# Patient Record
Sex: FEMALE | Race: WHITE | Employment: OTHER | URBAN - METROPOLITAN AREA
[De-identification: names, ages, dates, MRNs, and addresses within clinical notes are randomized per-mention and may not be internally consistent; named-entity substitution may affect disease eponyms.]

---

## 2018-01-05 ENCOUNTER — TRANSFERRED RECORDS (OUTPATIENT)
Dept: HEALTH INFORMATION MANAGEMENT | Facility: CLINIC | Age: 68
End: 2018-01-05

## 2018-01-10 ENCOUNTER — TRANSFERRED RECORDS (OUTPATIENT)
Dept: HEALTH INFORMATION MANAGEMENT | Facility: CLINIC | Age: 68
End: 2018-01-10

## 2018-01-16 ENCOUNTER — TRANSFERRED RECORDS (OUTPATIENT)
Dept: HEALTH INFORMATION MANAGEMENT | Facility: CLINIC | Age: 68
End: 2018-01-16

## 2018-01-16 PROCEDURE — 00000346 ZZHCL STATISTIC REVIEW OUTSIDE SLIDES TC 88321: Performed by: OBSTETRICS & GYNECOLOGY

## 2018-01-17 ENCOUNTER — TRANSFERRED RECORDS (OUTPATIENT)
Dept: HEALTH INFORMATION MANAGEMENT | Facility: CLINIC | Age: 68
End: 2018-01-17

## 2018-01-19 ENCOUNTER — CARE COORDINATION (OUTPATIENT)
Dept: ONCOLOGY | Facility: CLINIC | Age: 68
End: 2018-01-19

## 2018-01-19 NOTE — PROGRESS NOTES
New Patient Pre-Visit Phone Call:    1) Verify time, date & provider:    2) What to bring:  - Medication list and/or bring medication bottles  - List or notebook - write down all questions to address at visit    3) What to expect:   - Briefly walk Pt through their day  - Duration (2-4 hrs) Bring reading material. Family members welcome.  - Possible labs, EKG, CXR    4) Records/information  - PCP:   - Referring MD information:    5) Road construction  6) Use Applico Parking    Maria Victoria Turner RN

## 2018-01-22 ENCOUNTER — ONCOLOGY VISIT (OUTPATIENT)
Dept: ONCOLOGY | Facility: CLINIC | Age: 68
End: 2018-01-22
Attending: OBSTETRICS & GYNECOLOGY
Payer: COMMERCIAL

## 2018-01-22 VITALS
HEART RATE: 82 BPM | TEMPERATURE: 98 F | OXYGEN SATURATION: 96 % | WEIGHT: 284.3 LBS | HEIGHT: 65 IN | RESPIRATION RATE: 16 BRPM | BODY MASS INDEX: 47.37 KG/M2 | SYSTOLIC BLOOD PRESSURE: 145 MMHG | DIASTOLIC BLOOD PRESSURE: 76 MMHG

## 2018-01-22 DIAGNOSIS — C54.1 ENDOMETRIAL CARCINOMA (H): Primary | ICD-10-CM

## 2018-01-22 PROCEDURE — G0463 HOSPITAL OUTPT CLINIC VISIT: HCPCS | Mod: ZF

## 2018-01-22 PROCEDURE — 99205 OFFICE O/P NEW HI 60 MIN: CPT | Mod: ZP | Performed by: OBSTETRICS & GYNECOLOGY

## 2018-01-22 RX ORDER — TRIAMTERENE AND HYDROCHLOROTHIAZIDE 37.5; 25 MG/1; MG/1
1 CAPSULE ORAL EVERY MORNING
Status: ON HOLD | COMMUNITY
End: 2020-01-01

## 2018-01-22 RX ORDER — TRIAMTERENE/HYDROCHLOROTHIAZID 37.5-25 MG
1 TABLET ORAL
COMMUNITY
Start: 2011-11-14 | End: 2018-01-22

## 2018-01-22 RX ORDER — ASPIRIN 81 MG/1
81 TABLET ORAL
COMMUNITY
End: 2018-11-21

## 2018-01-22 RX ORDER — LOSARTAN POTASSIUM 25 MG/1
25 TABLET ORAL
COMMUNITY
Start: 2011-11-14 | End: 2018-02-09

## 2018-01-22 RX ORDER — SIMVASTATIN 20 MG
20 TABLET ORAL
COMMUNITY
Start: 2011-04-21 | End: 2018-02-09

## 2018-01-22 ASSESSMENT — PAIN SCALES - GENERAL: PAINLEVEL: NO PAIN (0)

## 2018-01-22 NOTE — NURSING NOTE
"Oncology Rooming Note    January 22, 2018 2:23 PM   Lu Smith is a 67 year old female who presents for:    Chief Complaint   Patient presents with     Oncology Clinic Visit     new patient visit for consultation related to endometrial adenocarcinoma      Initial Vitals: /76  Pulse 82  Temp 98  F (36.7  C) (Oral)  Resp 16  Ht 1.638 m (5' 4.5\")  Wt 129 kg (284 lb 4.8 oz)  SpO2 96%  Breastfeeding? No  BMI 48.05 kg/m2 Estimated body mass index is 48.05 kg/(m^2) as calculated from the following:    Height as of this encounter: 1.638 m (5' 4.5\").    Weight as of this encounter: 129 kg (284 lb 4.8 oz). Body surface area is 2.42 meters squared.  No Pain (0) Comment: abdomen cramping     No LMP recorded. Patient is postmenopausal.  Allergies reviewed: Yes  Medications reviewed: Yes    Medications: Medication refills not needed today.  Pharmacy [facility] entered into [facility]: [facility] [address] [place] [address] [address] AT [facility] [address]    Clinical concerns: no concerns dr. brothers was notified.    7 minutes for nursing intake (face to face time)     Renea Gonzales CMA              "

## 2018-01-22 NOTE — LETTER
2018       RE: Lu Smith  [address]  [facility] [facility] [address]     Dear Colleague,    Thank you for referring your patient, Lu Smith, to the [facility]. Please see a copy of my visit note below.                            Consult Notes on Referred Patient    Date: 2018       Dr. Jeanne Blancas MD  [facility]  [address]  [place] [address]       RE: Lu Smith  : 1950  HEIDY: 2018    Dear Dr. Jeanne Blancas:    I had the pleasure of seeing your patient Lu Smith here at the Gynecologic Cancer Clinic at the [facility] on 2018.  As you know she is a very pleasant 67 year old woman with a recent diagnosis of high grade serous endometrial cancer.  Given these findings she was subsequently sent to the Gynecologic Cancer Clinic for new patient consultation.   The patient is a , 3 prior vaginal deliveries, went through menopause at age 56.  She started having some spotting and discharge in December.  Pap smear showed adenocarcinoma.  Pap smear as well as endometrial biopsy showed high-grade serous carcinoma of the uterus.  CT was done of the abdomen revealed possible carcinomatosis as well as several enlarged lymph nodes which have been biopsied.  That biopsy is pending.  Otherwise, patient noticed some pelvic cramping and some diarrhea.  Otherwise, no nausea, vomiting, fever or chills.  No change in urinary habits.  No B symptoms.             Past Medical History:  1.  Hypertension.    2.  Diabetes.     3.  Obesity.           Past Surgical History:  1.  Laparoscopy cholecystectomy.     2.  Back surgery.           Health Maintenance:  Health Maintenance Due   Topic Date Due     TETANUS IMMUNIZATION (SYSTEM ASSIGNED)  1970     HEPATITIS C SCREENING  1970     LIPID SCREEN Q5 YR FEMALE (SYSTEM ASSIGNED)  1997     MAMMO SCREEN Q2 YR (SYSTEM ASSIGNED)  2002     COLON CANCER SCREEN (SYSTEM ASSIGNED)   "2002     ADVANCE DIRECTIVE PLANNING Q5 YRS  2007     FALL RISK ASSESSMENT  2017     DEXA SCAN SCREENING (SYSTEM ASSIGNED)  2017     PNEUMOCOCCAL (1 of 2 - PCV13) 2017     INFLUENZA VACCINE (SYSTEM ASSIGNED)  2017       Had an abnormal Pap smear about 20 years ago.  Since then, no abnormal Pap smear until recent one.       Patient just had a colonoscopy last year.             Current Medications:     has a current medication list which includes the following prescription(s): simvastatin, losartan, triamterene-hydrochlorothiazide, aspirin ec, and metformin hcl.       Allergies:     [unfilled]        Social History:     Social History   Substance Use Topics     Smoking status: Former Smoker     Smokeless tobacco: Never Used     Alcohol use Yes       History   Drug Use No           Family History:   1.  The patient's sister has had a lower grade lymphoma since 25 years.     2.  Patient has had breast cancer at age 60.   3.  Mother had colon cancer at age 70.   4.  Father had renal cell carcinoma,  at age 64.   5.  Brother has melanoma at age 61.     6.  Daughter  of leukemia at age 25.     There is further carcinoma of the breast as well as colon cancer in first degree cousins.  No genetic testing done of the family.           Physical Exam:     /76  Pulse 82  Temp 98  F (36.7  C) (Oral)  Resp 16  Ht 1.638 m (5' 4.5\")  Wt 129 kg (284 lb 4.8 oz)  SpO2 96%  Breastfeeding? No  BMI 48.05 kg/m2  Body mass index is 48.05 kg/(m^2).    General Appearance: healthy and alert, no distress    AXILLARY:  No axillary, supra or infraclavicular, axillary or inguinal lymphadenopathy or cervical lymphadenopathy.   ABDOMEN:  Soft, nontender, no adnexal mass or tenderness, obese.   PELVIC:  Normal external genitalia.  The patient has a skin tag on the right thigh that she has had for a long time and is unchanged.  There is normal vaginal mucosa, normal-appearing cervix, no adnexal " masses or tenderness.  Rectovaginal confirms.             Assessment:    Lu Smith is a 67 year old woman with a new diagnosis of high grade serous endometrial cancer.     A total of 60 minutes was spent with the patient, 40 minutes of which were spent in counseling the patient and/or treatment planning.    1.  High-grade serous endometrial carcinoma.   2.  Possible carcinomatosis.   3.  Significant family history for cancers.   4.  Class 3 obesity.   5.  Hypertension.   6.  Diabetes.        Discussed with the patient we will proceed with a PET scan, as well as have her see a genetic counselor.  After we have that PET scan, we will see her back for therapy planning.  Hopefully we will have the biopsy results of the retroperitoneal lymph nodes biopsied.  Depending on the extent of disease, we will proceed with surgical cytoreductive chemotherapy versus chemotherapy followed by surgical cytoreduction, unless she has additional secondary or primary.  The patient agrees with the plan, was very appreciative of her care.  All questions were answered.           Thank you for allowing us to participate in the care of your patient.         Sincerely,    Kevin Henderson MD, MS    Department of Obstetrics and Gynecology   Division of Gynecologic Oncology   [facility]  Phone: [phone]      CC  Patient Care Team:  Levar Scott as PCP - General (Internal Medicine)  Jeanne Blancas MD as Referring Physician (OB/Gyn)

## 2018-01-22 NOTE — MR AVS SNAPSHOT
After Visit Summary   1/22/2018    Lu Smith    MRN: [id number]           Patient Information     Date Of Birth          1950        Visit Information        Provider Department      1/22/2018 2:40 PM Kevin Henderson MD [facility]        Today's Diagnoses     Endometrial carcinoma (H)    -  1       Follow-ups after your visit        Additional Services     CANCER RISK MGMT/CANCER GENETIC COUNSELING REFERRAL       Your provider has referred you to the Cancer Risk Management Program - Cancer Genetic Counseling.    Reason for Referral: Patient has huge family history of cancer and now was recently diagnosed herself     We have a sent a notice to a staff member of the Cancer Risk Management Program to give you a call to assist with scheduling your appointment.  You may also call  [phone] ([phone]) to initiate scheduling.    Please be aware that coverage of these services is subject to the terms and limitations of your health insurance plan.  Call member services at your health plan with any benefit or coverage questions.      Please bring the completed family history sheet to your appointment in addition to any available outside medical records documenting your cancer diagnosis.                  Future tests that were ordered for you today     Open Future Orders        Priority Expected Expires Ordered    PET Oncology Whole Body Routine  1/22/2019 1/22/2018            Who to contact     If you have questions or need follow up information about today's clinic visit or your schedule please contact [facility] directly at [phone].  Normal or non-critical lab and imaging results will be communicated to you by MyChart, letter or phone within 4 business days after the clinic has received the results. If you do not hear from us within 7 days, please contact the clinic through MyChart or phone. If you have a critical or abnormal lab  "result, we will notify you by phone as soon as possible.  Submit refill requests through [employer] or call your pharmacy and they will forward the refill request to us. Please allow 3 business days for your refill to be completed.          Additional Information About Your Visit        [employer] Information     [employer] gives you secure access to your electronic health record. If you see a primary care provider, you can also send messages to your care team and make appointments. If you have questions, please call your primary care clinic.  If you do not have a primary care provider, please call [phone] and they will assist you.        Care EveryWhere ID     This is your Care EveryWhere ID. This could be used by other organizations to access your [place] medical records  [id number]        Your Vitals Were     Pulse Temperature Respirations Height Pulse Oximetry Breastfeeding?    82 98  F (36.7  C) (Oral) 16 1.638 m (5' 4.5\") 96% No    BMI (Body Mass Index)                   48.05 kg/m2            Blood Pressure from Last 3 Encounters:   01/22/18 145/76    Weight from Last 3 Encounters:   01/22/18 129 kg (284 lb 4.8 oz)              We Performed the Following     CANCER RISK MGMT/CANCER GENETIC COUNSELING REFERRAL        Primary Care Provider Office Phone # Fax #    Levar Scott [phone]       [address] [place] [address] [place] [address]        Equal Access to Services     CED COLLINS : Hadii bailee ku hadasho Soomaali, waaxda luqadaha, qaybta kaalmada adeegyada, angela enriquez. So [facility] [phone].    ATENCIÓN: Si habla español, tiene a cortes disposición servicios gratuitos de asistencia lingüística. Arnold al [phone].    We comply with applicable federal civil rights laws and [place] laws. We do not discriminate on the basis of race, color, national origin, age, disability, sex, sexual orientation, or gender identity.            Thank you!     Thank " you for choosing [facility]  for your care. Our goal is always to provide you with excellent care. Hearing back from our patients is one way we can continue to improve our services. Please take a few minutes to complete the written survey that you may receive in the mail after your visit with us. Thank you!             Your Updated Medication List - Protect others around you: Learn how to safely use, store and throw away your medicines at www.disposemymeds.org.          This list is accurate as of: 1/22/18  3:53 PM.  Always use your most recent med list.                   Brand Name Dispense Instructions for use Diagnosis    aspirin EC 81 MG EC tablet      Take 81 mg by mouth        losartan 25 MG tablet    COZAAR     Take 25 mg by mouth        METFORMIN HCL PO      Take by mouth daily        simvastatin 20 MG tablet    ZOCOR     Take 20 mg by mouth        triamterene-hydrochlorothiazide 37.5-25 MG per capsule    DYAZIDE     Take 1 capsule by mouth

## 2018-01-22 NOTE — PROGRESS NOTES
Consult Notes on Referred Patient    Date: 2018       Dr. Jeanne Blancas MD  [facility]  [address]  [place] [address]       RE: Lu Smith  : 1950  HEIDY: 2018    Dear Dr. Jeanne Blancas:    I had the pleasure of seeing your patient Lu Smith here at the Gynecologic Cancer Clinic at the [facility] on 2018.  As you know she is a very pleasant 67 year old woman with a recent diagnosis of high grade serous endometrial cancer.  Given these findings she was subsequently sent to the Gynecologic Cancer Clinic for new patient consultation.   The patient is a , 3 prior vaginal deliveries, went through menopause at age 56.  She started having some spotting and discharge in December.  Pap smear showed adenocarcinoma.  Pap smear as well as endometrial biopsy showed high-grade serous carcinoma of the uterus.  CT was done of the abdomen revealed possible carcinomatosis as well as several enlarged lymph nodes which have been biopsied.  That biopsy is pending.  Otherwise, patient noticed some pelvic cramping and some diarrhea.  Otherwise, no nausea, vomiting, fever or chills.  No change in urinary habits.  No B symptoms.             Past Medical History:  1.  Hypertension.    2.  Diabetes.     3.  Obesity.           Past Surgical History:  1.  Laparoscopy cholecystectomy.     2.  Back surgery.           Health Maintenance:  Health Maintenance Due   Topic Date Due     TETANUS IMMUNIZATION (SYSTEM ASSIGNED)  1970     HEPATITIS C SCREENING  1970     LIPID SCREEN Q5 YR FEMALE (SYSTEM ASSIGNED)  1997     MAMMO SCREEN Q2 YR (SYSTEM ASSIGNED)  2002     COLON CANCER SCREEN (SYSTEM ASSIGNED)  2002     ADVANCE DIRECTIVE PLANNING Q5 YRS  2007     FALL RISK ASSESSMENT  2017     DEXA SCAN SCREENING (SYSTEM ASSIGNED)  2017     PNEUMOCOCCAL (1 of 2 - PCV13) 2017     INFLUENZA VACCINE (SYSTEM  "ASSIGNED)  2017       Had an abnormal Pap smear about 20 years ago.  Since then, no abnormal Pap smear until recent one.       Patient just had a colonoscopy last year.             Current Medications:     has a current medication list which includes the following prescription(s): simvastatin, losartan, triamterene-hydrochlorothiazide, aspirin ec, and metformin hcl.       Allergies:     [unfilled]        Social History:     Social History   Substance Use Topics     Smoking status: Former Smoker     Smokeless tobacco: Never Used     Alcohol use Yes       History   Drug Use No           Family History:   1.  The patient's sister has had a lower grade lymphoma since 25 years.     2.  Patient has had breast cancer at age 60.   3.  Mother had colon cancer at age 70.   4.  Father had renal cell carcinoma,  at age 64.   5.  Brother has melanoma at age 61.     6.  Daughter  of leukemia at age 25.     There is further carcinoma of the breast as well as colon cancer in first degree cousins.  No genetic testing done of the family.           Physical Exam:     /76  Pulse 82  Temp 98  F (36.7  C) (Oral)  Resp 16  Ht 1.638 m (5' 4.5\")  Wt 129 kg (284 lb 4.8 oz)  SpO2 96%  Breastfeeding? No  BMI 48.05 kg/m2  Body mass index is 48.05 kg/(m^2).    General Appearance: healthy and alert, no distress    AXILLARY:  No axillary, supra or infraclavicular, axillary or inguinal lymphadenopathy or cervical lymphadenopathy.   ABDOMEN:  Soft, nontender, no adnexal mass or tenderness, obese.   PELVIC:  Normal external genitalia.  The patient has a skin tag on the right thigh that she has had for a long time and is unchanged.  There is normal vaginal mucosa, normal-appearing cervix, no adnexal masses or tenderness.  Rectovaginal confirms.             Assessment:    Lu Smith is a 67 year old woman with a new diagnosis of high grade serous endometrial cancer.     A total of 60 minutes was spent with the patient, " 40 minutes of which were spent in counseling the patient and/or treatment planning.    1.  High-grade serous endometrial carcinoma.   2.  Possible carcinomatosis.   3.  Significant family history for cancers.   4.  Class 3 obesity.   5.  Hypertension.   6.  Diabetes.        Discussed with the patient we will proceed with a PET scan, as well as have her see a genetic counselor.  After we have that PET scan, we will see her back for therapy planning.  Hopefully we will have the biopsy results of the retroperitoneal lymph nodes biopsied.  Depending on the extent of disease, we will proceed with surgical cytoreductive chemotherapy versus chemotherapy followed by surgical cytoreduction, unless she has additional secondary or primary.  The patient agrees with the plan, was very appreciative of her care.  All questions were answered.           Thank you for allowing us to participate in the care of your patient.         Sincerely,    Kevin Henderson MD, MS    Department of Obstetrics and Gynecology   Division of Gynecologic Oncology   [facility]  Phone: [phone]      CC  Patient Care Team:  Levar Scott as PCP - General (Internal Medicine)  Jeanne Ramirez MD as Referring Physician (OB/Gyn)  JEANNE RAMIREZ

## 2018-01-30 ENCOUNTER — TELEPHONE (OUTPATIENT)
Dept: ONCOLOGY | Facility: CLINIC | Age: 68
End: 2018-01-30

## 2018-01-30 NOTE — TELEPHONE ENCOUNTER
"Patient called triage to discuss her displeasure on how long it is taking to get a diagnosis and plan.     Patient was upset that she is having a PET scan completed on Feb 6th and will not be seeing Dr. Henderson until Feb 19th. Patient was wanting to see a different MD sooner. Patient has a sister that had lymphoma that spread \"wildly\" because doctors did not move fast enough. RN listened to all her concerns.     RN stated that patient could see if there were sooner openings for the PET scan and once we have those results we will know more.     RN discussed plan in great detail.     Patient was very grateful for RN time and agrees to the plan.     Maria Victoria Turner RN    "

## 2018-02-01 PROCEDURE — 00000346 ZZHCL STATISTIC REVIEW OUTSIDE SLIDES TC 88321: Performed by: OBSTETRICS & GYNECOLOGY

## 2018-02-06 ENCOUNTER — RADIANT APPOINTMENT (OUTPATIENT)
Dept: PET IMAGING | Facility: CLINIC | Age: 68
End: 2018-02-06
Attending: OBSTETRICS & GYNECOLOGY
Payer: MEDICARE

## 2018-02-06 DIAGNOSIS — C54.1 ENDOMETRIAL CARCINOMA (H): ICD-10-CM

## 2018-02-06 LAB
CREAT BLD-MCNC: 0.7 MG/DL (ref 0.5–1.2)
GFR SERPL CREATININE-BSD FRML MDRD: >90 ML/MIN/{1.73_M2}
GFRB: NORMAL
GLUCOSE SERPL-MCNC: 130 MG/DL (ref 70–99)

## 2018-02-06 RX ORDER — FUROSEMIDE 10 MG/ML
40 INJECTION INTRAMUSCULAR; INTRAVENOUS ONCE
Status: COMPLETED | OUTPATIENT
Start: 2018-02-06 | End: 2018-02-06

## 2018-02-06 RX ADMIN — FUROSEMIDE 40 MG: 10 INJECTION INTRAMUSCULAR; INTRAVENOUS at 09:53

## 2018-02-06 NOTE — DISCHARGE INSTRUCTIONS

## 2018-02-08 DIAGNOSIS — C54.1 ENDOMETRIAL CANCER (H): Primary | ICD-10-CM

## 2018-02-08 RX ORDER — CLINDAMYCIN PHOSPHATE 900 MG/50ML
900 INJECTION, SOLUTION INTRAVENOUS SEE ADMIN INSTRUCTIONS
Status: CANCELLED | OUTPATIENT
Start: 2018-02-08

## 2018-02-08 RX ORDER — CLINDAMYCIN PHOSPHATE 900 MG/50ML
900 INJECTION, SOLUTION INTRAVENOUS
Status: CANCELLED | OUTPATIENT
Start: 2018-02-08

## 2018-02-08 NOTE — PROGRESS NOTES
Result reviewed. Patient will have surgery Feb. 23 and will need to see PAC ASAP for clearance. Patient aware of and agrees with the plan. Order placed.

## 2018-02-09 ENCOUNTER — APPOINTMENT (OUTPATIENT)
Dept: SURGERY | Facility: CLINIC | Age: 68
End: 2018-02-09
Payer: MEDICARE

## 2018-02-09 ENCOUNTER — ALLIED HEALTH/NURSE VISIT (OUTPATIENT)
Dept: SURGERY | Facility: CLINIC | Age: 68
End: 2018-02-09
Payer: MEDICARE

## 2018-02-09 ENCOUNTER — OFFICE VISIT (OUTPATIENT)
Dept: SURGERY | Facility: CLINIC | Age: 68
End: 2018-02-09
Payer: MEDICARE

## 2018-02-09 ENCOUNTER — ANESTHESIA EVENT (OUTPATIENT)
Dept: SURGERY | Facility: CLINIC | Age: 68
End: 2018-02-09

## 2018-02-09 VITALS
WEIGHT: 288.5 LBS | DIASTOLIC BLOOD PRESSURE: 87 MMHG | OXYGEN SATURATION: 97 % | TEMPERATURE: 98 F | BODY MASS INDEX: 48.07 KG/M2 | RESPIRATION RATE: 16 BRPM | HEIGHT: 65 IN | SYSTOLIC BLOOD PRESSURE: 147 MMHG | HEART RATE: 74 BPM

## 2018-02-09 DIAGNOSIS — C54.1 ENDOMETRIAL CANCER (H): ICD-10-CM

## 2018-02-09 DIAGNOSIS — Z01.818 PREOP EXAMINATION: Primary | ICD-10-CM

## 2018-02-09 LAB
ANION GAP SERPL CALCULATED.3IONS-SCNC: 6 MMOL/L (ref 3–14)
BUN SERPL-MCNC: 11 MG/DL (ref 7–30)
CALCIUM SERPL-MCNC: 9.1 MG/DL (ref 8.5–10.1)
CHLORIDE SERPL-SCNC: 101 MMOL/L (ref 94–109)
CO2 SERPL-SCNC: 28 MMOL/L (ref 20–32)
CREAT SERPL-MCNC: 0.62 MG/DL (ref 0.52–1.04)
ERYTHROCYTE [DISTWIDTH] IN BLOOD BY AUTOMATED COUNT: 14.1 % (ref 10–15)
GFR SERPL CREATININE-BSD FRML MDRD: >90 ML/MIN/1.7M2
GLUCOSE SERPL-MCNC: 106 MG/DL (ref 70–99)
HBA1C MFR BLD: 6.7 % (ref 4.3–6)
HCT VFR BLD AUTO: 40.7 % (ref 35–47)
HGB BLD-MCNC: 13 G/DL (ref 11.7–15.7)
INR PPP: 0.99 (ref 0.86–1.14)
MCH RBC QN AUTO: 28 PG (ref 26.5–33)
MCHC RBC AUTO-ENTMCNC: 31.9 G/DL (ref 31.5–36.5)
MCV RBC AUTO: 88 FL (ref 78–100)
PLATELET # BLD AUTO: 336 10E9/L (ref 150–450)
POTASSIUM SERPL-SCNC: 4.2 MMOL/L (ref 3.4–5.3)
RBC # BLD AUTO: 4.64 10E12/L (ref 3.8–5.2)
SODIUM SERPL-SCNC: 134 MMOL/L (ref 133–144)
WBC # BLD AUTO: 9 10E9/L (ref 4–11)

## 2018-02-09 PROCEDURE — 86901 BLOOD TYPING SEROLOGIC RH(D): CPT | Performed by: NURSE PRACTITIONER

## 2018-02-09 PROCEDURE — 85610 PROTHROMBIN TIME: CPT | Performed by: NURSE PRACTITIONER

## 2018-02-09 PROCEDURE — 83036 HEMOGLOBIN GLYCOSYLATED A1C: CPT | Performed by: NURSE PRACTITIONER

## 2018-02-09 PROCEDURE — 86850 RBC ANTIBODY SCREEN: CPT | Performed by: NURSE PRACTITIONER

## 2018-02-09 PROCEDURE — 85027 COMPLETE CBC AUTOMATED: CPT | Performed by: NURSE PRACTITIONER

## 2018-02-09 PROCEDURE — 86900 BLOOD TYPING SEROLOGIC ABO: CPT | Performed by: NURSE PRACTITIONER

## 2018-02-09 PROCEDURE — 80048 BASIC METABOLIC PNL TOTAL CA: CPT | Performed by: NURSE PRACTITIONER

## 2018-02-09 RX ORDER — IBUPROFEN 200 MG
200 TABLET ORAL EVERY 4 HOURS PRN
COMMUNITY
End: 2018-03-19

## 2018-02-09 ASSESSMENT — LIFESTYLE VARIABLES: TOBACCO_USE: 1
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
## 2018-02-09 NOTE — PATIENT INSTRUCTIONS
Preparing for Your Surgery      Name:  Lu Smith   MRN:  [id number]   :  1950   Today's Date:  2018     Arriving for surgery:  Surgery date:  18  Arrival time:  0530 AM Friday  Please come to:       [facility] [address]  [address] [place]  [address]    -   parking is available in front of the hospital from 5:15 am to 8:00 pm    -  Stop at the Information Desk in the lobby    -   Inform the information person that you are here for surgery. An escort to 3c will be provided. If you would not like an escort, please proceed to 3C on the 3rd floor. [phone]     What can I eat or drink?  -  You may have solid food or milk products until 8 hours prior to your surgery.11 PM Thursday Jacqui.  -  You may have water, apple juice or 7up/Sprite until 2 hours prior to your surgery. 0530 AM Friday AM    Which medicines can I take?  -  Do NOT take these medications in the morning, the day of surgery:  HOLD METFORMIN, AM OF SURGERY. CONTINUE TO HOLD ASPIRIN.   HOLD IBUPROFEN 24 HOURS BEFORE SURGERY.    -  Please take these medications the day of surgery:  SCHEDULED MEDS. TYLENOL FOR DISCOMFORT TAKE DYAZIDE AM OF SURGERY.    How do I prepare myself?  -  Take two showers: one the night before surgery; and one the morning of surgery.         Use Scrubcare or Hibiclens to wash from neck down.  You may use your own shampoo and conditioner. No other hair products.   -  Do NOT use lotion, powder, deodorant, or antiperspirant the day of your surgery.  -  Do NOT wear any makeup, fingernail polish or jewelry.  -  Begin using Incentive Spirometer 1 week prior to surgery.  Use 4 times per day, up to 5 breaths each time.  Bring Incentive Spirometer to hospital.  -Do not bring your own medications to the hospital, except for inhalers and eye drops.  -  Bring your ID and insurance card.    Questions or Concerns:  If you have questions or concerns, please call  the  Preoperative Assessment Center, Monday-Friday 7AM-7PM:  [phone] YOUR SURGERY  Breathing exercises   Breathing exercises help you recover faster. Take deep breaths and let the air out slowly. This will:     Help you wake up after surgery.    Help prevent complications like pneumonia.  Preventing complications will help you go home sooner.   We may give you a breathing device (incentive spirometer) to encourage you to breathe deeply.   Nausea and vomiting   You may feel sick to your stomach after surgery; if so, let your nurse know.    Pain control:  After surgery, you may have pain. Our goal is to help you manage your pain. Pain medicine will help you feel comfortable enough to do activities that will help you heal.  These activities may include breathing exercises, walking and physical therapy.   To help your health care team treat your pain we will ask: 1) If you have pain  2) where it is located 3) describe your pain in your words  Methods of pain control include medications given by mouth, vein or by nerve block for some surgeries.  We may give you a pain control pump that will:  1) Deliver the medicine through a tube placed in your vein  2) Control the amount of medicine you receive  3) Allow you to push a button to deliver a dose of pain medicine  Sequential Compression Device (SCD) or Pneumo Boots:  You may need to wear SCD S on your legs or feet. These are wraps connected to a machine that pumps in air and releases it. The repeated pumping helps prevent blood clots from forming.     Using an Incentive Spirometer    An incentive spirometer is a device that helps you do deep breathing exercises. These exercises expand your lungs, aid in circulation, and help prevent pneumonia. Deep breathing exercises also help you breathe better and improve the function of your lungs by:    Keeping your lungs clear    Strengthening your breathing muscles    Helping prevent respiratory complications or  problems  The incentive spirometer gives you a way to take an active part in your care. A nurse or therapist will teach you breathing exercises. To do these exercises, you will breathe in through your mouth and not your nose. The incentive spirometer only works correctly if you breathe in through your mouth.    Steps to clear lungs  Step 1. Exhale normally. Then, inhale normally.    Relax and breathe out.  Step 2. Place your lips tightly around the mouthpiece.    Make sure the device is upright and not tilted.  Step 3. Inhale as much air as you can through the mouthpiece (don't breath through your nose).    Inhale slowly and deeply.    Hold your breath long enough to keep the balls or disk raised for at least 3 to 5 seconds, or as instructed by your healthcare provider.  Step 4. Repeat the exercise regularly.    Begin using the Incentive Spirometer one week prior to your surgery, 4 times per day-5 times each.

## 2018-02-09 NOTE — NURSING NOTE
Chief Complaint   Patient presents with     Lab Only     labs drawn with vpt by rn.     Labs drawn with vpt by rn.  Pt tolerated well.    Madeleine Rhodes RN

## 2018-02-09 NOTE — MR AVS SNAPSHOT
After Visit Summary   2018    Lu Smith    MRN: [id number]           Patient Information     Date Of Birth          1950        Visit Information        Provider Department      2018 8:30 AM Rn, [facility]        Care Instructions    Preparing for Your Surgery      Name:  Lu Smith   MRN:  [id number]   :  1950   Today's Date:  2018     Arriving for surgery:  Surgery date:  18  Arrival time:  05 AM Friday  Please come to:       [facility] [address]  [address] [place]  [address]    -   parking is available in front of the hospital from 5:15 am to 8:00 pm    -  Stop at the Information Desk in the lobby    -   Inform the information person that you are here for surgery. An escort to 3c will be provided. If you would not like an escort, please proceed to 3C on the 3rd floor. [phone]     What can I eat or drink?  -  You may have solid food or milk products until 8 hours prior to your surgery.11 PM Thursday Jacqui.  -  You may have water, apple juice or 7up/Sprite until 2 hours prior to your surgery. 0530 AM Friday AM    Which medicines can I take?  -  Do NOT take these medications in the morning, the day of surgery:  HOLD METFORMIN, AM OF SURGERY. CONTINUE TO HOLD ASPIRIN.   HOLD IBUPROFEN 24 HOURS BEFORE SURGERY.    -  Please take these medications the day of surgery:  SCHEDULED MEDS. TYLENOL FOR DISCOMFORT TAKE DYAZIDE AM OF SURGERY.    How do I prepare myself?  -  Take two showers: one the night before surgery; and one the morning of surgery.         Use Scrubcare or Hibiclens to wash from neck down.  You may use your own shampoo and conditioner. No other hair products.   -  Do NOT use lotion, powder, deodorant, or antiperspirant the day of your surgery.  -  Do NOT wear any makeup, fingernail polish or jewelry.  -  Begin using Incentive Spirometer 1 week prior to  surgery.  Use 4 times per day, up to 5 breaths each time.  Bring Incentive Spirometer to hospital.  -Do not bring your own medications to the hospital, except for inhalers and eye drops.  -  Bring your ID and insurance card.    Questions or Concerns:  If you have questions or concerns, please call the  Preoperative Assessment Center, Monday-Friday 7AM-7PM:  [phone] YOUR SURGERY  Breathing exercises   Breathing exercises help you recover faster. Take deep breaths and let the air out slowly. This will:     Help you wake up after surgery.    Help prevent complications like pneumonia.  Preventing complications will help you go home sooner.   We may give you a breathing device (incentive spirometer) to encourage you to breathe deeply.   Nausea and vomiting   You may feel sick to your stomach after surgery; if so, let your nurse know.    Pain control:  After surgery, you may have pain. Our goal is to help you manage your pain. Pain medicine will help you feel comfortable enough to do activities that will help you heal.  These activities may include breathing exercises, walking and physical therapy.   To help your health care team treat your pain we will ask: 1) If you have pain  2) where it is located 3) describe your pain in your words  Methods of pain control include medications given by mouth, vein or by nerve block for some surgeries.  We may give you a pain control pump that will:  1) Deliver the medicine through a tube placed in your vein  2) Control the amount of medicine you receive  3) Allow you to push a button to deliver a dose of pain medicine  Sequential Compression Device (SCD) or Pneumo Boots:  You may need to wear SCD S on your legs or feet. These are wraps connected to a machine that pumps in air and releases it. The repeated pumping helps prevent blood clots from forming.     Using an Incentive Spirometer    An incentive spirometer is a device that helps you do deep breathing exercises. These  exercises expand your lungs, aid in circulation, and help prevent pneumonia. Deep breathing exercises also help you breathe better and improve the function of your lungs by:    Keeping your lungs clear    Strengthening your breathing muscles    Helping prevent respiratory complications or problems  The incentive spirometer gives you a way to take an active part in your care. A nurse or therapist will teach you breathing exercises. To do these exercises, you will breathe in through your mouth and not your nose. The incentive spirometer only works correctly if you breathe in through your mouth.    Steps to clear lungs  Step 1. Exhale normally. Then, inhale normally.    Relax and breathe out.  Step 2. Place your lips tightly around the mouthpiece.    Make sure the device is upright and not tilted.  Step 3. Inhale as much air as you can through the mouthpiece (don't breath through your nose).    Inhale slowly and deeply.    Hold your breath long enough to keep the balls or disk raised for at least 3 to 5 seconds, or as instructed by your healthcare provider.  Step 4. Repeat the exercise regularly.    Begin using the Incentive Spirometer one week prior to your surgery, 4 times per day-5 times each.                    Follow-ups after your visit        Your next 10 appointments already scheduled     Feb 09, 2018  9:15 AM CST   [facility] Draw with  [facility]   [facility] Draw ([facility])    [address]  [address]  [facility] [address]   [phone]            Feb 19, 2018  4:00 PM CST   Ech Complete with UCECHCR2   [facility] Echo ([facility])    [address]  3rd Floor  [facility] [address]   [phone]           1. Please bring or wear a comfortable two-piece outfit. 2. You may eat, drink and take your normal medicines. 3. For any questions that cannot be answered, please contact the ordering physician            Feb 19,  2018  5:20 PM CST   (Arrive by 5:05 PM)   Return Visit with Kevin Henderson MD   [facility]    [address]  [address]  [facility] [address] [phone]            Feb 23, 2018   Procedure with Kevin Henderson MD   [facility] [place], Same Day Surgery (--)    [address]  [facility] [id number] [phone]              Future tests that were ordered for you today     Open Future Orders        Priority Expected Expires Ordered    ABO/Rh type and screen Routine 2/9/2018 3/11/2018 2/9/2018    Basic metabolic panel Routine 2/9/2018 3/11/2018 2/9/2018    CBC with platelets Routine 2/9/2018 3/11/2018 2/9/2018    Echocardiogram Routine 2/9/2018 3/11/2018 2/9/2018    Hemoglobin A1c Routine 2/9/2018 3/11/2018 2/9/2018    INR Routine 2/9/2018 3/11/2018 2/9/2018            Who to contact     Please call your clinic at [phone] to:    Ask questions about your health    Make or cancel appointments    Discuss your medicines    Learn about your test results    Speak to your doctor            Additional Information About Your Visit        [employer] Information     [employer] gives you secure access to your electronic health record. If you see a primary care provider, you can also send messages to your care team and make appointments. If you have questions, please call your primary care clinic.  If you do not have a primary care provider, please call [phone] and they will assist you.      [employer] is an electronic gateway that provides easy, online access to your medical records. With [employer], you can request a clinic appointment, read your test results, renew a prescription or communicate with your care team.     To access your existing account, please contact your [facility] or call [phone] for assistance.        Care EveryWhere ID     This is your Care EveryWhere ID. This could be used by other organizations to  "access your [place] medical records  [id number]         Blood Pressure from Last 3 Encounters:   02/09/18 147/87   01/22/18 145/76    Weight from Last 3 Encounters:   02/09/18 130.9 kg (288 lb 8 oz)   01/22/18 129 kg (284 lb 4.8 oz)              Today, you had the following     No orders found for display       Primary Care Provider Office Phone # Fax #    Levar Scott [phone]       [address] [place] [address] [place] [address]        Equal Access to Services     [facility]: Hadii bailee lafleur Soeliezer, waaxda luqadaha, qaybta kaalmada wing, angela greenfield . So [facility] [phone].    ATENCIÓN: Si habla español, tiene a cortes disposición servicios gratuitos de asistencia lingüística. [facility] [phone].    We comply with applicable federal civil rights laws and [place] laws. We do not discriminate on the basis of race, color, national origin, age, disability, sex, sexual orientation, or gender identity.            Thank you!     Thank you for choosing [facility]  for your care. Our goal is always to provide you with excellent care. Hearing back from our patients is one way we can continue to improve our services. Please take a few minutes to complete the written survey that you may receive in the mail after your visit with us. Thank you!             Your Updated Medication List - Protect others around you: Learn how to safely use, store and throw away your medicines at www.disposemymeds.org.          This list is accurate as of 2/9/18  9:14 AM.  Always use your most recent med list.                   Brand Name Dispense Instructions for use Diagnosis    aspirin EC 81 MG EC tablet      Take 81 mg by mouth Pt states, \"I don't take it regularly. I take it when I remember to\".        ibuprofen 200 MG tablet    ADVIL/MOTRIN     Take 200 mg by mouth every 4 hours as needed for mild pain        METFORMIN HCL PO      Take " 500 mg by mouth daily (with breakfast)        triamterene-hydrochlorothiazide 37.5-25 MG per capsule    DYAZIDE     Take 1 capsule by mouth every morning

## 2018-02-12 ENCOUNTER — DOCUMENTATION ONLY (OUTPATIENT)
Dept: ONCOLOGY | Facility: CLINIC | Age: 68
End: 2018-02-12

## 2018-02-12 ASSESSMENT — ENCOUNTER SYMPTOMS
ORTHOPNEA: 0
NERVOUS/ANXIOUS: 1
CHILLS: 0
SLEEP DISTURBANCES DUE TO BREATHING: 0
WEIGHT LOSS: 0
POLYDIPSIA: 0
POLYPHAGIA: 0
DECREASED CONCENTRATION: 1
PALPITATIONS: 0
FATIGUE: 1
INCREASED ENERGY: 1
PANIC: 0
HOT FLASHES: 0
EXERCISE INTOLERANCE: 0
INSOMNIA: 0
WEIGHT GAIN: 1
FEVER: 0
HYPOTENSION: 0
HALLUCINATIONS: 0
DECREASED LIBIDO: 0
HYPERTENSION: 1
ALTERED TEMPERATURE REGULATION: 0
NIGHT SWEATS: 0
SYNCOPE: 0
DEPRESSION: 0
LEG PAIN: 0
LIGHT-HEADEDNESS: 0
DECREASED APPETITE: 0

## 2018-02-15 LAB — COPATH REPORT: NORMAL

## 2018-02-19 ENCOUNTER — RADIANT APPOINTMENT (OUTPATIENT)
Dept: CARDIOLOGY | Facility: CLINIC | Age: 68
End: 2018-02-19
Payer: MEDICARE

## 2018-02-19 ENCOUNTER — ONCOLOGY VISIT (OUTPATIENT)
Dept: ONCOLOGY | Facility: CLINIC | Age: 68
End: 2018-02-19
Attending: OBSTETRICS & GYNECOLOGY
Payer: COMMERCIAL

## 2018-02-19 VITALS
HEART RATE: 75 BPM | WEIGHT: 286 LBS | OXYGEN SATURATION: 97 % | BODY MASS INDEX: 48.33 KG/M2 | TEMPERATURE: 95.5 F | DIASTOLIC BLOOD PRESSURE: 93 MMHG | RESPIRATION RATE: 18 BRPM | SYSTOLIC BLOOD PRESSURE: 160 MMHG

## 2018-02-19 DIAGNOSIS — C54.1 ENDOMETRIAL CANCER (H): ICD-10-CM

## 2018-02-19 DIAGNOSIS — C54.1 ENDOMETRIAL CANCER (H): Primary | ICD-10-CM

## 2018-02-19 PROCEDURE — 40000114 ZZH STATISTIC NO CHARGE CLINIC VISIT

## 2018-02-19 PROCEDURE — 99214 OFFICE O/P EST MOD 30 MIN: CPT | Mod: 57 | Performed by: OBSTETRICS & GYNECOLOGY

## 2018-02-19 ASSESSMENT — PAIN SCALES - GENERAL: PAINLEVEL: MODERATE PAIN (5)

## 2018-02-19 NOTE — LETTER
2018       RE: Lu Smith  [address] [address]     Dear Colleague,    Thank you for referring your patient, Lu Smith, to the [facility]. Please see a copy of my visit note below.                Follow Up Notes on Referred Patient    Date: 2018       Dr. Salvador Eugene MD  No address on file       RE: Lu Smith  : 1950  HEIDY: 2018    Dear Dr. Salvador Eugene:    Lu Smith is a 67 year old woman with a diagnosis of high grade serous endometrial cancer.     Patient presents today for followup and to discuss her results.  No new symptoms since the last time I have seen her.         Past Medical History:    Past Medical History:   Diagnosis Date     Diabetes (H)     Type II     Endometrial cancer determined by uterine biopsy (H) 2018     HTN (hypertension)      Obesity      Osteoarthritis          Past Surgical History:    Past Surgical History:   Procedure Laterality Date     CHOLECYSTECTOMY       Partial lumbar discectomy           Health Maintenance Due   Topic Date Due     TETANUS IMMUNIZATION (SYSTEM ASSIGNED)  1970     HEPATITIS C SCREENING  1970     LIPID SCREEN Q5 YR FEMALE (SYSTEM ASSIGNED)  1997     MAMMO SCREEN Q2 YR (SYSTEM ASSIGNED)  2002     COLON CANCER SCREEN (SYSTEM ASSIGNED)  2002     ADVANCE DIRECTIVE PLANNING Q5 YRS  2007     FALL RISK ASSESSMENT  2017     DEXA SCAN SCREENING (SYSTEM ASSIGNED)  2017     PNEUMOCOCCAL (1 of 2 - PCV13) 2017     INFLUENZA VACCINE (SYSTEM ASSIGNED)  2017       Current Medications:     Current Outpatient Prescriptions   Medication Sig Dispense Refill     triamterene-hydrochlorothiazide (DYAZIDE) 37.5-25 MG per capsule Take 1 capsule by mouth every morning        METFORMIN HCL PO Take 500 mg by mouth daily (with breakfast)        ibuprofen (ADVIL/MOTRIN) 200 MG tablet Take 200 mg by mouth every 4 hours as needed for mild pain    "    aspirin EC 81 MG EC tablet Take 81 mg by mouth Pt states, \"I don't take it regularly. I take it when I remember to\".           Allergies:        Allergies   Allergen Reactions     Ace Inhibitors Cough     Amoxicillin Hives        Social History:     Social History   Substance Use Topics     Smoking status: Former Smoker     Packs/day: 0.25     Years: 20.00     Smokeless tobacco: Never Used      Comment: Quite smoking 1992     Alcohol use Yes      Comment: 4-7/week if out 1-2x's/week       History   Drug Use No         Family History:       Family History   Problem Relation Age of Onset     Colon Cancer Mother      Breast Cancer Sister      Lymphoma Sister          Physical Exam:     BP (!) 160/93  Pulse 75  Temp 95.5  F (35.3  C) (Oral)  Resp 18  Wt 129.7 kg (286 lb)  SpO2 97%  BMI 48.33 kg/m2  Body mass index is 48.33 kg/(m^2).    General Appearance: healthy and alert, no distress      Assessment:    uL Smith is a 67 year old woman with a diagnosis of high grade serous endometrial cancer.     A total of 30 minutes was spent with the patient, 25 minutes of which were spent in counseling the patient and/or treatment planning.    1.  High-grade serous endometrial carcinoma.   2.  Intraabdominal nodule with atypia.   3.  Dilated pulmonary artery.        The patient has seen my colleagues in Anesthesia.  We will wait for the echo from today.  If that shows no sign of pulmonary hypertension, we will proceed with robotic hysterectomy, bilateral salpingo-oophorectomy, pelvic and periaortic lymphadenectomy, omentectomy and possible debulking on Friday.  Discussed with the patient it is possible we will find intraperitoneal carcinomatosis but will not know until that point.  The patient as well as her sister agrees with the plan.  She is very appreciative of her care.  All questions were answered.       Risks, benefits and alternatives to proceed discussed in detail with the patient. Risks include but are not " limited to bleeding, infection, possible injury to surrounding organs including bowel, bladder, ureter, need for second procedure/surgery related to complications from first procedure, postoperative medical complications such as cardiopulmonary events, lymphedema, lymphocyst, thromboembolic events. Consent for surgery, blood transfusion signed.  Will arrange appropriate preoperative blood work, CXR, EKG. Patient also advised on need for postoperative surveillance and/or adjuvant therapy. Questions answered.    Kevin Henderson MD, MS    Department of Obstetrics and Gynecology   Division of Gynecologic Oncology   [facility]  Phone: [phone]      CC  Patient Care Team:  Levar Scott as PCP - General (Internal Medicine)  Jeanne Blancas MD as Referring Physician (OB/Gyn)

## 2018-02-19 NOTE — PROGRESS NOTES
"            Follow Up Notes on Referred Patient    Date: 2018       Dr. Salvador Eugene MD  No address on file       RE: Lu Smith  : 1950  HEIDY: 2018    Dear Dr. Salvador Eugene:    Lu Smith is a 67 year old woman with a diagnosis of high grade serous endometrial cancer.     Patient presents today for followup and to discuss her results.  No new symptoms since the last time I have seen her.         Past Medical History:    Past Medical History:   Diagnosis Date     Diabetes (H)     Type II     Endometrial cancer determined by uterine biopsy (H) 2018     HTN (hypertension)      Obesity      Osteoarthritis          Past Surgical History:    Past Surgical History:   Procedure Laterality Date     CHOLECYSTECTOMY       Partial lumbar discectomy           Health Maintenance Due   Topic Date Due     TETANUS IMMUNIZATION (SYSTEM ASSIGNED)  1970     HEPATITIS C SCREENING  1970     LIPID SCREEN Q5 YR FEMALE (SYSTEM ASSIGNED)  1997     MAMMO SCREEN Q2 YR (SYSTEM ASSIGNED)  2002     COLON CANCER SCREEN (SYSTEM ASSIGNED)  2002     ADVANCE DIRECTIVE PLANNING Q5 YRS  2007     FALL RISK ASSESSMENT  2017     DEXA SCAN SCREENING (SYSTEM ASSIGNED)  2017     PNEUMOCOCCAL (1 of 2 - PCV13) 2017     INFLUENZA VACCINE (SYSTEM ASSIGNED)  2017       Current Medications:     Current Outpatient Prescriptions   Medication Sig Dispense Refill     triamterene-hydrochlorothiazide (DYAZIDE) 37.5-25 MG per capsule Take 1 capsule by mouth every morning        METFORMIN HCL PO Take 500 mg by mouth daily (with breakfast)        ibuprofen (ADVIL/MOTRIN) 200 MG tablet Take 200 mg by mouth every 4 hours as needed for mild pain       aspirin EC 81 MG EC tablet Take 81 mg by mouth Pt states, \"I don't take it regularly. I take it when I remember to\".           Allergies:        Allergies   Allergen Reactions     Ace Inhibitors Cough     Amoxicillin Hives    "     Social History:     Social History   Substance Use Topics     Smoking status: Former Smoker     Packs/day: 0.25     Years: 20.00     Smokeless tobacco: Never Used      Comment: Quite smoking 1992     Alcohol use Yes      Comment: 4-7/week if out 1-2x's/week       History   Drug Use No         Family History:       Family History   Problem Relation Age of Onset     Colon Cancer Mother      Breast Cancer Sister      Lymphoma Sister          Physical Exam:     BP (!) 160/93  Pulse 75  Temp 95.5  F (35.3  C) (Oral)  Resp 18  Wt 129.7 kg (286 lb)  SpO2 97%  BMI 48.33 kg/m2  Body mass index is 48.33 kg/(m^2).    General Appearance: healthy and alert, no distress      Assessment:    Lu Smith is a 67 year old woman with a diagnosis of high grade serous endometrial cancer.     A total of 30 minutes was spent with the patient, 25 minutes of which were spent in counseling the patient and/or treatment planning.    1.  High-grade serous endometrial carcinoma.   2.  Intraabdominal nodule with atypia.   3.  Dilated pulmonary artery.        The patient has seen my colleagues in Anesthesia.  We will wait for the echo from today.  If that shows no sign of pulmonary hypertension, we will proceed with robotic hysterectomy, bilateral salpingo-oophorectomy, pelvic and periaortic lymphadenectomy, omentectomy and possible debulking on Friday.  Discussed with the patient it is possible we will find intraperitoneal carcinomatosis but will not know until that point.  The patient as well as her sister agrees with the plan.  She is very appreciative of her care.  All questions were answered.       Risks, benefits and alternatives to proceed discussed in detail with the patient. Risks include but are not limited to bleeding, infection, possible injury to surrounding organs including bowel, bladder, ureter, need for second procedure/surgery related to complications from first procedure, postoperative medical complications such as  cardiopulmonary events, lymphedema, lymphocyst, thromboembolic events. Consent for surgery, blood transfusion signed.  Will arrange appropriate preoperative blood work, CXR, EKG. Patient also advised on need for postoperative surveillance and/or adjuvant therapy. Questions answered.    Kevin Henderson MD, MS    Department of Obstetrics and Gynecology   Division of Gynecologic Oncology   [facility]  Phone: [phone]      CC  Patient Care Team:  Levar Scott as PCP - General (Internal Medicine)  Jeanne Blancas MD as Referring Physician (OB/Gyn)  SELF, REFERRED

## 2018-02-20 NOTE — NURSING NOTE
Pre Op Nurse Teaching Template    Relevant Diagnosis: Serous endometrial cancer    Teaching Topic: Robotic assisted hysterectomy, BSO, lymph node dissection, omentectomy, possible open, cystoscopy    Person(s) involved in teaching :  Patient  & sister  Motivation Level:  Asks Questions:    Yes      Eager to Learn:     Yes     Cooperative:          Yes    Receptive (willing. Able to accept information):    Yes      Patient and those who are listed above demonstrates understanding of the following:   Reason for the appointment, diagnosis and treatment plan:   Yes   Knowledge of proper use of medications and conditions for which they are ordered (with special attention to potential side effects or drug interactions): Yes   Which situations necessitate calling provider and whom to contact: Yes         Nutritional needs and diet plan:  Yes      Pain management techniques:     Yes, Pain Scale   Diet:   Yes, [facility] Diet Instructions    Teaching Concerns addressed: Yes    Infection Prevention:  Patient and those who are listed above demonstrate understanding of the following:  Pre-Op CHG Bathing Instructions: Yes  Surgical procedure site care taught:   Yes   Signs and symptoms of infection taught: Yes       Instructional Materials Used/Given:  The [place] Before You Surgery Booklet  Showering or Bathing before Surgery Instructions   Hysterectomy Guidelines  Pain Assessment Tool   Home Care after Major Abdominal or Vaginal Surgery  Map  Accommodations Brochure  Phone numbers for [facility]  Copy of Surgical Consent    Comments:  LULU Turner RN

## 2018-02-22 LAB — COPATH REPORT: NORMAL

## 2018-02-23 ENCOUNTER — HOSPITAL ENCOUNTER (OUTPATIENT)
Facility: CLINIC | Age: 68
Discharge: HOME | End: 2018-02-24
Attending: OBSTETRICS & GYNECOLOGY | Admitting: OBSTETRICS & GYNECOLOGY
Payer: COMMERCIAL

## 2018-02-23 ENCOUNTER — SURGERY (OUTPATIENT)
Age: 68
End: 2018-02-23
Payer: COMMERCIAL

## 2018-02-23 ENCOUNTER — ANESTHESIA (OUTPATIENT)
Dept: SURGERY | Facility: CLINIC | Age: 68
End: 2018-02-23
Payer: COMMERCIAL

## 2018-02-23 ENCOUNTER — ANESTHESIA EVENT (OUTPATIENT)
Dept: SURGERY | Facility: CLINIC | Age: 68
End: 2018-02-23
Payer: COMMERCIAL

## 2018-02-23 DIAGNOSIS — C54.1 ENDOMETRIAL CARCINOMA (H): ICD-10-CM

## 2018-02-23 LAB
ABO + RH BLD: NORMAL
ABO + RH BLD: NORMAL
B-HCG SERPL-ACNC: <1 IU/L (ref 0–5)
BLD GP AB SCN SERPL QL: NORMAL
BLOOD BANK CMNT PATIENT-IMP: NORMAL
BLOOD BANK CMNT PATIENT-IMP: NORMAL
CREAT SERPL-MCNC: 0.72 MG/DL (ref 0.52–1.04)
GFR SERPL CREATININE-BSD FRML MDRD: 82 ML/MIN/1.7M2
GLUCOSE BLDC GLUCOMTR-MCNC: 123 MG/DL (ref 70–99)
GLUCOSE BLDC GLUCOMTR-MCNC: 138 MG/DL (ref 70–99)
GLUCOSE BLDC GLUCOMTR-MCNC: 157 MG/DL (ref 70–99)
GLUCOSE BLDC GLUCOMTR-MCNC: 168 MG/DL (ref 70–99)
HGB BLD-MCNC: 12 G/DL (ref 11.7–15.7)
POTASSIUM SERPL-SCNC: 3.7 MMOL/L (ref 3.4–5.3)
SPECIMEN EXP DATE BLD: NORMAL

## 2018-02-23 PROCEDURE — 37000008 ZZH ANESTHESIA TECHNICAL FEE, 1ST 30 MIN: Performed by: OBSTETRICS & GYNECOLOGY

## 2018-02-23 PROCEDURE — 58571 TLH W/T/O 250 G OR LESS: CPT | Mod: GC | Performed by: OBSTETRICS & GYNECOLOGY

## 2018-02-23 PROCEDURE — 25000128 H RX IP 250 OP 636: Performed by: OBSTETRICS & GYNECOLOGY

## 2018-02-23 PROCEDURE — 25000128 H RX IP 250 OP 636: Performed by: STUDENT IN AN ORGANIZED HEALTH CARE EDUCATION/TRAINING PROGRAM

## 2018-02-23 PROCEDURE — 71000014 ZZH RECOVERY PHASE 1 LEVEL 2 FIRST HR: Performed by: OBSTETRICS & GYNECOLOGY

## 2018-02-23 PROCEDURE — 84132 ASSAY OF SERUM POTASSIUM: CPT | Performed by: OBSTETRICS & GYNECOLOGY

## 2018-02-23 PROCEDURE — 88341 IMHCHEM/IMCYTCHM EA ADD ANTB: CPT | Performed by: OBSTETRICS & GYNECOLOGY

## 2018-02-23 PROCEDURE — 88309 TISSUE EXAM BY PATHOLOGIST: CPT | Performed by: OBSTETRICS & GYNECOLOGY

## 2018-02-23 PROCEDURE — 88307 TISSUE EXAM BY PATHOLOGIST: CPT | Performed by: OBSTETRICS & GYNECOLOGY

## 2018-02-23 PROCEDURE — 85018 HEMOGLOBIN: CPT | Performed by: OBSTETRICS & GYNECOLOGY

## 2018-02-23 PROCEDURE — 40000014 ZZH STATISTIC ARTERIAL MONITORING DAILY

## 2018-02-23 PROCEDURE — 88342 IMHCHEM/IMCYTCHM 1ST ANTB: CPT | Performed by: OBSTETRICS & GYNECOLOGY

## 2018-02-23 PROCEDURE — 84702 CHORIONIC GONADOTROPIN TEST: CPT | Performed by: OBSTETRICS & GYNECOLOGY

## 2018-02-23 PROCEDURE — 40000275 ZZH STATISTIC RCP TIME EA 10 MIN

## 2018-02-23 PROCEDURE — 82565 ASSAY OF CREATININE: CPT | Performed by: OBSTETRICS & GYNECOLOGY

## 2018-02-23 PROCEDURE — 71000015 ZZH RECOVERY PHASE 1 LEVEL 2 EA ADDTL HR: Performed by: OBSTETRICS & GYNECOLOGY

## 2018-02-23 PROCEDURE — 37000009 ZZH ANESTHESIA TECHNICAL FEE, EACH ADDTL 15 MIN: Performed by: OBSTETRICS & GYNECOLOGY

## 2018-02-23 PROCEDURE — 25000128 H RX IP 250 OP 636: Performed by: ANESTHESIOLOGY

## 2018-02-23 PROCEDURE — 88331 PATH CONSLTJ SURG 1 BLK 1SPC: CPT | Performed by: OBSTETRICS & GYNECOLOGY

## 2018-02-23 PROCEDURE — 82962 GLUCOSE BLOOD TEST: CPT

## 2018-02-23 PROCEDURE — 88305 TISSUE EXAM BY PATHOLOGIST: CPT | Performed by: OBSTETRICS & GYNECOLOGY

## 2018-02-23 PROCEDURE — 25000125 ZZHC RX 250: Performed by: OBSTETRICS & GYNECOLOGY

## 2018-02-23 PROCEDURE — 25000132 ZZH RX MED GY IP 250 OP 250 PS 637: Performed by: OBSTETRICS & GYNECOLOGY

## 2018-02-23 PROCEDURE — 27210794 ZZH OR GENERAL SUPPLY STERILE: Performed by: OBSTETRICS & GYNECOLOGY

## 2018-02-23 PROCEDURE — 25000128 H RX IP 250 OP 636: Performed by: NURSE ANESTHETIST, CERTIFIED REGISTERED

## 2018-02-23 PROCEDURE — 25000125 ZZHC RX 250: Performed by: NURSE ANESTHETIST, CERTIFIED REGISTERED

## 2018-02-23 PROCEDURE — 25000132 ZZH RX MED GY IP 250 OP 250 PS 637: Performed by: STUDENT IN AN ORGANIZED HEALTH CARE EDUCATION/TRAINING PROGRAM

## 2018-02-23 PROCEDURE — 82365 CALCULUS SPECTROSCOPY: CPT | Performed by: OBSTETRICS & GYNECOLOGY

## 2018-02-23 PROCEDURE — 38572 LAPAROSCOPY LYMPHADENECTOMY: CPT | Mod: GC | Performed by: OBSTETRICS & GYNECOLOGY

## 2018-02-23 PROCEDURE — 00000155 ZZHCL STATISTIC H-CELL BLOCK W/STAIN: Performed by: OBSTETRICS & GYNECOLOGY

## 2018-02-23 PROCEDURE — 40000170 ZZH STATISTIC PRE-PROCEDURE ASSESSMENT II: Performed by: OBSTETRICS & GYNECOLOGY

## 2018-02-23 PROCEDURE — 36000088 ZZH SURGERY LEVEL 8 EA 15 ADDTL MIN - UMMC: Performed by: OBSTETRICS & GYNECOLOGY

## 2018-02-23 PROCEDURE — 25000566 ZZH SEVOFLURANE, EA 15 MIN: Performed by: OBSTETRICS & GYNECOLOGY

## 2018-02-23 PROCEDURE — 25000131 ZZH RX MED GY IP 250 OP 636 PS 637: Performed by: STUDENT IN AN ORGANIZED HEALTH CARE EDUCATION/TRAINING PROGRAM

## 2018-02-23 PROCEDURE — 36000086 ZZH SURGERY LEVEL 8 1ST 30 MIN UMMC: Performed by: OBSTETRICS & GYNECOLOGY

## 2018-02-23 PROCEDURE — 88112 CYTOPATH CELL ENHANCE TECH: CPT | Performed by: OBSTETRICS & GYNECOLOGY

## 2018-02-23 PROCEDURE — C9399 UNCLASSIFIED DRUGS OR BIOLOG: HCPCS | Performed by: NURSE ANESTHETIST, CERTIFIED REGISTERED

## 2018-02-23 RX ORDER — AMOXICILLIN 250 MG
1-2 CAPSULE ORAL 2 TIMES DAILY
Qty: 30 TABLET | Refills: 0 | Status: SHIPPED | OUTPATIENT
Start: 2018-02-23 | End: 2018-03-19

## 2018-02-23 RX ORDER — NALOXONE HYDROCHLORIDE 0.4 MG/ML
.1-.4 INJECTION, SOLUTION INTRAMUSCULAR; INTRAVENOUS; SUBCUTANEOUS
Status: DISCONTINUED | OUTPATIENT
Start: 2018-02-23 | End: 2018-02-24 | Stop reason: HOSPADM

## 2018-02-23 RX ORDER — GLYCOPYRROLATE 0.2 MG/ML
INJECTION, SOLUTION INTRAMUSCULAR; INTRAVENOUS PRN
Status: DISCONTINUED | OUTPATIENT
Start: 2018-02-23 | End: 2018-02-23

## 2018-02-23 RX ORDER — ONDANSETRON 4 MG/1
4 TABLET, ORALLY DISINTEGRATING ORAL EVERY 30 MIN PRN
Status: DISCONTINUED | OUTPATIENT
Start: 2018-02-23 | End: 2018-02-23 | Stop reason: HOSPADM

## 2018-02-23 RX ORDER — IBUPROFEN 600 MG/1
600 TABLET, FILM COATED ORAL EVERY 6 HOURS PRN
Qty: 30 TABLET | Refills: 0 | Status: SHIPPED | OUTPATIENT
Start: 2018-02-23 | End: 2018-09-28

## 2018-02-23 RX ORDER — LIDOCAINE 40 MG/G
CREAM TOPICAL
Status: DISCONTINUED | OUTPATIENT
Start: 2018-02-23 | End: 2018-02-23 | Stop reason: HOSPADM

## 2018-02-23 RX ORDER — LABETALOL HYDROCHLORIDE 5 MG/ML
10 INJECTION, SOLUTION INTRAVENOUS
Status: COMPLETED | OUTPATIENT
Start: 2018-02-23 | End: 2018-02-23

## 2018-02-23 RX ORDER — DEXTROSE MONOHYDRATE 25 G/50ML
25-50 INJECTION, SOLUTION INTRAVENOUS
Status: DISCONTINUED | OUTPATIENT
Start: 2018-02-23 | End: 2018-02-24 | Stop reason: HOSPADM

## 2018-02-23 RX ORDER — SODIUM CHLORIDE 9 MG/ML
INJECTION, SOLUTION INTRAVENOUS CONTINUOUS
Status: DISCONTINUED | OUTPATIENT
Start: 2018-02-23 | End: 2018-02-24 | Stop reason: HOSPADM

## 2018-02-23 RX ORDER — FENTANYL CITRATE 50 UG/ML
25-50 INJECTION, SOLUTION INTRAMUSCULAR; INTRAVENOUS
Status: DISCONTINUED | OUTPATIENT
Start: 2018-02-23 | End: 2018-02-23 | Stop reason: HOSPADM

## 2018-02-23 RX ORDER — SODIUM CHLORIDE, SODIUM LACTATE, POTASSIUM CHLORIDE, CALCIUM CHLORIDE 600; 310; 30; 20 MG/100ML; MG/100ML; MG/100ML; MG/100ML
INJECTION, SOLUTION INTRAVENOUS CONTINUOUS
Status: DISCONTINUED | OUTPATIENT
Start: 2018-02-23 | End: 2018-02-23 | Stop reason: HOSPADM

## 2018-02-23 RX ORDER — ONDANSETRON 2 MG/ML
4 INJECTION INTRAMUSCULAR; INTRAVENOUS EVERY 30 MIN PRN
Status: DISCONTINUED | OUTPATIENT
Start: 2018-02-23 | End: 2018-02-23 | Stop reason: HOSPADM

## 2018-02-23 RX ORDER — LIDOCAINE 40 MG/G
CREAM TOPICAL
Status: DISCONTINUED | OUTPATIENT
Start: 2018-02-23 | End: 2018-02-24 | Stop reason: HOSPADM

## 2018-02-23 RX ORDER — CLINDAMYCIN PHOSPHATE 900 MG/50ML
900 INJECTION, SOLUTION INTRAVENOUS SEE ADMIN INSTRUCTIONS
Status: DISCONTINUED | OUTPATIENT
Start: 2018-02-23 | End: 2018-02-23 | Stop reason: HOSPADM

## 2018-02-23 RX ORDER — ONDANSETRON 2 MG/ML
INJECTION INTRAMUSCULAR; INTRAVENOUS PRN
Status: DISCONTINUED | OUTPATIENT
Start: 2018-02-23 | End: 2018-02-23

## 2018-02-23 RX ORDER — IBUPROFEN 600 MG/1
600 TABLET, FILM COATED ORAL EVERY 6 HOURS PRN
Status: DISCONTINUED | OUTPATIENT
Start: 2018-02-23 | End: 2018-02-24 | Stop reason: HOSPADM

## 2018-02-23 RX ORDER — OXYCODONE AND ACETAMINOPHEN 5; 325 MG/1; MG/1
1 TABLET ORAL
Status: DISCONTINUED | OUTPATIENT
Start: 2018-02-23 | End: 2018-02-23

## 2018-02-23 RX ORDER — HEPARIN SODIUM 5000 [USP'U]/.5ML
INJECTION, SOLUTION INTRAVENOUS; SUBCUTANEOUS PRN
Status: DISCONTINUED | OUTPATIENT
Start: 2018-02-23 | End: 2018-02-23

## 2018-02-23 RX ORDER — OXYCODONE AND ACETAMINOPHEN 5; 325 MG/1; MG/1
1-2 TABLET ORAL EVERY 4 HOURS PRN
Qty: 20 TABLET | Refills: 0 | Status: SHIPPED | OUTPATIENT
Start: 2018-02-23 | End: 2018-03-19

## 2018-02-23 RX ORDER — CLINDAMYCIN PHOSPHATE 900 MG/50ML
900 INJECTION, SOLUTION INTRAVENOUS
Status: COMPLETED | OUTPATIENT
Start: 2018-02-23 | End: 2018-02-23

## 2018-02-23 RX ORDER — PROPOFOL 10 MG/ML
INJECTION, EMULSION INTRAVENOUS PRN
Status: DISCONTINUED | OUTPATIENT
Start: 2018-02-23 | End: 2018-02-23

## 2018-02-23 RX ORDER — ONDANSETRON 4 MG/1
4 TABLET, FILM COATED ORAL EVERY 6 HOURS PRN
Status: DISCONTINUED | OUTPATIENT
Start: 2018-02-23 | End: 2018-02-24 | Stop reason: HOSPADM

## 2018-02-23 RX ORDER — ACETAMINOPHEN 325 MG/1
650 TABLET ORAL EVERY 4 HOURS PRN
Status: DISCONTINUED | OUTPATIENT
Start: 2018-02-26 | End: 2018-02-23

## 2018-02-23 RX ORDER — ACETAMINOPHEN 325 MG/1
650 TABLET ORAL EVERY 6 HOURS
Status: DISCONTINUED | OUTPATIENT
Start: 2018-02-23 | End: 2018-02-24 | Stop reason: HOSPADM

## 2018-02-23 RX ORDER — FENTANYL CITRATE 50 UG/ML
INJECTION, SOLUTION INTRAMUSCULAR; INTRAVENOUS PRN
Status: DISCONTINUED | OUTPATIENT
Start: 2018-02-23 | End: 2018-02-23

## 2018-02-23 RX ORDER — NALOXONE HYDROCHLORIDE 0.4 MG/ML
.1-.4 INJECTION, SOLUTION INTRAMUSCULAR; INTRAVENOUS; SUBCUTANEOUS
Status: ACTIVE | OUTPATIENT
Start: 2018-02-23 | End: 2018-02-24

## 2018-02-23 RX ORDER — NICOTINE POLACRILEX 4 MG
15-30 LOZENGE BUCCAL
Status: DISCONTINUED | OUTPATIENT
Start: 2018-02-23 | End: 2018-02-24 | Stop reason: HOSPADM

## 2018-02-23 RX ORDER — IBUPROFEN 200 MG
600 TABLET ORAL
Status: COMPLETED | OUTPATIENT
Start: 2018-02-23 | End: 2018-02-23

## 2018-02-23 RX ORDER — DEXAMETHASONE SODIUM PHOSPHATE 4 MG/ML
INJECTION, SOLUTION INTRA-ARTICULAR; INTRALESIONAL; INTRAMUSCULAR; INTRAVENOUS; SOFT TISSUE PRN
Status: DISCONTINUED | OUTPATIENT
Start: 2018-02-23 | End: 2018-02-23

## 2018-02-23 RX ORDER — OXYCODONE HYDROCHLORIDE 5 MG/1
5-10 TABLET ORAL EVERY 4 HOURS PRN
Status: DISCONTINUED | OUTPATIENT
Start: 2018-02-23 | End: 2018-02-24 | Stop reason: HOSPADM

## 2018-02-23 RX ADMIN — ACETAMINOPHEN 650 MG: 325 TABLET, FILM COATED ORAL at 23:38

## 2018-02-23 RX ADMIN — HEPARIN SODIUM 5000 UNITS: 10000 INJECTION, SOLUTION INTRAVENOUS; SUBCUTANEOUS at 08:00

## 2018-02-23 RX ADMIN — SODIUM CHLORIDE, POTASSIUM CHLORIDE, SODIUM LACTATE AND CALCIUM CHLORIDE: 600; 310; 30; 20 INJECTION, SOLUTION INTRAVENOUS at 07:26

## 2018-02-23 RX ADMIN — MIDAZOLAM 1 MG: 1 INJECTION INTRAMUSCULAR; INTRAVENOUS at 07:26

## 2018-02-23 RX ADMIN — FENTANYL CITRATE 25 MCG: 50 INJECTION, SOLUTION INTRAMUSCULAR; INTRAVENOUS at 13:04

## 2018-02-23 RX ADMIN — GENTAMICIN SULFATE 170 MG: 40 INJECTION, SOLUTION INTRAMUSCULAR; INTRAVENOUS at 07:50

## 2018-02-23 RX ADMIN — ACETAMINOPHEN 650 MG: 325 TABLET, FILM COATED ORAL at 18:27

## 2018-02-23 RX ADMIN — FENTANYL CITRATE 50 MCG: 50 INJECTION, SOLUTION INTRAMUSCULAR; INTRAVENOUS at 07:42

## 2018-02-23 RX ADMIN — DEXAMETHASONE SODIUM PHOSPHATE 6 MG: 4 INJECTION, SOLUTION INTRA-ARTICULAR; INTRALESIONAL; INTRAMUSCULAR; INTRAVENOUS; SOFT TISSUE at 07:54

## 2018-02-23 RX ADMIN — SUGAMMADEX 250 MG: 100 INJECTION, SOLUTION INTRAVENOUS at 12:01

## 2018-02-23 RX ADMIN — ROCURONIUM BROMIDE 30 MG: 10 INJECTION INTRAVENOUS at 08:41

## 2018-02-23 RX ADMIN — IBUPROFEN 600 MG: 200 TABLET, FILM COATED ORAL at 14:06

## 2018-02-23 RX ADMIN — FENTANYL CITRATE 100 MCG: 50 INJECTION, SOLUTION INTRAMUSCULAR; INTRAVENOUS at 08:10

## 2018-02-23 RX ADMIN — SODIUM CHLORIDE, POTASSIUM CHLORIDE, SODIUM LACTATE AND CALCIUM CHLORIDE: 600; 310; 30; 20 INJECTION, SOLUTION INTRAVENOUS at 07:50

## 2018-02-23 RX ADMIN — INSULIN ASPART 1 UNITS: 100 INJECTION, SOLUTION INTRAVENOUS; SUBCUTANEOUS at 18:27

## 2018-02-23 RX ADMIN — FENTANYL CITRATE 50 MCG: 50 INJECTION, SOLUTION INTRAMUSCULAR; INTRAVENOUS at 10:26

## 2018-02-23 RX ADMIN — FENTANYL CITRATE 100 MCG: 50 INJECTION, SOLUTION INTRAMUSCULAR; INTRAVENOUS at 08:26

## 2018-02-23 RX ADMIN — FENTANYL CITRATE 25 MCG: 50 INJECTION, SOLUTION INTRAMUSCULAR; INTRAVENOUS at 12:06

## 2018-02-23 RX ADMIN — HYDROMORPHONE HYDROCHLORIDE 0.3 MG: 1 INJECTION, SOLUTION INTRAMUSCULAR; INTRAVENOUS; SUBCUTANEOUS at 13:41

## 2018-02-23 RX ADMIN — SODIUM CHLORIDE: 9 INJECTION, SOLUTION INTRAVENOUS at 15:25

## 2018-02-23 RX ADMIN — ONDANSETRON 4 MG: 2 INJECTION INTRAMUSCULAR; INTRAVENOUS at 11:32

## 2018-02-23 RX ADMIN — ROCURONIUM BROMIDE 30 MG: 10 INJECTION INTRAVENOUS at 10:26

## 2018-02-23 RX ADMIN — MIDAZOLAM 1 MG: 1 INJECTION INTRAMUSCULAR; INTRAVENOUS at 07:47

## 2018-02-23 RX ADMIN — ROCURONIUM BROMIDE 10 MG: 10 INJECTION INTRAVENOUS at 09:38

## 2018-02-23 RX ADMIN — PROPOFOL 140 MG: 10 INJECTION, EMULSION INTRAVENOUS at 07:42

## 2018-02-23 RX ADMIN — CLINDAMYCIN PHOSPHATE 900 MG: 18 INJECTION, SOLUTION INTRAVENOUS at 07:50

## 2018-02-23 RX ADMIN — Medication 10 MG: at 13:39

## 2018-02-23 RX ADMIN — ROCURONIUM BROMIDE 10 MG: 10 INJECTION INTRAVENOUS at 11:15

## 2018-02-23 RX ADMIN — GLYCOPYRROLATE 0.2 MG: 0.2 INJECTION, SOLUTION INTRAMUSCULAR; INTRAVENOUS at 09:13

## 2018-02-23 RX ADMIN — PHENYLEPHRINE HYDROCHLORIDE 100 MCG: 10 INJECTION, SOLUTION INTRAMUSCULAR; INTRAVENOUS; SUBCUTANEOUS at 08:13

## 2018-02-23 RX ADMIN — ROCURONIUM BROMIDE 70 MG: 10 INJECTION INTRAVENOUS at 07:42

## 2018-02-23 ASSESSMENT — LIFESTYLE VARIABLES: TOBACCO_USE: 1

## 2018-02-23 ASSESSMENT — PAIN DESCRIPTION - DESCRIPTORS: DESCRIPTORS: ACHING

## 2018-02-23 NOTE — DISCHARGE SUMMARY
"Gynecologic Oncology Discharge Summary    Lu Smith  [id number]    Admit Date: 2/23/2018  Discharge Date: 2/24/2018  Admitting Provider: Dr. Henderson  Discharge Provider: Dr. Hernandez    Admission Dx:   -Serous Endometrial Cancer    Discharge Dx:  -High Grade Serous Adenocarcinoma    There is no problem list on file for this patient.      Procedures: Da Delfino Assisted total hysterectomy, bilateral salpingo-oopherectomy, Omentectomy, Bilateral Pelvic and Para-Aortic Lymph Node Dissection, Pelvic Washings    Prior to Admission Medications:  Prescriptions Prior to Admission   Medication Sig Dispense Refill Last Dose     ibuprofen (ADVIL/MOTRIN) 200 MG tablet Take 200 mg by mouth every 4 hours as needed for mild pain   Past Month at Unknown time     triamterene-hydrochlorothiazide (DYAZIDE) 37.5-25 MG per capsule Take 1 capsule by mouth every morning    2/23/2018 at 0430     aspirin EC 81 MG EC tablet Take 81 mg by mouth Pt states, \"I don't take it regularly. I take it when I remember to\".   Past Month at Unknown time     METFORMIN HCL PO Take 500 mg by mouth daily (with breakfast)    2/22/2018 at 0800       Discharge Medications:     Review of your medicines      START taking       Dose / Directions    oxyCODONE-acetaminophen 5-325 MG per tablet   Commonly known as:  PERCOCET   Used for:  Endometrial carcinoma (H)        Dose:  1-2 tablet   Take 1-2 tablets by mouth every 4 hours as needed for pain (moderate to severe)   Quantity:  20 tablet   Refills:  0       senna-docusate 8.6-50 MG per tablet   Commonly known as:  SENOKOT-S;PERICOLACE   Used for:  Endometrial carcinoma (H)        Dose:  1-2 tablet   Take 1-2 tablets by mouth 2 times daily Take while on oral narcotics to prevent or treat constipation.   Quantity:  30 tablet   Refills:  0         CONTINUE these medicines which may have CHANGED, or have new prescriptions. If we are uncertain of the size of tablets/capsules you have at home, strength may be " "listed as something that might have changed.       Dose / Directions    * ibuprofen 200 MG tablet   Commonly known as:  ADVIL/MOTRIN   This may have changed:  Another medication with the same name was added. Make sure you understand how and when to take each.        Dose:  200 mg   Take 200 mg by mouth every 4 hours as needed for mild pain   Refills:  0       * ibuprofen 600 MG tablet   Commonly known as:  ADVIL/MOTRIN   This may have changed:  You were already taking a medication with the same name, and this prescription was added. Make sure you understand how and when to take each.   Used for:  Endometrial carcinoma (H)        Dose:  600 mg   Take 1 tablet (600 mg) by mouth every 6 hours as needed for pain (mild)   Quantity:  30 tablet   Refills:  0       * Notice:  This list has 2 medication(s) that are the same as other medications prescribed for you. Read the directions carefully, and ask your doctor or other care provider to review them with you.      CONTINUE these medicines which have NOT CHANGED       Dose / Directions    aspirin EC 81 MG EC tablet        Dose:  81 mg   Take 81 mg by mouth Pt states, \"I don't take it regularly. I take it when I remember to\".   Refills:  0       METFORMIN HCL PO        Dose:  500 mg   Take 500 mg by mouth daily (with breakfast)   Refills:  0       triamterene-hydrochlorothiazide 37.5-25 MG per capsule   Commonly known as:  DYAZIDE        Dose:  1 capsule   Take 1 capsule by mouth every morning   Refills:  0            Where to get your medicines      These medications were sent to [facility] [place], [place] - [address]  [address] [address]     Phone:  [phone]      ibuprofen 600 MG tablet     senna-docusate 8.6-50 MG per tablet         Some of these will need a paper prescription and others can be bought over the counter. Ask your nurse if you have questions.     Bring a paper prescription for each of these medications "      oxyCODONE-acetaminophen 5-325 MG per tablet           Consultations:  None    Brief History of Illness:  The patient is a , 3 prior vaginal deliveries, went through menopause at age 56.  She started having some spotting and discharge in December.  Pap smear showed adenocarcinoma.  Pap smear as well as endometrial biopsy showed high-grade serous carcinoma of the uterus.  CT was done of the abdomen revealed possible carcinomatosis as well as several enlarged lymph nodes which have been biopsied. Due to diagnosis surgery was recommended   Risks, benefits and alternatives to proceed discussed in detail with the patient.    Hospital Course:  Dz:   - Preoperative diagnosis was high grade serous endometrial carcincoma.  Frozen section at the time of the surgery showed high grade serous adenocarcinoma.  Final pathology is pending at the time of discharge.  She will follow-up postoperatively for a care plan.  FEN:   - She was maintained on IVF overnight and by POD#1 she was tolerating regular diet and IVF discontinued. At time of discharge, she was tolerating a regular diet without nausea and vomiting and able to maintain her hydration without IVF supplementation.  Pain:   - Her pain was controlled with oral medications, and she discharged home with these medications.  CV:   - She has a history of hypertension and her home Dyazide was held while inpatient.  Her vital signs were stable while in house and she had no acute CV issues.  PULM:   - She has no history of pulmonary issues.  She was initially given O2 supplementation in to maintain her O2 sats in the immediate postop period and had difficulty weaning off oxygen and was admitted for observation.  By discharge, her O2 sats were greater than 94% on RA.  She was encouraged to use her bedside IS while in house.  HEME:   - Her preoperative Hgb was 12 with minimal surgical blood loss of 75. She was hemodynamically stable postoperatively\ without concern for  anemia.  GI:   - She was made NPO prior to the procedure.  On POD#0, her diet was advanced to clear liquids and then advanced slowly as tolerated.  At the time of discharge, she was tolerating a regular diet without nausea and vomiting.  She will be discharged with a bowel regimen to prevent constipation in the postoperative period.  She had no acute GI issues while in house.  :    - A sosa catheter was placed at the time of the surgery and removed after the procedure.   Prior to discharge, the patient was voiding spontaneously without difficulty.  She had no acute  issues while in house.  ID:   - The patient was afebrile during her hospitalization.  She received standard preoperative antibiotics without incident.    ENDO:   - Patient has a history of Type II Diabetes Mellitus. Her hold metformin was held and she was placed on a sliding scale insulin while inpatient.  PSYCH/NEURO:   - No issues  PPX:    - She was given SCDs and IS during her hospital course.  She tolerated these prophylactic interventions without incident.  They were discontinued at the time of her discharge.      Discharge Instructions and Follow up:  Ms. Lu Smith was discharged from the hospital with follow up with Dr. Henderson on 3/19 for postoperative visit.    Discharge Diet: Regular  Discharge Activity: Lifting restricted to 15 pounds for 4 weeks    Discharge Disposition:  Discharged to home    Discharge Staff: Dr. David Lan MD  OB/GYN Resident, PGY-2  2/24/2018

## 2018-02-23 NOTE — IP AVS SNAPSHOT
[facility] [address] Observation [address] [address]    Phone:  [phone]    Fax:  [phone]                                       After Visit Summary   2/23/2018    Lu Smith    MRN: [id number]           After Visit Summary Signature Page     I have received my discharge instructions, and my questions have been answered. I have discussed any challenges I see with this plan with the nurse or doctor.    ..........................................................................................................................................  Patient/Patient Representative Signature      ..........................................................................................................................................  Patient Representative Print Name and Relationship to Patient    ..................................................               ................................................  Date                                            Time    ..........................................................................................................................................  Reviewed by Signature/Title    ...................................................              ..............................................  Date                                                            Time
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
## 2018-02-23 NOTE — PROGRESS NOTES
02/23/18 0600   Values Beliefs and Spiritual Care   C: Community: In support of your spiritual health, is there someone we may contact for you? (identify all that apply) (3C/2-23)   Visit Information   Visit Made By Staff    Type of Visit Initial;Staff consultation/triage;Surgical   Visited Patient;Family   Interventions   Plan of Care Review   With patient/family/proxy   Basic Spiritual Interventions    introduction/orientation to Spiritual Health Services;Assessment of spiritual needs/resources;Prayer   Advanced Assessments/Interventions   Presenting Concerns/Issues Spiritual/Buddhist/emotional support   Presurgical  Visit - 3C   Data: Pt visited following request for  support. Met with Lu and her two sisters, one from [facility], one from [place], [place]. Lu said that they were raised [employer], but now she attends a [employer] that she likes a lot. We shared a prayer for wisdom for the doctors and a healthy recovery for Lu.    Rev. Burak Delcid, Staff   [address] [address]  Phone: [phone]  Pager: [phone]    * [facility] remains available 24/7 for emergent requests/referrals, either by having the switchboard page the on-call  or by entering an ASAP/STAT consult in Epic (this will also page the on-call ).*

## 2018-02-23 NOTE — BRIEF OP NOTE
[facility], [place]    Brief Operative Note    Pre-operative diagnosis: Serous Endometrial Carcinoma  Post-operative diagnosis High grade serous adenocarcinoma     Procedure: Procedure(s):  DaVinci Assisted Total Laparoscopic Hysterectomy, Bilateral Salpingo-Oophorectomy, Cystoscopy,  Omental biopsy, omentectomy,bilateral  Pelvic And Para Aortic Lymph Node Dissection - Wound Class: II-Clean Contaminated   - Wound Class: II-Clean Contaminated    Surgeon: Surgeon(s) and Role:     * Kevin Henderson MD - Primary     * Nika Cope MD - Resident - Assisting     * Rogelio Hernandez MD - Resident - Assisting    Anesthesia: General   Estimated blood loss: 75mL  IVF: 1400 mL  UOP: 450 mL    Drains: None    Specimens:   ID Type Source Tests Collected by Time Destination   1 : pelvic washings Washings Pelvis CYTOLOGY NON GYN Kevin Henderson MD 2/23/2018 11:24 AM    2 : gallstone Calculus/Stone Other STONE ANALYSIS Kevin Henderson MD 2/23/2018  9:59 AM    A : omental biopsy Tissue Omentum SURGICAL PATHOLOGY EXAM Kevin Henderson MD 2/23/2018  8:49 AM    B : uterus, cervix, bilateral fallopian tubes and ovaries Organ Uterus with Bilateral Ovaries and Fallopian Tubes SURGICAL PATHOLOGY EXAM Kevin Henderson MD 2/23/2018  9:54 AM    C : right pelvic lymph node Tissue Lymph Node, Right Pelvic SURGICAL PATHOLOGY EXAM Kevin Henderson MD 2/23/2018  9:58 AM    D : left pelvic lymph nodes Tissue Lymph Node, Left Pelvic SURGICAL PATHOLOGY EXAM Kevin Henderson MD 2/23/2018 10:01 AM    E : right para aortic lymph nodes Tissue Lymph Node, Right Para Aortic SURGICAL PATHOLOGY EXAM Kevin Henderson MD 2/23/2018 10:02 AM    F : left para aortic lymph nodes Tissue Lymph Node, Left Para Aortic SURGICAL PATHOLOGY EXAM Kevin Henderson MD 2/23/2018 10:02 AM    G : omentum Tissue Omentum SURGICAL PATHOLOGY EXAM Kevin Henderson MD 2/23/2018 11:17 AM       Findings:  EUA limited by body habitus. Normal appearing external genitalia. Cervix smooth and retroverted. On laparoscopy, adhesions visible from the anterior abdominal wall to the omentum near the umbilicus. The omentum appeared to have a discrete area of calcifications within it but biopsy sent to pathology and returned benign. Also within the omentum there was what appeared to be a calcified gall stone within it that was removed and sent to pathology. Otherwise, normal appearing upper abdominal survey. Uterus, bilateral fallopian tubes, and ovaries appeared normal. Frozen pathology showed high grade serous adenocarcinoma in deep myometrium, with intravascular invasion.  There were enlarged lymph nodes seen near the iliac chain that were removed and sent to pathology. Procedure technically difficult given patient's habitus with ventilation and steep [place]; however, no apparent complications and hemostatic at end of case. On cystoscopy, brisk bilateral ureteral efflux seen.   Complications: None.  Implants: None    Nika Cope MD   OBGYN, PGY-3  2/23/2018 12:03 PM

## 2018-02-23 NOTE — PROGRESS NOTES
Gynecologic Oncology Postoperative Check Note  2/23/2018    S: Doing well. Pain well controlled, tolerating clears, ambulating without dizziness. No SOB. Voiding on her own.    O:  Vitals:    02/23/18 1600 02/23/18 1630 02/23/18 1712 02/23/18 1715   BP: 135/72 113/82 128/69 128/69   BP Location:   Right arm    Resp: 16 16 16    Temp:   97.8  F (36.6  C)    TempSrc:   Oral    SpO2: 94% 96% 97%    Weight:       Height:           Gen: Alert, oriented  Cardio: RRR  Resp: On room air  Abdomen: Appropriately tender, non-distended  Incisions: Dressings on, c/d/i  Extremities: Trace edema    A: 67 year old POD#0 s/p DA-TLH, BSO, cysto, PPALND, omentecomy     Dz: High grade serous adenocarcinoma  FEN: Regular Diet. IVF 100ml LR  Pain: oxycodone PRN, scheduled tylenol  Heme: Hgb 12> EBL 75   CV: Hx of HTN- hold home dyazide  Pulm: O2 as needed. On room air currently.  GI: No acute issues. Bowel regimen, anti-emetics PRN  : s/p sosa  ID: afebrile. Pre-op antibiotics  Endo: DMT2- hold home metformin, MSSI  Psych/Neuro/MSK: NI  PPX: SCDs, IS,  Dispo: likely 2/24  Drains/Lines: PIV    Apolinar Negron MD, PGY1  2/23/2018 5:53 PM

## 2018-02-23 NOTE — ANESTHESIA PROCEDURE NOTES
Arterial Line Procedure Note  Staff:     Anesthesiologist:  OSMAN SORIANO  Location: In OR After Induction  Procedure Start/Stop Times:     patient identified, IV checked, risks and benefits discussed, informed consent, monitors and equipment checked, pre-op evaluation and at physician/surgeon's request      Correct Patient: Yes      Correct Position: Yes      Correct Site: Yes      Correct Procedure: Yes      Correct Laterality:  Yes    Site Marked:  N/A  Line Placement:     Procedure:  Arterial Line    Insertion Site:  Radial    Insertion laterality:  Left    Skin Prep: Chloraprep      Patient Prep: patient draped, mask, sterile gloves, hat and hand hygiene      Local skin infiltration:  None    Ultrasound Guided?: No      Catheter size:  20 gauge, Quick cath    Cath secured with: other (comment)      Dressing:  Tegaderm    Complications:  None obvious    Arterial waveform: Yes      IBP within 10% of NIBP: Yes

## 2018-02-23 NOTE — OR NURSING
GYN/ONC resident Leon Negron MD informed patient unable to maintain oxygen saturation >90% on room air.  Patient requiring 2L NC.  OB/GYN will enter orders for observation status.     Handoff report given to Triny Piedra RN.

## 2018-02-23 NOTE — OR NURSING
Dr. Morales arrived to bedside in PACU to assess patient's left hand numbness.  Per Dr. Morales, numbness d/t positioning during surgery.  Patient encouraged to continue mobility with hand.    Patient intermittently desaturating <90 % on RA.  Plan:  Place patient in upright position in chair to see if improved oxygen saturation prior to transport to phase II.  Patient may need observation overnight for oxygen needs.  Patient does not use Cpap machine at home.  Continued use of incentive spirometer in PACU.

## 2018-02-23 NOTE — IP AVS SNAPSHOT
MRN:[id number]                      After Visit Summary   2/23/2018    Lu Smith    MRN: [id number]           Thank you!     Thank you for choosing [facility] for your care. Our goal is always to provide you with excellent care. Hearing back from our patients is one way we can continue to improve our services. Please take a few minutes to complete the written survey that you may receive in the mail after you visit with us. Thank you!        Patient Information     Date Of Birth          1950        Designated Caregiver       Most Recent Value    Caregiver    Will someone help with your care after discharge? no      About your hospital stay     You were admitted on:  February 23, 2018 You last received care in the:  Unit 6D Observation [facility]    You were discharged on:  February 24, 2018       Who to Call     For medical emergencies, please call 911.  For non-urgent questions about your medical care, please call your primary care provider or clinic, [phone]  For questions related to your surgery, please call your surgery clinic        Attending Provider     Provider Specialty    Kevin Henderson MD Obstetrics & Gynecology, Maternal & Fetal Medicine    Kirstin Hernandez MD Gyn-Onc       Primary Care Provider Office Phone # Fax #    Levar Scott [phone]      After Care Instructions     Discharge Instructions       Pelvic Rest. No tampons, douching or intercourse for  6-8  weeks.            Discharge Instructions       Resume pre procedure diet            Discharge Instructions       Please keep your follow up visit with Dr. Henderson on 3/19/18            Discharge Instructions       PATIENT INSTRUCTIONS     FOLLOW-UP:     Call Gyn Onc team if you have any of the following:    Temperature greater than 100.4  Persistent nausea and vomiting  Severe uncontrolled pain  Redness, tenderness, or signs of infection (pain, swelling, redness, odor or green/yellow  discharge around the site)  Difficulty breathing, headache or visual disturbances  Hives  Persistent dizziness or light-headedness  Extreme fatigue      Contact Information:  Gynecology Cancer Clinic  Cancer Clinic ([facility])   Clinics and Surgery Center   Floor [address]  [address] [place] [address]   Appointments: [phone]   Information: [phone]   If you have any other questions please call [phone] or [phone] on nights or weekends.    In an emergency, call [phone] or go to an Emergency Department at a nearby hospital    WOUND CARE INSTRUCTIONS: Keep a dry clean dressing on the wound if there is drainage. The initial bandage may be removed after 24 hours. Once the wound has quit draining you may leave it open to air. If clothing rubs against the wound or causes irritation and the wound is not draining you may cover it with a dry dressing during the daytime. Try to keep the wound dry and avoid ointments on the wound unless directed to do so. If the wound becomes bright red and painful or starts to drain infected material that is not clear, please contact your physician immediately.     1. You may shower 48 hrs after surgery    2. No soaking in the tub     DIET: There are no dietary restrictions. You may eat any foods that you can tolerate. It is a good idea to eat a high fiber diet and take in plenty of fluids to prevent constipation. If you do become constipated you may want to take a mild laxative or take ducolax tablets on a daily basis until your bowel habits are regular. Constipation can be very uncomfortable, along with straining, after recent surgery.      ACTIVITY: You are encouraged to cough and deep breath or use your incentive spirometer if you were given one, every 15-30 minutes when awake. This will help prevent respiratory complications and low grade fevers post-operatively if you had a general anesthetic. You may want to hug a pillow when coughing and sneezing to add  additional support to the surgical area, if you had abdominal or chest surgery, which will decrease pain during these times.       1. No heavy lifting >10lbs or strenuous exercise for six-eight weeks. No exercise in which you are using core muscles (yoga, pilates, swimming, weight lifting)    2. You may walk as much as you wish. You are encouraged to increase your activity each day after surgery. Stairs are okay.     3. Nothing per vagina for 6-8 weeks. No tampons, no intercourse, no douching. You can expect some light vaginal spotting and discharge for up to six weeks. If bleeding becomes heavy, please contact the office.       MEDICATIONS: Try to take narcotic medications and anti-inflammatory medications, such as tylenol, ibuprofen, naprosyn, etc., with food. This will minimize stomach upset from the medication. Should you develop nausea and vomiting from the pain medication, or develop a rash, please discontinue the medication and contact your physician. You should not drive, make important decisions, or operate machinery when taking narcotic pain medication.      OTHER: Patients are often constipated after general anesthesia and surgery. The patient should continue to take stool softeners (for example, Senokot-S) for the next six weeks and consume adequate amounts of water. If the patient remains constipated or unable to pass stool, please try one or all of the following measures:    1. Milk of Magnesia 30cc twice a day as needed by mouth    2. Metamucil 2 tablespoons in 12 ounces of fluid    3. Dulcolax oral or suppositories    4. Prunes or prune juice    5. Miralax daily      QUESTIONS: Please feel free to call your physician or the hospital  if you have any questions, and they will be glad to assist you.            No alcohol       NO ALCOHOL for 24 hours post procedure            No driving or operating machinery       No driving or operating machinery until day after procedure            No lifting  "      No lifting over 10 pounds and no strenuous physical activity.  For 6 weeks            Shower        Shower on Post-op day #1.   DO NOT take a bath                  Follow-up Appointments     Follow Up and recommended labs and tests       Follow-up with Dr. Henderson 3/19.                  Your next 10 appointments already scheduled     Mar 19, 2018  1:00 PM CDT   (Arrive by 12:45 PM)   Return Visit with Kevin Henderson MD   [facility] ([facility])    [address] [place] [address]  [address] [place] [id number] [phone]              Additional Information     If you use hormonal birth control (such as the pill, patch, ring or implants): You'll need a second form of birth control for 7 days (condoms, a diaphragm or contraceptive foam). While in the hospital, you received a medicine called Bridion. Your normal birth control will not work as well for a week after taking this medicine.          Pending Results     Date and Time Order Name Status Description    2/23/2018 1143 Cytology non gyn In process     2/23/2018 1000 Stone analysis In process     2/23/2018 0850 Surgical pathology exam Preliminary             Statement of Approval     Ordered          02/24/18 1216  I have reviewed and agree with all the recommendations and orders detailed in this document.  EFFECTIVE NOW     Approved and electronically signed by:  Daryl Lan MD             Admission Information     Date & Time Provider Department Dept. Phone    2/23/2018 Kirstin Hernandez MD Unit 6D Observation [facility] [phone]      Your Vitals Were     Blood Pressure Temperature Respirations Height Weight Pulse Oximetry    123/65 (BP Location: Right arm) 98.1  F (36.7  C) (Oral) 16 1.626 m (5' 4\") 130.8 kg (288 lb 5.8 oz) 95%    BMI (Body Mass Index)                   49.5 kg/m2           MyChart Information     [employer] gives you secure access to your electronic health record. If you see a " primary care provider, you can also send messages to your care team and make appointments. If you have questions, please call your primary care clinic.  If you do not have a primary care provider, please call [phone] and they will assist you.        Care EveryWhere ID     This is your Care EveryWhere ID. This could be used by other organizations to access your [facility] medical records  [id number]        Equal Access to Services     CED COLLINS : Hadii aad ku hadasho Soomaali, waaxda luqadaha, qaybta kaalmada adeegyada, waxay tadin scarlettn congjeanette freitas laDenniscraig enriquez. So [facility] [phone].    ATENCIÓN: Si mars mccauley, tiene a cortes disposición servicios gratuitos de asistencia lingüística. Llame al [phone].    We comply with applicable federal civil rights laws and [place] laws. We do not discriminate on the basis of race, color, national origin, age, disability, sex, sexual orientation, or gender identity.               Review of your medicines      START taking        Dose / Directions    oxyCODONE-acetaminophen 5-325 MG per tablet   Commonly known as:  PERCOCET   Used for:  Endometrial carcinoma (H)        Dose:  1-2 tablet   Take 1-2 tablets by mouth every 4 hours as needed for pain (moderate to severe)   Quantity:  20 tablet   Refills:  0       senna-docusate 8.6-50 MG per tablet   Commonly known as:  SENOKOT-S;PERICOLACE   Used for:  Endometrial carcinoma (H)        Dose:  1-2 tablet   Take 1-2 tablets by mouth 2 times daily Take while on oral narcotics to prevent or treat constipation.   Quantity:  30 tablet   Refills:  0         CONTINUE these medicines which may have CHANGED, or have new prescriptions. If we are uncertain of the size of tablets/capsules you have at home, strength may be listed as something that might have changed.        Dose / Directions    * ibuprofen 200 MG tablet   Commonly known as:  ADVIL/MOTRIN   This may have changed:  Another medication with the same name was added. Make  "sure you understand how and when to take each.        Dose:  200 mg   Take 200 mg by mouth every 4 hours as needed for mild pain   Refills:  0       * ibuprofen 600 MG tablet   Commonly known as:  ADVIL/MOTRIN   This may have changed:  You were already taking a medication with the same name, and this prescription was added. Make sure you understand how and when to take each.   Used for:  Endometrial carcinoma (H)        Dose:  600 mg   Take 1 tablet (600 mg) by mouth every 6 hours as needed for pain (mild)   Quantity:  30 tablet   Refills:  0       * Notice:  This list has 2 medication(s) that are the same as other medications prescribed for you. Read the directions carefully, and ask your doctor or other care provider to review them with you.      CONTINUE these medicines which have NOT CHANGED        Dose / Directions    aspirin EC 81 MG EC tablet        Dose:  81 mg   Take 81 mg by mouth Pt states, \"I don't take it regularly. I take it when I remember to\".   Refills:  0       METFORMIN HCL PO        Dose:  500 mg   Take 500 mg by mouth daily (with breakfast)   Refills:  0       triamterene-hydrochlorothiazide 37.5-25 MG per capsule   Commonly known as:  DYAZIDE        Dose:  1 capsule   Take 1 capsule by mouth every morning   Refills:  0            Where to get your medicines      These medications were sent to [facility] [address] [address]     Phone:  [phone]     ibuprofen 600 MG tablet    senna-docusate 8.6-50 MG per tablet         Some of these will need a paper prescription and others can be bought over the counter. Ask your nurse if you have questions.     Bring a paper prescription for each of these medications     oxyCODONE-acetaminophen 5-325 MG per tablet                Protect others around you: Learn how to safely use, store and throw away your medicines at www.disposemymeds.org.        Information about OPIOIDS     " PRESCRIPTION OPIOIDS: WHAT YOU NEED TO KNOW    Prescription opioids can be used to help relieve moderate to severe pain and are often prescribed following a surgery or injury, or for certain health conditions. These medications can be an important part of treatment but also come with serious risks. It is important to work with your health care provider to make sure you are getting the safest, most effective care.    WHAT ARE THE RISKS AND SIDE EFFECTS OF OPIOID USE?  Prescription opioids carry serious risks of addiction and overdose, especially with prolonged use. An opioid overdose, often marked by slowed breathing can cause sudden death. The use of prescription opioids can have a number of side effects as well, even when taken as directed:      Tolerance - meaning you might need to take more of a medication for the same pain relief    Physical dependence - meaning you have symptoms of withdrawal when a medication is stopped    Increased sensitivity to pain    Constipation    Nausea, vomiting, and dry mouth    Sleepiness and dizziness    Confusion    Depression    Low levels of testosterone that can result in lower sex drive, energy, and strength    Itching and sweating    RISKS ARE GREATER WITH:    History of drug misuse, substance use disorder, or overdose    Mental health conditions (such as depression or anxiety)    Sleep apnea    Older age (65 years or older)    Pregnancy    Avoid alcohol while taking prescription opioids.   Also, unless specifically advised by your health care provider, medications to avoid include:    Benzodiazepines (such as Xanax or Valium)    Muscle relaxants (such as Soma or Flexeril)    Hypnotics (such as Ambien or Lunesta)    Other prescription opioids    KNOW YOUR OPTIONS:  Talk to your health care provider about ways to manage your pain that do not involve prescription opioids. Some of these options may actually work better and have fewer risks and side effects:    Pain relievers  "such as acetaminophen, ibuprofen, and naproxen    Some medications that are also used for depression or seizures    Physical therapy and exercise    Cognitive behavioral therapy, a psychological, goal-directed approach, in which patients learn how to modify physical, behavioral, and emotional triggers of pain and stress    IF YOU ARE PRESCRIBED OPIOIDS FOR PAIN:    Never take opioids in greater amounts or more often than prescribed    Follow up with your primary health care provider and work together to create a plan on how to manage your pain.    Talk about ways to help manage your pain that do not involve prescription opioids    Talk about all concerns and side effects    Help prevent misuse and abuse    Never sell or share prescription opioids    Never use another person's prescription opioids    Store prescription opioids in a secure place and out of reach of others (this may include visitors, children, friends, and family)    Visit www.cdc.gov/drugoverdose to learn about risks of opioid abuse and overdose    If you believe you may be struggling with addiction, tell your health care provider and ask for guidance or call [facility]'s National Helpline at 2-101-777-HELP    LEARN MORE / www.cdc.gov/drugoverdose/prescribing/guideline.html    Safely dispose of unused prescription opioids: Find your local drug take-back programs and more information about the importance of safe disposal at www.doseofreality.[place].gov             Medication List: This is a list of all your medications and when to take them. Check marks below indicate your daily home schedule. Keep this list as a reference.      Medications           Morning Afternoon Evening Bedtime As Needed    aspirin EC 81 MG EC tablet   Take 81 mg by mouth Pt states, \"I don't take it regularly. I take it when I remember to\".                                * ibuprofen 200 MG tablet   Commonly known as:  ADVIL/MOTRIN   Take 200 mg by mouth every 4 hours as needed for mild " pain   Last time this was given:  600 mg on 2/23/2018  2:06 PM                                * ibuprofen 600 MG tablet   Commonly known as:  ADVIL/MOTRIN   Take 1 tablet (600 mg) by mouth every 6 hours as needed for pain (mild)   Last time this was given:  600 mg on 2/23/2018  2:06 PM                                METFORMIN HCL PO   Take 500 mg by mouth daily (with breakfast)                                oxyCODONE-acetaminophen 5-325 MG per tablet   Commonly known as:  PERCOCET   Take 1-2 tablets by mouth every 4 hours as needed for pain (moderate to severe)                                senna-docusate 8.6-50 MG per tablet   Commonly known as:  SENOKOT-S;PERICOLACE   Take 1-2 tablets by mouth 2 times daily Take while on oral narcotics to prevent or treat constipation.                                triamterene-hydrochlorothiazide 37.5-25 MG per capsule   Commonly known as:  DYAZIDE   Take 1 capsule by mouth every morning                                * Notice:  This list has 2 medication(s) that are the same as other medications prescribed for you. Read the directions carefully, and ask your doctor or other care provider to review them with you.

## 2018-02-23 NOTE — ANESTHESIA CARE TRANSFER NOTE
Patient: Lu Smith    Procedure(s):  DaVinci Assisted Total Laparoscopic Hysterectomy, Bilateral Salpingo-Oophorectomy, Cystoscopy,  Omental biopsy, omentectomy,bilateral  Pelvic And Para Aortic Lymph Node Dissection - Wound Class: II-Clean Contaminated   - Wound Class: II-Clean Contaminated    Diagnosis: Endometrial Carcinoma  Diagnosis Additional Information: No value filed.    Anesthesia Type:   General, ETT     Note:  Airway :Face Mask  Patient transferred to:PACU  Comments: Pt. Pink and breathing spontaneously.  Pt alert and oriented.  Vitals stable.  Report given to oncoming nurse.  Transfer of care occurred.Handoff Report: Identifed the Patient, Identified the Reponsible Provider, Reviewed the pertinent medical history, Discussed the surgical course, Reviewed Intra-OP anesthesia mangement and issues during anesthesia, Set expectations for post-procedure period and Allowed opportunity for questions and acknowledgement of understanding      Vitals: (Last set prior to Anesthesia Care Transfer)    CRNA VITALS  2/23/2018 1154 - 2/23/2018 1236      2/23/2018             Pulse: 85    SpO2: 99 %    Resp Rate (set): 10                Electronically Signed By: ANU Still CRNA  February 23, 2018  12:36 PM

## 2018-02-23 NOTE — OR NURSING
Patient educated on use and importance of incentive spirometer.  Patient able to demonstrate proper use of incentive spirometer.    Patient c/o numbness left hand.  Dr. Morales notified and will assess in PACU.

## 2018-02-23 NOTE — ANESTHESIA POSTPROCEDURE EVALUATION
Patient: Lu Smith    Procedure(s):  DaVinci Assisted Total Laparoscopic Hysterectomy, Bilateral Salpingo-Oophorectomy, Cystoscopy,  Omental biopsy, omentectomy,bilateral  Pelvic And Para Aortic Lymph Node Dissection - Wound Class: II-Clean Contaminated   - Wound Class: II-Clean Contaminated    Diagnosis:Endometrial Carcinoma  Diagnosis Additional Information: No value filed.    Anesthesia Type:  General, ETT    Note:  Anesthesia Post Evaluation    Patient location during evaluation: PACU  Patient participation: Able to fully participate in evaluation  Level of consciousness: awake  Pain management: adequate  Airway patency: patent  Cardiovascular status: acceptable  Respiratory status: acceptable  Hydration status: acceptable  PONV: none     Anesthetic complications: None    Comments: Patient complaining of some numbness in her left hand (all finger tips), she has normal  strength in her hand and normal sensation.  She was told that this will likely improve over time and to follow up if symptoms worsen.          Last vitals:  Vitals:    02/23/18 1415 02/23/18 1430 02/23/18 1445   BP: 135/71 123/71 142/75   Resp: 18 18 18   Temp: 36.4  C (97.5  F)     SpO2: 97% 93% 93%         Electronically Signed By: Belen Morales MD  February 23, 2018  3:00 PM

## 2018-02-24 VITALS
DIASTOLIC BLOOD PRESSURE: 65 MMHG | HEIGHT: 64 IN | RESPIRATION RATE: 16 BRPM | SYSTOLIC BLOOD PRESSURE: 123 MMHG | BODY MASS INDEX: 49.23 KG/M2 | TEMPERATURE: 98.1 F | WEIGHT: 288.36 LBS | OXYGEN SATURATION: 95 %

## 2018-02-24 LAB
GLUCOSE BLDC GLUCOMTR-MCNC: 125 MG/DL (ref 70–99)
GLUCOSE BLDC GLUCOMTR-MCNC: 128 MG/DL (ref 70–99)

## 2018-02-24 PROCEDURE — 25000132 ZZH RX MED GY IP 250 OP 250 PS 637: Performed by: STUDENT IN AN ORGANIZED HEALTH CARE EDUCATION/TRAINING PROGRAM

## 2018-02-24 PROCEDURE — 25000128 H RX IP 250 OP 636: Performed by: STUDENT IN AN ORGANIZED HEALTH CARE EDUCATION/TRAINING PROGRAM

## 2018-02-24 PROCEDURE — 82962 GLUCOSE BLOOD TEST: CPT

## 2018-02-24 RX ADMIN — SODIUM CHLORIDE: 9 INJECTION, SOLUTION INTRAVENOUS at 08:00

## 2018-02-24 RX ADMIN — ACETAMINOPHEN 650 MG: 325 TABLET, FILM COATED ORAL at 06:16

## 2018-02-24 RX ADMIN — OXYCODONE HYDROCHLORIDE 5 MG: 5 TABLET ORAL at 06:16

## 2018-02-24 ASSESSMENT — PAIN DESCRIPTION - DESCRIPTORS: DESCRIPTORS: ACHING

## 2018-02-24 NOTE — PROGRESS NOTES
Patient alert, oriented, claimed having pain at her operative site/abdomen aggravated by movement like getting up going to the bathroom, scheduled Acetaminophen and Oxycodone given. Pt ambulated to the bathroom with SBA, voiding adequately and without difficulty. On 2 li of Oxygen overnight, was wean off this morning. Tolerating clear liquids, denies nausea. Operative site unable to visualize, abdominal binder in place, pt wants it not released. VSS. Tolerating clear liquids.

## 2018-02-24 NOTE — PROGRESS NOTES
Gynecologic Oncology Postoperative Check Note  2/24/2018    S: Lu states that she is having pain when she moves as she did not want to take her narcotic pain medication. She denies any nausea, vomiting, chest pain, shortness of breath, dizziness. She is ambulating and voiding spontaneously. She ate a chicken quesadilla last night. She denies any flatus.    O:  Vitals:    02/23/18 1712 02/23/18 1715 02/23/18 2249 02/23/18 2346   BP: 128/69 128/69 135/66    BP Location: Right arm      Resp: 16  16    Temp: 97.8  F (36.6  C)  98.2  F (36.8  C)    TempSrc: Oral  Oral    SpO2: 97%  94% 92%   Weight:       Height:         Gen: Alert, oriented- on oxygen  Cardio: RRR  Resp: On O2 NC with lungs CTAB bilateral from anterior lung fields  Abdomen: Appropriately tender, non-distended  Incisions: Dressings on, c/d/ix5 port sites  Extremities: Trace edema    UOP:  250 mL O/N    Blood glucose:  138 and 125    A: 67 year old POD#1 s/p DA-TLH, BSO, cysto, PPALND, omentecomy     Dz: High grade serous adenocarcinoma  FEN: Regular Diet. IVF 100ml LR. Will d/c today once tolerating more fluids  Pain: oxycodone and ibuprofen PRN, scheduled tylenol  Heme: Hgb 12> EBL 75 . Asymptomatic  CV: Hx of HTN- hold home dyazide  Pulm: O2 as needed. On room air currently.  GI: No acute issues. Bowel regimen, anti-emetics PRN  : s/p sosa  ID: afebrile. Pre-op antibiotics  Endo: DMT2- hold home metformin, MSSI  Psych/Neuro/MSK: NI  PPX: SCDs, IS,  Dispo: likely today when meeting post-operative goals. Will wean O2 and encouraged further ambulation  Drains/Lines: PIV    Nika Cope MD   OBGYN, PGY-3  2/24/2018 4:35 AM

## 2018-02-24 NOTE — PLAN OF CARE
Problem: Patient Care Overview  Goal: Plan of Care/Patient Progress Review  Outcome: Improving  Pt is A&Ox4, VSS, and tolerating a regular diet. Pt is c/o incisional abdominal pain that is managed with scheduled Tylenol and PRN oxycodone. Pt had IV fluids running at 125 mL/hr into a patent IV, and they were discontinued by the provider at 1130. Pt did not receive novalog this AM due to a BG of 128. Pt ambulates independently, and voids spontaneously and without difficulty. Pt discharging soon.

## 2018-02-24 NOTE — PROGRESS NOTES
Reviewed and discussed d/c instructions with patient and family.  PIV was removed and all questions were answered.  Instructions for opioids were also discussed.

## 2018-02-24 NOTE — PROVIDER NOTIFICATION
"Team paged RE: \"Pt is curious about when she is discharging today, and would like an updated timeline for that. Thanks!\"  "

## 2018-02-24 NOTE — OP NOTE
GYNECOLOGIC ONCOLOGY OPERATION NOTE    PATIENT: Lu Smith  MRN: [id number]  DATE OF SURGERY: 02/23/2018    PREOPERATIVE DIAGNOSES:    1. High-grade serous carcinoma of the uterus    POSTOPERATIVE DIAGNOSES:    1. High-grade serous carcinoma of the uterus    PROCEDURES:    1. Robotic-assisted total laparoscopic hysterectomy  2. Bilateral salpingo-oopherectomy  3. Bilateral pelvic lymphadenectomy  4. Bilateral para-aortic lymphadenectomy  5. Omental biopsy  6. Cystoscopy    SURGEON: Dr. Beatriz MD     ASSISTANTS:    1. Rogelio Hernandez MD, 2nd year fellow  2. Nika Cope MD, 4th year resident    ANESTHESIA: General endotracheal.     ESTIMATED BLOOD LOSS: 75 ml      IV FLUIDS: 1400 ml    TOTAL URINE OUTPUT: 450 ml    DRAINS: None      FINDINGS: Exam under anesthesia revealed normal appearing external genitalia, vagina and cervix. Bimanual exam with normal size uterus and no appreciable adnexal enlargement. Laparoscopy revealed atraumatic entry to the abdomen with normal appearing liver, stomach, diaphragm, small and large bowel. The omentum appeared to have a multiple discrete calcifications, which one was removed and sent for frozen section: benign calcified gall stone. Normal appearing uterus, cervix, fallopian tubes and ovaries. Frozen pathology with high grade serous adenocarcinoma of the endometrium with deep invasion. Enlarged lymph nodes on bilateral external iliacs. Procedure was complicated by patient's body habitus and unable to tolerate steep Trendelenburg with poor ventilation. Cystoscopy revealed bilateral UO jet streams without evidence of bladder injury. Hemostatic surgical site at the end of the case.      SPECIMENS:    1. Pelvic washings  2. Uterus, cervix, bilateral tubes and ovaries  3. Bilateral pelvic lymph nodes  4. Omental biopsies  5. Bilateral para-aortic lymph nodes    COMPLICATIONS: None    CONDITION: Stable to PACU.     INDICATIONS: High-grade serous adenocarcinoma of the  endometrium    OPERATIVE PROCEDURE IN DETAIL: Consent was reviewed with the patient in the preoperative setting and confirmed. She received prophylactic antibiotics. In addition, she received heparin for venous thrombosis prevention. The patient was transferred to the operating room and placed in dorsal supine position. General anesthetic was obtained in the usual manner without noted difficulties. The patient was then positioned onto Hernandez stirrups and an exam under anesthesia was performed with findings as described above. Sanchez catheter was then placed under sterile techniques and the patient was prepped and draped for the above-mentioned procedure.     Timeout was called at which point the patient's name, procedure and operative site was confirmed by the operative team. Initially, the umbilicus was then elevated and the Veress needle introduced through this stab incision. The abdomen was insufflated without difficulty. The Veress needle was removed. Sites for the da Delfino laparoscopic ports were demarcated, total of 5, initiating' midline approximately 4 cm above the umbilicus and positioned in a semicircular fashion around the upper abdomen. The initial midline 8 mm port was inserted without difficulties, the left 2 lateral 8 mm da Delfino ports were inserted and on the right a 12 mm and an 8 mm all under direct visualization without any vascular injury noted. At this point, the patient was placed into steep Trendelenburg. The pelvis was inspected as well as the upper abdomen as noted above. Bowels were attempted to be packed up into the upper abdomen with gentle traction but due to the patient's body habitus, the patient did not tolerate steep Trendelenburg. The abdominal pressure was reduced to 12 mmHg. The patient had a large amount of small and large bowel that would not remain in the upper abdomen. We decided to proceed with the surgery. At this point, da Delfino was docked onto the ports, all appropriate  instruments were inserted.     We started by resecting one of the calcified nodules in the omentum and sent it for frozen section, which returned benign calcified gall stone. Since this was negative, we decided to proceed with the procedure.    The hysterectomy procedure was initiated by identifying and opening the paravesicle spaces in both the right and left retroperitoneum. On the right, the round ligament was tented away and transected laterally. We opened the retroperitoneal space and identified the right superior vesicle artery. The paravesicle space was opened and the obturator nerve was identified. The same procedure was carried out on the left side.  Attention was then turned to the hysterectomy.    First on the right, the peritoneal dissection was carried out parallel to the infundibulopelvic ligament. A window was created in the medial leaf of the peritoneum isolating the ovarian vasculature with the ureter in view. The vessels were cauterized with the vessel sealer and transected. The posterior and anterior broad ligaments were then taken down to the level of the uterine vessels on the right. A bladder flap was then initiated at the lower uterine segment in the vesicouterine peritoneum. Our attention was then turned to the left side. In a similar manner, the peritoneal dissection was carried out parallel to the infundibulopelvic ligament on the left. A window was created in the medial leaf of the peritoneum isolating the ovarian vasculature with the ureter in view. The vessels were cauterized with the vessel sealer and transected. The posterior and anterior broad ligaments were then taken down to the level of the uterine vessels on the left. We connected the bladder flap. Minimal bleeding was encountered during the process. The vaginal cuff had been well-developed and clearly the bladder was out of the way.     At this point, the bilateral uterine vessels was taken with the vessel sealer. First on the  right, the uterine artery and vein were coagulated and cut, allowing the pedicle and ureter to fall laterally. The bladder flap was further created and pushed below the level of the cervix. Then on the left, the uterine artery and vein were coagulated and cut, allowing the pedicle and ureter to fall laterally. Once the uterine vessels were completely transected and the bladder flap well developed, we began colpotomy on the anterior side. This was continued around circumferentially, amputating the cervix, uterus, bilateral tubes and ovaries. The specimen was delivered through the vagina without difficulty. Specimen was sent for frozen, which returned as above.    We then began staging. We performed the left pelvic lymph node dissection. At this point, peritoneum overlying the left external iliac artery was incised and space opened up.The borders of the pelvic lymph node dissection included cephalad bifurcation of the common iliac artery, caudad to the circumflex iliac vein, medially the ureter and lateral to genitofemoral nerve. Lymph nodes overlying the iliac vessels towards the external iliac artery and vein, were elevated and resected and additional lymph nodes within the obturator space were removed above the level of the nerve without injury to the nerve. There were several enlarged left pelvic lymph nodes. Hemostasis was obtained. Attention was turned towards the right. In a similar manner, the right pelvic lymph node dissection was performed with the borders extending from the common iliac artery to the deep circumflex iliac vein, medially the ureter and laterally genitofemoral nerve.  We were able to retract the ureter again medially out of harm's way. Lymph nodes overlying the iliac vessels were elevated and there was significant adenopathy noted. In addition, we removed the lymph nodes within the obturator space above the level of the nerve without injury to the nerve. Both the right and left pelvic lymph  nodes were removed in the Endobag, delivered through the vagina, and sent to pathology for permanent sections.    We then performed a limited para-aortic lymph node dissection due to the difficulty retracting the bowel out of the way and obtaining visualization to perform the procedure safely. On the right, we were able to start at the bifurcation of the iliac vessels and dissect directly off the common right iliac artery up to the level of the CHRISTINE. We removed these nodes and sent them for permanent pathology. The left side was a little more easy, as the bowel retracted into the upper abdomen more easily. Again, we dissected along the left common iliac as high as above the level of the CHRISTINE. We removed these nodes and sent them for permanent pathology.    At this time, we had removed all [employer]s and lymph nodes through the vagina. We then decided to close the vaginal cuff. This was performed using interrupted 0-vicryl suture, starting at the right apex and moving towards the left apex. Excellent re-approximation was achieved and hemostasis was obtained.    Next, we performed cystoscopy using 30-degree angled scope. We noted normal bladder mucosa and efflux from bilateral ureteral orifice. At this point, the bladder was drained and sosa catheter remained out. Everything was removed from the vagina. The robot was undocked and patient taken out of steep Trendelenburg. We closed the fascia on the right upper 12 mm incision using the Franklin-Vega device. All laparoscopic port sites were now removed and CO2 allowed to escape from the abdomen. Skin was reapproximated with 4-0 Vicryl sutures and appropriate dressing applied.     Sponge, lap and needle counts were reported as correct x2 and instrument count was correct x1.     Kevin Henderson MD, MS    Department of Obstetrics and Gynecology   Division of Gynecologic Oncology   [facility]  Phone: [phone]

## 2018-02-24 NOTE — PROGRESS NOTES
"S/p total hysterectomy. Pt ate, voided well. Requires SBA to bathroom. 5 laps site CDI, with abdominal binder in place. Schedule tylenol given with relief. AVSS ./66  Temp 98.2  F (36.8  C) (Oral)  Resp 16  Ht 1.626 m (5' 4\")  Wt 130.8 kg (288 lb 5.8 oz)  SpO2 94%  BMI 49.5 kg/m2    "

## 2018-02-26 LAB
APPEARANCE STONE: NORMAL
COMPN STONE: NORMAL
COPATH REPORT: NORMAL
NUMBER STONE: 1
SIZE STONE: > 9 MM
WT STONE: NORMAL MG

## 2018-02-27 ENCOUNTER — CARE COORDINATION (OUTPATIENT)
Dept: ONCOLOGY | Facility: CLINIC | Age: 68
End: 2018-02-27

## 2018-02-27 NOTE — PROGRESS NOTES
RN attempted to reach patient for post operative call. Patient unavailable. Voicemail and call back number left.     Maria Victoria Turner RN

## 2018-03-01 LAB — COPATH REPORT: NORMAL

## 2018-03-12 ASSESSMENT — ENCOUNTER SYMPTOMS
POOR WOUND HEALING: 0
SKIN CHANGES: 0
NAIL CHANGES: 1

## 2018-03-19 ENCOUNTER — CARE COORDINATION (OUTPATIENT)
Dept: ONCOLOGY | Facility: CLINIC | Age: 68
End: 2018-03-19

## 2018-03-19 ENCOUNTER — ONCOLOGY VISIT (OUTPATIENT)
Dept: ONCOLOGY | Facility: CLINIC | Age: 68
End: 2018-03-19
Attending: OBSTETRICS & GYNECOLOGY
Payer: COMMERCIAL

## 2018-03-19 VITALS
RESPIRATION RATE: 16 BRPM | DIASTOLIC BLOOD PRESSURE: 72 MMHG | SYSTOLIC BLOOD PRESSURE: 149 MMHG | HEIGHT: 64 IN | BODY MASS INDEX: 49.66 KG/M2 | TEMPERATURE: 98.2 F | OXYGEN SATURATION: 94 % | HEART RATE: 73 BPM | WEIGHT: 290.9 LBS

## 2018-03-19 DIAGNOSIS — C54.1 ENDOMETRIAL CANCER (H): Primary | ICD-10-CM

## 2018-03-19 DIAGNOSIS — Z79.899 ENCOUNTER FOR LONG-TERM (CURRENT) USE OF MEDICATIONS: ICD-10-CM

## 2018-03-19 PROCEDURE — 99024 POSTOP FOLLOW-UP VISIT: CPT | Mod: ZP | Performed by: OBSTETRICS & GYNECOLOGY

## 2018-03-19 PROCEDURE — G0463 HOSPITAL OUTPT CLINIC VISIT: HCPCS | Mod: ZF

## 2018-03-19 RX ORDER — EPINEPHRINE 0.3 MG/.3ML
0.3 INJECTION SUBCUTANEOUS EVERY 5 MIN PRN
Status: CANCELLED | OUTPATIENT
Start: 2018-03-30

## 2018-03-19 RX ORDER — METHYLPREDNISOLONE SODIUM SUCCINATE 125 MG/2ML
125 INJECTION, POWDER, LYOPHILIZED, FOR SOLUTION INTRAMUSCULAR; INTRAVENOUS
Status: CANCELLED
Start: 2018-03-30

## 2018-03-19 RX ORDER — DIPHENHYDRAMINE HCL 25 MG
50 CAPSULE ORAL ONCE
Status: CANCELLED
Start: 2018-03-30

## 2018-03-19 RX ORDER — SODIUM CHLORIDE 9 MG/ML
1000 INJECTION, SOLUTION INTRAVENOUS CONTINUOUS PRN
Status: CANCELLED
Start: 2018-03-30

## 2018-03-19 RX ORDER — EPINEPHRINE 1 MG/ML
0.3 INJECTION, SOLUTION, CONCENTRATE INTRAVENOUS EVERY 5 MIN PRN
Status: CANCELLED | OUTPATIENT
Start: 2018-03-30

## 2018-03-19 RX ORDER — MEPERIDINE HYDROCHLORIDE 25 MG/ML
25 INJECTION INTRAMUSCULAR; INTRAVENOUS; SUBCUTANEOUS EVERY 30 MIN PRN
Status: CANCELLED | OUTPATIENT
Start: 2018-03-30

## 2018-03-19 RX ORDER — ALBUTEROL SULFATE 90 UG/1
1-2 AEROSOL, METERED RESPIRATORY (INHALATION)
Status: CANCELLED
Start: 2018-03-30

## 2018-03-19 RX ORDER — ALBUTEROL SULFATE 0.83 MG/ML
2.5 SOLUTION RESPIRATORY (INHALATION)
Status: CANCELLED | OUTPATIENT
Start: 2018-03-30

## 2018-03-19 RX ORDER — DIPHENHYDRAMINE HYDROCHLORIDE 50 MG/ML
50 INJECTION INTRAMUSCULAR; INTRAVENOUS
Status: CANCELLED
Start: 2018-03-30

## 2018-03-19 RX ORDER — PALONOSETRON 0.05 MG/ML
0.25 INJECTION, SOLUTION INTRAVENOUS ONCE
Status: CANCELLED
Start: 2018-03-30

## 2018-03-19 RX ORDER — LORAZEPAM 2 MG/ML
1 INJECTION INTRAMUSCULAR EVERY 6 HOURS PRN
Status: CANCELLED
Start: 2018-03-30

## 2018-03-19 ASSESSMENT — PAIN SCALES - GENERAL: PAINLEVEL: NO PAIN (0)

## 2018-03-19 NOTE — PROGRESS NOTES
Follow Up Notes on Referred Patient    Date: 3/19/2018       Dr. Levar Scott  [address] [place] [address]       RE: Lu Smith  : 1950  HEIDY: 3/19/2018    Dear Dr. Levar Scott:    Lu Smith is a 67 year old woman with a diagnosis of stage IIIC2 serous endometrial cancer.     Patient presents today for followup.  She has been doing well since surgery.  No nausea, vomiting, fever or chills.  Normal urinary and bowel function.  No vaginal bleeding.  No B symptoms.           Past Medical History:    Past Medical History:   Diagnosis Date     Diabetes (H)     Type II     Endometrial cancer determined by uterine biopsy (H) 2018     HTN (hypertension)      Obesity      Osteoarthritis          Past Surgical History:    Past Surgical History:   Procedure Laterality Date     CHOLECYSTECTOMY       CYSTOSCOPY N/A 2018    Procedure: CYSTOSCOPY;;  Surgeon: Kevin Henderson MD;  Location: [facility] OR     DAVINCI HYSTERECTOMY TOTAL, BILATERAL SALPINGO-OOPHORECTOMY, COMBINED N/A 2018    Procedure: COMBINED DAVINCI HYSTERECTOMY TOTAL, SALPINGO-OOPHORECTOMY;  DaVinci Assisted Total Laparoscopic Hysterectomy, Bilateral Salpingo-Oophorectomy, Cystoscopy,  Omental biopsy, omentectomy,bilateral  Pelvic And Para Aortic Lymph Node Dissection;  Surgeon: Kevin Henderson MD;  Location: [facility] OR     Partial lumbar discectomy           Health Maintenance Due   Topic Date Due     TETANUS IMMUNIZATION (SYSTEM ASSIGNED)  1970     HEPATITIS C SCREENING  1970     LIPID SCREEN Q5 YR FEMALE (SYSTEM ASSIGNED)  1997     MAMMO SCREEN Q2 YR (SYSTEM ASSIGNED)  2002     COLON CANCER SCREEN (SYSTEM ASSIGNED)  2002     ADVANCE DIRECTIVE PLANNING Q5 YRS  2007     FALL RISK ASSESSMENT  2017     DEXA SCAN SCREENING (SYSTEM ASSIGNED)  2017     PNEUMOCOCCAL (1 of 2 - PCV13) 2017       Current Medications:     Current  "Outpatient Prescriptions   Medication Sig Dispense Refill     ibuprofen (ADVIL/MOTRIN) 600 MG tablet Take 1 tablet (600 mg) by mouth every 6 hours as needed for pain (mild) 30 tablet 0     triamterene-hydrochlorothiazide (DYAZIDE) 37.5-25 MG per capsule Take 1 capsule by mouth every morning        aspirin EC 81 MG EC tablet Take 81 mg by mouth Pt states, \"I don't take it regularly. I take it when I remember to\".       METFORMIN HCL PO Take 500 mg by mouth daily (with breakfast)            Allergies:        Allergies   Allergen Reactions     Ace Inhibitors Cough     Amoxicillin Hives        Social History:     Social History   Substance Use Topics     Smoking status: Former Smoker     Packs/day: 0.25     Years: 20.00     Smokeless tobacco: Never Used      Comment: Quite smoking 1992     Alcohol use Yes      Comment: 4-7/week if out 1-2x's/week       History   Drug Use No         Family History:       Family History   Problem Relation Age of Onset     Colon Cancer Mother      Breast Cancer Sister      Lymphoma Sister          Physical Exam:     /72  Pulse 73  Temp 98.2  F (36.8  C) (Oral)  Resp 16  Ht 1.626 m (5' 4\")  Wt 132 kg (290 lb 14.4 oz)  SpO2 94%  Breastfeeding? No  BMI 49.93 kg/m2  Body mass index is 49.93 kg/(m^2).    General Appearance: healthy and alert, no distress     ABDOMEN:  Soft, nontender, nondistended.  No organomegaly.  Well-healed port site incisions.     PELVIC:  Normal external genitalia, normal vaginal mucosa, well-healing vaginal cuff, intact, confirmed on bimanual exam.             Assessment:    Lu Smith is a 67 year old woman with a diagnosis of stage IIIC2 serous endometrial cancer.     A total of 30 minutes was spent with the patient, 25 minutes of which were spent in counseling the patient and/or treatment planning.      1.  Stage IIIC2 serous endometrial carcinoma.   2.  Class 3 obesity.      Discussed with the patient that given she had had 6 positive paraaortic " lymph nodes involved by cancer out of over 40 lymph nodes total, that I would recommend to proceed with adjuvant treatment with a total of 6 cycles of carboplatin and paclitaxel every 3-week chemotherapy with dose of AUC of 6 for the carboplatin as well as paclitaxel 175 mg/m2.  In addition I would recommend adjuvant external beam radiation therapy. We will plan to do that sandwiched between cycle 3 and 4 of chemotherapy. She will see my colleagues in Radiation Oncology for consultation.  The patient agrees with the plan.  She is very appreciative of her care.  All questions were answered.       Kevin Henderson MD, MS    Department of Obstetrics and Gynecology   Division of Gynecologic Oncology   [facility]  Phone: [phone]        CC  Patient Care Team:  Levar Scott as PCP - General (Internal Medicine)  Jeanne Blancas MD as Referring Physician (OB/Gyn)  LEVAR SCOTT

## 2018-03-19 NOTE — PROGRESS NOTES
[facility] GYN-Oncology Teaching Note    Relevant Diagnosis:  Endometrial Cancer    Teaching Topic:  Chemotherapy:  Taxol/Carbo   Implanted Port    Person(s) involved in teaching:  Patient and sister    Motivation Level:   Asks Questions:   Yes  Eager to Learn:  Yes  Cooperative:  Yes  Receptive (willing/able to accept information):  Yes      Patient demonstrates understanding of the following:  Reason for the appointment, diagnosis and treatment plan:  Yes  Knowledge of proper use of medications and conditions for which they are ordered (with special attention to potential side effects or drug interactions):  Yes  Which situations necessitate calling provider and whom to contact:  Yes    Teaching Concerns:  Plan Taxol 175 mg/m2 and Carbo AUC 6 every 21 days x3/Radiation Therapy followed by 3 more cycles Taxol/Carbo.  Patient would like a port.  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  She is also looking at potential for skilled nursing.      Instructional Materials Used/Given:  Implanted Port Booklet  Chemotherapy Packet and Cards  Disease Packet   Cancer Handbook   Care Coordinator Cards and after hours contact information    Time spent teaching with patient:  60 minutes    Lalitha MERCER, RN  Care Coordinator  Gynecologic Cancer   Office:  [phone]  Pager: [phone] #[phone]

## 2018-03-19 NOTE — NURSING NOTE
"Oncology Rooming Note    March 19, 2018 1:23 PM   Lu Smith is a 67 year old female who presents for:    Chief Complaint   Patient presents with     Oncology Clinic Visit     return patient visit for post op follow up related to endometrial cancer     Initial Vitals: /72  Pulse 73  Temp 98.2  F (36.8  C) (Oral)  Resp 16  Ht 1.626 m (5' 4\")  Wt 132 kg (290 lb 14.4 oz)  SpO2 94%  Breastfeeding? No  BMI 49.93 kg/m2 Estimated body mass index is 49.93 kg/(m^2) as calculated from the following:    Height as of this encounter: 1.626 m (5' 4\").    Weight as of this encounter: 132 kg (290 lb 14.4 oz). Body surface area is 2.44 meters squared.  No Pain (0) Comment: lower abdomen pressure    No LMP recorded. Patient is postmenopausal.  Allergies reviewed: Yes  Medications reviewed: Yes    Medications: Medication refills not needed today.  Pharmacy name entered into [facility] [place] [address] AT [facility] [place] [address]    Clinical concerns: abdomen pressure and left hip rash dr. brothers was notified.    6 minutes for nursing intake (face to face time)     Renea Gonzales CMA              "

## 2018-03-19 NOTE — LETTER
3/19/2018       RE: Lu Smith  [address]  [place] [address]     Dear Colleague,    Thank you for referring your patient, Lu Smith, to the [facility]. Please see a copy of my visit note below.                Follow Up Notes on Referred Patient    Date: 3/19/2018       Dr. Levar Scott  [address] [place] [address]       RE: Lu Smith  : 1950  HEIDY: 3/19/2018    Dear Dr. Levar Scott:    uL Smith is a 67 year old woman with a diagnosis of stage IIIC2 serous endometrial cancer.     Patient presents today for followup.  She has been doing well since surgery.  No nausea, vomiting, fever or chills.  Normal urinary and bowel function.  No vaginal bleeding.  No B symptoms.           Past Medical History:    Past Medical History:   Diagnosis Date     Diabetes (H)     Type II     Endometrial cancer determined by uterine biopsy (H) 2018     HTN (hypertension)      Obesity      Osteoarthritis          Past Surgical History:    Past Surgical History:   Procedure Laterality Date     CHOLECYSTECTOMY       CYSTOSCOPY N/A 2018    Procedure: CYSTOSCOPY;;  Surgeon: Kevin Henderson MD;  Location: [facility] OR     DAVINCI HYSTERECTOMY TOTAL, BILATERAL SALPINGO-OOPHORECTOMY, COMBINED N/A 2018    Procedure: COMBINED DAVINCI HYSTERECTOMY TOTAL, SALPINGO-OOPHORECTOMY;  DaVinci Assisted Total Laparoscopic Hysterectomy, Bilateral Salpingo-Oophorectomy, Cystoscopy,  Omental biopsy, omentectomy,bilateral  Pelvic And Para Aortic Lymph Node Dissection;  Surgeon: Kevin Henderson MD;  Location: [facility] OR     Partial lumbar discectomy           Health Maintenance Due   Topic Date Due     TETANUS IMMUNIZATION (SYSTEM ASSIGNED)  1970     HEPATITIS C SCREENING  1970     LIPID SCREEN Q5 YR FEMALE (SYSTEM ASSIGNED)  1997     MAMMO SCREEN Q2 YR (SYSTEM ASSIGNED)  2002     COLON CANCER SCREEN (SYSTEM ASSIGNED)  2002  "    ADVANCE DIRECTIVE PLANNING Q5 YRS  04/07/2007     FALL RISK ASSESSMENT  04/07/2017     DEXA SCAN SCREENING (SYSTEM ASSIGNED)  04/07/2017     PNEUMOCOCCAL (1 of 2 - PCV13) 04/07/2017       Current Medications:     Current Outpatient Prescriptions   Medication Sig Dispense Refill     ibuprofen (ADVIL/MOTRIN) 600 MG tablet Take 1 tablet (600 mg) by mouth every 6 hours as needed for pain (mild) 30 tablet 0     triamterene-hydrochlorothiazide (DYAZIDE) 37.5-25 MG per capsule Take 1 capsule by mouth every morning        aspirin EC 81 MG EC tablet Take 81 mg by mouth Pt states, \"I don't take it regularly. I take it when I remember to\".       METFORMIN HCL PO Take 500 mg by mouth daily (with breakfast)            Allergies:        Allergies   Allergen Reactions     Ace Inhibitors Cough     Amoxicillin Hives        Social History:     Social History   Substance Use Topics     Smoking status: Former Smoker     Packs/day: 0.25     Years: 20.00     Smokeless tobacco: Never Used      Comment: Quite smoking 1992     Alcohol use Yes      Comment: 4-7/week if out 1-2x's/week       History   Drug Use No         Family History:       Family History   Problem Relation Age of Onset     Colon Cancer Mother      Breast Cancer Sister      Lymphoma Sister          Physical Exam:     /72  Pulse 73  Temp 98.2  F (36.8  C) (Oral)  Resp 16  Ht 1.626 m (5' 4\")  Wt 132 kg (290 lb 14.4 oz)  SpO2 94%  Breastfeeding? No  BMI 49.93 kg/m2  Body mass index is 49.93 kg/(m^2).    General Appearance: healthy and alert, no distress     ABDOMEN:  Soft, nontender, nondistended.  No organomegaly.  Well-healed port site incisions.     PELVIC:  Normal external genitalia, normal vaginal mucosa, well-healing vaginal cuff, intact, confirmed on bimanual exam.             Assessment:    uL Smith is a 67 year old woman with a diagnosis of stage IIIC2 serous endometrial cancer.     A total of 30 minutes was spent with the patient, 25 minutes " of which were spent in counseling the patient and/or treatment planning.      1.  Stage IIIC2 serous endometrial carcinoma.   2.  Class 3 obesity.      Discussed with the patient that given she had had 6 positive paraaortic lymph nodes involved by cancer out of over 40 lymph nodes total, that I would recommend to proceed with adjuvant treatment with a total of 6 cycles of carboplatin and paclitaxel every 3-week chemotherapy with dose of AUC of 6 for the carboplatin as well as paclitaxel 175 mg/m2.  In addition I would recommend adjuvant external beam radiation therapy. We will plan to do that sandwiched between cycle 3 and 4 of chemotherapy. She will see my colleagues in Radiation Oncology for consultation.  The patient agrees with the plan.  She is very appreciative of her care.  All questions were answered.       Kevin Henderson MD, MS    Department of Obstetrics and Gynecology   Division of Gynecologic Oncology   [facility]  Phone: [phone]        CC  Patient Care Team:  Levar Scott as PCP - General (Internal Medicine)  Jeanne Blancas MD as Referring Physician (OB/Gyn)

## 2018-03-19 NOTE — MR AVS SNAPSHOT
After Visit Summary   3/19/2018    Lu Smith    MRN: [id number]           Patient Information     Date Of Birth          1950        Visit Information        Provider Department      3/19/2018 1:00 PM Kevin Henderson MD [facility]        Today's Diagnoses     Endometrial cancer (H)    -  1       Follow-ups after your visit        Your next 10 appointments already scheduled     Mar 29, 2018  8:45 AM CDT   (Arrive by 7:15 AM)   IR CHEST PORT PLACEMENT > 5 YRS OF AGE with [id number]   [facility] ([facility])    [address]  5th Floor  [facility] [address]              1. Your doctor will need to do a history and physical within 7 days before this procedure. 2. Your doctor will which medications should not be taken the morning of the exam. 3. Laboratory tests are to be obtained by your doctor prior to the exam (Basic Metabolic Panel, CBCP, PTT and INR) (No labs needed if you are having a tunneled catheter exchange or removal) 4. If you have allergies to x-ray contrast or iodine, contact your doctor or a Radiology nurse prior to the exam day for instructions. 5. Someone will need to drive you to and from the hospital. 6. If you are or may be pregnant, contact your doctor or a Radiology nurse prior to the day of the exam. 7. If you have diabetes, check with your doctor or a Radiology nurse to see if your insulin needs to be adjusted for the exam. 8. If you are taking a medication called Glucophage or Glucovance; these medications need to be held the day of the exam and for approximately 48 hours following. A blood sample must be drawn so your creatinine level can be checked before resuming this medication. 9. If you are taking Coumadin (to thin you blood) please contact your doctor or a Radiology nurse at least 3 days before the exam for special instructions. 10. You should not have received contrast within 48 hours of this  exam. 11. The day before your exam you may eat your regular diet and are encouraged to drink at least 2 quarts of clear liquids. Drink no alcoholic beverages for 24 hours prior to the exam. 12. If you have a colostomy you will need to irrigate it with tap water at 8PM the evening before and again at 6AM the morning of the exam. 13. Do not smoke for 24 hours prior to the procedure. 14. Birth to 4 years: - Breast feeding must be stopped 4 hours prior to exam - Solid food or formula must be stopped 6 hours prior to exam - Tube feedings must be stopped 6 hours prior to exam 15. 4-10 years old: - Nothing to eat or drink 6 hours prior to exam 16. 10+ years old: - Nothing to eat or drink 8 hours prior to exam 17. The morning of the exam you may brush your teeth and take medications as directed with a sip of water. 18. When discharged, you cannot drive until morning, and an adult must be with you until then. You should stay in the [facility] overnight. 19. Bring a list of all drugs you are taking; include supplements and over-the-counter medications. Wear comfortable clothes and leave your valuables at home.            Mar 29, 2018   Procedure with Cesar Gifford PA-C   [facility] ([facility])    [address] [place] [address]  [address]th [place] [id number] [phone]           Located in the Clinics and Surgery Center at [address] [place] [address].   parking is very convenient and highly recommended.  is a $6 flat rate fee.  Both  and self parkers should enter the main arrival plaza from [facility]; parking attendants will direct you based on your parking preference.              Future tests that were ordered for you today     Open Future Orders        Priority Expected Expires Ordered    IR Chest Port Placement > 5 Yrs of Age Routine 3/29/2018 3/20/2019 3/19/2018            Who to contact     If you have questions or need  "follow up information about today's clinic visit or your schedule please contact [facility] directly at [phone].  Normal or non-critical lab and imaging results will be communicated to you by Gizmozhart, letter or phone within 4 business days after the clinic has received the results. If you do not hear from us within 7 days, please contact the clinic through [employer] Catert or phone. If you have a critical or abnormal lab result, we will notify you by phone as soon as possible.  Submit refill requests through [employer]Panda or call your pharmacy and they will forward the refill request to us. Please allow 3 business days for your refill to be completed.          Additional Information About Your Visit        GizmozharPymetrics Information     [employer] gives you secure access to your electronic health record. If you see a primary care provider, you can also send messages to your care team and make appointments. If you have questions, please call your primary care clinic.  If you do not have a primary care provider, please call [phone] and they will assist you.        Care EveryWhere ID     This is your Care EveryWhere ID. This could be used by other organizations to access your [place] medical records  [id number]        Your Vitals Were     Pulse Temperature Respirations Height Pulse Oximetry Breastfeeding?    73 98.2  F (36.8  C) (Oral) 16 1.626 m (5' 4\") 94% No    BMI (Body Mass Index)                   49.93 kg/m2            Blood Pressure from Last 3 Encounters:   03/19/18 149/72   02/24/18 123/65   02/19/18 (!) 160/93    Weight from Last 3 Encounters:   03/19/18 132 kg (290 lb 14.4 oz)   02/23/18 130.8 kg (288 lb 5.8 oz)   02/19/18 129.7 kg (286 lb)              Today, you had the following     No orders found for display         Today's Medication Changes          These changes are accurate as of 3/19/18 11:59 PM.  If you have any questions, ask your nurse or doctor.               These medicines have " changed or have updated prescriptions.        Dose/Directions    ibuprofen 600 MG tablet   Commonly known as:  ADVIL/MOTRIN   This may have changed:  Another medication with the same name was removed. Continue taking this medication, and follow the directions you see here.   Used for:  Endometrial carcinoma (H)   Changed by:  Kevin Henderson MD        Dose:  600 mg   Take 1 tablet (600 mg) by mouth every 6 hours as needed for pain (mild)   Quantity:  30 tablet   Refills:  0         Stop taking these medicines if you haven't already. Please contact your care team if you have questions.     oxyCODONE-acetaminophen 5-325 MG per tablet   Commonly known as:  PERCOCET   Stopped by:  Kevin Henderson MD           senna-docusate 8.6-50 MG per tablet   Commonly known as:  SENOKOT-S;PERICOLACE   Stopped by:  Kevin Henderson MD                    Primary Care Provider Office Phone # Fax #    Levar Scott [phone]       [address] [place]  [place] [id number]        Equal Access to Services     [facility]: Hadii bailee ku hadasho Soomaali, waaxda luqadaha, qaybta kaalmada adeegyada, waxay idiin haycraig greenfield . So [facility] [phone].    ATENCIÓN: Si habla español, tiene a cortes disposición servicios gratuitos de asistencia lingüística. Llame al [phone].    We comply with applicable federal civil rights laws and [place] laws. We do not discriminate on the basis of race, color, national origin, age, disability, sex, sexual orientation, or gender identity.            Thank you!     Thank you for choosing [facility]  for your care. Our goal is always to provide you with excellent care. Hearing back from our patients is one way we can continue to improve our services. Please take a few minutes to complete the written survey that you may receive in the mail after your visit with us. Thank you!             Your Updated Medication List - Protect  "others around you: Learn how to safely use, store and throw away your medicines at www.disposemymeds.org.          This list is accurate as of 3/19/18 11:59 PM.  Always use your most recent med list.                   Brand Name Dispense Instructions for use Diagnosis    aspirin EC 81 MG EC tablet      Take 81 mg by mouth Pt states, \"I don't take it regularly. I take it when I remember to\".        ibuprofen 600 MG tablet    ADVIL/MOTRIN    30 tablet    Take 1 tablet (600 mg) by mouth every 6 hours as needed for pain (mild)    Endometrial carcinoma (H)       METFORMIN HCL PO      Take 500 mg by mouth daily (with breakfast)        triamterene-hydrochlorothiazide 37.5-25 MG per capsule    DYAZIDE     Take 1 capsule by mouth every morning          "

## 2018-03-19 NOTE — PATIENT INSTRUCTIONS
Call your Care Coordinator if you have chills and/or temperature greater then or equal to 100.4, uncontrolled nausea and vomiting, diarrhea, constipation, dizziness, light headedness, shortness of breath, chest pain, unexplained bruising, bleeding that does not stop with 10 minutes of pressure or any new symptoms, questions/concerns  Monday- Friday.    If after hours, weekends or holidays call the [facility] hospital at [phone] and ask for the GYN ONCOLOGY resident on call.    Your  Care Coordinator is    Lalitha MERCER, RN  Gynecologic Cancer   Office:  [phone]  Pager: [phone] #[phone]

## 2018-03-20 ENCOUNTER — CARE COORDINATION (OUTPATIENT)
Dept: ONCOLOGY | Facility: CLINIC | Age: 68
End: 2018-03-20

## 2018-03-20 ENCOUNTER — HOSPITAL ENCOUNTER (OUTPATIENT)
Facility: AMBULATORY SURGERY CENTER | Age: 68
End: 2018-03-20
Attending: PHYSICIAN ASSISTANT
Payer: MEDICARE

## 2018-03-20 NOTE — PROGRESS NOTES
Care Coordinator Note  Message left for patient reviewing current schedule for port on Thursday 3/29/18 and chemo on Friday 3/30/18.      Lalitha Johns MSN, RN  Care Coordinator  Gynecologic Cancer   Office:  [phone]  Pager: [phone] #[phone]

## 2018-03-27 ENCOUNTER — TELEPHONE (OUTPATIENT)
Dept: INTERVENTIONAL RADIOLOGY/VASCULAR | Facility: CLINIC | Age: 68
End: 2018-03-27

## 2018-03-27 ENCOUNTER — CARE COORDINATION (OUTPATIENT)
Dept: ONCOLOGY | Facility: CLINIC | Age: 68
End: 2018-03-27

## 2018-03-27 NOTE — PROGRESS NOTES
Care Coordinator Note  Reviewed that port insertion is now scheduled to be done in main hospital.  Again reviewed date, time and prep information for port and also lab and chemo for following day.      Patient verbalized back understanding of the above information discussed.     Lalitha Johns MSN, RN  Care Coordinator  Gynecologic Cancer   Office:  [phone]  Pager: [phone] #[phone]

## 2018-03-29 ENCOUNTER — APPOINTMENT (OUTPATIENT)
Dept: MEDSURG UNIT | Facility: CLINIC | Age: 68
End: 2018-03-29
Attending: OBSTETRICS & GYNECOLOGY
Payer: COMMERCIAL

## 2018-03-29 ENCOUNTER — APPOINTMENT (OUTPATIENT)
Dept: INTERVENTIONAL RADIOLOGY/VASCULAR | Facility: CLINIC | Age: 68
End: 2018-03-29
Attending: OBSTETRICS & GYNECOLOGY
Payer: COMMERCIAL

## 2018-03-29 ENCOUNTER — HOSPITAL ENCOUNTER (OUTPATIENT)
Facility: CLINIC | Age: 68
Discharge: HOME | End: 2018-03-29
Attending: OBSTETRICS & GYNECOLOGY | Admitting: OBSTETRICS & GYNECOLOGY
Payer: COMMERCIAL

## 2018-03-29 VITALS
RESPIRATION RATE: 16 BRPM | OXYGEN SATURATION: 96 % | HEART RATE: 71 BPM | SYSTOLIC BLOOD PRESSURE: 104 MMHG | DIASTOLIC BLOOD PRESSURE: 52 MMHG | HEIGHT: 65 IN | TEMPERATURE: 99.1 F | BODY MASS INDEX: 47.82 KG/M2 | WEIGHT: 287 LBS

## 2018-03-29 DIAGNOSIS — C54.1 ENDOMETRIAL CANCER (H): ICD-10-CM

## 2018-03-29 DIAGNOSIS — Z79.899 ENCOUNTER FOR LONG-TERM (CURRENT) USE OF MEDICATIONS: ICD-10-CM

## 2018-03-29 LAB
ANION GAP SERPL CALCULATED.3IONS-SCNC: 8 MMOL/L (ref 3–14)
BUN SERPL-MCNC: 15 MG/DL (ref 7–30)
CALCIUM SERPL-MCNC: 8.7 MG/DL (ref 8.5–10.1)
CHLORIDE SERPL-SCNC: 104 MMOL/L (ref 94–109)
CO2 SERPL-SCNC: 27 MMOL/L (ref 20–32)
CREAT SERPL-MCNC: 0.62 MG/DL (ref 0.52–1.04)
ERYTHROCYTE [DISTWIDTH] IN BLOOD BY AUTOMATED COUNT: 14.5 % (ref 10–15)
GFR SERPL CREATININE-BSD FRML MDRD: >90 ML/MIN/1.7M2
GLUCOSE SERPL-MCNC: 123 MG/DL (ref 70–99)
HCT VFR BLD AUTO: 35.9 % (ref 35–47)
HGB BLD-MCNC: 11.3 G/DL (ref 11.7–15.7)
INR PPP: 1.04 (ref 0.86–1.14)
MCH RBC QN AUTO: 27.7 PG (ref 26.5–33)
MCHC RBC AUTO-ENTMCNC: 31.5 G/DL (ref 31.5–36.5)
MCV RBC AUTO: 88 FL (ref 78–100)
PLATELET # BLD AUTO: 302 10E9/L (ref 150–450)
POTASSIUM SERPL-SCNC: 3.6 MMOL/L (ref 3.4–5.3)
RBC # BLD AUTO: 4.08 10E12/L (ref 3.8–5.2)
SODIUM SERPL-SCNC: 139 MMOL/L (ref 133–144)
WBC # BLD AUTO: 9.2 10E9/L (ref 4–11)

## 2018-03-29 PROCEDURE — 25000128 H RX IP 250 OP 636: Performed by: PHYSICIAN ASSISTANT

## 2018-03-29 PROCEDURE — 99153 MOD SED SAME PHYS/QHP EA: CPT

## 2018-03-29 PROCEDURE — 99152 MOD SED SAME PHYS/QHP 5/>YRS: CPT

## 2018-03-29 PROCEDURE — 85027 COMPLETE CBC AUTOMATED: CPT | Performed by: RADIOLOGY

## 2018-03-29 PROCEDURE — 27210904 ZZH KIT CR6

## 2018-03-29 PROCEDURE — 77001 FLUOROGUIDE FOR VEIN DEVICE: CPT

## 2018-03-29 PROCEDURE — 80048 BASIC METABOLIC PNL TOTAL CA: CPT | Performed by: RADIOLOGY

## 2018-03-29 PROCEDURE — 85610 PROTHROMBIN TIME: CPT | Performed by: RADIOLOGY

## 2018-03-29 PROCEDURE — 25000125 ZZHC RX 250: Performed by: PHYSICIAN ASSISTANT

## 2018-03-29 PROCEDURE — C1769 GUIDE WIRE: HCPCS

## 2018-03-29 PROCEDURE — 27210732 ZZH ACCESSORY CR1

## 2018-03-29 PROCEDURE — 27210738 ZZH ACCESSORY CR2

## 2018-03-29 PROCEDURE — C1788 PORT, INDWELLING, IMP: HCPCS

## 2018-03-29 PROCEDURE — 40000166 ZZH STATISTIC PP CARE STAGE 1

## 2018-03-29 PROCEDURE — 27210908 ZZH NEEDLE CR4

## 2018-03-29 PROCEDURE — 76937 US GUIDE VASCULAR ACCESS: CPT

## 2018-03-29 RX ORDER — FLUMAZENIL 0.1 MG/ML
0.2 INJECTION, SOLUTION INTRAVENOUS
Status: DISCONTINUED | OUTPATIENT
Start: 2018-03-29 | End: 2018-03-29 | Stop reason: HOSPADM

## 2018-03-29 RX ORDER — HEPARIN SODIUM,PORCINE 10 UNIT/ML
5 VIAL (ML) INTRAVENOUS EVERY 24 HOURS
Status: DISCONTINUED | OUTPATIENT
Start: 2018-03-29 | End: 2018-03-29 | Stop reason: HOSPADM

## 2018-03-29 RX ORDER — LORAZEPAM 0.5 MG/1
0.5 TABLET ORAL EVERY 6 HOURS PRN
Qty: 30 TABLET | Refills: 1 | Status: SHIPPED | OUTPATIENT
Start: 2018-03-29 | End: 2019-02-20

## 2018-03-29 RX ORDER — HEPARIN SODIUM (PORCINE) LOCK FLUSH IV SOLN 100 UNIT/ML 100 UNIT/ML
5 SOLUTION INTRAVENOUS
Status: DISCONTINUED | OUTPATIENT
Start: 2018-03-29 | End: 2018-03-29 | Stop reason: HOSPADM

## 2018-03-29 RX ORDER — CLINDAMYCIN PHOSPHATE 900 MG/50ML
900 INJECTION, SOLUTION INTRAVENOUS
Status: COMPLETED | OUTPATIENT
Start: 2018-03-29 | End: 2018-03-29

## 2018-03-29 RX ORDER — NALOXONE HYDROCHLORIDE 0.4 MG/ML
.1-.4 INJECTION, SOLUTION INTRAMUSCULAR; INTRAVENOUS; SUBCUTANEOUS
Status: DISCONTINUED | OUTPATIENT
Start: 2018-03-29 | End: 2018-03-29 | Stop reason: HOSPADM

## 2018-03-29 RX ORDER — PROCHLORPERAZINE MALEATE 5 MG
5 TABLET ORAL EVERY 6 HOURS PRN
Qty: 30 TABLET | Refills: 2 | Status: SHIPPED | OUTPATIENT
Start: 2018-03-29 | End: 2019-02-20

## 2018-03-29 RX ORDER — LIDOCAINE 40 MG/G
CREAM TOPICAL
Status: DISCONTINUED | OUTPATIENT
Start: 2018-03-29 | End: 2018-03-29 | Stop reason: HOSPADM

## 2018-03-29 RX ORDER — HEPARIN SODIUM (PORCINE) LOCK FLUSH IV SOLN 100 UNIT/ML 100 UNIT/ML
5 SOLUTION INTRAVENOUS
Status: DISCONTINUED | OUTPATIENT
Start: 2018-03-29 | End: 2018-03-29

## 2018-03-29 RX ORDER — FENTANYL CITRATE 50 UG/ML
25-50 INJECTION, SOLUTION INTRAMUSCULAR; INTRAVENOUS EVERY 5 MIN PRN
Status: DISCONTINUED | OUTPATIENT
Start: 2018-03-29 | End: 2018-03-29 | Stop reason: HOSPADM

## 2018-03-29 RX ORDER — SODIUM CHLORIDE 9 MG/ML
INJECTION, SOLUTION INTRAVENOUS CONTINUOUS
Status: DISCONTINUED | OUTPATIENT
Start: 2018-03-29 | End: 2018-03-29 | Stop reason: HOSPADM

## 2018-03-29 RX ORDER — HEPARIN SODIUM,PORCINE 10 UNIT/ML
5 VIAL (ML) INTRAVENOUS
Status: COMPLETED | OUTPATIENT
Start: 2018-03-29 | End: 2018-03-29

## 2018-03-29 RX ADMIN — CLINDAMYCIN PHOSPHATE 900 MG: 18 INJECTION, SOLUTION INTRAVENOUS at 07:42

## 2018-03-29 RX ADMIN — SODIUM CHLORIDE, PRESERVATIVE FREE 5 ML: 5 INJECTION INTRAVENOUS at 09:30

## 2018-03-29 RX ADMIN — MIDAZOLAM 1 MG: 1 INJECTION INTRAMUSCULAR; INTRAVENOUS at 08:50

## 2018-03-29 RX ADMIN — MIDAZOLAM 1 MG: 1 INJECTION INTRAMUSCULAR; INTRAVENOUS at 09:15

## 2018-03-29 RX ADMIN — FENTANYL CITRATE 50 MCG: 50 INJECTION, SOLUTION INTRAMUSCULAR; INTRAVENOUS at 09:00

## 2018-03-29 RX ADMIN — SODIUM CHLORIDE: 9 INJECTION, SOLUTION INTRAVENOUS at 07:41

## 2018-03-29 RX ADMIN — FENTANYL CITRATE 50 MCG: 50 INJECTION, SOLUTION INTRAMUSCULAR; INTRAVENOUS at 09:15

## 2018-03-29 RX ADMIN — FENTANYL CITRATE 50 MCG: 50 INJECTION, SOLUTION INTRAMUSCULAR; INTRAVENOUS at 08:50

## 2018-03-29 RX ADMIN — LIDOCAINE HYDROCHLORIDE 18 ML: 10 INJECTION, SOLUTION EPIDURAL; INFILTRATION; INTRACAUDAL; PERINEURAL at 09:23

## 2018-03-29 RX ADMIN — HEPARIN SODIUM 5000 UNITS: 1000 INJECTION, SOLUTION INTRAVENOUS; SUBCUTANEOUS at 09:18

## 2018-03-29 RX ADMIN — MIDAZOLAM 1 MG: 1 INJECTION INTRAMUSCULAR; INTRAVENOUS at 09:00

## 2018-03-29 NOTE — DISCHARGE INSTRUCTIONS
[facility]        Interventional Radiology  Discharge Instructions  Following Port Placement    Your Port has been closed with  ? Absorbable suture    If after 10 days there are visible suture they may be trimmed by your primary doctor  ? Derma Calvert (Skin Glue)    Do not apply any ointments over site    Do not cover with any dressing    This thin layer will slough off in 7-10 days    May gently remove Derma Calvert in 10 days if still present    If there is any oozing or bleeding from the site, apply direct pressure for 5-10 minutes with a gauze pad.  If bleeding continues after 10 minutes call your primary doctor.  If bleeding cannot be controlled with direct pressure, call 911.    Call your Doctor if:    Bleeding as above    Swelling in your neck or arm    Sudden onset of Shortness of Breath, Lightheadedness, Palpitations.    Fever greater than 100.5  F    Other signs of infection such as, redness, tenderness, or drainage from the wound    If you were given sedation:    We recommend an adult stay with you for the first 24 hours.    No driving or alcoholic beverages for 24 hours.    ? Discharge Booklet given    ADDITIONAL INSTRUCTIONS: May use ice packs 3-4 times a day for 15 minutes for minor swelling or pain    No heavy lifting greater than 10 lbs. for one week    No tub bath, hot tub, or swimming until Derma Calvert (Skin Glue) is removed    It is OK to shower and get the incision wet but immediately pat it dry    No Emla Cream to Port site for 1 week.      [facility] INTERVENTIONAL RADIOLOGY DEPARTMENT  Procedure Physician:          ROBERT Nayak                   Date of procedure: March 29, 2018  Telephone Numbers: [phone] Monday-Friday 8:00 am to 4:30 pm  [phone] After 4:30 pm Monday-Friday, Weekends & Holidays.   Ask for the Interventional Radiologist on call.  Someone is on call 24 hrs/day  [facility] toll free number: [phone] Monday-Friday 8:00 am to 4:30 pm  [facility] Emergency  Dept: [phone]

## 2018-03-29 NOTE — PROGRESS NOTES
Patient prepped for port placement.  Has lower abd pain which she describes as like mild menstrual cramping.  Pain is eased with heat, so warm blankets across abdomen.   Has also had spotting last week.  Feels she got sick from food intake over weekend & had vomiting then which has resolved.  Sister with her.  Needs consent.  Labs pending.  H & P current.

## 2018-03-29 NOTE — IP AVS SNAPSHOT
"                  MRN:[id number]                      After Visit Summary   3/29/2018    Lu Smith    MRN: [id number]           Visit Information        Department      3/29/2018  6:49 AM [address] [facility] [place]          Review of your medicines      UNREVIEWED medicines. Ask your doctor about these medicines        Dose / Directions    aspirin EC 81 MG EC tablet        Dose:  81 mg   Take 81 mg by mouth Pt states, \"I don't take it regularly. I take it when I remember to\".   Refills:  0       ibuprofen 600 MG tablet   Commonly known as:  ADVIL/MOTRIN   Used for:  Endometrial carcinoma (H)        Dose:  600 mg   Take 1 tablet (600 mg) by mouth every 6 hours as needed for pain (mild)   Quantity:  30 tablet   Refills:  0       METFORMIN HCL PO        Dose:  500 mg   Take 500 mg by mouth daily (with breakfast)   Refills:  0       triamterene-hydrochlorothiazide 37.5-25 MG per capsule   Commonly known as:  DYAZIDE        Dose:  1 capsule   Take 1 capsule by mouth every morning   Refills:  0                Protect others around you: Learn how to safely use, store and throw away your medicines at www.disposemymeds.org.         Follow-ups after your visit        Your next 10 appointments already scheduled     Mar 30, 2018  8:00 AM CDT   Masonic Lab Draw with  MASONIC LAB DRAW   [facility] Masonic Lab Draw ([facility])    [address] [place] [address]  [address] [place] [address]   [phone]            Mar 30, 2018  8:30 AM CDT   Infusion 360 with  ONCOLOGY INFUSION, UC 30 ATC   [facility] ([facility])    [address] [place] [address]  [address]  [facility] [address]   [phone]            Apr 20, 2018  8:30 AM CDT   Masonic Lab Draw with  MASONIC LAB DRAW   [facility] Masonic Lab Draw ([facility])    [address] [place] [address]  [address]  [facility] [address]   [phone]            Apr 20, 2018  9:00 AM CDT   (Arrive by " 8:45 AM)   Return Active Treatment with ANU Selby CNP   [facility] ([facility] [place] [facility])    [address]  [address]  [facility] [address]   [phone]            Apr 20, 2018 10:00 AM CDT   Infusion 360 with UC ONCOLOGY INFUSION, UC 12 ATC   [facility] ([facility] [place])    [address]  [address]  [facility] [address]   [phone]            Apr 27, 2018 10:30 AM CDT   CONSULT with Ana Michelle MD   Radiation Oncology Clinic ([facility])    [facility]  1st Floor  [address] [address]   [phone]            May 11, 2018  8:30 AM CDT   Masonic Lab Draw with  MASONIC LAB DRAW   [facility] Lab Draw ([facility])    [address]  [address]  [facility] [address]   [phone]            May 11, 2018  9:00 AM CDT   (Arrive by 8:45 AM)   Return Active Treatment with ANU Selby CNP   [facility] ([facility])    [address]  [address]  [facility] [address]   [phone]               Care Instructions        Further instructions from your care team       [facility]        Interventional Radiology  Discharge Instructions  Following Port Placement    Your Port has been closed with  ? Absorbable suture    If after 10 days there are visible suture they may be trimmed by your primary doctor  ? Derma Calvert (Skin Glue)    Do not apply any ointments over site    Do not cover with any dressing    This thin layer will slough off in 7-10 days    May gently remove Derma Calvert in 10 days if still present    If there is any oozing or bleeding from the site, apply direct pressure for 5-10 minutes with a gauze pad.  If bleeding continues after 10 minutes call your primary doctor.  If bleeding cannot be controlled with direct  pressure, call [phone].    Call your Doctor if:    Bleeding as above    Swelling in your neck or arm    Sudden onset of Shortness of Breath, Lightheadedness, Palpitations.    Fever greater than 100.5  F    Other signs of infection such as, redness, tenderness, or drainage from the wound    If you were given sedation:    We recommend an adult stay with you for the first 24 hours.    No driving or alcoholic beverages for 24 hours.    ? Discharge Booklet given    ADDITIONAL INSTRUCTIONS: May use ice packs 3-4 times a day for 15 minutes for minor swelling or pain    No heavy lifting greater than 10 lbs. for one week    No tub bath, hot tub, or swimming until Derma Calvert (Skin Glue) is removed    It is OK to shower and get the incision wet but immediately pat it dry    No Emla Cream to Port site for 1 week.      [facility] INTERVENTIONAL RADIOLOGY DEPARTMENT  Procedure Physician:          ROBERT Nayak                   Date of procedure: March 29, 2018  Telephone Numbers: [phone] Monday-Friday 8:00 am to 4:30 pm  [phone] After 4:30 pm Monday-Friday, Weekends & Holidays.   Ask for the Interventional Radiologist on call.  Someone is on call 24 hrs/day  [facility] toll free number: [phone] Monday-Friday 8:00 am to 4:30 pm  [facility] Emergency Dept: [phone]             Additional Information About Your Visit        MyChart Information     [employer] gives you secure access to your electronic health record. If you see a primary care provider, you can also send messages to your care team and make appointments. If you have questions, please call your primary care clinic.  If you do not have a primary care provider, please call [phone] and they will assist you.        Care EveryWhere ID     This is your Care EveryWhere ID. This could be used by other organizations to access your [place] medical records  [id number]        Your Vitals Were     Blood Pressure Pulse Temperature Respirations Height Weight    118/63 (BP  "Location: Left arm) 71 99.1  F (37.3  C) (Oral) 18 1.651 m (5' 5\") 130.2 kg (287 lb)    Pulse Oximetry BMI (Body Mass Index)                96% 47.76 kg/m2           Primary Care Provider Office Phone # Fax #    Levar Scott [phone]      Equal Access to Services     [facility]: Hadii aad ku hadasho Soomaali, waaxda luqadaha, qaybta kaalmada adeegyada, waxay idiin hayaan adeeg zena la'aan ah. So [facility] [phone].    ATENCIÓN: Si habla espderick, tiene a cortes disposición servicios gratuitos de asistencia lingüística. Llame al [phone].    We comply with applicable federal civil rights laws and [place] laws. We do not discriminate on the basis of race, color, national origin, age, disability, sex, sexual orientation, or gender identity.            Thank you!     Thank you for choosing [facility] for your care. Our goal is always to provide you with excellent care. Hearing back from our patients is one way we can continue to improve our services. Please take a few minutes to complete the written survey that you may receive in the mail after you visit with us. Thank you!             Medication List: This is a list of all your medications and when to take them. Check marks below indicate your daily home schedule. Keep this list as a reference.      Medications           Morning Afternoon Evening Bedtime As Needed    aspirin EC 81 MG EC tablet   Take 81 mg by mouth Pt states, \"I don't take it regularly. I take it when I remember to\".                                ibuprofen 600 MG tablet   Commonly known as:  ADVIL/MOTRIN   Take 1 tablet (600 mg) by mouth every 6 hours as needed for pain (mild)                                METFORMIN HCL PO   Take 500 mg by mouth daily (with breakfast)                                triamterene-hydrochlorothiazide 37.5-25 MG per capsule   Commonly known as:  DYAZIDE   Take 1 capsule by mouth every morning                                  "

## 2018-03-29 NOTE — PROGRESS NOTES
Pt back from IR s/p right chest port placement.  VSS.  Pt alert and oriented x4.  Pt deneies any pain.  Rt neck and chest sites F/D/I.  Pt's family at the bedside.

## 2018-03-29 NOTE — PROGRESS NOTES
Interventional Radiology Intra-procedural Nursing Note    Patient Name: Lu Smith  Medical Record Number: [id number]  Today's Date: March 29, 2018    Procedure: chest port placement    Attending MD in room during timeout:  Proceduralist: Patricia Newell PA-C    Procedure Start Time: 0900  Procedure Puncture time:  0900  Procedure End Time: 0935    Sedation start time: 0850  Sedation end time: 0935  Sedation medications given: versed 3 mg, fentanyl 150 mg    Report given to: Kaelyn CANTU 2A  : not needed    D: Patient brought to IR room 2 at 0830. Verified Patient's ID and informed consent using two identifiers. She denied having questions or concerns regarding the procedure.  A: Patient was positioned supine and was monitored per IR protocol. Patient tolerated the procedure without apparent incident. The right internal jugular chest port placement was verified using fluoroscopy and is ready for immediate use. The port was left accessed as requested in the procedure order. The dressing is clean, dry and intact.  P: Patient was returned to 2A for post procedure cares.     Brynn Atkinson RN  [phone]

## 2018-03-29 NOTE — PROGRESS NOTES
Interventional Radiology Pre-Procedure Sedation Assessment   Time of Assessment: 8:12 AM    Expected Level: Moderate Sedation    Indication: Sedation is required for the following type of Procedure: Port Placement    Sedation and procedural consent: Risks, benefits and alternatives were discussed with Patient and Relative sister    PO Intake: Appropriately NPO for procedure    ASA Class: Class 2 - MILD SYSTEMIC DISEASE, NO ACUTE PROBLEMS, NO FUNCTIONAL LIMITATIONS.    Mallampati: Grade 2:  Soft palate, base of uvula, tonsillar pillars, and portion of posterior pharyngeal wall visible    Lungs: Lungs Clear with good breath sounds bilaterally    Heart: Normal heart sounds and rate    History and physical reviewed and no updates needed. I have reviewed the lab findings, diagnostic data, medications, and the plan for sedation. I have determined this patient to be an appropriate candidate for the planned sedation and procedure and have reassessed the patient IMMEDIATELY PRIOR to sedation and procedure.    Patricia Newell PA-C

## 2018-03-29 NOTE — PROCEDURES
Interventional Radiology Brief Post Procedure Note    Procedure: IR CHEST PORT PLACEMENT > 5 YRS OF AGE    Proceduralist: Patricia Newell MS, PAMolinaC    Assistant: None    Time Out: Prior to the start of the procedure and with procedural staff participation, I verbally confirmed the patient s identity using two indicators, relevant allergies, that the procedure was appropriate and matched the consent or emergent situation, and that the correct equipment/implants were available. Immediately prior to starting the procedure I conducted the Time Out with the procedural staff and re-confirmed the patient s name, procedure, and site/side. (The Joint Commission universal protocol was followed.)  Yes    Medications   Medication Event Details Admin User Admin Time   midazolam (VERSED) injection 0.5-1 mg Medication Given Dose: 1 mg; Route: Intravenous Brynn Atkinson RN 3/29/2018  8:50 AM   fentaNYL (PF) (SUBLIMAZE) injection 25-50 mcg Medication Given Dose: 50 mcg; Route: Intravenous Brynn Atkinson RN 3/29/2018  8:50 AM   midazolam (VERSED) injection 0.5-1 mg Medication Given Dose: 1 mg; Route: Intravenous Brynn Atkinson RN 3/29/2018  9:00 AM   fentaNYL (PF) (SUBLIMAZE) injection 25-50 mcg Medication Given Dose: 50 mcg; Route: Intravenous Brynn Atkinson RN 3/29/2018  9:00 AM   midazolam (VERSED) injection 0.5-1 mg Medication Given Dose: 1 mg; Route: Intravenous Brynn Atkinson RN 3/29/2018  9:15 AM   fentaNYL (PF) (SUBLIMAZE) injection 25-50 mcg Medication Given Dose: 50 mcg; Route: Intravenous Brynn Atkinson RN 3/29/2018  9:15 AM   heparin 5,000 units in 0.9% sodium chloride 1000 mL Medication New Bag Dose: 5,000 Units; Route: TABLE SOLN; Comment: placed on sterile field for use during the IR procedure Brynn Atkinson RN 3/29/2018  9:18 AM   lidocaine 1 % 1-30 mL Medication Given by Other Clinician Dose: 18 mL; Route: Intradermal Brynn Atkinson RN 3/29/2018  9:23 AM   heparin lock flush 10 UNIT/ML injection 5 mL  Medication Given by Other Clinician Dose: 5 mL; Route: Intravenous Brynn Atkinson RN 3/29/2018  9:30 AM       Sedation: IR Nurse Monitored Care   Post Procedure Summary:  Prior to the start of the procedure and with procedural staff participation, I verbally confirmed the patient s identity using two indicators, relevant allergies, that the procedure was appropriate and matched the consent or emergent situation, and that the correct equipment/implants were available. Immediately prior to starting the procedure I conducted the Time Out with the procedural staff and re-confirmed the patient s name, procedure, and site/side. (The Joint Commission universal protocol was followed.)  Yes       Sedatives: Fentanyl and Midazolam (Versed)    Vital signs, airway and pulse oximetry were monitored and remained stable throughout the procedure and sedation was maintained until the procedure was complete.  The patient was monitored by staff until sedation discharge criteria were met.    Patient tolerance: Patient tolerated the procedure well with no immediate complications.    Time of sedation in minutes: 45 Minutes minutes from beginning to end of physician one to one monitoring.    Findings: Completed image-guided placement of 8 Slovenian 22 cm single lumen power-injectable central venous chest port via right IJ. Aspirates and flushes freely, heparin locked and is ready for immediate use.    Estimated Blood Loss: Minimal    Fluoroscopy Time: 1 minute(s)    SPECIMENS: None    Complications: 1. None     Condition: Stable    Plan: Port accessed and ready for immediate use. Follow up per primary team.     Comments: See dictated procedure note for full details.    Patricia Newell PA-C

## 2018-03-29 NOTE — IP AVS SNAPSHOT
[address] [address]                                       After Visit Summary   3/29/2018    Lu Smith    MRN: [id number]           After Visit Summary Signature Page     I have received my discharge instructions, and my questions have been answered. I have discussed any challenges I see with this plan with the nurse or doctor.    ..........................................................................................................................................  Patient/Patient Representative Signature      ..........................................................................................................................................  Patient Representative Print Name and Relationship to Patient    ..................................................               ................................................  Date                                            Time    ..........................................................................................................................................  Reviewed by Signature/Title    ...................................................              ..............................................  Date                                                            Time

## 2018-03-30 ENCOUNTER — APPOINTMENT (OUTPATIENT)
Dept: LAB | Facility: CLINIC | Age: 68
End: 2018-03-30
Attending: OBSTETRICS & GYNECOLOGY
Payer: COMMERCIAL

## 2018-03-30 ENCOUNTER — INFUSION THERAPY VISIT (OUTPATIENT)
Dept: ONCOLOGY | Facility: CLINIC | Age: 68
End: 2018-03-30
Attending: OBSTETRICS & GYNECOLOGY
Payer: COMMERCIAL

## 2018-03-30 VITALS
TEMPERATURE: 98 F | OXYGEN SATURATION: 100 % | RESPIRATION RATE: 16 BRPM | DIASTOLIC BLOOD PRESSURE: 68 MMHG | HEART RATE: 76 BPM | SYSTOLIC BLOOD PRESSURE: 132 MMHG

## 2018-03-30 DIAGNOSIS — Z79.899 ENCOUNTER FOR LONG-TERM (CURRENT) USE OF MEDICATIONS: ICD-10-CM

## 2018-03-30 DIAGNOSIS — C54.1 ENDOMETRIAL CANCER (H): Primary | ICD-10-CM

## 2018-03-30 LAB
ALBUMIN SERPL-MCNC: 3.1 G/DL (ref 3.4–5)
ALP SERPL-CCNC: 58 U/L (ref 40–150)
ALT SERPL W P-5'-P-CCNC: 19 U/L (ref 0–50)
ANION GAP SERPL CALCULATED.3IONS-SCNC: 8 MMOL/L (ref 3–14)
AST SERPL W P-5'-P-CCNC: 16 U/L (ref 0–45)
BASOPHILS # BLD AUTO: 0 10E9/L (ref 0–0.2)
BASOPHILS NFR BLD AUTO: 0.5 %
BILIRUB SERPL-MCNC: 0.7 MG/DL (ref 0.2–1.3)
BUN SERPL-MCNC: 16 MG/DL (ref 7–30)
CALCIUM SERPL-MCNC: 8.7 MG/DL (ref 8.5–10.1)
CHLORIDE SERPL-SCNC: 102 MMOL/L (ref 94–109)
CO2 SERPL-SCNC: 27 MMOL/L (ref 20–32)
CREAT SERPL-MCNC: 0.69 MG/DL (ref 0.52–1.04)
DIFFERENTIAL METHOD BLD: ABNORMAL
EOSINOPHIL # BLD AUTO: 0.7 10E9/L (ref 0–0.7)
EOSINOPHIL NFR BLD AUTO: 9.2 %
ERYTHROCYTE [DISTWIDTH] IN BLOOD BY AUTOMATED COUNT: 14.3 % (ref 10–15)
GFR SERPL CREATININE-BSD FRML MDRD: 85 ML/MIN/1.7M2
GLUCOSE SERPL-MCNC: 131 MG/DL (ref 70–99)
HCT VFR BLD AUTO: 35 % (ref 35–47)
HGB BLD-MCNC: 11.3 G/DL (ref 11.7–15.7)
IMM GRANULOCYTES # BLD: 0 10E9/L (ref 0–0.4)
IMM GRANULOCYTES NFR BLD: 0.1 %
LYMPHOCYTES # BLD AUTO: 1.8 10E9/L (ref 0.8–5.3)
LYMPHOCYTES NFR BLD AUTO: 23.8 %
MAGNESIUM SERPL-MCNC: 2.2 MG/DL (ref 1.6–2.3)
MCH RBC QN AUTO: 28.1 PG (ref 26.5–33)
MCHC RBC AUTO-ENTMCNC: 32.3 G/DL (ref 31.5–36.5)
MCV RBC AUTO: 87 FL (ref 78–100)
MONOCYTES # BLD AUTO: 0.8 10E9/L (ref 0–1.3)
MONOCYTES NFR BLD AUTO: 10.8 %
NEUTROPHILS # BLD AUTO: 4.3 10E9/L (ref 1.6–8.3)
NEUTROPHILS NFR BLD AUTO: 55.6 %
NRBC # BLD AUTO: 0 10*3/UL
NRBC BLD AUTO-RTO: 0 /100
PLATELET # BLD AUTO: 301 10E9/L (ref 150–450)
POTASSIUM SERPL-SCNC: 3.3 MMOL/L (ref 3.4–5.3)
PROT SERPL-MCNC: 7.4 G/DL (ref 6.8–8.8)
RBC # BLD AUTO: 4.02 10E12/L (ref 3.8–5.2)
SODIUM SERPL-SCNC: 137 MMOL/L (ref 133–144)
WBC # BLD AUTO: 7.7 10E9/L (ref 4–11)

## 2018-03-30 PROCEDURE — 80053 COMPREHEN METABOLIC PANEL: CPT | Performed by: OBSTETRICS & GYNECOLOGY

## 2018-03-30 PROCEDURE — 96417 CHEMO IV INFUS EACH ADDL SEQ: CPT

## 2018-03-30 PROCEDURE — 83735 ASSAY OF MAGNESIUM: CPT | Performed by: OBSTETRICS & GYNECOLOGY

## 2018-03-30 PROCEDURE — 96375 TX/PRO/DX INJ NEW DRUG ADDON: CPT

## 2018-03-30 PROCEDURE — 96415 CHEMO IV INFUSION ADDL HR: CPT

## 2018-03-30 PROCEDURE — 25000128 H RX IP 250 OP 636: Mod: ZF | Performed by: OBSTETRICS & GYNECOLOGY

## 2018-03-30 PROCEDURE — 25000132 ZZH RX MED GY IP 250 OP 250 PS 637: Mod: ZF | Performed by: OBSTETRICS & GYNECOLOGY

## 2018-03-30 PROCEDURE — 96413 CHEMO IV INFUSION 1 HR: CPT

## 2018-03-30 PROCEDURE — 85025 COMPLETE CBC W/AUTO DIFF WBC: CPT | Performed by: OBSTETRICS & GYNECOLOGY

## 2018-03-30 PROCEDURE — 25000125 ZZHC RX 250: Mod: ZF | Performed by: OBSTETRICS & GYNECOLOGY

## 2018-03-30 RX ORDER — HEPARIN SODIUM (PORCINE) LOCK FLUSH IV SOLN 100 UNIT/ML 100 UNIT/ML
500 SOLUTION INTRAVENOUS ONCE
Status: COMPLETED | OUTPATIENT
Start: 2018-03-30 | End: 2018-03-30

## 2018-03-30 RX ORDER — EPINEPHRINE 0.3 MG/.3ML
INJECTION SUBCUTANEOUS
Status: DISCONTINUED
Start: 2018-03-30 | End: 2018-03-30 | Stop reason: WASHOUT

## 2018-03-30 RX ORDER — HEPARIN SODIUM (PORCINE) LOCK FLUSH IV SOLN 100 UNIT/ML 100 UNIT/ML
5 SOLUTION INTRAVENOUS
Status: COMPLETED | OUTPATIENT
Start: 2018-03-30 | End: 2018-03-30

## 2018-03-30 RX ORDER — DIPHENHYDRAMINE HYDROCHLORIDE 50 MG/ML
50 INJECTION INTRAMUSCULAR; INTRAVENOUS ONCE
Status: COMPLETED | OUTPATIENT
Start: 2018-03-30 | End: 2018-03-30

## 2018-03-30 RX ORDER — PALONOSETRON 0.05 MG/ML
0.25 INJECTION, SOLUTION INTRAVENOUS ONCE
Status: COMPLETED | OUTPATIENT
Start: 2018-03-30 | End: 2018-03-30

## 2018-03-30 RX ORDER — POTASSIUM CHLORIDE 1500 MG/1
40 TABLET, EXTENDED RELEASE ORAL ONCE
Status: COMPLETED | OUTPATIENT
Start: 2018-03-30 | End: 2018-03-30

## 2018-03-30 RX ADMIN — DEXAMETHASONE SODIUM PHOSPHATE 20 MG: 10 INJECTION, SOLUTION INTRAMUSCULAR; INTRAVENOUS at 09:03

## 2018-03-30 RX ADMIN — SODIUM CHLORIDE 250 ML: 9 INJECTION, SOLUTION INTRAVENOUS at 08:56

## 2018-03-30 RX ADMIN — FAMOTIDINE 40 MG: 10 INJECTION INTRAVENOUS at 09:01

## 2018-03-30 RX ADMIN — PALONOSETRON HYDROCHLORIDE 0.25 MG: 0.25 INJECTION INTRAVENOUS at 08:57

## 2018-03-30 RX ADMIN — PACLITAXEL 427 MG: 6 INJECTION, SOLUTION INTRAVENOUS at 09:32

## 2018-03-30 RX ADMIN — CARBOPLATIN 810 MG: 10 INJECTION, SOLUTION INTRAVENOUS at 12:48

## 2018-03-30 RX ADMIN — SODIUM CHLORIDE, PRESERVATIVE FREE 5 ML: 5 INJECTION INTRAVENOUS at 08:08

## 2018-03-30 RX ADMIN — POTASSIUM CHLORIDE 40 MEQ: 20 TABLET, EXTENDED RELEASE ORAL at 10:14

## 2018-03-30 RX ADMIN — DIPHENHYDRAMINE HYDROCHLORIDE 50 MG: 50 INJECTION, SOLUTION INTRAMUSCULAR; INTRAVENOUS at 08:57

## 2018-03-30 RX ADMIN — SODIUM CHLORIDE, PRESERVATIVE FREE 500 UNITS: 5 INJECTION INTRAVENOUS at 13:45

## 2018-03-30 ASSESSMENT — PAIN SCALES - GENERAL: PAINLEVEL: NO PAIN (0)

## 2018-03-30 NOTE — PROGRESS NOTES
Infusion Nursing Note:  Lu Smith presents today for C1D1 Taxl/Carboplatin.    Patient seen by provider today: No   present during visit today: Not Applicable.    Note: Patient feels well. No compalaints made. Agreed to have chemotherapy today.  First time getting chemotherapy today.Chemotherapy teaching, side effects, and schedule reviewed with patient. Pt instructed to call triage (or MD on call if after hours/weekends) with chills/temp >=100.5. Pt stated understanding of plan.  Thermometer provided.       Intravenous Access:  Implanted Port.    Treatment Conditions:  Lab Results   Component Value Date    HGB 11.3 03/30/2018     Lab Results   Component Value Date    WBC 7.7 03/30/2018      Lab Results   Component Value Date    ANEU 4.3 03/30/2018     Lab Results   Component Value Date     03/30/2018      Lab Results   Component Value Date     03/30/2018                   Lab Results   Component Value Date    POTASSIUM 3.3 03/30/2018           Lab Results   Component Value Date    MAG 2.2 03/30/2018            Lab Results   Component Value Date    CR 0.69 03/30/2018                   Lab Results   Component Value Date    MARYLOU 8.7 03/30/2018                Lab Results   Component Value Date    BILITOTAL 0.7 03/30/2018           Lab Results   Component Value Date    ALBUMIN 3.1 03/30/2018                    Lab Results   Component Value Date    ALT 19 03/30/2018           Lab Results   Component Value Date    AST 16 03/30/2018       Results reviewed, labs MET treatment parameters, ok to proceed with treatment.      Post Infusion Assessment:  Patient tolerated infusion without incident.  Blood return noted pre and post infusion.  Site patent and intact, free from redness, edema or discomfort.  No evidence of extravasations.  Access discontinued per protocol.    Discharge Plan:   Prescription refills given for Ativan and Compazine.  Discharge instructions reviewed with: Patient and  Family.  Patient and/or family verbalized understanding of discharge instructions and all questions answered.  Copy of AVS reviewed with patient and/or family.  Patient will return 4/20/18 for next appointment.  Patient discharged in stable condition accompanied by:  and sister.  Departure Mode: Ambulatory.    VICKIE MAR RN

## 2018-03-30 NOTE — MR AVS SNAPSHOT
After Visit Summary   3/30/2018    Lu Smith    MRN: [id number]           Patient Information     Date Of Birth          1950        Visit Information        Provider Department      3/30/2018 8:30 AM UC 30 ATC; UC ONCOLOGY INFUSION [facility]        Today's Diagnoses     Endometrial cancer (H)    -  1    Encounter for long-term (current) use of medications          Care Instructions    Contact Numbers  [facility]: [phone]    After Hours:  [phone]  Triage: [phone]    Please call the [facility] Triage line if you experience a temperature greater than or equal to 100.5, shaking chills, have uncontrolled nausea, vomiting and/or diarrhea, dizziness, shortness of breath, chest pain, bleeding, unexplained bruising, or if you have any other new/concerning symptoms, questions or concerns.     If it is after hours, weekends, or holidays, please call the main hospital  at  [phone] and ask to speak to the Oncology doctor on call.     If you are having any concerning symptoms or wish to speak to a provider before your next infusion visit, please call your care coordinator or triage to notify them so we can adequately serve you.     If you need a refill on a narcotic prescription or other medication, please call triage before your infusion appointment.           March 2018 Sunday Monday Tuesday Wednesday Thursday Friday Saturday                       1     2     3       4     5     6     7     8     9     10       11     12     13     14     15     16     17       18     19     [facility] RETURN   12:45 PM   (20 min.)   Kevin Henderson MD   [facility] 20     21     22     23     24       25     26     27     28     29     Admission    6:49 AM   Kevin Henderson MD   [address] [facility]   (Discharge: 3/29/2018)     PROCEDURE - 3.5 HR    7:00 AM   (210 min.)   U2A ROOM 4   [address] [facility]     IR CHEST PORT PLACEMENT >5  YRS    8:15 AM   (90 min.)   UUIR2   [facility] [place], Interventional Radiology 30     [facility] DRAW    8:00 AM   (15 min.)    [facility] DRAW   [facility] Lab Draw     P ONC INFUSION 360    8:30 AM   (360 min.)   UC ONCOLOGY INFUSION   [facility] 31 April 2018 Sunday Monday Tuesday Wednesday Thursday Friday Saturday   1     2     3     4     5     6     7  Happy Birthday!       8     9     10     11     12     13     14       15     16     17     18     19     20     [facility] DRAW    8:30 AM   (15 min.)    [facility] DRAW   [facility] Lab Draw     P RETURN ACTIVE TREATMENT    8:45 AM   (40 min.)   Yasmine Zazueta APRN CNP   [facility] ONC INFUSION 360   10:00 AM   (360 min.)   UC ONCOLOGY INFUSION   [facility] 21       22     23     24     25     26     27     [facility] CONSULT   10:30 AM   (90 min.)   Ana Michelle MD   Radiation Oncology Clinic 28       29     30                                           Lab Results:  Recent Results (from the past 12 hour(s))   CBC with platelets differential    Collection Time: 03/30/18  8:12 AM   Result Value Ref Range    WBC 7.7 4.0 - 11.0 10e9/L    RBC Count 4.02 3.8 - 5.2 10e12/L    Hemoglobin 11.3 (L) 11.7 - 15.7 g/dL    Hematocrit 35.0 35.0 - 47.0 %    MCV 87 78 - 100 fl    MCH 28.1 26.5 - 33.0 pg    MCHC 32.3 31.5 - 36.5 g/dL    RDW 14.3 10.0 - 15.0 %    Platelet Count 301 150 - 450 10e9/L    Diff Method Automated Method     % Neutrophils 55.6 %    % Lymphocytes 23.8 %    % Monocytes 10.8 %    % Eosinophils 9.2 %    % Basophils 0.5 %    % Immature Granulocytes 0.1 %    Nucleated RBCs 0 0 /100    Absolute Neutrophil 4.3 1.6 - 8.3 10e9/L    Absolute Lymphocytes 1.8 0.8 - 5.3 10e9/L    Absolute Monocytes 0.8 0.0 - 1.3 10e9/L    Absolute Eosinophils 0.7 0.0 - 0.7 10e9/L    Absolute Basophils 0.0 0.0 - 0.2 10e9/L    Abs Immature Granulocytes 0.0 0 - 0.4  10e9/L    Absolute Nucleated RBC 0.0    Comprehensive metabolic panel    Collection Time: 03/30/18  8:12 AM   Result Value Ref Range    Sodium 137 133 - 144 mmol/L    Potassium 3.3 (L) 3.4 - 5.3 mmol/L    Chloride 102 94 - 109 mmol/L    Carbon Dioxide 27 20 - 32 mmol/L    Anion Gap 8 3 - 14 mmol/L    Glucose 131 (H) 70 - 99 mg/dL    Urea Nitrogen 16 7 - 30 mg/dL    Creatinine 0.69 0.52 - 1.04 mg/dL    GFR Estimate 85 >60 mL/min/1.7m2    GFR Estimate If Black >90 >60 mL/min/1.7m2    Calcium 8.7 8.5 - 10.1 mg/dL    Bilirubin Total 0.7 0.2 - 1.3 mg/dL    Albumin 3.1 (L) 3.4 - 5.0 g/dL    Protein Total 7.4 6.8 - 8.8 g/dL    Alkaline Phosphatase 58 40 - 150 U/L    ALT 19 0 - 50 U/L    AST 16 0 - 45 U/L   Magnesium    Collection Time: 03/30/18  8:12 AM   Result Value Ref Range    Magnesium 2.2 1.6 - 2.3 mg/dL               Follow-ups after your visit        Your next 10 appointments already scheduled     Apr 20, 2018  8:30 AM CDT   [facility] Lab Draw with  [facility] DRAW   [facility] Lab Draw ([facility])    [address] [place] [address]  [address] [place] [id number] [phone]            Apr 20, 2018  9:00 AM CDT   (Arrive by 8:45 AM)   Return Active Treatment with ANU Selby CNP   [facility] ([facility])    [address] [place] [address]  [address] [place] [id number] [phone]            Apr 20, 2018 10:00 AM CDT   Infusion 360 with [facility] INFUSION, UC 12 ATC   [facility])    [address] [place] [address]  [address] [place] [id number]   [phone]            Apr 27, 2018 10:30 AM CDT   CONSULT with Ana Michelle MD   Radiation Oncology Clinic ([facility])    [facility]  1st Floor  [address] [facility] [id number] [phone]            May 11, 2018  8:30 AM CDT   [facility] Lab Draw with  [facility] DRAW   M  [facility] Lab Draw ([facility])    [address]  [address]  [facility] [id number] [phone]            May 11, 2018  9:00 AM CDT   (Arrive by 8:45 AM)   Return Active Treatment with ANU Selby CNP   [facility] ([facility])    [address]  [address]  [facility] [id number] [phone]            May 11, 2018 10:00 AM CDT   Infusion 360 with UC ONCOLOGY INFUSION, UC 12 ATC   [facility] ([facility])    [address]  [address]  [facility] [id number] [phone]              Who to contact     If you have questions or need follow up information about today's clinic visit or your schedule please contact [facility]CER [facility] directly at [phone].  Normal or non-critical lab and imaging results will be communicated to you by MyChart, letter or phone within 4 business days after the clinic has received the results. If you do not hear from us within 7 days, please contact the clinic through [employer] or phone. If you have a critical or abnormal lab result, we will notify you by phone as soon as possible.  Submit refill requests through [employer] or call your pharmacy and they will forward the refill request to us. Please allow 3 business days for your refill to be completed.          Additional Information About Your Visit        [email or web address] Information     [employer] gives you secure access to your electronic health record. If you see a primary care provider, you can also send messages to your care team and make appointments. If you have questions, please call your primary care clinic.  If you do not have a primary care provider, please call [phone] and they will assist you.        Care EveryWhere ID     This is your Care EveryWhere ID. This could be used by other organizations to access your [place] medical records  [id number]         Your Vitals Were     Pulse Temperature Respirations Pulse Oximetry          76 98  F (36.7  C) (Oral) 16 100%         Blood Pressure from Last 3 Encounters:   03/30/18 132/68   03/29/18 104/52   03/19/18 149/72    Weight from Last 3 Encounters:   03/29/18 130.2 kg (287 lb)   03/19/18 132 kg (290 lb 14.4 oz)   02/23/18 130.8 kg (288 lb 5.8 oz)              We Performed the Following     CBC with platelets differential     Comprehensive metabolic panel     Magnesium          Today's Medication Changes          These changes are accurate as of 3/30/18  9:15 AM.  If you have any questions, ask your nurse or doctor.               Start taking these medicines.        Dose/Directions    LORazepam 0.5 MG tablet   Commonly known as:  ATIVAN   Used for:  Endometrial cancer (H), Encounter for long-term (current) use of medications        Dose:  0.5 mg   Take 1 tablet (0.5 mg) by mouth every 6 hours as needed (nausea/vomiting, anxiety or sleep)   Quantity:  30 tablet   Refills:  1       prochlorperazine 5 MG tablet   Commonly known as:  COMPAZINE   Used for:  Endometrial cancer (H), Encounter for long-term (current) use of medications        Dose:  5 mg   Take 1 tablet (5 mg) by mouth every 6 hours as needed (nausea/vomiting)   Quantity:  30 tablet   Refills:  2            Where to get your medicines      These medications were sent to [facility] [address] [address]    Hours:  TRANSPLANT PHONE NUMBER [phone] Phone:  [phone]     prochlorperazine 5 MG tablet         Some of these will need a paper prescription and others can be bought over the counter.  Ask your nurse if you have questions.     Bring a paper prescription for each of these medications     LORazepam 0.5 MG tablet                Primary Care Provider Office Phone # Fax #    Levar Tyler [phone]       [address] [place]  [address]  [facility] [address]        Equal Access to Services     CED COLLINS : Hadii bailee forbes miquel Muhammadali, wavondada luqraduha, qaybta thuytinobandar dimas, angela raygozafedericosiddhartha enriquez. So [facility] [phone].    ATENCIÓN: Si habla español, tiene a cortes disposición servicios gratuitos de asistencia lingüística. Llame al [phone].    We comply with applicable federal civil rights laws and [place] laws. We do not discriminate on the basis of race, color, national origin, age, disability, sex, sexual orientation, or gender identity.            Thank you!     Thank you for choosing [facility]  for your care. Our goal is always to provide you with excellent care. Hearing back from our patients is one way we can continue to improve our services. Please take a few minutes to complete the written survey that you may receive in the mail after your visit with us. Thank you!             Your Updated Medication List - Protect others around you: Learn how to safely use, store and throw away your medicines at www.disposemymeds.org.          This list is accurate as of 3/30/18  9:15 AM.  Always use your most recent med list.                   Brand Name Dispense Instructions for use Diagnosis    aspirin EC 81 MG EC tablet      Take 81 mg by mouth Pt states, \"I don't take it regularly. I take it when I remember to\".        ibuprofen 600 MG tablet    ADVIL/MOTRIN    30 tablet    Take 1 tablet (600 mg) by mouth every 6 hours as needed for pain (mild)    Endometrial carcinoma (H)       LORazepam 0.5 MG tablet    ATIVAN    30 tablet    Take 1 tablet (0.5 mg) by mouth every 6 hours as needed (nausea/vomiting, anxiety or sleep)    Endometrial cancer (H), Encounter for long-term (current) use of medications       METFORMIN HCL PO      Take 500 mg by mouth daily (with breakfast)        prochlorperazine 5 MG tablet    COMPAZINE    30 tablet    Take 1 tablet (5 mg) by mouth every 6 hours as needed " (nausea/vomiting)    Endometrial cancer (H), Encounter for long-term (current) use of medications       triamterene-hydrochlorothiazide 37.5-25 MG per capsule    DYAZIDE     Take 1 capsule by mouth every morning

## 2018-03-30 NOTE — PATIENT INSTRUCTIONS
Contact Numbers  [facility] Cancer [facility]: [phone]    After Hours:  [phone]  Triage: [phone]    Please call the [facility] Triage line if you experience a temperature greater than or equal to 100.5, shaking chills, have uncontrolled nausea, vomiting and/or diarrhea, dizziness, shortness of breath, chest pain, bleeding, unexplained bruising, or if you have any other new/concerning symptoms, questions or concerns.     If it is after hours, weekends, or holidays, please call the main hospital  at  [phone] and ask to speak to the Oncology doctor on call.     If you are having any concerning symptoms or wish to speak to a provider before your next infusion visit, please call your care coordinator or triage to notify them so we can adequately serve you.     If you need a refill on a narcotic prescription or other medication, please call triage before your infusion appointment.           March 2018 Sunday Monday Tuesday Wednesday Thursday Friday Saturday                       1     2     3       4     5     6     7     8     9     10       11     12     13     14     15     16     17       18     19     [facility] RETURN   12:45 PM   (20 min.)   Kevin Henderson MD   Formerly [facility] 20     21     22     23     24       25     26     27     28     29     Admission    6:49 AM   Kevin Henderson MD   [address] [facility]   (Discharge: 3/29/2018)     PROCEDURE - 3.5 HR    7:00 AM   (210 min.)   U2A ROOM 4   [address] [facility]     IR CHEST PORT PLACEMENT >5 YRS    8:15 AM   (90 min.)   UUIR2   [facility], [place], Interventional Radiology 30     [facility] DRAW    8:00 AM   (15 min.)    [facility] DRAW   [facility] Lab Draw     [facility] ONC INFUSION 360    8:30 AM   (360 min.)   [facility]Y INFUSION   [facility] 31 April 2018 Sunday Monday Tuesday Wednesday Thursday Friday Saturday   1     2     3     4     5     6     7  Happy Birthday!        8     9     10     11     12     13     14       15     16     17     18     19     20     [facility] LAB DRAW    8:30 AM   (15 min.)   [facility] LAB DRAW   [facility] Lab Draw     [facility] RETURN ACTIVE TREATMENT    8:45 AM   (40 min.)   Yasmine Zazueta APRN CNP   [facility] ONC INFUSION 360   10:00 AM   (360 min.)   [facility] ONCOLOGY INFUSION   [facility] 21       22     23     24     25     26     27     P CONSULT   10:30 AM   (90 min.)   Ana Michelle MD   Radiation Oncology Clinic 28       29     30                                           Lab Results:  Recent Results (from the past 12 hour(s))   CBC with platelets differential    Collection Time: 03/30/18  8:12 AM   Result Value Ref Range    WBC 7.7 4.0 - 11.0 10e9/L    RBC Count 4.02 3.8 - 5.2 10e12/L    Hemoglobin 11.3 (L) 11.7 - 15.7 g/dL    Hematocrit 35.0 35.0 - 47.0 %    MCV 87 78 - 100 fl    MCH 28.1 26.5 - 33.0 pg    MCHC 32.3 31.5 - 36.5 g/dL    RDW 14.3 10.0 - 15.0 %    Platelet Count 301 150 - 450 10e9/L    Diff Method Automated Method     % Neutrophils 55.6 %    % Lymphocytes 23.8 %    % Monocytes 10.8 %    % Eosinophils 9.2 %    % Basophils 0.5 %    % Immature Granulocytes 0.1 %    Nucleated RBCs 0 0 /100    Absolute Neutrophil 4.3 1.6 - 8.3 10e9/L    Absolute Lymphocytes 1.8 0.8 - 5.3 10e9/L    Absolute Monocytes 0.8 0.0 - 1.3 10e9/L    Absolute Eosinophils 0.7 0.0 - 0.7 10e9/L    Absolute Basophils 0.0 0.0 - 0.2 10e9/L    Abs Immature Granulocytes 0.0 0 - 0.4 10e9/L    Absolute Nucleated RBC 0.0    Comprehensive metabolic panel    Collection Time: 03/30/18  8:12 AM   Result Value Ref Range    Sodium 137 133 - 144 mmol/L    Potassium 3.3 (L) 3.4 - 5.3 mmol/L    Chloride 102 94 - 109 mmol/L    Carbon Dioxide 27 20 - 32 mmol/L    Anion Gap 8 3 - 14 mmol/L    Glucose 131 (H) 70 - 99 mg/dL    Urea Nitrogen 16 7 - 30 mg/dL    Creatinine 0.69 0.52 - 1.04 mg/dL    GFR Estimate 85 >60  mL/min/1.7m2    GFR Estimate If Black >90 >60 mL/min/1.7m2    Calcium 8.7 8.5 - 10.1 mg/dL    Bilirubin Total 0.7 0.2 - 1.3 mg/dL    Albumin 3.1 (L) 3.4 - 5.0 g/dL    Protein Total 7.4 6.8 - 8.8 g/dL    Alkaline Phosphatase 58 40 - 150 U/L    ALT 19 0 - 50 U/L    AST 16 0 - 45 U/L   Magnesium    Collection Time: 03/30/18  8:12 AM   Result Value Ref Range    Magnesium 2.2 1.6 - 2.3 mg/dL

## 2018-03-30 NOTE — NURSING NOTE
Chief Complaint   Patient presents with     Port Draw     labs drawn from port (already accessed) by rn.  vs taken.     Labs drawn from port (already accessed) by rn.   port flushed with saline and heparin, vitals checked, pt checked in for next appointment.  Karishma Humphrey RN

## 2018-03-30 NOTE — PROGRESS NOTES
Result reviewed by Infusion RN. Per note: Results reviewed, labs MET treatment parameters, ok to proceed with treatment

## 2018-04-03 ENCOUNTER — CARE COORDINATION (OUTPATIENT)
Dept: ONCOLOGY | Facility: CLINIC | Age: 68
End: 2018-04-03

## 2018-04-03 NOTE — PROGRESS NOTES
Care Coordinator Note  Left message regarding radiation therapy appointment scheduled 4/27/18.    Lalitha Johns MSN, RN  Care Coordinator  Gynecologic Cancer   Office:  [phone]  Pager: [phone] #[phone]

## 2018-04-19 NOTE — PROGRESS NOTES
Gynecologic Oncology Follow-Up Note    RE: Lu Smith  MRN: [id number]  : 1950  Date of Visit: 2018    CC: Lu Smith is a 68 year old year old female with stage IIIC2 serous endometrial cancer who presents today for follow up regarding disease management.    HPI: Lu comes to the clinic accompanied by her sister Carol. She tolerated her first cycle of chemotherapy fairly well. Notes mild headaches the first few days after chemo- no associated vomiting, changes in vision, or gait changes. She is wondering if this is due to elevated blood sugars after receiving IV steroids. Reports feeling fatigued the first few days after treatment and abdominal discomfort/heartburn the first few days as well. Constipation improved with fiber. Eating well, drinking well. No nausea, fevers, bleeding, or numbness/tingling. Feels ready for another cycle of treatment today.      Oncology History:  18: TLH-BSO, omentectomy, bilateral pelvic and para-aortic lymph node dissection, pelvic washings  3/30/18: C1D1 carboplatin/paclitaxel  18: C2D1 carboplatin/paclitaxel    Past Medical History:   Diagnosis Date     Diabetes (H)     Type II     Endometrial cancer determined by uterine biopsy (H) 2018     HTN (hypertension)      Obesity      Osteoarthritis        Past Surgical History:   Procedure Laterality Date     CHOLECYSTECTOMY       CYSTOSCOPY N/A 2018    Procedure: CYSTOSCOPY;;  Surgeon: Kevin Henderson MD;  Location: [facility] OR     DAVINCI HYSTERECTOMY TOTAL, BILATERAL SALPINGO-OOPHORECTOMY, COMBINED N/A 2018    Procedure: COMBINED DAVINCI HYSTERECTOMY TOTAL, SALPINGO-OOPHORECTOMY;  DaVinci Assisted Total Laparoscopic Hysterectomy, Bilateral Salpingo-Oophorectomy, Cystoscopy,  Omental biopsy, omentectomy,bilateral  Pelvic And Para Aortic Lymph Node Dissection;  Surgeon: Kevin Henderson MD;  Location: [facility] OR     Partial lumbar discectomy         Current Outpatient Prescriptions    Medication     aspirin EC 81 MG EC tablet     ibuprofen (ADVIL/MOTRIN) 600 MG tablet     LORazepam (ATIVAN) 0.5 MG tablet     METFORMIN HCL PO     prochlorperazine (COMPAZINE) 5 MG tablet     triamterene-hydrochlorothiazide (DYAZIDE) 37.5-25 MG per capsule     No current facility-administered medications for this visit.        Allergies   Allergen Reactions     Ace Inhibitors Cough     Amoxicillin Hives       Family History   Problem Relation Age of Onset     Colon Cancer Mother      Breast Cancer Sister      Lymphoma Sister        Social History     Social History     Marital status:      Spouse name: N/A     Number of children: 3     Years of education: N/A     Occupational History     conroller      Social History Main Topics     Smoking status: Former Smoker     Packs/day: 0.25     Years: 20.00     Smokeless tobacco: Never Used      Comment: Quite smoking      Alcohol use Yes      Comment: 4-7/week if out 1-2x's/week     Drug use: No     Sexual activity: Not on file     Other Topics Concern     Not on file     Social History Narrative    Daughter  at age 25 from leukemia.  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           ROS  General: + fatigue. Denies changes in weight, weakness, appetite changes, night sweats, hot flashes, fever, chills, or difficulty sleeping  HEENT: + headaches, hair loss. Denies visual difficulty or disturbances, masses, head injury, tinnitus, hearing loss, epistaxis, congestion, problems with teeth or gums, dysphonia, or dysphagia  Pulmonary: Denies cough, sputum, hemoptysis, shortness of breath, dyspnea on exertion, wheezing, or allergies  Cardiovascular: Denies chest pain, fainting, palpitations, murmurs, activity intolerance, swelling in legs, or high blood pressure  Gastrointestinal: + constipation, abdominal pain. Denies nausea, vomiting, diarrhea, bloating, heartburn, melena, hematochezia, or jaundice  Genitourinary: Denies dysuria, urinary urgency or  frequency, hematuria, cloudy or malodorous urine, incontinence, repeat urinary tract infections, flank pain, pelvic pain, vaginal bleeding, vaginal discharge, or vaginal dryness  Sexual Function: Denies pain with intercourse, changes in libido, or changes in orgasm  Integumentary: Denies rashes, sores, changing moles, or scarring  Hematologic: Denies swollen lymph nodes, masses, easy bruising, or easy bleeding  Musculoskeletal: Denies falls, back pain, myalgias, arthralgias, stiffness, muscle weakness, or muscle cramps  Neurologic: Denies numbness or tingling, changes in memory, difficulty with walking, dizziness, seizures, or tremors  Psychiatric: Denies anxiety, depression, mood changes, suicidal thoughts, or difficulty concentrating  Endocrine: Denies polydipsia, polyuria, temperature intolerance, or history of thyroid disease      Physical Exam:    /82 (BP Location: Right arm, Patient Position: Sitting, Cuff Size: Adult Regular)  Pulse 72  Temp 98.5  F (36.9  C) (Oral)  Resp 18  Wt 130.6 kg (288 lb)  SpO2 98%  BMI 47.93 kg/m2    CONSTITUTIONAL: Alert non-toxic appearing female in no acute distress  HEAD: Normocephalic, atraumatic  EYES: PERRLA; no scleral icterus  ENT: Oropharynx pink without lesions  NECK: Neck supple without lymphadenopathy  RESPIRATORY: Lungs clear to auscultation, no increased work of breathing noted  CV: Regular rate and rhythm, S1S2, no clicks, murmurs, rubs, or gallops; bilateral lower extremities without edema, dorsalis pedis pulses 2+ bilaterally  GASTROINTESTINAL: Normoactive bowel sounds x4 quadrants, abdomen soft, non-distended, and non-tender to palpation without masses or organomegaly  GENITOURINARY: Not indicated  LYMPHATIC: Cervical, supraclavicular, and inguinal nodes without lymphadenopathy  MUSCULOSKELETAL: Moves all extremities, no obvious muscle wasting  NEUROLOGIC: No gross deficits, normal gait  SKIN: Appropriate color for race, warm and dry, no rashes or  lesions to unclothed skin  PSYCHIATRIC: Pleasant and interactive, affect bright, makes appropriate eye contact, thought process linear    Labs:      4/20/2018  Day 1   Hemoglobin 11.7 - 15.7 g/dL 11.1 (A)   Hematocrit 35.0 - 47.0 % 34.5 (A)   Platelet Count 150 - 450 10e9/L 195   Absolute Neutrophil 1.6 - 8.3 10e9/L 4.5   Sodium 133 - 144 mmol/L 138   Potassium 3.4 - 5.3 mmol/L 3.6   Chloride 94 - 109 mmol/L 103   Carbon Dioxide 20 - 32 mmol/L 26   Urea Nitrogen 7 - 30 mg/dL 18   Creatinine 0.52 - 1.04 mg/dL 0.64   Calcium 8.5 - 10.1 mg/dL 8.5   Magnesium 1.6 - 2.3 mg/dL 1.8   Bilirubin Total 0.2 - 1.3 mg/dL 0.6   ALT 0 - 50 U/L 27   AST 0 - 45 U/L 19   Alkaline Phosphatase 40 - 150 U/L 58   Albumin 3.4 - 5.0 g/dL 3.2 (A)   Protein Total 6.8 - 8.8 g/dL 7.2   WBC 4.0 - 11.0 10e9/L 7.4       Assessment/Plan:  1) Stage IIIC2 serous endometrial cancer: Tolerating treatment well without dose limiting side effects. Proceed with cycle two of carboplatin/paclitaxel as originally ordered by Dr. Henderson. To receive total of six cycles followed by treatment planning with Dr. Henderson. To see radiation oncology next week for radiation treatment planning (to receive sandwich style chemo/radiation). Continue small frequent meals high in protein, drink 2L non-caffeinated fluids daily. Reviewed signs and symptoms for when she should contact the clinic or seek additional care, including but not limited to fever, chills, inability to keep down food or fluids, nausea and vomiting not controlled with antiemetics, and diarrhea leading to dehydration. Patient to contact the clinic with any questions or concerns in the interim.  2) Chemotherapy/disease side effects:   Abdominal discomfort: Suspect this is due to gastritis/GERD- recommend omeprazole 20mg PO daily which she will  over the counter. Continue to monitor.   Fatigue: Mild, lasting three days. Continue with physical activity and periods of rest.   Headaches: Unclear  etiology- potentially due to hyperglycemia, dehydration, fatigue, deconditioning, tension. No red flags present. To obtain a blood glucose meter and check her BG when she has headaches- if readings are elevated, to see PCP. To drink a large glass of water when she develops headaches and take Tylenol 650mg x1.  3) Genetic counseling: MMR protein expression intact which makes Willsi syndrome unlikely  4) Health maintenance issues discussed include to follow up with PCP for non-gynecologic concerns and co-morbid conditions  5) Patient verbalized understanding of and agreement with plan    A total of 25 minutes of face to face time were spent with the patient with over 50% of that time spent in counseling, coordination of care, education, and symptom management.    ANU Selby, FNP-C  Division of Gynecologic Oncology  [facility]  Pager: [phone]

## 2018-04-20 ENCOUNTER — ONCOLOGY VISIT (OUTPATIENT)
Dept: ONCOLOGY | Facility: CLINIC | Age: 68
End: 2018-04-20
Attending: NURSE PRACTITIONER
Payer: COMMERCIAL

## 2018-04-20 ENCOUNTER — APPOINTMENT (OUTPATIENT)
Dept: LAB | Facility: CLINIC | Age: 68
End: 2018-04-20
Attending: NURSE PRACTITIONER
Payer: COMMERCIAL

## 2018-04-20 VITALS
OXYGEN SATURATION: 98 % | RESPIRATION RATE: 18 BRPM | DIASTOLIC BLOOD PRESSURE: 82 MMHG | WEIGHT: 288 LBS | HEART RATE: 72 BPM | TEMPERATURE: 98.5 F | SYSTOLIC BLOOD PRESSURE: 149 MMHG | BODY MASS INDEX: 47.93 KG/M2

## 2018-04-20 DIAGNOSIS — Z79.899 ENCOUNTER FOR LONG-TERM (CURRENT) USE OF MEDICATIONS: ICD-10-CM

## 2018-04-20 DIAGNOSIS — C54.1 ENDOMETRIAL CANCER (H): Primary | ICD-10-CM

## 2018-04-20 LAB
ALBUMIN SERPL-MCNC: 3.2 G/DL (ref 3.4–5)
ALP SERPL-CCNC: 58 U/L (ref 40–150)
ALT SERPL W P-5'-P-CCNC: 27 U/L (ref 0–50)
ANION GAP SERPL CALCULATED.3IONS-SCNC: 10 MMOL/L (ref 3–14)
AST SERPL W P-5'-P-CCNC: 19 U/L (ref 0–45)
BASOPHILS # BLD AUTO: 0.1 10E9/L (ref 0–0.2)
BASOPHILS NFR BLD AUTO: 1.1 %
BILIRUB SERPL-MCNC: 0.6 MG/DL (ref 0.2–1.3)
BUN SERPL-MCNC: 18 MG/DL (ref 7–30)
CALCIUM SERPL-MCNC: 8.5 MG/DL (ref 8.5–10.1)
CHLORIDE SERPL-SCNC: 103 MMOL/L (ref 94–109)
CO2 SERPL-SCNC: 26 MMOL/L (ref 20–32)
CREAT SERPL-MCNC: 0.64 MG/DL (ref 0.52–1.04)
DIFFERENTIAL METHOD BLD: ABNORMAL
EOSINOPHIL # BLD AUTO: 0.2 10E9/L (ref 0–0.7)
EOSINOPHIL NFR BLD AUTO: 3.2 %
ERYTHROCYTE [DISTWIDTH] IN BLOOD BY AUTOMATED COUNT: 14.5 % (ref 10–15)
GFR SERPL CREATININE-BSD FRML MDRD: >90 ML/MIN/1.7M2
GLUCOSE SERPL-MCNC: 130 MG/DL (ref 70–99)
HCT VFR BLD AUTO: 34.5 % (ref 35–47)
HGB BLD-MCNC: 11.1 G/DL (ref 11.7–15.7)
IMM GRANULOCYTES # BLD: 0.1 10E9/L (ref 0–0.4)
IMM GRANULOCYTES NFR BLD: 0.9 %
LYMPHOCYTES # BLD AUTO: 1.5 10E9/L (ref 0.8–5.3)
LYMPHOCYTES NFR BLD AUTO: 20.8 %
MAGNESIUM SERPL-MCNC: 1.8 MG/DL (ref 1.6–2.3)
MCH RBC QN AUTO: 28 PG (ref 26.5–33)
MCHC RBC AUTO-ENTMCNC: 32.2 G/DL (ref 31.5–36.5)
MCV RBC AUTO: 87 FL (ref 78–100)
MONOCYTES # BLD AUTO: 1 10E9/L (ref 0–1.3)
MONOCYTES NFR BLD AUTO: 13.5 %
NEUTROPHILS # BLD AUTO: 4.5 10E9/L (ref 1.6–8.3)
NEUTROPHILS NFR BLD AUTO: 60.5 %
NRBC # BLD AUTO: 0 10*3/UL
NRBC BLD AUTO-RTO: 0 /100
PLATELET # BLD AUTO: 195 10E9/L (ref 150–450)
POTASSIUM SERPL-SCNC: 3.6 MMOL/L (ref 3.4–5.3)
PROT SERPL-MCNC: 7.2 G/DL (ref 6.8–8.8)
RBC # BLD AUTO: 3.97 10E12/L (ref 3.8–5.2)
SODIUM SERPL-SCNC: 138 MMOL/L (ref 133–144)
WBC # BLD AUTO: 7.4 10E9/L (ref 4–11)

## 2018-04-20 PROCEDURE — 83735 ASSAY OF MAGNESIUM: CPT | Performed by: NURSE PRACTITIONER

## 2018-04-20 PROCEDURE — 80053 COMPREHEN METABOLIC PANEL: CPT | Performed by: NURSE PRACTITIONER

## 2018-04-20 PROCEDURE — 96375 TX/PRO/DX INJ NEW DRUG ADDON: CPT

## 2018-04-20 PROCEDURE — 25000128 H RX IP 250 OP 636: Mod: ZF | Performed by: NURSE PRACTITIONER

## 2018-04-20 PROCEDURE — G0463 HOSPITAL OUTPT CLINIC VISIT: HCPCS | Mod: ZF

## 2018-04-20 PROCEDURE — 85025 COMPLETE CBC W/AUTO DIFF WBC: CPT | Performed by: NURSE PRACTITIONER

## 2018-04-20 PROCEDURE — 96413 CHEMO IV INFUSION 1 HR: CPT

## 2018-04-20 PROCEDURE — 40000257 ZZH STATISTIC CONSULT NO CHARGE VASC ACCESS: Mod: ZF

## 2018-04-20 PROCEDURE — 99214 OFFICE O/P EST MOD 30 MIN: CPT | Mod: ZP | Performed by: NURSE PRACTITIONER

## 2018-04-20 PROCEDURE — 96415 CHEMO IV INFUSION ADDL HR: CPT

## 2018-04-20 PROCEDURE — 96417 CHEMO IV INFUS EACH ADDL SEQ: CPT

## 2018-04-20 PROCEDURE — 25000132 ZZH RX MED GY IP 250 OP 250 PS 637: Mod: ZF | Performed by: NURSE PRACTITIONER

## 2018-04-20 PROCEDURE — 25000125 ZZHC RX 250: Mod: ZF | Performed by: NURSE PRACTITIONER

## 2018-04-20 RX ORDER — DIPHENHYDRAMINE HCL 25 MG
50 CAPSULE ORAL ONCE
Status: CANCELLED
Start: 2018-04-20

## 2018-04-20 RX ORDER — EPINEPHRINE 0.3 MG/.3ML
INJECTION SUBCUTANEOUS
Status: DISCONTINUED
Start: 2018-04-20 | End: 2018-04-20 | Stop reason: WASHOUT

## 2018-04-20 RX ORDER — PALONOSETRON 0.05 MG/ML
0.25 INJECTION, SOLUTION INTRAVENOUS ONCE
Status: COMPLETED | OUTPATIENT
Start: 2018-04-20 | End: 2018-04-20

## 2018-04-20 RX ORDER — EPINEPHRINE 1 MG/ML
0.3 INJECTION, SOLUTION, CONCENTRATE INTRAVENOUS EVERY 5 MIN PRN
Status: CANCELLED | OUTPATIENT
Start: 2018-04-20

## 2018-04-20 RX ORDER — ALBUTEROL SULFATE 0.83 MG/ML
2.5 SOLUTION RESPIRATORY (INHALATION)
Status: CANCELLED | OUTPATIENT
Start: 2018-04-20

## 2018-04-20 RX ORDER — PALONOSETRON 0.05 MG/ML
0.25 INJECTION, SOLUTION INTRAVENOUS ONCE
Status: CANCELLED
Start: 2018-04-20

## 2018-04-20 RX ORDER — DIPHENHYDRAMINE HYDROCHLORIDE 50 MG/ML
50 INJECTION INTRAMUSCULAR; INTRAVENOUS
Status: CANCELLED
Start: 2018-04-20

## 2018-04-20 RX ORDER — SODIUM CHLORIDE 9 MG/ML
1000 INJECTION, SOLUTION INTRAVENOUS CONTINUOUS PRN
Status: CANCELLED
Start: 2018-04-20

## 2018-04-20 RX ORDER — METHYLPREDNISOLONE SODIUM SUCCINATE 125 MG/2ML
125 INJECTION, POWDER, LYOPHILIZED, FOR SOLUTION INTRAMUSCULAR; INTRAVENOUS
Status: CANCELLED
Start: 2018-04-20

## 2018-04-20 RX ORDER — ACETAMINOPHEN 500 MG
500 TABLET ORAL EVERY 6 HOURS PRN
COMMUNITY

## 2018-04-20 RX ORDER — LORAZEPAM 2 MG/ML
1 INJECTION INTRAMUSCULAR EVERY 6 HOURS PRN
Status: CANCELLED
Start: 2018-04-20

## 2018-04-20 RX ORDER — HEPARIN SODIUM (PORCINE) LOCK FLUSH IV SOLN 100 UNIT/ML 100 UNIT/ML
5 SOLUTION INTRAVENOUS EVERY 8 HOURS PRN
Status: DISCONTINUED | OUTPATIENT
Start: 2018-04-20 | End: 2018-04-20 | Stop reason: HOSPADM

## 2018-04-20 RX ORDER — ALBUTEROL SULFATE 90 UG/1
1-2 AEROSOL, METERED RESPIRATORY (INHALATION)
Status: CANCELLED
Start: 2018-04-20

## 2018-04-20 RX ORDER — MEPERIDINE HYDROCHLORIDE 25 MG/ML
25 INJECTION INTRAMUSCULAR; INTRAVENOUS; SUBCUTANEOUS EVERY 30 MIN PRN
Status: CANCELLED | OUTPATIENT
Start: 2018-04-20

## 2018-04-20 RX ORDER — EPINEPHRINE 0.3 MG/.3ML
0.3 INJECTION SUBCUTANEOUS EVERY 5 MIN PRN
Status: CANCELLED | OUTPATIENT
Start: 2018-04-20

## 2018-04-20 RX ORDER — DIPHENHYDRAMINE HCL 25 MG
50 CAPSULE ORAL ONCE
Status: COMPLETED | OUTPATIENT
Start: 2018-04-20 | End: 2018-04-20

## 2018-04-20 RX ADMIN — PACLITAXEL 427 MG: 6 INJECTION, SOLUTION INTRAVENOUS at 11:04

## 2018-04-20 RX ADMIN — SODIUM CHLORIDE, PRESERVATIVE FREE 5 ML: 5 INJECTION INTRAVENOUS at 14:55

## 2018-04-20 RX ADMIN — SODIUM CHLORIDE 250 ML: 9 INJECTION, SOLUTION INTRAVENOUS at 10:29

## 2018-04-20 RX ADMIN — CARBOPLATIN 845 MG: 10 INJECTION, SOLUTION INTRAVENOUS at 14:10

## 2018-04-20 RX ADMIN — PALONOSETRON HYDROCHLORIDE 0.25 MG: 0.25 INJECTION INTRAVENOUS at 10:34

## 2018-04-20 RX ADMIN — FAMOTIDINE 40 MG: 10 INJECTION INTRAVENOUS at 10:36

## 2018-04-20 RX ADMIN — DIPHENHYDRAMINE HYDROCHLORIDE 50 MG: 25 CAPSULE ORAL at 10:28

## 2018-04-20 RX ADMIN — SODIUM CHLORIDE, PRESERVATIVE FREE 5 ML: 5 INJECTION INTRAVENOUS at 08:50

## 2018-04-20 RX ADMIN — DEXAMETHASONE SODIUM PHOSPHATE 20 MG: 10 INJECTION, SOLUTION INTRAMUSCULAR; INTRAVENOUS at 10:29

## 2018-04-20 ASSESSMENT — PAIN SCALES - GENERAL: PAINLEVEL: NO PAIN (0)

## 2018-04-20 NOTE — LETTER
2018       RE: Lu Smith  [address] [address]     Dear Colleague,    Thank you for referring your patient, Lu Smith, to the [facility]. Please see a copy of my visit note below.    Gynecologic Oncology Follow-Up Note    RE: Lu Smith  MRN: [id number]  : 1950  Date of Visit: 2018    CC: Lu Smith is a 68 year old year old female with stage IIIC2 serous endometrial cancer who presents today for follow up regarding disease management.    HPI: Lu comes to the clinic accompanied by her sister Carol. She tolerated her first cycle of chemotherapy fairly well. Notes mild headaches the first few days after chemo- no associated vomiting, changes in vision, or gait changes. She is wondering if this is due to elevated blood sugars after receiving IV steroids. Reports feeling fatigued the first few days after treatment and abdominal discomfort/heartburn the first few days as well. Constipation improved with fiber. Eating well, drinking well. No nausea, fevers, bleeding, or numbness/tingling. Feels ready for another cycle of treatment today.      Oncology History:  18: TLH-BSO, omentectomy, bilateral pelvic and para-aortic lymph node dissection, pelvic washings  3/30/18: C1D1 carboplatin/paclitaxel  18: C2D1 carboplatin/paclitaxel    Past Medical History:   Diagnosis Date     Diabetes (H)     Type II     Endometrial cancer determined by uterine biopsy (H) 2018     HTN (hypertension)      Obesity      Osteoarthritis        Past Surgical History:   Procedure Laterality Date     CHOLECYSTECTOMY       CYSTOSCOPY N/A 2018    Procedure: CYSTOSCOPY;;  Surgeon: Kevin Henderson MD;  Location:  OR     DAVINCI HYSTERECTOMY TOTAL, BILATERAL SALPINGO-OOPHORECTOMY, COMBINED N/A 2018    Procedure: COMBINED DAVINCI HYSTERECTOMY TOTAL, SALPINGO-OOPHORECTOMY;  DaVinci Assisted Total Laparoscopic Hysterectomy, Bilateral  Salpingo-Oophorectomy, Cystoscopy,  Omental biopsy, omentectomy,bilateral  Pelvic And Para Aortic Lymph Node Dissection;  Surgeon: Kevin Henderson MD;  Location: [facility] OR     Partial lumbar discectomy         Current Outpatient Prescriptions   Medication     aspirin EC 81 MG EC tablet     ibuprofen (ADVIL/MOTRIN) 600 MG tablet     LORazepam (ATIVAN) 0.5 MG tablet     METFORMIN HCL PO     prochlorperazine (COMPAZINE) 5 MG tablet     triamterene-hydrochlorothiazide (DYAZIDE) 37.5-25 MG per capsule     No current facility-administered medications for this visit.        Allergies   Allergen Reactions     Ace Inhibitors Cough     Amoxicillin Hives       Family History   Problem Relation Age of Onset     Colon Cancer Mother      Breast Cancer Sister      Lymphoma Sister        Social History     Social History     Marital status:      Spouse name: N/A     Number of children: 3     Years of education: N/A     Occupational History     conroller      Social History Main Topics     Smoking status: Former Smoker     Packs/day: 0.25     Years: 20.00     Smokeless tobacco: Never Used      Comment: Quite smoking      Alcohol use Yes      Comment: 4-7/week if out 1-2x's/week     Drug use: No     Sexual activity: Not on file     Other Topics Concern     Not on file     Social History Narrative    Daughter  at age 25 from leukemia.  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           ROS  General: + fatigue. Denies changes in weight, weakness, appetite changes, night sweats, hot flashes, fever, chills, or difficulty sleeping  HEENT: + headaches, hair loss. Denies visual difficulty or disturbances, masses, head injury, tinnitus, hearing loss, epistaxis, congestion, problems with teeth or gums, dysphonia, or dysphagia  Pulmonary: Denies cough, sputum, hemoptysis, shortness of breath, dyspnea on exertion, wheezing, or allergies  Cardiovascular: Denies chest pain, fainting, palpitations, murmurs, activity  intolerance, swelling in legs, or high blood pressure  Gastrointestinal: + constipation, abdominal pain. Denies nausea, vomiting, diarrhea, bloating, heartburn, melena, hematochezia, or jaundice  Genitourinary: Denies dysuria, urinary urgency or frequency, hematuria, cloudy or malodorous urine, incontinence, repeat urinary tract infections, flank pain, pelvic pain, vaginal bleeding, vaginal discharge, or vaginal dryness  Sexual Function: Denies pain with intercourse, changes in libido, or changes in orgasm  Integumentary: Denies rashes, sores, changing moles, or scarring  Hematologic: Denies swollen lymph nodes, masses, easy bruising, or easy bleeding  Musculoskeletal: Denies falls, back pain, myalgias, arthralgias, stiffness, muscle weakness, or muscle cramps  Neurologic: Denies numbness or tingling, changes in memory, difficulty with walking, dizziness, seizures, or tremors  Psychiatric: Denies anxiety, depression, mood changes, suicidal thoughts, or difficulty concentrating  Endocrine: Denies polydipsia, polyuria, temperature intolerance, or history of thyroid disease      Physical Exam:    /82 (BP Location: Right arm, Patient Position: Sitting, Cuff Size: Adult Regular)  Pulse 72  Temp 98.5  F (36.9  C) (Oral)  Resp 18  Wt 130.6 kg (288 lb)  SpO2 98%  BMI 47.93 kg/m2    CONSTITUTIONAL: Alert non-toxic appearing female in no acute distress  HEAD: Normocephalic, atraumatic  EYES: PERRLA; no scleral icterus  ENT: Oropharynx pink without lesions  NECK: Neck supple without lymphadenopathy  RESPIRATORY: Lungs clear to auscultation, no increased work of breathing noted  CV: Regular rate and rhythm, S1S2, no clicks, murmurs, rubs, or gallops; bilateral lower extremities without edema, dorsalis pedis pulses 2+ bilaterally  GASTROINTESTINAL: Normoactive bowel sounds x4 quadrants, abdomen soft, non-distended, and non-tender to palpation without masses or organomegaly  GENITOURINARY: Not indicated  LYMPHATIC:  Cervical, supraclavicular, and inguinal nodes without lymphadenopathy  MUSCULOSKELETAL: Moves all extremities, no obvious muscle wasting  NEUROLOGIC: No gross deficits, normal gait  SKIN: Appropriate color for race, warm and dry, no rashes or lesions to unclothed skin  PSYCHIATRIC: Pleasant and interactive, affect bright, makes appropriate eye contact, thought process linear    Labs:      4/20/2018  Day 1   Hemoglobin 11.7 - 15.7 g/dL 11.1 (A)   Hematocrit 35.0 - 47.0 % 34.5 (A)   Platelet Count 150 - 450 10e9/L 195   Absolute Neutrophil 1.6 - 8.3 10e9/L 4.5   Sodium 133 - 144 mmol/L 138   Potassium 3.4 - 5.3 mmol/L 3.6   Chloride 94 - 109 mmol/L 103   Carbon Dioxide 20 - 32 mmol/L 26   Urea Nitrogen 7 - 30 mg/dL 18   Creatinine 0.52 - 1.04 mg/dL 0.64   Calcium 8.5 - 10.1 mg/dL 8.5   Magnesium 1.6 - 2.3 mg/dL 1.8   Bilirubin Total 0.2 - 1.3 mg/dL 0.6   ALT 0 - 50 U/L 27   AST 0 - 45 U/L 19   Alkaline Phosphatase 40 - 150 U/L 58   Albumin 3.4 - 5.0 g/dL 3.2 (A)   Protein Total 6.8 - 8.8 g/dL 7.2   WBC 4.0 - 11.0 10e9/L 7.4       Assessment/Plan:  1) Stage IIIC2 serous endometrial cancer: Tolerating treatment well without dose limiting side effects. Proceed with cycle two of carboplatin/paclitaxel as originally ordered by Dr. Henderson. To receive total of six cycles followed by treatment planning with Dr. Henderson. To see radiation oncology next week for radiation treatment planning (to receive sandwich style chemo/radiation). Continue small frequent meals high in protein, drink 2L non-caffeinated fluids daily. Reviewed signs and symptoms for when she should contact the clinic or seek additional care, including but not limited to fever, chills, inability to keep down food or fluids, nausea and vomiting not controlled with antiemetics, and diarrhea leading to dehydration. Patient to contact the clinic with any questions or concerns in the interim.  2) Chemotherapy/disease side effects:   Abdominal discomfort:  Suspect this is due to gastritis/GERD- recommend omeprazole 20mg PO daily which she will  over the counter. Continue to monitor.   Fatigue: Mild, lasting three days. Continue with physical activity and periods of rest.   Headaches: Unclear etiology- potentially due to hyperglycemia, dehydration, fatigue, deconditioning, tension. No red flags present. To obtain a blood glucose meter and check her BG when she has headaches- if readings are elevated, to see PCP. To drink a large glass of water when she develops headaches and take Tylenol 650mg x1.  3) Genetic counseling: MMR protein expression intact which makes Willis syndrome unlikely  4) Health maintenance issues discussed include to follow up with PCP for non-gynecologic concerns and co-morbid conditions  5) Patient verbalized understanding of and agreement with plan    A total of 25 minutes of face to face time were spent with the patient with over 50% of that time spent in counseling, coordination of care, education, and symptom management.    ANU Selby, FNP-C  Division of Gynecologic Oncology  [facility]  Pager: [phone]              Again, thank you for allowing me to participate in the care of your patient.      Sincerely,    ANU Selby CNP

## 2018-04-20 NOTE — MR AVS SNAPSHOT
After Visit Summary   4/20/2018    Lu Smith    MRN: [id number]           Patient Information     Date Of Birth          1950        Visit Information        Provider Department      4/20/2018 10:00 AM  12 ATC;  ONCOLOGY INFUSION [facility]        Today's Diagnoses     Endometrial cancer (H)    -  1    Encounter for long-term (current) use of medications          Care Instructions    Contact Numbers    [facility] Main Line: [phone]  [facility] Triage:  [phone]    Call triage with chills and/or temperature greater than or equal to 100.5, uncontrolled nausea/vomiting, diarrhea, constipation, dizziness, shortness of breath, chest pain, bleeding, unexplained bruising, or any new/concerning symptoms, questions/concerns.     If you are having any concerning symptoms or wish to speak to a provider before your next infusion visit, please call your care coordinator or triage to notify them so we can adequately serve you.       After Hours: [phone]    If after hours, weekends, or holidays, call main hospital  and ask for Oncology doctor on call.         April 2018 Sunday Monday Tuesday Wednesday Thursday Friday Saturday   1     2     3     4     5     6     7  Happy Birthday!       8     9     10     11     12     13     14       15     16     17     18     19     20     [facility] LAB DRAW    8:30 AM   (15 min.)    M[facility]ONIC LAB DRAW   [facility] Lab Draw     [facility] RETURN ACTIVE TREATMENT    8:45 AM   (40 min.)   Yasmine Zazueta APRN CNP   [facility]   10:00 AM   (360 min.)   UC ONCOLOGY INFUSION   [facility] 21       22     23     24     25     26     27     [facility] CONSULT   10:30 AM   (90 min.)   Ana Michelle MD   Radiation Oncology Clinic 28       29     30                                         May 2018   Derrek Monday Tuesday Wednesday Thursday Friday Saturday             1     2      3     4     5       6     7     8     9     10     11     [facility] LAB DRAW    8:30 AM   (15 min.)   [facility] LAB DRAW   [facility] Lab Draw     [facility] RETURN ACTIVE TREATMENT    8:45 AM   (40 min.)   Yasmine Zazueta APRN CNP   [facility] ONC INFUSION 360   10:00 AM   (360 min.)    ONCOLOGY INFUSION   [facility] 12       13     14     15     16     17     18     19       20     21     22     23     24     25     26       27     28     29     30     31                            Lab Results:  Recent Results (from the past 12 hour(s))   CBC with platelets differential    Collection Time: 04/20/18  9:18 AM   Result Value Ref Range    WBC 7.4 4.0 - 11.0 10e9/L    RBC Count 3.97 3.8 - 5.2 10e12/L    Hemoglobin 11.1 (L) 11.7 - 15.7 g/dL    Hematocrit 34.5 (L) 35.0 - 47.0 %    MCV 87 78 - 100 fl    MCH 28.0 26.5 - 33.0 pg    MCHC 32.2 31.5 - 36.5 g/dL    RDW 14.5 10.0 - 15.0 %    Platelet Count 195 150 - 450 10e9/L    Diff Method Automated Method     % Neutrophils 60.5 %    % Lymphocytes 20.8 %    % Monocytes 13.5 %    % Eosinophils 3.2 %    % Basophils 1.1 %    % Immature Granulocytes 0.9 %    Nucleated RBCs 0 0 /100    Absolute Neutrophil 4.5 1.6 - 8.3 10e9/L    Absolute Lymphocytes 1.5 0.8 - 5.3 10e9/L    Absolute Monocytes 1.0 0.0 - 1.3 10e9/L    Absolute Eosinophils 0.2 0.0 - 0.7 10e9/L    Absolute Basophils 0.1 0.0 - 0.2 10e9/L    Abs Immature Granulocytes 0.1 0 - 0.4 10e9/L    Absolute Nucleated RBC 0.0    Comprehensive metabolic panel    Collection Time: 04/20/18  9:18 AM   Result Value Ref Range    Sodium 138 133 - 144 mmol/L    Potassium 3.6 3.4 - 5.3 mmol/L    Chloride 103 94 - 109 mmol/L    Carbon Dioxide 26 20 - 32 mmol/L    Anion Gap 10 3 - 14 mmol/L    Glucose 130 (H) 70 - 99 mg/dL    Urea Nitrogen 18 7 - 30 mg/dL    Creatinine 0.64 0.52 - 1.04 mg/dL    GFR Estimate >90 >60 mL/min/1.7m2    GFR Estimate If Black >90 >60 mL/min/1.7m2    Calcium 8.5  8.5 - 10.1 mg/dL    Bilirubin Total 0.6 0.2 - 1.3 mg/dL    Albumin 3.2 (L) 3.4 - 5.0 g/dL    Protein Total 7.2 6.8 - 8.8 g/dL    Alkaline Phosphatase 58 40 - 150 U/L    ALT 27 0 - 50 U/L    AST 19 0 - 45 U/L   Magnesium    Collection Time: 04/20/18  9:18 AM   Result Value Ref Range    Magnesium 1.8 1.6 - 2.3 mg/dL               Follow-ups after your visit        Your next 10 appointments already scheduled     Apr 27, 2018 10:30 AM CDT   CONSULT with Ana Michelle MD   Radiation Oncology Clinic ([facility])    [facility]  1st Floor  [address] [facility] [address] [phone]            May 11, 2018  8:30 AM CDT   [facility] Lab Draw with UC [facility] LAB DRAW   [facility] Lab Draw ([facility])    [address] [place] [address]  [address]  [facility] [id number] [phone]            May 11, 2018  9:00 AM CDT   (Arrive by 8:45 AM)   Return Active Treatment with ANU Selby CNP   [facility] ([facility])    [address] [place] [address]  [address]  [facility] [id number] [phone]            May 11, 2018 10:00 AM CDT   Infusion 360 with PAOLA ONCOLOGY INFUSION, UC 12 ATC   [facility] ([facility])    [address] [place] [address]  [address]  [facility] [id number] [phone]              Who to contact     If you have questions or need follow up information about today's clinic visit or your schedule please contact [facility] directly at [phone].  Normal or non-critical lab and imaging results will be communicated to you by MyChart, letter or phone within 4 business days after the clinic has received the results. If you do not hear from us within 7 days, please contact the clinic through MyChart or phone. If you have a critical or abnormal lab result, we will notify you by phone as soon as possible.  Submit refill requests  through [employer] or call your pharmacy and they will forward the refill request to us. Please allow 3 business days for your refill to be completed.          Additional Information About Your Visit        [employer] Information     [employer] gives you secure access to your electronic health record. If you see a primary care provider, you can also send messages to your care team and make appointments. If you have questions, please call your primary care clinic.  If you do not have a primary care provider, please call [phone] and they will assist you.        Care EveryWhere ID     This is your Care EveryWhere ID. This could be used by other organizations to access your [place] medical records  [id number]         Blood Pressure from Last 3 Encounters:   04/20/18 149/82   03/30/18 132/68   03/29/18 104/52    Weight from Last 3 Encounters:   04/20/18 130.6 kg (288 lb)   03/29/18 130.2 kg (287 lb)   03/19/18 132 kg (290 lb 14.4 oz)              We Performed the Following     CBC with platelets differential     Comprehensive metabolic panel     Magnesium        Primary Care Provider Office Phone # Fax #    Levar Scott [phone]       [address] [place]  [facility] [address]        Equal Access to Services     CED COLLINS : Hadii bailee forbes hadasho Soomaali, waaxda luqadaha, qaybta kaalmada adeegyada, angela enriquez. So [facility] [phone].    ATENCIÓN: Si habla español, tiene a cortes disposición servicios gratuitos de asistencia lingüística. Llame al [phone].    We comply with applicable federal civil rights laws and [place] laws. We do not discriminate on the basis of race, color, national origin, age, disability, sex, sexual orientation, or gender identity.            Thank you!     Thank you for choosing [facility]  for your care. Our goal is always to provide you with excellent care. Hearing back from our patients is one way we can  "continue to improve our services. Please take a few minutes to complete the written survey that you may receive in the mail after your visit with us. Thank you!             Your Updated Medication List - Protect others around you: Learn how to safely use, store and throw away your medicines at www.disposemymeds.org.          This list is accurate as of 4/20/18  3:20 PM.  Always use your most recent med list.                   Brand Name Dispense Instructions for use Diagnosis    ACETAMINOPHEN PO      Take 325 mg by mouth every 6 hours as needed for pain        aspirin EC 81 MG EC tablet      Take 81 mg by mouth Pt states, \"I don't take it regularly. I take it when I remember to\".        ibuprofen 600 MG tablet    ADVIL/MOTRIN    30 tablet    Take 1 tablet (600 mg) by mouth every 6 hours as needed for pain (mild)    Endometrial carcinoma (H)       LORazepam 0.5 MG tablet    ATIVAN    30 tablet    Take 1 tablet (0.5 mg) by mouth every 6 hours as needed (nausea/vomiting, anxiety or sleep)    Endometrial cancer (H), Encounter for long-term (current) use of medications       METFORMIN HCL PO      Take 500 mg by mouth daily (with breakfast)        prochlorperazine 5 MG tablet    COMPAZINE    30 tablet    Take 1 tablet (5 mg) by mouth every 6 hours as needed (nausea/vomiting)    Endometrial cancer (H), Encounter for long-term (current) use of medications       triamterene-hydrochlorothiazide 37.5-25 MG per capsule    DYAZIDE     Take 1 capsule by mouth every morning          "

## 2018-04-20 NOTE — PATIENT INSTRUCTIONS
Contact Numbers    [facility] Main Line: [phone]  [facility] Triage:  [phone]    Call triage with chills and/or temperature greater than or equal to 100.5, uncontrolled nausea/vomiting, diarrhea, constipation, dizziness, shortness of breath, chest pain, bleeding, unexplained bruising, or any new/concerning symptoms, questions/concerns.     If you are having any concerning symptoms or wish to speak to a provider before your next infusion visit, please call your care coordinator or triage to notify them so we can adequately serve you.       After Hours: [phone]    If after hours, weekends, or holidays, call main hospital  and ask for Oncology doctor on call.         April 2018 Sunday Monday Tuesday Wednesday Thursday Friday Saturday   1     2     3     4     5     6     7  Happy Birthday!       8     9     10     11     12     13     14       15     16     17     18     19     20     [facility] DRAW    8:30 AM   (15 min.)    [facility] LAB DRAW   [facility] Lab Draw     [facility] RETURN ACTIVE TREATMENT    8:45 AM   (40 min.)   Yasmine Zazueta APRN CNP   [facility]   10:00 AM   (360 min.)   [facility] ONCOLOGY INFUSION   [facility] 21       22     23     24     25     26     27     [facility] CONSULT   10:30 AM   (90 min.)   Ana Michelle MD   Radiation Oncology Clinic 28       29     30                                         May 2018   Derrek Monday Tuesday Wednesday Thursday Friday Saturday             1     2     3     4     5       6     7     8     9     10     11     [facility] LAB DRAW    8:30 AM   (15 min.)   [facility] LAB DRAW   [facility] Lab Draw     [facility] RETURN ACTIVE TREATMENT    8:45 AM   (40 min.)   Yasmine Zazueta APRN CNP   [facility]     [facility] ONC INFUSION 360   10:00 AM   (360 min.)   UC ONCOLOGY INFUSION   [facility] 12       13     14     15     16     17     18     19        20     21     22     23     24     25     26       27     28     29     30     31                            Lab Results:  Recent Results (from the past 12 hour(s))   CBC with platelets differential    Collection Time: 04/20/18  9:18 AM   Result Value Ref Range    WBC 7.4 4.0 - 11.0 10e9/L    RBC Count 3.97 3.8 - 5.2 10e12/L    Hemoglobin 11.1 (L) 11.7 - 15.7 g/dL    Hematocrit 34.5 (L) 35.0 - 47.0 %    MCV 87 78 - 100 fl    MCH 28.0 26.5 - 33.0 pg    MCHC 32.2 31.5 - 36.5 g/dL    RDW 14.5 10.0 - 15.0 %    Platelet Count 195 150 - 450 10e9/L    Diff Method Automated Method     % Neutrophils 60.5 %    % Lymphocytes 20.8 %    % Monocytes 13.5 %    % Eosinophils 3.2 %    % Basophils 1.1 %    % Immature Granulocytes 0.9 %    Nucleated RBCs 0 0 /100    Absolute Neutrophil 4.5 1.6 - 8.3 10e9/L    Absolute Lymphocytes 1.5 0.8 - 5.3 10e9/L    Absolute Monocytes 1.0 0.0 - 1.3 10e9/L    Absolute Eosinophils 0.2 0.0 - 0.7 10e9/L    Absolute Basophils 0.1 0.0 - 0.2 10e9/L    Abs Immature Granulocytes 0.1 0 - 0.4 10e9/L    Absolute Nucleated RBC 0.0    Comprehensive metabolic panel    Collection Time: 04/20/18  9:18 AM   Result Value Ref Range    Sodium 138 133 - 144 mmol/L    Potassium 3.6 3.4 - 5.3 mmol/L    Chloride 103 94 - 109 mmol/L    Carbon Dioxide 26 20 - 32 mmol/L    Anion Gap 10 3 - 14 mmol/L    Glucose 130 (H) 70 - 99 mg/dL    Urea Nitrogen 18 7 - 30 mg/dL    Creatinine 0.64 0.52 - 1.04 mg/dL    GFR Estimate >90 >60 mL/min/1.7m2    GFR Estimate If Black >90 >60 mL/min/1.7m2    Calcium 8.5 8.5 - 10.1 mg/dL    Bilirubin Total 0.6 0.2 - 1.3 mg/dL    Albumin 3.2 (L) 3.4 - 5.0 g/dL    Protein Total 7.2 6.8 - 8.8 g/dL    Alkaline Phosphatase 58 40 - 150 U/L    ALT 27 0 - 50 U/L    AST 19 0 - 45 U/L   Magnesium    Collection Time: 04/20/18  9:18 AM   Result Value Ref Range    Magnesium 1.8 1.6 - 2.3 mg/dL

## 2018-04-20 NOTE — PROGRESS NOTES
Infusion Nursing Note:  Lu Smith presents today for Cycle 2, day 1 Taxol and Carboplatin (#2).    Patient seen by provider today: Yes: Yasmine Zazueta NP    Note: Patient presents to clinic today feeling well with no questions.      Intravenous Access:  Implanted Port.    Treatment Conditions:  Lab Results   Component Value Date    HGB 11.1 04/20/2018     Lab Results   Component Value Date    WBC 7.4 04/20/2018      Lab Results   Component Value Date    ANEU 4.5 04/20/2018     Lab Results   Component Value Date     04/20/2018      Lab Results   Component Value Date     04/20/2018                   Lab Results   Component Value Date    POTASSIUM 3.6 04/20/2018           Lab Results   Component Value Date    MAG 1.8 04/20/2018            Lab Results   Component Value Date    CR 0.64 04/20/2018                   Lab Results   Component Value Date    MARYLOU 8.5 04/20/2018                Lab Results   Component Value Date    BILITOTAL 0.6 04/20/2018           Lab Results   Component Value Date    ALBUMIN 3.2 04/20/2018                    Lab Results   Component Value Date    ALT 27 04/20/2018           Lab Results   Component Value Date    AST 19 04/20/2018     Results reviewed, labs MET treatment parameters, ok to proceed with treatment.    Post Infusion Assessment:  Patient tolerated infusion without incident.  Blood return noted pre and post infusion.  Site patent and intact, free from redness, edema or discomfort.  No evidence of extravasations.  Access discontinued per protocol.    Discharge Plan:   Patient declined prescription refills.  Discharge instructions reviewed with: Patient.  Patient and/or family verbalized understanding of discharge instructions and all questions answered.  AVS to patient via [employer].  Patient will return 5/11/2018 for next appointment.   Departure Mode: Ambulatory.      Asia Manzo RN

## 2018-04-20 NOTE — MR AVS SNAPSHOT
After Visit Summary   4/20/2018    Lu Smith    MRN: [id number]           Patient Information     Date Of Birth          1950        Visit Information        Provider Department      4/20/2018 9:00 AM Yasmine Zazueta APRN CNP [facility]        Today's Diagnoses     Endometrial cancer (H)    -  1    Encounter for long-term (current) use of medications           Follow-ups after your visit        Your next 10 appointments already scheduled     Apr 27, 2018 10:30 AM CDT   CONSULT with Ana Michelle MD   Radiation Oncology Clinic ([facility])    [facility]  1st Floor  [address] [facility] [address]   [phone]            May 11, 2018  8:30 AM CDT   [facility] Lab Draw with UC [facility]ONIC LAB DRAW   [facility] Lab Draw ([facility])    [address] [place] [address]  [address]  [facility] [id number] [phone]            May 11, 2018  9:00 AM CDT   (Arrive by 8:45 AM)   Return Active Treatment with ANU Selby CNP   [facility] ([facility])    [address] [place] [address]  [address]  [facility] [id number] [phone]            May 11, 2018 10:00 AM CDT   Infusion 360 with  ONCOLOGY INFUSION, UC 12 ATC   [facility] ([facility])    [address] [place] [address]  [address]  [facility] [id number] [phone]              Who to contact     If you have questions or need follow up information about today's clinic visit or your schedule please contact [facility] directly at [phone].  Normal or non-critical lab and imaging results will be communicated to you by MyChart, letter or phone within 4 business days after the clinic has received the results. If you do not hear from us within 7 days, please contact the clinic through MyChart or phone. If you have a critical or abnormal lab  result, we will notify you by phone as soon as possible.  Submit refill requests through [employer] or call your pharmacy and they will forward the refill request to us. Please allow 3 business days for your refill to be completed.          Additional Information About Your Visit        [employer] Information     [employer] gives you secure access to your electronic health record. If you see a primary care provider, you can also send messages to your care team and make appointments. If you have questions, please call your primary care clinic.  If you do not have a primary care provider, please call [phone] and they will assist you.        Care EveryWhere ID     This is your Care EveryWhere ID. This could be used by other organizations to access your [place] medical records  [id number]        Your Vitals Were     Pulse Temperature Respirations Pulse Oximetry BMI (Body Mass Index)       72 98.5  F (36.9  C) (Oral) 18 98% 47.93 kg/m2        Blood Pressure from Last 3 Encounters:   04/20/18 149/82   03/30/18 132/68   03/29/18 104/52    Weight from Last 3 Encounters:   04/20/18 130.6 kg (288 lb)   03/29/18 130.2 kg (287 lb)   03/19/18 132 kg (290 lb 14.4 oz)              Today, you had the following     No orders found for display       Primary Care Provider Office Phone # Fax #    Levar Scott [phone]       [address] [place]  [place] [address]        Equal Access to Services     CED COLLINS AH: Hadii bailee fraustoo Soeliezer, waaxda luqadaha, qaybta kaalmada adeegyada, angela enriquez. So [facility] [phone].    ATENCIÓN: Si habla español, tiene a cortes disposición servicios gratuitos de asistencia lingüística. Lldianna al [phone].    We comply with applicable federal civil rights laws and [place] laws. We do not discriminate on the basis of race, color, national origin, age, disability, sex, sexual orientation, or gender identity.            Thank you!   "   Thank you for choosing [facility]  for your care. Our goal is always to provide you with excellent care. Hearing back from our patients is one way we can continue to improve our services. Please take a few minutes to complete the written survey that you may receive in the mail after your visit with us. Thank you!             Your Updated Medication List - Protect others around you: Learn how to safely use, store and throw away your medicines at www.disposemymeds.org.          This list is accurate as of 4/20/18  4:34 PM.  Always use your most recent med list.                   Brand Name Dispense Instructions for use Diagnosis    ACETAMINOPHEN PO      Take 325 mg by mouth every 6 hours as needed for pain        aspirin EC 81 MG EC tablet      Take 81 mg by mouth Pt states, \"I don't take it regularly. I take it when I remember to\".        ibuprofen 600 MG tablet    ADVIL/MOTRIN    30 tablet    Take 1 tablet (600 mg) by mouth every 6 hours as needed for pain (mild)    Endometrial carcinoma (H)       LORazepam 0.5 MG tablet    ATIVAN    30 tablet    Take 1 tablet (0.5 mg) by mouth every 6 hours as needed (nausea/vomiting, anxiety or sleep)    Endometrial cancer (H), Encounter for long-term (current) use of medications       METFORMIN HCL PO      Take 500 mg by mouth daily (with breakfast)        prochlorperazine 5 MG tablet    COMPAZINE    30 tablet    Take 1 tablet (5 mg) by mouth every 6 hours as needed (nausea/vomiting)    Endometrial cancer (H), Encounter for long-term (current) use of medications       triamterene-hydrochlorothiazide 37.5-25 MG per capsule    DYAZIDE     Take 1 capsule by mouth every morning          "

## 2018-04-20 NOTE — NURSING NOTE
Chief Complaint   Patient presents with     Port Draw     Labs drawn via port by RN. Line flushed and hep locked. VS taken.     Princess Overton RN

## 2018-04-20 NOTE — NURSING NOTE
"Oncology Rooming Note    April 20, 2018 9:32 AM   Lu Smith is a 68 year old female who presents for:    Chief Complaint   Patient presents with     Port Draw     Labs drawn via port by RN. Line flushed and hep locked. VS taken.     Oncology Clinic Visit     Endometrial CA; Active Tx     Initial Vitals: /82 (BP Location: Right arm, Patient Position: Sitting, Cuff Size: Adult Regular)  Pulse 72  Temp 98.5  F (36.9  C) (Oral)  Resp 18  Wt 130.6 kg (288 lb)  SpO2 98%  BMI 47.93 kg/m2 Estimated body mass index is 47.93 kg/(m^2) as calculated from the following:    Height as of 3/29/18: 1.651 m (5' 5\").    Weight as of this encounter: 130.6 kg (288 lb). Body surface area is 2.45 meters squared.  No Pain (0) Comment: Data Unavailable   No LMP recorded. Patient is postmenopausal.  Allergies reviewed: Yes  Medications reviewed: Yes    Medications: Medication refills not needed today.  Pharm[facility] into [employer] [address] [place] [address] [address] AT [facility] [address] [place] [address]    Clinical concerns: Patient states there are no new concerns to discuss with provider.  Yasmine Zazueta was not notified.       8 minutes for nursing intake (face to face time)     Coby Anne CMA              "

## 2018-04-27 ENCOUNTER — OFFICE VISIT (OUTPATIENT)
Dept: RADIATION ONCOLOGY | Facility: CLINIC | Age: 68
End: 2018-04-27
Attending: RADIOLOGY
Payer: MEDICARE

## 2018-04-27 VITALS — DIASTOLIC BLOOD PRESSURE: 77 MMHG | BODY MASS INDEX: 47.26 KG/M2 | WEIGHT: 284 LBS | SYSTOLIC BLOOD PRESSURE: 160 MMHG

## 2018-04-27 DIAGNOSIS — C54.1 ENDOMETRIAL CANCER (H): Primary | ICD-10-CM

## 2018-04-27 PROCEDURE — G0463 HOSPITAL OUTPT CLINIC VISIT: HCPCS | Performed by: RADIOLOGY

## 2018-04-27 ASSESSMENT — ENCOUNTER SYMPTOMS
ABDOMINAL PAIN: 0
INSOMNIA: 0
EYE DISCHARGE: 0
CONSTIPATION: 0
BACK PAIN: 0
MYALGIAS: 0
BLOOD IN STOOL: 0
SPEECH CHANGE: 0
TINGLING: 0
POLYDIPSIA: 0
SINUS PAIN: 0
DEPRESSION: 0
TREMORS: 0
FLANK PAIN: 0
HEMOPTYSIS: 0
PHOTOPHOBIA: 0
PND: 0
HEMATURIA: 0
LOSS OF CONSCIOUSNESS: 0
BRUISES/BLEEDS EASILY: 0
NERVOUS/ANXIOUS: 0
FALLS: 0
DIZZINESS: 0
FOCAL WEAKNESS: 0
BLURRED VISION: 0
ORTHOPNEA: 0
DOUBLE VISION: 0
FEVER: 0
COUGH: 0
WEIGHT LOSS: 0
WEAKNESS: 0
SENSORY CHANGE: 0
HEADACHES: 0
CLAUDICATION: 0
EYE PAIN: 0
VOMITING: 0
HEARTBURN: 0
NAUSEA: 0
DYSURIA: 0
DIAPHORESIS: 0
EYE REDNESS: 0
MEMORY LOSS: 0
STRIDOR: 0
SPUTUM PRODUCTION: 0
SORE THROAT: 0
SHORTNESS OF BREATH: 0
WHEEZING: 0
DIARRHEA: 0
NECK PAIN: 0
HALLUCINATIONS: 0
PALPITATIONS: 0
CHILLS: 0
FREQUENCY: 0
SEIZURES: 0

## 2018-04-27 ASSESSMENT — LIFESTYLE VARIABLES: SUBSTANCE_ABUSE: 0

## 2018-04-27 NOTE — PROGRESS NOTES
HPI  INITIAL PATIENT ASSESSMENT    Diagnosis: Endometrial Cancer    Prior radiation therapy: None    Prior chemotherapy: Taxol and Carboplatin    Prior hormonal therapy:No    Pain Eval:  Denies    Psychosocial  Living arrangements: Lives with son  Fall Risk: independent   referral needs: Not needed    Advanced Directive: No  Implantable Cardiac Device? No    Onset of menarche: 15  LMP: No LMP recorded. Patient is postmenopausal.  Onset of menopause: 54  Abnormal vaginal bleeding/discharge: No  Nurse face-to-face time: Level 5:  over 15 min face to face time    Review of Systems   Constitutional: Positive for malaise/fatigue. Negative for chills, diaphoresis, fever and weight loss.   HENT: Negative for congestion, ear discharge, ear pain, hearing loss, nosebleeds, sinus pain, sore throat and tinnitus.    Eyes: Negative for blurred vision, double vision, photophobia, pain, discharge and redness.   Respiratory: Negative for cough, hemoptysis, sputum production, shortness of breath, wheezing and stridor.    Cardiovascular: Positive for leg swelling. Negative for chest pain, palpitations, orthopnea, claudication and PND.   Gastrointestinal: Negative for abdominal pain, blood in stool, constipation, diarrhea, heartburn, melena, nausea and vomiting.   Genitourinary: Negative for dysuria, flank pain, frequency, hematuria and urgency.   Musculoskeletal: Positive for joint pain. Negative for back pain, falls, myalgias and neck pain.   Skin: Negative for itching and rash.   Neurological: Negative for dizziness, tingling, tremors, sensory change, speech change, focal weakness, seizures, loss of consciousness, weakness and headaches.   Endo/Heme/Allergies: Positive for environmental allergies. Negative for polydipsia. Does not bruise/bleed easily.   Psychiatric/Behavioral: Negative for depression, hallucinations, memory loss, substance abuse and suicidal ideas. The patient is not nervous/anxious and does not  have insomnia.

## 2018-04-27 NOTE — PROGRESS NOTES
RADIATION ONCOLOGY CONSULTATION  DATE OF ENCOUNTER: April 27, 2018      Lu Smith was seen in consultation at the request of Dr. Kevin Henderson for consideration of adjuvant pelvic radiation for newly diagnosed endometrial carcinoma.      HISTORY OF PRESENT ILLNESS:  Lu Smith is a 67-year-old woman who began experiencing postmenopausal vaginal bleeding in 12/2017.  A Pap smear as well as an endometrial biopsy showed high-grade serous carcinoma.  She saw Dr. Henderson in 01/2018 and subsequently underwent da Delfino-assisted total laparoscopic hysterectomy, bilateral salpingo-oophorectomy, cystoscopy, omentectomy, pelvic and periaortic lymph node dissection on 02/23/2018.  At surgery, the omentum appeared to have multiple discrete calcifications.  One was sent for pathology which demonstrated benign calcified gallstone.  Pathology ([id number]) demonstrated endometrial serous carcinoma invading the outer half of the myometrium.  Cervical stromal invasion of the outer half was also noted.  Both the right and left ovaries were involved with metastatic serous carcinoma into the hilar and cortical vessels.  There were 11 right pelvic lymph nodes and 11 left pelvic lymph nodes negative for metastatic disease (0/22).  There were 4 of 9 right periaortic lymph nodes involved with metastatic serous carcinoma with the largest metastatic focus measuring 8 mm without extranodal extension.  Two of 10 left periaortic lymph nodes were involved with metastatic serous carcinoma with the largest focus measuring 15 mm with extranodal extension.  Additionally, tumor size measured 4.9 cm and the radial margin measured 1 mm at the anterior cervical/lower uterine segment margin.  Lymphovascular space invasion was present.  Pathologic stage is pT3a pN2 (FIGO IIIC2).  Immunohistochemical testing for mismatch repair proteins showed no loss of nuclear expression of MMR proteins.      Postoperatively, Ms. Smith has done well.  She  began systemic chemotherapy on 2018 and received her second cycle of carboplatin and paclitaxel on .  She presents now for consideration of adjuvant pelvic radiation with sandwich chemotherapy.  Overall, she feels relatively well except for fatigue related to chemotherapy.  She denies any fever, chills, nausea, vomiting, headaches, abdominal pain, vaginal bleeding.      PAST MEDICAL HISTORY:   1.  Diabetes mellitus.   2.  Hypertension.   3.  Osteoarthritis.   4.  Endometrial cancer (see History of Present Illness).      PAST SURGICAL HISTORY:   1.  Cholecystectomy .   2.  Partial lumbar diskectomy .   3.  Hysterectomy (see History of Present Illness).      PAST RADIATION HISTORY:  None.      PAST CHEMOTHERAPY HISTORY:  Please see History of Present Illness for current chemotherapy protocol.      IMPLANTABLE CARDIAC DEVICE:  None.      PREGNANCY STATUS:  Post hysterectomy, postmenopausal.      ALLERGIES:  ACE inhibitors cause cough, amoxicillin causes hives.      MEDICATIONS:   1.  Dyazide (triamterene/hydrochlorothiazide) 37.25/25 mg every morning.   2.  Metformin 500 mg every morning.   3.  Aspirin 81 mg daily.   4.  Acetaminophen p.r.n. pain.   5.  Compazine p.r.n. nausea.   6.  Ativan p.r.n. nausea.      SOCIAL HISTORY:  The patient is .  She has 3 adult children.  She presents today with her sister.  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  She has a 20-pack-year history of smoking but quit in  and currently does not use any tobacco products.  She drinks approximately 4 alcoholic drinks per week.  She is independent in her activities of daily living.      FAMILY CANCER HISTORY:  The patient's daughter  of leukemia at age 25.  Patient's sister was diagnosed with low-grade lymphoma at age 25.  A brother diagnosed with melanoma at age 61.  Her father  of renal cell carcinoma at age 64.  The patient's mother has a history of  colon cancer at age 70.  She also notes further breast cancer and colon cancer in first-degree cousins.      REVIEW OF SYSTEMS:  A 12-point review of systems was performed and reviewed in the medical record.  Pertinent positives and negatives are noted in the history of present illness.      ECOG Performance Status: 0    PHYSICAL EXAMINATION:   GENERAL:  Well-developed, well-nourished, in no acute distress.   VITAL SIGNS:  /77, weight 128.82 kg, pain 0/10.   HEENT:  Normocephalic, atraumatic, complete alopecia.  Oral cavity and oropharynx without lesions.   LUNGS:  Breathing comfortably on room air, no wheezing.   CARDIOVASCULAR:  Regular rate and rhythm.  Extremities well perfused.   ABDOMEN:  Soft, nondistended, healing port sites, no masses.   PELVIC: Deferred  EXTREMITIES:  Without cyanosis or edema.   SKIN:  No rashes.   LYMPHATICS:  No palpable cervical, supraclavicular or axillary adenopathy.   MUSCULOSKELETAL:  No atrophy, pain or tenderness to percussion of the spine.   PSYCHIATRIC:  Mood is appropriate.      LABORATORY:    Lab Results   Component Value Date    WBC 7.4 04/20/2018    HGB 11.1 (L) 04/20/2018    HCT 34.5 (L) 04/20/2018    MCV 87 04/20/2018     04/20/2018     Lab Results   Component Value Date     04/20/2018    POTASSIUM 3.6 04/20/2018    CHLORIDE 103 04/20/2018    CO2 26 04/20/2018     (H) 04/20/2018        ASSESSMENT and PLAN:  Lu Smith is a 68-year-old woman with FIGO IIIC2 serous endometrial carcinoma currently receiving sandwich protocol chemotherapy with carboplatin and paclitaxel.  The rationale for recommending adjuvant pelvic and periaortic jamel irradiation was discussed with the patient and her sister.  I reviewed the pathologic findings that are of concern for increased local regional recurrence and the recommendation to proceed with external beam radiation to the pelvis and periaortic nodes followed by vaginal cuff brachytherapy boost.  I also  discussed the anticipated acute side effects, the potential long-term risks and the expected outcome from radiation.  The patient and her sister asked several pertinent questions which were answered such that they were able to verbalize understanding.  Informed consent was obtained.  She is scheduled to receive her third cycle of chemotherapy on 05/11/2018 and we will plan to initiate therapy approximately 3 weeks later pending recovery of any cytopenia from chemotherapy.  Given the close radial margin and adnexal involvement, we will plan for 5,040 cGy in 28 fractions to the pelvis and periaortic region followed by 2 fractions of high-dose rate brachytherapy boost to the vaginal cuff.  Simulation is scheduled for 05/22/2018.      We appreciate the opportunity to participate in Lu Smith's care.  Please do not hesitate to contact me should you have any questions regarding her visit with us today.      Ana Michelle MD  Department of Radiation Oncology    CC  Patient Care Team:  Levar Scott as PCP - General (Internal Medicine)  Jeanne Blancas MD as Referring Physician (OB/Gyn)  Kevin Henderson MD

## 2018-04-27 NOTE — LETTER
4/27/2018       RE: Lu Smith  [address] [place] [place] [address]     Dear Colleague,    Thank you for referring your patient, Lu Smith, to the [facility]. Please see a copy of my visit note below.      HPI  INITIAL PATIENT ASSESSMENT    Diagnosis: Endometrial Cancer    Prior radiation therapy: None    Prior chemotherapy: Taxol and Carboplatin    Prior hormonal therapy:No    Pain Eval:  Denies    Psychosocial  Living arrangements: Lives with son  Fall Risk: independent   referral needs: Not needed    Advanced Directive: No  Implantable Cardiac Device? No    Onset of menarche: 15  LMP: No LMP recorded. Patient is postmenopausal.  Onset of menopause: 54  Abnormal vaginal bleeding/discharge: No  Nurse face-to-face time: Level 5:  over 15 min face to face time    Review of Systems   Constitutional: Positive for malaise/fatigue. Negative for chills, diaphoresis, fever and weight loss.   HENT: Negative for congestion, ear discharge, ear pain, hearing loss, nosebleeds, sinus pain, sore throat and tinnitus.    Eyes: Negative for blurred vision, double vision, photophobia, pain, discharge and redness.   Respiratory: Negative for cough, hemoptysis, sputum production, shortness of breath, wheezing and stridor.    Cardiovascular: Positive for leg swelling. Negative for chest pain, palpitations, orthopnea, claudication and PND.   Gastrointestinal: Negative for abdominal pain, blood in stool, constipation, diarrhea, heartburn, melena, nausea and vomiting.   Genitourinary: Negative for dysuria, flank pain, frequency, hematuria and urgency.   Musculoskeletal: Positive for joint pain. Negative for back pain, falls, myalgias and neck pain.   Skin: Negative for itching and rash.   Neurological: Negative for dizziness, tingling, tremors, sensory change, speech change, focal weakness, seizures, loss of consciousness, weakness and headaches.   Endo/Heme/Allergies: Positive for environmental  allergies. Negative for polydipsia. Does not bruise/bleed easily.   Psychiatric/Behavioral: Negative for depression, hallucinations, memory loss, substance abuse and suicidal ideas. The patient is not nervous/anxious and does not have insomnia.                  RADIATION ONCOLOGY CONSULTATION  DATE OF ENCOUNTER: April 27, 2018      Lu Smith was seen in consultation at the request of Dr. Kevin Henderson for consideration of adjuvant pelvic radiation for newly diagnosed endometrial carcinoma.      HISTORY OF PRESENT ILLNESS:  Lu Smith is a 67-year-old woman who began experiencing postmenopausal vaginal bleeding in 12/2017.  A Pap smear as well as an endometrial biopsy showed high-grade serous carcinoma.  She saw Dr. Henderson in 01/2018 and subsequently underwent da Delfino-assisted total laparoscopic hysterectomy, bilateral salpingo-oophorectomy, cystoscopy, omentectomy, pelvic and periaortic lymph node dissection on 02/23/2018.  At surgery, the omentum appeared to have multiple discrete calcifications.  One was sent for pathology which demonstrated benign calcified gallstone.  Pathology ([id number]) demonstrated endometrial serous carcinoma invading the outer half of the myometrium.  Cervical stromal invasion of the outer half was also noted.  Both the right and left ovaries were involved with metastatic serous carcinoma into the hilar and cortical vessels.  There were 11 right pelvic lymph nodes and 11 left pelvic lymph nodes negative for metastatic disease (0/22).  There were 4 of 9 right periaortic lymph nodes involved with metastatic serous carcinoma with the largest metastatic focus measuring 8 mm without extranodal extension.  Two of 10 left periaortic lymph nodes were involved with metastatic serous carcinoma with the largest focus measuring 15 mm with extranodal extension.  Additionally, tumor size measured 4.9 cm and the radial margin measured 1 mm at the anterior cervical/lower uterine segment  margin.  Lymphovascular space invasion was present.  Pathologic stage is pT3a pN2 (FIGO IIIC2).  Immunohistochemical testing for mismatch repair proteins showed no loss of nuclear expression of MMR proteins.      Postoperatively, Ms. Smith has done well.  She began systemic chemotherapy on 03/30/2018 and received her second cycle of carboplatin and paclitaxel on 04/20.  She presents now for consideration of adjuvant pelvic radiation with sandwich chemotherapy.  Overall, she feels relatively well except for fatigue related to chemotherapy.  She denies any fever, chills, nausea, vomiting, headaches, abdominal pain, vaginal bleeding.      PAST MEDICAL HISTORY:   1.  Diabetes mellitus.   2.  Hypertension.   3.  Osteoarthritis.   4.  Endometrial cancer (see History of Present Illness).      PAST SURGICAL HISTORY:   1.  Cholecystectomy 1993.   2.  Partial lumbar diskectomy 1998.   3.  Hysterectomy (see History of Present Illness).      PAST RADIATION HISTORY:  None.      PAST CHEMOTHERAPY HISTORY:  Please see History of Present Illness for current chemotherapy protocol.      IMPLANTABLE CARDIAC DEVICE:  None.      PREGNANCY STATUS:  Post hysterectomy, postmenopausal.      ALLERGIES:  ACE inhibitors cause cough, amoxicillin causes hives.      MEDICATIONS:   1.  Dyazide (triamterene/hydrochlorothiazide) 37.25/25 mg every morning.   2.  Metformin 500 mg every morning.   3.  Aspirin 81 mg daily.   4.  Acetaminophen p.r.n. pain.   5.  Compazine p.r.n. nausea.   6.  Ativan p.r.n. nausea.      SOCIAL HISTORY:  The patient is .  She has 3 adult children.  She presents today with her sister.  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  She has a 20-pack-year history of smoking but quit in 1992 and currently does not use any tobacco products.  She drinks approximately 4 alcoholic drinks per week.  She is independent in her activities of daily living.      FAMILY CANCER  HISTORY:  The patient's daughter  of leukemia at age 25.  Patient's sister was diagnosed with low-grade lymphoma at age 25.  A brother diagnosed with melanoma at age 61.  Her father  of renal cell carcinoma at age 64.  The patient's mother has a history of colon cancer at age 70.  She also notes further breast cancer and colon cancer in first-degree cousins.      REVIEW OF SYSTEMS:  A 12-point review of systems was performed and reviewed in the medical record.  Pertinent positives and negatives are noted in the history of present illness.      ECOG Performance Status: 0    PHYSICAL EXAMINATION:   GENERAL:  Well-developed, well-nourished, in no acute distress.   VITAL SIGNS:  /77, weight 128.82 kg, pain 0/10.   HEENT:  Normocephalic, atraumatic, complete alopecia.  Oral cavity and oropharynx without lesions.   LUNGS:  Breathing comfortably on room air, no wheezing.   CARDIOVASCULAR:  Regular rate and rhythm.  Extremities well perfused.   ABDOMEN:  Soft, nondistended, healing port sites, no masses.   PELVIC: Deferred  EXTREMITIES:  Without cyanosis or edema.   SKIN:  No rashes.   LYMPHATICS:  No palpable cervical, supraclavicular or axillary adenopathy.   MUSCULOSKELETAL:  No atrophy, pain or tenderness to percussion of the spine.   PSYCHIATRIC:  Mood is appropriate.      LABORATORY:    Lab Results   Component Value Date    WBC 7.4 2018    HGB 11.1 (L) 2018    HCT 34.5 (L) 2018    MCV 87 2018     2018     Lab Results   Component Value Date     2018    POTASSIUM 3.6 2018    CHLORIDE 103 2018    CO2 26 2018     (H) 2018        ASSESSMENT and PLAN:  Lu Smith is a 68-year-old woman with FIGO IIIC2 serous endometrial carcinoma currently receiving sandwich protocol chemotherapy with carboplatin and paclitaxel.  The rationale for recommending adjuvant pelvic and periaortic jamel irradiation was discussed with the patient and  her sister.  I reviewed the pathologic findings that are of concern for increased local regional recurrence and the recommendation to proceed with external beam radiation to the pelvis and periaortic nodes followed by vaginal cuff brachytherapy boost.  I also discussed the anticipated acute side effects, the potential long-term risks and the expected outcome from radiation.  The patient and her sister asked several pertinent questions which were answered such that they were able to verbalize understanding.  Informed consent was obtained.  She is scheduled to receive her third cycle of chemotherapy on 05/11/2018 and we will plan to initiate therapy approximately 3 weeks later pending recovery of any cytopenia from chemotherapy.  Given the close radial margin and adnexal involvement, we will plan for 5,040 cGy in 28 fractions to the pelvis and periaortic region followed by 2 fractions of high-dose rate brachytherapy boost to the vaginal cuff.  Simulation is scheduled for 05/22/2018.      We appreciate the opportunity to participate in Lu Smith's care.  Please do not hesitate to contact me should you have any questions regarding her visit with us today.      Ana Michelle MD  Department of Radiation Oncology    [facility]  Patient Care Team:  Levar Scott as PCP - General (Internal Medicine)  Jeanne Blancas MD as Referring Physician (OB/Gyn)  Kevin Henderson MD

## 2018-04-27 NOTE — MR AVS SNAPSHOT
After Visit Summary   4/27/2018    Lu Smith    MRN: [id number]           Patient Information     Date Of Birth          1950        Visit Information        Provider Department      4/27/2018 10:30 AM Ana Michelle MD [facility]        Today's Diagnoses     Endometrial cancer (H)    -  1       Follow-ups after your visit        Your next 10 appointments already scheduled     May 11, 2018  8:30 AM CDT   [facility] Draw with [facility] LAB DRAW   [facility] Draw ([facility])    [address]  [address]  [place] [address]   [phone]            May 11, 2018  9:00 AM CDT   (Arrive by 8:45 AM)   Return Active Treatment with ANU Selby CNP   [facility] ([facility])    [address]  [address]  [place] [address]   [phone]            May 11, 2018 10:00 AM CDT   Infusion 360 with  ONCOLOGY INFUSION, UC 12 ATC   [facility] ([facility])    [address]  [address]  [place] [address]   [phone]            May 22, 2018  1:00 PM CDT   TCT/SIM Suite Visit with Ana Michelle MD   [facility] ([facility])    [facility]  1st Floor  [address] [address]   [phone]            May 22, 2018  2:00 PM CDT   CT PELVIS W CONTRAST with UUCT1   [facility] [place] ([facility] [place])    [address] [address] [phone]           Please bring any scans or X-rays taken at other hospitals, if similar tests were done. Also bring a list of your medicines, including vitamins, minerals and over-the-counter drugs. It is safest to leave personal items at home.  Be sure to tell your doctor:   If you have any allergies.   If there s any chance you are pregnant.   If  you are breastfeeding.  How to prepare:   Do not eat or drink for 2 hours before your exam. If you need to take medicine, you may take it with small sips of water. (We may ask you to take liquid medicine as well.)   Please wear loose clothing, such as a sweat suit or jogging clothes. Avoid snaps, zippers and other metal. We may ask you to undress and put on a hospital gown.  Please arrive 30 minutes early for your CT. Once in the department you might be asked to drink water 15-20 minutes prior to your exam.  If indicated you may be asked to drink an oral contrast in advance of your CT.  If this is the case, the imaging team will let you know or be in contact with you prior to your appointment  Patients over 70 or patients with diabetes or kidney problems:   If you haven t had a blood test (creatinine test) within the last 30 days, the Cardiologist/Radiologist may require you to get this test prior to your exam.  If you have diabetes:   Continue to take your metformin medication on the day of your exam  If you have any questions, please call the Imaging Department where you will have your exam.              Future tests that were ordered for you today     Open Future Orders        Priority Expected Expires Ordered    CT Pelvis w contrast* Routine 5/22/2018 4/27/2019 4/27/2018            Who to contact     Please call your clinic at [phone] to:    Ask questions about your health    Make or cancel appointments    Discuss your medicines    Learn about your test results    Speak to your doctor            Additional Information About Your Visit        redealize Information     [employer]alize gives you secure access to your electronic health record. If you see a primary care provider, you can also send messages to your care team and make appointments. If you have questions, please call your primary care clinic.  If you do not have a primary care provider, please call [phone] and they will assist you.      [employer]ize is an  electronic gateway that provides easy, online access to your medical records. With [employer], you can request a clinic appointment, read your test results, renew a prescription or communicate with your care team.     To access your existing account, please contact your [facility] or call [phone] for assistance.        Care EveryWhere ID     This is your Care EveryWhere ID. This could be used by other organizations to access your [facility] medical records  [id number]        Your Vitals Were     BMI (Body Mass Index)                   47.26 kg/m2            Blood Pressure from Last 3 Encounters:   04/27/18 160/77   04/20/18 149/82   03/30/18 132/68    Weight from Last 3 Encounters:   04/27/18 128.8 kg (284 lb)   04/20/18 130.6 kg (288 lb)   03/29/18 130.2 kg (287 lb)               Primary Care Provider Office Phone # Fax #    Levar Scott [phone]       [address] [place]  [place] [address]        Equal Access to Services     CED COLLINS : Hadii bailee ku hadasho Soomaali, waaxda luqadaha, qaybta kaalmada adeegyada, angela greenfield . So [facility] [phone].    ATENCIÓN: Si habla español, tiene a cortes disposición servicios gratuitos de asistencia lingüística. Llame al [phone].    We comply with applicable federal civil rights laws and [place] laws. We do not discriminate on the basis of race, color, national origin, age, disability, sex, sexual orientation, or gender identity.            Thank you!     Thank you for choosing [facility]  for your care. Our goal is always to provide you with excellent care. Hearing back from our patients is one way we can continue to improve our services. Please take a few minutes to complete the written survey that you may receive in the mail after your visit with us. Thank you!             Your Updated Medication List - Protect others around you: Learn how to safely use,  "store and throw away your medicines at www.disposemymeds.org.          This list is accurate as of 4/27/18  2:39 PM.  Always use your most recent med list.                   Brand Name Dispense Instructions for use Diagnosis    ACETAMINOPHEN PO      Take 325 mg by mouth every 6 hours as needed for pain        aspirin EC 81 MG EC tablet      Take 81 mg by mouth Pt states, \"I don't take it regularly. I take it when I remember to\".        ibuprofen 600 MG tablet    ADVIL/MOTRIN    30 tablet    Take 1 tablet (600 mg) by mouth every 6 hours as needed for pain (mild)    Endometrial carcinoma (H)       LORazepam 0.5 MG tablet    ATIVAN    30 tablet    Take 1 tablet (0.5 mg) by mouth every 6 hours as needed (nausea/vomiting, anxiety or sleep)    Endometrial cancer (H), Encounter for long-term (current) use of medications       METFORMIN HCL PO      Take 500 mg by mouth daily (with breakfast)        prochlorperazine 5 MG tablet    COMPAZINE    30 tablet    Take 1 tablet (5 mg) by mouth every 6 hours as needed (nausea/vomiting)    Endometrial cancer (H), Encounter for long-term (current) use of medications       triamterene-hydrochlorothiazide 37.5-25 MG per capsule    DYAZIDE     Take 1 capsule by mouth every morning          "

## 2018-05-02 ENCOUNTER — CARE COORDINATION (OUTPATIENT)
Dept: ONCOLOGY | Facility: CLINIC | Age: 68
End: 2018-05-02

## 2018-05-07 ASSESSMENT — ENCOUNTER SYMPTOMS
ALTERED TEMPERATURE REGULATION: 1
SINUS PAIN: 1
SYNCOPE: 0
INCREASED ENERGY: 1
WEIGHT GAIN: 0
JAUNDICE: 0
LOSS OF CONSCIOUSNESS: 0
HYPOTENSION: 0
SMELL DISTURBANCE: 0
NECK MASS: 0
HALLUCINATIONS: 0
LIGHT-HEADEDNESS: 0
ARTHRALGIAS: 1
DIARRHEA: 1
BACK PAIN: 0
HEARTBURN: 1
STIFFNESS: 0
SINUS CONGESTION: 1
JOINT SWELLING: 0
RECTAL PAIN: 1
NECK PAIN: 0
SEIZURES: 0
HEADACHES: 1
BLOOD IN STOOL: 1
LEG PAIN: 0
EXERCISE INTOLERANCE: 0
DECREASED APPETITE: 0
POLYDIPSIA: 0
DISTURBANCES IN COORDINATION: 0
MUSCLE WEAKNESS: 1
NUMBNESS: 0
CHILLS: 0
SKIN CHANGES: 0
NAUSEA: 0
MYALGIAS: 1
NAIL CHANGES: 0
TROUBLE SWALLOWING: 0
POLYPHAGIA: 0
PARALYSIS: 0
ORTHOPNEA: 0
FEVER: 0
NIGHT SWEATS: 0
WEAKNESS: 1
TINGLING: 1
TASTE DISTURBANCE: 0
SLEEP DISTURBANCES DUE TO BREATHING: 0
ABDOMINAL PAIN: 1
CONSTIPATION: 1
DIZZINESS: 0
BLOATING: 0
POOR WOUND HEALING: 0
MUSCLE CRAMPS: 1
MEMORY LOSS: 0
WEIGHT LOSS: 0
HYPERTENSION: 1
PALPITATIONS: 0
FATIGUE: 1
HOARSE VOICE: 0
BOWEL INCONTINENCE: 0
SORE THROAT: 0
SPEECH CHANGE: 0
TREMORS: 0
VOMITING: 1

## 2018-05-11 ENCOUNTER — ONCOLOGY VISIT (OUTPATIENT)
Dept: ONCOLOGY | Facility: CLINIC | Age: 68
End: 2018-05-11
Attending: NURSE PRACTITIONER
Payer: COMMERCIAL

## 2018-05-11 ENCOUNTER — APPOINTMENT (OUTPATIENT)
Dept: LAB | Facility: CLINIC | Age: 68
End: 2018-05-11
Attending: NURSE PRACTITIONER
Payer: COMMERCIAL

## 2018-05-11 VITALS
DIASTOLIC BLOOD PRESSURE: 72 MMHG | BODY MASS INDEX: 47.39 KG/M2 | OXYGEN SATURATION: 95 % | SYSTOLIC BLOOD PRESSURE: 152 MMHG | HEART RATE: 77 BPM | WEIGHT: 284.8 LBS | TEMPERATURE: 97.9 F

## 2018-05-11 DIAGNOSIS — C54.1 ENDOMETRIAL CANCER (H): Primary | ICD-10-CM

## 2018-05-11 DIAGNOSIS — J30.2 CHRONIC SEASONAL ALLERGIC RHINITIS, UNSPECIFIED TRIGGER: ICD-10-CM

## 2018-05-11 DIAGNOSIS — Z79.899 ENCOUNTER FOR LONG-TERM (CURRENT) USE OF MEDICATIONS: ICD-10-CM

## 2018-05-11 DIAGNOSIS — M25.50 PAIN IN JOINT, MULTIPLE SITES: ICD-10-CM

## 2018-05-11 DIAGNOSIS — G62.0 CHEMOTHERAPY-INDUCED PERIPHERAL NEUROPATHY (H): ICD-10-CM

## 2018-05-11 DIAGNOSIS — Z51.11 ENCOUNTER FOR ANTINEOPLASTIC CHEMOTHERAPY: ICD-10-CM

## 2018-05-11 DIAGNOSIS — T45.1X5A CHEMOTHERAPY-INDUCED PERIPHERAL NEUROPATHY (H): ICD-10-CM

## 2018-05-11 LAB
ALBUMIN SERPL-MCNC: 3.1 G/DL (ref 3.4–5)
ALP SERPL-CCNC: 63 U/L (ref 40–150)
ALT SERPL W P-5'-P-CCNC: 30 U/L (ref 0–50)
ANION GAP SERPL CALCULATED.3IONS-SCNC: 9 MMOL/L (ref 3–14)
AST SERPL W P-5'-P-CCNC: 19 U/L (ref 0–45)
BASOPHILS # BLD AUTO: 0.1 10E9/L (ref 0–0.2)
BASOPHILS NFR BLD AUTO: 0.8 %
BILIRUB SERPL-MCNC: 0.5 MG/DL (ref 0.2–1.3)
BUN SERPL-MCNC: 15 MG/DL (ref 7–30)
CALCIUM SERPL-MCNC: 9.3 MG/DL (ref 8.5–10.1)
CHLORIDE SERPL-SCNC: 103 MMOL/L (ref 94–109)
CO2 SERPL-SCNC: 25 MMOL/L (ref 20–32)
CREAT SERPL-MCNC: 0.66 MG/DL (ref 0.52–1.04)
DIFFERENTIAL METHOD BLD: ABNORMAL
EOSINOPHIL # BLD AUTO: 0.1 10E9/L (ref 0–0.7)
EOSINOPHIL NFR BLD AUTO: 1.8 %
ERYTHROCYTE [DISTWIDTH] IN BLOOD BY AUTOMATED COUNT: 15 % (ref 10–15)
GFR SERPL CREATININE-BSD FRML MDRD: >90 ML/MIN/1.7M2
GLUCOSE SERPL-MCNC: 110 MG/DL (ref 70–99)
HCT VFR BLD AUTO: 33.9 % (ref 35–47)
HGB BLD-MCNC: 11 G/DL (ref 11.7–15.7)
IMM GRANULOCYTES # BLD: 0 10E9/L (ref 0–0.4)
IMM GRANULOCYTES NFR BLD: 0.3 %
LYMPHOCYTES # BLD AUTO: 1.9 10E9/L (ref 0.8–5.3)
LYMPHOCYTES NFR BLD AUTO: 26.4 %
MAGNESIUM SERPL-MCNC: 1.7 MG/DL (ref 1.6–2.3)
MCH RBC QN AUTO: 27.8 PG (ref 26.5–33)
MCHC RBC AUTO-ENTMCNC: 32.4 G/DL (ref 31.5–36.5)
MCV RBC AUTO: 86 FL (ref 78–100)
MONOCYTES # BLD AUTO: 0.7 10E9/L (ref 0–1.3)
MONOCYTES NFR BLD AUTO: 9 %
NEUTROPHILS # BLD AUTO: 4.5 10E9/L (ref 1.6–8.3)
NEUTROPHILS NFR BLD AUTO: 61.7 %
NRBC # BLD AUTO: 0 10*3/UL
NRBC BLD AUTO-RTO: 0 /100
PLATELET # BLD AUTO: 246 10E9/L (ref 150–450)
POTASSIUM SERPL-SCNC: 3.6 MMOL/L (ref 3.4–5.3)
PROT SERPL-MCNC: 7.3 G/DL (ref 6.8–8.8)
RBC # BLD AUTO: 3.95 10E12/L (ref 3.8–5.2)
SODIUM SERPL-SCNC: 138 MMOL/L (ref 133–144)
WBC # BLD AUTO: 7.2 10E9/L (ref 4–11)

## 2018-05-11 PROCEDURE — 25000125 ZZHC RX 250: Mod: ZF | Performed by: NURSE PRACTITIONER

## 2018-05-11 PROCEDURE — 96417 CHEMO IV INFUS EACH ADDL SEQ: CPT

## 2018-05-11 PROCEDURE — 99214 OFFICE O/P EST MOD 30 MIN: CPT | Mod: 24 | Performed by: NURSE PRACTITIONER

## 2018-05-11 PROCEDURE — 96375 TX/PRO/DX INJ NEW DRUG ADDON: CPT

## 2018-05-11 PROCEDURE — 25000128 H RX IP 250 OP 636: Mod: ZF | Performed by: NURSE PRACTITIONER

## 2018-05-11 PROCEDURE — 25000132 ZZH RX MED GY IP 250 OP 250 PS 637: Mod: ZF | Performed by: NURSE PRACTITIONER

## 2018-05-11 PROCEDURE — 96413 CHEMO IV INFUSION 1 HR: CPT

## 2018-05-11 PROCEDURE — 96415 CHEMO IV INFUSION ADDL HR: CPT

## 2018-05-11 PROCEDURE — 83735 ASSAY OF MAGNESIUM: CPT | Performed by: NURSE PRACTITIONER

## 2018-05-11 PROCEDURE — 80053 COMPREHEN METABOLIC PANEL: CPT | Performed by: NURSE PRACTITIONER

## 2018-05-11 PROCEDURE — 85025 COMPLETE CBC W/AUTO DIFF WBC: CPT | Performed by: NURSE PRACTITIONER

## 2018-05-11 RX ORDER — ALBUTEROL SULFATE 90 UG/1
1-2 AEROSOL, METERED RESPIRATORY (INHALATION)
Status: CANCELLED
Start: 2018-05-11

## 2018-05-11 RX ORDER — LANOLIN ALCOHOL/MO/W.PET/CERES
50 CREAM (GRAM) TOPICAL 2 TIMES DAILY
Qty: 60 TABLET | Refills: 3 | Status: SHIPPED | OUTPATIENT
Start: 2018-05-11 | End: 2018-09-07

## 2018-05-11 RX ORDER — EPINEPHRINE 0.3 MG/.3ML
0.3 INJECTION SUBCUTANEOUS EVERY 5 MIN PRN
Status: CANCELLED | OUTPATIENT
Start: 2018-05-11

## 2018-05-11 RX ORDER — DIPHENHYDRAMINE HCL 25 MG
50 CAPSULE ORAL ONCE
Status: CANCELLED
Start: 2018-05-11

## 2018-05-11 RX ORDER — HEPARIN SODIUM (PORCINE) LOCK FLUSH IV SOLN 100 UNIT/ML 100 UNIT/ML
5 SOLUTION INTRAVENOUS ONCE
Status: COMPLETED | OUTPATIENT
Start: 2018-05-11 | End: 2018-05-11

## 2018-05-11 RX ORDER — DIPHENHYDRAMINE HCL 25 MG
50 CAPSULE ORAL ONCE
Status: COMPLETED | OUTPATIENT
Start: 2018-05-11 | End: 2018-05-11

## 2018-05-11 RX ORDER — LORATADINE 10 MG/1
10 TABLET ORAL DAILY
Qty: 30 TABLET | Refills: 3 | Status: SHIPPED | OUTPATIENT
Start: 2018-05-11 | End: 2020-01-01

## 2018-05-11 RX ORDER — DIPHENHYDRAMINE HYDROCHLORIDE 50 MG/ML
50 INJECTION INTRAMUSCULAR; INTRAVENOUS
Status: CANCELLED
Start: 2018-05-11

## 2018-05-11 RX ORDER — METHYLPREDNISOLONE SODIUM SUCCINATE 125 MG/2ML
125 INJECTION, POWDER, LYOPHILIZED, FOR SOLUTION INTRAMUSCULAR; INTRAVENOUS
Status: CANCELLED
Start: 2018-05-11

## 2018-05-11 RX ORDER — EPINEPHRINE 1 MG/ML
0.3 INJECTION, SOLUTION, CONCENTRATE INTRAVENOUS EVERY 5 MIN PRN
Status: CANCELLED | OUTPATIENT
Start: 2018-05-11

## 2018-05-11 RX ORDER — LORAZEPAM 2 MG/ML
1 INJECTION INTRAMUSCULAR EVERY 6 HOURS PRN
Status: CANCELLED
Start: 2018-05-11

## 2018-05-11 RX ORDER — FLUTICASONE PROPIONATE 50 MCG
1-2 SPRAY, SUSPENSION (ML) NASAL DAILY
Qty: 1 BOTTLE | Refills: 1 | Status: SHIPPED | OUTPATIENT
Start: 2018-05-11 | End: 2018-09-28

## 2018-05-11 RX ORDER — PALONOSETRON 0.05 MG/ML
0.25 INJECTION, SOLUTION INTRAVENOUS ONCE
Status: CANCELLED
Start: 2018-05-11

## 2018-05-11 RX ORDER — SODIUM CHLORIDE 9 MG/ML
1000 INJECTION, SOLUTION INTRAVENOUS CONTINUOUS PRN
Status: CANCELLED
Start: 2018-05-11

## 2018-05-11 RX ORDER — MEPERIDINE HYDROCHLORIDE 25 MG/ML
25 INJECTION INTRAMUSCULAR; INTRAVENOUS; SUBCUTANEOUS EVERY 30 MIN PRN
Status: CANCELLED | OUTPATIENT
Start: 2018-05-11

## 2018-05-11 RX ORDER — PALONOSETRON 0.05 MG/ML
0.25 INJECTION, SOLUTION INTRAVENOUS ONCE
Status: COMPLETED | OUTPATIENT
Start: 2018-05-11 | End: 2018-05-11

## 2018-05-11 RX ORDER — ALBUTEROL SULFATE 0.83 MG/ML
2.5 SOLUTION RESPIRATORY (INHALATION)
Status: CANCELLED | OUTPATIENT
Start: 2018-05-11

## 2018-05-11 RX ADMIN — CARBOPLATIN 820 MG: 10 INJECTION, SOLUTION INTRAVENOUS at 14:07

## 2018-05-11 RX ADMIN — PALONOSETRON HYDROCHLORIDE 0.25 MG: 0.25 INJECTION INTRAVENOUS at 10:13

## 2018-05-11 RX ADMIN — DEXAMETHASONE SODIUM PHOSPHATE 20 MG: 10 INJECTION, SOLUTION INTRAMUSCULAR; INTRAVENOUS at 10:20

## 2018-05-11 RX ADMIN — DIPHENHYDRAMINE HYDROCHLORIDE 50 MG: 25 CAPSULE ORAL at 10:16

## 2018-05-11 RX ADMIN — PACLITAXEL 427 MG: 6 INJECTION, SOLUTION INTRAVENOUS at 11:04

## 2018-05-11 RX ADMIN — Medication 5 ML: at 14:56

## 2018-05-11 RX ADMIN — SODIUM CHLORIDE 250 ML: 9 INJECTION, SOLUTION INTRAVENOUS at 10:12

## 2018-05-11 RX ADMIN — SODIUM CHLORIDE, PRESERVATIVE FREE 5 ML: 5 INJECTION INTRAVENOUS at 08:35

## 2018-05-11 RX ADMIN — FAMOTIDINE 40 MG: 10 INJECTION INTRAVENOUS at 10:15

## 2018-05-11 ASSESSMENT — PAIN SCALES - GENERAL: PAINLEVEL: NO PAIN (0)

## 2018-05-11 NOTE — PROGRESS NOTES
"Gynecologic Oncology Follow-Up Note    RE: Lu Smith  MRN: [id number]  : 1950  Date of Visit: 2018    CC: Lu Smith is a 68 year old year old female with stage IIIC2 serous endometrial cancer who presents today for follow up regarding disease management.    HPI: Lu comes to the clinic accompanied by her sister Golria. Notes her arthralgias were less severe this past cycle but lasted longer, used hot packs with relief. Had some itching and tingling to the bottoms of her feet for roughly one week after chemotherapy but no rashes or airway compromise. Continues to have restless legs. Constipated, taking Miralax with relief. Roughly 12 days after chemotherapy she had a sudden onset of nausea, vomiting, and diarrhea with cramping that resolved within a day- had vomited so hard at one point she noticed a pink color but denies len bleeding, has not since occurred. No other issues with nausea or vomiting, thinks she may have contracted \"a stomach bug.\" Seasonal allergies and allergic rhinitis are bothersome to her. Eating well, drinking well. Feels ready for another cycle of treatment today.      Oncology History:  18: TLH-BSO, omentectomy, bilateral pelvic and para-aortic lymph node dissection, pelvic washings  3/30/18: C1D1 carboplatin/paclitaxel  18: C2D1 carboplatin/paclitaxel  18: C3D1 carboplatin/paclitaxel    Past Medical History:   Diagnosis Date     Diabetes (H)     Type II     Endometrial cancer determined by uterine biopsy (H) 2018     HTN (hypertension)      Obesity      Osteoarthritis        Past Surgical History:   Procedure Laterality Date     1993     CYSTOSCOPY N/A 2018    Procedure: CYSTOSCOPY;;  Surgeon: Kevin Henderson MD;  Location: [facility] OR     DAVINCI HYSTERECTOMY TOTAL, BILATERAL SALPINGO-OOPHORECTOMY, COMBINED N/A 2018    Procedure: COMBINED DAVINCI HYSTERECTOMY TOTAL, SALPINGO-OOPHORECTOMY;  DaVinci Assisted Total " Laparoscopic Hysterectomy, Bilateral Salpingo-Oophorectomy, Cystoscopy,  Omental biopsy, omentectomy,bilateral  Pelvic And Para Aortic Lymph Node Dissection;  Surgeon: Kevin Henderson MD;  Location: [facility] OR     Partial lumbar discectomy         Current Outpatient Prescriptions   Medication     ACETAMINOPHEN PO     aspirin EC 81 MG EC tablet     ibuprofen (ADVIL/MOTRIN) 600 MG tablet     LORazepam (ATIVAN) 0.5 MG tablet     METFORMIN HCL PO     prochlorperazine (COMPAZINE) 5 MG tablet     triamterene-hydrochlorothiazide (DYAZIDE) 37.5-25 MG per capsule     No current facility-administered medications for this visit.        Allergies   Allergen Reactions     Ace Inhibitors Cough     Amoxicillin Hives       Family History   Problem Relation Age of Onset     Colon Cancer Mother      Breast Cancer Sister      Lymphoma Sister        Social History     Social History     Marital status:      Spouse name: N/A     Number of children: 3     Years of education: N/A     Occupational History     conroller      Social History Main Topics     Smoking status: Former Smoker     Packs/day: 0.25     Years: 20.00     Quit date: 1991     Smokeless tobacco: Never Used      Comment: Quite smoking      Alcohol use Yes      Comment: 4-7/week if out 1-2x's/week     Drug use: No     Sexual activity: Not on file     Other Topics Concern     Not on file     Social History Narrative    Daughter  at age 25 from leukemia.  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           ROS  Answers for HPI/ROS submitted by the patient on 2018   General Symptoms: Yes  Skin Symptoms: Yes  HENT Symptoms: Yes  EYE SYMPTOMS: No  HEART SYMPTOMS: Yes  LUNG SYMPTOMS: No  INTESTINAL SYMPTOMS: Yes  URINARY SYMPTOMS: No  GYNECOLOGIC SYMPTOMS: No  BREAST SYMPTOMS: No  SKELETAL SYMPTOMS: Yes  BLOOD SYMPTOMS: No  NERVOUS SYSTEM SYMPTOMS: Yes  MENTAL HEALTH SYMPTOMS: No  Fever: No  Loss of appetite: No  Weight loss: No  Weight gain:  No  Fatigue: Yes  Night sweats: No  Chills: No  Increased stress: No  Excessive hunger: No  Excessive thirst: No  Feeling hot or cold when others believe the temperature is normal: Yes  Loss of height: No  Post-operative complications: No  Surgical site pain: No  Hallucinations: No  Change in or Loss of Energy: Yes  Hyperactivity: No  Confusion: No  Changes in hair: No  Changes in moles/birth marks: No  Itching: Yes  Rashes: No  Changes in nails: No  Acne: No  Hair in places you don't want it: No  Change in facial hair: No  Warts: No  Non-healing sores: No  Scarring: No  Flaking of skin: No  Color changes of hands/feet in cold : No  Sun sensitivity: Yes  Skin thickening: No  Ear pain: No  Ear discharge: No  Hearing loss: No  Tinnitus: No  Nosebleeds: No  Congestion: Yes  Sinus pain: Yes  Trouble swallowing: No   Voice hoarseness: No  Mouth sores: No  Sore throat: No  Tooth pain: No  Gum tenderness: No  Bleeding gums: No  Change in taste: No  Change in sense of smell: No  Dry mouth: Yes  Hearing aid used: No  Neck lump: No  Chest pain or pressure: No  Fast or irregular heartbeat: No  Pain in legs with walking: No  Trouble breathing while lying down: No  Fingers or toes appear blue: No  High blood pressure: Yes  Low blood pressure: No  Fainting: No  Murmurs: No  Pacemaker: No  Varicose veins: No  Edema or swelling: Yes  Wake up at night with shortness of breath: No  Light-headedness: No  Exercise intolerance: No  Heart burn or indigestion: Yes  Nausea: No  Vomiting: Yes  Abdominal pain: Yes  Bloating: No  Constipation: Yes  Diarrhea: Yes  Blood in stool: Yes  Black stools: No  Rectal or Anal pain: Yes  Fecal incontinence: No  Yellowing of skin or eyes: No  Vomit with blood: Yes  Change in stools: No  Back pain: No  Muscle aches: Yes  Neck pain: No  Swollen joints: No  Joint pain: Yes  Bone pain: No  Muscle cramps: Yes  Muscle weakness: Yes  Joint stiffness: No  Bone fracture: No  Trouble with coordination:  No  Dizziness or trouble with balance: No  Fainting or black-out spells: No  Memory loss: No  Headache: Yes  Seizures: No  Speech problems: No  Tingling: Yes  Tremor: No  Weakness: Yes  Difficulty walking: No  Paralysis: No  Numbness: No  I have reviewed the patient's ROS and discussed all pertinent information as noted in the HPI.      Physical Exam:    /72 (BP Location: Right arm, Patient Position: Chair, Cuff Size: Adult Large)  Pulse 77  Temp 97.9  F (36.6  C) (Oral)  Wt 129.2 kg (284 lb 12.8 oz)  SpO2 95%  BMI 47.39 kg/m2    CONSTITUTIONAL: Alert non-toxic appearing female in no acute distress  HEAD: Normocephalic, atraumatic  EYES: PERRLA; no scleral icterus  ENT: Oropharynx pink without lesions  NECK: Neck supple without lymphadenopathy  RESPIRATORY: Lungs clear to auscultation, no increased work of breathing noted  CV: Regular rate and rhythm, S1S2, no clicks, murmurs, rubs, or gallops; bilateral lower extremities without edema, dorsalis pedis pulses 2+ bilaterally  GASTROINTESTINAL: Normoactive bowel sounds x4 quadrants, abdomen soft, non-distended, and non-tender to palpation without masses or organomegaly  GENITOURINARY: Not indicated  LYMPHATIC: Cervical, supraclavicular, and inguinal nodes without lymphadenopathy  MUSCULOSKELETAL: Moves all extremities, no obvious muscle wasting  NEUROLOGIC: No gross deficits, normal gait  SKIN: Appropriate color for race, warm and dry, no rashes or lesions to unclothed skin  PSYCHIATRIC: Pleasant and interactive, affect bright, makes appropriate eye contact, thought process linear    Labs:      5/11/2018  Day 1   Hemoglobin 11.7 - 15.7 g/dL 11.0 (A)   Hematocrit 35.0 - 47.0 % 33.9 (A)   Platelet Count 150 - 450 10e9/L 246   Absolute Neutrophil 1.6 - 8.3 10e9/L 4.5   Sodium 133 - 144 mmol/L 138   Potassium 3.4 - 5.3 mmol/L 3.6   Chloride 94 - 109 mmol/L 103   Carbon Dioxide 20 - 32 mmol/L 25   Urea Nitrogen 7 - 30 mg/dL 15   Creatinine 0.52 - 1.04 mg/dL 0.66    Calcium 8.5 - 10.1 mg/dL 9.3   Magnesium 1.6 - 2.3 mg/dL 1.7   Bilirubin Total 0.2 - 1.3 mg/dL 0.5   ALT 0 - 50 U/L 30   AST 0 - 45 U/L 19   Alkaline Phosphatase 40 - 150 U/L 63   Albumin 3.4 - 5.0 g/dL 3.1 (A)   Protein Total 6.8 - 8.8 g/dL 7.3   WBC 4.0 - 11.0 10e9/L 7.2       Assessment/Plan:  1) Stage IIIC2 serous endometrial cancer: Tolerating treatment well without dose limiting side effects. Proceed with cycle two of carboplatin/paclitaxel as originally ordered by Dr. Henderson. To receive total of six cycles followed by treatment planning with Dr. Henderson. Radiation per radiation oncology. Continue small frequent meals high in protein, drink 2L non-caffeinated fluids daily. Reviewed signs and symptoms for when she should contact the clinic or seek additional care, including but not limited to fever, chills, inability to keep down food or fluids, nausea and vomiting not controlled with antiemetics, and diarrhea leading to dehydration. Patient to contact the clinic with any questions or concerns in the interim.  2) Chemotherapy/disease side effects:   Pruritis and tingling: Suspect this is related to neuropathy rather than a reaction to chemotherapy. To start vitamin B6 50mg PO BID and L-glutamine 2000mg PO BID. Given instructions on neuropathy care.   Vomiting and diarrhea: Short lived, onset 12 days post chemotherapy- suspect this was an acute viral vs bacterial process. Now afebrile, no leukocytosis. Continue with chemotherapy. To call clinic if this recurs.   Arthralgias: May scheduled Tylenol 1g TID and start daily loratadine 10mg PO, which may also help with her seasonal allergies.  3) Allergic rhinitis: Bothersome. Recommend fluticasone nasal spray and daily loratadine 10mg PO. Follow up with PCP if this worsens or fails to improve.  4) Genetic counseling: MMR protein expression intact, however, strong family history of cancer. Referral for genetic counseling placed.  5) Health maintenance issues  discussed include to follow up with PCP for non-gynecologic concerns and co-morbid conditions  6) Patient verbalized understanding of and agreement with plan    A total of 25 minutes of face to face time were spent with the patient with over 50% of that time spent in counseling, coordination of care, education, and symptom management.    ANU Selby, FNP-C  Division of Gynecologic Oncology  [facility]  Pager: [phone]

## 2018-05-11 NOTE — LETTER
"2018       RE: Lu Smith  [address]  [place] [address]     Dear Colleague,    Thank you for referring your patient, Lu Smith, to the [facility]. Please see a copy of my visit note below.    Gynecologic Oncology Follow-Up Note    RE: Lu Smith  MRN: [id number]  : 1950  Date of Visit: 2018    CC: Lu Smith is a 68 year old year old female with stage IIIC2 serous endometrial cancer who presents today for follow up regarding disease management.    HPI: Lu comes to the clinic accompanied by her sister Gloria. Notes her arthralgias were less severe this past cycle but lasted longer, used hot packs with relief. Had some itching and tingling to the bottoms of her feet for roughly one week after chemotherapy but no rashes or airway compromise. Continues to have restless legs. Constipated, taking Miralax with relief. Roughly 12 days after chemotherapy she had a sudden onset of nausea, vomiting, and diarrhea with cramping that resolved within a day- had vomited so hard at one point she noticed a pink color but denies len bleeding, has not since occurred. No other issues with nausea or vomiting, thinks she may have contracted \"a stomach bug.\" Seasonal allergies and allergic rhinitis are bothersome to her. Eating well, drinking well. Feels ready for another cycle of treatment today.      Oncology History:  18: TLH-BSO, omentectomy, bilateral pelvic and para-aortic lymph node dissection, pelvic washings  3/30/18: C1D1 carboplatin/paclitaxel  18: C2D1 carboplatin/paclitaxel  18: C3D1 carboplatin/paclitaxel    Past Medical History:   Diagnosis Date     Diabetes (H)     Type II     Endometrial cancer determined by uterine biopsy (H) 2018     HTN (hypertension)      Obesity      Osteoarthritis        Past Surgical History:   Procedure Laterality Date     CHOLECYSTECTOMY       CYSTOSCOPY N/A 2018    Procedure: CYSTOSCOPY;;  Surgeon: " Kevin Henderson MD;  Location: [facility] OR     DAVINCI HYSTERECTOMY TOTAL, BILATERAL SALPINGO-OOPHORECTOMY, COMBINED N/A 2018    Procedure: COMBINED DAVINCI HYSTERECTOMY TOTAL, SALPINGO-OOPHORECTOMY;  DaVinci Assisted Total Laparoscopic Hysterectomy, Bilateral Salpingo-Oophorectomy, Cystoscopy,  Omental biopsy, omentectomy,bilateral  Pelvic And Para Aortic Lymph Node Dissection;  Surgeon: Kevin Henderson MD;  Location: [facility] OR     Partial lumbar discectomy         Current Outpatient Prescriptions   Medication     ACETAMINOPHEN PO     aspirin EC 81 MG EC tablet     ibuprofen (ADVIL/MOTRIN) 600 MG tablet     LORazepam (ATIVAN) 0.5 MG tablet     METFORMIN HCL PO     prochlorperazine (COMPAZINE) 5 MG tablet     triamterene-hydrochlorothiazide (DYAZIDE) 37.5-25 MG per capsule     No current facility-administered medications for this visit.        Allergies   Allergen Reactions     Ace Inhibitors Cough     Amoxicillin Hives       Family History   Problem Relation Age of Onset     Colon Cancer Mother      Breast Cancer Sister      Lymphoma Sister        Social History     Social History     Marital status:      Spouse name: N/A     Number of children: 3     Years of education: N/A     Occupational History     conroller      Social History Main Topics     Smoking status: Former Smoker     Packs/day: 0.25     Years: 20.00     Quit date: 1991     Smokeless tobacco: Never Used      Comment: Quite smoking      Alcohol use Yes      Comment: 4-7/week if out 1-2x's/week     Drug use: No     Sexual activity: Not on file     Other Topics Concern     Not on file     Social History Narrative    Daughter  at age 25 from leukemia.  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           ROS  Answers for HPI/ROS submitted by the patient on 2018   General Symptoms: Yes  Skin Symptoms: Yes  HENT Symptoms: Yes  EYE SYMPTOMS: No  HEART SYMPTOMS: Yes  LUNG SYMPTOMS: No  INTESTINAL SYMPTOMS: Yes  URINARY  SYMPTOMS: No  GYNECOLOGIC SYMPTOMS: No  BREAST SYMPTOMS: No  SKELETAL SYMPTOMS: Yes  BLOOD SYMPTOMS: No  NERVOUS SYSTEM SYMPTOMS: Yes  MENTAL HEALTH SYMPTOMS: No  Fever: No  Loss of appetite: No  Weight loss: No  Weight gain: No  Fatigue: Yes  Night sweats: No  Chills: No  Increased stress: No  Excessive hunger: No  Excessive thirst: No  Feeling hot or cold when others believe the temperature is normal: Yes  Loss of height: No  Post-operative complications: No  Surgical site pain: No  Hallucinations: No  Change in or Loss of Energy: Yes  Hyperactivity: No  Confusion: No  Changes in hair: No  Changes in moles/birth marks: No  Itching: Yes  Rashes: No  Changes in nails: No  Acne: No  Hair in places you don't want it: No  Change in facial hair: No  Warts: No  Non-healing sores: No  Scarring: No  Flaking of skin: No  Color changes of hands/feet in cold : No  Sun sensitivity: Yes  Skin thickening: No  Ear pain: No  Ear discharge: No  Hearing loss: No  Tinnitus: No  Nosebleeds: No  Congestion: Yes  Sinus pain: Yes  Trouble swallowing: No   Voice hoarseness: No  Mouth sores: No  Sore throat: No  Tooth pain: No  Gum tenderness: No  Bleeding gums: No  Change in taste: No  Change in sense of smell: No  Dry mouth: Yes  Hearing aid used: No  Neck lump: No  Chest pain or pressure: No  Fast or irregular heartbeat: No  Pain in legs with walking: No  Trouble breathing while lying down: No  Fingers or toes appear blue: No  High blood pressure: Yes  Low blood pressure: No  Fainting: No  Murmurs: No  Pacemaker: No  Varicose veins: No  Edema or swelling: Yes  Wake up at night with shortness of breath: No  Light-headedness: No  Exercise intolerance: No  Heart burn or indigestion: Yes  Nausea: No  Vomiting: Yes  Abdominal pain: Yes  Bloating: No  Constipation: Yes  Diarrhea: Yes  Blood in stool: Yes  Black stools: No  Rectal or Anal pain: Yes  Fecal incontinence: No  Yellowing of skin or eyes: No  Vomit with blood: Yes  Change in stools:  No  Back pain: No  Muscle aches: Yes  Neck pain: No  Swollen joints: No  Joint pain: Yes  Bone pain: No  Muscle cramps: Yes  Muscle weakness: Yes  Joint stiffness: No  Bone fracture: No  Trouble with coordination: No  Dizziness or trouble with balance: No  Fainting or black-out spells: No  Memory loss: No  Headache: Yes  Seizures: No  Speech problems: No  Tingling: Yes  Tremor: No  Weakness: Yes  Difficulty walking: No  Paralysis: No  Numbness: No  I have reviewed the patient's ROS and discussed all pertinent information as noted in the HPI.      Physical Exam:    /72 (BP Location: Right arm, Patient Position: Chair, Cuff Size: Adult Large)  Pulse 77  Temp 97.9  F (36.6  C) (Oral)  Wt 129.2 kg (284 lb 12.8 oz)  SpO2 95%  BMI 47.39 kg/m2    CONSTITUTIONAL: Alert non-toxic appearing female in no acute distress  HEAD: Normocephalic, atraumatic  EYES: PERRLA; no scleral icterus  ENT: Oropharynx pink without lesions  NECK: Neck supple without lymphadenopathy  RESPIRATORY: Lungs clear to auscultation, no increased work of breathing noted  CV: Regular rate and rhythm, S1S2, no clicks, murmurs, rubs, or gallops; bilateral lower extremities without edema, dorsalis pedis pulses 2+ bilaterally  GASTROINTESTINAL: Normoactive bowel sounds x4 quadrants, abdomen soft, non-distended, and non-tender to palpation without masses or organomegaly  GENITOURINARY: Not indicated  LYMPHATIC: Cervical, supraclavicular, and inguinal nodes without lymphadenopathy  MUSCULOSKELETAL: Moves all extremities, no obvious muscle wasting  NEUROLOGIC: No gross deficits, normal gait  SKIN: Appropriate color for race, warm and dry, no rashes or lesions to unclothed skin  PSYCHIATRIC: Pleasant and interactive, affect bright, makes appropriate eye contact, thought process linear    Labs:      5/11/2018  Day 1   Hemoglobin 11.7 - 15.7 g/dL 11.0 (A)   Hematocrit 35.0 - 47.0 % 33.9 (A)   Platelet Count 150 - 450 10e9/L 246   Absolute Neutrophil 1.6  - 8.3 10e9/L 4.5   Sodium 133 - 144 mmol/L 138   Potassium 3.4 - 5.3 mmol/L 3.6   Chloride 94 - 109 mmol/L 103   Carbon Dioxide 20 - 32 mmol/L 25   Urea Nitrogen 7 - 30 mg/dL 15   Creatinine 0.52 - 1.04 mg/dL 0.66   Calcium 8.5 - 10.1 mg/dL 9.3   Magnesium 1.6 - 2.3 mg/dL 1.7   Bilirubin Total 0.2 - 1.3 mg/dL 0.5   ALT 0 - 50 U/L 30   AST 0 - 45 U/L 19   Alkaline Phosphatase 40 - 150 U/L 63   Albumin 3.4 - 5.0 g/dL 3.1 (A)   Protein Total 6.8 - 8.8 g/dL 7.3   WBC 4.0 - 11.0 10e9/L 7.2       Assessment/Plan:  1) Stage IIIC2 serous endometrial cancer: Tolerating treatment well without dose limiting side effects. Proceed with cycle two of carboplatin/paclitaxel as originally ordered by Dr. Henderson. To receive total of six cycles followed by treatment planning with Dr. Henderson. Radiation per radiation oncology. Continue small frequent meals high in protein, drink 2L non-caffeinated fluids daily. Reviewed signs and symptoms for when she should contact the clinic or seek additional care, including but not limited to fever, chills, inability to keep down food or fluids, nausea and vomiting not controlled with antiemetics, and diarrhea leading to dehydration. Patient to contact the clinic with any questions or concerns in the interim.  2) Chemotherapy/disease side effects:   Pruritis and tingling: Suspect this is related to neuropathy rather than a reaction to chemotherapy. To start vitamin B6 50mg PO BID and L-glutamine 2000mg PO BID. Given instructions on neuropathy care.   Vomiting and diarrhea: Short lived, onset 12 days post chemotherapy- suspect this was an acute viral vs bacterial process. Now afebrile, no leukocytosis. Continue with chemotherapy. To call clinic if this recurs.   Arthralgias: May scheduled Tylenol 1g TID and start daily loratadine 10mg PO, which may also help with her seasonal allergies.  3) Allergic rhinitis: Bothersome. Recommend fluticasone nasal spray and daily loratadine 10mg PO. Follow up  with PCP if this worsens or fails to improve.  4) Genetic counseling: MMR protein expression intact, however, strong family history of cancer. Referral for genetic counseling placed.  5) Health maintenance issues discussed include to follow up with PCP for non-gynecologic concerns and co-morbid conditions  6) Patient verbalized understanding of and agreement with plan    A total of 25 minutes of face to face time were spent with the patient with over 50% of that time spent in counseling, coordination of care, education, and symptom management.    ANU Selby, FNP-C  Division of Gynecologic Oncology  [facility]  Pager: [phone]

## 2018-05-11 NOTE — PATIENT INSTRUCTIONS
"Signs and symptoms of neuropathy include numbness, tingling, burning, or heaviness of the extremities, typically in a \"stocking/glove\" distribution of the feet and arms. It is important to take care of extremities by doing such things as wearing gloves or boots in cold weather and protecting skin from extreme temperatures, adjusting hot water heater to 105-120 degrees, checking hands and feet daily for skin breakdown, and wearing hard soled protective shoes. Alcohol may worsen symptoms of neuropathy and should be avoided. Neuropathy puts patients at risks for falls. Floor rugs may cause trips and falls and should be avoided. Night lights may help prevent falls at night. Vitamin B6 50mg twice daily and L-glutamine 2000mg twice daily may help decrease neuropathy symptoms during active chemotherapy treatment. Call clinic if you have worsening numbness, tingling, pain, or functional ability as treatment may need to be adjusted.    L-glutamine can be found at [employer], [employer], co-ops, [employer], and [employer]      "

## 2018-05-11 NOTE — NURSING NOTE
"Oncology Rooming Note    May 11, 2018 8:57 AM   Lu Smith is a 68 year old female who presents for:    Chief Complaint   Patient presents with     Port Draw     labs drawn via port by RN     Oncology Clinic Visit     endometrial ca ; active tx.      Initial Vitals: /72 (BP Location: Right arm, Patient Position: Chair, Cuff Size: Adult Large)  Pulse 77  Temp 97.9  F (36.6  C) (Oral)  Wt 129.2 kg (284 lb 12.8 oz)  SpO2 95%  BMI 47.39 kg/m2 Estimated body mass index is 47.39 kg/(m^2) as calculated from the following:    Height as of 3/29/18: 1.651 m (5' 5\").    Weight as of this encounter: 129.2 kg (284 lb 12.8 oz). Body surface area is 2.43 meters squared.  No Pain (0) Comment: Data Unavailable   No LMP recorded. Patient is postmenopausal.  Allergies reviewed: Yes  Medications reviewed: Yes    Medications: Medication refills not needed today.  Pharmacy name entered into [employer]: [facility] [address] [place] [address] [address] AT [facility] [address]    Clinical concerns: pt here for endometrial cancer ; active tx. Pt has concerns about stool softeners would like to discuss options during the visit. Would like to ask about port flushes and where she is able to have it flushed at.  Yasmine Zazueta was notified.    8 minutes for nursing intake (face to face time)     Kristina Bermudez              "

## 2018-05-11 NOTE — PATIENT INSTRUCTIONS
Contact Numbers    [facility] [place] [phone]  [facility] [place]:  [phone]    Call triage with chills and/or temperature greater than or equal to 100.5, uncontrolled nausea/vomiting, diarrhea, constipation, dizziness, shortness of breath, chest pain, bleeding, unexplained bruising, or any new/concerning symptoms, questions/concerns.     If you are having any concerning symptoms or wish to speak to a provider before your next infusion visit, please call your care coordinator or triage to notify them so we can adequately serve you.       After Hours: [phone]    If after hours, weekends, or holidays, call main hospital  and ask for Oncology doctor on call.         May 2018   Derrek Monday Tuesday Wednesday Thursday Friday Saturday             1     2     3     4     5       6     7     8     9     10     11     [facility] LAB DRAW    8:30 AM   (15 min.)   [facility] LAB DRAW   [facility] Lab Draw     [facility] RETURN ACTIVE TREATMENT    8:45 AM   (40 min.)   Yasmine Zazueta APRN CNP   [facility] ONC INFUSION 360   10:00 AM   (360 min.)    ONCOLOGY INFUSION   [facility] 12       13     14     15     16     17     18     19       20     21     22     [facility] TCT/SIM SUITE    1:00 PM   (60 min.)   Ana Michelle MD   Radiation Oncology Clinic     CT PELVIS W    2:00 PM   (20 min.)   [id number]   [place], [place] 23     24     25     26       27     28     29     30     31 June 2018 Sunday Monday Tuesday Wednesday Thursday Friday Saturday                            1     2       3     4     5     6     7     8     9       10     11     12     13     14     15     16       17     18     19     20     21     22     23       24     25     26     27     28     29     30                  Lab Results:  Recent Results (from the past 12 hour(s))   CBC with platelets differential    Collection Time: 05/11/18  8:44 AM   Result Value Ref  Range    WBC 7.2 4.0 - 11.0 10e9/L    RBC Count 3.95 3.8 - 5.2 10e12/L    Hemoglobin 11.0 (L) 11.7 - 15.7 g/dL    Hematocrit 33.9 (L) 35.0 - 47.0 %    MCV 86 78 - 100 fl    MCH 27.8 26.5 - 33.0 pg    MCHC 32.4 31.5 - 36.5 g/dL    RDW 15.0 10.0 - 15.0 %    Platelet Count 246 150 - 450 10e9/L    Diff Method Automated Method     % Neutrophils 61.7 %    % Lymphocytes 26.4 %    % Monocytes 9.0 %    % Eosinophils 1.8 %    % Basophils 0.8 %    % Immature Granulocytes 0.3 %    Nucleated RBCs 0 0 /100    Absolute Neutrophil 4.5 1.6 - 8.3 10e9/L    Absolute Lymphocytes 1.9 0.8 - 5.3 10e9/L    Absolute Monocytes 0.7 0.0 - 1.3 10e9/L    Absolute Eosinophils 0.1 0.0 - 0.7 10e9/L    Absolute Basophils 0.1 0.0 - 0.2 10e9/L    Abs Immature Granulocytes 0.0 0 - 0.4 10e9/L    Absolute Nucleated RBC 0.0    Comprehensive metabolic panel    Collection Time: 05/11/18  8:44 AM   Result Value Ref Range    Sodium 138 133 - 144 mmol/L    Potassium 3.6 3.4 - 5.3 mmol/L    Chloride 103 94 - 109 mmol/L    Carbon Dioxide 25 20 - 32 mmol/L    Anion Gap 9 3 - 14 mmol/L    Glucose 110 (H) 70 - 99 mg/dL    Urea Nitrogen 15 7 - 30 mg/dL    Creatinine 0.66 0.52 - 1.04 mg/dL    GFR Estimate >90 >60 mL/min/1.7m2    GFR Estimate If Black >90 >60 mL/min/1.7m2    Calcium 9.3 8.5 - 10.1 mg/dL    Bilirubin Total 0.5 0.2 - 1.3 mg/dL    Albumin 3.1 (L) 3.4 - 5.0 g/dL    Protein Total 7.3 6.8 - 8.8 g/dL    Alkaline Phosphatase 63 40 - 150 U/L    ALT 30 0 - 50 U/L    AST 19 0 - 45 U/L   Magnesium    Collection Time: 05/11/18  8:44 AM   Result Value Ref Range    Magnesium 1.7 1.6 - 2.3 mg/dL

## 2018-05-11 NOTE — PROGRESS NOTES
Infusion Nursing Note:  Lu Smith presents today for Cycle 3, day 1 Taxol and Carboplatin.    Patient seen by provider today: Yes: Yasmine Zazueta NP    Note: Patient presents to clinic today feeling well with no questions.      Intravenous Access:  Implanted Port; discussed port will need to be flushed Q4-5 weeks while off of chemo.  Patient will schedule at lab in hospital while completing radiation.    Treatment Conditions:  Lab Results   Component Value Date    HGB 11.0 05/11/2018     Lab Results   Component Value Date    WBC 7.2 05/11/2018      Lab Results   Component Value Date    ANEU 4.5 05/11/2018     Lab Results   Component Value Date     05/11/2018      Lab Results   Component Value Date     05/11/2018                   Lab Results   Component Value Date    POTASSIUM 3.6 05/11/2018           Lab Results   Component Value Date    MAG 1.7 05/11/2018            Lab Results   Component Value Date    CR 0.66 05/11/2018                   Lab Results   Component Value Date    MARYLOU 9.3 05/11/2018                Lab Results   Component Value Date    BILITOTAL 0.5 05/11/2018           Lab Results   Component Value Date    ALBUMIN 3.1 05/11/2018                    Lab Results   Component Value Date    ALT 30 05/11/2018           Lab Results   Component Value Date    AST 19 05/11/2018     Results reviewed, labs MET treatment parameters, ok to proceed with treatment.    Post Infusion Assessment:  Patient tolerated infusion without incident.  Blood return noted pre and post infusion.  Site patent and intact, free from redness, edema or discomfort.  No evidence of extravasations.  Access discontinued per protocol.    Discharge Plan:   Prescription refills given for Flonase, Claritin and Vitamin B6..  Discharge instructions reviewed with: Patient.  Patient and/or family verbalized understanding of discharge instructions and all questions answered.  AVS to patient via [employer].  Patient will return as  determined following radiation for next appointment.   Departure Mode: Ambulatory.    Asia Manzo RN

## 2018-05-11 NOTE — MR AVS SNAPSHOT
"              After Visit Summary   5/11/2018    Lu Smith    MRN: [id number]           Patient Information     Date Of Birth          1950        Visit Information        Provider Department      5/11/2018 9:00 AM Yasmine Zazueta APRN [facility]        Today's Diagnoses     Endometrial cancer (H)    -  1    Encounter for antineoplastic chemotherapy        Chemotherapy-induced peripheral neuropathy (H)        Pain in joint, multiple sites        Chronic seasonal allergic rhinitis, unspecified trigger          Care Instructions    Signs and symptoms of neuropathy include numbness, tingling, burning, or heaviness of the extremities, typically in a \"stocking/glove\" distribution of the feet and arms. It is important to take care of extremities by doing such things as wearing gloves or boots in cold weather and protecting skin from extreme temperatures, adjusting hot water heater to 105-120 degrees, checking hands and feet daily for skin breakdown, and wearing hard soled protective shoes. Alcohol may worsen symptoms of neuropathy and should be avoided. Neuropathy puts patients at risks for falls. Floor rugs may cause trips and falls and should be avoided. Night lights may help prevent falls at night. Vitamin B6 50mg twice daily and L-glutamine 2000mg twice daily may help decrease neuropathy symptoms during active chemotherapy treatment. Call clinic if you have worsening numbness, tingling, pain, or functional ability as treatment may need to be adjusted.    L-glutamine can be found at [employer], [employer], co-ops, [employer], and [employer]              Follow-ups after your visit        Additional Services     CANCER RISK MGMT/CANCER GENETIC COUNSELING REFERRAL       Your provider has referred you to the Cancer Risk Management Program - Cancer Genetic Counseling.    Reason for Referral: strong family history of cancer, endometrial cancer    We have a sent a notice to a staff " member of the Cancer Risk Management Program to give you a call to assist with scheduling your appointment.  You may also call  [phone] ([phone]) to initiate scheduling.    Please be aware that coverage of these services is subject to the terms and limitations of your health insurance plan.  Call member services at your health plan with any benefit or coverage questions.      Please bring the completed family history sheet to your appointment in addition to any available outside medical records documenting your cancer diagnosis.                  Your next 10 appointments already scheduled     May 22, 2018  1:00 PM CDT   TCT/SIM Suite Visit with Ana Michelle MD   Radiation Oncology Clinic ([facility])    [facility]  1st Floor  [address] [address] [phone]            May 22, 2018  2:00 PM CDT   CT PELVIS W CONTRAST with UUCT1   [facility], [place], [place] ([facility], [facility])    [address] [address] [phone]           Please bring any scans or X-rays taken at other hospitals, if similar tests were done. Also bring a list of your medicines, including vitamins, minerals and over-the-counter drugs. It is safest to leave personal items at home.  Be sure to tell your doctor:   If you have any allergies.   If there s any chance you are pregnant.   If you are breastfeeding.  How to prepare:   Do not eat or drink for 2 hours before your exam. If you need to take medicine, you may take it with small sips of water. (We may ask you to take liquid medicine as well.)   Please wear loose clothing, such as a sweat suit or jogging clothes. Avoid snaps, zippers and other metal. We may ask you to undress and put on a hospital gown.  Please arrive 30 minutes early for your CT. Once in the department you might be asked to drink water 15-20 minutes prior to your exam.  If indicated you  may be asked to drink an oral contrast in advance of your CT.  If this is the case, the imaging team will let you know or be in contact with you prior to your appointment  Patients over 70 or patients with diabetes or kidney problems:   If you haven t had a blood test (creatinine test) within the last 30 days, the Cardiologist/Radiologist may require you to get this test prior to your exam.  If you have diabetes:   Continue to take your metformin medication on the day of your exam  If you have any questions, please call the Imaging Department where you will have your exam.              Who to contact     If you have questions or need follow up information about today's clinic visit or your schedule please contact [facility] directly at [phone].  Normal or non-critical lab and imaging results will be communicated to you by [employer], letter or phone within 4 business days after the clinic has received the results. If you do not hear from us within 7 days, please contact the clinic through [employer] or phone. If you have a critical or abnormal lab result, we will notify you by phone as soon as possible.  Submit refill requests through [employer] or call your pharmacy and they will forward the refill request to us. Please allow 3 business days for your refill to be completed.          Additional Information About Your Visit        [employer] Information     [employer] gives you secure access to your electronic health record. If you see a primary care provider, you can also send messages to your care team and make appointments. If you have questions, please call your primary care clinic.  If you do not have a primary care provider, please call [phone] and they will assist you.        Care EveryWhere ID     This is your Care EveryWhere ID. This could be used by other organizations to access your [place] medical records  [id number]        Your Vitals Were     Pulse Temperature Pulse Oximetry BMI (Body  Mass Index)          77 97.9  F (36.6  C) (Oral) 95% 47.39 kg/m2         Blood Pressure from Last 3 Encounters:   05/11/18 152/72   04/27/18 160/77   04/20/18 149/82    Weight from Last 3 Encounters:   05/11/18 129.2 kg (284 lb 12.8 oz)   04/27/18 128.8 kg (284 lb)   04/20/18 130.6 kg (288 lb)              We Performed the Following     CANCER RISK MGMT/CANCER GENETIC COUNSELING REFERRAL          Today's Medication Changes          These changes are accurate as of 5/11/18  2:13 PM.  If you have any questions, ask your nurse or doctor.               Start taking these medicines.        Dose/Directions    fluticasone 50 MCG/ACT spray   Commonly known as:  FLONASE   Used for:  Chronic seasonal allergic rhinitis, unspecified trigger   Started by:  Yasmine Zazueta APRN CNP        Dose:  1-2 spray   Spray 1-2 sprays into both nostrils daily   Quantity:  1 Bottle   Refills:  1       loratadine 10 MG tablet   Commonly known as:  CLARITIN   Used for:  Pain in joint, multiple sites, Chronic seasonal allergic rhinitis, unspecified trigger   Started by:  Yasmine Zazueta APRN CNP        Dose:  10 mg   Take 1 tablet (10 mg) by mouth daily   Quantity:  30 tablet   Refills:  3       pyridoxine 50 MG Tabs   Commonly known as:  VITAMIN B-6   Used for:  Chemotherapy-induced peripheral neuropathy (H)   Started by:  Yasmine Zazueta APRN CNP        Dose:  50 mg   Take 1 tablet (50 mg) by mouth 2 times daily   Quantity:  60 tablet   Refills:  3            Where to get your medicines      These medications were sent to [address] [place] [address] [facility] [place] [address]    Hours:  TRANSPLANT PHONE NUMBER [phone] Phone:  [phone]     fluticasone 50 MCG/ACT spray    loratadine 10 MG tablet    pyridoxine 50 MG Tabs                Primary Care Provider Office Phone # Fax #    Levar Scott [phone]       [place] [id number]  [address]  [facility] [address]        Equal Access to Services     CED COLLINS : Hadii aad ku hadzenyjeniffer Sejaleliezer, wavondada iesharaduha, qaregineta katinobandar dimas, angela raygozafedericosiddhartha enriquez. So [facility] [phone].    ATENCIÓN: Si habla español, tiene a cortes disposición servicios gratuitos de asistencia lingüística. [facility] [phone].    We comply with applicable federal civil rights laws and [place] laws. We do not discriminate on the basis of race, color, national origin, age, disability, sex, sexual orientation, or gender identity.            Thank you!     Thank you for choosing [facility]  for your care. Our goal is always to provide you with excellent care. Hearing back from our patients is one way we can continue to improve our services. Please take a few minutes to complete the written survey that you may receive in the mail after your visit with us. Thank you!             Your Updated Medication List - Protect others around you: Learn how to safely use, store and throw away your medicines at www.disposemymeds.org.          This list is accurate as of 5/11/18  2:13 PM.  Always use your most recent med list.                   Brand Name Dispense Instructions for use Diagnosis    ACETAMINOPHEN PO      Take 325 mg by mouth every 6 hours as needed for pain        aspirin 81 MG EC tablet      Take 81 mg by mouth Pt states, \"I don't take it regularly. I take it when I remember to\".        fluticasone 50 MCG/ACT spray    FLONASE    1 Bottle    Spray 1-2 sprays into both nostrils daily    Chronic seasonal allergic rhinitis, unspecified trigger       ibuprofen 600 MG tablet    ADVIL/MOTRIN    30 tablet    Take 1 tablet (600 mg) by mouth every 6 hours as needed for pain (mild)    Endometrial carcinoma (H)       loratadine 10 MG tablet    CLARITIN    30 tablet    Take 1 tablet (10 mg) by mouth daily    Pain in joint, multiple sites, Chronic seasonal allergic rhinitis, " unspecified trigger       LORazepam 0.5 MG tablet    ATIVAN    30 tablet    Take 1 tablet (0.5 mg) by mouth every 6 hours as needed (nausea/vomiting, anxiety or sleep)    Endometrial cancer (H), Encounter for long-term (current) use of medications       METFORMIN HCL PO      Take 500 mg by mouth daily (with breakfast)        prochlorperazine 5 MG tablet    COMPAZINE    30 tablet    Take 1 tablet (5 mg) by mouth every 6 hours as needed (nausea/vomiting)    Endometrial cancer (H), Encounter for long-term (current) use of medications       pyridoxine 50 MG Tabs    VITAMIN B-6    60 tablet    Take 1 tablet (50 mg) by mouth 2 times daily    Chemotherapy-induced peripheral neuropathy (H)       triamterene-hydrochlorothiazide 37.5-25 MG per capsule    DYAZIDE     Take 1 capsule by mouth every morning

## 2018-05-11 NOTE — NURSING NOTE
Chief Complaint   Patient presents with     Port Draw     labs drawn via port by RN     /72 (BP Location: Right arm, Patient Position: Chair, Cuff Size: Adult Large)  Pulse 77  Temp 97.9  F (36.6  C) (Oral)  Wt 129.2 kg (284 lb 12.8 oz)  SpO2 95%  BMI 47.39 kg/m2    Vitals taken. Port accessed by RN. Labs collected and sent. Line flushed with NS & Heparin. Pt tolerated well. Pt checked in for next appointment.    Verona Lees RN

## 2018-05-16 ENCOUNTER — TELEPHONE (OUTPATIENT)
Dept: ONCOLOGY | Facility: CLINIC | Age: 68
End: 2018-05-16

## 2018-05-16 NOTE — TELEPHONE ENCOUNTER
Social Work Note: Telephone Call  Oncology Clinic    Data/Intervention:  Patient Name:  Lu Smith  /Age:  1950 (68 year old)     Call From:  Social work contacted patient over the phone  Reason for Call:  Social work received referral from APRN indicating patient had concerns about insurance/disability/work/finances    Assessment:  SW contacted patient over the phone.  Patient reported that she is currently on short term disability from her employer and has applied for long term disability benefits as she does not feel she would likely be able to return to work.  Patient has also started receiving social security but indicated she is concerned about managing her household living expenses with social security alone if her long term disability application is not approved.  She is still waiting to hear back from her employer regarding the long term disability at this time.  Patient asked about different resources that may be able to assist if problems arise in the future.  SW discussed available oncology grants and programs including [employer], [employer] and [employer] ([employer] as potential options. Patient indicated understanding and stated she does not need further assistance at this time but would contact SW if needs come up once she hears back from her employer.      Plan:  SW contact information provided to patient. SW is available to assist with any identified needs.     Soo Yeon Han, MSW, LICSW  Pager: [phone]  Phone: [phone]

## 2018-05-22 ENCOUNTER — ALLIED HEALTH/NURSE VISIT (OUTPATIENT)
Dept: RADIATION ONCOLOGY | Facility: CLINIC | Age: 68
End: 2018-05-22
Attending: RADIOLOGY
Payer: COMMERCIAL

## 2018-05-22 ENCOUNTER — HOSPITAL ENCOUNTER (OUTPATIENT)
Dept: CT IMAGING | Facility: CLINIC | Age: 68
Discharge: HOME | End: 2018-05-22
Attending: RADIOLOGY | Admitting: RADIOLOGY
Payer: COMMERCIAL

## 2018-05-22 DIAGNOSIS — C54.1 ENDOMETRIAL CANCER (H): ICD-10-CM

## 2018-05-22 DIAGNOSIS — C54.1 ENDOMETRIAL CANCER (H): Primary | ICD-10-CM

## 2018-05-22 PROCEDURE — 25000128 H RX IP 250 OP 636: Performed by: RADIOLOGY

## 2018-05-22 PROCEDURE — 77334 RADIATION TREATMENT AID(S): CPT | Performed by: RADIOLOGY

## 2018-05-22 PROCEDURE — 72193 CT PELVIS W/DYE: CPT

## 2018-05-22 RX ORDER — IOPAMIDOL 755 MG/ML
135 INJECTION, SOLUTION INTRAVASCULAR ONCE
Status: COMPLETED | OUTPATIENT
Start: 2018-05-22 | End: 2018-05-22

## 2018-05-22 RX ADMIN — IOPAMIDOL 135 ML: 755 INJECTION, SOLUTION INTRAVENOUS at 14:30

## 2018-05-22 NOTE — PROGRESS NOTES
A radiation therapy treatment planning simulation was performed.  Please see the [employer] record for documentation.    Ana Michelle MD  Radiation Oncology

## 2018-05-22 NOTE — MR AVS SNAPSHOT
After Visit Summary   5/22/2018    Lu Smith    MRN: [id number]           Patient Information     Date Of Birth          1950        Visit Information        Provider Department      5/22/2018 1:00 PM Ana Michelle MD Radiation Oncology Clinic        Today's Diagnoses     Endometrial cancer (H)    -  1       Follow-ups after your visit        Who to contact     Please call your clinic at [phone] to:    Ask questions about your health    Make or cancel appointments    Discuss your medicines    Learn about your test results    Speak to your doctor            Additional Information About Your Visit        [employer] Information     "[employer] gives you secure access to your electronic health record. If you see a primary care provider, you can also send messages to your care team and make appointments. If you have questions, please call your primary care clinic.  If you do not have a primary care provider, please call [phone] and they will assist you.      "[employer] is an electronic gateway that provides easy, online access to your medical records. With "[employer] you can request a clinic appointment, read your test results, renew a prescription or communicate with your care team.     To access your existing account, please contact your [facility] or call [phone] for assistance.        Care EveryWhere ID     This is your Care EveryWhere ID. This could be used by other organizations to access your [place] medical records  [id number]         Blood Pressure from Last 3 Encounters:   05/11/18 152/72   04/27/18 160/77   04/20/18 149/82    Weight from Last 3 Encounters:   05/11/18 129.2 kg (284 lb 12.8 oz)   04/27/18 128.8 kg (284 lb)   04/20/18 130.6 kg (288 lb)              Today, you had the following     No orders found for display       Primary Care Provider Office Phone # Fax #    Mokendominic Scott [phone]       [facility] [address]  [facility] [address]        Equal Access to Services     CED COLLINS : Hadii bailee forbes lisbetjeniffer Jbali, wavondada luqraduha, qaregineta katinobandar reedamarabandar, angela raygozafedericosiddhartha enriquez. So [facility] [phone].    ATENCIÓN: Si habla español, tiene a cortes disposición servicios gratuitos de asistencia lingüística. [facility] [phone].    We comply with applicable federal civil rights laws and [place] laws. We do not discriminate on the basis of race, color, national origin, age, disability, sex, sexual orientation, or gender identity.            Thank you!     Thank you for choosing RADIATION ONCOLOGY CLINIC  for your care. Our goal is always to provide you with excellent care. Hearing back from our patients is one way we can continue to improve our services. Please take a few minutes to complete the written survey that you may receive in the mail after your visit with us. Thank you!             Your Updated Medication List - Protect others around you: Learn how to safely use, store and throw away your medicines at www.disposemymeds.org.          This list is accurate as of 5/22/18  2:04 PM.  Always use your most recent med list.                   Brand Name Dispense Instructions for use Diagnosis    ACETAMINOPHEN PO      Take 325 mg by mouth every 6 hours as needed for pain        aspirin 81 MG EC tablet      Take 81 mg by mouth Pt states, \"I don't take it regularly. I take it when I remember to\".        fluticasone 50 MCG/ACT spray    FLONASE    1 Bottle    Spray 1-2 sprays into both nostrils daily    Chronic seasonal allergic rhinitis, unspecified trigger       ibuprofen 600 MG tablet    ADVIL/MOTRIN    30 tablet    Take 1 tablet (600 mg) by mouth every 6 hours as needed for pain (mild)    Endometrial carcinoma (H)       loratadine 10 MG tablet    CLARITIN    30 tablet    Take 1 tablet (10 mg) by mouth daily    Pain in joint, multiple sites, Chronic seasonal allergic rhinitis, unspecified " trigger       LORazepam 0.5 MG tablet    ATIVAN    30 tablet    Take 1 tablet (0.5 mg) by mouth every 6 hours as needed (nausea/vomiting, anxiety or sleep)    Endometrial cancer (H), Encounter for long-term (current) use of medications       METFORMIN HCL PO      Take 500 mg by mouth daily (with breakfast)        prochlorperazine 5 MG tablet    COMPAZINE    30 tablet    Take 1 tablet (5 mg) by mouth every 6 hours as needed (nausea/vomiting)    Endometrial cancer (H), Encounter for long-term (current) use of medications       pyridoxine 50 MG Tabs    VITAMIN B-6    60 tablet    Take 1 tablet (50 mg) by mouth 2 times daily    Chemotherapy-induced peripheral neuropathy (H)       triamterene-hydrochlorothiazide 37.5-25 MG per capsule    DYAZIDE     Take 1 capsule by mouth every morning

## 2018-05-22 NOTE — PROGRESS NOTES
Radiation Therapy Patient Education    Person involved with teaching: Patient and Friend    Patient educational needs for self management of treatment-related side effects assessment completed.  [facility] Patient Ed tab contains Patient Learning Assessment    Education Materials Given  Radiation Therapy for GYN Patients    Educational Topics Discussed  Side effects expected, Pain management, Skin care, Nutrition and weight loss and When to call MD/RN    Response To Teaching  Verbalizes understanding    GYN Only  Vaginal Dilator-given and educated: given    Referrals sent: None    Chemotherapy?  No

## 2018-06-01 DIAGNOSIS — C54.1 MALIGNANT NEOPLASM OF ENDOMETRIUM (H): Primary | ICD-10-CM

## 2018-06-04 ENCOUNTER — OFFICE VISIT (OUTPATIENT)
Dept: RADIATION ONCOLOGY | Facility: CLINIC | Age: 68
End: 2018-06-04
Attending: RADIOLOGY
Payer: MEDICARE

## 2018-06-04 VITALS — BODY MASS INDEX: 47.28 KG/M2 | WEIGHT: 284.1 LBS

## 2018-06-04 DIAGNOSIS — C54.1 MALIGNANT NEOPLASM OF ENDOMETRIUM (H): Primary | ICD-10-CM

## 2018-06-04 PROCEDURE — 77386 ZZH IMRT TREATMENT DELIVERY, COMPLEX: CPT | Performed by: RADIOLOGY

## 2018-06-04 NOTE — PROGRESS NOTES
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 Smith presents to clinic today for her weekly on-treatment visit. She starts radiation today. She has no concerns at this time.    Nursing ROS:   Nutrition Alteration  Diet Type: Patient's Preference  Skin  Skin Reaction: 0 - No changes     ENT and Mouth Exam  Mucositis - Current: 0 - None   Cardiovascular  Respiratory effort: 1 - Normal - without distress  Gastrointestinal  Nausea: 0 - None     Pain Assessment  0-10 Pain Scale: 0    OBJECTIVE:  General: in NAD  Vital signs: Wt 128.9 kg (284 lb 1.6 oz)  BMI 47.28 kg/m2    Lab Results   Component Value Date    WBC 7.2 05/11/2018    HGB 11.0 (L) 05/11/2018    HCT 33.9 (L) 05/11/2018    MCV 86 05/11/2018     05/11/2018     Lab Results   Component Value Date     05/11/2018    POTASSIUM 3.6 05/11/2018    CHLORIDE 103 05/11/2018    CO2 25 05/11/2018     (H) 05/11/2018     Mosaiq chart and setup information reviewed  MVCT images reviewed    Medication Review  Med list reviewed with patient?: Yes    Educational Topic Discussed  Education Instructions: Reviewed    IMPRESSION: Ms. Smith is tolerating treatment well.    PLAN:   1. Continue radiation therapy.  2. Continue Skin cares  3. Vaginal given with instructions    Ana Michelle M.D.  Department of Radiation Oncology

## 2018-06-04 NOTE — LETTER
6/4/2018       RE: Lu Smith  [address]  [place] [address]     Dear Colleague,    Thank you for referring your patient, Lu Smith, to the RADIATION ONCOLOGY CLINIC. Please see a copy of my visit note below.    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 Smith presents to clinic today for her weekly on-treatment visit. She starts radiation today. She has no concerns at this time.    Nursing ROS:   Nutrition Alteration  Diet Type: Patient's Preference  Skin  Skin Reaction: 0 - No changes     ENT and Mouth Exam  Mucositis - Current: 0 - None   Cardiovascular  Respiratory effort: 1 - Normal - without distress  Gastrointestinal  Nausea: 0 - None     Pain Assessment  0-10 Pain Scale: 0    OBJECTIVE:  General: in NAD  Vital signs: Wt 128.9 kg (284 lb 1.6 oz)  BMI 47.28 kg/m2    Lab Results   Component Value Date    WBC 7.2 05/11/2018    HGB 11.0 (L) 05/11/2018    HCT 33.9 (L) 05/11/2018    MCV 86 05/11/2018     05/11/2018     Lab Results   Component Value Date     05/11/2018    POTASSIUM 3.6 05/11/2018    CHLORIDE 103 05/11/2018    CO2 25 05/11/2018     (H) 05/11/2018     Mosaiq chart and setup information reviewed  MVCT images reviewed    Medication Review  Med list reviewed with patient?: Yes    Educational Topic Discussed  Education Instructions: Reviewed    IMPRESSION: Ms. Smith is tolerating treatment well.    PLAN:   1. Continue  radiation therapy.  2. Continue Skin cares  3. Vaginal given with instructions    Ana Michelle M.D.  Department of Radiation Oncology

## 2018-06-04 NOTE — MR AVS SNAPSHOT
After Visit Summary   6/4/2018    Lu Smith    MRN: [id number]           Patient Information     Date Of Birth          1950        Visit Information        Provider Department      6/4/2018 11:45 AM Ana Michelle MD Radiation Oncology Clinic        Today's Diagnoses     Malignant neoplasm of endometrium (H)    -  1       Follow-ups after your visit        Your next 10 appointments already scheduled     Jun 05, 2018 11:15 AM CDT   EXTERNAL RADIATION TREATMENT with [facility] RAD ONC [place]   Radiation Oncology Clinic ([facility])    [facility]  1st Floor  [address] [address]   [phone]            Jun 06, 2018 11:15 AM CDT   EXTERNAL RADIATION TREATMENT with [facility] RAD ONC [place]   Radiation Oncology Clinic ([facility])    [facility]  1st Floor  [address] [address]   [phone]            Jun 07, 2018 11:15 AM CDT   EXTERNAL RADIATION TREATMENT with [facility] RAD ONC [place]   Radiation Oncology Clinic ([facility])    [facility]  1st Floor  [address] [address]   [phone]            Jun 08, 2018 11:15 AM CDT   EXTERNAL RADIATION TREATMENT with [facility] RAD ONC [place]   Radiation Oncology Clinic ([facility])    [facility]  1st Floor  [address] [address]   [phone]            Jun 11, 2018 11:15 AM CDT   EXTERNAL RADIATION TREATMENT with [facility] RAD ONC [place]   Radiation Oncology Clinic ([facility])    [facility]  1st Floor  [address] [address]   [phone]            Jun 11, 2018 11:45 AM CDT   ON TREATMENT VISIT with Ana Michelle MD   Radiation Oncology Clinic ([facility])    [facility]  1st Floor  [address] [address]   [phone]            Jun 12,  2018 11:15 AM CDT   EXTERNAL RADIATION TREATMENT with [facility] RAD ONC [place]   Radiation Oncology Clinic ([facility])    [facility]  1st Floor  [address] [facility] [address]   [phone]            Jun 13, 2018 11:15 AM CDT   EXTERNAL RADIATION TREATMENT with [facility] RAD ONC [place]   Radiation Oncology Clinic ([facility])    [facility]  1st Floor  [address] [facility] [address]   [phone]            Jun 14, 2018 11:15 AM CDT   EXTERNAL RADIATION TREATMENT with [facility] RAD ONC [place]   Radiation Oncology Clinic ([facility])    [facility]  1st Floor  [address] [facility] [address]   [phone]            Flex 15, 2018 11:15 AM CDT   EXTERNAL RADIATION TREATMENT with [facility] RAD ONC [place]   Radiation Oncology Clinic ([facility])    [facility]  1st Floor  [address] [address] [phone]              Who to contact     Please call your clinic at [phone] to:    Ask questions about your health    Make or cancel appointments    Discuss your medicines    Learn about your test results    Speak to your doctor            Additional Information About Your Visit        [employer] Information     [employer] gives you secure access to your electronic health record. If you see a primary care provider, you can also send messages to your care team and make appointments. If you have questions, please call your primary care clinic.  If you do not have a primary care provider, please call [phone] and they will assist you.      [employer] is an electronic gateway that provides easy, online access to your medical records. With [employer], you can request a clinic appointment, read your test results, renew a prescription or communicate with your care team.     To access your existing account, please contact your [facility]  or call [phone] for assistance.        Care EveryWhere ID     This is your Care EveryWhere ID. This could be used by other organizations to access your [employer] medical records  [id number]        Your Vitals Were     BMI (Body Mass Index)                   47.28 kg/m2            Blood Pressure from Last 3 Encounters:   05/11/18 152/72   04/27/18 160/77   04/20/18 149/82    Weight from Last 3 Encounters:   06/04/18 128.9 kg (284 lb 1.6 oz)   05/11/18 129.2 kg (284 lb 12.8 oz)   04/27/18 128.8 kg (284 lb)              Today, you had the following     No orders found for display       Primary Care Provider Office Phone # Fax #    Levar Scott [phone]       [address]  [place] [facility] [address]        Equal Access to Services     [facility]E [facility] : Hadii bailee fraustoo Soeliezer, waaxda luqadaha, qaybta kaalmada briannayabandar, angela greenfield . So [facility] [phone].    ATENCIÓN: Si habla español, tiene a cortes disposición servicios gratuitos de asistencia lingüística. Arnold al [phone].    We comply with applicable federal civil rights laws and [place] laws. We do not discriminate on the basis of race, color, national origin, age, disability, sex, sexual orientation, or gender identity.            Thank you!     Thank you for choosing [facility] ONCOLOGY CLINIC  for your care. Our goal is always to provide you with excellent care. Hearing back from our patients is one way we can continue to improve our services. Please take a few minutes to complete the written survey that you may receive in the mail after your visit with us. Thank you!             Your Updated Medication List - Protect others around you: Learn how to safely use, store and throw away your medicines at www.disposemymeds.org.          This list is accurate as of 6/4/18 12:21 PM.  Always use your most recent med list.                   Brand Name Dispense Instructions for use Diagnosis     "ACETAMINOPHEN PO      Take 325 mg by mouth every 6 hours as needed for pain        aspirin 81 MG EC tablet      Take 81 mg by mouth Pt states, \"I don't take it regularly. I take it when I remember to\".        fluticasone 50 MCG/ACT spray    FLONASE    1 Bottle    Spray 1-2 sprays into both nostrils daily    Chronic seasonal allergic rhinitis, unspecified trigger       ibuprofen 600 MG tablet    ADVIL/MOTRIN    30 tablet    Take 1 tablet (600 mg) by mouth every 6 hours as needed for pain (mild)    Endometrial carcinoma (H)       loratadine 10 MG tablet    CLARITIN    30 tablet    Take 1 tablet (10 mg) by mouth daily    Pain in joint, multiple sites, Chronic seasonal allergic rhinitis, unspecified trigger       LORazepam 0.5 MG tablet    ATIVAN    30 tablet    Take 1 tablet (0.5 mg) by mouth every 6 hours as needed (nausea/vomiting, anxiety or sleep)    Endometrial cancer (H), Encounter for long-term (current) use of medications       METFORMIN HCL PO      Take 500 mg by mouth daily (with breakfast)        prochlorperazine 5 MG tablet    COMPAZINE    30 tablet    Take 1 tablet (5 mg) by mouth every 6 hours as needed (nausea/vomiting)    Endometrial cancer (H), Encounter for long-term (current) use of medications       pyridoxine 50 MG Tabs    VITAMIN B-6    60 tablet    Take 1 tablet (50 mg) by mouth 2 times daily    Chemotherapy-induced peripheral neuropathy (H)       triamterene-hydrochlorothiazide 37.5-25 MG per capsule    DYAZIDE     Take 1 capsule by mouth every morning          "

## 2018-06-05 ENCOUNTER — APPOINTMENT (OUTPATIENT)
Dept: RADIATION ONCOLOGY | Facility: CLINIC | Age: 68
End: 2018-06-05
Attending: RADIOLOGY
Payer: MEDICARE

## 2018-06-05 PROCEDURE — 77386 ZZH IMRT TREATMENT DELIVERY, COMPLEX: CPT | Performed by: RADIOLOGY

## 2018-06-06 ENCOUNTER — APPOINTMENT (OUTPATIENT)
Dept: RADIATION ONCOLOGY | Facility: CLINIC | Age: 68
End: 2018-06-06
Attending: RADIOLOGY
Payer: MEDICARE

## 2018-06-06 PROCEDURE — 77386 ZZH IMRT TREATMENT DELIVERY, COMPLEX: CPT | Performed by: RADIOLOGY

## 2018-06-07 ENCOUNTER — APPOINTMENT (OUTPATIENT)
Dept: RADIATION ONCOLOGY | Facility: CLINIC | Age: 68
End: 2018-06-07
Attending: RADIOLOGY
Payer: MEDICARE

## 2018-06-07 PROCEDURE — 77386 ZZH IMRT TREATMENT DELIVERY, COMPLEX: CPT | Performed by: RADIOLOGY

## 2018-06-08 ENCOUNTER — APPOINTMENT (OUTPATIENT)
Dept: RADIATION ONCOLOGY | Facility: CLINIC | Age: 68
End: 2018-06-08
Attending: RADIOLOGY
Payer: MEDICARE

## 2018-06-08 PROCEDURE — 77336 RADIATION PHYSICS CONSULT: CPT | Performed by: RADIOLOGY

## 2018-06-08 PROCEDURE — 77386 ZZH IMRT TREATMENT DELIVERY, COMPLEX: CPT | Performed by: RADIOLOGY

## 2018-06-11 ENCOUNTER — APPOINTMENT (OUTPATIENT)
Dept: RADIATION ONCOLOGY | Facility: CLINIC | Age: 68
End: 2018-06-11
Attending: RADIOLOGY
Payer: MEDICARE

## 2018-06-11 PROCEDURE — 77386 ZZH IMRT TREATMENT DELIVERY, COMPLEX: CPT | Performed by: RADIOLOGY

## 2018-06-12 ENCOUNTER — OFFICE VISIT (OUTPATIENT)
Dept: RADIATION ONCOLOGY | Facility: CLINIC | Age: 68
End: 2018-06-12
Attending: RADIOLOGY
Payer: MEDICARE

## 2018-06-12 ENCOUNTER — CARE COORDINATION (OUTPATIENT)
Dept: ONCOLOGY | Facility: CLINIC | Age: 68
End: 2018-06-12

## 2018-06-12 VITALS — WEIGHT: 283 LBS | BODY MASS INDEX: 47.09 KG/M2

## 2018-06-12 DIAGNOSIS — C54.1 MALIGNANT NEOPLASM OF ENDOMETRIUM (H): Primary | ICD-10-CM

## 2018-06-12 PROCEDURE — 77386 ZZH IMRT TREATMENT DELIVERY, COMPLEX: CPT | Performed by: RADIOLOGY

## 2018-06-12 NOTE — LETTER
2018       RE: Lu Smith  [address]  [place] [address]     Dear Colleague,    Thank you for referring your patient, Lu Smith, to the RADIATION ONCOLOGY CLINIC. Please see a copy of my visit note below.    [facility]  SPECIALIZING IN BREAKTHROUGHS  Radiation Oncology     On Treatment Visit Note     Lu Smith                                                                                                                                    Date: 2018             MRN: [id number]   : 1950     Reason for Visit:  On Radiation Treatment Visit      Diagnosis: Serous endometrial carcinoma, FIGOIIIC2, with close radial cervical margins     Plan for curative sandwich therapy (3C carbo/taxel->RT->3C carbo/taxel)     Treatment Summary to Date  Site Treated: Pelvis and PA nodes Current Dose:  1260cGy/5040cGy  Fraction:        Subjective:   Today Ms. Smith continues to do well. She is early in the course of radiation therapy. She affirms to mild intermittent nausea and to a softening of her stools, but denies any pelvic pain, vaginal spotting or bleeding, diarrhea, or pain at any other site. She has no questions or concerns.     Objective:   Gen: A/Ox3. NAD     Toxicities: No appreciable treatment related toxicities.     Labs: No interval labs     Assessment:    Tolerating radiation therapy well.  All questions and concerns addressed.     Plan:   1. Continue current therapy.       Hernandez Sidhu MD-PhD PGY-2  Radiation Oncology Resident, [facility]     Mosaiq chart and setup information reviewed  MVCT images reviewed  Ms. Smith was seen and examined by me. Note above by Dr. Sidhu was reviewed and edited by me and reflects our mutual findings and plan of care.    Ana Michelle MD  Department of Radiation Oncology  [facility]

## 2018-06-12 NOTE — MR AVS SNAPSHOT
After Visit Summary   6/12/2018    Lu Smith    MRN: [id number]           Patient Information     Date Of Birth          1950        Visit Information        Provider Department      6/12/2018 11:30 AM Ana Michelle MD Radiation Oncology Clinic        Today's Diagnoses     Malignant neoplasm of endometrium (H)    -  1       Follow-ups after your visit        Your next 10 appointments already scheduled     Jun 13, 2018 11:15 AM CDT   EXTERNAL RADIATION TREATMENT with [facility] RAD ONC [place]   Radiation Oncology Clinic ([facility])    [facility]  1st Floor  [address] [address]   [phone]            Jun 14, 2018 11:15 AM CDT   EXTERNAL RADIATION TREATMENT with [facility] RAD ONC [place]   Radiation Oncology Clinic ([facility])    [facility]  1st Floor  [address] [address]   [phone]            Flex 15, 2018 11:15 AM CDT   EXTERNAL RADIATION TREATMENT with [facility] RAD ONC [place]   Radiation Oncology Clinic ([facility])    [facility]  1st Floor  [address] [address]   [phone]            Jun 18, 2018 11:15 AM CDT   EXTERNAL RADIATION TREATMENT with [facility] RAD ONC [place]   Radiation Oncology Clinic ([facility])    [facility]  1st Floor  [address] [address]   [phone]            Jun 18, 2018 11:45 AM CDT   ON TREATMENT VISIT with Ana Michelle MD   Radiation Oncology Clinic ([facility])    [facility]  1st Floor  [address] [address]   [phone]            Jun 19, 2018 11:15 AM CDT   EXTERNAL RADIATION TREATMENT with [facility] RAD ONC [place]   Radiation Oncology Clinic ([facility])    [facility]  1st Floor  [address] [address]   [phone]            Jun  20, 2018 11:15 AM CDT   EXTERNAL RADIATION TREATMENT with [facility] RAD ONC [place]   Radiation Oncology Clinic ([facility])    [facility]  1st Floor  [address] [address]   [phone]            Jun 21, 2018 11:15 AM CDT   EXTERNAL RADIATION TREATMENT with [facility] RAD ONC [place]   Radiation Oncology Clinic ([facility])    [facility]  1st Floor  [address] [address]   [phone]            Jun 22, 2018 11:15 AM CDT   EXTERNAL RADIATION TREATMENT with [facility] RAD ONC [place]   Radiation Oncology Clinic ([facility])    [facility]  1st Floor  [address] [address]   [phone]            Jun 25, 2018 11:15 AM CDT   EXTERNAL RADIATION TREATMENT with [facility] RAD ONC [place]   Radiation Oncology Clinic ([facility])    [facility]  1st Floor  [address] [address] [phone]              Who to contact     Please call your clinic at [phone] to:    Ask questions about your health    Make or cancel appointments    Discuss your medicines    Learn about your test results    Speak to your doctor            Additional Information About Your Visit        [employer] Information     [employer] gives you secure access to your electronic health record. If you see a primary care provider, you can also send messages to your care team and make appointments. If you have questions, please call your primary care clinic.  If you do not have a primary care provider, please call [phone] and they will assist you.      [employer] is an electronic gateway that provides easy, online access to your medical records. With [employer], you can request a clinic appointment, read your test results, renew a prescription or communicate with your care team.     To access your existing account, please contact your [facility] or call [phone] for assistance.        Care EveryWhere ID     This is your Care EveryWhere ID. This could be used by other organizations to access your [facility] medical records  [id number]        Your Vitals Were     BMI (Body Mass Index)                   47.09 kg/m2            Blood Pressure from Last 3 Encounters:   05/11/18 152/72   04/27/18 160/77   04/20/18 149/82    Weight from Last 3 Encounters:   06/12/18 128.4 kg (283 lb)   06/04/18 128.9 kg (284 lb 1.6 oz)   05/11/18 129.2 kg (284 lb 12.8 oz)              Today, you had the following     No orders found for display       Primary Care Provider Office Phone # Fax #    Levar Scott [phone]       [address]  [place] [facility] [address]        Equal Access to Services     HA[facility]E [facility] : Hadii aad ku hadasho Soeliezer, waaxda luqadaha, qaybta kaalmada adeegyada, angela greenfield . So [facility] [phone].    ATENCIÓN: Si habla español, tiene a cortes disposición servicios gratuitos de asistencia lingüística. Llame al [phone].    We comply with applicable federal civil rights laws and [place] laws. We do not discriminate on the basis of race, color, national origin, age, disability, sex, sexual orientation, or gender identity.            Thank you!     Thank you for choosing [facility]COL[facility]Y CLINIC  for your care. Our goal is always to provide you with excellent care. Hearing back from our patients is one way we can continue to improve our services. Please take a few minutes to complete the written survey that you may receive in the mail after your visit with us. Thank you!             Your Updated Medication List - Protect others around you: Learn how to safely use, store and throw away your medicines at www.disposemymeds.org.          This list is accurate as of 6/12/18 11:59 PM.  Always use your most recent med list.                   Brand Name Dispense Instructions for use  "Diagnosis    ACETAMINOPHEN PO      Take 325 mg by mouth every 6 hours as needed for pain        aspirin 81 MG EC tablet      Take 81 mg by mouth Pt states, \"I don't take it regularly. I take it when I remember to\".        fluticasone 50 MCG/ACT spray    FLONASE    1 Bottle    Spray 1-2 sprays into both nostrils daily    Chronic seasonal allergic rhinitis, unspecified trigger       ibuprofen 600 MG tablet    ADVIL/MOTRIN    30 tablet    Take 1 tablet (600 mg) by mouth every 6 hours as needed for pain (mild)    Endometrial carcinoma (H)       loratadine 10 MG tablet    CLARITIN    30 tablet    Take 1 tablet (10 mg) by mouth daily    Pain in joint, multiple sites, Chronic seasonal allergic rhinitis, unspecified trigger       LORazepam 0.5 MG tablet    ATIVAN    30 tablet    Take 1 tablet (0.5 mg) by mouth every 6 hours as needed (nausea/vomiting, anxiety or sleep)    Endometrial cancer (H), Encounter for long-term (current) use of medications       METFORMIN HCL PO      Take 500 mg by mouth daily (with breakfast)        prochlorperazine 5 MG tablet    COMPAZINE    30 tablet    Take 1 tablet (5 mg) by mouth every 6 hours as needed (nausea/vomiting)    Endometrial cancer (H), Encounter for long-term (current) use of medications       pyridoxine 50 MG Tabs    VITAMIN B-6    60 tablet    Take 1 tablet (50 mg) by mouth 2 times daily    Chemotherapy-induced peripheral neuropathy (H)       triamterene-hydrochlorothiazide 37.5-25 MG per capsule    DYAZIDE     Take 1 capsule by mouth every morning          "

## 2018-06-12 NOTE — PROGRESS NOTES
Care Coordinator Note  Message left for patient that I am trying to reach her to schedule appointment to have port accessed/port flush.  I will try to reach her in morning to arrange appointment for after her radiation therapy appointment.     Lalitha MERCER, RN  Care Coordinator  Gynecologic Cancer   Office:  [phone]  Pager: [phone] #[phone]

## 2018-06-12 NOTE — PROGRESS NOTES
[facility] PHYSICIANS  SPECIALIZING IN BREAKTHROUGHS  Radiation Oncology     On Treatment Visit Note     Lu Smith                                                                                                                                    Date: 2018             MRN: [id number]   : 1950     Reason for Visit:  On Radiation Treatment Visit      Diagnosis: Serous endometrial carcinoma, FIGOIIIC2, with close radial cervical margins     Plan for curative sandwich therapy (3C carbo/taxel->RT->3C carbo/taxel)     Treatment Summary to Date  Site Treated: Pelvis and PA nodes Current Dose: 1260cGy/5040cGy  Fraction:       Subjective:   Today Ms. Smith continues to do well. She is early in the course of radiation therapy. She affirms to mild intermittent nausea and to a softening of her stools, but denies any pelvic pain, vaginal spotting or bleeding, diarrhea, or pain at any other site. She has no questions or concerns.     Objective:   Gen: A/Ox3. NAD     Toxicities: No appreciable treatment related toxicities.     Labs: No interval labs     Assessment:    Tolerating radiation therapy well.  All questions and concerns addressed.     Plan:   1. Continue current therapy.       Hernandez Sidhu MD-PhD PGY-2  Radiation Oncology Resident, [facility]     Mosaiq chart and setup information reviewed  MVCT images reviewed  Ms. Smith was seen and examined by me. Note above by Dr. Sidhu was reviewed and edited by me and reflects our mutual findings and plan of care.    Ana Michelle MD  Department of Radiation Oncology  [facility] [place]

## 2018-06-13 ENCOUNTER — APPOINTMENT (OUTPATIENT)
Dept: RADIATION ONCOLOGY | Facility: CLINIC | Age: 68
End: 2018-06-13
Attending: RADIOLOGY
Payer: MEDICARE

## 2018-06-13 PROCEDURE — 77386 ZZH IMRT TREATMENT DELIVERY, COMPLEX: CPT | Performed by: RADIOLOGY

## 2018-06-14 ENCOUNTER — APPOINTMENT (OUTPATIENT)
Dept: RADIATION ONCOLOGY | Facility: CLINIC | Age: 68
End: 2018-06-14
Attending: RADIOLOGY
Payer: MEDICARE

## 2018-06-14 PROCEDURE — 25000128 H RX IP 250 OP 636: Performed by: OBSTETRICS & GYNECOLOGY

## 2018-06-14 PROCEDURE — 96523 IRRIG DRUG DELIVERY DEVICE: CPT

## 2018-06-14 PROCEDURE — 77386 ZZH IMRT TREATMENT DELIVERY, COMPLEX: CPT | Performed by: RADIOLOGY

## 2018-06-14 RX ORDER — HEPARIN SODIUM (PORCINE) LOCK FLUSH IV SOLN 100 UNIT/ML 100 UNIT/ML
5 SOLUTION INTRAVENOUS ONCE
Status: COMPLETED | OUTPATIENT
Start: 2018-06-14 | End: 2018-06-14

## 2018-06-14 RX ADMIN — SODIUM CHLORIDE, PRESERVATIVE FREE 5 ML: 5 INJECTION INTRAVENOUS at 12:28

## 2018-06-14 NOTE — NURSING NOTE
Chief Complaint   Patient presents with     Port Flush     Port flushed by RN     There were no vitals taken for this visit.    Port accessed by RN. Line flushed with NS & Heparin. Pt tolerated well.     Verona Lees

## 2018-06-15 ENCOUNTER — APPOINTMENT (OUTPATIENT)
Dept: RADIATION ONCOLOGY | Facility: CLINIC | Age: 68
End: 2018-06-15
Attending: RADIOLOGY
Payer: MEDICARE

## 2018-06-15 PROCEDURE — 77386 ZZH IMRT TREATMENT DELIVERY, COMPLEX: CPT | Performed by: RADIOLOGY

## 2018-06-15 PROCEDURE — 77336 RADIATION PHYSICS CONSULT: CPT | Performed by: RADIOLOGY

## 2018-06-18 ENCOUNTER — OFFICE VISIT (OUTPATIENT)
Dept: RADIATION ONCOLOGY | Facility: CLINIC | Age: 68
End: 2018-06-18
Attending: RADIOLOGY
Payer: MEDICARE

## 2018-06-18 VITALS — WEIGHT: 288 LBS | BODY MASS INDEX: 47.93 KG/M2

## 2018-06-18 DIAGNOSIS — C54.1 MALIGNANT NEOPLASM OF ENDOMETRIUM (H): Primary | ICD-10-CM

## 2018-06-18 PROCEDURE — 77386 ZZH IMRT TREATMENT DELIVERY, COMPLEX: CPT | Performed by: RADIOLOGY

## 2018-06-18 NOTE — MR AVS SNAPSHOT
After Visit Summary   6/18/2018    Lu Smith    MRN: [id number]           Patient Information     Date Of Birth          1950        Visit Information        Provider Department      6/18/2018 11:45 AM Ana Michelle MD Radiation Oncology Clinic        Today's Diagnoses     Malignant neoplasm of endometrium (H)    -  1       Follow-ups after your visit        Your next 10 appointments already scheduled     Jun 19, 2018 11:15 AM CDT   EXTERNAL RADIATION TREATMENT with [facility] RAD ONC [place]   Radiation Oncology Clinic ([facility])    [facility]  1st Floor  [address] [address]   [phone]            Jun 20, 2018 11:15 AM CDT   EXTERNAL RADIATION TREATMENT with [facility] RAD ONC [place]   Radiation Oncology Clinic ([facility])    [facility]  1st Floor  [address] [address]   [phone]            Jun 21, 2018 11:15 AM CDT   EXTERNAL RADIATION TREATMENT with [facility] RAD ONC [place]   Radiation Oncology Clinic ([facility])    [facility]  1st Floor  [address] [address]   [phone]            Jun 22, 2018 11:15 AM CDT   EXTERNAL RADIATION TREATMENT with [facility] RAD ONC [place]   Radiation Oncology Clinic ([facility])    [facility]  1st Floor  [address] [address]   [phone]            Jun 25, 2018 11:15 AM CDT   EXTERNAL RADIATION TREATMENT with [facility] RAD ONC [place]   Radiation Oncology Clinic ([facility])    [facility]  1st Floor  [address] [address]   [phone]            Jun 25, 2018 11:45 AM CDT   ON TREATMENT VISIT with Ana Michelle MD   Radiation Oncology Clinic ([facility])    [facility]  1st Floor  [address] [address]   [phone]            Jun  26, 2018 11:15 AM CDT   EXTERNAL RADIATION TREATMENT with P RAD ONC [place]   Radiation Oncology Clinic ([facility])    [facility]  1st Floor  [address] [address]   [phone]            Jun 27, 2018 11:15 AM CDT   EXTERNAL RADIATION TREATMENT with [facility] RAD ONC [place]   Radiation Oncology Clinic ([facility])    [facility]  1st Floor  [address] [address]   [phone]            Jun 28, 2018 11:15 AM CDT   EXTERNAL RADIATION TREATMENT with [facility] RAD ONC [place]   Radiation Oncology Clinic ([facility])    [facility]  1st Floor  [address] [address]   [phone]            Jun 29, 2018 11:15 AM CDT   EXTERNAL RADIATION TREATMENT with [facility] RAD ONC [place]   Radiation Oncology Clinic ([facility])    [facility]  1st Floor  [address] [address] [phone]              Who to contact     Please call your clinic at [phone] to:    Ask questions about your health    Make or cancel appointments    Discuss your medicines    Learn about your test results    Speak to your doctor            Additional Information About Your Visit        Zinio Information     Zinio gives you secure access to your electronic health record. If you see a primary care provider, you can also send messages to your care team and make appointments. If you have questions, please call your primary care clinic.  If you do not have a primary care provider, please call [phone] and they will assist you.      Zinio is an electronic gateway that provides easy, online access to your medical records. With Zinio, you can request a clinic appointment, read your test results, renew a prescription or communicate with your care team.     To access your existing account, please contact your [facility]  Clinic or call [phone] for assistance.        Care EveryWhere ID     This is your Care EveryWhere ID. This could be used by other organizations to access your [facility] medical records  [id number]        Your Vitals Were     BMI (Body Mass Index)                   47.93 kg/m2            Blood Pressure from Last 3 Encounters:   05/11/18 152/72   04/27/18 160/77   04/20/18 149/82    Weight from Last 3 Encounters:   06/18/18 130.6 kg (288 lb)   06/12/18 128.4 kg (283 lb)   06/04/18 128.9 kg (284 lb 1.6 oz)              Today, you had the following     No orders found for display       Primary Care Provider Office Phone # Fax #    Levar Scott [phone]       [address] [place]  [facility] [address]        Equal Access to Services     CED COLLINS : Hadii bailee fraustoo Soeliezer, waaxda luqadaha, qaybta kaalmada adezac, angela greenfield . So [facility] [phone].    ATENCIÓN: Si habla español, tiene a cortes disposición servicios gratuitos de asistencia lingüística. Arnold al [phone].    We comply with applicable federal civil rights laws and [place] laws. We do not discriminate on the basis of race, color, national origin, age, disability, sex, sexual orientation, or gender identity.            Thank you!     Thank you for choosing RADIATION ONCOLOGY CLINIC  for your care. Our goal is always to provide you with excellent care. Hearing back from our patients is one way we can continue to improve our services. Please take a few minutes to complete the written survey that you may receive in the mail after your visit with us. Thank you!             Your Updated Medication List - Protect others around you: Learn how to safely use, store and throw away your medicines at www.disposemymeds.org.          This list is accurate as of 6/18/18 12:53 PM.  Always use your most recent med list.                   Brand Name Dispense Instructions for use Diagnosis     "ACETAMINOPHEN PO      Take 325 mg by mouth every 6 hours as needed for pain        aspirin 81 MG EC tablet      Take 81 mg by mouth Pt states, \"I don't take it regularly. I take it when I remember to\".        fluticasone 50 MCG/ACT spray    FLONASE    1 Bottle    Spray 1-2 sprays into both nostrils daily    Chronic seasonal allergic rhinitis, unspecified trigger       ibuprofen 600 MG tablet    ADVIL/MOTRIN    30 tablet    Take 1 tablet (600 mg) by mouth every 6 hours as needed for pain (mild)    Endometrial carcinoma (H)       loratadine 10 MG tablet    CLARITIN    30 tablet    Take 1 tablet (10 mg) by mouth daily    Pain in joint, multiple sites, Chronic seasonal allergic rhinitis, unspecified trigger       LORazepam 0.5 MG tablet    ATIVAN    30 tablet    Take 1 tablet (0.5 mg) by mouth every 6 hours as needed (nausea/vomiting, anxiety or sleep)    Endometrial cancer (H), Encounter for long-term (current) use of medications       METFORMIN HCL PO      Take 500 mg by mouth daily (with breakfast)        prochlorperazine 5 MG tablet    COMPAZINE    30 tablet    Take 1 tablet (5 mg) by mouth every 6 hours as needed (nausea/vomiting)    Endometrial cancer (H), Encounter for long-term (current) use of medications       pyridoxine 50 MG Tabs    VITAMIN B-6    60 tablet    Take 1 tablet (50 mg) by mouth 2 times daily    Chemotherapy-induced peripheral neuropathy (H)       triamterene-hydrochlorothiazide 37.5-25 MG per capsule    DYAZIDE     Take 1 capsule by mouth every morning          "

## 2018-06-18 NOTE — LETTER
2018       RE: Lu mSith  [address]  [place] [address]     Dear Colleague,    Thank you for referring your patient, Lu Smith, to the RADIATION ONCOLOGY CLINIC. Please see a copy of my visit note below.    [facility]YS[facility]ANS  SPECIALIZING IN BREAKTHROUGHS  Radiation Oncology     On Treatment Visit Note     Lu Smith                                                                                                                                    Date: 2018             MRN: [id number]   : 1950     Reason for Visit:  On Radiation Treatment Visit      Diagnosis: Serous endometrial carcinoma, FIGOIIIC2, with close radial cervical margins     Plan for curative sandwich therapy (3C carbo/taxel->RT->3C carbo/taxel)     Treatment Summary to Date  Site Treated: Pelvis and PA nodes Current Dose: 1 980cGy/5040cGy  Fraction:       Subjective:   Today Ms. Smith continues to do well. She states that she had a very active weekend and continues to affirm to mild fatigue and watery stools. She take 1 imodium this morning with relief of her diarrhea. She denies any pelvic pain, vaginal spotting or bleeding, or pain at any other site. She has no questions or concerns.     Objective:   Gen: A/Ox3. NAD     Toxicities: Grade 1 diarrhea, alleviated with low-doses of Imodium PRN     Labs: No interval labs     Assessment:    Tolerating radiation therapy well.  All questions and concerns addressed.     Plan:   1. Continue current therapy  2. Recommendation for continued use of Imodium PRN for management of mild diarrhea       Hernandez Sidhu MD-PhD PGY-2  Radiation Oncology Resident, [facility]     Mosaiq chart and setup information reviewed  MVCT images reviewed  Ms. Smith was seen and examined by me. Note above by Dr. Sidhu was reviewed and edited by me and reflects our mutual findings and plan of care.    Ana Michelle MD  Department of  Radiation Oncology  [facility]

## 2018-06-18 NOTE — PROGRESS NOTES
[facility] PHYSICIANS  SPECIALIZING IN BREAKTHROUGHS  Radiation Oncology     On Treatment Visit Note     Lu Smith                                                                                                                                    Date: 2018             MRN: [id number]   : 1950     Reason for Visit:  On Radiation Treatment Visit      Diagnosis: Serous endometrial carcinoma, FIGOIIIC2, with close radial cervical margins     Plan for curative sandwich therapy (3C carbo/taxel->RT->3C carbo/taxel)     Treatment Summary to Date  Site Treated: Pelvis and PA nodes Current Dose: 1980cGy/5040cGy  Fraction:       Subjective:   Today Ms. Smith continues to do well. She states that she had a very active weekend and continues to affirm to mild fatigue and watery stools. She take 1 imodium this morning with relief of her diarrhea. She denies any pelvic pain, vaginal spotting or bleeding, or pain at any other site. She has no questions or concerns.     Objective:   Gen: A/Ox3. NAD     Toxicities: Grade 1 diarrhea, alleviated with low-doses of Imodium PRN     Labs: No interval labs     Assessment:    Tolerating radiation therapy well.  All questions and concerns addressed.     Plan:   1. Continue current therapy  2. Recommendation for continued use of Imodium PRN for management of mild diarrhea       Hernandez Sidhu MD-PhD PGY-2  Radiation Oncology Resident, [facility]     Mosaiq chart and setup information reviewed  MVCT images reviewed  Ms. Smith was seen and examined by me. Note above by Dr. Sidhu was reviewed and edited by me and reflects our mutual findings and plan of care.    Ana Michelle MD  Department of Radiation Oncology  [facility]

## 2018-06-19 ENCOUNTER — APPOINTMENT (OUTPATIENT)
Dept: RADIATION ONCOLOGY | Facility: CLINIC | Age: 68
End: 2018-06-19
Attending: RADIOLOGY
Payer: MEDICARE

## 2018-06-19 PROCEDURE — 77386 ZZH IMRT TREATMENT DELIVERY, COMPLEX: CPT | Performed by: RADIOLOGY

## 2018-06-20 ENCOUNTER — APPOINTMENT (OUTPATIENT)
Dept: RADIATION ONCOLOGY | Facility: CLINIC | Age: 68
End: 2018-06-20
Attending: RADIOLOGY
Payer: MEDICARE

## 2018-06-20 PROCEDURE — 77386 ZZH IMRT TREATMENT DELIVERY, COMPLEX: CPT | Performed by: RADIOLOGY

## 2018-06-21 ENCOUNTER — APPOINTMENT (OUTPATIENT)
Dept: RADIATION ONCOLOGY | Facility: CLINIC | Age: 68
End: 2018-06-21
Attending: RADIOLOGY
Payer: MEDICARE

## 2018-06-21 PROCEDURE — 77386 ZZH IMRT TREATMENT DELIVERY, COMPLEX: CPT | Performed by: RADIOLOGY

## 2018-06-22 ENCOUNTER — APPOINTMENT (OUTPATIENT)
Dept: RADIATION ONCOLOGY | Facility: CLINIC | Age: 68
End: 2018-06-22
Attending: RADIOLOGY
Payer: MEDICARE

## 2018-06-22 PROCEDURE — 77336 RADIATION PHYSICS CONSULT: CPT | Performed by: RADIOLOGY

## 2018-06-22 PROCEDURE — 77386 ZZH IMRT TREATMENT DELIVERY, COMPLEX: CPT | Performed by: RADIOLOGY

## 2018-06-25 ENCOUNTER — APPOINTMENT (OUTPATIENT)
Dept: RADIATION ONCOLOGY | Facility: CLINIC | Age: 68
End: 2018-06-25
Attending: RADIOLOGY
Payer: MEDICARE

## 2018-06-25 VITALS — BODY MASS INDEX: 47.93 KG/M2 | WEIGHT: 288 LBS

## 2018-06-25 DIAGNOSIS — C54.1 MALIGNANT NEOPLASM OF ENDOMETRIUM (H): Primary | ICD-10-CM

## 2018-06-25 NOTE — MR AVS SNAPSHOT
After Visit Summary   6/25/2018    Lu Smith    MRN: [id number]           Patient Information     Date Of Birth          1950        Visit Information        Provider Department      6/25/2018 11:45 AM Ana Michelle MD Radiation Oncology Clinic        Today's Diagnoses     Malignant neoplasm of endometrium (H)    -  1       Follow-ups after your visit        Your next 10 appointments already scheduled     Jun 26, 2018 11:15 AM CDT   EXTERNAL RADIATION TREATMENT with [facility] RAD ONC [place]   Radiation Oncology Clinic ([facility])    [facility]  1st Floor  [address] [address]   [phone]            Jun 27, 2018 11:15 AM CDT   EXTERNAL RADIATION TREATMENT with [facility] RAD ONC [place]   Radiation Oncology Clinic ([facility])    [facility]  1st Floor  [address] [address]   [phone]            Jun 28, 2018 11:15 AM CDT   EXTERNAL RADIATION TREATMENT with [facility] RAD ONC [place]   Radiation Oncology Clinic ([facility])    [facility]  1st Floor  [address] [address]   [phone]            Jun 29, 2018 11:15 AM CDT   EXTERNAL RADIATION TREATMENT with [facility] RAD ONC [place]   Radiation Oncology Clinic ([facility])    [facility]  1st Floor  [address] [address]   [phone]            Jul 02, 2018 11:15 AM CDT   EXTERNAL RADIATION TREATMENT with [facility] RAD ONC [place]   Radiation Oncology Clinic ([facility])    [facility]  1st Floor  [address] [address]   [phone]            Jul 02, 2018 11:45 AM CDT   ON TREATMENT VISIT with Ana Michelle MD   Radiation Oncology Clinic ([facility])    [facility]  1st Floor  [address] [address]   [phone]            Jul  03, 2018 11:15 AM CDT   EXTERNAL RADIATION TREATMENT with P RAD ONC [place]   Radiation Oncology Clinic ([facility])    [facility]  1st Floor  [address] [facility] [address]   [phone]            Jul 05, 2018 11:15 AM CDT   EXTERNAL RADIATION TREATMENT with [facility] RAD ONC [place]   Radiation Oncology Clinic ([facility])    [facility]  1st Floor  [address] [address]   [phone]            Jul 06, 2018 11:15 AM CDT   EXTERNAL RADIATION TREATMENT with [facility] RAD ONC [place]   [facility] ([facility])    [facility]  1st Floor  [address] [facility] [address]   [phone]            Jul 09, 2018 11:15 AM CDT   EXTERNAL RADIATION TREATMENT with [facility] RAD ONC [place]   Radiation Oncology Clinic ([facility])    [facility]  1st Floor  [address] [address] [phone]              Who to contact     Please call your clinic at [phone] to:    Ask questions about your health    Make or cancel appointments    Discuss your medicines    Learn about your test results    Speak to your doctor            Additional Information About Your Visit        Veeqo Information     Veeqo gives you secure access to your electronic health record. If you see a primary care provider, you can also send messages to your care team and make appointments. If you have questions, please call your primary care clinic.  If you do not have a primary care provider, please call [phone] and they will assist you.      Veeqo is an electronic gateway that provides easy, online access to your medical records. With Veeqo, you can request a clinic appointment, read your test results, renew a prescription or communicate with your care team.     To access your existing account, please contact your [facility]  Clinic or call [phone] for assistance.        Care EveryWhere ID     This is your Care EveryWhere ID. This could be used by other organizations to access your [facility] medical records  [id number]        Your Vitals Were     BMI (Body Mass Index)                   47.93 kg/m2            Blood Pressure from Last 3 Encounters:   05/11/18 152/72   04/27/18 160/77   04/20/18 149/82    Weight from Last 3 Encounters:   06/25/18 130.6 kg (288 lb)   06/18/18 130.6 kg (288 lb)   06/12/18 128.4 kg (283 lb)              Today, you had the following     No orders found for display       Primary Care Provider Office Phone # Fax #    Levar Scott [phone]       [address]  [place] [facility] [address]        Equal Access to Services     CED COLLINS : Geeta lafleur Soeliezer, waaxda luqadaha, qaybta kaalmabandar dimas, angela greenfield . So [facility] [phone].    ATENCIÓN: Si habla español, tiene a cortes disposición servicios gratuitos de asistencia lingüística. Llame al [phone].    We comply with applicable federal civil rights laws and [place] laws. We do not discriminate on the basis of race, color, national origin, age, disability, sex, sexual orientation, or gender identity.            Thank you!     Thank you for choosing RAD[facility]ATION ONCOLOGY CLINIC  for your care. Our goal is always to provide you with excellent care. Hearing back from our patients is one way we can continue to improve our services. Please take a few minutes to complete the written survey that you may receive in the mail after your visit with us. Thank you!             Your Updated Medication List - Protect others around you: Learn how to safely use, store and throw away your medicines at www.disposemymeds.org.          This list is accurate as of 6/25/18  4:48 PM.  Always use your most recent med list.                   Brand Name Dispense Instructions for use Diagnosis     "ACETAMINOPHEN PO      Take 325 mg by mouth every 6 hours as needed for pain        aspirin 81 MG EC tablet      Take 81 mg by mouth Pt states, \"I don't take it regularly. I take it when I remember to\".        fluticasone 50 MCG/ACT spray    FLONASE    1 Bottle    Spray 1-2 sprays into both nostrils daily    Chronic seasonal allergic rhinitis, unspecified trigger       ibuprofen 600 MG tablet    ADVIL/MOTRIN    30 tablet    Take 1 tablet (600 mg) by mouth every 6 hours as needed for pain (mild)    Endometrial carcinoma (H)       loratadine 10 MG tablet    CLARITIN    30 tablet    Take 1 tablet (10 mg) by mouth daily    Pain in joint, multiple sites, Chronic seasonal allergic rhinitis, unspecified trigger       LORazepam 0.5 MG tablet    ATIVAN    30 tablet    Take 1 tablet (0.5 mg) by mouth every 6 hours as needed (nausea/vomiting, anxiety or sleep)    Endometrial cancer (H), Encounter for long-term (current) use of medications       METFORMIN HCL PO      Take 500 mg by mouth daily (with breakfast)        prochlorperazine 5 MG tablet    COMPAZINE    30 tablet    Take 1 tablet (5 mg) by mouth every 6 hours as needed (nausea/vomiting)    Endometrial cancer (H), Encounter for long-term (current) use of medications       pyridoxine 50 MG Tabs    VITAMIN B-6    60 tablet    Take 1 tablet (50 mg) by mouth 2 times daily    Chemotherapy-induced peripheral neuropathy (H)       triamterene-hydrochlorothiazide 37.5-25 MG per capsule    DYAZIDE     Take 1 capsule by mouth every morning          "

## 2018-06-25 NOTE — PROGRESS NOTES
[facility] PHYSICIANS  SPECIALIZING IN BREAKTHROUGHS  Radiation Oncology     On Treatment Visit Note     Lu Smith                                                                                                                                    Date: 2018             MRN: [id number]   : 1950     Reason for Visit:  On Radiation Treatment Visit      Diagnosis: Serous endometrial carcinoma, FIGOIIIC2, with close radial cervical margins     Plan for curative sandwich therapy (3C carbo/taxel->RT->3C carbo/taxel)     Treatment Summary to Date  Site Treated: Pelvis and PA nodes Current Dose: 2880cGy/5040cGy  Fraction:         Subjective: Today Ms. Smith continues to do well. She admits fatigue and intermittent watery stools, which have slightly worsened since last week. She takes 2-3 imodium this morning with relief. She denies any lightheadedness, pelvic pain, vaginal spotting or bleeding, or pain at any other site. She has no questions or concerns. She requested to review her pre-tx CT scan today.     Objective:   Gen: A/Ox3. NAD     Toxicities: Grade 1 diarrhea, alleviated with low-doses of Imodium PRN     Labs: No interval labs     Assessment:    Tolerating radiation therapy well.  All questions and concerns addressed.     Plan:   1. Continue current therapy  2. Recommendation for continued use of Imodium PRN and judicious intake of fluids for management of mild diarrhea       Hernandez Sidhu MD-PhD PGY-2  Radiation Oncology Resident, [facility]     Mosaiq chart and setup information reviewed  MVCT images reviewed  Ms. Smith was seen and examined by me. Note above by Dr. Sidhu was reviewed and edited by me and reflects our mutual findings and plan of care.    Ana Michelle MD  Department of Radiation Oncology  [facility]

## 2018-06-25 NOTE — LETTER
2018       RE: Lu Smith  [address]  [place] [address]     Dear Colleague,    Thank you for referring your patient, Lu Smith, to the RADIATION ONCOLOGY CLINIC. Please see a copy of my visit note below.    [facility] PHYSIC[facility]ANS  SPECIALIZING IN BREAKTHROUGHS  Radiation Oncology     On Treatment Visit Note     Lu Smith                                                                                                                                    Date: 2018             MRN: [id number]   : 1950     Reason for Visit:  On Radiation Treatment Visit      Diagnosis: Serous endometrial carcinoma, FIGOIIIC2, with close radial cervical margins     Plan for curative sandwich therapy (3C carbo/taxel->RT->3C carbo/taxel)     Treatment Summary to Date  Site Treated: Pelvis and PA nodes Current Dose: 2880cGy/5040cGy  Fraction:         Subjective: Today Ms. Smith continues to do well. She admits fatigue and intermittent watery stools, which have slightly worsened since last week. She takes 2-3 imodium this morning with relief. She denies any  lightheadedness, pelvic pain, vaginal spotting or bleeding, or pain at any other site. She has no questions or concerns. She requested to review her pre-tx CT scan today.     Objective:   Gen: A/Ox3. NAD     Toxicities: Grade 1 diarrhea, alleviated with low-doses of Imodium PRN     Labs: No interval labs     Assessment:    Tolerating radiation therapy well.  All questions and concerns addressed.     Plan:   1. Continue current therapy  2. Recommendation for continued use of Imodium PRN and judicious intake of fluids for management of mild diarrhea       Hernandez Sidhu MD-PhD PGY-2  Radiation Oncology Resident, [facility]     Mosaiq chart and setup information reviewed  MVCT images reviewed  Ms. Smith was seen and examined by me. Note above by Dr. Sidhu was reviewed and edited by me and reflects our  mutual findings and plan of care.    Ana Michelle MD  Department of Radiation Oncology  [facility]

## 2018-06-26 ENCOUNTER — APPOINTMENT (OUTPATIENT)
Dept: RADIATION ONCOLOGY | Facility: CLINIC | Age: 68
End: 2018-06-26
Attending: RADIOLOGY
Payer: MEDICARE

## 2018-06-26 PROCEDURE — 77386 ZZH IMRT TREATMENT DELIVERY, COMPLEX: CPT | Performed by: RADIOLOGY

## 2018-06-27 ENCOUNTER — APPOINTMENT (OUTPATIENT)
Dept: RADIATION ONCOLOGY | Facility: CLINIC | Age: 68
End: 2018-06-27
Attending: RADIOLOGY
Payer: MEDICARE

## 2018-06-27 PROCEDURE — 77386 ZZH IMRT TREATMENT DELIVERY, COMPLEX: CPT | Performed by: RADIOLOGY

## 2018-06-28 ENCOUNTER — APPOINTMENT (OUTPATIENT)
Dept: RADIATION ONCOLOGY | Facility: CLINIC | Age: 68
End: 2018-06-28
Attending: RADIOLOGY
Payer: MEDICARE

## 2018-06-28 PROCEDURE — 77386 ZZH IMRT TREATMENT DELIVERY, COMPLEX: CPT | Performed by: RADIOLOGY

## 2018-06-29 ENCOUNTER — APPOINTMENT (OUTPATIENT)
Dept: RADIATION ONCOLOGY | Facility: CLINIC | Age: 68
End: 2018-06-29
Attending: RADIOLOGY
Payer: MEDICARE

## 2018-06-29 PROCEDURE — 77386 ZZH IMRT TREATMENT DELIVERY, COMPLEX: CPT | Performed by: RADIOLOGY

## 2018-07-02 ENCOUNTER — APPOINTMENT (OUTPATIENT)
Dept: RADIATION ONCOLOGY | Facility: CLINIC | Age: 68
End: 2018-07-02
Attending: RADIOLOGY
Payer: MEDICARE

## 2018-07-02 VITALS — WEIGHT: 286 LBS | BODY MASS INDEX: 47.59 KG/M2

## 2018-07-02 DIAGNOSIS — C54.1 MALIGNANT NEOPLASM OF ENDOMETRIUM (H): Primary | ICD-10-CM

## 2018-07-02 PROCEDURE — 77386 ZZH IMRT TREATMENT DELIVERY, COMPLEX: CPT | Performed by: RADIOLOGY

## 2018-07-02 PROCEDURE — 77336 RADIATION PHYSICS CONSULT: CPT | Performed by: RADIOLOGY

## 2018-07-02 NOTE — LETTER
2018       RE: Lu Smith  [address]  [facility] [address]     Dear Colleague,    Thank you for referring your patient, Lu Smith, to the RADIATION ONCOLOGY CLINIC. Please see a copy of my visit note below.    [facility] PHYSICIANS  SPECIALIZING IN BREAKTHROUGHS  Radiation Oncology    On Treatment Visit Note      Lu Smith      Date: 2018   MRN: [id number]   : 1950  Diagnosis: Endometrial Cancer      Reason for Visit:  On Radiation Treatment Visit     Diagnosis: Serous endometrial carcinoma, FIGOIIIC2, with close radial cervical margins      Plan for curative sandwich therapy (3C carbo/taxol->RT->3C carbo/taxol)    Treatment Summary to Date   Site: Pelvis Current Dose: 3600/5040 cGy Fractions:       Chemotherapy  Chemo concurrent with radx?: Yes  Oncologist: Dr Henderson  Drug Name/Frequency 1: Taxol/Carboplatin    Subjective:     Continues to tolerate treatment well. Primary complaint is fatigue but she is still able to manage all ADLs without too much difficulty. Diarrhea is still present but well controlled on imodium. She uses imodium following loose bowel movements and has been taking 2-3 per day recently. No skin irritation. No additional complaints.    Nursing ROS:   Nutrition Alteration  Diet Type: Patient's Preference  Skin  Skin Reaction: 0 - No changes     ENT and Mouth Exam  Mucositis - Current: 0 - None   Cardiovascular  Respiratory effort: 1 - Normal - without distress  Gastrointestinal  Nausea: 0 - None  Diarrhea: 1 - Abdominal cramping, two or less soft or liquid bowel movements (Controlled with Immodium)    Pain Assessment  0-10 Pain Scale: 0    Objective:   Wt 129.7 kg (286 lb)  BMI 47.59 kg/m2  Gen: Appears well, in no acute distress  Skin: No erythema    Labs:  CBC RESULTS:   Recent Labs   Lab Test  18   0844   WBC  7.2   RBC  3.95   HGB  11.0*   HCT  33.9*   MCV  86   MCH  27.8   MCHC  32.4   RDW  15.0   PLT  246      ELECTROLYTES:  Recent Labs   Lab Test  05/11/18   0844   NA  138   POTASSIUM  3.6   CHLORIDE  103   MARYLOU  9.3   CO2  25   BUN  15   CR  0.66   GLC  110*       Assessment:    Tolerating radiation therapy well.  All questions and concerns addressed.    Toxicities:  Fatigue: Grade 1: Fatigue relieved by rest  Diarrhea: Grade 1: Increase of <4 stools per day over baseline; mild increase in ostomy output compared to baseline    Mosaiq chart and setup information reviewed  MVCT/IGRT images checked    Medication Review  Med list reviewed with patient?: Yes    Educational Topic Discussed  Education Instructions: Reviewed    Plan:   1. Continue current therapy.    2. Continue imodium prn.    The patient was seen and discussed with staff, Dr. Michelle.    Anoop Riddle MD  Resident, PGY-3  Department of Radiation Oncology  [facility]  [phone]    Mosaiq chart and setup information reviewed  MVCT images reviewed  Ms. Smith was seen and examined by me. Note above by Dr. Riddle was reviewed and edited by me and reflects our mutual findings and plan of care.    Ana Michelle MD  Department of Radiation Oncology  [facility]

## 2018-07-02 NOTE — PROGRESS NOTES
[facility] PHYSICIANS  SPECIALIZING IN BREAKTHROUGHS  Radiation Oncology    On Treatment Visit Note      Lu Smith      Date: 2018   MRN: [id number]   : 1950  Diagnosis: Endometrial Cancer      Reason for Visit:  On Radiation Treatment Visit     Diagnosis: Serous endometrial carcinoma, FIGOIIIC2, with close radial cervical margins      Plan for curative sandwich therapy (3C carbo/taxol->RT->3C carbo/taxol)    Treatment Summary to Date   Site: Pelvis Current Dose: 3600/5040 cGy Fractions:       Chemotherapy  Chemo concurrent with radx?: Yes  Oncologist: Dr Henderson  Drug Name/Frequency 1: Taxol/Carboplatin    Subjective:     Continues to tolerate treatment well. Primary complaint is fatigue but she is still able to manage all ADLs without too much difficulty. Diarrhea is still present but well controlled on imodium. She uses imodium following loose bowel movements and has been taking 2-3 per day recently. No skin irritation. No additional complaints.    Nursing ROS:   Nutrition Alteration  Diet Type: Patient's Preference  Skin  Skin Reaction: 0 - No changes     ENT and Mouth Exam  Mucositis - Current: 0 - None   Cardiovascular  Respiratory effort: 1 - Normal - without distress  Gastrointestinal  Nausea: 0 - None  Diarrhea: 1 - Abdominal cramping, two or less soft or liquid bowel movements (Controlled with Immodium)    Pain Assessment  0-10 Pain Scale: 0    Objective:   Wt 129.7 kg (286 lb)  BMI 47.59 kg/m2  Gen: Appears well, in no acute distress  Skin: No erythema    Labs:  CBC RESULTS:   Recent Labs   Lab Test  18   0844   WBC  7.2   RBC  3.95   HGB  11.0*   HCT  33.9*   MCV  86   MCH  27.8   MCHC  32.4   RDW  15.0   PLT  246     ELECTROLYTES:  Recent Labs   Lab Test  18   0844   NA  138   POTASSIUM  3.6   CHLORIDE  103   MARYLOU  9.3   CO2  25   BUN  15   CR  0.66   GLC  110*       Assessment:    Tolerating radiation therapy well.  All questions and concerns  addressed.    Toxicities:  Fatigue: Grade 1: Fatigue relieved by rest  Diarrhea: Grade 1: Increase of <4 stools per day over baseline; mild increase in ostomy output compared to baseline    Mosaiq chart and setup information reviewed  MVCT/IGRT images checked    Medication Review  Med list reviewed with patient?: Yes    Educational Topic Discussed  Education Instructions: Reviewed    Plan:   1. Continue current therapy.    2. Continue imodium prn.    The patient was seen and discussed with staff, Dr. Michelle.    Anoop Riddle MD  Resident, PGY-3  Department of Radiation Oncology  [facility]  [phone]    Mosaiq chart and setup information reviewed  MVCT images reviewed  Ms. Smith was seen and examined by me. Note above by Dr. Riddle was reviewed and edited by me and reflects our mutual findings and plan of care.    Ana Michelle MD  Department of Radiation Oncology  [facility]

## 2018-07-02 NOTE — MR AVS SNAPSHOT
After Visit Summary   7/2/2018    Lu Smith    MRN: [id number]           Patient Information     Date Of Birth          1950        Visit Information        Provider Department      7/2/2018 11:45 AM Ana Michelle MD Radiation Oncology Clinic        Today's Diagnoses     Malignant neoplasm of endometrium (H)    -  1       Follow-ups after your visit        Your next 10 appointments already scheduled     Jul 05, 2018 11:15 AM CDT   EXTERNAL RADIATION TREATMENT with [facility] [place]   Radiation Oncology Clinic ([facility])    [facility]  1st Floor  [address] [address]   [phone]            Jul 06, 2018 11:15 AM CDT   EXTERNAL RADIATION TREATMENT with [facility] [place]   Radiation Oncology Clinic ([facility])    [facility]  1st Floor  [address] [address]   [phone]            Jul 09, 2018 11:15 AM CDT   EXTERNAL RADIATION TREATMENT with [facility] [place]   Radiation Oncology Clinic ([facility])    [facility]  1st Floor  [address] [address]   [phone]            Jul 09, 2018 11:45 AM CDT   ON TREATMENT VISIT with Ana Michelle MD   Radiation Oncology Clinic ([facility])    [facility]  1st Floor  [address] [address]   [phone]            Jul 10, 2018 11:15 AM CDT   EXTERNAL RADIATION TREATMENT with [facility] [place]   Radiation Oncology Clinic ([facility])    [facility]  1st Floor  [address] [address]   [phone]            Jul 11, 2018 11:15 AM CDT   EXTERNAL RADIATION TREATMENT with [facility] [place]   Radiation Oncology Clinic ([facility])    [facility]  1st Floor  [address] [address]   [phone]            Jul 12,  2018 11:15 AM CDT   EXTERNAL RADIATION TREATMENT with [facility] [place]   Radiation Oncology Clinic ([facility])    [facility]  1st Floor  [address] [address]   [phone]            Jul 13, 2018 11:15 AM CDT   EXTERNAL RADIATION TREATMENT with [facility] RAD [facility] [place]   Radiation Oncology Clinic ([facility])    [facility]  1st Floor  [address] [address] [phone]            Jul 16, 2018  8:45 AM CDT   TCT/SIM Suite Visit with Sara Rosado MD   Radiation Oncology Clinic ([facility])    [facility]  1st Floor  [address] [address] [phone]            Jul 20, 2018  9:45 AM CDT   TCT/SIM Suite Visit with Sara Rosado MD   Radiation Oncology Clinic ([facility])    [facility]  1st Floor  [address] [address] [phone]              Who to contact     Please call your clinic at [phone] to:    Ask questions about your health    Make or cancel appointments    Discuss your medicines    Learn about your test results    Speak to your doctor            Additional Information About Your Visit        [employer] Information     [employer] gives you secure access to your electronic health record. If you see a primary care provider, you can also send messages to your care team and make appointments. If you have questions, please call your primary care clinic.  If you do not have a primary care provider, please call [phone] and they will assist you.      [employer] is an electronic gateway that provides easy, online access to your medical records. With [employer], you can request a clinic appointment, read your test results, renew a prescription or communicate with your care team.     To access your existing account, please contact your [facility] or call  [phone] for assistance.        Care EveryWhere ID     This is your Care EveryWhere ID. This could be used by other organizations to access your [facility] medical records  [id number]        Your Vitals Were     BMI (Body Mass Index)                   47.59 kg/m2            Blood Pressure from Last 3 Encounters:   05/11/18 152/72   04/27/18 160/77   04/20/18 149/82    Weight from Last 3 Encounters:   07/02/18 129.7 kg (286 lb)   06/25/18 130.6 kg (288 lb)   06/18/18 130.6 kg (288 lb)              Today, you had the following     No orders found for display       Primary Care Provider Office Phone # Fax #    Levar Scott [phone]       [address] [place]  [facility] [address]        Equal Access to Services     CED COLLINS : Hadii bailee fraustoo Soeliezer, waaxda luqadaha, qaybta kaalmada wing, angela greenfield . So [facility] [phone].    ATENCIÓN: Si habla español, tiene a cortes disposición servicios gratuitos de asistencia lingüística. Arnold al [phone].    We comply with applicable federal civil rights laws and [place] laws. We do not discriminate on the basis of race, color, national origin, age, disability, sex, sexual orientation, or gender identity.            Thank you!     Thank you for choosing RADIATION ONCOLOGY CLINIC  for your care. Our goal is always to provide you with excellent care. Hearing back from our patients is one way we can continue to improve our services. Please take a few minutes to complete the written survey that you may receive in the mail after your visit with us. Thank you!             Your Updated Medication List - Protect others around you: Learn how to safely use, store and throw away your medicines at www.disposemymeds.org.          This list is accurate as of 7/2/18 11:59 PM.  Always use your most recent med list.                   Brand Name Dispense Instructions for use Diagnosis    ACETAMINOPHEN PO       "Take 325 mg by mouth every 6 hours as needed for pain        aspirin 81 MG EC tablet      Take 81 mg by mouth Pt states, \"I don't take it regularly. I take it when I remember to\".        fluticasone 50 MCG/ACT spray    FLONASE    1 Bottle    Spray 1-2 sprays into both nostrils daily    Chronic seasonal allergic rhinitis, unspecified trigger       ibuprofen 600 MG tablet    ADVIL/MOTRIN    30 tablet    Take 1 tablet (600 mg) by mouth every 6 hours as needed for pain (mild)    Endometrial carcinoma (H)       loratadine 10 MG tablet    CLARITIN    30 tablet    Take 1 tablet (10 mg) by mouth daily    Pain in joint, multiple sites, Chronic seasonal allergic rhinitis, unspecified trigger       LORazepam 0.5 MG tablet    ATIVAN    30 tablet    Take 1 tablet (0.5 mg) by mouth every 6 hours as needed (nausea/vomiting, anxiety or sleep)    Endometrial cancer (H), Encounter for long-term (current) use of medications       METFORMIN HCL PO      Take 500 mg by mouth daily (with breakfast)        prochlorperazine 5 MG tablet    COMPAZINE    30 tablet    Take 1 tablet (5 mg) by mouth every 6 hours as needed (nausea/vomiting)    Endometrial cancer (H), Encounter for long-term (current) use of medications       pyridoxine 50 MG Tabs    VITAMIN B-6    60 tablet    Take 1 tablet (50 mg) by mouth 2 times daily    Chemotherapy-induced peripheral neuropathy (H)       triamterene-hydrochlorothiazide 37.5-25 MG per capsule    DYAZIDE     Take 1 capsule by mouth every morning          "

## 2018-07-03 ENCOUNTER — APPOINTMENT (OUTPATIENT)
Dept: RADIATION ONCOLOGY | Facility: CLINIC | Age: 68
End: 2018-07-03
Attending: RADIOLOGY
Payer: MEDICARE

## 2018-07-03 PROCEDURE — 77386 ZZH IMRT TREATMENT DELIVERY, COMPLEX: CPT | Performed by: RADIOLOGY

## 2018-07-05 ENCOUNTER — APPOINTMENT (OUTPATIENT)
Dept: RADIATION ONCOLOGY | Facility: CLINIC | Age: 68
End: 2018-07-05
Attending: RADIOLOGY
Payer: MEDICARE

## 2018-07-05 PROCEDURE — 77386 ZZH IMRT TREATMENT DELIVERY, COMPLEX: CPT | Performed by: RADIOLOGY

## 2018-07-06 ENCOUNTER — APPOINTMENT (OUTPATIENT)
Dept: RADIATION ONCOLOGY | Facility: CLINIC | Age: 68
End: 2018-07-06
Attending: RADIOLOGY
Payer: MEDICARE

## 2018-07-06 PROCEDURE — 77386 ZZH IMRT TREATMENT DELIVERY, COMPLEX: CPT | Performed by: RADIOLOGY

## 2018-07-09 ENCOUNTER — OFFICE VISIT (OUTPATIENT)
Dept: RADIATION ONCOLOGY | Facility: CLINIC | Age: 68
End: 2018-07-09
Attending: RADIOLOGY
Payer: MEDICARE

## 2018-07-09 VITALS — BODY MASS INDEX: 47.26 KG/M2 | WEIGHT: 284 LBS

## 2018-07-09 DIAGNOSIS — C54.1 MALIGNANT NEOPLASM OF ENDOMETRIUM (H): Primary | ICD-10-CM

## 2018-07-09 PROCEDURE — 77386 ZZH IMRT TREATMENT DELIVERY, COMPLEX: CPT | Performed by: RADIOLOGY

## 2018-07-09 NOTE — LETTER
2018       RE: Lu Smith  [address]  [facility] [address]     Dear Colleague,    Thank you for referring your patient, Lu Smith, to the RADIATION ONCOLOGY CLINIC. Please see a copy of my visit note below.    RADIATION ONCOLOGY WEEKLY ON TREATMENT VISIT   Encounter Date: 2018    Patient Name: Lu Smith  MRN: [id number]  : 1950     Disease and Stage: Serous endometrial cancer, FIGO IIIC2  Treatment Site: Pelvis and PA nodes  Current Dose/Planned Total Dose: [4320] / [5040] cGy  Daily Fraction Size: [180] cGy/day, [5] times/week  Concurrent Chemotherapy: No    Treatment Summary:  Ms. Smith is a 68 year old female with FIGO IIIC2 serous endometrial cancer. She initially presented with post-menopausal bleeding and an endometrial biopsy showed high-grade serous carcinoma.  She underwent robotic assisted TLH-BSO, omentectomy, PPALND.  Surgical path was significant for outer one half myometrial invasion, LV SI, cervical stromal invasion, and bilateral ovarian involvement.  0/22 pelvic lymph nodes were involved, but 4/9 right and 2/10 left periaortic lymph nodes were positive; several with ADAL.  Also noted was a close radial margin of 1 mm.  She completed 3 cycles of carbo/taxol and is now undergoing adjuvant RT with the plan for 3 additional cycles of chemotherapy following RT.     ED visits/Hospitalizations:  None    Missed Treatments:  18    Subjective: Ms. Smith presents to clinic today for her weekly on-treatment visit. She states that she continues to tolerate treatment well but her fatigue is getting worse. Still able to complete ADLs without difficulty but resting more. Diarrhea is well controlled with imodium. Notes some cramping pain when she valsalvas. Also notes a little skin irritation under he pannus.    ROS:   Constitutional  Pain (0-10): 0  Fatigue: Grade 1    CNS  Headaches: None    ENT  Mucositis: N/A    Cardiopulmonary  Dyspnea: N/A    GI  Nausea/vomiting:  None    Nutrition  PEG: No  Diet: Full    Integumentary  Dermatitis: None    Objective:   Wt 128.8 kg (284 lb)  BMI 47.26 kg/m2  Current weight: 128.8 kg  Last week's weight: 129.7 kg  Starting weight: 129.1 kg      General: alert, oriented, NAD  HEENT: NCAT, EOMI  Cardiac: WWP  Respiratory: Breathing comfortably on RA  Skin: No erythema    Treatment-related toxicities (CTCAE v5.0):  1. Fatigue: Grade 1: Fatigue relieved by rest  2. Diarrhea: Grade 1: Increase of <4 stools per day over baseline; mild increase in ostomy output compared to baseline    Assessment:    Ms. Smith is a 68 year old female with a FIGO IIIC2 endometrial cancer s/p surgery and 3 cycles adjuvant chemo. She is currently undergoing adjuvant RT as per sandwich protocol.    Plan:   Continue radiotherapy  Continue imodium prn  RTC in 6-8 weeks following brachytherapy    Pain management:    N/A    Fluids/Electrolytes/Nutrition:    Regular diet    Dermatitis:    N/A    Mosaiq chart and setup information reviewed  IGRT images reviewed    Medication Review  Med list reviewed with patient?: Yes    The patient was seen and discussed with staff, Dr. Michelle.    Anoop Riddle MD  Resident, PGY-4  Department of Radiation Oncology  [facility]  [phone]    Ms. Smith was seen and examined by me. Note above by Dr. Riddle was reviewed and edited by me and reflects our mutual findings and plan of care.    Ana Michelle MD  Department of Radiation Oncology  [facility]

## 2018-07-09 NOTE — MR AVS SNAPSHOT
After Visit Summary   7/9/2018    Lu Smith    MRN: [id number]           Patient Information     Date Of Birth          1950        Visit Information        Provider Department      7/9/2018 11:45 AM Ana Michelle MD Radiation Oncology Clinic        Today's Diagnoses     Malignant neoplasm of endometrium (H)    -  1       Follow-ups after your visit        Your next 10 appointments already scheduled     Jul 10, 2018 11:15 AM CDT   EXTERNAL RADIATION TREATMENT with [facility] RAD ONC [place]   Radiation Oncology Clinic ([facility])    [facility]  1st Floor  [address] [address]   [phone]            Jul 11, 2018 11:15 AM CDT   EXTERNAL RADIATION TREATMENT with [facility] RAD ONC [place]   Radiation Oncology Clinic ([facility])    [facility]  1st Floor  [address] [address]   [phone]            Jul 12, 2018 11:15 AM CDT   EXTERNAL RADIATION TREATMENT with [facility] RAD ONC [place]   Radiation Oncology Clinic ([facility])    [facility]  1st Floor  [address] [address]   [phone]            Jul 13, 2018 11:15 AM CDT   EXTERNAL RADIATION TREATMENT with [facility] RAD ONC [place]   Radiation Oncology Clinic ([facility])    [facility]  1st Floor  [address] [address]   [phone]            Jul 16, 2018  8:45 AM CDT   TCT/SIM Suite Visit with Sara Rosado MD   Radiation Oncology Clinic ([facility])    [facility]  1st Floor  [address] [address]   [phone]            Jul 20, 2018  9:45 AM CDT   TCT/SIM Suite Visit with Sara Rosado MD   Radiation Oncology Clinic ([facility])    [facility]  1st Floor  [address] [address]   [phone]            Aug 13, 2018  6:30 AM  CDT   [facility] Lab Draw with  [facility] LAB DRAW   [facility] ([facility])    [address]  [address]  [facility] [id number] [phone]            Aug 13, 2018  7:00 AM CDT   (Arrive by 6:45 AM)   Return Active Treatment with ANU Selby CNP   [facility] ([facility])    [address]  [address]  [facility] [address] [phone]            Aug 13, 2018  8:00 AM CDT   Infusion 360 with UC ONCOLOGY INFUSION, UC 14 ATC   [facility] ([facility])    [address]  [address]  [facility] [address] [phone]              Who to contact     Please call your clinic at [phone] to:    Ask questions about your health    Make or cancel appointments    Discuss your medicines    Learn about your test results    Speak to your doctor            Additional Information About Your Visit        [employer] Information     [employer] gives you secure access to your electronic health record. If you see a primary care provider, you can also send messages to your care team and make appointments. If you have questions, please call your primary care clinic.  If you do not have a primary care provider, please call [phone] and they will assist you.      [employer] is an electronic gateway that provides easy, online access to your medical records. With [employer], you can request a clinic appointment, read your test results, renew a prescription or communicate with your care team.     To access your existing account, please contact your [facility] or call [phone] for assistance.        Care EveryWhere ID     This is your Care EveryWhere ID. This could be used by other organizations to access your [facility] medical records  [id number]        Your Vitals Were     BMI (Body Mass Index)                   47.26 kg/m2            Blood  "Pressure from Last 3 Encounters:   05/11/18 152/72   04/27/18 160/77   04/20/18 149/82    Weight from Last 3 Encounters:   07/09/18 128.8 kg (284 lb)   07/02/18 129.7 kg (286 lb)   06/25/18 130.6 kg (288 lb)              Today, you had the following     No orders found for display       Primary Care Provider Office Phone # Fax #    Levar Scott [phone]       [address] DAR[place]ING [address] [place] [address]        Equal Access to Services     [facility]: Hadii bailee lafleur Soeliezer, waaxda luqadaha, qaybta kaalmada wing, angela greenfield . So [facility] [phone].    ATENCIÓN: Si habla español, tiene a cortes disposición servicios gratuitos de asistencia lingüística. [facility] [phone].    We comply with applicable federal civil rights laws and [place] laws. We do not discriminate on the basis of race, color, national origin, age, disability, sex, sexual orientation, or gender identity.            Thank you!     Thank you for choosing RADIATION ONCOLOGY CLINIC  for your care. Our goal is always to provide you with excellent care. Hearing back from our patients is one way we can continue to improve our services. Please take a few minutes to complete the written survey that you may receive in the mail after your visit with us. Thank you!             Your Updated Medication List - Protect others around you: Learn how to safely use, store and throw away your medicines at www.disposemymeds.org.          This list is accurate as of 7/9/18  8:56 PM.  Always use your most recent med list.                   Brand Name Dispense Instructions for use Diagnosis    ACETAMINOPHEN PO      Take 325 mg by mouth every 6 hours as needed for pain        aspirin 81 MG EC tablet      Take 81 mg by mouth Pt states, \"I don't take it regularly. I take it when I remember to\".        fluticasone 50 MCG/ACT spray    FLONASE    1 Bottle    Spray 1-2 sprays into both nostrils daily    " Chronic seasonal allergic rhinitis, unspecified trigger       ibuprofen 600 MG tablet    ADVIL/MOTRIN    30 tablet    Take 1 tablet (600 mg) by mouth every 6 hours as needed for pain (mild)    Endometrial carcinoma (H)       loratadine 10 MG tablet    CLARITIN    30 tablet    Take 1 tablet (10 mg) by mouth daily    Pain in joint, multiple sites, Chronic seasonal allergic rhinitis, unspecified trigger       LORazepam 0.5 MG tablet    ATIVAN    30 tablet    Take 1 tablet (0.5 mg) by mouth every 6 hours as needed (nausea/vomiting, anxiety or sleep)    Endometrial cancer (H), Encounter for long-term (current) use of medications       METFORMIN HCL PO      Take 500 mg by mouth daily (with breakfast)        prochlorperazine 5 MG tablet    COMPAZINE    30 tablet    Take 1 tablet (5 mg) by mouth every 6 hours as needed (nausea/vomiting)    Endometrial cancer (H), Encounter for long-term (current) use of medications       pyridoxine 50 MG Tabs    VITAMIN B-6    60 tablet    Take 1 tablet (50 mg) by mouth 2 times daily    Chemotherapy-induced peripheral neuropathy (H)       triamterene-hydrochlorothiazide 37.5-25 MG per capsule    DYAZIDE     Take 1 capsule by mouth every morning

## 2018-07-09 NOTE — PROGRESS NOTES
RADIATION ONCOLOGY WEEKLY ON TREATMENT VISIT   Encounter Date: 2018    Patient Name: Lu Smith  MRN: [id number]  : 1950     Disease and Stage: Serous endometrial cancer, FIGO IIIC2  Treatment Site: Pelvis and PA nodes  Current Dose/Planned Total Dose: [4320] / [5040] cGy  Daily Fraction Size: [180] cGy/day, [5] times/week  Concurrent Chemotherapy: No    Treatment Summary:  Ms. Smith is a 68 year old female with FIGO IIIC2 serous endometrial cancer. She initially presented with post-menopausal bleeding and an endometrial biopsy showed high-grade serous carcinoma.  She underwent robotic assisted TLH-BSO, omentectomy, PPALND.  Surgical path was significant for outer one half myometrial invasion, LV SI, cervical stromal invasion, and bilateral ovarian involvement.  0/22 pelvic lymph nodes were involved, but 4/9 right and 2/10 left periaortic lymph nodes were positive; several with ADAL.  Also noted was a close radial margin of 1 mm.  She completed 3 cycles of carbo/taxol and is now undergoing adjuvant RT with the plan for 3 additional cycles of chemotherapy following RT.     ED visits/Hospitalizations:  None    Missed Treatments:  18    Subjective: Ms. Smith presents to clinic today for her weekly on-treatment visit. She states that she continues to tolerate treatment well but her fatigue is getting worse. Still able to complete ADLs without difficulty but resting more. Diarrhea is well controlled with imodium. Notes some cramping pain when she valsalvas. Also notes a little skin irritation under he pannus.    ROS:   Constitutional  Pain (0-10): 0  Fatigue: Grade 1    CNS  Headaches: None    ENT  Mucositis: N/A    Cardiopulmonary  Dyspnea: N/A    GI  Nausea/vomiting: None    Nutrition  PEG: No  Diet: Full    Integumentary  Dermatitis: None    Objective:   Wt 128.8 kg (284 lb)  BMI 47.26 kg/m2  Current weight: 128.8 kg  Last week's weight: 129.7 kg  Starting weight: 129.1 kg      General:  alert, oriented, NAD  HEENT: NCAT, EOMI  Cardiac: WWP  Respiratory: Breathing comfortably on RA  Skin: No erythema    Treatment-related toxicities (CTCAE v5.0):  1. Fatigue: Grade 1: Fatigue relieved by rest  2. Diarrhea: Grade 1: Increase of <4 stools per day over baseline; mild increase in ostomy output compared to baseline    Assessment:    Ms. Smith is a 68 year old female with a FIGO IIIC2 endometrial cancer s/p surgery and 3 cycles adjuvant chemo. She is currently undergoing adjuvant RT as per sandwich protocol.    Plan:   Continue radiotherapy  Continue imodium prn  RTC in 6-8 weeks following brachytherapy    Pain management:    N/A    Fluids/Electrolytes/Nutrition:    Regular diet    Dermatitis:    N/A    Mosaiq chart and setup information reviewed  IGRT images reviewed    Medication Review  Med list reviewed with patient?: Yes    The patient was seen and discussed with staff, Dr. Michelle.    Anoop Riddle MD  Resident, PGY-4  Department of Radiation Oncology  [facility]  [phone]    Ms. Smith was seen and examined by me. Note above by Dr. Riddle was reviewed and edited by me and reflects our mutual findings and plan of care.    Ana Michelle MD  Department of Radiation Oncology  [facility]

## 2018-07-10 ENCOUNTER — APPOINTMENT (OUTPATIENT)
Dept: RADIATION ONCOLOGY | Facility: CLINIC | Age: 68
End: 2018-07-10
Attending: RADIOLOGY
Payer: MEDICARE

## 2018-07-10 PROCEDURE — 77336 RADIATION PHYSICS CONSULT: CPT | Performed by: RADIOLOGY

## 2018-07-10 PROCEDURE — 77386 ZZH IMRT TREATMENT DELIVERY, COMPLEX: CPT | Performed by: RADIOLOGY

## 2018-07-11 ENCOUNTER — APPOINTMENT (OUTPATIENT)
Dept: RADIATION ONCOLOGY | Facility: CLINIC | Age: 68
End: 2018-07-11
Attending: RADIOLOGY
Payer: MEDICARE

## 2018-07-11 PROCEDURE — 77386 ZZH IMRT TREATMENT DELIVERY, COMPLEX: CPT | Performed by: RADIOLOGY

## 2018-07-12 ENCOUNTER — APPOINTMENT (OUTPATIENT)
Dept: RADIATION ONCOLOGY | Facility: CLINIC | Age: 68
End: 2018-07-12
Attending: RADIOLOGY
Payer: MEDICARE

## 2018-07-12 PROCEDURE — 77386 ZZH IMRT TREATMENT DELIVERY, COMPLEX: CPT | Performed by: RADIOLOGY

## 2018-07-16 ENCOUNTER — ALLIED HEALTH/NURSE VISIT (OUTPATIENT)
Dept: RADIATION ONCOLOGY | Facility: CLINIC | Age: 68
End: 2018-07-16
Attending: RADIOLOGY
Payer: MEDICARE

## 2018-07-16 VITALS — DIASTOLIC BLOOD PRESSURE: 74 MMHG | SYSTOLIC BLOOD PRESSURE: 132 MMHG | BODY MASS INDEX: 47.59 KG/M2 | WEIGHT: 286 LBS

## 2018-07-16 DIAGNOSIS — C54.1 ENDOMETRIAL CANCER (H): Primary | ICD-10-CM

## 2018-07-16 PROCEDURE — 57156 INS VAG BRACHYTX DEVICE: CPT | Performed by: RADIOLOGY

## 2018-07-16 PROCEDURE — 77295 3-D RADIOTHERAPY PLAN: CPT | Performed by: RADIOLOGY

## 2018-07-16 PROCEDURE — 77332 RADIATION TREATMENT AID(S): CPT | Performed by: RADIOLOGY

## 2018-07-16 PROCEDURE — C1717 BRACHYTX, NON-STR,HDR IR-192: HCPCS | Performed by: RADIOLOGY

## 2018-07-16 PROCEDURE — 27110014 ZZH IMPLANT RADIATION BRIEF: Performed by: RADIOLOGY

## 2018-07-16 PROCEDURE — 77470 SPECIAL RADIATION TREATMENT: CPT | Performed by: RADIOLOGY

## 2018-07-16 PROCEDURE — 77770 HDR RDNCL NTRSTL/ICAV BRCHTX: CPT | Performed by: RADIOLOGY

## 2018-07-16 NOTE — MR AVS SNAPSHOT
After Visit Summary   7/16/2018    Lu Smith    MRN: [id number]           Patient Information     Date Of Birth          1950        Visit Information        Provider Department      7/16/2018 8:45 AM Sara Rosado MD Radiation Oncology Clinic        Today's Diagnoses     Endometrial cancer (H)    -  1       Follow-ups after your visit        Your next 10 appointments already scheduled     Jul 20, 2018  9:45 AM CDT   TCT/SIM Suite Visit with Sara Rosado MD   Radiation Oncology Clinic ([facility])    [facility]  1st Floor  [address] [address]   [phone]            Aug 13, 2018  6:30 AM CDT   [facility] Lab Draw with  [facility]IC LAB DRAW   [facility] ([facility])    [address] [place] [address]  [address] [place] [address] [phone]            Aug 13, 2018  7:00 AM CDT   (Arrive by 6:45 AM)   Return Active Treatment with ANU Selby CNP   [facility] ([facility])    [address] [place] [address]  [address] [place] [address] [phone]            Aug 13, 2018  8:00 AM CDT   Infusion 360 with UC ONCOLOGY INFUSION, UC 14 ATC   [facility] ([facility])    [address] [place] [address]  [address] [place] [address] [phone]              Who to contact     Please call your clinic at [phone] to:    Ask questions about your health    Make or cancel appointments    Discuss your medicines    Learn about your test results    Speak to your doctor            Additional Information About Your Visit        MyChart Information     [employer] gives you secure access to your electronic health record. If you see a primary care provider, you can also send messages to your care team and make appointments. If you have questions, please call your primary care clinic.  If you do not have a primary care  provider, please call [phone] and they will assist you.      [employer] is an electronic gateway that provides easy, online access to your medical records. With [employer], you can request a clinic appointment, read your test results, renew a prescription or communicate with your care team.     To access your existing account, please contact your [facility] or call [phone] for assistance.        Care EveryWhere ID     This is your Care EveryWhere ID. This could be used by other organizations to access your [place] medical records  [id number]        Your Vitals Were     BMI (Body Mass Index)                   47.59 kg/m2            Blood Pressure from Last 3 Encounters:   07/16/18 132/74   05/11/18 152/72   04/27/18 160/77    Weight from Last 3 Encounters:   07/16/18 129.7 kg (286 lb)   07/09/18 128.8 kg (284 lb)   07/02/18 129.7 kg (286 lb)              Today, you had the following     No orders found for display       Primary Care Provider Office Phone # Fax #    Levra Scott [phone]       [address] [place]  [place] [id number]        Equal Access to Services     [facility] : Hadii bailee Pride, waaxda luqadaha, qaybta kaalmada adejeanetteyada, angela enriquez. So [facility] [phone].    ATENCIÓN: Si habla español, tiene a cortes disposición servicios gratuitos de asistencia lingüística. [facility] [phone].    We comply with applicable federal civil rights laws and [place] laws. We do not discriminate on the basis of race, color, national origin, age, disability, sex, sexual orientation, or gender identity.            Thank you!     Thank you for choosing RAD[facility]ATION ONCOLOGY CLINIC  for your care. Our goal is always to provide you with excellent care. Hearing back from our patients is one way we can continue to improve our services. Please take a few minutes to complete the written survey that you may  "receive in the mail after your visit with us. Thank you!             Your Updated Medication List - Protect others around you: Learn how to safely use, store and throw away your medicines at www.disposemymeds.org.          This list is accurate as of 7/16/18  2:19 PM.  Always use your most recent med list.                   Brand Name Dispense Instructions for use Diagnosis    ACETAMINOPHEN PO      Take 325 mg by mouth every 6 hours as needed for pain        aspirin 81 MG EC tablet      Take 81 mg by mouth Pt states, \"I don't take it regularly. I take it when I remember to\".        fluticasone 50 MCG/ACT spray    FLONASE    1 Bottle    Spray 1-2 sprays into both nostrils daily    Chronic seasonal allergic rhinitis, unspecified trigger       ibuprofen 600 MG tablet    ADVIL/MOTRIN    30 tablet    Take 1 tablet (600 mg) by mouth every 6 hours as needed for pain (mild)    Endometrial carcinoma (H)       loratadine 10 MG tablet    CLARITIN    30 tablet    Take 1 tablet (10 mg) by mouth daily    Pain in joint, multiple sites, Chronic seasonal allergic rhinitis, unspecified trigger       LORazepam 0.5 MG tablet    ATIVAN    30 tablet    Take 1 tablet (0.5 mg) by mouth every 6 hours as needed (nausea/vomiting, anxiety or sleep)    Endometrial cancer (H), Encounter for long-term (current) use of medications       METFORMIN HCL PO      Take 500 mg by mouth daily (with breakfast)        prochlorperazine 5 MG tablet    COMPAZINE    30 tablet    Take 1 tablet (5 mg) by mouth every 6 hours as needed (nausea/vomiting)    Endometrial cancer (H), Encounter for long-term (current) use of medications       pyridoxine 50 MG Tabs    VITAMIN B-6    60 tablet    Take 1 tablet (50 mg) by mouth 2 times daily    Chemotherapy-induced peripheral neuropathy (H)       triamterene-hydrochlorothiazide 37.5-25 MG per capsule    DYAZIDE     Take 1 capsule by mouth every morning          "

## 2018-07-16 NOTE — PROGRESS NOTES
Lu Smith is here for scheduled HDR brachytherapy    HDR Single Channel Cylinder  1    Pre-procedure-  Time out done by Jana Newton RN and Dr Rosado.   Patient identified, arm band applied, signed consent available   Medications taken by patient prior to procedure: none  Pain assessment: 0/10  /74  Wt 129.7 kg (286 lb)  BMI 47.59 kg/m2    Post-procedure-  Pain assessment: 0/10   Device removed without difficulty, Education for possible side effects and management, Discharge teaching reviewed, questions answered, Vaginal dilator use reviewed and Discharged to home

## 2018-07-20 ENCOUNTER — ALLIED HEALTH/NURSE VISIT (OUTPATIENT)
Dept: RADIATION ONCOLOGY | Facility: CLINIC | Age: 68
End: 2018-07-20
Attending: RADIOLOGY
Payer: MEDICARE

## 2018-07-20 ENCOUNTER — ONCOLOGY VISIT (OUTPATIENT)
Dept: RADIATION ONCOLOGY | Facility: CLINIC | Age: 68
End: 2018-07-20

## 2018-07-20 VITALS — OXYGEN SATURATION: 96 % | DIASTOLIC BLOOD PRESSURE: 82 MMHG | HEART RATE: 92 BPM | SYSTOLIC BLOOD PRESSURE: 157 MMHG

## 2018-07-20 DIAGNOSIS — C54.1 ENDOMETRIAL CANCER (H): Primary | ICD-10-CM

## 2018-07-20 PROCEDURE — 77280 THER RAD SIMULAJ FIELD SMPL: CPT | Performed by: RADIOLOGY

## 2018-07-20 PROCEDURE — 27110014 ZZH IMPLANT RADIATION BRIEF: Performed by: RADIOLOGY

## 2018-07-20 PROCEDURE — C1717 BRACHYTX, NON-STR,HDR IR-192: HCPCS | Performed by: RADIOLOGY

## 2018-07-20 PROCEDURE — 77336 RADIATION PHYSICS CONSULT: CPT | Performed by: RADIOLOGY

## 2018-07-20 PROCEDURE — 57156 INS VAG BRACHYTX DEVICE: CPT | Performed by: RADIOLOGY

## 2018-07-20 PROCEDURE — 25000125 ZZHC RX 250: Mod: ZF | Performed by: RADIOLOGY

## 2018-07-20 PROCEDURE — 77332 RADIATION TREATMENT AID(S): CPT | Performed by: RADIOLOGY

## 2018-07-20 PROCEDURE — 77770 HDR RDNCL NTRSTL/ICAV BRCHTX: CPT | Performed by: RADIOLOGY

## 2018-07-20 RX ORDER — LOPERAMIDE HCL 2 MG
2 CAPSULE ORAL 4 TIMES DAILY PRN
COMMUNITY

## 2018-07-20 RX ADMIN — LIDOCAINE HYDROCHLORIDE 1 TUBE: 20 JELLY TOPICAL at 12:29

## 2018-07-20 ASSESSMENT — PAIN SCALES - GENERAL: PAINLEVEL: NO PAIN (0)

## 2018-07-20 NOTE — MR AVS SNAPSHOT
After Visit Summary   7/20/2018    Lu Smith    MRN: [id number]           Patient Information     Date Of Birth          1950        Visit Information        Provider Department      7/20/2018 9:45 AM Sara Rosado MD Radiation Oncology Clinic        Today's Diagnoses     Endometrial cancer (H)    -  1       Follow-ups after your visit        Your next 10 appointments already scheduled     Aug 13, 2018  6:30 AM CDT   [facility] Lab Draw with UC [facility]ONIC LAB DRAW   [facility] ([facility])    [address] [place] [address]  [address]  [facility] [address] [phone]            Aug 13, 2018  7:00 AM CDT   (Arrive by 6:45 AM)   Return Active Treatment with ANU Selby CNP   [facility] ([facility])    [address] [place] [address]  [address]  [facility] [address] [phone]            Aug 13, 2018  8:00 AM CDT   Infusion 360 with  ONCOLOGY INFUSION, UC 14 ATC   [facility] ([facility])    [address] [place] [address]  [address] [place] [address] [phone]              Who to contact     Please call your clinic at [phone] to:    Ask questions about your health    Make or cancel appointments    Discuss your medicines    Learn about your test results    Speak to your doctor            Additional Information About Your Visit        MyChart Information     [employer] gives you secure access to your electronic health record. If you see a primary care provider, you can also send messages to your care team and make appointments. If you have questions, please call your primary care clinic.  If you do not have a primary care provider, please call [phone] and they will assist you.      [employer] is an electronic gateway that provides easy, online access to your medical records. With [employer], you can request a clinic appointment, read your test results, renew a  prescription or communicate with your care team.     To access your existing account, please contact your [facility] or call [phone] for assistance.        Care EveryWhere ID     This is your Care EveryWhere ID. This could be used by other organizations to access your [facility] medical records  [id number]        Your Vitals Were     Pulse Pulse Oximetry                92 96%           Blood Pressure from Last 3 Encounters:   07/20/18 157/82   07/16/18 132/74   05/11/18 152/72    Weight from Last 3 Encounters:   07/16/18 129.7 kg (286 lb)   07/09/18 128.8 kg (284 lb)   07/02/18 129.7 kg (286 lb)              Today, you had the following     No orders found for display       Primary Care Provider Office Phone # Fax #    Levar Scott [phone]       [address] [place]  [place] [address]        Equal Access to Services     [facility] : Hadii aad ku hadasho Soeliezer, waaxda luqadaha, qaybta kaalmada adeegyada, angela greenfield . So [facility] [phone].    ATENCIÓN: Si habla español, tiene a cortes disposición servicios gratuitos de asistencia lingüística. Llame al [phone].    We comply with applicable federal civil rights laws and [place] laws. We do not discriminate on the basis of race, color, national origin, age, disability, sex, sexual orientation, or gender identity.            Thank you!     Thank you for choosing RAD[facility] ONCOLOGY CLINIC  for your care. Our goal is always to provide you with excellent care. Hearing back from our patients is one way we can continue to improve our services. Please take a few minutes to complete the written survey that you may receive in the mail after your visit with us. Thank you!             Your Updated Medication List - Protect others around you: Learn how to safely use, store and throw away your medicines at www.disposemymeds.org.          This list is accurate as of  "7/20/18 12:30 PM.  Always use your most recent med list.                   Brand Name Dispense Instructions for use Diagnosis    ACETAMINOPHEN PO      Take 325 mg by mouth every 6 hours as needed for pain        aspirin 81 MG EC tablet      Take 81 mg by mouth Pt states, \"I don't take it regularly. I take it when I remember to\".        fluticasone 50 MCG/ACT spray    FLONASE    1 Bottle    Spray 1-2 sprays into both nostrils daily    Chronic seasonal allergic rhinitis, unspecified trigger       ibuprofen 600 MG tablet    ADVIL/MOTRIN    30 tablet    Take 1 tablet (600 mg) by mouth every 6 hours as needed for pain (mild)    Endometrial carcinoma (H)       loperamide 2 MG capsule    IMODIUM     Take 2 mg by mouth 4 times daily as needed for diarrhea        loratadine 10 MG tablet    CLARITIN    30 tablet    Take 1 tablet (10 mg) by mouth daily    Pain in joint, multiple sites, Chronic seasonal allergic rhinitis, unspecified trigger       LORazepam 0.5 MG tablet    ATIVAN    30 tablet    Take 1 tablet (0.5 mg) by mouth every 6 hours as needed (nausea/vomiting, anxiety or sleep)    Endometrial cancer (H), Encounter for long-term (current) use of medications       METFORMIN HCL PO      Take 500 mg by mouth daily (with breakfast)        prochlorperazine 5 MG tablet    COMPAZINE    30 tablet    Take 1 tablet (5 mg) by mouth every 6 hours as needed (nausea/vomiting)    Endometrial cancer (H), Encounter for long-term (current) use of medications       pyridoxine 50 MG Tabs    VITAMIN B-6    60 tablet    Take 1 tablet (50 mg) by mouth 2 times daily    Chemotherapy-induced peripheral neuropathy (H)       triamterene-hydrochlorothiazide 37.5-25 MG per capsule    DYAZIDE     Take 1 capsule by mouth every morning          "

## 2018-07-20 NOTE — MR AVS SNAPSHOT
After Visit Summary   7/20/2018    Lu Smith    MRN: [id number]           Patient Information     Date Of Birth          1950        Visit Information        Provider Department      7/20/2018 10:00 PM Ana Michelle MD Rad[facility]         Follow-ups after your visit        Your next 10 appointments already scheduled     Aug 13, 2018  6:30 AM CDT   [facility] Draw with [facility] DRAW   [facility] ([facility])    [address] [place] [address]  [address]  [facility] [id number] [phone]            Aug 13, 2018  7:00 AM CDT   (Arrive by 6:45 AM)   Return Active Treatment with ANU Selby CNP   [facility] ([facility])    [address] [place] [address]  [address]  [facility] [id number] [phone]            Aug 13, 2018  8:00 AM CDT   Infusion 360 with  ONCOLOGY INFUSION, UC 14 ATC   [facility] ([facility])    [address] [place] [address]  [address]  [facility] [id number] [phone]              Who to contact     Please call your clinic at [phone] to:    Ask questions about your health    Make or cancel appointments    Discuss your medicines    Learn about your test results    Speak to your doctor            Additional Information About Your Visit        Globitelhart Information     B[employer] gives you secure access to your electronic health record. If you see a primary care provider, you can also send messages to your care team and make appointments. If you have questions, please call your primary care clinic.  If you do not have a primary care provider, please call [phone] and they will assist you.      Brainjuicer is an electronic gateway that provides easy, online access to your medical records. With [employer], you can request a clinic appointment, read your test results, renew a prescription or communicate with your care team.      To access your existing account, please contact your [facility] or call [phone] for assistance.        Care EveryWhere ID     This is your Care EveryWhere ID. This could be used by other organizations to access your [place] medical records  [id number]         Blood Pressure from Last 3 Encounters:   No data found for BP    Weight from Last 3 Encounters:   No data found for Wt              Today, you had the following     No orders found for display       Primary Care Provider Office Phone # Fax #    Levar Scott [phone]       [address] [place]  [facility] [address]        Equal Access to Services     [facility]: Hadii aad ku hadasho Soomaali, waaxda luqadaha, qaybta kaalmada adeegyada, angela pryor haymaryn adejeanette greenfield . So [facility] [phone].    ATENCIÓN: Si habla español, tiene a corets disposición servicios gratuitos de asistencia lingüística. Llame al [phone].    We comply with applicable federal civil rights laws and [place] laws. We do not discriminate on the basis of race, color, national origin, age, disability, sex, sexual orientation, or gender identity.            Thank you!     Thank you for choosing [facility]  for your care. Our goal is always to provide you with excellent care. Hearing back from our patients is one way we can continue to improve our services. Please take a few minutes to complete the written survey that you may receive in the mail after your visit with us. Thank you!             Your Updated Medication List - Protect others around you: Learn how to safely use, store and throw away your medicines at www.disposemymeds.org.          This list is accurate as of 7/20/18 11:59 PM.  Always use your most recent med list.                   Brand Name Dispense Instructions for use Diagnosis    ACETAMINOPHEN PO      Take 325 mg by mouth every 6 hours as needed for pain        aspirin 81 MG  "EC tablet      Take 81 mg by mouth Pt states, \"I don't take it regularly. I take it when I remember to\".        fluticasone 50 MCG/ACT spray    FLONASE    1 Bottle    Spray 1-2 sprays into both nostrils daily    Chronic seasonal allergic rhinitis, unspecified trigger       ibuprofen 600 MG tablet    ADVIL/MOTRIN    30 tablet    Take 1 tablet (600 mg) by mouth every 6 hours as needed for pain (mild)    Endometrial carcinoma (H)       loperamide 2 MG capsule    IMODIUM     Take 2 mg by mouth 4 times daily as needed for diarrhea        loratadine 10 MG tablet    CLARITIN    30 tablet    Take 1 tablet (10 mg) by mouth daily    Pain in joint, multiple sites, Chronic seasonal allergic rhinitis, unspecified trigger       LORazepam 0.5 MG tablet    ATIVAN    30 tablet    Take 1 tablet (0.5 mg) by mouth every 6 hours as needed (nausea/vomiting, anxiety or sleep)    Endometrial cancer (H), Encounter for long-term (current) use of medications       METFORMIN HCL PO      Take 500 mg by mouth daily (with breakfast)        prochlorperazine 5 MG tablet    COMPAZINE    30 tablet    Take 1 tablet (5 mg) by mouth every 6 hours as needed (nausea/vomiting)    Endometrial cancer (H), Encounter for long-term (current) use of medications       pyridoxine 50 MG Tabs    VITAMIN B-6    60 tablet    Take 1 tablet (50 mg) by mouth 2 times daily    Chemotherapy-induced peripheral neuropathy (H)       triamterene-hydrochlorothiazide 37.5-25 MG per capsule    DYAZIDE     Take 1 capsule by mouth every morning          "

## 2018-07-25 NOTE — PROCEDURES
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    Radiation Oncology - Course: 1  Treatment Site Current Dose Modality From To Elapsed Days Fx.  Pelvis and PANs 5,040 cGy 06 X 6/04/2018 2018 38 28  Vaginal Cuff 1,200 cGy Implant 2018  4  2    Dose per Fraction: 180 cGy IMRT/ 600 cGy brachytherapy      Total Dose: 6,240 cGy         COMMENTS: Ms. Smith is a 68 year old female with FIGO IIIC2 serous endometrial cancer. She initially presented with post-menopausal bleeding and an endometrial biopsy showed high-grade serous carcinoma.  She underwent robotic assisted total laparoscopic hysterectomy, bilateral salpingooophorectomy, omentectomy, and node dissection. Pathology was significant for outer one half myometrial invasion, lymphovascular space invasion, cervical stromal invasion, and bilateral ovarian involvement.  0/22 pelvic lymph nodes were involved, but 4/9 right and 2/10 left periaortic lymph nodes were positive; several with extracapsular extension.  Also noted was a close radial margin of 1 mm.  She completed 3 cycles of carbo/taxol and then underwent adjuvant radiation as described above with the plan for 3 additional cycles of chemotherapy following radiation. She tolerated the above treatment well with relatively mild acute toxicity and no unplanned treatment breaks. She did not have any hospitalizations or ED visits during treatment. She experienced grade 1 fatigue and grade 1 diarrhea which was managed with Imodium. She did not have any  periprocedural complications during the brachytherapy portion of her treatment. Acute Toxicity Profile by CTC v5.0: Grade 1 fatigue and Grade 1 diarrhea.    PAIN MANAGEMENT: Ms. Smith did not experience any pain during her course of treatment.    FOLLOW UP PLAN: Return to clinic in 6-8 weeks                        Resident Physician:  Anoop Riddle M.D.   Staff Physician: Ana Michelle M.D.  Physicist: Wan Amor, PhD    CC:  Kevin Henderson MD

## 2018-08-13 ENCOUNTER — INFUSION THERAPY VISIT (OUTPATIENT)
Dept: ONCOLOGY | Facility: CLINIC | Age: 68
End: 2018-08-13
Attending: OBSTETRICS & GYNECOLOGY
Payer: MEDICARE

## 2018-08-13 ENCOUNTER — APPOINTMENT (OUTPATIENT)
Dept: LAB | Facility: CLINIC | Age: 68
End: 2018-08-13
Attending: OBSTETRICS & GYNECOLOGY
Payer: MEDICARE

## 2018-08-13 VITALS
OXYGEN SATURATION: 96 % | BODY MASS INDEX: 46.54 KG/M2 | WEIGHT: 279.7 LBS | DIASTOLIC BLOOD PRESSURE: 76 MMHG | SYSTOLIC BLOOD PRESSURE: 151 MMHG | TEMPERATURE: 98.5 F | RESPIRATION RATE: 22 BRPM | HEART RATE: 71 BPM

## 2018-08-13 DIAGNOSIS — Z79.899 ENCOUNTER FOR LONG-TERM (CURRENT) USE OF MEDICATIONS: ICD-10-CM

## 2018-08-13 DIAGNOSIS — Z51.11 ENCOUNTER FOR ANTINEOPLASTIC CHEMOTHERAPY: ICD-10-CM

## 2018-08-13 DIAGNOSIS — C54.1 ENDOMETRIAL CANCER (H): Primary | ICD-10-CM

## 2018-08-13 LAB
ALBUMIN SERPL-MCNC: 3.1 G/DL (ref 3.4–5)
ALP SERPL-CCNC: 58 U/L (ref 40–150)
ALT SERPL W P-5'-P-CCNC: 29 U/L (ref 0–50)
ANION GAP SERPL CALCULATED.3IONS-SCNC: 7 MMOL/L (ref 3–14)
AST SERPL W P-5'-P-CCNC: 20 U/L (ref 0–45)
BASOPHILS # BLD AUTO: 0 10E9/L (ref 0–0.2)
BASOPHILS NFR BLD AUTO: 0.6 %
BILIRUB SERPL-MCNC: 0.6 MG/DL (ref 0.2–1.3)
BUN SERPL-MCNC: 21 MG/DL (ref 7–30)
CALCIUM SERPL-MCNC: 9.4 MG/DL (ref 8.5–10.1)
CHLORIDE SERPL-SCNC: 102 MMOL/L (ref 94–109)
CO2 SERPL-SCNC: 29 MMOL/L (ref 20–32)
CREAT SERPL-MCNC: 0.66 MG/DL (ref 0.52–1.04)
DIFFERENTIAL METHOD BLD: ABNORMAL
EOSINOPHIL # BLD AUTO: 0.3 10E9/L (ref 0–0.7)
EOSINOPHIL NFR BLD AUTO: 4.7 %
ERYTHROCYTE [DISTWIDTH] IN BLOOD BY AUTOMATED COUNT: 17.3 % (ref 10–15)
GFR SERPL CREATININE-BSD FRML MDRD: 90 ML/MIN/1.7M2
GLUCOSE SERPL-MCNC: 115 MG/DL (ref 70–99)
HCT VFR BLD AUTO: 29.2 % (ref 35–47)
HGB BLD-MCNC: 9.6 G/DL (ref 11.7–15.7)
IMM GRANULOCYTES # BLD: 0 10E9/L (ref 0–0.4)
IMM GRANULOCYTES NFR BLD: 0.8 %
LYMPHOCYTES # BLD AUTO: 0.8 10E9/L (ref 0.8–5.3)
LYMPHOCYTES NFR BLD AUTO: 15 %
MAGNESIUM SERPL-MCNC: 1.7 MG/DL (ref 1.6–2.3)
MCH RBC QN AUTO: 30.2 PG (ref 26.5–33)
MCHC RBC AUTO-ENTMCNC: 32.9 G/DL (ref 31.5–36.5)
MCV RBC AUTO: 92 FL (ref 78–100)
MONOCYTES # BLD AUTO: 0.6 10E9/L (ref 0–1.3)
MONOCYTES NFR BLD AUTO: 12.1 %
NEUTROPHILS # BLD AUTO: 3.5 10E9/L (ref 1.6–8.3)
NEUTROPHILS NFR BLD AUTO: 66.8 %
NRBC # BLD AUTO: 0 10*3/UL
NRBC BLD AUTO-RTO: 0 /100
PLATELET # BLD AUTO: 212 10E9/L (ref 150–450)
POTASSIUM SERPL-SCNC: 3.8 MMOL/L (ref 3.4–5.3)
PROT SERPL-MCNC: 7.1 G/DL (ref 6.8–8.8)
RBC # BLD AUTO: 3.18 10E12/L (ref 3.8–5.2)
SODIUM SERPL-SCNC: 138 MMOL/L (ref 133–144)
WBC # BLD AUTO: 5.3 10E9/L (ref 4–11)

## 2018-08-13 PROCEDURE — 25000128 H RX IP 250 OP 636: Mod: ZF | Performed by: NURSE PRACTITIONER

## 2018-08-13 PROCEDURE — 25000125 ZZHC RX 250: Mod: ZF | Performed by: NURSE PRACTITIONER

## 2018-08-13 PROCEDURE — 96375 TX/PRO/DX INJ NEW DRUG ADDON: CPT

## 2018-08-13 PROCEDURE — 80053 COMPREHEN METABOLIC PANEL: CPT | Performed by: NURSE PRACTITIONER

## 2018-08-13 PROCEDURE — 85025 COMPLETE CBC W/AUTO DIFF WBC: CPT | Performed by: NURSE PRACTITIONER

## 2018-08-13 PROCEDURE — 99214 OFFICE O/P EST MOD 30 MIN: CPT | Mod: ZP | Performed by: NURSE PRACTITIONER

## 2018-08-13 PROCEDURE — 96415 CHEMO IV INFUSION ADDL HR: CPT

## 2018-08-13 PROCEDURE — 96413 CHEMO IV INFUSION 1 HR: CPT

## 2018-08-13 PROCEDURE — 83735 ASSAY OF MAGNESIUM: CPT | Performed by: NURSE PRACTITIONER

## 2018-08-13 PROCEDURE — 25000132 ZZH RX MED GY IP 250 OP 250 PS 637: Mod: ZF | Performed by: NURSE PRACTITIONER

## 2018-08-13 PROCEDURE — G0463 HOSPITAL OUTPT CLINIC VISIT: HCPCS | Mod: ZF

## 2018-08-13 PROCEDURE — 96417 CHEMO IV INFUS EACH ADDL SEQ: CPT

## 2018-08-13 RX ORDER — LORAZEPAM 2 MG/ML
1 INJECTION INTRAMUSCULAR EVERY 6 HOURS PRN
Status: CANCELLED
Start: 2018-08-13

## 2018-08-13 RX ORDER — EPINEPHRINE 0.3 MG/.3ML
0.3 INJECTION SUBCUTANEOUS EVERY 5 MIN PRN
Status: CANCELLED | OUTPATIENT
Start: 2018-08-13

## 2018-08-13 RX ORDER — PALONOSETRON 0.05 MG/ML
0.25 INJECTION, SOLUTION INTRAVENOUS ONCE
Status: COMPLETED | OUTPATIENT
Start: 2018-08-13 | End: 2018-08-13

## 2018-08-13 RX ORDER — SODIUM CHLORIDE 9 MG/ML
1000 INJECTION, SOLUTION INTRAVENOUS CONTINUOUS PRN
Status: CANCELLED
Start: 2018-08-13

## 2018-08-13 RX ORDER — HEPARIN SODIUM (PORCINE) LOCK FLUSH IV SOLN 100 UNIT/ML 100 UNIT/ML
5 SOLUTION INTRAVENOUS ONCE
Status: COMPLETED | OUTPATIENT
Start: 2018-08-13 | End: 2018-08-13

## 2018-08-13 RX ORDER — METHYLPREDNISOLONE SODIUM SUCCINATE 125 MG/2ML
125 INJECTION, POWDER, LYOPHILIZED, FOR SOLUTION INTRAMUSCULAR; INTRAVENOUS
Status: CANCELLED
Start: 2018-08-13

## 2018-08-13 RX ORDER — ALBUTEROL SULFATE 90 UG/1
1-2 AEROSOL, METERED RESPIRATORY (INHALATION)
Status: CANCELLED
Start: 2018-08-13

## 2018-08-13 RX ORDER — PALONOSETRON 0.05 MG/ML
0.25 INJECTION, SOLUTION INTRAVENOUS ONCE
Status: CANCELLED
Start: 2018-08-13

## 2018-08-13 RX ORDER — MEPERIDINE HYDROCHLORIDE 25 MG/ML
25 INJECTION INTRAMUSCULAR; INTRAVENOUS; SUBCUTANEOUS EVERY 30 MIN PRN
Status: CANCELLED | OUTPATIENT
Start: 2018-08-13

## 2018-08-13 RX ORDER — HEPARIN SODIUM (PORCINE) LOCK FLUSH IV SOLN 100 UNIT/ML 100 UNIT/ML
5 SOLUTION INTRAVENOUS EVERY 8 HOURS
Status: DISCONTINUED | OUTPATIENT
Start: 2018-08-13 | End: 2018-08-13 | Stop reason: HOSPADM

## 2018-08-13 RX ORDER — DIPHENHYDRAMINE HYDROCHLORIDE 50 MG/ML
50 INJECTION INTRAMUSCULAR; INTRAVENOUS
Status: CANCELLED
Start: 2018-08-13

## 2018-08-13 RX ORDER — DIPHENHYDRAMINE HCL 25 MG
50 CAPSULE ORAL ONCE
Status: COMPLETED | OUTPATIENT
Start: 2018-08-13 | End: 2018-08-13

## 2018-08-13 RX ORDER — ALBUTEROL SULFATE 0.83 MG/ML
2.5 SOLUTION RESPIRATORY (INHALATION)
Status: CANCELLED | OUTPATIENT
Start: 2018-08-13

## 2018-08-13 RX ORDER — EPINEPHRINE 1 MG/ML
0.3 INJECTION, SOLUTION INTRAMUSCULAR; SUBCUTANEOUS EVERY 5 MIN PRN
Status: CANCELLED | OUTPATIENT
Start: 2018-08-13

## 2018-08-13 RX ORDER — DIPHENHYDRAMINE HCL 25 MG
50 CAPSULE ORAL ONCE
Status: CANCELLED
Start: 2018-08-13

## 2018-08-13 RX ADMIN — PALONOSETRON HYDROCHLORIDE 0.25 MG: 0.25 INJECTION INTRAVENOUS at 08:20

## 2018-08-13 RX ADMIN — SODIUM CHLORIDE, PRESERVATIVE FREE 5 ML: 5 INJECTION INTRAVENOUS at 13:13

## 2018-08-13 RX ADMIN — PACLITAXEL 427 MG: 6 INJECTION, SOLUTION INTRAVENOUS at 09:12

## 2018-08-13 RX ADMIN — SODIUM CHLORIDE, PRESERVATIVE FREE 5 ML: 5 INJECTION INTRAVENOUS at 06:52

## 2018-08-13 RX ADMIN — CARBOPLATIN 815 MG: 10 INJECTION, SOLUTION INTRAVENOUS at 12:21

## 2018-08-13 RX ADMIN — DIPHENHYDRAMINE HYDROCHLORIDE 50 MG: 25 CAPSULE ORAL at 08:18

## 2018-08-13 RX ADMIN — FAMOTIDINE 40 MG: 10 INJECTION INTRAVENOUS at 08:22

## 2018-08-13 RX ADMIN — DEXAMETHASONE SODIUM PHOSPHATE 20 MG: 10 INJECTION, SOLUTION INTRAMUSCULAR; INTRAVENOUS at 08:27

## 2018-08-13 RX ADMIN — SODIUM CHLORIDE 250 ML: 9 INJECTION, SOLUTION INTRAVENOUS at 08:16

## 2018-08-13 ASSESSMENT — PAIN SCALES - GENERAL: PAINLEVEL: NO PAIN (0)

## 2018-08-13 NOTE — PROGRESS NOTES
Gynecologic Oncology Follow-Up Note    RE: Lu Smith  MRN: [id number]  : 1950  Date of Visit: 2018    CC: Lu Smith is a 68 year old year old female with stage IIIC2 serous endometrial cancer who presents today for follow up regarding disease management.    HPI: Lu comes to the clinic accompanied by her sister Lisbeth. She completed radiation with EBRT and vaginal brachytherapy on 18. Noted some fatigue but otherwise tolerated it well. Using her dilator but admits she uses it sporadically, usually 1-3 times per week. Feeling well to start chemotherapy, though she is nervous about developing arthralgias again- previously improved with the use of loratadine and Tylenol. She had developed transient tingling in her fingertips and toes but this has resolved. Restarted magnesium supplement due to muscle cramping which has been effective. Occasional constipation relieved with adding in extra fiber to her diet. States she is eating well and focusing on nutritious foods. Attempting to increase her water intake but knows she needs to drink more. Has not yet seen genetic counseling but would like to set up an appointment. Did not take her metformin or anti-hypertensive this morning.    Oncology History:  18: TLH-BSO, omentectomy, bilateral pelvic and para-aortic lymph node dissection, pelvic washings  3/30/18: C1D1 carboplatin AUC 6/paclitaxel 175mg/m2  18: C2D1 carboplatin AUC 6/paclitaxel 175mg/m2  18: C3D1 carboplatin AUC6/paclitaxel 175mg/m2  18-18: EBRT delivered to pelvis and para-aortic nodes, 5040 cGy in 28 fractions  18-18: HDR brachytherapy delivered to the vaginal cuff, 1200 cGy in two fractions  18: C4D1 carboplatin AUC 6/paclitaxel 175mg/m2    Past Medical History:   Diagnosis Date     Diabetes (H)     Type II     Endometrial cancer determined by uterine biopsy (H) 2018     HTN (hypertension)      Obesity      Osteoarthritis        Past  Surgical History:   Procedure Laterality Date     CHOLECYSTECTOMY       CYSTOSCOPY N/A 2018    Procedure: CYSTOSCOPY;;  Surgeon: Kevin Henderson MD;  Location: [facility] OR     DAVINCI HYSTERECTOMY TOTAL, BILATERAL SALPINGO-OOPHORECTOMY, COMBINED N/A 2018    Procedure: COMBINED DAVINCI HYSTERECTOMY TOTAL, SALPINGO-OOPHORECTOMY;  DaVinci Assisted Total Laparoscopic Hysterectomy, Bilateral Salpingo-Oophorectomy, Cystoscopy,  Omental biopsy, omentectomy,bilateral  Pelvic And Para Aortic Lymph Node Dissection;  Surgeon: Kevin Henderson MD;  Location: [facility] OR     Partial lumbar discectomy         Current Outpatient Prescriptions   Medication     ACETAMINOPHEN PO     aspirin EC 81 MG EC tablet     fluticasone (FLONASE) 50 MCG/ACT spray     ibuprofen (ADVIL/MOTRIN) 600 MG tablet     loperamide (IMODIUM) 2 MG capsule     loratadine (CLARITIN) 10 MG tablet     LORazepam (ATIVAN) 0.5 MG tablet     METFORMIN HCL PO     prochlorperazine (COMPAZINE) 5 MG tablet     pyridoxine (VITAMIN B-6) 50 MG TABS     triamterene-hydrochlorothiazide (DYAZIDE) 37.5-25 MG per capsule     No current facility-administered medications for this visit.        Allergies   Allergen Reactions     Ace Inhibitors Cough     Amoxicillin Hives       Family History   Problem Relation Age of Onset     Colon Cancer Mother      Breast Cancer Sister      Lymphoma Sister        Social History     Social History     Marital status:      Spouse name: N/A     Number of children: 3     Years of education: N/A     Occupational History     conroller      Social History Main Topics     Smoking status: Former Smoker     Packs/day: 0.25     Years: 20.00     Quit date: 1991     Smokeless tobacco: Never Used      Comment: Quite smoking      Alcohol use Yes      Comment: 4-7/week if out 1-2x's/week     Drug use: No     Sexual activity: Not on file     Other Topics Concern     Not on file     Social History Narrative    Daughter  at age  25 from leukemia.  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           ROS  General: + fatigue. Denies changes in weight, weakness, appetite changes, night sweats, hot flashes, fever, chills, or difficulty sleeping  HEENT: Denies headaches, hair loss, visual difficulty or disturbances, masses, head injury, tinnitus, hearing loss, epistaxis, congestion, problems with teeth or gums, dysphonia, or dysphagia  Pulmonary: Denies cough, sputum, hemoptysis, shortness of breath, dyspnea on exertion, wheezing, or allergies  Cardiovascular: + hypertension, chronic edema to bilateral ankles/feet. Denies chest pain, fainting, palpitations, murmurs, activity intolerance  Gastrointestinal: + constipation. Denies nausea, vomiting,  diarrhea, abdominal pain, bloating, heartburn, melena, hematochezia, or jaundice  Genitourinary: Denies dysuria, urinary urgency or frequency, hematuria, cloudy or malodorous urine, incontinence, repeat urinary tract infections, flank pain, pelvic pain, vaginal bleeding, vaginal discharge, or vaginal dryness  Integumentary: Denies rashes, sores, changing moles, or scarring  Hematologic: Denies swollen lymph nodes, masses, easy bruising, or easy bleeding  Musculoskeletal: Denies falls, back pain, myalgias, arthralgias, stiffness, muscle weakness, or muscle cramps  Neurologic: Denies numbness or tingling, changes in memory, difficulty with walking, dizziness, seizures, or tremors  Psychiatric: Denies anxiety, depression, mood changes, suicidal thoughts, or difficulty concentrating  Endocrine: Denies polydipsia, polyuria, temperature intolerance, or history of thyroid disease    Physical Exam:    /76 (BP Location: Right arm, Patient Position: Sitting)  Pulse 71  Temp 98.5  F (36.9  C) (Oral)  Resp 22  Wt 126.9 kg (279 lb 11.2 oz)  SpO2 96%  BMI 46.54 kg/m2    CONSTITUTIONAL: Alert non-toxic appearing female in no acute distress  HEAD: Normocephalic, atraumatic  EYES: PERRLA; no scleral  icterus  ENT: Oropharynx pink without lesions  NECK: Neck supple without lymphadenopathy  RESPIRATORY: Lungs clear to auscultation, no increased work of breathing noted  CV: Regular rate and rhythm, S1S2, no clicks, murmurs, rubs, or gallops; bilateral lower extremities with trace edema, dorsalis pedis pulses 2+ bilaterally  GASTROINTESTINAL: Normoactive bowel sounds x4 quadrants, abdomen soft, non-distended, and non-tender to palpation without masses or organomegaly  GENITOURINARY: Not indicated  LYMPHATIC: Cervical, supraclavicular, and inguinal nodes without lymphadenopathy  MUSCULOSKELETAL: Moves all extremities, no obvious muscle wasting  NEUROLOGIC: No gross deficits, normal gait  SKIN: Appropriate color for race, warm and dry, no rashes or lesions to unclothed skin  PSYCHIATRIC: Pleasant and interactive, affect bright, makes appropriate eye contact, thought process linear    Labs:      8/13/2018  Day 1 (Planned)   Hemoglobin 11.7 - 15.7 g/dL 9.6 (A)   Hematocrit 35.0 - 47.0 % 29.2 (A)   Platelet Count 150 - 450 10e9/L 212   Absolute Neutrophil 1.6 - 8.3 10e9/L 3.5   Sodium 133 - 144 mmol/L 138   Potassium 3.4 - 5.3 mmol/L 3.8   Chloride 94 - 109 mmol/L 102   Carbon Dioxide 20 - 32 mmol/L 29   Urea Nitrogen 7 - 30 mg/dL 21   Creatinine 0.52 - 1.04 mg/dL 0.66   Calcium 8.5 - 10.1 mg/dL 9.4   Magnesium 1.6 - 2.3 mg/dL 1.7   Bilirubin Total 0.2 - 1.3 mg/dL 0.6   ALT 0 - 50 U/L 29   AST 0 - 45 U/L 20   Alkaline Phosphatase 40 - 150 U/L 58   Albumin 3.4 - 5.0 g/dL 3.1 (A)   Protein Total 6.8 - 8.8 g/dL 7.1   WBC 4.0 - 11.0 10e9/L 5.3       Assessment/Plan:  1) Stage IIIC2 serous endometrial cancer: Tolerating treatment well without dose limiting side effects, feels recovered from radiation side effects. Proceed with cycle four of carboplatin AUC 6/paclitaxel 175mg/m2 as originally ordered by Dr. Henderson. To receive total of six cycles followed by treatment planning with Dr. Henderson. Continue small frequent  meals high in protein, drink 2L non-caffeinated fluids daily. Reviewed signs and symptoms for when she should contact the clinic or seek additional care, including but not limited to fever, chills, inability to keep down food or fluids, nausea and vomiting not controlled with antiemetics, and diarrhea leading to dehydration. Patient to contact the clinic with any questions or concerns in the interim.  2) Chemotherapy/disease side effects:   Tingling: Transient. Restart vitamin B6 50mg PO BID and L-glutamine 2000mg PO BID.    Arthralgias: Restart scheduled Tylenol 1g TID and daily loratadine 10mg PO during the first week of chemotherapy. Consider addition of gabapentin TID if the aforementioned regimen does not provide adequate relief.   Anemia: Reports her fatigue is improving, hgb 9.6 today. She plans to take in leafy green vegetables for iron intake- has a large garden at home. Continue to monitor.  3) Genetic counseling: MMR protein expression intact, however, strong family history of cancer. Referral previously placed, message sent to scheduling to help facilitate this.  4) Health maintenance issues discussed include to follow up with PCP for non-gynecologic concerns and co-morbid conditions  5) Patient verbalized understanding of and agreement with plan    A total of 25 minutes of face to face time were spent with the patient with over 50% of that time spent in counseling, coordination of care, education, and symptom management.    ANU Selby, FNP-C  Division of Gynecologic Oncology  [facility]  Pager: [phone]

## 2018-08-13 NOTE — NURSING NOTE
"Oncology Rooming Note    August 13, 2018 7:07 AM   Lu Smith is a 68 year old female who presents for:    Chief Complaint   Patient presents with     Port Draw     Port labs collected by RN.      Oncology Clinic Visit     Return for Endometrial Ca , labs, Tx     Initial Vitals: /76 (BP Location: Right arm, Patient Position: Sitting)  Pulse 71  Temp 98.5  F (36.9  C) (Oral)  Resp 22  Wt 126.9 kg (279 lb 11.2 oz)  SpO2 96%  BMI 46.54 kg/m2 Estimated body mass index is 46.54 kg/(m^2) as calculated from the following:    Height as of 3/29/18: 1.651 m (5' 5\").    Weight as of this encounter: 126.9 kg (279 lb 11.2 oz). Body surface area is 2.41 meters squared.  No Pain (0) Comment: Data Unavailable   No LMP recorded. Patient is postmenopausal.  Allergies reviewed: Yes  Medications reviewed: Yes    Medications: Medication refills not needed today.  Pharm[facility] into [employer] [place] [id number] AT [facility] [address]    Clinical concerns: results , want later ettats  Yasmine was notified.     minutes for nursing intake (face to face time)     Adriana Hargrove MA              "

## 2018-08-13 NOTE — LETTER
2018       RE: Lu Smith  [address]  [place] [address]     Dear Colleague,    Thank you for referring your patient, Lu Smith, to the [facility]. Please see a copy of my visit note below.    Gynecologic Oncology Follow-Up Note    RE: Lu Smith  MRN: [id number]  : 1950  Date of Visit: 2018    CC: Lu Smith is a 68 year old year old female with stage IIIC2 serous endometrial cancer who presents today for follow up regarding disease management.    HPI: Lu comes to the clinic accompanied by her sister Lisbeth. She completed radiation with EBRT and vaginal brachytherapy on 18. Noted some fatigue but otherwise tolerated it well. Using her dilator but admits she uses it sporadically, usually 1-3 times per week. Feeling well to start chemotherapy, though she is nervous about developing arthralgias again- previously improved with the use of loratadine and Tylenol. She had developed transient tingling in her fingertips and toes but this has resolved. Restarted magnesium supplement due to muscle cramping which has been effective. Occasional constipation relieved with adding in extra fiber to her diet. States she is eating well and focusing on nutritious foods. Attempting to increase her water intake but knows she needs to drink more. Has not yet seen genetic counseling but would like to set up an appointment. Did not take her metformin or anti-hypertensive this morning.    Oncology History:  18: TLH-BSO, omentectomy, bilateral pelvic and para-aortic lymph node dissection, pelvic washings  3/30/18: C1D1 carboplatin AUC 6/paclitaxel 175mg/m2  18: C2D1 carboplatin AUC 6/paclitaxel 175mg/m2  18: C3D1 carboplatin AUC6/paclitaxel 175mg/m2  18-18: EBRT delivered to pelvis and para-aortic nodes, 5040 cGy in 28 fractions  18-18: HDR brachytherapy delivered to the vaginal cuff, 1200 cGy in two fractions  18: C4D1 carboplatin  AUC 6/paclitaxel 175mg/m2    Past Medical History:   Diagnosis Date     Diabetes (H)     Type II     Endometrial cancer determined by uterine biopsy (H) 02/2018     HTN (hypertension)      Obesity      Osteoarthritis        Past Surgical History:   Procedure Laterality Date     CHOLECYSTECTOMY  1993     CYSTOSCOPY N/A 2/23/2018    Procedure: CYSTOSCOPY;;  Surgeon: Kevin Henderson MD;  Location: [facility] OR     DAVINCI HYSTERECTOMY TOTAL, BILATERAL SALPINGO-OOPHORECTOMY, COMBINED N/A 2/23/2018    Procedure: COMBINED DAVINCI HYSTERECTOMY TOTAL, SALPINGO-OOPHORECTOMY;  DaVinci Assisted Total Laparoscopic Hysterectomy, Bilateral Salpingo-Oophorectomy, Cystoscopy,  Omental biopsy, omentectomy,bilateral  Pelvic And Para Aortic Lymph Node Dissection;  Surgeon: Kevin Henderson MD;  Location: [facility] OR     Partial lumbar discectomy  1998       Current Outpatient Prescriptions   Medication     ACETAMINOPHEN PO     aspirin EC 81 MG EC tablet     fluticasone (FLONASE) 50 MCG/ACT spray     ibuprofen (ADVIL/MOTRIN) 600 MG tablet     loperamide (IMODIUM) 2 MG capsule     loratadine (CLARITIN) 10 MG tablet     LORazepam (ATIVAN) 0.5 MG tablet     METFORMIN HCL PO     prochlorperazine (COMPAZINE) 5 MG tablet     pyridoxine (VITAMIN B-6) 50 MG TABS     triamterene-hydrochlorothiazide (DYAZIDE) 37.5-25 MG per capsule     No current facility-administered medications for this visit.        Allergies   Allergen Reactions     Ace Inhibitors Cough     Amoxicillin Hives       Family History   Problem Relation Age of Onset     Colon Cancer Mother      Breast Cancer Sister      Lymphoma Sister        Social History     Social History     Marital status:      Spouse name: N/A     Number of children: 3     Years of education: N/A     Occupational History     conroller      Social History Main Topics     Smoking status: Former Smoker     Packs/day: 0.25     Years: 20.00     Quit date: 4/27/1991     Smokeless tobacco: Never Used       Comment: Quite smoking      Alcohol use Yes      Comment: 4-7/week if out 1-2x's/week     Drug use: No     Sexual activity: Not on file     Other Topics Concern     Not on file     Social History Narrative    Daughter  at age 25 from leukemia.  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           ROS  General: + fatigue. Denies changes in weight, weakness, appetite changes, night sweats, hot flashes, fever, chills, or difficulty sleeping  HEENT: Denies headaches, hair loss, visual difficulty or disturbances, masses, head injury, tinnitus, hearing loss, epistaxis, congestion, problems with teeth or gums, dysphonia, or dysphagia  Pulmonary: Denies cough, sputum, hemoptysis, shortness of breath, dyspnea on exertion, wheezing, or allergies  Cardiovascular: + hypertension, chronic edema to bilateral ankles/feet. Denies chest pain, fainting, palpitations, murmurs, activity intolerance  Gastrointestinal: + constipation. Denies nausea, vomiting,  diarrhea, abdominal pain, bloating, heartburn, melena, hematochezia, or jaundice  Genitourinary: Denies dysuria, urinary urgency or frequency, hematuria, cloudy or malodorous urine, incontinence, repeat urinary tract infections, flank pain, pelvic pain, vaginal bleeding, vaginal discharge, or vaginal dryness  Integumentary: Denies rashes, sores, changing moles, or scarring  Hematologic: Denies swollen lymph nodes, masses, easy bruising, or easy bleeding  Musculoskeletal: Denies falls, back pain, myalgias, arthralgias, stiffness, muscle weakness, or muscle cramps  Neurologic: Denies numbness or tingling, changes in memory, difficulty with walking, dizziness, seizures, or tremors  Psychiatric: Denies anxiety, depression, mood changes, suicidal thoughts, or difficulty concentrating  Endocrine: Denies polydipsia, polyuria, temperature intolerance, or history of thyroid disease    Physical Exam:    /76 (BP Location: Right arm, Patient Position: Sitting)  Pulse 71   Temp 98.5  F (36.9  C) (Oral)  Resp 22  Wt 126.9 kg (279 lb 11.2 oz)  SpO2 96%  BMI 46.54 kg/m2    CONSTITUTIONAL: Alert non-toxic appearing female in no acute distress  HEAD: Normocephalic, atraumatic  EYES: PERRLA; no scleral icterus  ENT: Oropharynx pink without lesions  NECK: Neck supple without lymphadenopathy  RESPIRATORY: Lungs clear to auscultation, no increased work of breathing noted  CV: Regular rate and rhythm, S1S2, no clicks, murmurs, rubs, or gallops; bilateral lower extremities with trace edema, dorsalis pedis pulses 2+ bilaterally  GASTROINTESTINAL: Normoactive bowel sounds x4 quadrants, abdomen soft, non-distended, and non-tender to palpation without masses or organomegaly  GENITOURINARY: Not indicated  LYMPHATIC: Cervical, supraclavicular, and inguinal nodes without lymphadenopathy  MUSCULOSKELETAL: Moves all extremities, no obvious muscle wasting  NEUROLOGIC: No gross deficits, normal gait  SKIN: Appropriate color for race, warm and dry, no rashes or lesions to unclothed skin  PSYCHIATRIC: Pleasant and interactive, affect bright, makes appropriate eye contact, thought process linear    Labs:      8/13/2018  Day 1 (Planned)   Hemoglobin 11.7 - 15.7 g/dL 9.6 (A)   Hematocrit 35.0 - 47.0 % 29.2 (A)   Platelet Count 150 - 450 10e9/L 212   Absolute Neutrophil 1.6 - 8.3 10e9/L 3.5   Sodium 133 - 144 mmol/L 138   Potassium 3.4 - 5.3 mmol/L 3.8   Chloride 94 - 109 mmol/L 102   Carbon Dioxide 20 - 32 mmol/L 29   Urea Nitrogen 7 - 30 mg/dL 21   Creatinine 0.52 - 1.04 mg/dL 0.66   Calcium 8.5 - 10.1 mg/dL 9.4   Magnesium 1.6 - 2.3 mg/dL 1.7   Bilirubin Total 0.2 - 1.3 mg/dL 0.6   ALT 0 - 50 U/L 29   AST 0 - 45 U/L 20   Alkaline Phosphatase 40 - 150 U/L 58   Albumin 3.4 - 5.0 g/dL 3.1 (A)   Protein Total 6.8 - 8.8 g/dL 7.1   WBC 4.0 - 11.0 10e9/L 5.3       Assessment/Plan:  1) Stage IIIC2 serous endometrial cancer: Tolerating treatment well without dose limiting side effects, feels recovered from  radiation side effects. Proceed with cycle four of carboplatin AUC 6/paclitaxel 175mg/m2 as originally ordered by Dr. Henderson. To receive total of six cycles followed by treatment planning with Dr. Henderson. Continue small frequent meals high in protein, drink 2L non-caffeinated fluids daily. Reviewed signs and symptoms for when she should contact the clinic or seek additional care, including but not limited to fever, chills, inability to keep down food or fluids, nausea and vomiting not controlled with antiemetics, and diarrhea leading to dehydration. Patient to contact the clinic with any questions or concerns in the interim.  2) Chemotherapy/disease side effects:   Tingling: Transient. Restart vitamin B6 50mg PO BID and L-glutamine 2000mg PO BID.    Arthralgias: Restart scheduled Tylenol 1g TID and daily loratadine 10mg PO during the first week of chemotherapy. Consider addition of gabapentin TID if the aforementioned regimen does not provide adequate relief.   Anemia: Reports her fatigue is improving, hgb 9.6 today. She plans to take in leafy green vegetables for iron intake- has a large garden at home. Continue to monitor.  3) Genetic counseling: MMR protein expression intact, however, strong family history of cancer. Referral previously placed, message sent to scheduling to help facilitate this.  4) Health maintenance issues discussed include to follow up with PCP for non-gynecologic concerns and co-morbid conditions  5) Patient verbalized understanding of and agreement with plan    A total of 25 minutes of face to face time were spent with the patient with over 50% of that time spent in counseling, coordination of care, education, and symptom management.    ANU Selby, FNP-C  Division of Gynecologic Oncology  [facility]  Pager: [phone]

## 2018-08-13 NOTE — MR AVS SNAPSHOT
After Visit Summary   8/13/2018    Lu Smith    MRN: [id number]           Patient Information     Date Of Birth          1950        Visit Information        Provider Department      8/13/2018 7:00 AM Yasmine Zazueta APRN CNP [facility]        Today's Diagnoses     Endometrial cancer (H)    -  1    Encounter for antineoplastic chemotherapy          Care Instructions    For joint pain:  Schedule 1g Tylenol/acetaminophen three times daily and loratadine/Claritin    Drink two liters of non-caffeinated fluids    Restart vitamin B6 50mg twice daily and L-glutamine 2000mg twice daily              Follow-ups after your visit        Your next 10 appointments already scheduled     Aug 13, 2018  8:00 AM CDT   Infusion 360 with UC ONCOLOGY INFUSION, UC 14 ATC   [facility] ([facility])    [address] [place] [address]  [address]  [facility] [place] [address]   [phone]            Sep 07, 2018  6:30 AM CDT   [facility] Lab Draw with  [facility] LAB DRAW   [facility]    [address] [place] [address]  [address]  [facility] [place] [address]   [phone]            Sep 07, 2018  7:00 AM CDT   (Arrive by 6:45 AM)   Return Active Treatment with ANU Selby CNP   [facility] ([facility])    [address] [place] [address]  [address]  [facility] [place] [address]   [phone]            Sep 07, 2018  8:00 AM CDT   Infusion 360 with UC ONCOLOGY INFUSION, UC 14 ATC   [facility] ([facility])    [address] [place] [address]  [address]  [facility] [place] [address]   [phone]            Sep 28, 2018  6:30 AM CDT   [facility] Lab Draw with  MASONIC LAB DRAW   [facility] Lab Draw ([facility])    [address] [place] [address]  [address]  [facility] [place] [address]   [phone]            Sep 28, 2018  7:00 AM CDT   (Arrive  by 6:45 AM)   Return Active Treatment with ANU Selby CNP   [facility] ([facility])    [address]  [address]  [facility] [address] [phone]            Sep 28, 2018  8:00 AM CDT   Infusion 360 with UC ONCOLOGY INFUSION, UC 14 ATC   [facility] ([facility])    [address]  [address]  [place] [address] [phone]              Who to contact     If you have questions or need follow up information about today's clinic visit or your schedule please contact [facility]CER [facility] directly at [phone].  Normal or non-critical lab and imaging results will be communicated to you by Jorot[employer], letter or phone within 4 business days after the clinic has received the results. If you do not hear from us within 7 days, please contact the clinic through [employer] or phone. If you have a critical or abnormal lab result, we will notify you by phone as soon as possible.  Submit refill requests through [employer] or call your pharmacy and they will forward the refill request to us. Please allow 3 business days for your refill to be completed.          Additional Information About Your Visit        [employer] Information     [employer] gives you secure access to your electronic health record. If you see a primary care provider, you can also send messages to your care team and make appointments. If you have questions, please call your primary care clinic.  If you do not have a primary care provider, please call [phone] and they will assist you.        Care EveryWhere ID     This is your Care EveryWhere ID. This could be used by other organizations to access your [place] medical records  [id number]        Your Vitals Were     Pulse Temperature Respirations Pulse Oximetry BMI (Body Mass Index)       71 98.5  F (36.9  C) (Oral) 22 96% 46.54 kg/m2        Blood Pressure from Last 3 Encounters:  "  08/13/18 151/76   07/20/18 157/82   07/16/18 132/74    Weight from Last 3 Encounters:   08/13/18 126.9 kg (279 lb 11.2 oz)   07/16/18 129.7 kg (286 lb)   07/09/18 128.8 kg (284 lb)              Today, you had the following     No orders found for display       Primary Care Provider Office Phone # Fax #    Levar Scott [phone]       [address] [place]  [facility] [address]        Equal Access to Services     [facility]: Hadii bailee Pride, waaxbandar brunnerqadaha, qaybshweta kaalmabandar dimas, angela greenfield . So [facility] [phone].    ATENCIÓN: Si habla español, tiene a cortes disposición servicios gratuitos de asistencia lingüística. [facility] [phone].    We comply with applicable federal civil rights laws and [place] laws. We do not discriminate on the basis of race, color, national origin, age, disability, sex, sexual orientation, or gender identity.            Thank you!     Thank you for choosing [facility]  for your care. Our goal is always to provide you with excellent care. Hearing back from our patients is one way we can continue to improve our services. Please take a few minutes to complete the written survey that you may receive in the mail after your visit with us. Thank you!             Your Updated Medication List - Protect others around you: Learn how to safely use, store and throw away your medicines at www.disposemymeds.org.          This list is accurate as of 8/13/18  7:54 AM.  Always use your most recent med list.                   Brand Name Dispense Instructions for use Diagnosis    ACETAMINOPHEN PO      Take 325 mg by mouth every 6 hours as needed for pain        aspirin 81 MG EC tablet      Take 81 mg by mouth Pt states, \"I don't take it regularly. I take it when I remember to\".        fluticasone 50 MCG/ACT spray    FLONASE    1 Bottle    Spray 1-2 sprays into both nostrils daily    Chronic seasonal " allergic rhinitis, unspecified trigger       ibuprofen 600 MG tablet    ADVIL/MOTRIN    30 tablet    Take 1 tablet (600 mg) by mouth every 6 hours as needed for pain (mild)    Endometrial carcinoma (H)       loperamide 2 MG capsule    IMODIUM     Take 2 mg by mouth 4 times daily as needed for diarrhea        loratadine 10 MG tablet    CLARITIN    30 tablet    Take 1 tablet (10 mg) by mouth daily    Pain in joint, multiple sites, Chronic seasonal allergic rhinitis, unspecified trigger       LORazepam 0.5 MG tablet    ATIVAN    30 tablet    Take 1 tablet (0.5 mg) by mouth every 6 hours as needed (nausea/vomiting, anxiety or sleep)    Endometrial cancer (H), Encounter for long-term (current) use of medications       METFORMIN HCL PO      Take 500 mg by mouth daily (with breakfast)        prochlorperazine 5 MG tablet    COMPAZINE    30 tablet    Take 1 tablet (5 mg) by mouth every 6 hours as needed (nausea/vomiting)    Endometrial cancer (H), Encounter for long-term (current) use of medications       pyridoxine 50 MG Tabs    VITAMIN B-6    60 tablet    Take 1 tablet (50 mg) by mouth 2 times daily    Chemotherapy-induced peripheral neuropathy (H)       triamterene-hydrochlorothiazide 37.5-25 MG per capsule    DYAZIDE     Take 1 capsule by mouth every morning

## 2018-08-13 NOTE — PROGRESS NOTES
Infusion Nursing Note:  Lu Smith presents today for Cycle 4 Day 1 of Taxol and Carboplatin.    Patient seen by provider today: Yes: Yasmine Zazueta NP   present during visit today: Not Applicable.    Note: Patient presents to infusion with no issues or complaints.    Intravenous Access:  Implanted Port.    Treatment Conditions:  Lab Results   Component Value Date    HGB 9.6 08/13/2018     Lab Results   Component Value Date    WBC 5.3 08/13/2018      Lab Results   Component Value Date    ANEU 3.5 08/13/2018     Lab Results   Component Value Date     08/13/2018      Lab Results   Component Value Date     08/13/2018                   Lab Results   Component Value Date    POTASSIUM 3.8 08/13/2018           Lab Results   Component Value Date    MAG 1.7 08/13/2018            Lab Results   Component Value Date    CR 0.66 08/13/2018                   Lab Results   Component Value Date    MARYLOU 9.4 08/13/2018                Lab Results   Component Value Date    BILITOTAL 0.6 08/13/2018           Lab Results   Component Value Date    ALBUMIN 3.1 08/13/2018                    Lab Results   Component Value Date    ALT 29 08/13/2018           Lab Results   Component Value Date    AST 20 08/13/2018       Results reviewed, labs MET treatment parameters, ok to proceed with treatment.      Post Infusion Assessment:  Patient tolerated infusion without incident.  Blood return noted pre and post infusion.  Site patent and intact, free from redness, edema or discomfort.  No evidence of extravasations.  Access discontinued per protocol.    Discharge Plan:   Patient declined prescription refills.  Discharge instructions reviewed with: Patient.  Patient and/or family verbalized understanding of discharge instructions and all questions answered.  AVS to patient via [employer].  Patient will return 9/7/18 for next appointment.   Patient discharged in stable condition accompanied by: sister.  Departure Mode:  Ambulatory.    Radhika Shetty RN

## 2018-08-13 NOTE — MR AVS SNAPSHOT
After Visit Summary   8/13/2018    Lu Smith    MRN: [id number]           Patient Information     Date Of Birth          1950        Visit Information        Provider Department      8/13/2018 8:00 AM  14 ATC;  ONCOLOGY INFUSION [facility]        Today's Diagnoses     Endometrial cancer (H)    -  1    Encounter for long-term (current) use of medications          Care Instructions    Contact Numbers  [facility]: [phone]    After Hours:  [phone]  Triage: [phone]    Please call the [facility] Triage line if you experience a temperature greater than or equal to 100.5, shaking chills, have uncontrolled nausea, vomiting and/or diarrhea, dizziness, shortness of breath, chest pain, bleeding, unexplained bruising, or if you have any other new/concerning symptoms, questions or concerns.     If it is after hours, weekends, or holidays, please call the main hospital  at  [phone] and ask to speak to the Oncology doctor on call.     If you are having any concerning symptoms or wish to speak to a provider before your next infusion visit, please call your care coordinator or triage to notify them so we can adequately serve you.     If you need a refill on a narcotic prescription or other medication, please call triage before your infusion appointment.           August 2018 Sunday Monday Tuesday Wednesday Thursday Friday Saturday                  1     2     3     4       5     6     7     8     9     10     11       12     13     [facility] LAB DRAW    6:30 AM   (15 min.)   [facility] LAB DRAW   [facility] Lab Draw     [facility] RETURN ACTIVE TREATMENT    6:45 AM   (40 min.)   Yasmine Zazueta APRN CNP   [facility] ONC INFUSION 360    8:00 AM   (360 min.)    ONCOLOGY INFUSION   [facility] 14     15     16     17     18       19     20     21     22     23     24     25       26     27     28      29 30 31 September 2018 Sunday Monday Tuesday Wednesday Thursday Friday Saturday                                 1       2     3     4     5     6     7     [facility] LAB DRAW    6:30 AM   (15 min.)    [facility] LAB DRAW   [facility] Lab Draw     [facility] RETURN ACTIVE TREATMENT    6:45 AM   (40 min.)   Yasmine Zazueta APRN CNP   [facility]     [facility] ONC INFUSION 360    8:00 AM   (360 min.)   [facility] ONCOLOGY INFUSION   [facility] 8       9     10     11     12     13     14     15       16     17     18     19     20     21     22       23     24     25     26     27     28     [facility] LAB DRAW    6:30 AM   (15 min.)   [facility] LAB DRAW   [facility] Lab Draw     [facility] RETURN ACTIVE TREATMENT    6:45 AM   (40 min.)   Yasmine Zazueta APRN CNP   [facility]     [facility] ONC INFUSION 360    8:00 AM   (360 min.)   [facility] ONCOLOGY INFUSION   [facility] 29       30                                                Lab Results:  Recent Results (from the past 12 hour(s))   CBC with platelets differential    Collection Time: 08/13/18  6:51 AM   Result Value Ref Range    WBC 5.3 4.0 - 11.0 10e9/L    RBC Count 3.18 (L) 3.8 - 5.2 10e12/L    Hemoglobin 9.6 (L) 11.7 - 15.7 g/dL    Hematocrit 29.2 (L) 35.0 - 47.0 %    MCV 92 78 - 100 fl    MCH 30.2 26.5 - 33.0 pg    MCHC 32.9 31.5 - 36.5 g/dL    RDW 17.3 (H) 10.0 - 15.0 %    Platelet Count 212 150 - 450 10e9/L    Diff Method Automated Method     % Neutrophils 66.8 %    % Lymphocytes 15.0 %    % Monocytes 12.1 %    % Eosinophils 4.7 %    % Basophils 0.6 %    % Immature Granulocytes 0.8 %    Nucleated RBCs 0 0 /100    Absolute Neutrophil 3.5 1.6 - 8.3 10e9/L    Absolute Lymphocytes 0.8 0.8 - 5.3 10e9/L    Absolute Monocytes 0.6 0.0 - 1.3 10e9/L    Absolute Eosinophils 0.3 0.0 - 0.7 10e9/L    Absolute Basophils 0.0 0.0 - 0.2 10e9/L    Abs Immature Granulocytes 0.0 0 - 0.4  10e9/L    Absolute Nucleated RBC 0.0    Comprehensive metabolic panel    Collection Time: 08/13/18  6:51 AM   Result Value Ref Range    Sodium 138 133 - 144 mmol/L    Potassium 3.8 3.4 - 5.3 mmol/L    Chloride 102 94 - 109 mmol/L    Carbon Dioxide 29 20 - 32 mmol/L    Anion Gap 7 3 - 14 mmol/L    Glucose 115 (H) 70 - 99 mg/dL    Urea Nitrogen 21 7 - 30 mg/dL    Creatinine 0.66 0.52 - 1.04 mg/dL    GFR Estimate 90 >60 mL/min/1.7m2    GFR Estimate If Black >90 >60 mL/min/1.7m2    Calcium 9.4 8.5 - 10.1 mg/dL    Bilirubin Total 0.6 0.2 - 1.3 mg/dL    Albumin 3.1 (L) 3.4 - 5.0 g/dL    Protein Total 7.1 6.8 - 8.8 g/dL    Alkaline Phosphatase 58 40 - 150 U/L    ALT 29 0 - 50 U/L    AST 20 0 - 45 U/L   Magnesium    Collection Time: 08/13/18  6:51 AM   Result Value Ref Range    Magnesium 1.7 1.6 - 2.3 mg/dL               Follow-ups after your visit        Your next 10 appointments already scheduled     Sep 07, 2018  6:30 AM CDT   [facility] Lab Draw with  [facility] LAB DRAW   [facility] ([facility])    [address] [place] [address]  [address]  [facility] [address]   [phone]            Sep 07, 2018  7:00 AM CDT   (Arrive by 6:45 AM)   Return Active Treatment with ANU Selby CNP   [facility])    [address] [place] [address]  [address]  [facility] [address]   [phone]            Sep 07, 2018  8:00 AM CDT   Infusion 360 with UC ONCOLOGY INFUSION, UC 14 ATC   [facility])    [address] [place] [address]  [address]  [facility] [address]   [phone]            Sep 28, 2018  6:30 AM CDT   [facility] Lab Draw with UC [facility] LAB DRAW   [facility] Lab Draw ([facility])    [address] [place] [address]  [address]  [facility] [address]   [phone]            Sep 28, 2018  7:00 AM CDT   (Arrive by 6:45 AM)   Return Active Treatment with Yasmine  ANU Zazueta CNP   [facility])    [address]  [address]  [facility] [address] [phone]            Sep 28, 2018  8:00 AM CDT   Infusion 360 with UC ONCOLOGY INFUSION, UC 14 ATC   [facility] ([facility])    [address]  [address]  [facility] [address] [phone]              Who to contact     If you have questions or need follow up information about today's clinic visit or your schedule please contact [facility] directly at [phone].  Normal or non-critical lab and imaging results will be communicated to you by [employer], letter or phone within 4 business days after the clinic has received the results. If you do not hear from us within 7 days, please contact the clinic through [employer] or phone. If you have a critical or abnormal lab result, we will notify you by phone as soon as possible.  Submit refill requests through [employer] or call your pharmacy and they will forward the refill request to us. Please allow 3 business days for your refill to be completed.          Additional Information About Your Visit        MyChart Information     [employer] gives you secure access to your electronic health record. If you see a primary care provider, you can also send messages to your care team and make appointments. If you have questions, please call your primary care clinic.  If you do not have a primary care provider, please call [phone] and they will assist you.        Care EveryWhere ID     This is your Care EveryWhere ID. This could be used by other organizations to access your [place] medical records  [id number]         Blood Pressure from Last 3 Encounters:   08/13/18 151/76   07/20/18 157/82   07/16/18 132/74    Weight from Last 3 Encounters:   08/13/18 126.9 kg (279 lb 11.2 oz)   07/16/18 129.7 kg (286 lb)   07/09/18 128.8 kg (284 lb)              We  "Performed the Following     CBC with platelets differential     Comprehensive metabolic panel     Magnesium        Primary Care Provider Office Phone # Fax #    Levar Scott [phone]       [address] [place]  [facility] [address]        Equal Access to Services     CED COLLINS : Geeta forbes lisbeto Soilianaali, waaxda luqadaha, qaybta kaalmada adejeanetteyabandar, anglea tadin hayaawil garjeanette charltonsiddhartha enriquez. So [facility] [phone].    ATENCIÓN: Si habla español, tiene a cortes disposición servicios gratuitos de asistencia lingüística. [facility] [phone].    We comply with applicable federal civil rights laws and [place] laws. We do not discriminate on the basis of race, color, national origin, age, disability, sex, sexual orientation, or gender identity.            Thank you!     Thank you for choosing [facility]  for your care. Our goal is always to provide you with excellent care. Hearing back from our patients is one way we can continue to improve our services. Please take a few minutes to complete the written survey that you may receive in the mail after your visit with us. Thank you!             Your Updated Medication List - Protect others around you: Learn how to safely use, store and throw away your medicines at www.disposemymeds.org.          This list is accurate as of 8/13/18  1:29 PM.  Always use your most recent med list.                   Brand Name Dispense Instructions for use Diagnosis    ACETAMINOPHEN PO      Take 325 mg by mouth every 6 hours as needed for pain        aspirin 81 MG EC tablet      Take 81 mg by mouth Pt states, \"I don't take it regularly. I take it when I remember to\".        fluticasone 50 MCG/ACT spray    FLONASE    1 Bottle    Spray 1-2 sprays into both nostrils daily    Chronic seasonal allergic rhinitis, unspecified trigger       ibuprofen 600 MG tablet    ADVIL/MOTRIN    30 tablet    Take 1 tablet (600 mg) by mouth every 6 hours as " needed for pain (mild)    Endometrial carcinoma (H)       loperamide 2 MG capsule    IMODIUM     Take 2 mg by mouth 4 times daily as needed for diarrhea        loratadine 10 MG tablet    CLARITIN    30 tablet    Take 1 tablet (10 mg) by mouth daily    Pain in joint, multiple sites, Chronic seasonal allergic rhinitis, unspecified trigger       LORazepam 0.5 MG tablet    ATIVAN    30 tablet    Take 1 tablet (0.5 mg) by mouth every 6 hours as needed (nausea/vomiting, anxiety or sleep)    Endometrial cancer (H), Encounter for long-term (current) use of medications       METFORMIN HCL PO      Take 500 mg by mouth daily (with breakfast)        prochlorperazine 5 MG tablet    COMPAZINE    30 tablet    Take 1 tablet (5 mg) by mouth every 6 hours as needed (nausea/vomiting)    Endometrial cancer (H), Encounter for long-term (current) use of medications       pyridoxine 50 MG Tabs    VITAMIN B-6    60 tablet    Take 1 tablet (50 mg) by mouth 2 times daily    Chemotherapy-induced peripheral neuropathy (H)       triamterene-hydrochlorothiazide 37.5-25 MG per capsule    DYAZIDE     Take 1 capsule by mouth every morning

## 2018-08-13 NOTE — PATIENT INSTRUCTIONS
For joint pain:  Schedule 1g Tylenol/acetaminophen three times daily and loratadine/Claritin    Drink two liters of non-caffeinated fluids    Restart vitamin B6 50mg twice daily and L-glutamine 2000mg twice daily

## 2018-08-13 NOTE — PATIENT INSTRUCTIONS
Contact Numbers  [facility]: [phone]    After Hours:  [phone]  Triage: [phone]    Please call the [facility] Triage line if you experience a temperature greater than or equal to 100.5, shaking chills, have uncontrolled nausea, vomiting and/or diarrhea, dizziness, shortness of breath, chest pain, bleeding, unexplained bruising, or if you have any other new/concerning symptoms, questions or concerns.     If it is after hours, weekends, or holidays, please call the main hospital  at  [phone] and ask to speak to the Oncology doctor on call.     If you are having any concerning symptoms or wish to speak to a provider before your next infusion visit, please call your care coordinator or triage to notify them so we can adequately serve you.     If you need a refill on a narcotic prescription or other medication, please call triage before your infusion appointment.           August 2018 Sunday Monday Tuesday Wednesday Thursday Friday Saturday                  1     2     3     4       5     6     7     8     9     10     11       12     13     [facility] LAB DRAW    6:30 AM   (15 min.)    [facility] LAB DRAW   [facility] Lab Draw     [facility] RETURN ACTIVE TREATMENT    6:45 AM   (40 min.)   Yasmine Zazueta APRN CNP   [facility] ONC INFUSION 360    8:00 AM   (360 min.)    ONCOLOGY INFUSION   [facility] 14     15     16     17     18       19     20     21     22     23     24     25       26     27     28     29     30     31                     September 2018 Sunday Monday Tuesday Wednesday Thursday Friday Saturday                                 1       2     3     4     5     6     7     [facility] LAB DRAW    6:30 AM   (15 min.)    [facility] LAB DRAW   [facility] Lab Draw     [facility] RETURN ACTIVE TREATMENT    6:45 AM   (40 min.)   Yasmine Zazueta APRN CNP   [facility] ONC INFUSION 360    8:00  AM   (360 min.)   UC ONCOLOGY INFUSION   [facility] 8       9     10     11     12     13     14     15       16     17     18     19     20     21     22       23     24     25     26     27     28     [facility] MAS[facility] LAB DRAW    6:30 AM   (15 min.)    [facility] LAB DRAW   [facility] Lab Draw     [facility] RETURN ACTIVE TREATMENT    6:45 AM   (40 min.)   Yasmine Zazueta APRN CNP   [facility] ONC INFUSION 360    8:00 AM   (360 min.)    ONCOLOGY INFUSION   [facility] 29       30                                                Lab Results:  Recent Results (from the past 12 hour(s))   CBC with platelets differential    Collection Time: 08/13/18  6:51 AM   Result Value Ref Range    WBC 5.3 4.0 - 11.0 10e9/L    RBC Count 3.18 (L) 3.8 - 5.2 10e12/L    Hemoglobin 9.6 (L) 11.7 - 15.7 g/dL    Hematocrit 29.2 (L) 35.0 - 47.0 %    MCV 92 78 - 100 fl    MCH 30.2 26.5 - 33.0 pg    MCHC 32.9 31.5 - 36.5 g/dL    RDW 17.3 (H) 10.0 - 15.0 %    Platelet Count 212 150 - 450 10e9/L    Diff Method Automated Method     % Neutrophils 66.8 %    % Lymphocytes 15.0 %    % Monocytes 12.1 %    % Eosinophils 4.7 %    % Basophils 0.6 %    % Immature Granulocytes 0.8 %    Nucleated RBCs 0 0 /100    Absolute Neutrophil 3.5 1.6 - 8.3 10e9/L    Absolute Lymphocytes 0.8 0.8 - 5.3 10e9/L    Absolute Monocytes 0.6 0.0 - 1.3 10e9/L    Absolute Eosinophils 0.3 0.0 - 0.7 10e9/L    Absolute Basophils 0.0 0.0 - 0.2 10e9/L    Abs Immature Granulocytes 0.0 0 - 0.4 10e9/L    Absolute Nucleated RBC 0.0    Comprehensive metabolic panel    Collection Time: 08/13/18  6:51 AM   Result Value Ref Range    Sodium 138 133 - 144 mmol/L    Potassium 3.8 3.4 - 5.3 mmol/L    Chloride 102 94 - 109 mmol/L    Carbon Dioxide 29 20 - 32 mmol/L    Anion Gap 7 3 - 14 mmol/L    Glucose 115 (H) 70 - 99 mg/dL    Urea Nitrogen 21 7 - 30 mg/dL    Creatinine 0.66 0.52 - 1.04 mg/dL    GFR Estimate 90 >60 mL/min/1.7m2     GFR Estimate If Black >90 >60 mL/min/1.7m2    Calcium 9.4 8.5 - 10.1 mg/dL    Bilirubin Total 0.6 0.2 - 1.3 mg/dL    Albumin 3.1 (L) 3.4 - 5.0 g/dL    Protein Total 7.1 6.8 - 8.8 g/dL    Alkaline Phosphatase 58 40 - 150 U/L    ALT 29 0 - 50 U/L    AST 20 0 - 45 U/L   Magnesium    Collection Time: 08/13/18  6:51 AM   Result Value Ref Range    Magnesium 1.7 1.6 - 2.3 mg/dL

## 2018-08-29 ENCOUNTER — OFFICE VISIT (OUTPATIENT)
Dept: RADIATION ONCOLOGY | Facility: CLINIC | Age: 68
End: 2018-08-29
Attending: RADIOLOGY
Payer: MEDICARE

## 2018-08-29 VITALS — WEIGHT: 280 LBS | BODY MASS INDEX: 46.59 KG/M2 | SYSTOLIC BLOOD PRESSURE: 148 MMHG | DIASTOLIC BLOOD PRESSURE: 76 MMHG

## 2018-08-29 DIAGNOSIS — C54.1 ENDOMETRIAL CANCER (H): Primary | ICD-10-CM

## 2018-08-29 PROCEDURE — G0463 HOSPITAL OUTPT CLINIC VISIT: HCPCS | Performed by: RADIOLOGY

## 2018-08-29 NOTE — MR AVS SNAPSHOT
After Visit Summary   8/29/2018    Lu Smith    MRN: [id number]           Patient Information     Date Of Birth          1950        Visit Information        Provider Department      8/29/2018 11:30 AM Ana Michelle MD [facility]        Today's Diagnoses     Endometrial cancer (H)    -  1       Follow-ups after your visit        Your next 10 appointments already scheduled     Sep 07, 2018  6:30 AM CDT   [facility] Lab Draw with UC MASONIC LAB DRAW   [facility] Mason[facility] Lab Draw ([facility])    [address] [place] [address]  [address]  [facility] [address]   [phone]            Sep 07, 2018  7:00 AM CDT   (Arrive by 6:45 AM)   Return Active Treatment with ANU Selby CNP   [facility] ([facility])    [address] [place] [address]  [address]  [facility] [address]   [phone]            Sep 07, 2018  8:00 AM CDT   Infusion 360 with UC ONCOLOGY INFUSION, UC 14 ATC   [facility] ([facility])    [address] [place] [address]  [address]  [facility] [address]   [phone]            Sep 28, 2018  6:30 AM CDT   [facility] Lab Draw with UC MASONIC LAB DRAW   [facility] Lab Draw ([facility])    [address] [place] [address]  [address]  [facility] [address]   [phone]            Sep 28, 2018  7:00 AM CDT   (Arrive by 6:45 AM)   Return Active Treatment with ANU Selby CNP   [facility])    [address] [place] [address]  [address]  [facility] [address]   [phone]            Sep 28, 2018  8:00 AM CDT   Infusion 360 with UC ONCOLOGY INFUSION, UC 14 ATC   [facility] ([facility])    [address] [place] [address]  [address]  [facility] [address]   [phone]            Oct 02, 2018 12:00 PM CDT   (Arrive by 11:45 AM)   NEW WITH ROOM with  Gricelda Fontaine GC,  2 117 CONSULT [facility] ([facility])    [address]  [address]  [facility] [address] [phone]              Who to contact     Please call your clinic at [phone] to:    Ask questions about your health    Make or cancel appointments    Discuss your medicines    Learn about your test results    Speak to your doctor            Additional Information About Your Visit        [employer] Information     [employer] gives you secure access to your electronic health record. If you see a primary care provider, you can also send messages to your care team and make appointments. If you have questions, please call your primary care clinic.  If you do not have a primary care provider, please call [phone] and they will assist you.      [employer] is an electronic gateway that provides easy, online access to your medical records. With [employer], you can request a clinic appointment, read your test results, renew a prescription or communicate with your care team.     To access your existing account, please contact your [facility] or call [phone] for assistance.        Care EveryWhere ID     This is your Care EveryWhere ID. This could be used by other organizations to access your [place] medical records  [id number]        Your Vitals Were     BMI (Body Mass Index)                   46.59 kg/m2            Blood Pressure from Last 3 Encounters:   08/29/18 148/76   08/13/18 151/76   07/20/18 157/82    Weight from Last 3 Encounters:   08/29/18 127 kg (280 lb)   08/13/18 126.9 kg (279 lb 11.2 oz)   07/16/18 129.7 kg (286 lb)              Today, you had the following     No orders found for display       Primary Care Provider Office Phone # Fax #    Mokendominic Tyler [phone]       [address]  [place] [facility] [address]        Equal Access to Services     CED COLLINS AH:  "Hadii aad ku hadzenyo Soomaali, waaxda luqadaha, qaybta kaalrob dimas, angela enriquez. So [facility] [phone].    ATENCIÓN: Si mars mccauley, tiene a cortes disposición servicios gratuitos de asistencia lingüística. Cassandraame al [phone].    We comply with applicable federal civil rights laws and [place] laws. We do not discriminate on the basis of race, color, national origin, age, disability, sex, sexual orientation, or gender identity.            Thank you!     Thank you for choosing [facility]  for your care. Our goal is always to provide you with excellent care. Hearing back from our patients is one way we can continue to improve our services. Please take a few minutes to complete the written survey that you may receive in the mail after your visit with us. Thank you!             Your Updated Medication List - Protect others around you: Learn how to safely use, store and throw away your medicines at www.disposemymeds.org.          This list is accurate as of 8/29/18 11:59 PM.  Always use your most recent med list.                   Brand Name Dispense Instructions for use Diagnosis    ACETAMINOPHEN PO      Take 325 mg by mouth every 6 hours as needed for pain        aspirin 81 MG EC tablet      Take 81 mg by mouth Pt states, \"I don't take it regularly. I take it when I remember to\".        fluticasone 50 MCG/ACT spray    FLONASE    1 Bottle    Spray 1-2 sprays into both nostrils daily    Chronic seasonal allergic rhinitis, unspecified trigger       ibuprofen 600 MG tablet    ADVIL/MOTRIN    30 tablet    Take 1 tablet (600 mg) by mouth every 6 hours as needed for pain (mild)    Endometrial carcinoma (H)       loperamide 2 MG capsule    IMODIUM     Take 2 mg by mouth 4 times daily as needed for diarrhea        loratadine 10 MG tablet    CLARITIN    30 tablet    Take 1 tablet (10 mg) by mouth daily    Pain in joint, multiple sites, Chronic seasonal allergic rhinitis, " unspecified trigger       LORazepam 0.5 MG tablet    ATIVAN    30 tablet    Take 1 tablet (0.5 mg) by mouth every 6 hours as needed (nausea/vomiting, anxiety or sleep)    Endometrial cancer (H), Encounter for long-term (current) use of medications       METFORMIN HCL PO      Take 500 mg by mouth daily (with breakfast)        prochlorperazine 5 MG tablet    COMPAZINE    30 tablet    Take 1 tablet (5 mg) by mouth every 6 hours as needed (nausea/vomiting)    Endometrial cancer (H), Encounter for long-term (current) use of medications       pyridoxine 50 MG Tabs    VITAMIN B-6    60 tablet    Take 1 tablet (50 mg) by mouth 2 times daily    Chemotherapy-induced peripheral neuropathy (H)       triamterene-hydrochlorothiazide 37.5-25 MG per capsule    DYAZIDE     Take 1 capsule by mouth every morning

## 2018-08-29 NOTE — PROGRESS NOTES
[facility], [place]  Radiation Oncology Follow-up Note  2018    Lu Smith   MRN: [id number]  : 1950     DISEASE TREATED:  FIGO IIIC2 serous endometrial carcinoma     INTERVAL SINCE COMPLETION OF RADIATION THERAPY: 6 weeks; completed treatment on 18.     TYPE OF RADIATION THERAPY ADMINISTERED: 5040 cGy in 28 fractions delivered to pelvis and PALNs followed by 1200 cGy in 2 fractions intracavitary vaginal cuff brachytherapy     SUBJECTIVE:   Ms. Smith is a 68 year old female with FIGO IIIC2 serous endometrial cancer. She initially presented with post-menopausal bleeding and an endometrial biopsy showed high-grade serous carcinoma.  She underwent robotic assisted total laparoscopic hysterectomy, bilateral salpingooophorectomy, omentectomy, and node dissection. Pathology was significant for outer one half myometrial invasion, lymphovascular space invasion, cervical stromal invasion, and bilateral ovarian involvement.  0/22 pelvic lymph nodes were involved, but 4/9 right and 2/10 left periaortic lymph nodes were positive; several with extracapsular extension.  Also noted was a close radial margin of 1 mm.  She completed 3 cycles of carbo/taxol and then underwent adjuvant radiation as described above with the plan for 3 additional cycles of chemotherapy following radiation. She tolerated the above treatment well with relatively mild acute toxicity and no unplanned treatment breaks. She did not have any hospitalizations or ED visits during treatment. She experienced grade 1 fatigue and grade 1 diarrhea which was managed with Imodium. She did not have any periprocedural complications during the brachytherapy portion of her treatment. She returns to clinic today for a routine follow up visit approximately 6 weeks after completion of treatment.    On exam today, Ms. Smith reports that she is overall doing well. She has resumed chemotherapy with her most recent cycle  on 8/13/18. She has 2 cycles remaining and will receive her next cycle on 9/7/18. She reports that she is tolerating the chemotherapy well but is noticing some hair loss. She does not have any residual toxicity from RT at this time and notes mostly normal BMs with only occasional constipation and diarrhea. She states that her fatigue has improved significantly over the past few weeks. She notes some vaginal dryness but has been compliant with her dilator use every other day. No bleeding or discharge. The remainder of her ROS were unremarkable.      OBJECTIVE:   /76  Wt 127 kg (280 lb)  BMI 46.59 kg/m2  General: Alert, oriented, NAD  Skin: No rashes or lesions appreciated  HEENT: NCAT, EOMI  Neck: full ROM,  Heart: WWP  Lungs: Breathing comfortably on RA  Abd: Nondistended  /Rectal: Deferred  Ext: Atraumatic, grossly intact strength  Neuro: CNs grossly intact, normal gait    LABORATORY:  Lab Results   Component Value Date    WBC 5.3 08/13/2018    HGB 9.6 (L) 08/13/2018    HCT 29.2 (L) 08/13/2018    MCV 92 08/13/2018     08/13/2018     Lab Results   Component Value Date     08/13/2018    POTASSIUM 3.8 08/13/2018    CHLORIDE 102 08/13/2018    CO2 29 08/13/2018     (H) 08/13/2018     IMPRESSION: 68 year old female with FIGO IIIC2 serous endometrial cancer undergoing adjuvant chemoradiation per sandwich protocol now 6 weeks s/p EBRT + VBT. She is clinically doing well with no significant residual toxicity.      RECOMMENDATIONS:    1. Follow up in radiation oncology prn  2. Routine follow up with Dr. Henderson  3. Continue dilator use for 1 year    The patient was seen and discussed with staff, Dr. Michelle.    Anoop Riddle MD  Resident, PGY-4  Department of Radiation Oncology  [facility]  [phone]    Ms. Smith was seen and examined by me. Note above by Dr. Riddle was reviewed and edited by me and reflects our mutual findings and plan of care.  Ana Michelle,  MD  Department of Radiation Oncology  [facility]

## 2018-08-29 NOTE — PROGRESS NOTES
FOLLOW-UP VISIT    Patient Name: Lu Smith      : 1950     Age: 68 year old        ______________________________________________________________________________     Chief Complaint   Patient presents with     Cancer     Pt is here for a follow up:Uterine cancer: Pelvis and PANS 5040 cGy completed on 18, Brachytherapy x 2 1200 cGy completed on 18     /76  Wt 127 kg (280 lb)  BMI 46.59 kg/m2       Pain  Denies    Labs  Other Labs: No    Imaging  None        Diarrhea: 0- None    Constipation: 0- None    Nausea: 0- None    Vomitin- No vomiting    Genitourinary system: 0- Normal    Dysuria: 0- None    Vaginal dilator use:No    Other Appointments:     MD Name: Yasmine Tam Appointment Date:    MD Name: Appointment Date:   MD Name: Appointment Date:   Other Appointment Notes:     Residual Radiation side effect: No side effects     Additional Instructions:     Nurse face-to-face time: Level 3:  10 min face to face time

## 2018-08-29 NOTE — LETTER
2018       RE: Lu Smith  [address]  [place] [address]     Dear Colleague,    Thank you for referring your patient, Lu Smith, to the RADIATION ONCOLOGY CLINIC. Please see a copy of my visit note below.    FOLLOW-UP VISIT    Patient Name: Lu Smith      : 1950     Age: 68 year old        ______________________________________________________________________________     Chief Complaint   Patient presents with     Cancer     Pt is here for a follow up:Uterine cancer: Pelvis and PANS 5040 cGy completed on 18, Brachytherapy x 2 1200 cGy completed on 18     /76  Wt 127 kg (280 lb)  BMI 46.59 kg/m2       Pain  Denies    Labs  Other Labs: No    Imaging  None        Diarrhea: 0- None    Constipation: 0- None    Nausea: 0- None    Vomitin- No vomiting    Genitourinary system: 0- Normal    Dysuria: 0- None    Vaginal dilator use:No    Other Appointments:     MD Name: Yasmine Tam Appointment Date:    MD Name: Appointment Date:   MD Name: Appointment Date:   Other Appointment Notes:     Residual Radiation side effect: No side effects     Additional Instructions:     Nurse face-to-face time: Level 3:  10 min face to face time          [facility], [place]  Radiation Oncology Follow-up Note  2018    Lu Smith   MRN: [id number]  : 1950     DISEASE TREATED:  FIGO IIIC2 serous endometrial carcinoma     INTERVAL SINCE COMPLETION OF RADIATION THERAPY: 6 weeks; completed treatment on 18.     TYPE OF RADIATION THERAPY ADMINISTERED: 5040 cGy in 28 fractions delivered to pelvis and PALNs followed by 1200 cGy in 2 fractions intracavitary vaginal cuff brachytherapy     SUBJECTIVE:   Ms. Smith is a 68 year old female with FIGO IIIC2 serous endometrial cancer. She initially presented with post-menopausal bleeding and an endometrial biopsy showed high-grade serous carcinoma.  She underwent robotic assisted total  laparoscopic hysterectomy, bilateral salpingooophorectomy, omentectomy, and node dissection. Pathology was significant for outer one half myometrial invasion, lymphovascular space invasion, cervical stromal invasion, and bilateral ovarian involvement.  0/22 pelvic lymph nodes were involved, but 4/9 right and 2/10 left periaortic lymph nodes were positive; several with extracapsular extension.  Also noted was a close radial margin of 1 mm.  She completed 3 cycles of carbo/taxol and then underwent adjuvant radiation as described above with the plan for 3 additional cycles of chemotherapy following radiation. She tolerated the above treatment well with relatively mild acute toxicity and no unplanned treatment breaks. She did not have any hospitalizations or ED visits during treatment. She experienced grade 1 fatigue and grade 1 diarrhea which was managed with Imodium. She did not have any periprocedural complications during the brachytherapy portion of her treatment. She returns to clinic today for a routine follow up visit approximately 6 weeks after completion of treatment.    On exam today, Ms. Smith reports that she is overall doing well. She has resumed chemotherapy with her most recent cycle on 8/13/18. She has 2 cycles remaining and will receive her next cycle on 9/7/18. She reports that she is tolerating the chemotherapy well but is noticing some hair loss. She does not have any residual toxicity from RT at this time and notes mostly normal BMs with only occasional constipation and diarrhea. She states that her fatigue has improved significantly over the past few weeks. She notes some vaginal dryness but has been compliant with her dilator use every other day. No bleeding or discharge. The remainder of her ROS were unremarkable.      OBJECTIVE:   /76  Wt 127 kg (280 lb)  BMI 46.59 kg/m2  General: Alert, oriented, NAD  Skin: No rashes or lesions appreciated  HEENT: NCAT, EOMI  Neck: full ROM,  Heart:  WWP  Lungs: Breathing comfortably on RA  Abd: Nondistended  /Rectal: Deferred  Ext: Atraumatic, grossly intact strength  Neuro: CNs grossly intact, normal gait    LABORATORY:  Lab Results   Component Value Date    WBC 5.3 08/13/2018    HGB 9.6 (L) 08/13/2018    HCT 29.2 (L) 08/13/2018    MCV 92 08/13/2018     08/13/2018     Lab Results   Component Value Date     08/13/2018    POTASSIUM 3.8 08/13/2018    CHLORIDE 102 08/13/2018    CO2 29 08/13/2018     (H) 08/13/2018     IMPRESSION: 68 year old female with FIGO IIIC2 serous endometrial cancer undergoing adjuvant chemoradiation per sandwich protocol now 6 weeks s/p EBRT + VBT. She is clinically doing well with no significant residual toxicity.      RECOMMENDATIONS:    1. Follow up in radiation oncology prn  2. Routine follow up with Dr. Henderson  3. Continue dilator use for 1 year    The patient was seen and discussed with staff, Dr. Michelle.    Anoop Riddle MD  Resident, PGY-4  Department of Radiation Oncology  [facility]  [phone]    Ms. Smith was seen and examined by me. Note above by Dr. Riddle was reviewed and edited by me and reflects our mutual findings and plan of care.  Ana Michelle MD  Department of Radiation Oncology  [facility]

## 2018-09-06 NOTE — PROGRESS NOTES
Gynecologic Oncology Follow-Up Note    RE: Lu Smith  MRN: [id number]  : 1950  Date of Visit: 18    CC: Lu Smith is a 68 year old year old female with stage IIIC2 serous endometrial cancer who presents today for follow up regarding disease management.    HPI: Lu comes to the clinic accompanied by her sister Lisbeth. She completed radiation with EBRT and vaginal brachytherapy on 18. Using her dilator but admits she uses it sporadically, usually 1-3 times per week. Feels she tolerated this past cycle of chemotherapy fairly but notes that she had more tingling in her hands and feet though this was transient and resolved- taking vitamin B6 but not L-glutamine. Had more nausea this cycle, controlled with anti-emetics. Constipation managed with fiber. Fatigue managed with rest. No fevers or bleeding. Eating and drinking well, feels ready for another cycle of chemotherapy.    Oncology History:  18: TLH-BSO, omentectomy, bilateral pelvic and para-aortic lymph node dissection, pelvic washings  3/30/18: C1D1 carboplatin AUC 6/paclitaxel 175mg/m2  18: C2D1 carboplatin AUC 6/paclitaxel 175mg/m2  18: C3D1 carboplatin AUC6/paclitaxel 175mg/m2  18-18: EBRT delivered to pelvis and para-aortic nodes, 5040 cGy in 28 fractions  18-18: HDR brachytherapy delivered to the vaginal cuff, 1200 cGy in two fractions  18: C4D1 carboplatin AUC 6/paclitaxel 175mg/m2  18: C5D1 carboplatin AUC6/paclitaxel 175mg/m2    Past Medical History:   Diagnosis Date     Diabetes (H)     Type II     Endometrial cancer determined by uterine biopsy (H) 2018     HTN (hypertension)      Obesity      Osteoarthritis        Past Surgical History:   Procedure Laterality Date     CHOLECYSTECTOMY       CYSTOSCOPY N/A 2018    Procedure: CYSTOSCOPY;;  Surgeon: Kevin Henderson MD;  Location: [facility] OR     DAVINCI HYSTERECTOMY TOTAL, BILATERAL SALPINGO-OOPHORECTOMY, COMBINED N/A 2018     Procedure: COMBINED DAVINCI HYSTERECTOMY TOTAL, SALPINGO-OOPHORECTOMY;  DaVinci Assisted Total Laparoscopic Hysterectomy, Bilateral Salpingo-Oophorectomy, Cystoscopy,  Omental biopsy, omentectomy,bilateral  Pelvic And Para Aortic Lymph Node Dissection;  Surgeon: Kevin Henderson MD;  Location: [facility] OR     Partial lumbar discectomy         Current Outpatient Prescriptions   Medication     ACETAMINOPHEN PO     aspirin EC 81 MG EC tablet     fluticasone (FLONASE) 50 MCG/ACT spray     ibuprofen (ADVIL/MOTRIN) 600 MG tablet     loperamide (IMODIUM) 2 MG capsule     loratadine (CLARITIN) 10 MG tablet     LORazepam (ATIVAN) 0.5 MG tablet     METFORMIN HCL PO     prochlorperazine (COMPAZINE) 5 MG tablet     pyridoxine (VITAMIN B-6) 50 MG TABS     triamterene-hydrochlorothiazide (DYAZIDE) 37.5-25 MG per capsule     No current facility-administered medications for this visit.        Allergies   Allergen Reactions     Ace Inhibitors Cough     Amoxicillin Hives       Family History   Problem Relation Age of Onset     Colon Cancer Mother      Breast Cancer Sister      Lymphoma Sister        Social History     Social History     Marital status:      Spouse name: N/A     Number of children: 3     Years of education: N/A     Occupational History     conroller      Social History Main Topics     Smoking status: Former Smoker     Packs/day: 0.25     Years: 20.00     Quit date: 1991     Smokeless tobacco: Never Used      Comment: Quite smoking      Alcohol use Yes      Comment: 4-7/week if out 1-2x's/week     Drug use: No     Sexual activity: Not on file     Other Topics Concern     Not on file     Social History Narrative    Daughter  at age 25 from leukemia.  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           ROS  General: + fatigue. Denies changes in weight, weakness, appetite changes, night sweats, hot flashes, fever, chills, or difficulty sleeping  HEENT: Denies headaches, hair loss, visual  difficulty or disturbances, masses, head injury, tinnitus, hearing loss, epistaxis, congestion, problems with teeth or gums, dysphonia, or dysphagia  Pulmonary: Denies cough, sputum, hemoptysis, shortness of breath, dyspnea on exertion, wheezing, or allergies  Cardiovascular: + hypertension, chronic edema to bilateral ankles/feet. Denies chest pain, fainting, palpitations, murmurs, activity intolerance  Gastrointestinal: + constipation, nausea. Denies nausea, vomiting,  diarrhea, abdominal pain, bloating, heartburn, melena, hematochezia, or jaundice  Genitourinary: Denies dysuria, urinary urgency or frequency, hematuria, cloudy or malodorous urine, incontinence, repeat urinary tract infections, flank pain, pelvic pain, vaginal bleeding, vaginal discharge, or vaginal dryness  Integumentary: Denies rashes, sores, changing moles, or scarring  Hematologic: Denies swollen lymph nodes, masses, easy bruising, or easy bleeding  Musculoskeletal: Denies falls, back pain, myalgias, arthralgias, stiffness, muscle weakness, or muscle cramps  Neurologic: + numbness/tingling. Denies changes in memory, difficulty with walking, dizziness, seizures, or tremors  Psychiatric: Denies anxiety, depression, mood changes, suicidal thoughts, or difficulty concentrating  Endocrine: Denies polydipsia, polyuria, temperature intolerance, or history of thyroid disease    Physical Exam:    /76 (BP Location: Right arm, Patient Position: Chair, Cuff Size: Adult Large)  Pulse 76  Temp 98.5  F (36.9  C) (Oral)  Wt 126.2 kg (278 lb 4.8 oz)  SpO2 96%  BMI 46.31 kg/m2    CONSTITUTIONAL: Alert non-toxic appearing female in no acute distress  HEAD: Normocephalic, atraumatic  EYES: PERRLA; no scleral icterus  ENT: Oropharynx pink without lesions  NECK: Neck supple without lymphadenopathy  RESPIRATORY: Lungs clear to auscultation, no increased work of breathing noted  CV: Regular rate and rhythm, S1S2, no clicks, murmurs, rubs, or gallops;  bilateral lower extremities with trace edema, dorsalis pedis pulses 2+ bilaterally  GASTROINTESTINAL: Normoactive bowel sounds x4 quadrants, abdomen soft, non-distended, and non-tender to palpation without masses or organomegaly  GENITOURINARY: Not indicated  LYMPHATIC: Cervical, supraclavicular, and inguinal nodes without lymphadenopathy  MUSCULOSKELETAL: Moves all extremities, no obvious muscle wasting  NEUROLOGIC: No gross deficits, normal gait  SKIN: Appropriate color for race, warm and dry, no rashes or lesions to unclothed skin  PSYCHIATRIC: Pleasant and interactive, affect bright, makes appropriate eye contact, thought process linear    Labs:      9/7/2018  Day 1   Hemoglobin 11.7 - 15.7 g/dL 8.6 (A)   Hematocrit 35.0 - 47.0 % 26.5 (A)   Platelet Count 150 - 450 10e9/L 129 (A)   Absolute Neutrophil 1.6 - 8.3 10e9/L 2.8   Sodium 133 - 144 mmol/L 139   Potassium 3.4 - 5.3 mmol/L 3.6   Chloride 94 - 109 mmol/L 103   Carbon Dioxide 20 - 32 mmol/L 28   Urea Nitrogen 7 - 30 mg/dL 19   Creatinine 0.52 - 1.04 mg/dL 0.71   Calcium 8.5 - 10.1 mg/dL 8.9   Magnesium 1.6 - 2.3 mg/dL 1.6   Bilirubin Total 0.2 - 1.3 mg/dL 0.4   ALT 0 - 50 U/L 29   AST 0 - 45 U/L 18   Alkaline Phosphatase 40 - 150 U/L 53   Albumin 3.4 - 5.0 g/dL 3.0 (A)   Protein Total 6.8 - 8.8 g/dL 7.0   WBC 4.0 - 11.0 10e9/L 4.6       Assessment/Plan:  1) Stage IIIC2 serous endometrial cancer: Tolerating treatment well without dose limiting side effects. Proceed with cycle five of carboplatin AUC 6/paclitaxel 175mg/m2 as originally ordered by Dr. Henderson. To receive total of six cycles followed by treatment planning with Dr. Henderson. Continue small frequent meals high in protein, drink 2L non-caffeinated fluids daily. Reviewed signs and symptoms for when she should contact the clinic or seek additional care, including but not limited to fever, chills, inability to keep down food or fluids, nausea and vomiting not controlled with antiemetics, and  diarrhea leading to dehydration. Patient to contact the clinic with any questions or concerns in the interim.  2) Chemotherapy/disease side effects:   Tingling: Transient. Continue vitamin B6 50mg PO BID, to start L-glutamine 2000mg PO BID.    Chemotherapy induced nausea: More bothersome this cycle- Emend added in as a pre-medication, dexamethasone subsequently halved. Continue with PRN anti-emetics.  3) Genetic counseling: MMR protein expression intact, however, strong family history of cancer. Referral previously placed, scheduled 10/2/18.  4) Health maintenance issues discussed include to follow up with PCP for non-gynecologic concerns and co-morbid conditions. Lu was 1h15 minutes late to clinic today and reports being late to her radiation appointment as well- reviewed importance of arriving on time to ensure adequate time for chemotherapy, labs, and provider visit. Reviewed her upcoming appointments.  5) Patient verbalized understanding of and agreement with plan    A total of 25 minutes of face to face time were spent with the patient with over 50% of that time spent in counseling, coordination of care, education, and symptom management.    ANU Selby, FNP-C  Division of Gynecologic Oncology  [facility]  Pager: [phone]

## 2018-09-07 ENCOUNTER — ONCOLOGY VISIT (OUTPATIENT)
Dept: ONCOLOGY | Facility: CLINIC | Age: 68
End: 2018-09-07
Attending: NURSE PRACTITIONER
Payer: MEDICARE

## 2018-09-07 ENCOUNTER — TELEPHONE (OUTPATIENT)
Dept: ONCOLOGY | Facility: CLINIC | Age: 68
End: 2018-09-07

## 2018-09-07 ENCOUNTER — APPOINTMENT (OUTPATIENT)
Dept: LAB | Facility: CLINIC | Age: 68
End: 2018-09-07
Attending: NURSE PRACTITIONER
Payer: MEDICARE

## 2018-09-07 VITALS
DIASTOLIC BLOOD PRESSURE: 76 MMHG | TEMPERATURE: 98.5 F | SYSTOLIC BLOOD PRESSURE: 148 MMHG | HEART RATE: 76 BPM | BODY MASS INDEX: 46.31 KG/M2 | WEIGHT: 278.3 LBS | OXYGEN SATURATION: 96 %

## 2018-09-07 DIAGNOSIS — C54.1 ENDOMETRIAL CANCER (H): Primary | ICD-10-CM

## 2018-09-07 DIAGNOSIS — G62.0 CHEMOTHERAPY-INDUCED PERIPHERAL NEUROPATHY (H): ICD-10-CM

## 2018-09-07 DIAGNOSIS — Z79.899 ENCOUNTER FOR LONG-TERM (CURRENT) USE OF MEDICATIONS: ICD-10-CM

## 2018-09-07 DIAGNOSIS — T45.1X5A CHEMOTHERAPY-INDUCED PERIPHERAL NEUROPATHY (H): ICD-10-CM

## 2018-09-07 DIAGNOSIS — R11.0 CHEMOTHERAPY-INDUCED NAUSEA: ICD-10-CM

## 2018-09-07 DIAGNOSIS — Z51.11 ENCOUNTER FOR ANTINEOPLASTIC CHEMOTHERAPY: ICD-10-CM

## 2018-09-07 DIAGNOSIS — T45.1X5A CHEMOTHERAPY-INDUCED NAUSEA: ICD-10-CM

## 2018-09-07 LAB
ALBUMIN SERPL-MCNC: 3 G/DL (ref 3.4–5)
ALP SERPL-CCNC: 53 U/L (ref 40–150)
ALT SERPL W P-5'-P-CCNC: 29 U/L (ref 0–50)
ANION GAP SERPL CALCULATED.3IONS-SCNC: 7 MMOL/L (ref 3–14)
AST SERPL W P-5'-P-CCNC: 18 U/L (ref 0–45)
BASOPHILS # BLD AUTO: 0 10E9/L (ref 0–0.2)
BASOPHILS NFR BLD AUTO: 0.4 %
BILIRUB SERPL-MCNC: 0.4 MG/DL (ref 0.2–1.3)
BUN SERPL-MCNC: 19 MG/DL (ref 7–30)
CALCIUM SERPL-MCNC: 8.9 MG/DL (ref 8.5–10.1)
CHLORIDE SERPL-SCNC: 103 MMOL/L (ref 94–109)
CO2 SERPL-SCNC: 28 MMOL/L (ref 20–32)
CREAT SERPL-MCNC: 0.71 MG/DL (ref 0.52–1.04)
DIFFERENTIAL METHOD BLD: ABNORMAL
EOSINOPHIL # BLD AUTO: 0.4 10E9/L (ref 0–0.7)
EOSINOPHIL NFR BLD AUTO: 7.6 %
ERYTHROCYTE [DISTWIDTH] IN BLOOD BY AUTOMATED COUNT: 16.4 % (ref 10–15)
GFR SERPL CREATININE-BSD FRML MDRD: 82 ML/MIN/1.7M2
GLUCOSE SERPL-MCNC: 107 MG/DL (ref 70–99)
HCT VFR BLD AUTO: 26.5 % (ref 35–47)
HGB BLD-MCNC: 8.6 G/DL (ref 11.7–15.7)
IMM GRANULOCYTES # BLD: 0 10E9/L (ref 0–0.4)
IMM GRANULOCYTES NFR BLD: 0.4 %
LYMPHOCYTES # BLD AUTO: 0.9 10E9/L (ref 0.8–5.3)
LYMPHOCYTES NFR BLD AUTO: 20 %
MAGNESIUM SERPL-MCNC: 1.6 MG/DL (ref 1.6–2.3)
MCH RBC QN AUTO: 30.8 PG (ref 26.5–33)
MCHC RBC AUTO-ENTMCNC: 32.5 G/DL (ref 31.5–36.5)
MCV RBC AUTO: 95 FL (ref 78–100)
MONOCYTES # BLD AUTO: 0.5 10E9/L (ref 0–1.3)
MONOCYTES NFR BLD AUTO: 11.5 %
NEUTROPHILS # BLD AUTO: 2.8 10E9/L (ref 1.6–8.3)
NEUTROPHILS NFR BLD AUTO: 60.1 %
NRBC # BLD AUTO: 0 10*3/UL
NRBC BLD AUTO-RTO: 0 /100
PLATELET # BLD AUTO: 129 10E9/L (ref 150–450)
POTASSIUM SERPL-SCNC: 3.6 MMOL/L (ref 3.4–5.3)
PROT SERPL-MCNC: 7 G/DL (ref 6.8–8.8)
RBC # BLD AUTO: 2.79 10E12/L (ref 3.8–5.2)
SODIUM SERPL-SCNC: 139 MMOL/L (ref 133–144)
WBC # BLD AUTO: 4.6 10E9/L (ref 4–11)

## 2018-09-07 PROCEDURE — 80053 COMPREHEN METABOLIC PANEL: CPT | Performed by: NURSE PRACTITIONER

## 2018-09-07 PROCEDURE — 96413 CHEMO IV INFUSION 1 HR: CPT

## 2018-09-07 PROCEDURE — 96415 CHEMO IV INFUSION ADDL HR: CPT

## 2018-09-07 PROCEDURE — 25000128 H RX IP 250 OP 636: Mod: ZF | Performed by: NURSE PRACTITIONER

## 2018-09-07 PROCEDURE — 99214 OFFICE O/P EST MOD 30 MIN: CPT | Mod: ZP | Performed by: NURSE PRACTITIONER

## 2018-09-07 PROCEDURE — 96417 CHEMO IV INFUS EACH ADDL SEQ: CPT

## 2018-09-07 PROCEDURE — 96367 TX/PROPH/DG ADDL SEQ IV INF: CPT

## 2018-09-07 PROCEDURE — 96375 TX/PRO/DX INJ NEW DRUG ADDON: CPT

## 2018-09-07 PROCEDURE — 85025 COMPLETE CBC W/AUTO DIFF WBC: CPT | Performed by: NURSE PRACTITIONER

## 2018-09-07 PROCEDURE — 25000132 ZZH RX MED GY IP 250 OP 250 PS 637: Mod: ZF | Performed by: NURSE PRACTITIONER

## 2018-09-07 PROCEDURE — 83735 ASSAY OF MAGNESIUM: CPT | Performed by: NURSE PRACTITIONER

## 2018-09-07 PROCEDURE — 25000125 ZZHC RX 250: Mod: ZF | Performed by: NURSE PRACTITIONER

## 2018-09-07 RX ORDER — EPINEPHRINE 1 MG/ML
0.3 INJECTION, SOLUTION INTRAMUSCULAR; SUBCUTANEOUS EVERY 5 MIN PRN
Status: CANCELLED | OUTPATIENT
Start: 2018-09-07

## 2018-09-07 RX ORDER — LANOLIN ALCOHOL/MO/W.PET/CERES
50 CREAM (GRAM) TOPICAL 2 TIMES DAILY
Qty: 60 TABLET | Refills: 3 | Status: SHIPPED | OUTPATIENT
Start: 2018-09-07

## 2018-09-07 RX ORDER — DIPHENHYDRAMINE HYDROCHLORIDE 50 MG/ML
50 INJECTION INTRAMUSCULAR; INTRAVENOUS
Status: CANCELLED
Start: 2018-09-07

## 2018-09-07 RX ORDER — LORAZEPAM 2 MG/ML
1 INJECTION INTRAMUSCULAR EVERY 6 HOURS PRN
Status: CANCELLED
Start: 2018-09-07

## 2018-09-07 RX ORDER — ALBUTEROL SULFATE 0.83 MG/ML
2.5 SOLUTION RESPIRATORY (INHALATION)
Status: CANCELLED | OUTPATIENT
Start: 2018-09-07

## 2018-09-07 RX ORDER — ALBUTEROL SULFATE 90 UG/1
1-2 AEROSOL, METERED RESPIRATORY (INHALATION)
Status: CANCELLED
Start: 2018-09-07

## 2018-09-07 RX ORDER — DIPHENHYDRAMINE HCL 25 MG
50 CAPSULE ORAL ONCE
Status: COMPLETED | OUTPATIENT
Start: 2018-09-07 | End: 2018-09-07

## 2018-09-07 RX ORDER — METHYLPREDNISOLONE SODIUM SUCCINATE 125 MG/2ML
125 INJECTION, POWDER, LYOPHILIZED, FOR SOLUTION INTRAMUSCULAR; INTRAVENOUS
Status: CANCELLED
Start: 2018-09-07

## 2018-09-07 RX ORDER — MEPERIDINE HYDROCHLORIDE 25 MG/ML
25 INJECTION INTRAMUSCULAR; INTRAVENOUS; SUBCUTANEOUS EVERY 30 MIN PRN
Status: CANCELLED | OUTPATIENT
Start: 2018-09-07

## 2018-09-07 RX ORDER — EPINEPHRINE 0.3 MG/.3ML
0.3 INJECTION SUBCUTANEOUS EVERY 5 MIN PRN
Status: CANCELLED | OUTPATIENT
Start: 2018-09-07

## 2018-09-07 RX ORDER — PALONOSETRON 0.05 MG/ML
0.25 INJECTION, SOLUTION INTRAVENOUS ONCE
Status: CANCELLED
Start: 2018-09-07

## 2018-09-07 RX ORDER — HEPARIN SODIUM (PORCINE) LOCK FLUSH IV SOLN 100 UNIT/ML 100 UNIT/ML
5 SOLUTION INTRAVENOUS ONCE
Status: COMPLETED | OUTPATIENT
Start: 2018-09-07 | End: 2018-09-07

## 2018-09-07 RX ORDER — SODIUM CHLORIDE 9 MG/ML
1000 INJECTION, SOLUTION INTRAVENOUS CONTINUOUS PRN
Status: CANCELLED
Start: 2018-09-07

## 2018-09-07 RX ORDER — DIPHENHYDRAMINE HCL 25 MG
50 CAPSULE ORAL ONCE
Status: CANCELLED
Start: 2018-09-07

## 2018-09-07 RX ORDER — PALONOSETRON 0.05 MG/ML
0.25 INJECTION, SOLUTION INTRAVENOUS ONCE
Status: COMPLETED | OUTPATIENT
Start: 2018-09-07 | End: 2018-09-07

## 2018-09-07 RX ORDER — HEPARIN SODIUM (PORCINE) LOCK FLUSH IV SOLN 100 UNIT/ML 100 UNIT/ML
5 SOLUTION INTRAVENOUS ONCE
Status: CANCELLED
Start: 2018-09-07 | End: 2018-09-07

## 2018-09-07 RX ADMIN — DIPHENHYDRAMINE HYDROCHLORIDE 50 MG: 25 CAPSULE ORAL at 09:30

## 2018-09-07 RX ADMIN — FAMOTIDINE 40 MG: 10 INJECTION INTRAVENOUS at 09:33

## 2018-09-07 RX ADMIN — SODIUM CHLORIDE 250 ML: 9 INJECTION, SOLUTION INTRAVENOUS at 09:30

## 2018-09-07 RX ADMIN — CARBOPLATIN 765 MG: 10 INJECTION, SOLUTION INTRAVENOUS at 13:49

## 2018-09-07 RX ADMIN — SODIUM CHLORIDE, PRESERVATIVE FREE 5 ML: 5 INJECTION INTRAVENOUS at 14:37

## 2018-09-07 RX ADMIN — DEXAMETHASONE SODIUM PHOSPHATE 150 MG: 10 INJECTION, SOLUTION INTRAMUSCULAR; INTRAVENOUS at 10:01

## 2018-09-07 RX ADMIN — PACLITAXEL 427 MG: 6 INJECTION, SOLUTION INTRAVENOUS at 10:39

## 2018-09-07 RX ADMIN — PALONOSETRON HYDROCHLORIDE 0.25 MG: 0.25 INJECTION INTRAVENOUS at 09:30

## 2018-09-07 RX ADMIN — Medication 5 ML: at 08:14

## 2018-09-07 ASSESSMENT — PAIN SCALES - GENERAL: PAINLEVEL: NO PAIN (0)

## 2018-09-07 NOTE — NURSING NOTE
Chief Complaint   Patient presents with     Port Draw     labs drawn via port by RN     /76 (BP Location: Right arm, Patient Position: Chair, Cuff Size: Adult Large)  Pulse 76  Temp 98.5  F (36.9  C) (Oral)  Wt 126.2 kg (278 lb 4.8 oz)  SpO2 96%  BMI 46.31 kg/m2    Vitals taken. Port accessed by RN. Labs collected and sent. Line flushed with NS & Heparin. Pt tolerated well. Pt checked in for next appointment.    Verona Lees RN

## 2018-09-07 NOTE — MR AVS SNAPSHOT
After Visit Summary   9/7/2018    Lu Smith    MRN: [id number]           Patient Information     Date Of Birth          1950        Visit Information        Provider Department      9/7/2018 7:00 AM Yasmine Zazueta APRN CNP [facility]        Today's Diagnoses     Endometrial cancer (H)    -  1    Encounter for antineoplastic chemotherapy        Chemotherapy-induced peripheral neuropathy (H)        Chemotherapy-induced nausea           Follow-ups after your visit        Your next 10 appointments already scheduled     Sep 28, 2018  6:30 AM CDT   [facility] Draw with  [facility] LAB DRAW   [facility] [facility] Draw ([facility])    [address] [place] [address]  [address]  [facility] [address]   [phone]            Sep 28, 2018  7:00 AM CDT   (Arrive by 6:45 AM)   Return Active Treatment with ANU Selby CNP   [facility] ([facility])    [address] [place] [address]  [address]  [facility] [address]   [phone]            Sep 28, 2018  8:00 AM CDT   Infusion 360 with  ONCOLOGY INFUSION, UC 14 ATC   [facility] ([facility])    [address] [place] [address]  [address]  [facility] [address]   [phone]            Oct 02, 2018 12:00 PM CDT   (Arrive by 11:45 AM)   NEW WITH ROOM with Gricelda Fontaine GC,  2 117 CONSULT RM   [facility] ([facility])    [address] [place] [address]  [address]  [facility] [address]   [phone]            Oct 02, 2018  1:15 PM CDT   [facility] Lab Draw with UC [facility] LAB DRAW   [facility] [facility] Draw ([facility])    [address] [place] [address]  [address]  [facility] [address]   [phone]              Who to contact     If you have questions or need follow up information about today's clinic visit or your schedule please contact [facility] directly at [phone].  Normal or non-critical lab and imaging results will be communicated to you by MyChart, letter or phone within 4 business days after the clinic has received the results. If you do not hear from us within 7 days, please contact the clinic through [employer] or phone. If you have a critical or abnormal lab result, we will notify you by phone as soon as possible.  Submit refill requests through [facility] or call your pharmacy and they will forward the refill request to us. Please allow 3 business days for your refill to be completed.          Additional Information About Your Visit        [facility] Information     [facility] gives you secure access to your electronic health record. If you see a primary care provider, you can also send messages to your care team and make appointments. If you have questions, please call your primary care clinic.  If you do not have a primary care provider, please call [phone] and they will assist you.        Care EveryWhere ID     This is your Care EveryWhere ID. This could be used by other organizations to access your [place] medical records  [id number]        Your Vitals Were     Pulse Temperature Pulse Oximetry BMI (Body Mass Index)          76 98.5  F (36.9  C) (Oral) 96% 46.31 kg/m2         Blood Pressure from Last 3 Encounters:   09/07/18 148/76   08/29/18 148/76   08/13/18 151/76    Weight from Last 3 Encounters:   09/07/18 126.2 kg (278 lb 4.8 oz)   08/29/18 127 kg (280 lb)   08/13/18 126.9 kg (279 lb 11.2 oz)              Today, you had the following     No orders found for display         Where to get your medicines      These medications were sent to [id number] - [facility], [place] - [address]  [address], [facility] [place] [address]     Phone:  [phone]     pyridoxine 50 MG Tabs          Primary Care Provider Office Phone # Fax #    Levar Scott [phone]       [facility]  "[address]  [facility] [address]        Equal Access to Services     CED COLLINS : Hadii bailee forbes lisbetjeniffer Soilianaali, wavondada lumylesraduha, qaregineta thuytinobandar dimas, angela raygozafedericosiddhartha enriquez. So [facility] [phone].    ATENCIÓN: Si habla español, tiene a cortes disposición servicios gratuitos de asistencia lingüística. [facility] [phone].    We comply with applicable federal civil rights laws and [place] laws. We do not discriminate on the basis of race, color, national origin, age, disability, sex, sexual orientation, or gender identity.            Thank you!     Thank you for choosing [facility]  for your care. Our goal is always to provide you with excellent care. Hearing back from our patients is one way we can continue to improve our services. Please take a few minutes to complete the written survey that you may receive in the mail after your visit with us. Thank you!             Your Updated Medication List - Protect others around you: Learn how to safely use, store and throw away your medicines at www.disposemymeds.org.          This list is accurate as of 9/7/18 11:59 PM.  Always use your most recent med list.                   Brand Name Dispense Instructions for use Diagnosis    ACETAMINOPHEN PO      Take 325 mg by mouth every 6 hours as needed for pain        aspirin 81 MG EC tablet      Take 81 mg by mouth Pt states, \"I don't take it regularly. I take it when I remember to\".        fluticasone 50 MCG/ACT spray    FLONASE    1 Bottle    Spray 1-2 sprays into both nostrils daily    Chronic seasonal allergic rhinitis, unspecified trigger       ibuprofen 600 MG tablet    ADVIL/MOTRIN    30 tablet    Take 1 tablet (600 mg) by mouth every 6 hours as needed for pain (mild)    Endometrial carcinoma (H)       loperamide 2 MG capsule    IMODIUM     Take 2 mg by mouth 4 times daily as needed for diarrhea        loratadine 10 MG tablet    CLARITIN    30 tablet    Take 1 " tablet (10 mg) by mouth daily    Pain in joint, multiple sites, Chronic seasonal allergic rhinitis, unspecified trigger       LORazepam 0.5 MG tablet    ATIVAN    30 tablet    Take 1 tablet (0.5 mg) by mouth every 6 hours as needed (nausea/vomiting, anxiety or sleep)    Endometrial cancer (H), Encounter for long-term (current) use of medications       METFORMIN HCL PO      Take 500 mg by mouth daily (with breakfast)        prochlorperazine 5 MG tablet    COMPAZINE    30 tablet    Take 1 tablet (5 mg) by mouth every 6 hours as needed (nausea/vomiting)    Endometrial cancer (H), Encounter for long-term (current) use of medications       pyridoxine 50 MG Tabs    VITAMIN B-6    60 tablet    Take 1 tablet (50 mg) by mouth 2 times daily    Chemotherapy-induced peripheral neuropathy (H)       triamterene-hydrochlorothiazide 37.5-25 MG per capsule    DYAZIDE     Take 1 capsule by mouth every morning

## 2018-09-07 NOTE — LETTER
2018       RE: Lu Smith  [address] [address]     Dear Colleague,    Thank you for referring your patient, Lu Smith, to the [facility]. Please see a copy of my visit note below.    Gynecologic Oncology Follow-Up Note    RE: Lu Smith  MRN: [id number]  : 1950  Date of Visit: 18    CC: Lu Smith is a 68 year old year old female with stage IIIC2 serous endometrial cancer who presents today for follow up regarding disease management.    HPI: Lu comes to the clinic accompanied by her sister Lisbeth. She completed radiation with EBRT and vaginal brachytherapy on 18. Using her dilator but admits she uses it sporadically, usually 1-3 times per week. Feels she tolerated this past cycle of chemotherapy fairly but notes that she had more tingling in her hands and feet though this was transient and resolved- taking vitamin B6 but not L-glutamine. Had more nausea this cycle, controlled with anti-emetics. Constipation managed with fiber. Fatigue managed with rest. No fevers or bleeding. Eating and drinking well, feels ready for another cycle of chemotherapy.    Oncology History:  18: TLH-BSO, omentectomy, bilateral pelvic and para-aortic lymph node dissection, pelvic washings  3/30/18: C1D1 carboplatin AUC 6/paclitaxel 175mg/m2  18: C2D1 carboplatin AUC 6/paclitaxel 175mg/m2  18: C3D1 carboplatin AUC6/paclitaxel 175mg/m2  18-18: EBRT delivered to pelvis and para-aortic nodes, 5040 cGy in 28 fractions  18-18: HDR brachytherapy delivered to the vaginal cuff, 1200 cGy in two fractions  18: C4D1 carboplatin AUC 6/paclitaxel 175mg/m2  18: C5D1 carboplatin AUC6/paclitaxel 175mg/m2    Past Medical History:   Diagnosis Date     Diabetes (H)     Type II     Endometrial cancer determined by uterine biopsy (H) 2018     HTN (hypertension)      Obesity      Osteoarthritis        Past Surgical History:   Procedure  Laterality Date     CHOLECYSTECTOMY       CYSTOSCOPY N/A 2018    Procedure: CYSTOSCOPY;;  Surgeon: Kevin Henderson MD;  Location: [facility] OR     DAVINCI HYSTERECTOMY TOTAL, BILATERAL SALPINGO-OOPHORECTOMY, COMBINED N/A 2018    Procedure: COMBINED DAVINCI HYSTERECTOMY TOTAL, SALPINGO-OOPHORECTOMY;  DaVinci Assisted Total Laparoscopic Hysterectomy, Bilateral Salpingo-Oophorectomy, Cystoscopy,  Omental biopsy, omentectomy,bilateral  Pelvic And Para Aortic Lymph Node Dissection;  Surgeon: Kevin Henderson MD;  Location: [facility] OR     Partial lumbar discectomy         Current Outpatient Prescriptions   Medication     ACETAMINOPHEN PO     aspirin EC 81 MG EC tablet     fluticasone (FLONASE) 50 MCG/ACT spray     ibuprofen (ADVIL/MOTRIN) 600 MG tablet     loperamide (IMODIUM) 2 MG capsule     loratadine (CLARITIN) 10 MG tablet     LORazepam (ATIVAN) 0.5 MG tablet     METFORMIN HCL PO     prochlorperazine (COMPAZINE) 5 MG tablet     pyridoxine (VITAMIN B-6) 50 MG TABS     triamterene-hydrochlorothiazide (DYAZIDE) 37.5-25 MG per capsule     No current facility-administered medications for this visit.        Allergies   Allergen Reactions     Ace Inhibitors Cough     Amoxicillin Hives       Family History   Problem Relation Age of Onset     Colon Cancer Mother      Breast Cancer Sister      Lymphoma Sister        Social History     Social History     Marital status:      Spouse name: N/A     Number of children: 3     Years of education: N/A     Occupational History     conroller      Social History Main Topics     Smoking status: Former Smoker     Packs/day: 0.25     Years: 20.00     Quit date: 1991     Smokeless tobacco: Never Used      Comment: Quite smoking      Alcohol use Yes      Comment: 4-7/week if out 1-2x's/week     Drug use: No     Sexual activity: Not on file     Other Topics Concern     Not on file     Social History Narrative    Daughter  at age 25 from leukemia.  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           ROS  General: + fatigue. Denies changes in weight, weakness, appetite changes, night sweats, hot flashes, fever, chills, or difficulty sleeping  HEENT: Denies headaches, hair loss, visual difficulty or disturbances, masses, head injury, tinnitus, hearing loss, epistaxis, congestion, problems with teeth or gums, dysphonia, or dysphagia  Pulmonary: Denies cough, sputum, hemoptysis, shortness of breath, dyspnea on exertion, wheezing, or allergies  Cardiovascular: + hypertension, chronic edema to bilateral ankles/feet. Denies chest pain, fainting, palpitations, murmurs, activity intolerance  Gastrointestinal: + constipation, nausea. Denies nausea, vomiting,  diarrhea, abdominal pain, bloating, heartburn, melena, hematochezia, or jaundice  Genitourinary: Denies dysuria, urinary urgency or frequency, hematuria, cloudy or malodorous urine, incontinence, repeat urinary tract infections, flank pain, pelvic pain, vaginal bleeding, vaginal discharge, or vaginal dryness  Integumentary: Denies rashes, sores, changing moles, or scarring  Hematologic: Denies swollen lymph nodes, masses, easy bruising, or easy bleeding  Musculoskeletal: Denies falls, back pain, myalgias, arthralgias, stiffness, muscle weakness, or muscle cramps  Neurologic: + numbness/tingling. Denies changes in memory, difficulty with walking, dizziness, seizures, or tremors  Psychiatric: Denies anxiety, depression, mood changes, suicidal thoughts, or difficulty concentrating  Endocrine: Denies polydipsia, polyuria, temperature intolerance, or history of thyroid disease    Physical Exam:    /76 (BP Location: Right arm, Patient Position: Chair, Cuff Size: Adult Large)  Pulse 76  Temp 98.5  F (36.9  C) (Oral)  Wt 126.2 kg (278 lb 4.8 oz)  SpO2 96%  BMI 46.31 kg/m2    CONSTITUTIONAL: Alert non-toxic appearing female in no acute distress  HEAD: Normocephalic, atraumatic  EYES: PERRLA; no scleral icterus  ENT:  Oropharynx pink without lesions  NECK: Neck supple without lymphadenopathy  RESPIRATORY: Lungs clear to auscultation, no increased work of breathing noted  CV: Regular rate and rhythm, S1S2, no clicks, murmurs, rubs, or gallops; bilateral lower extremities with trace edema, dorsalis pedis pulses 2+ bilaterally  GASTROINTESTINAL: Normoactive bowel sounds x4 quadrants, abdomen soft, non-distended, and non-tender to palpation without masses or organomegaly  GENITOURINARY: Not indicated  LYMPHATIC: Cervical, supraclavicular, and inguinal nodes without lymphadenopathy  MUSCULOSKELETAL: Moves all extremities, no obvious muscle wasting  NEUROLOGIC: No gross deficits, normal gait  SKIN: Appropriate color for race, warm and dry, no rashes or lesions to unclothed skin  PSYCHIATRIC: Pleasant and interactive, affect bright, makes appropriate eye contact, thought process linear    Labs:      9/7/2018  Day 1   Hemoglobin 11.7 - 15.7 g/dL 8.6 (A)   Hematocrit 35.0 - 47.0 % 26.5 (A)   Platelet Count 150 - 450 10e9/L 129 (A)   Absolute Neutrophil 1.6 - 8.3 10e9/L 2.8   Sodium 133 - 144 mmol/L 139   Potassium 3.4 - 5.3 mmol/L 3.6   Chloride 94 - 109 mmol/L 103   Carbon Dioxide 20 - 32 mmol/L 28   Urea Nitrogen 7 - 30 mg/dL 19   Creatinine 0.52 - 1.04 mg/dL 0.71   Calcium 8.5 - 10.1 mg/dL 8.9   Magnesium 1.6 - 2.3 mg/dL 1.6   Bilirubin Total 0.2 - 1.3 mg/dL 0.4   ALT 0 - 50 U/L 29   AST 0 - 45 U/L 18   Alkaline Phosphatase 40 - 150 U/L 53   Albumin 3.4 - 5.0 g/dL 3.0 (A)   Protein Total 6.8 - 8.8 g/dL 7.0   WBC 4.0 - 11.0 10e9/L 4.6       Assessment/Plan:  1) Stage IIIC2 serous endometrial cancer: Tolerating treatment well without dose limiting side effects. Proceed with cycle five of carboplatin AUC 6/paclitaxel 175mg/m2 as originally ordered by Dr. Henderson. To receive total of six cycles followed by treatment planning with Dr. Henderson. Continue small frequent meals high in protein, drink 2L non-caffeinated fluids daily.  Reviewed signs and symptoms for when she should contact the clinic or seek additional care, including but not limited to fever, chills, inability to keep down food or fluids, nausea and vomiting not controlled with antiemetics, and diarrhea leading to dehydration. Patient to contact the clinic with any questions or concerns in the interim.  2) Chemotherapy/disease side effects:   Tingling: Transient. Continue vitamin B6 50mg PO BID, to start L-glutamine 2000mg PO BID.    Chemotherapy induced nausea: More bothersome this cycle- Emend added in as a pre-medication, dexamethasone subsequently halved. Continue with PRN anti-emetics.  3) Genetic counseling: MMR protein expression intact, however, strong family history of cancer. Referral previously placed, scheduled 10/2/18.  4) Health maintenance issues discussed include to follow up with PCP for non-gynecologic concerns and co-morbid conditions. Lu was 1h15 minutes late to clinic today and reports being late to her radiation appointment as well- reviewed importance of arriving on time to ensure adequate time for chemotherapy, labs, and provider visit. Reviewed her upcoming appointments.  5) Patient verbalized understanding of and agreement with plan    A total of 25 minutes of face to face time were spent with the patient with over 50% of that time spent in counseling, coordination of care, education, and symptom management.    ANU Selby, FNP-C  Division of Gynecologic Oncology  [facility]  Pager: [phone]

## 2018-09-07 NOTE — PROGRESS NOTES
Infusion Nursing Note:  Lu Smith presents today for Cycle 5 Day 1 Taxol and Carboplatin.    Patient seen by provider today: Yes: Yasmine Zazueta NP    Intravenous Access:  Implanted Port.    Treatment Conditions:  Lab Results   Component Value Date    HGB 8.6 09/07/2018     Lab Results   Component Value Date    WBC 4.6 09/07/2018      Lab Results   Component Value Date    ANEU 2.8 09/07/2018     Lab Results   Component Value Date     09/07/2018      Lab Results   Component Value Date     09/07/2018                   Lab Results   Component Value Date    POTASSIUM 3.6 09/07/2018           Lab Results   Component Value Date    MAG 1.6 09/07/2018            Lab Results   Component Value Date    CR 0.71 09/07/2018                   Lab Results   Component Value Date    MARYLOU 8.9 09/07/2018                Lab Results   Component Value Date    BILITOTAL 0.4 09/07/2018           Lab Results   Component Value Date    ALBUMIN 3.0 09/07/2018                    Lab Results   Component Value Date    ALT 29 09/07/2018           Lab Results   Component Value Date    AST 18 09/07/2018       Results reviewed, labs MET treatment parameters, ok to proceed with treatment.    Post Infusion Assessment:  Patient tolerated infusion without incident.  Blood return noted pre and post infusion.  Site patent and intact, free from redness, edema or discomfort.  No evidence of extravasations.  Access discontinued per protocol.    Discharge Plan:   Prescription refills given for Vitamin B6.  Discharge instructions reviewed with: Patient and Family.  Patient and/or family verbalized understanding of discharge instructions and all questions answered.  AVS to patient via [employer].  Patient will return 9/28/18 for next appointment.   Patient discharged in stable condition accompanied by: sister.  Departure Mode: Ambulatory.    MARKEL STEEL RN

## 2018-09-07 NOTE — TELEPHONE ENCOUNTER
Called Lu and left VM- she was scheduled for 0630 labs, 7am with me, and 8am chemo and has not yet arrived.  ANU Selby, FNP-C  Gynecologic Oncology  [facility]  Pager: [phone]

## 2018-09-07 NOTE — MR AVS SNAPSHOT
After Visit Summary   9/7/2018    Lu Smith    MRN: [id number]           Patient Information     Date Of Birth          1950        Visit Information        Provider Department      9/7/2018 8:00 AM UC 14 ATC; UC ONCOLOGY INFUSION [facility]        Today's Diagnoses     Endometrial cancer (H)    -  1    Encounter for long-term (current) use of medications           Follow-ups after your visit        Your next 10 appointments already scheduled     Sep 28, 2018  6:30 AM CDT   [facility] Draw with  [facility] DRAW   [facility] Lab Draw ([facility])    [address] [place] [address]  [address]  [facility] [address]   [phone]            Sep 28, 2018  7:00 AM CDT   (Arrive by 6:45 AM)   Return Active Treatment with ANU Selby CNP   [facility] ([facility])    [address] [place] [address]  [address]  [facility] [address]   [phone]            Sep 28, 2018  8:00 AM CDT   Infusion 360 with UC ONCOLOGY INFUSION, UC 14 ATC   [facility] ([facility])    [address] [place] [address]  [address]  [facility] [address]   [phone]            Oct 02, 2018 12:00 PM CDT   (Arrive by 11:45 AM)   NEW WITH ROOM with Gricelda Fontaine GC,  2 117 CONSULT RM   [facility] ([facility])    [address] [place] [address]  [address]  [facility] [address] [phone]            Oct 02, 2018  1:15 PM CDT   [facility] Draw with [facility] DRAW   [facility] Lab Draw ([facility])    [address] [place] [address]  [address]  [facility] [address] [phone]              Who to contact     If you have questions or need follow up information about today's clinic visit or your schedule please contact [facility] directly at [phone].  Normal or non-critical lab and imaging  results will be communicated to you by MyChart, letter or phone within 4 business days after the clinic has received the results. If you do not hear from us within 7 days, please contact the clinic through [employer] or phone. If you have a critical or abnormal lab result, we will notify you by phone as soon as possible.  Submit refill requests through [employer] or call your pharmacy and they will forward the refill request to us. Please allow 3 business days for your refill to be completed.          Additional Information About Your Visit        [employer] Information     [employer] gives you secure access to your electronic health record. If you see a primary care provider, you can also send messages to your care team and make appointments. If you have questions, please call your primary care clinic.  If you do not have a primary care provider, please call [phone] and they will assist you.        Care EveryWhere ID     This is your Care EveryWhere ID. This could be used by other organizations to access your [facility] medical records  [id number]         Blood Pressure from Last 3 Encounters:   09/07/18 148/76   08/29/18 148/76   08/13/18 151/76    Weight from Last 3 Encounters:   09/07/18 126.2 kg (278 lb 4.8 oz)   08/29/18 127 kg (280 lb)   08/13/18 126.9 kg (279 lb 11.2 oz)              We Performed the Following     CBC with platelets differential     Comprehensive metabolic panel     Magnesium          Where to get your medicines      These medications were sent to [id number] - [place], [place] - [address] [facility] [address] [facility]  [address] [facility] [address]     Phone:  [phone]     pyridoxine 50 MG Tabs          Primary Care Provider Office Phone # Fax #    Levar Scott [phone]       [address]  [place] [facility] [address]        Equal Access to Services     CED COLLINS : Geeta Pride, wendy cota, kelsey krause  "angela dimas tadisaac enriquez. So [facility] [phone].    ATENCIÓN: Si mars mccauley, tiene a cortes disposición servicios gratuitos de asistencia lingüística. Arnold al [phone].    We comply with applicable federal civil rights laws and [place] laws. We do not discriminate on the basis of race, color, national origin, age, disability, sex, sexual orientation, or gender identity.            Thank you!     Thank you for choosing [facility]  for your care. Our goal is always to provide you with excellent care. Hearing back from our patients is one way we can continue to improve our services. Please take a few minutes to complete the written survey that you may receive in the mail after your visit with us. Thank you!             Your Updated Medication List - Protect others around you: Learn how to safely use, store and throw away your medicines at www.disposemymeds.org.          This list is accurate as of 9/7/18  3:04 PM.  Always use your most recent med list.                   Brand Name Dispense Instructions for use Diagnosis    ACETAMINOPHEN PO      Take 325 mg by mouth every 6 hours as needed for pain        aspirin 81 MG EC tablet      Take 81 mg by mouth Pt states, \"I don't take it regularly. I take it when I remember to\".        fluticasone 50 MCG/ACT spray    FLONASE    1 Bottle    Spray 1-2 sprays into both nostrils daily    Chronic seasonal allergic rhinitis, unspecified trigger       ibuprofen 600 MG tablet    ADVIL/MOTRIN    30 tablet    Take 1 tablet (600 mg) by mouth every 6 hours as needed for pain (mild)    Endometrial carcinoma (H)       loperamide 2 MG capsule    IMODIUM     Take 2 mg by mouth 4 times daily as needed for diarrhea        loratadine 10 MG tablet    CLARITIN    30 tablet    Take 1 tablet (10 mg) by mouth daily    Pain in joint, multiple sites, Chronic seasonal allergic rhinitis, unspecified trigger       LORazepam 0.5 MG tablet    ATIVAN    30 " tablet    Take 1 tablet (0.5 mg) by mouth every 6 hours as needed (nausea/vomiting, anxiety or sleep)    Endometrial cancer (H), Encounter for long-term (current) use of medications       METFORMIN HCL PO      Take 500 mg by mouth daily (with breakfast)        prochlorperazine 5 MG tablet    COMPAZINE    30 tablet    Take 1 tablet (5 mg) by mouth every 6 hours as needed (nausea/vomiting)    Endometrial cancer (H), Encounter for long-term (current) use of medications       pyridoxine 50 MG Tabs    VITAMIN B-6    60 tablet    Take 1 tablet (50 mg) by mouth 2 times daily    Chemotherapy-induced peripheral neuropathy (H)       triamterene-hydrochlorothiazide 37.5-25 MG per capsule    DYAZIDE     Take 1 capsule by mouth every morning

## 2018-09-27 ASSESSMENT — ENCOUNTER SYMPTOMS
BLOATING: 0
COUGH: 1
SHORTNESS OF BREATH: 0
WHEEZING: 1
COUGH DISTURBING SLEEP: 1
HEADACHES: 1
DIARRHEA: 1
CONSTIPATION: 0
DYSPNEA ON EXERTION: 0
ALTERED TEMPERATURE REGULATION: 1
LOSS OF CONSCIOUSNESS: 0
DECREASED APPETITE: 0
SEIZURES: 0
POLYPHAGIA: 0
TASTE DISTURBANCE: 0
TROUBLE SWALLOWING: 0
MEMORY LOSS: 0
NIGHT SWEATS: 0
RECTAL PAIN: 0
DIZZINESS: 1
HEARTBURN: 1
CHILLS: 0
POSTURAL DYSPNEA: 0
DISTURBANCES IN COORDINATION: 0
SINUS CONGESTION: 1
PARALYSIS: 0
HALLUCINATIONS: 0
SINUS PAIN: 1
ABDOMINAL PAIN: 0
HEMOPTYSIS: 0
TREMORS: 0
JAUNDICE: 0
SORE THROAT: 1
TINGLING: 1
BOWEL INCONTINENCE: 0
SPUTUM PRODUCTION: 0
BLOOD IN STOOL: 0
FATIGUE: 1
WEIGHT GAIN: 0
SPEECH CHANGE: 0
INCREASED ENERGY: 1
VOMITING: 0
POOR WOUND HEALING: 0
WEAKNESS: 1
WEIGHT LOSS: 0
NUMBNESS: 0
SMELL DISTURBANCE: 0
NECK MASS: 0
SNORES LOUDLY: 0
NAUSEA: 0
HOARSE VOICE: 1
FEVER: 1
NAIL CHANGES: 1
SKIN CHANGES: 0
POLYDIPSIA: 0

## 2018-09-27 NOTE — PROGRESS NOTES
"Gynecologic Oncology Follow-Up Note    RE: Lu Smith  MRN: [id number]  : 1950  Date of Visit: 18    CC: Lu Smith is a 68 year old year old female with stage IIIC2 serous endometrial cancer who presents today for follow up regarding disease management.    HPI: Lu comes to the clinic accompanied by her sister Lisbeth. She states chemo \"wasn't bad\" this past cycle, continues with fatigue and low appetite the first week. Continues with transient numbness in her hands and soreness in her feet, taking L-glutamine and vitamin B6. She did have a cold but she states she has recovered from this. Had a few loose stools during that time which have resolved. Continues with some congestion she relates to seasonal allergies, would like a refill of fluticasone nasal spray. No nausea this past cycle. Continues to eat well despite low appetite the first week, taking in protein shakes and protein bars. Drinking well. Using her dilator. No fevers (temp 99 during her cold), chills, or bleeding.  Feels ready for her sixth cycle of chemotherapy today.    Oncology History:  18: TLH-BSO, omentectomy, bilateral pelvic and para-aortic lymph node dissection, pelvic washings  3/30/18: C1D1 carboplatin AUC 6/paclitaxel 175mg/m2  18: C2D1 carboplatin AUC 6/paclitaxel 175mg/m2  18: C3D1 carboplatin AUC6/paclitaxel 175mg/m2  18-18: EBRT delivered to pelvis and para-aortic nodes, 5040 cGy in 28 fractions  18-18: HDR brachytherapy delivered to the vaginal cuff, 1200 cGy in two fractions  18: C4D1 carboplatin AUC 6/paclitaxel 175mg/m2  18: C5D1 carboplatin AUC6/paclitaxel 175mg/m2  18: C6D1 carboplatin AUC6/paclitaxel 175mg/m2    Past Medical History:   Diagnosis Date     Diabetes (H)     Type II     Endometrial cancer determined by uterine biopsy (H) 2018     HTN (hypertension)      Obesity      Osteoarthritis        Past Surgical History:   Procedure Laterality Date     " CHOLECYSTECTOMY       CYSTOSCOPY N/A 2018    Procedure: CYSTOSCOPY;;  Surgeon: Kevin Henderson MD;  Location: [facility] OR     DAVINCI HYSTERECTOMY TOTAL, BILATERAL SALPINGO-OOPHORECTOMY, COMBINED N/A 2018    Procedure: COMBINED DAVINCI HYSTERECTOMY TOTAL, SALPINGO-OOPHORECTOMY;  DaVinci Assisted Total Laparoscopic Hysterectomy, Bilateral Salpingo-Oophorectomy, Cystoscopy,  Omental biopsy, omentectomy,bilateral  Pelvic And Para Aortic Lymph Node Dissection;  Surgeon: Kevin Henderson MD;  Location: [facility] OR     Partial lumbar discectomy         Current Outpatient Prescriptions   Medication     ACETAMINOPHEN PO     aspirin EC 81 MG EC tablet     fluticasone (FLONASE) 50 MCG/ACT spray     ibuprofen (ADVIL/MOTRIN) 600 MG tablet     loperamide (IMODIUM) 2 MG capsule     loratadine (CLARITIN) 10 MG tablet     LORazepam (ATIVAN) 0.5 MG tablet     METFORMIN HCL PO     prochlorperazine (COMPAZINE) 5 MG tablet     pyridoxine (VITAMIN B-6) 50 MG TABS     triamterene-hydrochlorothiazide (DYAZIDE) 37.5-25 MG per capsule     No current facility-administered medications for this visit.        Allergies   Allergen Reactions     Ace Inhibitors Cough     Amoxicillin Hives       Family History   Problem Relation Age of Onset     Colon Cancer Mother      Breast Cancer Sister      Lymphoma Sister        Social History     Social History     Marital status:      Spouse name: N/A     Number of children: 3     Years of education: N/A     Occupational History     conroller      Social History Main Topics     Smoking status: Former Smoker     Packs/day: 0.25     Years: 20.00     Quit date: 1991     Smokeless tobacco: Never Used      Comment: Quite smoking      Alcohol use Yes      Comment: 4-7/week if out 1-2x's/week     Drug use: No     Sexual activity: Not on file     Other Topics Concern     Not on file     Social History Narrative    Daughter  at age 25 from leukemia.  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           ROS  Answers for HPI/ROS submitted by the patient on 9/27/2018   General Symptoms: Yes  Skin Symptoms: Yes  HENT Symptoms: Yes  EYE SYMPTOMS: No  HEART SYMPTOMS: No  LUNG SYMPTOMS: Yes  INTESTINAL SYMPTOMS: Yes  URINARY SYMPTOMS: No  GYNECOLOGIC SYMPTOMS: No  BREAST SYMPTOMS: No  SKELETAL SYMPTOMS: No  BLOOD SYMPTOMS: No  NERVOUS SYSTEM SYMPTOMS: Yes  MENTAL HEALTH SYMPTOMS: No  Fever: Yes  Loss of appetite: No  Weight loss: No  Weight gain: No  Fatigue: Yes  Night sweats: No  Chills: No  Increased stress: No  Excessive hunger: No  Excessive thirst: No  Feeling hot or cold when others believe the temperature is normal: Yes  Loss of height: No  Post-operative complications: No  Surgical site pain: No  Hallucinations: No  Change in or Loss of Energy: Yes  Hyperactivity: No  Confusion: No  Changes in hair: No  Changes in moles/birth marks: No  Itching: No  Rashes: No  Changes in nails: Yes  Acne: No  Hair in places you don't want it: No  Change in facial hair: No  Warts: No  Non-healing sores: No  Scarring: No  Flaking of skin: No  Color changes of hands/feet in cold : No  Sun sensitivity: No  Skin thickening: No  Ear pain: No  Ear discharge: No  Hearing loss: No  Tinnitus: No  Nosebleeds: No  Congestion: Yes  Sinus pain: Yes  Trouble swallowing: No   Voice hoarseness: Yes  Mouth sores: No  Sore throat: Yes  Tooth pain: No  Gum tenderness: No  Bleeding gums: No  Change in taste: No  Change in sense of smell: No  Dry mouth: Yes  Hearing aid used: No  Neck lump: No  Cough: Yes  Sputum or phlegm: No  Coughing up blood: No  Difficulty breating or shortness of breath: No  Snoring: No  Wheezing: Yes  Difficulty breathing on exertion: No  Nighttime Cough: Yes  Difficulty breathing when lying flat: No  Heart burn or indigestion: Yes  Nausea: No  Vomiting: No  Abdominal pain: No  Bloating: No  Constipation: No  Diarrhea: Yes  Blood in stool: No  Black stools: No  Rectal or Anal pain:  "No  Fecal incontinence: No  Yellowing of skin or eyes: No  Vomit with blood: No  Change in stools: No  Trouble with coordination: No  Dizziness or trouble with balance: Yes  Fainting or black-out spells: No  Memory loss: No  Headache: Yes  Seizures: No  Speech problems: No  Tingling: Yes  Tremor: No  Weakness: Yes  Difficulty walking: No  Paralysis: No  Numbness: No    I have reviewed the patient's ROS and discussed all pertinent information as noted in the HPI.    Physical Exam:    /84 (BP Location: Right arm, Patient Position: Sitting, Cuff Size: Adult Large)  Pulse 95  Temp 98.9  F (37.2  C) (Oral)  Resp 18  Ht 1.651 m (5' 5\")  Wt 124.1 kg (273 lb 9.6 oz)  SpO2 95%  BMI 45.53 kg/m2    CONSTITUTIONAL: Alert non-toxic appearing female in no acute distress  HEAD: Normocephalic, atraumatic  EYES: PERRLA; no scleral icterus  ENT: Oropharynx pink without lesions  NECK: Neck supple without lymphadenopathy  RESPIRATORY: Lungs clear to auscultation, no increased work of breathing noted  CV: Regular rate and rhythm, S1S2, no clicks, murmurs, rubs, or gallops; bilateral lower extremities with trace edema, dorsalis pedis pulses 2+ bilaterally  GASTROINTESTINAL: Normoactive bowel sounds x4 quadrants, abdomen soft, non-distended, and non-tender to palpation without masses or organomegaly  GENITOURINARY: Not indicated  LYMPHATIC: Cervical, supraclavicular, and inguinal nodes without lymphadenopathy  MUSCULOSKELETAL: Moves all extremities, no obvious muscle wasting  NEUROLOGIC: No gross deficits, normal gait  SKIN: Appropriate color for race, warm and dry, no rashes or lesions to unclothed skin  PSYCHIATRIC: Pleasant and interactive, affect bright, makes appropriate eye contact, thought process linear    Labs:      9/28/2018  Day 1   Hemoglobin 11.7 - 15.7 g/dL 8.9 (A)   Hematocrit 35.0 - 47.0 % 27.2 (A)   Platelet Count 150 - 450 10e9/L 98 (A)   Absolute Neutrophil 1.6 - 8.3 10e9/L 3.6   Sodium 133 - 144 mmol/L 135 "   Potassium 3.4 - 5.3 mmol/L 3.7   Chloride 94 - 109 mmol/L 100   Carbon Dioxide 20 - 32 mmol/L 28   Urea Nitrogen 7 - 30 mg/dL 17   Creatinine 0.52 - 1.04 mg/dL 0.74   Calcium 8.5 - 10.1 mg/dL 8.4 (A)   Magnesium 1.6 - 2.3 mg/dL 1.8   Bilirubin Total 0.2 - 1.3 mg/dL 0.4   ALT 0 - 50 U/L 35   AST 0 - 45 U/L 21   Alkaline Phosphatase 40 - 150 U/L 62   Albumin 3.4 - 5.0 g/dL 3.0 (A)   Protein Total 6.8 - 8.8 g/dL 7.4   WBC 4.0 - 11.0 10e9/L 5.6   Corrected calcium= 9.2mg/dL    Assessment/Plan:  1) Stage IIIC2 serous endometrial cancer: Tolerating treatment well without dose limiting side effects. Proceed with cycle six of carboplatin AUC 6/paclitaxel 175mg/m2 as originally ordered by Dr. Henderson. To receive total of six cycles followed by treatment planning with Dr. Henderson. Reviewed s/sxs carboplatin reactions and when to call her nurse/seek further care. Continue small frequent meals high in protein, drink 2L non-caffeinated fluids daily. Reviewed signs and symptoms for when she should contact the clinic or seek additional care, including but not limited to fever, chills, inability to keep down food or fluids, nausea and vomiting not controlled with antiemetics, and diarrhea leading to dehydration. Patient to contact the clinic with any questions or concerns in the interim. Reviewed surveillance and survivorship.  2) Chemotherapy/disease side effects:   Tingling/soreness: Transient. Continue vitamin B6 50mg PO BID, to start L-glutamine 2000mg PO BID.    Chemotherapy induced nausea: Resolved with addition of Emend, continue current plan.   Thrombocytopenia: No bleeding, may receive treatment with platelet count 98K. Reviewed bleeding precautions.  3) Seasonal allergic rhinitis: Bothersome, fluticasone nasal spray refilled.  4) Genetic counseling: MMR protein expression intact, however, strong family history of cancer. Referral previously placed, scheduled 10/2/18.  5) Health maintenance issues discussed include  to follow up with PCP for non-gynecologic concerns and co-morbid conditions.   6) Patient verbalized understanding of and agreement with plan    A total of 30 minutes of face to face time were spent with the patient with over 50% of that time spent in counseling, coordination of care, education, and symptom management.    ANU Selby, FNP-C  Division of Gynecologic Oncology  [facility]  Pager: [phone]

## 2018-09-28 ENCOUNTER — ONCOLOGY VISIT (OUTPATIENT)
Dept: ONCOLOGY | Facility: CLINIC | Age: 68
End: 2018-09-28
Attending: NURSE PRACTITIONER
Payer: MEDICARE

## 2018-09-28 ENCOUNTER — APPOINTMENT (OUTPATIENT)
Dept: LAB | Facility: CLINIC | Age: 68
End: 2018-09-28
Attending: NURSE PRACTITIONER
Payer: MEDICARE

## 2018-09-28 VITALS
RESPIRATION RATE: 18 BRPM | HEART RATE: 95 BPM | DIASTOLIC BLOOD PRESSURE: 84 MMHG | BODY MASS INDEX: 45.58 KG/M2 | HEIGHT: 65 IN | SYSTOLIC BLOOD PRESSURE: 152 MMHG | OXYGEN SATURATION: 95 % | WEIGHT: 273.6 LBS | TEMPERATURE: 98.9 F

## 2018-09-28 DIAGNOSIS — Z79.899 ENCOUNTER FOR LONG-TERM (CURRENT) USE OF MEDICATIONS: ICD-10-CM

## 2018-09-28 DIAGNOSIS — J30.2 SEASONAL ALLERGIC RHINITIS, UNSPECIFIED TRIGGER: ICD-10-CM

## 2018-09-28 DIAGNOSIS — C54.1 ENDOMETRIAL CANCER (H): Primary | ICD-10-CM

## 2018-09-28 DIAGNOSIS — D69.59 CHEMOTHERAPY-INDUCED THROMBOCYTOPENIA: ICD-10-CM

## 2018-09-28 DIAGNOSIS — T45.1X5A CHEMOTHERAPY-INDUCED THROMBOCYTOPENIA: ICD-10-CM

## 2018-09-28 DIAGNOSIS — Z51.11 ENCOUNTER FOR ANTINEOPLASTIC CHEMOTHERAPY: ICD-10-CM

## 2018-09-28 LAB
ALBUMIN SERPL-MCNC: 3 G/DL (ref 3.4–5)
ALP SERPL-CCNC: 62 U/L (ref 40–150)
ALT SERPL W P-5'-P-CCNC: 35 U/L (ref 0–50)
ANION GAP SERPL CALCULATED.3IONS-SCNC: 7 MMOL/L (ref 3–14)
AST SERPL W P-5'-P-CCNC: 21 U/L (ref 0–45)
BASOPHILS # BLD AUTO: 0 10E9/L (ref 0–0.2)
BASOPHILS NFR BLD AUTO: 0.7 %
BILIRUB SERPL-MCNC: 0.4 MG/DL (ref 0.2–1.3)
BUN SERPL-MCNC: 17 MG/DL (ref 7–30)
CALCIUM SERPL-MCNC: 8.4 MG/DL (ref 8.5–10.1)
CHLORIDE SERPL-SCNC: 100 MMOL/L (ref 94–109)
CO2 SERPL-SCNC: 28 MMOL/L (ref 20–32)
CREAT SERPL-MCNC: 0.74 MG/DL (ref 0.52–1.04)
DIFFERENTIAL METHOD BLD: ABNORMAL
EOSINOPHIL # BLD AUTO: 0.1 10E9/L (ref 0–0.7)
EOSINOPHIL NFR BLD AUTO: 1.8 %
ERYTHROCYTE [DISTWIDTH] IN BLOOD BY AUTOMATED COUNT: 16 % (ref 10–15)
GFR SERPL CREATININE-BSD FRML MDRD: 78 ML/MIN/1.7M2
GLUCOSE SERPL-MCNC: 137 MG/DL (ref 70–99)
HCT VFR BLD AUTO: 27.2 % (ref 35–47)
HGB BLD-MCNC: 8.9 G/DL (ref 11.7–15.7)
IMM GRANULOCYTES # BLD: 0.1 10E9/L (ref 0–0.4)
IMM GRANULOCYTES NFR BLD: 0.9 %
LYMPHOCYTES # BLD AUTO: 1 10E9/L (ref 0.8–5.3)
LYMPHOCYTES NFR BLD AUTO: 18.2 %
MAGNESIUM SERPL-MCNC: 1.8 MG/DL (ref 1.6–2.3)
MCH RBC QN AUTO: 31 PG (ref 26.5–33)
MCHC RBC AUTO-ENTMCNC: 32.7 G/DL (ref 31.5–36.5)
MCV RBC AUTO: 95 FL (ref 78–100)
MONOCYTES # BLD AUTO: 0.8 10E9/L (ref 0–1.3)
MONOCYTES NFR BLD AUTO: 13.7 %
NEUTROPHILS # BLD AUTO: 3.6 10E9/L (ref 1.6–8.3)
NEUTROPHILS NFR BLD AUTO: 64.7 %
NRBC # BLD AUTO: 0 10*3/UL
NRBC BLD AUTO-RTO: 0 /100
PLATELET # BLD AUTO: 98 10E9/L (ref 150–450)
POTASSIUM SERPL-SCNC: 3.7 MMOL/L (ref 3.4–5.3)
PROT SERPL-MCNC: 7.4 G/DL (ref 6.8–8.8)
RBC # BLD AUTO: 2.87 10E12/L (ref 3.8–5.2)
SODIUM SERPL-SCNC: 135 MMOL/L (ref 133–144)
WBC # BLD AUTO: 5.6 10E9/L (ref 4–11)

## 2018-09-28 PROCEDURE — 85025 COMPLETE CBC W/AUTO DIFF WBC: CPT | Performed by: NURSE PRACTITIONER

## 2018-09-28 PROCEDURE — 80053 COMPREHEN METABOLIC PANEL: CPT | Performed by: NURSE PRACTITIONER

## 2018-09-28 PROCEDURE — 25000125 ZZHC RX 250: Mod: ZF | Performed by: NURSE PRACTITIONER

## 2018-09-28 PROCEDURE — 96417 CHEMO IV INFUS EACH ADDL SEQ: CPT

## 2018-09-28 PROCEDURE — 25000128 H RX IP 250 OP 636: Mod: ZF | Performed by: NURSE PRACTITIONER

## 2018-09-28 PROCEDURE — 96367 TX/PROPH/DG ADDL SEQ IV INF: CPT

## 2018-09-28 PROCEDURE — 96415 CHEMO IV INFUSION ADDL HR: CPT

## 2018-09-28 PROCEDURE — 83735 ASSAY OF MAGNESIUM: CPT | Performed by: NURSE PRACTITIONER

## 2018-09-28 PROCEDURE — 36415 COLL VENOUS BLD VENIPUNCTURE: CPT

## 2018-09-28 PROCEDURE — G0463 HOSPITAL OUTPT CLINIC VISIT: HCPCS | Mod: ZF

## 2018-09-28 PROCEDURE — 25000132 ZZH RX MED GY IP 250 OP 250 PS 637: Mod: ZF | Performed by: NURSE PRACTITIONER

## 2018-09-28 PROCEDURE — 96413 CHEMO IV INFUSION 1 HR: CPT

## 2018-09-28 PROCEDURE — 99214 OFFICE O/P EST MOD 30 MIN: CPT | Mod: ZP | Performed by: NURSE PRACTITIONER

## 2018-09-28 PROCEDURE — 96375 TX/PRO/DX INJ NEW DRUG ADDON: CPT

## 2018-09-28 RX ORDER — FLUTICASONE PROPIONATE 50 MCG
1-2 SPRAY, SUSPENSION (ML) NASAL DAILY
Qty: 1 BOTTLE | Refills: 1 | Status: SHIPPED | OUTPATIENT
Start: 2018-09-28 | End: 2020-01-01

## 2018-09-28 RX ORDER — SODIUM CHLORIDE 9 MG/ML
1000 INJECTION, SOLUTION INTRAVENOUS CONTINUOUS PRN
Status: CANCELLED
Start: 2018-09-28

## 2018-09-28 RX ORDER — ALBUTEROL SULFATE 90 UG/1
1-2 AEROSOL, METERED RESPIRATORY (INHALATION)
Status: CANCELLED
Start: 2018-09-28

## 2018-09-28 RX ORDER — HEPARIN SODIUM (PORCINE) LOCK FLUSH IV SOLN 100 UNIT/ML 100 UNIT/ML
5 SOLUTION INTRAVENOUS ONCE
Status: CANCELLED
Start: 2018-09-28 | End: 2018-09-28

## 2018-09-28 RX ORDER — MEPERIDINE HYDROCHLORIDE 25 MG/ML
25 INJECTION INTRAMUSCULAR; INTRAVENOUS; SUBCUTANEOUS EVERY 30 MIN PRN
Status: CANCELLED | OUTPATIENT
Start: 2018-09-28

## 2018-09-28 RX ORDER — HEPARIN SODIUM (PORCINE) LOCK FLUSH IV SOLN 100 UNIT/ML 100 UNIT/ML
5 SOLUTION INTRAVENOUS EVERY 8 HOURS PRN
Status: DISCONTINUED | OUTPATIENT
Start: 2018-09-28 | End: 2018-10-04 | Stop reason: HOSPADM

## 2018-09-28 RX ORDER — PALONOSETRON 0.05 MG/ML
0.25 INJECTION, SOLUTION INTRAVENOUS ONCE
Status: CANCELLED
Start: 2018-09-28

## 2018-09-28 RX ORDER — PALONOSETRON 0.05 MG/ML
0.25 INJECTION, SOLUTION INTRAVENOUS ONCE
Status: COMPLETED | OUTPATIENT
Start: 2018-09-28 | End: 2018-09-28

## 2018-09-28 RX ORDER — DIPHENHYDRAMINE HCL 25 MG
50 CAPSULE ORAL ONCE
Status: COMPLETED | OUTPATIENT
Start: 2018-09-28 | End: 2018-09-28

## 2018-09-28 RX ORDER — METHYLPREDNISOLONE SODIUM SUCCINATE 125 MG/2ML
125 INJECTION, POWDER, LYOPHILIZED, FOR SOLUTION INTRAMUSCULAR; INTRAVENOUS
Status: CANCELLED
Start: 2018-09-28

## 2018-09-28 RX ORDER — DIPHENHYDRAMINE HCL 25 MG
50 CAPSULE ORAL ONCE
Status: CANCELLED
Start: 2018-09-28

## 2018-09-28 RX ORDER — LORAZEPAM 2 MG/ML
1 INJECTION INTRAMUSCULAR EVERY 6 HOURS PRN
Status: CANCELLED
Start: 2018-09-28

## 2018-09-28 RX ORDER — EPINEPHRINE 0.3 MG/.3ML
0.3 INJECTION SUBCUTANEOUS EVERY 5 MIN PRN
Status: CANCELLED | OUTPATIENT
Start: 2018-09-28

## 2018-09-28 RX ORDER — EPINEPHRINE 1 MG/ML
0.3 INJECTION, SOLUTION INTRAMUSCULAR; SUBCUTANEOUS EVERY 5 MIN PRN
Status: CANCELLED | OUTPATIENT
Start: 2018-09-28

## 2018-09-28 RX ORDER — ALBUTEROL SULFATE 0.83 MG/ML
2.5 SOLUTION RESPIRATORY (INHALATION)
Status: CANCELLED | OUTPATIENT
Start: 2018-09-28

## 2018-09-28 RX ORDER — HEPARIN SODIUM (PORCINE) LOCK FLUSH IV SOLN 100 UNIT/ML 100 UNIT/ML
5 SOLUTION INTRAVENOUS ONCE
Status: DISCONTINUED | OUTPATIENT
Start: 2018-09-28 | End: 2018-09-28 | Stop reason: HOSPADM

## 2018-09-28 RX ORDER — DIPHENHYDRAMINE HYDROCHLORIDE 50 MG/ML
50 INJECTION INTRAMUSCULAR; INTRAVENOUS
Status: CANCELLED
Start: 2018-09-28

## 2018-09-28 RX ADMIN — PALONOSETRON HYDROCHLORIDE 0.25 MG: 0.25 INJECTION INTRAVENOUS at 08:48

## 2018-09-28 RX ADMIN — Medication 5 ML: at 13:49

## 2018-09-28 RX ADMIN — SODIUM CHLORIDE 250 ML: 9 INJECTION, SOLUTION INTRAVENOUS at 08:26

## 2018-09-28 RX ADMIN — DEXAMETHASONE SODIUM PHOSPHATE 150 MG: 10 INJECTION, SOLUTION INTRAMUSCULAR; INTRAVENOUS at 08:58

## 2018-09-28 RX ADMIN — FAMOTIDINE 40 MG: 10 INJECTION INTRAVENOUS at 08:49

## 2018-09-28 RX ADMIN — CARBOPLATIN 745 MG: 10 INJECTION, SOLUTION INTRAVENOUS at 12:34

## 2018-09-28 RX ADMIN — Medication 5 ML: at 06:50

## 2018-09-28 RX ADMIN — PACLITAXEL 427 MG: 6 INJECTION, SOLUTION INTRAVENOUS at 09:29

## 2018-09-28 RX ADMIN — DIPHENHYDRAMINE HYDROCHLORIDE 50 MG: 25 CAPSULE ORAL at 08:47

## 2018-09-28 ASSESSMENT — PAIN SCALES - GENERAL: PAINLEVEL: NO PAIN (0)

## 2018-09-28 NOTE — PROGRESS NOTES
Infusion Nursing Note:  Lu Smith presents today for Cycle 6 day 1, Taxol and Carboplatin    Patient seen by provider today: Yes: Yasmine Zazueta NP   present during visit today: Not Applicable.    Note: Patient was seen by Yasmine Zazueta NP prior to infusion today. Patient has no new concerns/complaints since then.   PAOLA Zazueta NP/R.V. Kandis Chávez RN 9/28/18 @0830: Okay to proceed with chemotherapy today with platelets of 98,000.    Intravenous Access:  Implanted Port. Right chest port a cath flushes easy and give an excellent blood return.     Treatment Conditions:  Lab Results   Component Value Date    HGB 8.9 09/28/2018     Lab Results   Component Value Date    WBC 5.6 09/28/2018      Lab Results   Component Value Date    ANEU 3.6 09/28/2018     Lab Results   Component Value Date    PLT 98 09/28/2018      Lab Results   Component Value Date     09/28/2018                   Lab Results   Component Value Date    POTASSIUM 3.7 09/28/2018           Lab Results   Component Value Date    MAG 1.8 09/28/2018            Lab Results   Component Value Date    CR 0.74 09/28/2018                   Lab Results   Component Value Date    MARYLOU 8.4 09/28/2018                Lab Results   Component Value Date    BILITOTAL 0.4 09/28/2018           Lab Results   Component Value Date    ALBUMIN 3.0 09/28/2018                    Lab Results   Component Value Date    ALT 35 09/28/2018           Lab Results   Component Value Date    AST 21 09/28/2018       Results reviewed, labs MET treatment parameters, ok to proceed with treatment.      Post Infusion Assessment:  Patient tolerated infusion without incident.  Blood return noted pre and post infusion.  Site patent and intact, free from redness, edema or discomfort.  No evidence of extravasations.  Access discontinued per protocol.    Discharge Plan:   Patient declined prescription refills.  Discharge instructions reviewed with: Patient.  Patient  and/or family verbalized understanding of discharge instructions and all questions answered.  AVS to patient via [employer].  Patient will return 10/24/18 to see Dr. Henderson post 6 cycles of chemotherapy.  Patient discharged in stable condition accompanied by: family.  Departure Mode: Ambulatory.    Kandis Chávez RN

## 2018-09-28 NOTE — PATIENT INSTRUCTIONS
Contact Numbers    [facility] Main Line: [phone]  [facility] Triage and after hours / weekends / holidays:  [phone]      Please call the triage or after hours line if you experience a temperature greater than or equal to 100.5, shaking chills, have uncontrolled nausea, vomiting and/or diarrhea, dizziness, shortness of breath, chest pain, bleeding, unexplained bruising, or if you have any other new/concerning symptoms, questions or concerns.      If you are having any concerning symptoms or wish to speak to a provider before your next infusion visit, please call your care coordinator or triage to notify them so we can adequately serve you.     If you need a refill on a narcotic prescription or other medication, please call before your infusion appointment.                   September 2018 Sunday Monday Tuesday Wednesday Thursday Friday Saturday                                 1       2     3     4     5     6     7     [facility] LAB DRAW    6:30 AM   (15 min.)    [facility] DRAW   [facility] Lab Draw     P RETURN ACTIVE TREATMENT    6:45 AM   (40 min.)   Yasmine Zazueta APRN CNP   [facility] ONC INFUSION 360    8:00 AM   (360 min.)    ONCOLOGY INFUSION   [facility] 8       9     10     11     12     13     14     15       16     17     18     19     20     21     22       23     24     25     26     27     28     P [facility] DRAW    6:30 AM   (15 min.)    [facility] DRAW   [facility] Lab Draw     [facility] RETURN ACTIVE TREATMENT    6:45 AM   (40 min.)   Yasmine Zazueta APRN CNP   [facility] ONC INFUSION 360    8:00 AM   (360 min.)    ONCOLOGY INFUSION   [facility] 29 30 October 2018 Sunday Monday Tuesday Wednesday Thursday Friday Saturday        1     2     [facility] NEW WITH ROOM   11:45 AM   (75 min.)   Gricelda Fontaine GC   [facility]    1:15 PM   (15 min.)   [facility] LAB DRAW   [facility] Lab Draw 3     4     5     6       7     8     9     10     11     12     13       14     15     16     17     18     19     20       21     22     23     24     [facility] DRAW    9:15 AM   (15 min.)   [facility]   [facility] Lab Draw     [facility] RETURN    9:45 AM   (20 min.)   Kevin Henderson MD   [facility] 25     26     27       28     29     30     31                                 Lab Results:  Recent Results (from the past 12 hour(s))   CBC with platelets differential    Collection Time: 09/28/18  7:07 AM   Result Value Ref Range    WBC 5.6 4.0 - 11.0 10e9/L    RBC Count 2.87 (L) 3.8 - 5.2 10e12/L    Hemoglobin 8.9 (L) 11.7 - 15.7 g/dL    Hematocrit 27.2 (L) 35.0 - 47.0 %    MCV 95 78 - 100 fl    MCH 31.0 26.5 - 33.0 pg    MCHC 32.7 31.5 - 36.5 g/dL    RDW 16.0 (H) 10.0 - 15.0 %    Platelet Count 98 (L) 150 - 450 10e9/L    Diff Method Automated Method     % Neutrophils 64.7 %    % Lymphocytes 18.2 %    % Monocytes 13.7 %    % Eosinophils 1.8 %    % Basophils 0.7 %    % Immature Granulocytes 0.9 %    Nucleated RBCs 0 0 /100    Absolute Neutrophil 3.6 1.6 - 8.3 10e9/L    Absolute Lymphocytes 1.0 0.8 - 5.3 10e9/L    Absolute Monocytes 0.8 0.0 - 1.3 10e9/L    Absolute Eosinophils 0.1 0.0 - 0.7 10e9/L    Absolute Basophils 0.0 0.0 - 0.2 10e9/L    Abs Immature Granulocytes 0.1 0 - 0.4 10e9/L    Absolute Nucleated RBC 0.0    Comprehensive metabolic panel    Collection Time: 09/28/18  7:07 AM   Result Value Ref Range    Sodium 135 133 - 144 mmol/L    Potassium 3.7 3.4 - 5.3 mmol/L    Chloride 100 94 - 109 mmol/L    Carbon Dioxide 28 20 - 32 mmol/L    Anion Gap 7 3 - 14 mmol/L    Glucose 137 (H) 70 - 99 mg/dL    Urea Nitrogen 17 7 - 30 mg/dL    Creatinine 0.74 0.52 - 1.04 mg/dL    GFR Estimate 78 >60 mL/min/1.7m2    GFR Estimate If Black >90 >60 mL/min/1.7m2     Calcium 8.4 (L) 8.5 - 10.1 mg/dL    Bilirubin Total 0.4 0.2 - 1.3 mg/dL    Albumin 3.0 (L) 3.4 - 5.0 g/dL    Protein Total 7.4 6.8 - 8.8 g/dL    Alkaline Phosphatase 62 40 - 150 U/L    ALT 35 0 - 50 U/L    AST 21 0 - 45 U/L   Magnesium    Collection Time: 09/28/18  7:07 AM   Result Value Ref Range    Magnesium 1.8 1.6 - 2.3 mg/dL

## 2018-09-28 NOTE — LETTER
"2018       RE: Lu Smith  [address]  [facility] [address]     Dear Colleague,    Thank you for referring your patient, Lu Smith, to the [facility]. Please see a copy of my visit note below.    Gynecologic Oncology Follow-Up Note    RE: Lu Smith  MRN: [id number]  : 1950  Date of Visit: 18    CC: Lu Smith is a 68 year old year old female with stage IIIC2 serous endometrial cancer who presents today for follow up regarding disease management.    HPI: Lu comes to the clinic accompanied by her sister Lisbeth. She states chemo \"wasn't bad\" this past cycle, continues with fatigue and low appetite the first week. Continues with transient numbness in her hands and soreness in her feet, taking L-glutamine and vitamin B6. She did have a cold but she states she has recovered from this. Had a few loose stools during that time which have resolved. Continues with some congestion she relates to seasonal allergies, would like a refill of fluticasone nasal spray. No nausea this past cycle. Continues to eat well despite low appetite the first week, taking in protein shakes and protein bars. Drinking well. Using her dilator. No fevers (temp 99 during her cold), chills, or bleeding.  Feels ready for her sixth cycle of chemotherapy today.    Oncology History:  18: TLH-BSO, omentectomy, bilateral pelvic and para-aortic lymph node dissection, pelvic washings  3/30/18: C1D1 carboplatin AUC 6/paclitaxel 175mg/m2  18: C2D1 carboplatin AUC 6/paclitaxel 175mg/m2  18: C3D1 carboplatin AUC6/paclitaxel 175mg/m2  18-18: EBRT delivered to pelvis and para-aortic nodes, 5040 cGy in 28 fractions  18-18: HDR brachytherapy delivered to the vaginal cuff, 1200 cGy in two fractions  18: C4D1 carboplatin AUC 6/paclitaxel 175mg/m2  18: C5D1 carboplatin AUC6/paclitaxel 175mg/m2  18: C6D1 carboplatin AUC6/paclitaxel 175mg/m2    Past Medical " History:   Diagnosis Date     Diabetes (H)     Type II     Endometrial cancer determined by uterine biopsy (H) 02/2018     HTN (hypertension)      Obesity      Osteoarthritis        Past Surgical History:   Procedure Laterality Date     CHOLECYSTECTOMY  1993     CYSTOSCOPY N/A 2/23/2018    Procedure: CYSTOSCOPY;;  Surgeon: Kevin Henderson MD;  Location: [facility] OR     DAVINCI HYSTERECTOMY TOTAL, BILATERAL SALPINGO-OOPHORECTOMY, COMBINED N/A 2/23/2018    Procedure: COMBINED DAVINCI HYSTERECTOMY TOTAL, SALPINGO-OOPHORECTOMY;  DaVinci Assisted Total Laparoscopic Hysterectomy, Bilateral Salpingo-Oophorectomy, Cystoscopy,  Omental biopsy, omentectomy,bilateral  Pelvic And Para Aortic Lymph Node Dissection;  Surgeon: Kevin Henderson MD;  Location: [facility] OR     Partial lumbar discectomy  1998       Current Outpatient Prescriptions   Medication     ACETAMINOPHEN PO     aspirin EC 81 MG EC tablet     fluticasone (FLONASE) 50 MCG/ACT spray     ibuprofen (ADVIL/MOTRIN) 600 MG tablet     loperamide (IMODIUM) 2 MG capsule     loratadine (CLARITIN) 10 MG tablet     LORazepam (ATIVAN) 0.5 MG tablet     METFORMIN HCL PO     prochlorperazine (COMPAZINE) 5 MG tablet     pyridoxine (VITAMIN B-6) 50 MG TABS     triamterene-hydrochlorothiazide (DYAZIDE) 37.5-25 MG per capsule     No current facility-administered medications for this visit.        Allergies   Allergen Reactions     Ace Inhibitors Cough     Amoxicillin Hives       Family History   Problem Relation Age of Onset     Colon Cancer Mother      Breast Cancer Sister      Lymphoma Sister        Social History     Social History     Marital status:      Spouse name: N/A     Number of children: 3     Years of education: N/A     Occupational History     conroller      Social History Main Topics     Smoking status: Former Smoker     Packs/day: 0.25     Years: 20.00     Quit date: 4/27/1991     Smokeless tobacco: Never Used      Comment: Quite smoking 1992     Alcohol use  Yes      Comment: 4-7/week if out 1-2x's/week     Drug use: No     Sexual activity: Not on file     Other Topics Concern     Not on file     Social History Narrative    Daughter  at age 25 from leukemia.  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           ROS  Answers for HPI/ROS submitted by the patient on 2018   General Symptoms: Yes  Skin Symptoms: Yes  HENT Symptoms: Yes  EYE SYMPTOMS: No  HEART SYMPTOMS: No  LUNG SYMPTOMS: Yes  INTESTINAL SYMPTOMS: Yes  URINARY SYMPTOMS: No  GYNECOLOGIC SYMPTOMS: No  BREAST SYMPTOMS: No  SKELETAL SYMPTOMS: No  BLOOD SYMPTOMS: No  NERVOUS SYSTEM SYMPTOMS: Yes  MENTAL HEALTH SYMPTOMS: No  Fever: Yes  Loss of appetite: No  Weight loss: No  Weight gain: No  Fatigue: Yes  Night sweats: No  Chills: No  Increased stress: No  Excessive hunger: No  Excessive thirst: No  Feeling hot or cold when others believe the temperature is normal: Yes  Loss of height: No  Post-operative complications: No  Surgical site pain: No  Hallucinations: No  Change in or Loss of Energy: Yes  Hyperactivity: No  Confusion: No  Changes in hair: No  Changes in moles/birth marks: No  Itching: No  Rashes: No  Changes in nails: Yes  Acne: No  Hair in places you don't want it: No  Change in facial hair: No  Warts: No  Non-healing sores: No  Scarring: No  Flaking of skin: No  Color changes of hands/feet in cold : No  Sun sensitivity: No  Skin thickening: No  Ear pain: No  Ear discharge: No  Hearing loss: No  Tinnitus: No  Nosebleeds: No  Congestion: Yes  Sinus pain: Yes  Trouble swallowing: No   Voice hoarseness: Yes  Mouth sores: No  Sore throat: Yes  Tooth pain: No  Gum tenderness: No  Bleeding gums: No  Change in taste: No  Change in sense of smell: No  Dry mouth: Yes  Hearing aid used: No  Neck lump: No  Cough: Yes  Sputum or phlegm: No  Coughing up blood: No  Difficulty breating or shortness of breath: No  Snoring: No  Wheezing: Yes  Difficulty breathing on exertion: No  Nighttime Cough:  "Yes  Difficulty breathing when lying flat: No  Heart burn or indigestion: Yes  Nausea: No  Vomiting: No  Abdominal pain: No  Bloating: No  Constipation: No  Diarrhea: Yes  Blood in stool: No  Black stools: No  Rectal or Anal pain: No  Fecal incontinence: No  Yellowing of skin or eyes: No  Vomit with blood: No  Change in stools: No  Trouble with coordination: No  Dizziness or trouble with balance: Yes  Fainting or black-out spells: No  Memory loss: No  Headache: Yes  Seizures: No  Speech problems: No  Tingling: Yes  Tremor: No  Weakness: Yes  Difficulty walking: No  Paralysis: No  Numbness: No    I have reviewed the patient's ROS and discussed all pertinent information as noted in the HPI.    Physical Exam:    /84 (BP Location: Right arm, Patient Position: Sitting, Cuff Size: Adult Large)  Pulse 95  Temp 98.9  F (37.2  C) (Oral)  Resp 18  Ht 1.651 m (5' 5\")  Wt 124.1 kg (273 lb 9.6 oz)  SpO2 95%  BMI 45.53 kg/m2    CONSTITUTIONAL: Alert non-toxic appearing female in no acute distress  HEAD: Normocephalic, atraumatic  EYES: PERRLA; no scleral icterus  ENT: Oropharynx pink without lesions  NECK: Neck supple without lymphadenopathy  RESPIRATORY: Lungs clear to auscultation, no increased work of breathing noted  CV: Regular rate and rhythm, S1S2, no clicks, murmurs, rubs, or gallops; bilateral lower extremities with trace edema, dorsalis pedis pulses 2+ bilaterally  GASTROINTESTINAL: Normoactive bowel sounds x4 quadrants, abdomen soft, non-distended, and non-tender to palpation without masses or organomegaly  GENITOURINARY: Not indicated  LYMPHATIC: Cervical, supraclavicular, and inguinal nodes without lymphadenopathy  MUSCULOSKELETAL: Moves all extremities, no obvious muscle wasting  NEUROLOGIC: No gross deficits, normal gait  SKIN: Appropriate color for race, warm and dry, no rashes or lesions to unclothed skin  PSYCHIATRIC: Pleasant and interactive, affect bright, makes appropriate eye contact, thought " process linear    Labs:      9/28/2018  Day 1   Hemoglobin 11.7 - 15.7 g/dL 8.9 (A)   Hematocrit 35.0 - 47.0 % 27.2 (A)   Platelet Count 150 - 450 10e9/L 98 (A)   Absolute Neutrophil 1.6 - 8.3 10e9/L 3.6   Sodium 133 - 144 mmol/L 135   Potassium 3.4 - 5.3 mmol/L 3.7   Chloride 94 - 109 mmol/L 100   Carbon Dioxide 20 - 32 mmol/L 28   Urea Nitrogen 7 - 30 mg/dL 17   Creatinine 0.52 - 1.04 mg/dL 0.74   Calcium 8.5 - 10.1 mg/dL 8.4 (A)   Magnesium 1.6 - 2.3 mg/dL 1.8   Bilirubin Total 0.2 - 1.3 mg/dL 0.4   ALT 0 - 50 U/L 35   AST 0 - 45 U/L 21   Alkaline Phosphatase 40 - 150 U/L 62   Albumin 3.4 - 5.0 g/dL 3.0 (A)   Protein Total 6.8 - 8.8 g/dL 7.4   WBC 4.0 - 11.0 10e9/L 5.6   Corrected calcium= 9.2mg/dL    Assessment/Plan:  1) Stage IIIC2 serous endometrial cancer: Tolerating treatment well without dose limiting side effects. Proceed with cycle six of carboplatin AUC 6/paclitaxel 175mg/m2 as originally ordered by Dr. Henderson. To receive total of six cycles followed by treatment planning with Dr. Henderson. Reviewed s/sxs carboplatin reactions and when to call her nurse/seek further care. Continue small frequent meals high in protein, drink 2L non-caffeinated fluids daily. Reviewed signs and symptoms for when she should contact the clinic or seek additional care, including but not limited to fever, chills, inability to keep down food or fluids, nausea and vomiting not controlled with antiemetics, and diarrhea leading to dehydration. Patient to contact the clinic with any questions or concerns in the interim. Reviewed surveillance and survivorship.  2) Chemotherapy/disease side effects:   Tingling/soreness: Transient. Continue vitamin B6 50mg PO BID, to start L-glutamine 2000mg PO BID.    Chemotherapy induced nausea: Resolved with addition of Emend, continue current plan.   Thrombocytopenia: No bleeding, may receive treatment with platelet count 98K. Reviewed bleeding precautions.  3) Seasonal allergic rhinitis:  Bothersome, fluticasone nasal spray refilled.  4) Genetic counseling: MMR protein expression intact, however, strong family history of cancer. Referral previously placed, scheduled 10/2/18.  5) Health maintenance issues discussed include to follow up with PCP for non-gynecologic concerns and co-morbid conditions.   6) Patient verbalized understanding of and agreement with plan    A total of 30 minutes of face to face time were spent with the patient with over 50% of that time spent in counseling, coordination of care, education, and symptom management.    ANU Selby, FNP-C  Division of Gynecologic Oncology  [facility]  Pager: [phone]

## 2018-09-28 NOTE — MR AVS SNAPSHOT
After Visit Summary   9/28/2018    Lu Smith    MRN: [id number]           Patient Information     Date Of Birth          1950        Visit Information        Provider Department      9/28/2018 7:00 AM Yasmine Zazueta APRN CNP [facility]        Today's Diagnoses     Endometrial cancer (H)    -  1    Encounter for antineoplastic chemotherapy        Chemotherapy-induced thrombocytopenia        Seasonal allergic rhinitis, unspecified trigger           Follow-ups after your visit        Your next 10 appointments already scheduled     Oct 24, 2018  9:15 AM CDT   [facility] Draw with  [employer] LAB DRAW   [facility]    [address] [place] [address]  [address]  [facility] [address]   [phone]            Oct 24, 2018 10:00 AM CDT   (Arrive by 9:45 AM)   Return Visit with Kevin Henderson MD   [facility]    [address] [place] [address]  [address]  [facility] [address] [phone]            Nov 21, 2018 12:00 PM CST   [facility] Lab Draw with  [employer] LAB DRAW   [facility]    [address] [place] [address]  [address]  [facility] [address] [phone]            Nov 21, 2018 12:40 PM CST   (Arrive by 12:25 PM)   Survivorship Visit with ANU Selby CNP   [facility]    [address] [place] [address]  [address]  [facility] [address] [phone]              Who to contact     If you have questions or need follow up information about today's clinic visit or your schedule please contact [facility] directly at [phone].  Normal or non-critical lab and imaging results will be communicated to you by MyChart, letter or phone within 4 business days after the clinic has received the results. If you do not hear from us within 7 days, please  "contact the clinic through [employer] or phone. If you have a critical or abnormal lab result, we will notify you by phone as soon as possible.  Submit refill requests through [employer] or call your pharmacy and they will forward the refill request to us. Please allow 3 business days for your refill to be completed.          Additional Information About Your Visit        [employer] Information     [employer] gives you secure access to your electronic health record. If you see a primary care provider, you can also send messages to your care team and make appointments. If you have questions, please call your primary care clinic.  If you do not have a primary care provider, please call [phone] and they will assist you.        Care EveryWhere ID     This is your Care EveryWhere ID. This could be used by other organizations to access your [place] medical records  [id number]        Your Vitals Were     Pulse Temperature Respirations Height Pulse Oximetry BMI (Body Mass Index)    95 98.9  F (37.2  C) (Oral) 18 1.651 m (5' 5\") 95% 45.53 kg/m2       Blood Pressure from Last 3 Encounters:   09/28/18 152/84   09/07/18 148/76   08/29/18 148/76    Weight from Last 3 Encounters:   09/28/18 124.1 kg (273 lb 9.6 oz)   09/07/18 126.2 kg (278 lb 4.8 oz)   08/29/18 127 kg (280 lb)              Today, you had the following     No orders found for display         Where to get your medicines      These medications were sent to [facility] [address] [place] [address]     Phone:  [phone]     fluticasone 50 MCG/ACT spray          Primary Care Provider Office Phone # Fax #    Levar Scott [phone]       [address]  [place] [facility] [address]        Equal Access to Services     CED COLLINS AH: Geeta Pride, waaxda luqadaha, qaybta kaalmabandar dimas, angela enriquez. So [facility] " "[phone].    ATENCIÓN: Si mars mccauley, tiene a cortes disposición servicios gratuitos de asistencia lingüística. Arnold engel [phone].    We comply with applicable federal civil rights laws and [place] laws. We do not discriminate on the basis of race, color, national origin, age, disability, sex, sexual orientation, or gender identity.            Thank you!     Thank you for choosing [facility]  for your care. Our goal is always to provide you with excellent care. Hearing back from our patients is one way we can continue to improve our services. Please take a few minutes to complete the written survey that you may receive in the mail after your visit with us. Thank you!             Your Updated Medication List - Protect others around you: Learn how to safely use, store and throw away your medicines at www.disposemymeds.org.          This list is accurate as of 9/28/18 11:59 PM.  Always use your most recent med list.                   Brand Name Dispense Instructions for use Diagnosis    ACETAMINOPHEN PO      Take 325 mg by mouth every 6 hours as needed for pain        aspirin 81 MG EC tablet      Take 81 mg by mouth Pt states, \"I don't take it regularly. I take it when I remember to\".        fluticasone 50 MCG/ACT spray    FLONASE    1 Bottle    Spray 1-2 sprays into both nostrils daily    Seasonal allergic rhinitis, unspecified trigger       L-Glutamine 500 MG Caps           loperamide 2 MG capsule    IMODIUM     Take 2 mg by mouth 4 times daily as needed for diarrhea        loratadine 10 MG tablet    CLARITIN    30 tablet    Take 1 tablet (10 mg) by mouth daily    Pain in joint, multiple sites, Chronic seasonal allergic rhinitis, unspecified trigger       LORazepam 0.5 MG tablet    ATIVAN    30 tablet    Take 1 tablet (0.5 mg) by mouth every 6 hours as needed (nausea/vomiting, anxiety or sleep)    Endometrial cancer (H), Encounter for long-term (current) use of medications       METFORMIN " HCL PO      Take 500 mg by mouth daily (with breakfast)        prochlorperazine 5 MG tablet    COMPAZINE    30 tablet    Take 1 tablet (5 mg) by mouth every 6 hours as needed (nausea/vomiting)    Endometrial cancer (H), Encounter for long-term (current) use of medications       pyridoxine 50 MG Tabs    VITAMIN B-6    60 tablet    Take 1 tablet (50 mg) by mouth 2 times daily    Chemotherapy-induced peripheral neuropathy (H)       triamterene-hydrochlorothiazide 37.5-25 MG per capsule    DYAZIDE     Take 1 capsule by mouth every morning

## 2018-09-28 NOTE — NURSING NOTE
"Oncology Rooming Note    September 28, 2018 7:28 AM   Lu Smith is a 68 year old female who presents for:    Chief Complaint   Patient presents with     Port Draw     Labs drawn via  by RN. Port accessed, blood return noted, not enough for labs as port is positional. Infusion nurse informed via message column in appointment notes. VS taken.     Oncology Clinic Visit     Return Endometrial Ca     Initial Vitals: /84 (BP Location: Right arm, Patient Position: Sitting, Cuff Size: Adult Large)  Pulse 95  Temp 98.9  F (37.2  C) (Oral)  Resp 18  Ht 1.651 m (5' 5\")  Wt 124.1 kg (273 lb 9.6 oz)  SpO2 95%  BMI 45.53 kg/m2 Estimated body mass index is 45.53 kg/(m^2) as calculated from the following:    Height as of this encounter: 1.651 m (5' 5\").    Weight as of this encounter: 124.1 kg (273 lb 9.6 oz). Body surface area is 2.39 meters squared.  No Pain (0) Comment: Data Unavailable   No LMP recorded. Patient has had a hysterectomy.  Allergies reviewed: Yes  Medications reviewed: Yes    Medications: Medication refills not needed today.  Pharmacy name entered into [facility]: [facility] [place], [place] - [address] [facility]    Clinical concerns: refill on flonase     6 minutes for nursing intake (face to face time)     Martha Carroll CMA              "

## 2018-09-28 NOTE — Clinical Note
Going to chemo. Needs to see Dr. Henderson in 3-4 weeks for post-treatment eval, then needs to see Yasmine in about 8 weeks from now for survivorship.

## 2018-09-28 NOTE — NURSING NOTE
Chief Complaint   Patient presents with     Port Draw     Labs drawn via  by RN. Port accessed, blood return noted, not enough for labs as port is positional. Infusion nurse informed via message column in appointment notes. VS taken.     Port line flushed and hep locked.    Princess Overton RN

## 2018-09-28 NOTE — MR AVS SNAPSHOT
After Visit Summary   9/28/2018    Lu Smith    MRN: [id number]           Patient Information     Date Of Birth          1950        Visit Information        Provider Department      9/28/2018 8:00 AM  14 ATC;  ONCOLOGY INFUSION [facility]        Today's Diagnoses     Endometrial cancer (H)    -  1    Encounter for long-term (current) use of medications          Care Instructions    Contact Numbers    [facility]: [phone]  [facility] and after hours / weekends / holidays:  [phone]      Please call the triage or after hours line if you experience a temperature greater than or equal to 100.5, shaking chills, have uncontrolled nausea, vomiting and/or diarrhea, dizziness, shortness of breath, chest pain, bleeding, unexplained bruising, or if you have any other new/concerning symptoms, questions or concerns.      If you are having any concerning symptoms or wish to speak to a provider before your next infusion visit, please call your care coordinator or triage to notify them so we can adequately serve you.     If you need a refill on a narcotic prescription or other medication, please call before your infusion appointment.                   September 2018 Sunday Monday Tuesday Wednesday Thursday Friday Saturday                                 1       2     3     4     5     6     7     [facility] DRAW    6:30 AM   (15 min.)    [facility] DRAW   [facility] Lab Draw     [facility] RETURN ACTIVE TREATMENT    6:45 AM   (40 min.)   Yasmine Zazueta APRN CNP   [facility] ONC INFUSION 360    8:00 AM   (360 min.)    ONCOLOGY INFUSION   [facility] 8       9     10     11     12     13     14     15       16     17     18     19     20     21     22       23     24     25     26     27     28     [facility] DRAW    6:30 AM   (15 min.)    [facility] DRAW   [facility] Lab Draw     [facility] RETURN ACTIVE  TREATMENT    6:45 AM   (40 min.)   Yasmine Zazueta APRN CNP   [facility] ONC INFUSION 360    8:00 AM   (360 min.)    ONCOLOGY INFUSION   [facility] 29 30 October 2018 Sunday Monday Tuesday Wednesday Thursday Friday Saturday        1     2     [facility] NEW WITH ROOM   11:45 AM   (75 min.)   [facility]elda Fontaine GC   [facility]    1:15 PM   (15 min.)   [facility]   [facility] Lab Draw 3     4     5     6       7     8     9     10     11     12     13       14     15     16     17     18     19     20       21     22     23     24     [facility]    9:15 AM   (15 min.)   [facility] LAB DRAW   [facility] Lab Draw     [facility] RETURN    9:45 AM   (20 min.)   Kevin Henderson MD   [facility] 25     26     27       28     29     30     31                                 Lab Results:  Recent Results (from the past 12 hour(s))   CBC with platelets differential    Collection Time: 09/28/18  7:07 AM   Result Value Ref Range    WBC 5.6 4.0 - 11.0 10e9/L    RBC Count 2.87 (L) 3.8 - 5.2 10e12/L    Hemoglobin 8.9 (L) 11.7 - 15.7 g/dL    Hematocrit 27.2 (L) 35.0 - 47.0 %    MCV 95 78 - 100 fl    MCH 31.0 26.5 - 33.0 pg    MCHC 32.7 31.5 - 36.5 g/dL    RDW 16.0 (H) 10.0 - 15.0 %    Platelet Count 98 (L) 150 - 450 10e9/L    Diff Method Automated Method     % Neutrophils 64.7 %    % Lymphocytes 18.2 %    % Monocytes 13.7 %    % Eosinophils 1.8 %    % Basophils 0.7 %    % Immature Granulocytes 0.9 %    Nucleated RBCs 0 0 /100    Absolute Neutrophil 3.6 1.6 - 8.3 10e9/L    Absolute Lymphocytes 1.0 0.8 - 5.3 10e9/L    Absolute Monocytes 0.8 0.0 - 1.3 10e9/L    Absolute Eosinophils 0.1 0.0 - 0.7 10e9/L    Absolute Basophils 0.0 0.0 - 0.2 10e9/L    Abs Immature Granulocytes 0.1 0 - 0.4 10e9/L    Absolute Nucleated RBC 0.0    Comprehensive  metabolic panel    Collection Time: 09/28/18  7:07 AM   Result Value Ref Range    Sodium 135 133 - 144 mmol/L    Potassium 3.7 3.4 - 5.3 mmol/L    Chloride 100 94 - 109 mmol/L    Carbon Dioxide 28 20 - 32 mmol/L    Anion Gap 7 3 - 14 mmol/L    Glucose 137 (H) 70 - 99 mg/dL    Urea Nitrogen 17 7 - 30 mg/dL    Creatinine 0.74 0.52 - 1.04 mg/dL    GFR Estimate 78 >60 mL/min/1.7m2    GFR Estimate If Black >90 >60 mL/min/1.7m2    Calcium 8.4 (L) 8.5 - 10.1 mg/dL    Bilirubin Total 0.4 0.2 - 1.3 mg/dL    Albumin 3.0 (L) 3.4 - 5.0 g/dL    Protein Total 7.4 6.8 - 8.8 g/dL    Alkaline Phosphatase 62 40 - 150 U/L    ALT 35 0 - 50 U/L    AST 21 0 - 45 U/L   Magnesium    Collection Time: 09/28/18  7:07 AM   Result Value Ref Range    Magnesium 1.8 1.6 - 2.3 mg/dL               Follow-ups after your visit        Your next 10 appointments already scheduled     Oct 02, 2018 12:00 PM CDT   (Arrive by 11:45 AM)   NEW WITH ROOM with Gricelda Fontaine GC,  2 [address] CONSULT [facility])    [address] [place] [address]  [address]  [facility] [address]   [phone]            Oct 02, 2018  1:15 PM CDT   [facility] Draw with  [facility] DRAW   [facility])    [address] [place] [address]  [address]  [facility] [address]   [phone]            Oct 24, 2018  9:15 AM CDT   [facility] Draw with  [facility] DRAW   [facility]    [address] [place] [address]  [address]  [facility] [address]   [phone]            Oct 24, 2018 10:00 AM CDT   (Arrive by 9:45 AM)   Return Visit with Kevin Henderson MD   [facility] ([facility])    [address] [place] [address]  [address]  [facility] [address]   [phone]            Nov 21, 2018 12:00 PM RUST   [facility] Lab Draw with  [facility] LAB DRAW   [facility] Lab Draw ([facility])    [address]  [address]  [facility] [id number] [phone]            Nov 21, 2018 12:40 PM CST   (Arrive by 12:25 PM)   Survivorship Visit with ANU Selby CNP   [facility] ([facility])    [address]  [address]  [facility] [id number] [phone]              Who to contact     If you have questions or need follow up information about today's clinic visit or your schedule please contact [facility] directly at [phone].  Normal or non-critical lab and imaging results will be communicated to you by MyChart, letter or phone within 4 business days after the clinic has received the results. If you do not hear from us within 7 days, please contact the clinic through [employer] or phone. If you have a critical or abnormal lab result, we will notify you by phone as soon as possible.  Submit refill requests through [employer] or call your pharmacy and they will forward the refill request to us. Please allow 3 business days for your refill to be completed.          Additional Information About Your Visit        MyChart Information     [employer] gives you secure access to your electronic health record. If you see a primary care provider, you can also send messages to your care team and make appointments. If you have questions, please call your primary care clinic.  If you do not have a primary care provider, please call [phone] and they will assist you.        Care EveryWhere ID     This is your Care EveryWhere ID. This could be used by other organizations to access your [place] medical records  [id number]         Blood Pressure from Last 3 Encounters:   09/28/18 152/84   09/07/18 148/76   08/29/18 148/76    Weight from Last 3 Encounters:   09/28/18 124.1 kg (273 lb 9.6 oz)   09/07/18 126.2 kg (278 lb 4.8 oz)   08/29/18 127 kg (280 lb)              We Performed the Following     CBC with  "platelets differential     Comprehensive metabolic panel     Magnesium          Where to get your medicines      These medications were sent to [facility] [address] [place], [place] - [address]  [address] [address]     Phone:  [phone]     fluticasone 50 MCG/ACT spray          Primary Care Provider Office Phone # Fax #    Levar Scott [phone]       [address] [place] [address] [place] [address]        Equal Access to Services     CED COLLINS : Hadii aad ku hadasho Soomaali, waaxda luqadaha, qaybta kaalmada adeegyada, waxay idiin hayaan adeeg kharash lasam enriquez. So [facility] [phone].    ATENCIÓN: Si habla español, tiene a cortes disposición servicios gratuitos de asistencia lingüística. [facility] [phone].    We comply with applicable federal civil rights laws and [place] laws. We do not discriminate on the basis of race, color, national origin, age, disability, sex, sexual orientation, or gender identity.            Thank you!     Thank you for choosing [facility]  for your care. Our goal is always to provide you with excellent care. Hearing back from our patients is one way we can continue to improve our services. Please take a few minutes to complete the written survey that you may receive in the mail after your visit with us. Thank you!             Your Updated Medication List - Protect others around you: Learn how to safely use, store and throw away your medicines at www.disposemymeds.org.          This list is accurate as of 9/28/18 12:45 PM.  Always use your most recent med list.                   Brand Name Dispense Instructions for use Diagnosis    ACETAMINOPHEN PO      Take 325 mg by mouth every 6 hours as needed for pain        aspirin 81 MG EC tablet      Take 81 mg by mouth Pt states, \"I don't take it regularly. I take it when I remember to\".        fluticasone 50 MCG/ACT spray    FLONASE    1 Bottle    Spray " 1-2 sprays into both nostrils daily    Chronic seasonal allergic rhinitis, unspecified trigger       L-Glutamine 500 MG Caps           loperamide 2 MG capsule    IMODIUM     Take 2 mg by mouth 4 times daily as needed for diarrhea        loratadine 10 MG tablet    CLARITIN    30 tablet    Take 1 tablet (10 mg) by mouth daily    Pain in joint, multiple sites, Chronic seasonal allergic rhinitis, unspecified trigger       LORazepam 0.5 MG tablet    ATIVAN    30 tablet    Take 1 tablet (0.5 mg) by mouth every 6 hours as needed (nausea/vomiting, anxiety or sleep)    Endometrial cancer (H), Encounter for long-term (current) use of medications       METFORMIN HCL PO      Take 500 mg by mouth daily (with breakfast)        prochlorperazine 5 MG tablet    COMPAZINE    30 tablet    Take 1 tablet (5 mg) by mouth every 6 hours as needed (nausea/vomiting)    Endometrial cancer (H), Encounter for long-term (current) use of medications       pyridoxine 50 MG Tabs    VITAMIN B-6    60 tablet    Take 1 tablet (50 mg) by mouth 2 times daily    Chemotherapy-induced peripheral neuropathy (H)       triamterene-hydrochlorothiazide 37.5-25 MG per capsule    DYAZIDE     Take 1 capsule by mouth every morning

## 2018-10-02 ENCOUNTER — APPOINTMENT (OUTPATIENT)
Dept: LAB | Facility: CLINIC | Age: 68
End: 2018-10-02
Attending: GENETIC COUNSELOR, MS
Payer: MEDICARE

## 2018-10-02 ENCOUNTER — OFFICE VISIT (OUTPATIENT)
Dept: ONCOLOGY | Facility: CLINIC | Age: 68
End: 2018-10-02
Attending: GENETIC COUNSELOR, MS
Payer: MEDICARE

## 2018-10-02 DIAGNOSIS — Z80.51 FAMILY HISTORY OF KIDNEY CANCER: ICD-10-CM

## 2018-10-02 DIAGNOSIS — Z80.0 FAMILY HISTORY OF COLON CANCER: ICD-10-CM

## 2018-10-02 DIAGNOSIS — C54.1 ENDOMETRIAL CANCER (H): Primary | ICD-10-CM

## 2018-10-02 DIAGNOSIS — Z80.3 FAMILY HISTORY OF BREAST CANCER: ICD-10-CM

## 2018-10-02 LAB — MISCELLANEOUS TEST: NORMAL

## 2018-10-02 PROCEDURE — 36415 COLL VENOUS BLD VENIPUNCTURE: CPT

## 2018-10-02 PROCEDURE — 96040 ZZH GENETIC COUNSELING, EACH 30 MINUTES: CPT | Mod: ZF | Performed by: GENETIC COUNSELOR, MS

## 2018-10-02 NOTE — PATIENT INSTRUCTIONS
Assessing Cancer Risk  Only about 5-10% of cancers are thought to be due to an inherited cancer susceptibility gene.    These families often have:    Several people with the same or related types of cancer    Cancers diagnosed at a young age (before age 50)    Individuals with more than one primary cancer    Multiple generations of the family affected with cancer    Some people may be candidates for genetic testing of more than one gene.  For these families, genetic testing using a cancer panel may be offered.  These panels will test different genes known to increase the risk for breast, ovarian, uterine, and/or other cancers. All of the genes discussed below have published clinical management guidelines for individuals who are found to carry a mutation. The purpose of this handout is to serve as a brief summary of the genes analyzed by the panels used to inquire about hereditary breast and gynecologic cancer:  SHARIFA, BRCA1, BRCA2, BRIP1, CDH1, CHEK2, MLH1, MSH2, MSH6, PMS2, EPCAM, PTEN, PALB2, RAD51C, RAD51D, and TP53.  ______________________________________________________________________________  Hereditary Breast and Ovarian Cancer Syndrome   (BRCA1 and BRCA2)  A single mutation in one of the copies of BRCA1 or BRCA2 increases the risk for breast and ovarian cancer, among others.  The risk for pancreatic cancer and melanoma may also be slightly increased in some families.  The chart below shows the chance that someone with a BRCA mutation would develop cancer in his or her lifetime1,2,3,4.        A person s ethnic background is also important to consider, as individuals of Ashkenazi Zoroastrian ancestry have a higher chance of having a BRCA gene mutation.  There are three BRCA mutations that occur more frequently in this population.    Willis Syndrome   (MLH1, MSH2, MSH6, PMS2, and EPCAM)  Currently five genes are known to cause Willis Syndrome: MLH1, MSH2, MSH6, PMS2, and EPCAM.  A single mutation in one of the  Willis Syndrome genes increases the risk for colon, endometrial, ovarian, and stomach cancers.  Other cancers that occur less commonly in Willis Syndrome include urinary tract, skin, and brain cancers.  The chart below shows the chance that a person with Willis syndrome would develop cancer in his or her lifetime5.      *Cancer risk varies depending on Willis syndrome gene found    Cowden Syndrome   (PTEN)  Cowden syndrome is a hereditary condition that increases the risk for breast, thyroid, endometrial, colon, and kidney cancer.  Cowden syndrome is caused by a mutation in the PTEN gene.  A single mutation in one of the copies of PTEN causes Cowden syndrome and increases cancer risk.  The chart below shows the chance that someone with a PTEN mutation would develop cancer in their lifetime6,7.  Other benign features seen in some individuals with Cowden syndrome include benign skin lesions (facial papules, keratoses, lipomas), learning disability, autism, thyroid nodules, colon polyps, and larger head size.      *One recent study found breast cancer risk to be increased to 85%    Li-Fraumeni Syndrome   (TP53)  Li-Fraumeni Syndrome (LFS) is a cancer predisposition syndrome caused by a mutation in the TP53 gene. A single mutation in one of the copies of TP53 increases the risk for multiple cancers. Individuals with LFS are at an increased risk for developing cancer at a young age. The lifetime risk for development of a LFS-associated cancer is 50% by age 30 and 90% by age 60.   Core Cancers: Sarcomas, Breast, Brain, Lung, Leukemias/Lymphomas, Adrenocortical carcinomas  Other Cancers: Gastrointestinal, Thyroid, Skin, Genitourinary    Hereditary Diffuse Gastric Cancer   (CDH1)  Currently, one gene is known to cause hereditary diffuse gastric cancer (HDGC): CDH1.  Individuals with HDGC are at increased risk for diffuse gastric cancer and lobular breast cancer. Of people diagnosed with HDGC, 30-50% have a mutation in the CDH1  gene.  This suggests there are likely other genes that may cause HDGC that have not been identified yet.      Lifetime Cancer Risks    General Population HDGC    Diffuse Gastric  <1% ~80%   Breast 12% 39-52%         Additional Genes  SHARIFA  SHARIFA is a moderate-risk breast cancer gene. Women who have a mutation in SHARIFA can have between a 2-4 fold increased risk for breast cancer compared to the general population8. SHARIFA mutations have also been associated with increased risk for pancreatic cancer, however an estimate of this cancer risk is not well understood9. Individuals who inherit two SHARIFA mutations have a condition called ataxia-telangiectasia (AT).  This rare autosomal recessive condition affects the nervous system and immune system, and is associated with progressive cerebellar ataxia beginning in childhood.  Individuals with ataxia-telangiectasia often have a weakened immune system and have an increased risk for childhood cancers.    PALB2  Mutations in PALB2 have been shown to increase the risk of breast cancer up to 33-58% in some families; where individuals fall within this risk range is dependent upon family [id number]. PALB2 mutations have also been associated with increased risk for pancreatic cancer, although this risk has not been quantified yet.  Individuals who inherit two PALB2 mutations--one from their mother and one from their father--have a condition called Fanconi Anemia.  This rare autosomal recessive condition is associated with short stature, developmental delay, bone marrow failure, and increased risk for childhood cancers.    CHEK2   CHEK2 is a moderate-risk breast cancer gene.  Women who have a mutation in CHEK2 have around a 2-fold increased risk for breast cancer compared to the general population, and this risk may be higher depending upon family history.11,12,13 Mutations in CHEK2 have also been shown to increase the risk of a number of other cancers, including colon and prostate, however  these cancer risks are currently not well understood.    BRIP1, RAD51C and RAD51D  Mutations in BRIP1, RAD51C, and RAD51D have been shown to increase the risk of ovarian cancer and possibly female breast cancer as well14,15 .       Lifetime Cancer Risk    General Population BRIP1 RAD51C RAD51D   Ovarian 1-2% ~5-8% ~5-9% ~7-15%           Inheritance  All of the cancer syndromes reviewed above are inherited in an autosomal dominant pattern.  This means that if a parent has a mutation, each of his or her children will have a 50% chance of inheriting that same mutation.  Therefore, each child--male or female--would have a 50% chance of being at increased risk for developing cancer.      Image obtained from Genetics Home Reference, 2013     Mutations in some genes can occur de tate, which means that a person s mutation occurred for the first time in them and was not inherited from a parent.  Now that they have the mutation, however, it can be passed on to future generations.    Genetic Testing  Genetic testing involves a blood test and will look at the genetic information in the SAHRIFA, BRCA1, BRCA2, BRIP1, CDH1, CHEK2, MLH1, MSH2, MSH6, PMS2, EPCAM, PTEN, PALB2, RAD51C, RAD51D, and TP53 genes for any harmful mutations that are associated with increased cancer risk.  If possible, it is recommended that the person(s) who has had cancer be tested before other family members.  That person will give us the most useful information about whether or not a specific gene is associated with the cancer in the family.    Results  There are three possible results of genetic testing:    Positive--a harmful mutation was identified in one or more of the genes    Negative--no mutation was identified in any of the genes on this panel    Variant of unknown significance--a variation in one of the genes was identified, but it is unclear how this impacts cancer risk in the family    Advantages and Disadvantages   There are advantages and  disadvantages to genetic testing.    Advantages    May clarify your cancer risk    Can help you make medical decisions    May explain the cancers in your family    May give useful information to your family members (if you share your results)    Disadvantages    Possible negative emotional impact of learning about inherited cancer risk    Uncertainty in interpreting a negative test result in some situations    Possible genetic discrimination concerns (see below)    Genetic Information Nondiscrimination Act (JOEL)  JOEL is a federal law that protects individuals from health insurance or employment discrimination based on a genetic test result alone.  Although rare, there are currently no legal discrimination protections in terms of life insurance, long term care, or disability insurances.  Visit the [employer] website to learn more.    Reducing Cancer Risk  All of the genes described above have nationally recognized cancer screening guidelines that would be recommended for individuals who test positive.  In addition to increased cancer screening, surgeries may be offered or recommended to reduce cancer risk.  Recommendations are based upon an individual s genetic test result as well as their personal and family history of cancer.    Questions to Think About Regarding Genetic Testing:    What effect will the test result have on me and my relationship with my family members if I have an inherited gene mutation?  If I don t have a gene mutation?    Should I share my test results, and how will my family react to this news, which may also affect them?    Are my children ready to learn new information that may one day affect their own health?    Hereditary Cancer Resources    FORCE: Facing Our Risk of Cancer Empowered facingourrisk.org   Bright Pink bebrightpink.org   Li-Fraumeni Syndrome Association lfsassociation.org   PTEN World PTENworld.com   No stomach for cancer, Inc.  nostomachforcancer.org   Stomach cancer relief network Scrnet.org   Collaborative Group of the Americas on Inherited Colorectal Cancer (CGA) cgaicc.com    Cancer Care cancercare.org   American Cancer Society (ACS) cancer.org   National Cancer [place] (NCI) cancer.gov     Please call us if you have any questions or concerns.   Cancer Risk Management Program [phone] ([phone])  ? Savita Hannah, MS, [facility]  [phone]  ? Brigette Melgar, MS, [facility]  [phone]  ? Yahaira Blum, MS, [facility]  [phone]  ? Pollo Saldana, MS, [facility]  [phone]  ? Gricelda Zhen, MS, [facility] [phone]    References  1. Gayatri SWIFT, Vitor PDP, Caleb S, Endy JACK, Sara JE, Piotr JL, Karolyn N, Cari H, Haily O, Mamta A, Dallin B, Cindy P, Pascual S, Shanta DM, Saurav N, Tisha E, Jacob H, Dean E, Lubinski J, Gronwald J, Krista B, Tulinius H, Thorlacius S, Eerola H, Dayanara H, Faith K, Camryn OP. Average risks of breast and ovarian cancer associated with BRCA1 or BRCA2 mutations detected in case series unselected for family history: a combined analysis of 222 studies. Am J Hum Emy. 2003;72:1117-30.  2. Gavi N, Kinga M, Chucho G.  BRCA1 and BRCA2 Hereditary Breast and Ovarian Cancer. Gene Reviews online. 2013.  3. Marcin YC, Praveena S, Nikki G, Briones S. Breast cancer risk among male BRCA1 and BRCA2 mutation carriers. J Natl Cancer Inst. 2007;99:1811-4.  4. Ming LIU, Steven I, Mamadou J, Anthony E, Tobias ER, Ariadne F. Risk of breast cancer in male BRCA2 carriers. J Med Emy. 2010;47:710-1.  5. National Comprehensive Cancer Network. Clinical practice guidelines in oncology, colorectal cancer screening. Available online (registration required). 2015.  6. Issac BURGESS, Dara J, Venita J, Eliza LA, Dedrick MS, Eng C. Lifetime cancer risks in individuals with germline PTEN mutations. Clin Cancer Res. 2012;18:400-7.  7. Daren MOROCHO. Cowden Syndrome: A Critical Review of the Clinical Literature. J Emy .  2009:18:13-27.  8. Aletha A, Gary D, Martita S, Jill P, Isidro T, Abe M, Sage B, Luci H, Teodora R, Hemanth K, Juliane L, Ming DG, Shanta D, Francisco J DF, Leigha MR, The Breast Cancer Susceptibility Collaboration () & Art MARCH. SHARIFA mutations that cause ataxia-telangiectasia are breast cancer susceptibility alleles. Nature Genetics. 2006;38:873-875  9. Matthew N , Nori Y, Damaris J, Nica L, Laury GM , Magda ML, Gallinger S, Shepard AG, Syngal S, Brenda ML, Camilo J , Marcos R, Lea SZ, Arie JR, Sy VE, Eddie M, Voshonna B, Landy N, Miley RH, Karime KW, and Sherley AP. SHARIFA mutations in patients with hereditary pancreatic cancer. Cancer Discover. 2012;2:41-46  10. Gayatri PARKS, et al. Breast-Cancer Risk in Families with Mutations in PALB2. NEJM. 2014; 371(6):497-506.  11. CHEK2 Breast Cancer Case-Control Consortium. CHEK2*1100delC and susceptibility to breast cancer: A collaborative analysis involving 10,860 breast cancer cases and 9,065 controls from 10 studies. Am J Hum Emy, 74 (2004), pp. 4084-8800  12. Kp T, Claudia S, Kosta K, et al. Spectrum of Mutations in BRCA1, BRCA2, CHEK2, and TP53 in Families at High Risk of Breast Cancer. JARED. 2006;295(12):2423-5303.   13. Dennis C, Weston D, Gretel A, et al. Risk of breast cancer in women with a CHEK2 mutation with and without a family history of breast cancer. J Clin Oncol. 2011;29:0488-7234.  14. Tyler BUTT, Jason E, Alethea SJ, et al. Contribution of germline mutations in the RAD51B, RAD51C, and RAD51D genes to ovarian cancer in the population. J Clin Oncol. 2015;33(26):8542-1798. Doi:10.1200/JCO.2015.61.2408.  15. Henny T, Antonio MANSFIELD, Claudia P, et al. Mutations in BRIP1 confer high risk of ovarian cancer. Winsome Emy. 2011;43(11):9514-1812. doi:10.1038/ng.955.

## 2018-10-02 NOTE — LETTER
Cancer Risk Management  Program Locations    [facility]  [facility]  Mailing Address  Cancer Risk Management Program  [facility]  [address]  [place] [address]    New patient appointments  [phone]  October 8, 2018    Lu Smith  [address] [facility] [address]      Dear Lu,    It was a pleasure meeting with you at the [facility] on October 2, 2018. Here is a copy of the progress note from your recent genetic counseling visit to the Cancer Risk Management Program. If you have any additional questions, please feel free to call.    10/2/2018    Referring Provider: ANU Selby FNP-C    Presenting Information:   I met with Lu Smith today for genetic counseling at the Cancer Risk Management Program at the [facility] to discuss her personal history of endometrial cancer and family history of melanoma, breast, colon, bile duct, and renal cancer. She is here today to review this history, cancer screening recommendations, and available genetic testing options.    Personal History:  Lu is a 68 year old female. She was diagnosed with high grade endometrial serous carcinoma at age 67; treatment included surgery to remove her uterus and ovaries, chemotherapy, and radiation. Immunohistochemistry (IHC) tumor testing of the mismatch repair proteins was normal.      She had her first menstrual period at age 15, her first child at age 21, and went through menopause at age [age]. She reports that she has never used hormone replacement therapy.      Her most recent OB-GYN exam and Pap smear in December 2017 prompted further evaluation with a transvaginal ultrasound in January 2018, which identified this cancer. She has clinical breast exams and mammograms every 1-2 years;  her most recent mammogram in December 2017 was normal. Her most recent colonoscopy in June 2016 was normal and follow-up was recommended in five years. A thyroid biopsy in January 2018 identified a benign nodule. She does not regularly do any other cancer screening at this time. Lu reported that she quit smoking in 1990 and occasional drinks alcohol.    Family History: (Please see scanned pedigree for detailed family history information)    Lu's daughter was diagnosed with ALL at age 24 and passed away at age 25.    One sister is 67 and was diagnosed with breast cancer at age 58; treatment included a lumpectomy and radiation.    Lu's fraternal twin sister is 66 and was diagnosed with non-Hodgkin's lymphoma at age 39.    One brother is 63 and was diagnosed with a melanoma on his head at age 61.    Lu's mother was diagnosed with colon cancer at age 64 and passed away at age 84.    One maternal aunt is 82 and was diagnosed with colon cancer at age 61. Her daughter was diagnosed with breast cancer at age 58.    One maternal uncle was diagnosed with bile duct cancer at age 72 and passed away at age 74; he had a history of smoking.    Lu's father was diagnosed with unilateral kidney cancer at age 62 and passed away at age 64; he had a history of smoking.    One paternal aunt was diagnosed with and passed away from colon cancer in her 60/70's. Her daughter was diagnosed with breast cancer in her 60's.    One paternal first cousin was diagnosed with breast cancer in her 50's.    Her maternal and paternal ethnicity is Luxembourger and [place]ic. There is no known Ashkenazi Congregation ancestry on either side of her family. There is no reported consanguinity.    Discussion:    Lu's personal history of endometrial cancer and family history of multiple types of cancer is suggestive of a hereditary cancer syndrome.    We reviewed the features of sporadic, familial, and hereditary cancers. In looking at Annie  "family history, it is possible that a cancer susceptibility gene is present as Lu, several siblings, and multiple relatives on both sides of the family have been diagnosed with related cancers in several generations.    We discussed the natural history and genetics of several hereditary cancer syndromes, including Willis syndrome and Hereditary Breast and Ovarian Cancer (HBOC) syndrome. A detailed handout regarding these syndromes and the information we discussed was provided to Lu at the end of our appointment today and can be found in the after visit summary. Topics included: inheritance pattern, cancer risks, cancer screening recommendations, and also risks, benefits and limitations of testing.  We reviewed that one of the most common causes of hereditary endometrial cancer is Willis syndrome, which is caused by mutations in the mismatch repair genes MLH1, MSH2, MSH6, PMS2 and the gene EPCAM. Each mismatch repair gene makes a protein. IHC testing of a tumor allows us to screen a patient for Willis syndrome and involves looking at the tumor to determine which of the proteins are present and which (if any) are absent. If one or more of the mismatch repair proteins is absent in the tumor, it can indicate an underlying inherited mutation in the respective gene.  IHC testing of Lu s endometrial tumor was normal. All four mismatch repair proteins were present. Given this normal testing, it is very unlikely that Lu s endometrial cancer was due to a mutation in one of the mismatch repair genes. That being said, IHC testing is considered a \"screen\" and not a \"diagnostic test\". This means a small percentage of individuals with Willis syndrome will have normal IHC testing.  As such, based on Lu's personal and family history, Lu still meets current National Comprehensive Cancer Network (NCCN) criteria for genetic testing of the Willis syndrome genes. Her PREMM5 score is also 3.4%.  PREMM5 is a tool used to " determine who is a good candidate for genetic testing for Willis syndrome based on personal and family history of cancer. This model supports consideration of genetic testing for individuals who have a score > 2.5% (J Clin Oncol. 2017 May 10:[id number], PMID: [id number]).    We then reviewed other potential explanations for her family history. For example, the most common cause of hereditary breast cancer is HBOC syndrome, which is caused by mutations in the BRCA1 and BRCA2 genes. Individuals with HBOC syndrome are at increased risk for several different cancers, including breast, ovarian, male breast, prostate, melanoma, pancreatic, and potentially serous endometrial cancer.    Based on her personal and family history, Lu meets current NCCN criteria for genetic testing of BRCA1 and BRCA2.      We discussed that there are additional genes that could cause increased risk for the cancers in Lu's family. For example, individuals with Cowden syndrome (PTEN mutation) are at increased risk for endometrial, kidney, breast, and colon cancer. As many of these genes present with overlapping features in a family and accurate cancer risk cannot always be established based upon the pedigree analysis alone, it would be reasonable for Lu to consider panel genetic testing to analyze multiple genes at once.    Lu expressed interest in learning as much information as possible from genetic testing. As such, we discussed that genetic testing is available for 34 genes associated with hereditary cancer: CancerNext (APC, SHARIFA, BARD1, BRCA1, BRCA2, BRIP1, BMPR1A, CDH1, CDK4, CDKN2A, CHEK2, DICER1, EPCAM, GREM1, HOXB13, MLH1, MRE11A, MSH2, MSH6, MUTYH, NBN, NF1, PALB2, PMS2, POLD1, POLE, PTEN, RAD50, RAD51C, RAD51D, SMAD4, SMARCA4, STK11, and TP53).    We discussed that many of the genes in the CancerNext panel are associated with specific hereditary cancer syndromes and published management guidelines: HBOC syndrome (BRCA1,  BRCA2), Willis syndrome (MLH1, MSH2, MSH6, PMS2, EPCAM), Familial Adenomatous Polyposis (APC), Hereditary Diffuse Gastric Cancer (CDH1), Familial Atypical Multiple Mole Melanoma syndrome (CDK4, CDKN2A), Juvenile Polyposis syndrome (BMPR1A, SMAD4), Cowden syndrome (PTEN), Li Fraumeni syndrome (TP53), Peutz-Jeghers syndrome (STK11), MUTYH Associated Polyposis (MUTYH), and Neurofibromatosis type 1 (NF1).     The SHARIFA, BRIP1, CHEK2, GREM1, NBN , PALB2, POLD1, POLE, RAD51C, and RAD51D genes are associated with increased cancer risk and have published management guidelines for certain cancers.      The remaining genes (BARD1, DICER1, HOXB13, MRE11A, RAD50, and SMARCA4) are associated with increased cancer risk and may allow us to make medical recommendations when mutations are identified.      Lu was provided with a detailed brochure from [facility] explaining the CancerNext testing.    Consent was obtained and genetic testing for CancerNext was sent to [facility]. Turn around time: 3-4 weeks.    Medical Management: For Lu, we reviewed that the information from genetic testing may determine:    additional cancer screening for which Lu may qualify (i.e. annual mammogram and breast MRI, colonoscopies every 1-5 years, more frequent dermatologic exams, etc.),    options for risk reducing surgeries Lu could consider (i.e. bilateral mastectomy),      and targeted chemotherapies if she were to develop certain cancers in the future (i.e. immunotherapy for individuals with Willis syndrome, PARP inhibitors, etc.).     These recommendations and possible targeted chemotherapies will be discussed in detail once genetic testing is completed.     Plan:  1) Today Lu elected to proceed with genetic testing using the CancerNext panel offered by [facility].  2) This information should be available in 3-4 weeks.  3) Lu will return to clinic to discuss the results.    Face to face time: 40  moses [place]aine MS, [facility]  Licensed Genetic Counselor  Office: [phone]  Pager: [phone]

## 2018-10-02 NOTE — NURSING NOTE
Chief Complaint   Patient presents with     Blood Draw     labs drawn via VPT by RN.     Labs collected from venipuncture by RN. Vitals taken. Checked in for appointment(s).    Leah WALLER RN PHN BSN  BMT/Oncology Lab

## 2018-10-02 NOTE — PROGRESS NOTES
10/2/2018    Referring Provider: ANU Selby FNP-C    Presenting Information:   I met with Lu Smith today for genetic counseling at the Cancer Risk Management Program at the [facility] to discuss her personal history of endometrial cancer and family history of melanoma, breast, colon, bile duct, and renal cancer. She is here today to review this history, cancer screening recommendations, and available genetic testing options.    Personal History:  Lu is a 68 year old female. She was diagnosed with high grade endometrial serous carcinoma at age 67; treatment included surgery to remove her uterus and ovaries, chemotherapy, and radiation. Immunohistochemistry (IHC) tumor testing of the mismatch repair proteins was normal.      She had her first menstrual period at age 15, her first child at age 21, and went through menopause at age [age]. She reports that she has never used hormone replacement therapy.      Her most recent OB-GYN exam and Pap smear in December 2017 prompted further evaluation with a transvaginal ultrasound in January 2018, which identified this cancer. She has clinical breast exams and mammograms every 1-2 years; her most recent mammogram in December 2017 was normal. Her most recent colonoscopy in June 2016 was normal and follow-up was recommended in five years. A thyroid biopsy in January 2018 identified a benign nodule. She does not regularly do any other cancer screening at this time. Lu reported that she quit smoking in 1990 and occasional drinks alcohol.    Family History: (Please see scanned pedigree for detailed family history information)    Lu's daughter was diagnosed with ALL at age 24 and passed away at age 25.    One sister is 67 and was diagnosed with breast cancer at age 58; treatment included a lumpectomy and radiation.    Lu's fraternal twin sister is 66 and was diagnosed with non-Hodgkin's lymphoma at age 39.    One brother is 63 and was  diagnosed with a melanoma on his head at age 61.    Lu's mother was diagnosed with colon cancer at age 64 and passed away at age 84.    One maternal aunt is 82 and was diagnosed with colon cancer at age 61. Her daughter was diagnosed with breast cancer at age 58.    One maternal uncle was diagnosed with bile duct cancer at age 72 and passed away at age 74; he had a history of smoking.    Lu's father was diagnosed with unilateral kidney cancer at age 62 and passed away at age 64; he had a history of smoking.    One paternal aunt was diagnosed with and passed away from colon cancer in her 60/70's. Her daughter was diagnosed with breast cancer in her 60's.    One paternal first cousin was diagnosed with breast cancer in her 50's.    Her maternal and paternal ethnicity is [place] and [place]. There is no known Ashkenazi Amish ancestry on either side of her family. There is no reported consanguinity.    Discussion:    Lu's personal history of endometrial cancer and family history of multiple types of cancer is suggestive of a hereditary cancer syndrome.    We reviewed the features of sporadic, familial, and hereditary cancers. In looking at Lu's family history, it is possible that a cancer susceptibility gene is present as Lu, several siblings, and multiple relatives on both sides of the family have been diagnosed with related cancers in several generations.    We discussed the natural history and genetics of several hereditary cancer syndromes, including Willis syndrome and Hereditary Breast and Ovarian Cancer (HBOC) syndrome. A detailed handout regarding these syndromes and the information we discussed was provided to Lu at the end of our appointment today and can be found in the after visit summary. Topics included: inheritance pattern, cancer risks, cancer screening recommendations, and also risks, benefits and limitations of testing.  We reviewed that one of the most common causes of  "hereditary endometrial cancer is Willis syndrome, which is caused by mutations in the mismatch repair genes MLH1, MSH2, MSH6, PMS2 and the gene EPCAM. Each mismatch repair gene makes a protein. IHC testing of a tumor allows us to screen a patient for Willis syndrome and involves looking at the tumor to determine which of the proteins are present and which (if any) are absent. If one or more of the mismatch repair proteins is absent in the tumor, it can indicate an underlying inherited mutation in the respective gene.  IHC testing of Lu s endometrial tumor was normal. All four mismatch repair proteins were present. Given this normal testing, it is very unlikely that Lu s endometrial cancer was due to a mutation in one of the mismatch repair genes. That being said, IHC testing is considered a \"screen\" and not a \"diagnostic test\". This means a small percentage of individuals with Willis syndrome will have normal IHC testing.  As such, based on Lu's personal and family history, Lu still meets current National Comprehensive Cancer Network (NCCN) criteria for genetic testing of the Willis syndrome genes. Her PREMM5 score is also 3.4%.  PREMM5 is a tool used to determine who is a good candidate for genetic testing for Willis syndrome based on personal and family history of cancer. This model supports consideration of genetic testing for individuals who have a score > 2.5% (J Clin Oncol. 2017 May 10:URN9914792770, PMID: 01765456).    We then reviewed other potential explanations for her family history. For example, the most common cause of hereditary breast cancer is HBOC syndrome, which is caused by mutations in the BRCA1 and BRCA2 genes. Individuals with HBOC syndrome are at increased risk for several different cancers, including breast, ovarian, male breast, prostate, melanoma, pancreatic, and potentially serous endometrial cancer.    Based on her personal and family history, Lu meets current NCCN criteria " for genetic testing of BRCA1 and BRCA2.      We discussed that there are additional genes that could cause increased risk for the cancers in Lu's family. For example, individuals with Cowden syndrome (PTEN mutation) are at increased risk for endometrial, kidney, breast, and colon cancer. As many of these genes present with overlapping features in a family and accurate cancer risk cannot always be established based upon the pedigree analysis alone, it would be reasonable for Lu to consider panel genetic testing to analyze multiple genes at once.    Lu expressed interest in learning as much information as possible from genetic testing. As such, we discussed that genetic testing is available for 34 genes associated with hereditary cancer: CancerNext (APC, SHARIFA, BARD1, BRCA1, BRCA2, BRIP1, BMPR1A, CDH1, CDK4, CDKN2A, CHEK2, DICER1, EPCAM, GREM1, HOXB13, MLH1, MRE11A, MSH2, MSH6, MUTYH, NBN, NF1, PALB2, PMS2, POLD1, POLE, PTEN, RAD50, RAD51C, RAD51D, SMAD4, SMARCA4, STK11, and TP53).    We discussed that many of the genes in the CancerNext panel are associated with specific hereditary cancer syndromes and published management guidelines: HBOC syndrome (BRCA1, BRCA2), Willis syndrome (MLH1, MSH2, MSH6, PMS2, EPCAM), Familial Adenomatous Polyposis (APC), Hereditary Diffuse Gastric Cancer (CDH1), Familial Atypical Multiple Mole Melanoma syndrome (CDK4, CDKN2A), Juvenile Polyposis syndrome (BMPR1A, SMAD4), Cowden syndrome (PTEN), Li Fraumeni syndrome (TP53), Peutz-Jeghers syndrome (STK11), MUTYH Associated Polyposis (MUTYH), and Neurofibromatosis type 1 (NF1).     The SHARIFA, BRIP1, CHEK2, GREM1, NBN , PALB2, POLD1, POLE, RAD51C, and RAD51D genes are associated with increased cancer risk and have published management guidelines for certain cancers.      The remaining genes (BARD1, DICER1, HOXB13, MRE11A, RAD50, and SMARCA4) are associated with increased cancer risk and may allow us to make medical recommendations when  mutations are identified.      Lu was provided with a detailed brochure from [facility] explaining the CancerNext testing.    Consent was obtained and genetic testing for CancerNext was sent to [facility]. Turn around time: 3-4 weeks.    Medical Management: For Lu, we reviewed that the information from genetic testing may determine:    additional cancer screening for which Lu may qualify (i.e. annual mammogram and breast MRI, colonoscopies every 1-5 years, more frequent dermatologic exams, etc.),    options for risk reducing surgeries Lu could consider (i.e. bilateral mastectomy),      and targeted chemotherapies if she were to develop certain cancers in the future (i.e. immunotherapy for individuals with Willis syndrome, PARP inhibitors, etc.).     These recommendations and possible targeted chemotherapies will be discussed in detail once genetic testing is completed.     Plan:  1) Today Lu elected to proceed with genetic testing using the CancerNext panel offered by [facility].  2) This information should be available in 3-4 weeks.  3) Lu will return to clinic to discuss the results.    Face to face time: 40 minutes    Gricelda Fontaine MS, [facility]  Licensed Genetic Counselor  Office: [phone]  Pager: [phone]

## 2018-10-02 NOTE — MR AVS SNAPSHOT
After Visit Summary   10/2/2018    Lu Smith    MRN: [id number]           Patient Information     Date Of Birth          1950        Visit Information        Provider Department      10/2/2018 12:00 PM Gricelda Fontaine GC;  2 [address] CONSULT [facility]        Today's Diagnoses     Endometrial cancer (H)    -  1    Family history of breast cancer        Family history of colon cancer        Family history of kidney cancer          Care Instructions        Assessing Cancer Risk  Only about 5-10% of cancers are thought to be due to an inherited cancer susceptibility gene.    These families often have:    Several people with the same or related types of cancer    Cancers diagnosed at a young age (before age 50)    Individuals with more than one primary cancer    Multiple generations of the family affected with cancer    Some people may be candidates for genetic testing of more than one gene.  For these families, genetic testing using a cancer panel may be offered.  These panels will test different genes known to increase the risk for breast, ovarian, uterine, and/or other cancers. All of the genes discussed below have published clinical management guidelines for individuals who are found to carry a mutation. The purpose of this handout is to serve as a brief summary of the genes analyzed by the panels used to inquire about hereditary breast and gynecologic cancer:  SHARIFA, BRCA1, BRCA2, BRIP1, CDH1, CHEK2, MLH1, MSH2, MSH6, PMS2, EPCAM, PTEN, PALB2, RAD51C, RAD51D, and TP53.  ______________________________________________________________________________  Hereditary Breast and Ovarian Cancer Syndrome   (BRCA1 and BRCA2)  A single mutation in one of the copies of BRCA1 or BRCA2 increases the risk for breast and ovarian cancer, among others.  The risk for pancreatic cancer and melanoma may also be slightly increased in some families.  The chart below shows the chance that  someone with a BRCA mutation would develop cancer in his or her lifetime1,2,3,4.        A person s ethnic background is also important to consider, as individuals of Ashkenazi Sikh ancestry have a higher chance of having a BRCA gene mutation.  There are three BRCA mutations that occur more frequently in this population.    Willis Syndrome   (MLH1, MSH2, MSH6, PMS2, and EPCAM)  Currently five genes are known to cause Willis Syndrome: MLH1, MSH2, MSH6, PMS2, and EPCAM.  A single mutation in one of the Willis Syndrome genes increases the risk for colon, endometrial, ovarian, and stomach cancers.  Other cancers that occur less commonly in Willis Syndrome include urinary tract, skin, and brain cancers.  The chart below shows the chance that a person with Willis syndrome would develop cancer in his or her lifetime5.      *Cancer risk varies depending on Willis syndrome gene found    Cowden Syndrome   (PTEN)  Cowden syndrome is a hereditary condition that increases the risk for breast, thyroid, endometrial, colon, and kidney cancer.  Cowden syndrome is caused by a mutation in the PTEN gene.  A single mutation in one of the copies of PTEN causes Cowden syndrome and increases cancer risk.  The chart below shows the chance that someone with a PTEN mutation would develop cancer in their lifetime6,7.  Other benign features seen in some individuals with Cowden syndrome include benign skin lesions (facial papules, keratoses, lipomas), learning disability, autism, thyroid nodules, colon polyps, and larger head size.      *One recent study found breast cancer risk to be increased to 85%    Li-Fraumeni Syndrome   (TP53)  Li-Fraumeni Syndrome (LFS) is a cancer predisposition syndrome caused by a mutation in the TP53 gene. A single mutation in one of the copies of TP53 increases the risk for multiple cancers. Individuals with LFS are at an increased risk for developing cancer at a young age. The lifetime risk for development of a  LFS-associated cancer is 50% by age 30 and 90% by age 60.   Core Cancers: Sarcomas, Breast, Brain, Lung, Leukemias/Lymphomas, Adrenocortical carcinomas  Other Cancers: Gastrointestinal, Thyroid, Skin, Genitourinary    Hereditary Diffuse Gastric Cancer   (CDH1)  Currently, one gene is known to cause hereditary diffuse gastric cancer (HDGC): CDH1.  Individuals with HDGC are at increased risk for diffuse gastric cancer and lobular breast cancer. Of people diagnosed with HDGC, 30-50% have a mutation in the CDH1 gene.  This suggests there are likely other genes that may cause HDGC that have not been identified yet.      Lifetime Cancer Risks    General Population HDGC    Diffuse Gastric  <1% ~80%   Breast 12% 39-52%         Additional Genes  SHARIFA  SHARIFA is a moderate-risk breast cancer gene. Women who have a mutation in SHARIFA can have between a 2-4 fold increased risk for breast cancer compared to the general population8. SHARIFA mutations have also been associated with increased risk for pancreatic cancer, however an estimate of this cancer risk is not well understood9. Individuals who inherit two SHARIFA mutations have a condition called ataxia-telangiectasia (AT).  This rare autosomal recessive condition affects the nervous system and immune system, and is associated with progressive cerebellar ataxia beginning in childhood.  Individuals with ataxia-telangiectasia often have a weakened immune system and have an increased risk for childhood cancers.    PALB2  Mutations in PALB2 have been shown to increase the risk of breast cancer up to 33-58% in some families; where individuals fall within this risk range is dependent upon family dplpwik43. PALB2 mutations have also been associated with increased risk for pancreatic cancer, although this risk has not been quantified yet.  Individuals who inherit two PALB2 mutations--one from their mother and one from their father--have a condition called Fanconi Anemia.  This rare autosomal  recessive condition is associated with short stature, developmental delay, bone marrow failure, and increased risk for childhood cancers.    CHEK2   CHEK2 is a moderate-risk breast cancer gene.  Women who have a mutation in CHEK2 have around a 2-fold increased risk for breast cancer compared to the general population, and this risk may be higher depending upon family history.11,12,13 Mutations in CHEK2 have also been shown to increase the risk of a number of other cancers, including colon and prostate, however these cancer risks are currently not well understood.    BRIP1, RAD51C and RAD51D  Mutations in BRIP1, RAD51C, and RAD51D have been shown to increase the risk of ovarian cancer and possibly female breast cancer as well14,15 .       Lifetime Cancer Risk    General Population BRIP1 RAD51C RAD51D   Ovarian 1-2% ~5-8% ~5-9% ~7-15%           Inheritance  All of the cancer syndromes reviewed above are inherited in an autosomal dominant pattern.  This means that if a parent has a mutation, each of his or her children will have a 50% chance of inheriting that same mutation.  Therefore, each child--male or female--would have a 50% chance of being at increased risk for developing cancer.      Image obtained from Genetics Home Reference, 2013     Mutations in some genes can occur de tate, which means that a person s mutation occurred for the first time in them and was not inherited from a parent.  Now that they have the mutation, however, it can be passed on to future generations.    Genetic Testing  Genetic testing involves a blood test and will look at the genetic information in the SHARIFA, BRCA1, BRCA2, BRIP1, CDH1, CHEK2, MLH1, MSH2, MSH6, PMS2, EPCAM, PTEN, PALB2, RAD51C, RAD51D, and TP53 genes for any harmful mutations that are associated with increased cancer risk.  If possible, it is recommended that the person(s) who has had cancer be tested before other family members.  That person will give us the most useful  information about whether or not a specific gene is associated with the cancer in the family.    Results  There are three possible results of genetic testing:    Positive--a harmful mutation was identified in one or more of the genes    Negative--no mutation was identified in any of the genes on this panel    Variant of unknown significance--a variation in one of the genes was identified, but it is unclear how this impacts cancer risk in the family    Advantages and Disadvantages   There are advantages and disadvantages to genetic testing.    Advantages    May clarify your cancer risk    Can help you make medical decisions    May explain the cancers in your family    May give useful information to your family members (if you share your results)    Disadvantages    Possible negative emotional impact of learning about inherited cancer risk    Uncertainty in interpreting a negative test result in some situations    Possible genetic discrimination concerns (see below)    Genetic Information Nondiscrimination Act (JOEL)  JOEL is a federal law that protects individuals from health insurance or employment discrimination based on a genetic test result alone.  Although rare, there are currently no legal discrimination protections in terms of life insurance, long term care, or disability insurances.  Visit the National Human Genome Research [place] website to learn more.    Reducing Cancer Risk  All of the genes described above have nationally recognized cancer screening guidelines that would be recommended for individuals who test positive.  In addition to increased cancer screening, surgeries may be offered or recommended to reduce cancer risk.  Recommendations are based upon an individual s genetic test result as well as their personal and family history of cancer.    Questions to Think About Regarding Genetic Testing:    What effect will the test result have on me and my relationship with my family members if I have an  inherited gene mutation?  If I don t have a gene mutation?    Should I share my test results, and how will my family react to this news, which may also affect them?    Are my children ready to learn new information that may one day affect their own health?    Hereditary Cancer Resources    FORCE: Facing Our Risk of Cancer Empowered facingourrisk.org   Bright Pink bebrightpink.org   Li-Fraumeni Syndrome Association lfsassociation.org   PTEN World PTENworld.OPEN Sports Network   No stomach for cancer, Inc. nostomachforcancer.org   Stomach cancer relief network Scrnet.org   Collaborative Group of the Americas on Inherited Colorectal Cancer (CGA) cgaicc.com    Cancer Care cancercare.org   American Cancer Society (ACS) cancer.org   National Cancer [place] (NCI) cancer.gov     Please call us if you have any questions or concerns.   Cancer Risk Management Program [phone] ([phone])  ? Savita Hannah, MS, [facility]  [phone]  ? Brigette Melgar, MS, [facility]  [phone]  ? Yahaira Blum, MS, [facility]  [phone]  ? Pollo Saldana, MS, [facility]  [phone]  ? Gricelda Fontaine, MS, [facility] [phone]    References  1. Vitor De Paz PDP, Caleb S, Endy JACK, Sara JE, Piotr JL, Karolyn N, Cari H, Haily O, Mamta A, Dallin B, Cindy P, Pascual S, Shanta DM, Saurav N, Tisha E, Jacob H, Dean E, Rachel J, Rowan J, Krista B, Tulhelga H, Thorlacius S, Eerola H, Dayanara H, Faith K, Cmaryn OP. Average risks of breast and ovarian cancer associated with BRCA1 or BRCA2 mutations detected in case series unselected for family history: a combined analysis of 222 studies. Am J Hum Emy. 2003;72:1117-30.  2. Gavi N, Kinga M, Chucho G.  BRCA1 and BRCA2 Hereditary Breast and Ovarian Cancer. Gene Reviews online. 2013.  3. Marcin YC, Praveena S, Nikki G, Anali S. Breast cancer risk among male BRCA1 and BRCA2 mutation carriers. J Natl Cancer Inst. 2007;99:1811-4.  4. Ming LIU, Steven I, Mamadou J, Anthony E, Tobias VAZQUEZ, Ariadne OG.  Risk of breast cancer in male BRCA2 carriers. J Med Emy. 2010;47:710-1.  5. National Comprehensive Cancer Network. Clinical practice guidelines in oncology, colorectal cancer screening. Available online (registration required). 2015.  6. Issac MH, Dara J, Venita J, Eliza LA, Dedrick MS, Nany C. Lifetime cancer risks in individuals with germline PTEN mutations. Clin Cancer Res. 2012;18:400-7.  7. Daren MOROCHO. Cowden Syndrome: A Critical Review of the Clinical Literature. J Emy . 2009:18:13-27.  8. Aletha A, Gary D, Martita S, Jill P, Isidro T, Abe M, Sage B, Luci H, Teodora R, Hemanth K, Juliane L, Ming DG, Shanta D, Francisco J DF, Leigha MR, The Breast Cancer Susceptibility Collaboration (UK) & Art MARCH. SHARIFA mutations that cause ataxia-telangiectasia are breast cancer susceptibility alleles. Nature Genetics. 2006;38:873-875  9. Matthew N , Nori Y, Damaris J, Nica L, Laury GM , Magda ML, Gallinger S, Shepard AG, Syngal S, Brenda ML, Camilo J , Marcos R, Lea SZ, Esrashid JR, Sy VE, Eddie M, Vogelstein B, Landy N, Miley RH, Karime KW, and Sherley AP. SHARIFA mutations in patients with hereditary pancreatic cancer. Cancer Discover. 2012;2:41-46  10. Gayatri PARKS, et al. Breast-Cancer Risk in Families with Mutations in PALB2. NEJM. 2014; 371(6):497-506.  11. CHEK2 Breast Cancer Case-Control Consortium. CHEK2*1100delC and susceptibility to breast cancer: A collaborative analysis involving 10,860 breast cancer cases and 9,065 controls from 10 studies. Am J Hum Emy, 74 (2004), pp. 6765-5275  12. Kp WALL, Claudia S, Kosta K, et al. Spectrum of Mutations in BRCA1, BRCA2, CHEK2, and TP53 in Families at High Risk of Breast Cancer. JARED. 2006;295(12):6421-1759.   13. Dennis LAWLER, [place] MO, Gretel SWIFT, et al. Risk of breast cancer in women with a CHEK2 mutation with and without a family history of breast cancer. J Clin Oncol. 2011;29:3329-5485.  14. Tyler BUTT, Jason E, Alethea FITZGERALD, et  al. Contribution of germline mutations in the RAD51B, RAD51C, and RAD51D genes to ovarian cancer in the population. J Clin Oncol. 2015;33(26):9238-3955. Doi:10.1200/JCO.2015.61.2408.  15. Henny T, Antonio DF, Claudia P, et al. Mutations in BRIP1 confer high risk of ovarian cancer. Winsome Emy. 2011;43(11):5254-1019. doi:10.1038/ng.955.          Follow-ups after your visit        Your next 10 appointments already scheduled     Oct 02, 2018  1:15 PM CDT   [facility] Lab Draw with  MAS[facility] LAB DRAW   [facility] Draw ([facility])    [address] [place] [address]  [address] [place] [id number]   [phone]            Oct 24, 2018  9:15 AM CDT   [facility] Lab Draw with  [facility] LAB DRAW   [facility]    [address] [place] [address]  [address] [place] [id number]   [phone]            Oct 24, 2018 10:00 AM CDT   (Arrive by 9:45 AM)   Return Visit with Kevin Henderson MD   [facility] ([facility])    [address] [place] [address]  [address] [place] [id number]   [phone]            Nov 21, 2018 12:00 PM CST   Masonic Lab Draw with  MASONIC LAB DRAW   [facility] Draw ([facility])    [address] [place] [address]  [address]  [facility] [address]   [phone]            Nov 21, 2018 12:40 PM CST   (Arrive by 12:25 PM)   Survivorship Visit with ANU Selby CNP   [facility] ([facility])    [address] [place] [address]  [address] [place] [phone]   [phone]              Future tests that were ordered for you today     Open Future Orders        Priority Expected Expires Ordered    CancerNext genetic testing, [facility] Test: Laboratory Miscellaneous Order Routine  10/2/2019 10/2/2018            Who to contact     If you have questions or need follow up information about today's clinic visit or your  schedule please contact [facility] directly at [phone].  Normal or non-critical lab and imaging results will be communicated to you by MyChart, letter or phone within 4 business days after the clinic has received the results. If you do not hear from us within 7 days, please contact the clinic through MyChart or phone. If you have a critical or abnormal lab result, we will notify you by phone as soon as possible.  Submit refill requests through [employer] or call your pharmacy and they will forward the refill request to us. Please allow 3 business days for your refill to be completed.          Additional Information About Your Visit        [employer] Information     [employer] gives you secure access to your electronic health record. If you see a primary care provider, you can also send messages to your care team and make appointments. If you have questions, please call your primary care clinic.  If you do not have a primary care provider, please call [phone] and they will assist you.        Care EveryWhere ID     This is your Care EveryWhere ID. This could be used by other organizations to access your [place] medical records  [id number]         Blood Pressure from Last 3 Encounters:   09/28/18 152/84   09/07/18 148/76   08/29/18 148/76    Weight from Last 3 Encounters:   09/28/18 124.1 kg (273 lb 9.6 oz)   09/07/18 126.2 kg (278 lb 4.8 oz)   08/29/18 127 kg (280 lb)               Primary Care Provider Office Phone # Fax #    Mokenumi Tyler [phone]       [address] [place]OR[place]  [facility] [address]        Equal Access to Services     [facility]: Hadii bailee forbes hadasho Soilianaali, waaxda luqadaha, qaybta kaalmada angela dimas. So [facility] [phone].    ATENCIÓN: Si habla español, tiene a cortes disposición servicios gratuitos de asistencia lingüística. Arnold al [phone].    We comply with applicable federal civil rights laws  "and [place] laws. We do not discriminate on the basis of race, color, national origin, age, disability, sex, sexual orientation, or gender identity.            Thank you!     Thank you for choosing [facility]  for your care. Our goal is always to provide you with excellent care. Hearing back from our patients is one way we can continue to improve our services. Please take a few minutes to complete the written survey that you may receive in the mail after your visit with us. Thank you!             Your Updated Medication List - Protect others around you: Learn how to safely use, store and throw away your medicines at www.disposemymeds.org.          This list is accurate as of 10/2/18  1:00 PM.  Always use your most recent med list.                   Brand Name Dispense Instructions for use Diagnosis    ACETAMINOPHEN PO      Take 325 mg by mouth every 6 hours as needed for pain        aspirin 81 MG EC tablet      Take 81 mg by mouth Pt states, \"I don't take it regularly. I take it when I remember to\".        fluticasone 50 MCG/ACT spray    FLONASE    1 Bottle    Spray 1-2 sprays into both nostrils daily    Chronic seasonal allergic rhinitis, unspecified trigger       L-Glutamine 500 MG Caps           loperamide 2 MG capsule    IMODIUM     Take 2 mg by mouth 4 times daily as needed for diarrhea        loratadine 10 MG tablet    CLARITIN    30 tablet    Take 1 tablet (10 mg) by mouth daily    Pain in joint, multiple sites, Chronic seasonal allergic rhinitis, unspecified trigger       LORazepam 0.5 MG tablet    ATIVAN    30 tablet    Take 1 tablet (0.5 mg) by mouth every 6 hours as needed (nausea/vomiting, anxiety or sleep)    Endometrial cancer (H), Encounter for long-term (current) use of medications       METFORMIN HCL PO      Take 500 mg by mouth daily (with breakfast)        prochlorperazine 5 MG tablet    COMPAZINE    30 tablet    Take 1 tablet (5 mg) by mouth every 6 hours as needed " (nausea/vomiting)    Endometrial cancer (H), Encounter for long-term (current) use of medications       pyridoxine 50 MG Tabs    VITAMIN B-6    60 tablet    Take 1 tablet (50 mg) by mouth 2 times daily    Chemotherapy-induced peripheral neuropathy (H)       triamterene-hydrochlorothiazide 37.5-25 MG per capsule    DYAZIDE     Take 1 capsule by mouth every morning

## 2018-10-23 DIAGNOSIS — Z79.899 ENCOUNTER FOR LONG-TERM (CURRENT) USE OF MEDICATIONS: ICD-10-CM

## 2018-10-23 DIAGNOSIS — C54.1 ENDOMETRIAL CANCER (H): Primary | ICD-10-CM

## 2018-10-23 ASSESSMENT — ENCOUNTER SYMPTOMS
MUSCLE CRAMPS: 1
ALTERED TEMPERATURE REGULATION: 1
EXERCISE INTOLERANCE: 1
STIFFNESS: 0
POSTURAL DYSPNEA: 0
HEMATURIA: 0
WHEEZING: 1
SNORES LOUDLY: 1
SHORTNESS OF BREATH: 1
SLEEP DISTURBANCES DUE TO BREATHING: 0
JOINT SWELLING: 0
DISTURBANCES IN COORDINATION: 0
FEVER: 1
TINGLING: 1
WEIGHT GAIN: 1
LOSS OF CONSCIOUSNESS: 0
CHILLS: 0
SINUS CONGESTION: 1
BLOATING: 0
POLYPHAGIA: 0
CONSTIPATION: 0
BACK PAIN: 0
DYSPNEA ON EXERTION: 1
FATIGUE: 1
HEADACHES: 1
BOWEL INCONTINENCE: 0
COUGH DISTURBING SLEEP: 1
DIFFICULTY URINATING: 0
SORE THROAT: 1
MUSCLE WEAKNESS: 1
DIZZINESS: 1
HYPERTENSION: 0
SMELL DISTURBANCE: 0
ARTHRALGIAS: 1
COUGH: 1
PALPITATIONS: 0
POLYDIPSIA: 0
RECTAL PAIN: 0
ORTHOPNEA: 0
SPUTUM PRODUCTION: 1
NECK MASS: 0
WEAKNESS: 1
DECREASED APPETITE: 1
MEMORY LOSS: 1
TROUBLE SWALLOWING: 0
NAUSEA: 0
HALLUCINATIONS: 0
HOARSE VOICE: 0
SEIZURES: 0
WEIGHT LOSS: 0
LIGHT-HEADEDNESS: 1
PARALYSIS: 0
SKIN CHANGES: 0
NIGHT SWEATS: 0
HEMOPTYSIS: 0
DYSURIA: 0
POOR WOUND HEALING: 0
SINUS PAIN: 1
SPEECH CHANGE: 0
JAUNDICE: 0
HEARTBURN: 1
TREMORS: 1
LEG PAIN: 0
TASTE DISTURBANCE: 1
SYNCOPE: 0
FLANK PAIN: 0
VOMITING: 0
NUMBNESS: 1
HYPOTENSION: 0
NECK PAIN: 0
INCREASED ENERGY: 1
ABDOMINAL PAIN: 0
DIARRHEA: 1
NAIL CHANGES: 1
BLOOD IN STOOL: 0
MYALGIAS: 1

## 2018-10-24 ENCOUNTER — APPOINTMENT (OUTPATIENT)
Dept: LAB | Facility: CLINIC | Age: 68
End: 2018-10-24
Attending: OBSTETRICS & GYNECOLOGY
Payer: MEDICARE

## 2018-10-24 ENCOUNTER — ONCOLOGY VISIT (OUTPATIENT)
Dept: ONCOLOGY | Facility: CLINIC | Age: 68
End: 2018-10-24
Attending: OBSTETRICS & GYNECOLOGY
Payer: MEDICARE

## 2018-10-24 VITALS
RESPIRATION RATE: 18 BRPM | HEART RATE: 94 BPM | BODY MASS INDEX: 45.86 KG/M2 | SYSTOLIC BLOOD PRESSURE: 149 MMHG | DIASTOLIC BLOOD PRESSURE: 81 MMHG | WEIGHT: 275.6 LBS | OXYGEN SATURATION: 97 % | TEMPERATURE: 97.7 F

## 2018-10-24 DIAGNOSIS — Z79.899 ENCOUNTER FOR LONG-TERM (CURRENT) USE OF MEDICATIONS: ICD-10-CM

## 2018-10-24 DIAGNOSIS — C54.1 ENDOMETRIAL CANCER (H): ICD-10-CM

## 2018-10-24 LAB
ALBUMIN SERPL-MCNC: 3.1 G/DL (ref 3.4–5)
ALP SERPL-CCNC: 60 U/L (ref 40–150)
ALT SERPL W P-5'-P-CCNC: 29 U/L (ref 0–50)
ANION GAP SERPL CALCULATED.3IONS-SCNC: 8 MMOL/L (ref 3–14)
AST SERPL W P-5'-P-CCNC: 17 U/L (ref 0–45)
BASOPHILS # BLD AUTO: 0 10E9/L (ref 0–0.2)
BASOPHILS NFR BLD AUTO: 0.4 %
BILIRUB SERPL-MCNC: 0.4 MG/DL (ref 0.2–1.3)
BUN SERPL-MCNC: 15 MG/DL (ref 7–30)
CALCIUM SERPL-MCNC: 8.6 MG/DL (ref 8.5–10.1)
CHLORIDE SERPL-SCNC: 102 MMOL/L (ref 94–109)
CO2 SERPL-SCNC: 26 MMOL/L (ref 20–32)
CREAT SERPL-MCNC: 0.62 MG/DL (ref 0.52–1.04)
DIFFERENTIAL METHOD BLD: ABNORMAL
EOSINOPHIL # BLD AUTO: 0.1 10E9/L (ref 0–0.7)
EOSINOPHIL NFR BLD AUTO: 1.4 %
ERYTHROCYTE [DISTWIDTH] IN BLOOD BY AUTOMATED COUNT: 17 % (ref 10–15)
GFR SERPL CREATININE-BSD FRML MDRD: >90 ML/MIN/1.7M2
GLUCOSE SERPL-MCNC: 135 MG/DL (ref 70–99)
HCT VFR BLD AUTO: 23.8 % (ref 35–47)
HGB BLD-MCNC: 7.8 G/DL (ref 11.7–15.7)
IMM GRANULOCYTES # BLD: 0 10E9/L (ref 0–0.4)
IMM GRANULOCYTES NFR BLD: 0.8 %
LYMPHOCYTES # BLD AUTO: 0.9 10E9/L (ref 0.8–5.3)
LYMPHOCYTES NFR BLD AUTO: 18 %
MCH RBC QN AUTO: 31.7 PG (ref 26.5–33)
MCHC RBC AUTO-ENTMCNC: 32.8 G/DL (ref 31.5–36.5)
MCV RBC AUTO: 97 FL (ref 78–100)
MONOCYTES # BLD AUTO: 0.5 10E9/L (ref 0–1.3)
MONOCYTES NFR BLD AUTO: 10.2 %
NEUTROPHILS # BLD AUTO: 3.5 10E9/L (ref 1.6–8.3)
NEUTROPHILS NFR BLD AUTO: 69.2 %
NRBC # BLD AUTO: 0 10*3/UL
NRBC BLD AUTO-RTO: 0 /100
PLATELET # BLD AUTO: 110 10E9/L (ref 150–450)
POTASSIUM SERPL-SCNC: 3.4 MMOL/L (ref 3.4–5.3)
PROT SERPL-MCNC: 7.5 G/DL (ref 6.8–8.8)
RBC # BLD AUTO: 2.46 10E12/L (ref 3.8–5.2)
SODIUM SERPL-SCNC: 136 MMOL/L (ref 133–144)
WBC # BLD AUTO: 5.1 10E9/L (ref 4–11)

## 2018-10-24 PROCEDURE — 36591 DRAW BLOOD OFF VENOUS DEVICE: CPT

## 2018-10-24 PROCEDURE — 80053 COMPREHEN METABOLIC PANEL: CPT | Performed by: OBSTETRICS & GYNECOLOGY

## 2018-10-24 PROCEDURE — 85025 COMPLETE CBC W/AUTO DIFF WBC: CPT | Performed by: OBSTETRICS & GYNECOLOGY

## 2018-10-24 PROCEDURE — 25000128 H RX IP 250 OP 636: Mod: ZF | Performed by: OBSTETRICS & GYNECOLOGY

## 2018-10-24 PROCEDURE — G0463 HOSPITAL OUTPT CLINIC VISIT: HCPCS | Mod: ZF

## 2018-10-24 PROCEDURE — 99214 OFFICE O/P EST MOD 30 MIN: CPT | Mod: ZP | Performed by: OBSTETRICS & GYNECOLOGY

## 2018-10-24 RX ORDER — HEPARIN SODIUM (PORCINE) LOCK FLUSH IV SOLN 100 UNIT/ML 100 UNIT/ML
5 SOLUTION INTRAVENOUS EVERY 8 HOURS PRN
Status: DISCONTINUED | OUTPATIENT
Start: 2018-10-24 | End: 2018-10-25 | Stop reason: HOSPADM

## 2018-10-24 RX ADMIN — Medication 5 ML: at 09:57

## 2018-10-24 ASSESSMENT — PAIN SCALES - GENERAL: PAINLEVEL: NO PAIN (0)

## 2018-10-24 NOTE — MR AVS SNAPSHOT
After Visit Summary   10/24/2018    Lu Smith    MRN: [id number]           Patient Information     Date Of Birth          1950        Visit Information        Provider Department      10/24/2018 10:00 AM Kevin Henderson MD [facility]        Today's Diagnoses     Endometrial cancer (H)        Encounter for long-term (current) use of medications           Follow-ups after your visit        Your next 10 appointments already scheduled     Nov 13, 2018  3:30 PM CST   (Arrive by 3:15 PM)   RETURN WITH ROOM with Gricelda Fontaine GC,  2 117 CONSULT RM   [facility] ([facility])    [address] [place] [address]  [address]  [facility] [id number] [phone]            Nov 21, 2018 12:00 PM CST   [facility] Draw with [facility] DRAW   [facility] Draw ([facility])    [address] [place] [address]  [address]  [facility] [id number] [phone]            Nov 21, 2018 12:40 PM CST   (Arrive by 12:25 PM)   Survivorship Visit with ANU Selby CNP   [facility] ([facility])    [address] [place] [address]  [address]  [facility] [id number] [phone]              Who to contact     If you have questions or need follow up information about today's clinic visit or your schedule please contact [facility] directly at [phone].  Normal or non-critical lab and imaging results will be communicated to you by MyChart, letter or phone within 4 business days after the clinic has received the results. If you do not hear from us within 7 days, please contact the clinic through MyChart or phone. If you have a critical or abnormal lab result, we will notify you by phone as soon as possible.  Submit refill requests through [employer] or call your pharmacy and they will forward the refill request to us. Please allow 3 business days for your  refill to be completed.          Additional Information About Your Visit        [facility] - [facility] [facility] Outreach Programhart Information     [employer] gives you secure access to your electronic health record. If you see a primary care provider, you can also send messages to your care team and make appointments. If you have questions, please call your primary care clinic.  If you do not have a primary care provider, please call [phone] and they will assist you.        Care EveryWhere ID     This is your Care EveryWhere ID. This could be used by other organizations to access your [facility] medical records  [id number]        Your Vitals Were     Pulse Temperature Respirations Pulse Oximetry BMI (Body Mass Index)       94 97.7  F (36.5  C) (Oral) 18 97% 45.86 kg/m2        Blood Pressure from Last 3 Encounters:   10/24/18 149/81   09/28/18 152/84   09/07/18 148/76    Weight from Last 3 Encounters:   10/24/18 125 kg (275 lb 9.6 oz)   09/28/18 124.1 kg (273 lb 9.6 oz)   09/07/18 126.2 kg (278 lb 4.8 oz)              We Performed the Following     CBC with platelets differential     Comprehensive metabolic panel        Primary Care Provider Office Phone # Fax #    Levar Scott [phone]       [address] [place]  [facility] [address]        Equal Access to Services     CED COLLINS : Hadii bailee ku hadasho Soomaali, waaxda luqadaha, qaybta kaalmada adeegyada, angela pryor haycraig enriquez. So [facility] [phone].    ATENCIÓN: Si habla español, tiene a cortes disposición servicios gratuitos de asistencia lingüística. Llame al [phone].    We comply with applicable federal civil rights laws and [place] laws. We do not discriminate on the basis of race, color, national origin, age, disability, sex, sexual orientation, or gender identity.            Thank you!     Thank you for choosing [facility] [place] [facility]  for your care. Our goal is always to provide you with excellent care. Hearing back from our  "patients is one way we can continue to improve our services. Please take a few minutes to complete the written survey that you may receive in the mail after your visit with us. Thank you!             Your Updated Medication List - Protect others around you: Learn how to safely use, store and throw away your medicines at www.disposemymeds.org.          This list is accurate as of 10/24/18 11:59 PM.  Always use your most recent med list.                   Brand Name Dispense Instructions for use Diagnosis    ACETAMINOPHEN PO      Take 325 mg by mouth every 6 hours as needed for pain        aspirin 81 MG EC tablet      Take 81 mg by mouth Pt states, \"I don't take it regularly. I take it when I remember to\".        fluticasone 50 MCG/ACT spray    FLONASE    1 Bottle    Spray 1-2 sprays into both nostrils daily    Seasonal allergic rhinitis, unspecified trigger       L-Glutamine 500 MG Caps           loperamide 2 MG capsule    IMODIUM     Take 2 mg by mouth 4 times daily as needed for diarrhea        loratadine 10 MG tablet    CLARITIN    30 tablet    Take 1 tablet (10 mg) by mouth daily    Pain in joint, multiple sites, Chronic seasonal allergic rhinitis, unspecified trigger       LORazepam 0.5 MG tablet    ATIVAN    30 tablet    Take 1 tablet (0.5 mg) by mouth every 6 hours as needed (nausea/vomiting, anxiety or sleep)    Endometrial cancer (H), Encounter for long-term (current) use of medications       METFORMIN HCL PO      Take 500 mg by mouth daily (with breakfast)        prochlorperazine 5 MG tablet    COMPAZINE    30 tablet    Take 1 tablet (5 mg) by mouth every 6 hours as needed (nausea/vomiting)    Endometrial cancer (H), Encounter for long-term (current) use of medications       pyridoxine 50 MG Tabs    VITAMIN B-6    60 tablet    Take 1 tablet (50 mg) by mouth 2 times daily    Chemotherapy-induced peripheral neuropathy (H)       triamterene-hydrochlorothiazide 37.5-25 MG per capsule    DYAZIDE     Take 1 " capsule by mouth every morning

## 2018-10-24 NOTE — PROGRESS NOTES
Follow Up Notes on Referred Patient    Date: 10/24/2018       Dr. Levar Scott  [address] [place] [address]       RE: Lu Smith  : 1950  HEIDY: 10/24/2018    Dear Dr. 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     Patient presents today for followup.  She has finished adjuvant chemoradiation using sandwich protocol.  She is still tired and recovering from this.  She is eating and drinking okay.  No nausea, vomiting, fever or chills.  No vaginal bleeding.  Normal urinary and bowel function.             Past Medical History:    Past Medical History:   Diagnosis Date     Diabetes (H)     Type II     Endometrial cancer determined by uterine biopsy (H) 2018     HTN (hypertension)      Obesity      Osteoarthritis          Past Surgical History:    Past Surgical History:   Procedure Laterality Date     CHOLECYSTECTOMY       CYSTOSCOPY N/A 2018    Procedure: CYSTOSCOPY;;  Surgeon: Kevin Henderson MD;  Location: [facility] OR     DAVINCI HYSTERECTOMY TOTAL, BILATERAL SALPINGO-OOPHORECTOMY, COMBINED N/A 2018    Procedure: COMBINED DAVINCI HYSTERECTOMY TOTAL, SALPINGO-OOPHORECTOMY;  DaVinci Assisted Total Laparoscopic Hysterectomy, Bilateral Salpingo-Oophorectomy, Cystoscopy,  Omental biopsy, omentectomy,bilateral  Pelvic And Para Aortic Lymph Node Dissection;  Surgeon: Kevin Henderson MD;  Location: [facility] OR     Partial lumbar discectomy           Health Maintenance Due   Topic Date Due     TETANUS IMMUNIZATION (SYSTEM ASSIGNED)  1970     HEPATITIS C SCREENING  1970     LIPID SCREEN Q5 YR FEMALE (SYSTEM ASSIGNED)  1997     ADVANCE DIRECTIVE PLANNING Q5 YRS  2007     DEXA SCAN SCREENING (SYSTEM ASSIGNED)  2017     PNEUMOCOCCAL (1 of 2 - PCV13) 2017     INFLUENZA VACCINE (1) 2018       Current Medications:     Current  "Outpatient Prescriptions   Medication Sig Dispense Refill     ACETAMINOPHEN PO Take 325 mg by mouth every 6 hours as needed for pain       fluticasone (FLONASE) 50 MCG/ACT spray Spray 1-2 sprays into both nostrils daily 1 Bottle 1     L-Glutamine 500 MG CAPS        loperamide (IMODIUM) 2 MG capsule Take 2 mg by mouth 4 times daily as needed for diarrhea       loratadine (CLARITIN) 10 MG tablet Take 1 tablet (10 mg) by mouth daily 30 tablet 3     METFORMIN HCL PO Take 500 mg by mouth daily (with breakfast)        pyridoxine (VITAMIN B-6) 50 MG TABS Take 1 tablet (50 mg) by mouth 2 times daily 60 tablet 3     triamterene-hydrochlorothiazide (DYAZIDE) 37.5-25 MG per capsule Take 1 capsule by mouth every morning        aspirin EC 81 MG EC tablet Take 81 mg by mouth Pt states, \"I don't take it regularly. I take it when I remember to\".       LORazepam (ATIVAN) 0.5 MG tablet Take 1 tablet (0.5 mg) by mouth every 6 hours as needed (nausea/vomiting, anxiety or sleep) (Patient not taking: Reported on 9/28/2018) 30 tablet 1     prochlorperazine (COMPAZINE) 5 MG tablet Take 1 tablet (5 mg) by mouth every 6 hours as needed (nausea/vomiting) (Patient not taking: Reported on 9/7/2018) 30 tablet 2         Allergies:        Allergies   Allergen Reactions     Ace Inhibitors Cough     Amoxicillin Hives        Social History:     Social History   Substance Use Topics     Smoking status: Former Smoker     Packs/day: 0.25     Years: 20.00     Quit date: 4/27/1991     Smokeless tobacco: Never Used      Comment: Quite smoking 1992     Alcohol use Yes      Comment: 4-7/week if out 1-2x's/week       History   Drug Use No         Family History:       Family History   Problem Relation Age of Onset     Colon Cancer Mother      Breast Cancer Sister      Lymphoma Sister          Physical Exam:     /81 (BP Location: Right arm, Patient Position: Sitting, Cuff Size: Adult Large)  Pulse 94  Temp 97.7  F (36.5  C) (Oral)  Resp 18  Wt 125 kg " (275 lb 9.6 oz)  SpO2 97%  BMI 45.86 kg/m2  Body mass index is 45.86 kg/(m^2).    General Appearance: healthy and alert, no distress     Musculoskeletal: extremities non tender and without edema    Neurological: normal gait, no gross defects     Psychiatric: appropriate mood and affect                               ABDOMEN:  Soft, nontender and nondistended.  Well-healed port site incisions.           Assessment:    Lu Smith is a 68 year old woman with a diagnosis of  stage IIIC2 high grade serous endometrial cancer cancer.     A total of 25 minutes was spent with the patient, 20 minutes of which were spent in counseling the patient and/or treatment planning.    2.  Stage IIIC high-grade serous endometrial cancer, status post adjuvant chemotherapy with Taxol, carboplatin and paclitaxel and sandwich radiation therapy.   3.  Class 3 obesity.      Discussed with the patient.  She completed adjuvant treatment.  We will now start surveillance.  We will see her back every 3-4 months for the first 2 years and then every 6 months for the next 2 years and then yearly once we get to year five.  She agrees with this plan.  She is very appreciative of her care.  All her questions were answered.         Kevin Henderson MD, MS    Department of Obstetrics and Gynecology   Division of Gynecologic Oncology   [facility]  Phone: [phone]      CC  Patient Care Team:  Levar Ramos as PCP - General (Internal Medicine)  Jeanne Blancas MD as Referring Physician (OB/Gyn)  LEVAR RAMOS

## 2018-10-24 NOTE — NURSING NOTE
Chief Complaint   Patient presents with     Port Draw     Labs drawn via port by RN. Line flushed and hep locked, then de-accesssed. VS taken.     Princess Overton RN

## 2018-10-24 NOTE — LETTER
10/24/2018       RE: Lu Smith  [address]  [place] [address]     Dear Colleague,    Thank you for referring your patient, Lu Smith, to the [facility]. Please see a copy of my visit note below.                Follow Up Notes on Referred Patient    Date: 10/24/2018       Dr. Levar Scott  [address] [place] [address]       RE: Lu Smith  : 1950  HEIDY: 10/24/2018    Dear Dr. 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     Patient presents today for followup.  She has finished adjuvant chemoradiation using sandwich protocol.  She is still tired and recovering from this.  She is eating and drinking okay.  No nausea, vomiting, fever or chills.  No vaginal bleeding.  Normal urinary and bowel function.             Past Medical History:    Past Medical History:   Diagnosis Date     Diabetes (H)     Type II     Endometrial cancer determined by uterine biopsy (H) 2018     HTN (hypertension)      Obesity      Osteoarthritis          Past Surgical History:    Past Surgical History:   Procedure Laterality Date     CHOLECYSTECTOMY       CYSTOSCOPY N/A 2018    Procedure: CYSTOSCOPY;;  Surgeon: Kevin Henderson MD;  Location: [facility] OR     DAVINCI HYSTERECTOMY TOTAL, BILATERAL SALPINGO-OOPHORECTOMY, COMBINED N/A 2018    Procedure: COMBINED DAVINCI HYSTERECTOMY TOTAL, SALPINGO-OOPHORECTOMY;  DaVinci Assisted Total Laparoscopic Hysterectomy, Bilateral Salpingo-Oophorectomy, Cystoscopy,  Omental biopsy, omentectomy,bilateral  Pelvic And Para Aortic Lymph Node Dissection;  Surgeon: Kevin Henderson MD;  Location: [facility] OR     Partial lumbar discectomy           Health Maintenance Due   Topic Date Due     TETANUS IMMUNIZATION (SYSTEM ASSIGNED)  1970     HEPATITIS C SCREENING  1970     LIPID SCREEN Q5 YR FEMALE (SYSTEM ASSIGNED)  1997  "    ADVANCE DIRECTIVE PLANNING Q5 YRS  04/07/2007     DEXA SCAN SCREENING (SYSTEM ASSIGNED)  04/07/2017     PNEUMOCOCCAL (1 of 2 - PCV13) 04/07/2017     INFLUENZA VACCINE (1) 09/01/2018       Current Medications:     Current Outpatient Prescriptions   Medication Sig Dispense Refill     ACETAMINOPHEN PO Take 325 mg by mouth every 6 hours as needed for pain       fluticasone (FLONASE) 50 MCG/ACT spray Spray 1-2 sprays into both nostrils daily 1 Bottle 1     L-Glutamine 500 MG CAPS        loperamide (IMODIUM) 2 MG capsule Take 2 mg by mouth 4 times daily as needed for diarrhea       loratadine (CLARITIN) 10 MG tablet Take 1 tablet (10 mg) by mouth daily 30 tablet 3     METFORMIN HCL PO Take 500 mg by mouth daily (with breakfast)        pyridoxine (VITAMIN B-6) 50 MG TABS Take 1 tablet (50 mg) by mouth 2 times daily 60 tablet 3     triamterene-hydrochlorothiazide (DYAZIDE) 37.5-25 MG per capsule Take 1 capsule by mouth every morning        aspirin EC 81 MG EC tablet Take 81 mg by mouth Pt states, \"I don't take it regularly. I take it when I remember to\".       LORazepam (ATIVAN) 0.5 MG tablet Take 1 tablet (0.5 mg) by mouth every 6 hours as needed (nausea/vomiting, anxiety or sleep) (Patient not taking: Reported on 9/28/2018) 30 tablet 1     prochlorperazine (COMPAZINE) 5 MG tablet Take 1 tablet (5 mg) by mouth every 6 hours as needed (nausea/vomiting) (Patient not taking: Reported on 9/7/2018) 30 tablet 2         Allergies:        Allergies   Allergen Reactions     Ace Inhibitors Cough     Amoxicillin Hives        Social History:     Social History   Substance Use Topics     Smoking status: Former Smoker     Packs/day: 0.25     Years: 20.00     Quit date: 4/27/1991     Smokeless tobacco: Never Used      Comment: Quite smoking 1992     Alcohol use Yes      Comment: 4-7/week if out 1-2x's/week       History   Drug Use No         Family History:       Family History   Problem Relation Age of Onset     Colon Cancer Mother  "     Breast Cancer Sister      Lymphoma Sister          Physical Exam:     /81 (BP Location: Right arm, Patient Position: Sitting, Cuff Size: Adult Large)  Pulse 94  Temp 97.7  F (36.5  C) (Oral)  Resp 18  Wt 125 kg (275 lb 9.6 oz)  SpO2 97%  BMI 45.86 kg/m2  Body mass index is 45.86 kg/(m^2).    General Appearance: healthy and alert, no distress     Musculoskeletal: extremities non tender and without edema    Neurological: normal gait, no gross defects     Psychiatric: appropriate mood and affect                               ABDOMEN:  Soft, nontender and nondistended.  Well-healed port site incisions.           Assessment:    Lu Smith is a 68 year old woman with a diagnosis of  stage IIIC2 high grade serous endometrial cancer cancer.     A total of 25 minutes was spent with the patient, 20 minutes of which were spent in counseling the patient and/or treatment planning.    2.  Stage IIIC high-grade serous endometrial cancer, status post adjuvant chemotherapy with Taxol, carboplatin and paclitaxel and sandwich radiation therapy.   3.  Class 3 obesity.      Discussed with the patient.  She completed adjuvant treatment.  We will now start surveillance.  We will see her back every 3-4 months for the first 2 years and then every 6 months for the next 2 years and then yearly once we get to year five.  She agrees with this plan.  She is very appreciative of her care.  All her questions were answered.         Kevin Henderson MD, MS    Department of Obstetrics and Gynecology   Division of Gynecologic Oncology   [facility]  Phone: [phone]      CC  Patient Care Team:  Levar Scott as PCP - General (Internal Medicine)  Jeanne Blancas MD as Referring Physician (OB/Gyn)

## 2018-10-24 NOTE — NURSING NOTE
"Oncology Rooming Note    October 24, 2018 10:16 AM   Lu Smith is a 68 year old female who presents for:    Chief Complaint   Patient presents with     Port Draw     Labs drawn via port by RN. Line flushed and hep locked, then de-accesssed. VS taken.     Oncology Clinic Visit     Return; Post Treatment eval Endometrial CA     Initial Vitals: /81 (BP Location: Right arm, Patient Position: Sitting, Cuff Size: Adult Large)  Pulse 94  Temp 97.7  F (36.5  C) (Oral)  Resp 18  Wt 125 kg (275 lb 9.6 oz)  SpO2 97%  BMI 45.86 kg/m2 Estimated body mass index is 45.86 kg/(m^2) as calculated from the following:    Height as of 9/28/18: 1.651 m (5' 5\").    Weight as of this encounter: 125 kg (275 lb 9.6 oz). Body surface area is 2.39 meters squared.  No Pain (0) Comment: Data Unavailable   No LMP recorded. Patient has had a hysterectomy.  Allergies reviewed: Yes  Medications reviewed: Yes    Medications: Medication refills not needed today.  Pharmacy name entered into [employer]: [facility] [address] [place] [address] - [address] [place] [address]    Clinical concerns: Patient is here for a post treatment evaluation. Patient says she is becoming very fatigued very easily. She feels weak and light headed, and she states she knows she is not drinking enough water, and she is fighting a cold. Beatriz was notified.    7 minutes for nursing intake (face to face time)     Pamela Brice MA              "

## 2018-11-13 ENCOUNTER — OFFICE VISIT (OUTPATIENT)
Dept: ONCOLOGY | Facility: CLINIC | Age: 68
End: 2018-11-13
Attending: GENETIC COUNSELOR, MS
Payer: MEDICARE

## 2018-11-13 DIAGNOSIS — Z80.3 FAMILY HISTORY OF MALIGNANT NEOPLASM OF BREAST: ICD-10-CM

## 2018-11-13 DIAGNOSIS — Z80.0 FAMILY HISTORY OF COLON CANCER: ICD-10-CM

## 2018-11-13 DIAGNOSIS — C54.1 ENDOMETRIAL CANCER (H): Primary | ICD-10-CM

## 2018-11-13 DIAGNOSIS — Z80.51 FAMILY HISTORY OF KIDNEY CANCER: ICD-10-CM

## 2018-11-13 PROCEDURE — 96040 ZZH GENETIC COUNSELING, EACH 30 MINUTES: CPT | Mod: ZF | Performed by: GENETIC COUNSELOR, MS

## 2018-11-13 NOTE — PROGRESS NOTES
"11/13/2018    Referring Provider: ANU Selby FNP-C    Presenting Information:  Lu Smith returned to the Cancer Risk Management Program at the [facility] to discuss her genetic testing results. Her blood was drawn on 10/2/2018. BRCA1/2 and Willis Syndrome Analyses with the CancerNext panel was ordered from [employer]. This testing was done because of her personal history of endometrial cancer and family history of breast, melanoma, colon, and kidney cancer.    Genetic Testing Result: NEGATIVE  Lu is negative for mutations in the APC, SHARIFA, BARD1, BRCA1, BRCA2, BRIP1, BMPR1A, CDH1, CDK4, CDKN2A, CHEK2, DICER1, EPCAM, HOXB13, GREM1, MLH1, MRE11A, MSH2, MSH6, MUTYH, NBN, NF1, PALB2, PMS2, POLD1, POLE, PTEN, RAD50, RAD51C, RAD51D, SMAD4, SMARCA4, STK11, and TP53 genes.    No mutations were found in any of the 34 genes analyzed. This test involved sequencing and deletion/duplication analysis of all genes with the exception of EPCAM and GREM1 (deletions only).    Testing did not detect an identifiable mutation associated with Hereditary Breast and Ovarian Cancer syndrome (BRCA1, BRCA2), Willis syndrome (MLH1, MSH2, MSH6, PMS2, EPCAM), Familial Adenomatous Polyposis (APC), Hereditary Diffuse Gastric Cancer (CDH1), Familial Atypical Multiple Mole Melanoma syndrome (CDK4, CDKN2A), Juvenile Polyposis syndrome (BMPR1A, SMAD4), Cowden syndrome (PTEN), Li Fraumeni syndrome (TP53), Peutz-Jeghers syndrome (STK11), MUTYH Associated Polyposis (MUTYH), or Neurofibromatosis type 1 (NF1).    A copy of the test report can be found in the Laboratory tab, dated 10/2/2018, and named \"[facility]\". The report is scanned in as a linked document.    Interpretation:  We discussed several different interpretations of this negative test result.    1. One explanation may be that there is a different gene or combination of genes and environment that are associated with the cancers in Lu and/or her " relatives. If that is the case, additional genetic testing may be available in the future that can identify a genetic cause for Lu's endometrial cancer. As such, she is encouraged to contact me annually to review any new genetic testing options that may be appropriate for her.  2. It is possible that her other relatives with cancer, such as her sister with breast cancer, do have a mutation in one of these 34 genes and Lu did not inherit it. If that is the case, Cindys endometrial cancer may be a sporadic cancer caused by random cellular changes.  3. There is also a small possibility that there is a mutation in one of these genes, and we could not find it with our current testing methods.       Screening:  Based on this negative test result, it is important for Lu and her relatives to refer back to the family history for appropriate cancer screening.      Lu should continue to follow all endometrial cancer treatment and screening recommendations as made by her medical providers.    Based on her personal and family history, Lu has a 10.1% lifetime risk of developing breast cancer based on the RON 8 model. Therefore, Lu does not meet current National Comprehensive Cancer Network (NCCN) guidelines for high risk breast screening, which is offered to women with a 20% lifetime risk or higher. However, it is still important for Lu to continue with routine breast screening under the care of her physicians.  Per current NCCN guidelines, individuals with a first degree relative with colorectal cancer diagnosed at any age should begin colonoscopy at age 40, or 10 years before the earliest diagnosis of colorectal cancer, whichever is first. In this family, colonoscopy should start at age 40.  Colonoscopy should be repeated every 5 years, or per colonoscopy findings. This would apply to Lu and her siblings.  Due to the family history of melanoma, Lu and her siblings remain at increased risk  for developing melanoma. According to the American Cancer Society, they are encouraged to speak to their primary care providers about having regular skin exams and safe skin practices (i.e. sunscreen, self skin exams, limited sun exposure, etc.).  Due to Lu's personal history of endometrial cancer, her close female relatives remain at slightly increased risk for endometrial cancer. We discussed available endometrial cancer screening (pelvic exams, endometrial sampling, transvaginal ultrasounds) as well as the significant limitations of this screening. As such, this screening is not typically recommended. That being said, women in this family should discuss their family history, this screening and the signs and symptoms of endometrial cancer with their primary OB/GYN provider, as they may have individualized recommendations.  Other population cancer screening options, such as those recommended by the American Cancer Society and NCCN, are also appropriate for Lu and her family. These screening recommendations may change if there are changes to Lu's personal and/or family history. Final screening recommendations should be made by each individual's managing physician.      Inheritance:  We reviewed the autosomal dominant inheritance of mutations in these 34 genes. We discussed that Lu did not pass on an identifiable mutation in these genes to her children based on this test result. Mutations in these genes do not skip generations.      Additional Testing Considerations:  Although Lu's genetic testing result was negative, other relatives may still carry a gene mutation associated with hereditary cancer. Genetic counseling is recommended for her sister with breast cancer to discuss her genetic testing options. Lu's brother with melanoma, as well as her maternal and paternal first cousins with cancer, could consider meeting with a genetic counselor, as well. If any of these relatives do pursue  genetic testing, Lu is encouraged to contact me so that we may review the impact of their test results on Lu.    Summary:  We do not have an explanation for Lu's endometrial cancer. Because of that, it is important that she continue with cancer screening based on her personal and family history as discussed above.    Genetic testing is rapidly advancing, and new cancer susceptibility genes will most likely be identified in the future. Therefore, I encouraged Lu to contact me annually or if there are changes in her personal or family history. This may change how we assess her cancer risk, screening, and the testing we would offer.    Plan:  1. I provided Lu with a copy of her test results today, and a handout summarizing negative genetic test results (see after visit summary).  2. She plans to follow-up with her medical providers, including ANU Selby FNP-C.  3. She should contact me annually, or sooner if her family history changes.    If Lu has any further questions, I encouraged her to contact me at [phone].    Time spent face to face: 25 minutes    Gricelda Fontaine MS, [facility]  Licensed Genetic Counselor  Office: [phone]  Pager: [phone]

## 2018-11-13 NOTE — MR AVS SNAPSHOT
After Visit Summary   11/13/2018    Lu Smith    MRN: [id number]           Patient Information     Date Of Birth          1950        Visit Information        Provider Department      11/13/2018 3:30 PM Gricelda Fontaine GC;  [address] CONSULT [facility]        Care Instructions            Negative Genetic Test Result    Genetic Testing  You had a blood test that looked at the genetic information in one or more genes associated with increased cancer risk.  The testing looked for any harmful changes that would stop this particular gene from working like it should. If an individual does not have any harmful changes or variants of unknown significance found from their blood test, their genetic test result is reported as negative.       Results  The genetic test did not identify any pathogenic (harmful) changes in the genes that were tested. There are several possible explanations for a negative test result. Without knowing the gene mutation in your family, the cause of the cancer in you or your relatives is still unknown. Your genetic counselor can help interpret the result for you and your relatives. In this case, there are several reasons that may explain the negative test result:    There may be a gene mutation in the family that you did not inherit.     You may have a gene mutation in a different gene that was not included in the test, or has not yet been discovered.     The cancers in you or your family may be due to a combination of genetic factors and environment (multifactorial/familial).    The cancers in you or your family may be sporadic/random cancers.    There is very small chance that a mutation was not found by current testing methods.  As testing technology evolves over time, it may still be possible to identify a mutation in a gene that was not found on this test.    It is important to note which genes were included in your test. A list of these genes  can be found on your test result.    Screening Recommendations  Due to this negative test result, cancer screening recommendations should be based on your personal and family history. This may include increased cancer screening for you and/or your family members. Your genetic counselor and health care provider can help make appropriate recommendations.      Please call us if you have any questions or concerns.   Cancer Risk Management Program [phone] ([phone])  ? Savita Camden, MS, [facility]  [phone]  ? Brigetet Talia, MS, [facility]  [phone]  ? Yahaira Blum, MS, [facility]  [phone]  ? Pollo Saldana, MS, [facility]  [phone]  ? Gricelda Fontaine, MS, [facility] [phone]          Follow-ups after your visit        Your next 10 appointments already scheduled     Nov 21, 2018 12:00 PM CST   [facility] Draw with  [facility]   [facility] ([facility])    [address] [place] [address]  [address] [place] [id number] [phone]            Nov 21, 2018 12:40 PM CST   (Arrive by 12:25 PM)   Survivorship Visit with ANU Selby CNP   [facility] ([facility])    [address] [place] [address]  [address] [place] [id number] [phone]              Who to contact     If you have questions or need follow up information about today's clinic visit or your schedule please contact [facility] directly at [phone].  Normal or non-critical lab and imaging results will be communicated to you by MyChart, letter or phone within 4 business days after the clinic has received the results. If you do not hear from us within 7 days, please contact the clinic through MyChart or phone. If you have a critical or abnormal lab result, we will notify you by phone as soon as possible.  Submit refill requests through [employer] or call your pharmacy and they will forward the refill request to us. Please allow 3 business days for your  refill to be completed.          Additional Information About Your Visit        [employer] Information     Transmit gives you secure access to your electronic health record. If you see a primary care provider, you can also send messages to your care team and make appointments. If you have questions, please call your primary care clinic.  If you do not have a primary care provider, please call [phone] and they will assist you.        Care EveryWhere ID     This is your Care EveryWhere ID. This could be used by other organizations to access your [facility] medical records  [id number]         Blood Pressure from Last 3 Encounters:   10/24/18 149/81   09/28/18 152/84   09/07/18 148/76    Weight from Last 3 Encounters:   10/24/18 125 kg (275 lb 9.6 oz)   09/28/18 124.1 kg (273 lb 9.6 oz)   09/07/18 126.2 kg (278 lb 4.8 oz)              Today, you had the following     No orders found for display       Primary Care Provider Office Phone # Fax #    Levar Scott [phone]       [address] [place]  [facility] [address]        Equal Access to Services     [facility] Hospital[facility]WN : Hadii bailee ku hadasho Soeliezer, waaxda luqadaha, qaybta kaalmada adejeanetteyada, angela greenfield . So [facility] [phone].    ATENCIÓN: Si habla español, tiene a cortes disposición servicios gratuitos de asistencia lingüística. Llame al [phone].    We comply with applicable federal civil rights laws and [place] laws. We do not discriminate on the basis of race, color, national origin, age, disability, sex, sexual orientation, or gender identity.            Thank you!     Thank you for choosing [facility]  for your care. Our goal is always to provide you with excellent care. Hearing back from our patients is one way we can continue to improve our services. Please take a few minutes to complete the written survey that you may receive in the mail after your visit with us. Thank you!    "          Your Updated Medication List - Protect others around you: Learn how to safely use, store and throw away your medicines at www.disposemymeds.org.          This list is accurate as of 11/13/18  3:32 PM.  Always use your most recent med list.                   Brand Name Dispense Instructions for use Diagnosis    ACETAMINOPHEN PO      Take 325 mg by mouth every 6 hours as needed for pain        aspirin 81 MG EC tablet      Take 81 mg by mouth Pt states, \"I don't take it regularly. I take it when I remember to\".        fluticasone 50 MCG/ACT spray    FLONASE    1 Bottle    Spray 1-2 sprays into both nostrils daily    Seasonal allergic rhinitis, unspecified trigger       L-Glutamine 500 MG Caps           loperamide 2 MG capsule    IMODIUM     Take 2 mg by mouth 4 times daily as needed for diarrhea        loratadine 10 MG tablet    CLARITIN    30 tablet    Take 1 tablet (10 mg) by mouth daily    Pain in joint, multiple sites, Chronic seasonal allergic rhinitis, unspecified trigger       LORazepam 0.5 MG tablet    ATIVAN    30 tablet    Take 1 tablet (0.5 mg) by mouth every 6 hours as needed (nausea/vomiting, anxiety or sleep)    Endometrial cancer (H), Encounter for long-term (current) use of medications       METFORMIN HCL PO      Take 500 mg by mouth daily (with breakfast)        prochlorperazine 5 MG tablet    COMPAZINE    30 tablet    Take 1 tablet (5 mg) by mouth every 6 hours as needed (nausea/vomiting)    Endometrial cancer (H), Encounter for long-term (current) use of medications       pyridoxine 50 MG Tabs    VITAMIN B-6    60 tablet    Take 1 tablet (50 mg) by mouth 2 times daily    Chemotherapy-induced peripheral neuropathy (H)       triamterene-hydrochlorothiazide 37.5-25 MG per capsule    DYAZIDE     Take 1 capsule by mouth every morning          "

## 2018-11-13 NOTE — PATIENT INSTRUCTIONS
Negative Genetic Test Result    Genetic Testing  You had a blood test that looked at the genetic information in one or more genes associated with increased cancer risk.  The testing looked for any harmful changes that would stop this particular gene from working like it should. If an individual does not have any harmful changes or variants of unknown significance found from their blood test, their genetic test result is reported as negative.       Results  The genetic test did not identify any pathogenic (harmful) changes in the genes that were tested. There are several possible explanations for a negative test result. Without knowing the gene mutation in your family, the cause of the cancer in you or your relatives is still unknown. Your genetic counselor can help interpret the result for you and your relatives. In this case, there are several reasons that may explain the negative test result:    There may be a gene mutation in the family that you did not inherit.     You may have a gene mutation in a different gene that was not included in the test, or has not yet been discovered.     The cancers in you or your family may be due to a combination of genetic factors and environment (multifactorial/familial).    The cancers in you or your family may be sporadic/random cancers.    There is very small chance that a mutation was not found by current testing methods.  As testing technology evolves over time, it may still be possible to identify a mutation in a gene that was not found on this test.    It is important to note which genes were included in your test. A list of these genes can be found on your test result.    Screening Recommendations  Due to this negative test result, cancer screening recommendations should be based on your personal and family history. This may include increased cancer screening for you and/or your family members. Your genetic counselor and health care provider can help make  appropriate recommendations.      Please call us if you have any questions or concerns.   Cancer Risk Management Program [phone] ([phone])  ? Savita Hannah, MS, [facility]  [phone]  ? Brigette Melgar, MS, [facility]  [phone]  ? Yahaira Blum, MS, [facility]  [phone]  ? Pollo Saldana, MS, [facility]  [phone]  ? Gricelda Fontaine, MS, [facility] [phone]

## 2018-11-13 NOTE — LETTER
Cancer Risk Management  Program Locations    [facility]  [facility]  Mailing Address  Cancer Risk Management Program  [facility]  [address]  [place] [address]    New patient appointments  [phone]  November 19, 2018    Lu Smith  [address] [place] [address]      Dear Lu,    It was a pleasure meeting with you again at the [facility] on November 13, 2018. Here is a copy of the progress note from your recent genetic counseling visit to the Cancer Risk Management Program. If you have any additional questions, please feel free to call.    11/13/2018    Referring Provider: ANU Selby FNP-C    Presenting Information:  Lu Smith returned to the Cancer Risk Management Program at the [facility] to discuss her genetic testing results. Her blood was drawn on 10/2/2018. BRCA1/2 and Willis Syndrome Analyses with the Cancer[facility] panel was ordered from [facility]. This testing was done because of her personal history of endometrial cancer and family history of breast, melanoma, colon, and kidney cancer.    Genetic Testing Result: NEGATIVE  Lu is negative for mutations in the APC, SHARIFA, BARD1, BRCA1, BRCA2, BRIP1, BMPR1A, CDH1, CDK4, CDKN2A, CHEK2, DICER1, EPCAM, HOXB13, GREM1, MLH1, MRE11A, MSH2, MSH6, MUTYH, NBN, NF1, PALB2, PMS2, POLD1, POLE, PTEN, RAD50, RAD51C, RAD51D, SMAD4, SMARCA4, STK11, and TP53 genes.    No mutations were found in any of the 34 genes analyzed. This test involved sequencing and deletion/duplication analysis of all genes with the exception of EPCAM and GREM1 (deletions only).    Testing did not detect an identifiable mutation associated with Hereditary Breast and Ovarian Cancer syndrome (BRCA1, BRCA2), Willis syndrome (MLH1, MSH2, MSH6,  "PMS2, EPCAM), Familial Adenomatous Polyposis (APC), Hereditary Diffuse Gastric Cancer (CDH1), Familial Atypical Multiple Mole Melanoma syndrome (CDK4, CDKN2A), Juvenile Polyposis syndrome (BMPR1A, SMAD4), Cowden syndrome (PTEN), Li Fraumeni syndrome (TP53), Peutz-Jeghers syndrome (STK11), MUTYH Associated Polyposis (MUTYH), or Neurofibromatosis type 1 (NF1).    A copy of the test report can be found in the Laboratory tab, dated 10/2/2018, and named \"[facility]\". The report is scanned in as a linked document.    Interpretation:  We discussed several different interpretations of this negative test result.    1. One explanation may be that there is a different gene or combination of genes and environment that are associated with the cancers in Lu and/or her relatives. If that is the case, additional genetic testing may be available in the future that can identify a genetic cause for Cindys endometrial cancer. As such, she is encouraged to contact me annually to review any new genetic testing options that may be appropriate for her.  2. It is possible that her other relatives with cancer, such as her sister with breast cancer, do have a mutation in one of these 34 genes and Lu did not inherit it. If that is the case, Cindys endometrial cancer may be a sporadic cancer caused by random cellular changes.  3. There is also a small possibility that there is a mutation in one of these genes, and we could not find it with our current testing methods.       Screening:  Based on this negative test result, it is important for Lu and her relatives to refer back to the family history for appropriate cancer screening.      Lu should continue to follow all endometrial cancer treatment and screening recommendations as made by her medical providers.    Based on her personal and family history, Lu has a 10.1% lifetime risk of developing breast cancer based on the RON 8 model. Therefore, Lu does not " meet current National Comprehensive Cancer Network (NCCN) guidelines for high risk breast screening, which is offered to women with a 20% lifetime risk or higher. However, it is still important for Lu to continue with routine breast screening under the care of her physicians.  Per current NCCN guidelines, individuals with a first degree relative with colorectal cancer diagnosed at any age should begin colonoscopy at age 40, or 10 years before the earliest diagnosis of colorectal cancer, whichever is first. In this family, colonoscopy should start at age 40.  Colonoscopy should be repeated every 5 years, or per colonoscopy findings. This would apply to Lu and her siblings.  Due to the family history of melanoma, Lu and her siblings remain at increased risk for developing melanoma. According to the American Cancer Society, they are encouraged to speak to their primary care providers about having regular skin exams and safe skin practices (i.e. sunscreen, self skin exams, limited sun exposure, etc.).  Due to Lu's personal history of endometrial cancer, her close female relatives remain at slightly increased risk for endometrial cancer. We discussed available endometrial cancer screening (pelvic exams, endometrial sampling, transvaginal ultrasounds) as well as the significant limitations of this screening. As such, this screening is not typically recommended. That being said, women in this family should discuss their family history, this screening and the signs and symptoms of endometrial cancer with their primary OB/GYN provider, as they may have individualized recommendations.  Other population cancer screening options, such as those recommended by the American Cancer Society and NCCN, are also appropriate for Lu and her family. These screening recommendations may change if there are changes to Lu's personal and/or family history. Final screening recommendations should be made by each  individual's managing physician.      Inheritance:  We reviewed the autosomal dominant inheritance of mutations in these 34 genes. We discussed that Lu did not pass on an identifiable mutation in these genes to her children based on this test result. Mutations in these genes do not skip generations.      Additional Testing Considerations:  Although Lu's genetic testing result was negative, other relatives may still carry a gene mutation associated with hereditary cancer. Genetic counseling is recommended for her sister with breast cancer to discuss her genetic testing options. Lu's brother with melanoma, as well as her maternal and paternal first cousins with cancer, could consider meeting with a genetic counselor, as well. If any of these relatives do pursue genetic testing, Lu is encouraged to contact me so that we may review the impact of their test results on Lu.    Summary:  We do not have an explanation for Lu's endometrial cancer. Because of that, it is important that she continue with cancer screening based on her personal and family history as discussed above.    Genetic testing is rapidly advancing, and new cancer susceptibility genes will most likely be identified in the future. Therefore, I encouraged Lu to contact me annually or if there are changes in her personal or family history. This may change how we assess her cancer risk, screening, and the testing we would offer.    Plan:  1. I provided Lu with a copy of her test results today, and a handout summarizing negative genetic test results (see after visit summary).  2. She plans to follow-up with her medical providers, including ANU Selby FNP-C.  3. She should contact me annually, or sooner if her family history changes.    If Lu has any further questions, I encouraged her to contact me at [phone].    Time spent face to face: 25 minutes    Gricelda Fontaine MS, [facility]  Licensed Genetic  Counselor  Office: [phone]  Pager: [phone]

## 2018-11-15 LAB — LAB SCANNED RESULT: NORMAL

## 2018-11-21 ENCOUNTER — ONCOLOGY SURVIVORSHIP VISIT (OUTPATIENT)
Dept: ONCOLOGY | Facility: CLINIC | Age: 68
End: 2018-11-21
Attending: OBSTETRICS & GYNECOLOGY
Payer: MEDICARE

## 2018-11-21 VITALS
OXYGEN SATURATION: 96 % | WEIGHT: 269.6 LBS | SYSTOLIC BLOOD PRESSURE: 136 MMHG | TEMPERATURE: 99.5 F | HEART RATE: 88 BPM | BODY MASS INDEX: 44.86 KG/M2 | DIASTOLIC BLOOD PRESSURE: 77 MMHG | RESPIRATION RATE: 16 BRPM

## 2018-11-21 DIAGNOSIS — D64.81 ANTINEOPLASTIC CHEMOTHERAPY INDUCED ANEMIA: ICD-10-CM

## 2018-11-21 DIAGNOSIS — C54.1 ENDOMETRIAL CANCER (H): Primary | ICD-10-CM

## 2018-11-21 DIAGNOSIS — T45.1X5A ANTINEOPLASTIC CHEMOTHERAPY INDUCED ANEMIA: ICD-10-CM

## 2018-11-21 DIAGNOSIS — N39.3 FEMALE STRESS INCONTINENCE: ICD-10-CM

## 2018-11-21 LAB
ALBUMIN UR-MCNC: NEGATIVE MG/DL
APPEARANCE UR: CLEAR
BASOPHILS # BLD AUTO: 0 10E9/L (ref 0–0.2)
BASOPHILS NFR BLD AUTO: 0.4 %
BILIRUB UR QL STRIP: NEGATIVE
COLOR UR AUTO: YELLOW
DIFFERENTIAL METHOD BLD: ABNORMAL
EOSINOPHIL # BLD AUTO: 0.1 10E9/L (ref 0–0.7)
EOSINOPHIL NFR BLD AUTO: 1.9 %
ERYTHROCYTE [DISTWIDTH] IN BLOOD BY AUTOMATED COUNT: 17.4 % (ref 10–15)
GLUCOSE UR STRIP-MCNC: NEGATIVE MG/DL
HCT VFR BLD AUTO: 26.9 % (ref 35–47)
HGB BLD-MCNC: 8.7 G/DL (ref 11.7–15.7)
HGB UR QL STRIP: NEGATIVE
IMM GRANULOCYTES # BLD: 0 10E9/L (ref 0–0.4)
IMM GRANULOCYTES NFR BLD: 0.4 %
KETONES UR STRIP-MCNC: NEGATIVE MG/DL
LEUKOCYTE ESTERASE UR QL STRIP: NEGATIVE
LYMPHOCYTES # BLD AUTO: 1.5 10E9/L (ref 0.8–5.3)
LYMPHOCYTES NFR BLD AUTO: 27 %
MCH RBC QN AUTO: 31.9 PG (ref 26.5–33)
MCHC RBC AUTO-ENTMCNC: 32.3 G/DL (ref 31.5–36.5)
MCV RBC AUTO: 99 FL (ref 78–100)
MONOCYTES # BLD AUTO: 0.7 10E9/L (ref 0–1.3)
MONOCYTES NFR BLD AUTO: 11.8 %
MUCOUS THREADS #/AREA URNS LPF: PRESENT /LPF
NEUTROPHILS # BLD AUTO: 3.3 10E9/L (ref 1.6–8.3)
NEUTROPHILS NFR BLD AUTO: 58.5 %
NITRATE UR QL: NEGATIVE
NRBC # BLD AUTO: 0 10*3/UL
NRBC BLD AUTO-RTO: 0 /100
PH UR STRIP: 7 PH (ref 5–7)
PLATELET # BLD AUTO: 203 10E9/L (ref 150–450)
RBC # BLD AUTO: 2.73 10E12/L (ref 3.8–5.2)
RBC #/AREA URNS AUTO: <1 /HPF (ref 0–2)
SOURCE: ABNORMAL
SP GR UR STRIP: 1.01 (ref 1–1.03)
UROBILINOGEN UR STRIP-MCNC: 0 MG/DL (ref 0–2)
WBC # BLD AUTO: 5.7 10E9/L (ref 4–11)
WBC #/AREA URNS AUTO: <1 /HPF (ref 0–5)

## 2018-11-21 PROCEDURE — 90662 IIV NO PRSV INCREASED AG IM: CPT | Mod: ZF | Performed by: NURSE PRACTITIONER

## 2018-11-21 PROCEDURE — 81001 URINALYSIS AUTO W/SCOPE: CPT | Performed by: NURSE PRACTITIONER

## 2018-11-21 PROCEDURE — 99215 OFFICE O/P EST HI 40 MIN: CPT | Mod: ZP | Performed by: NURSE PRACTITIONER

## 2018-11-21 PROCEDURE — G0008 ADMIN INFLUENZA VIRUS VAC: HCPCS

## 2018-11-21 PROCEDURE — 85025 COMPLETE CBC W/AUTO DIFF WBC: CPT | Performed by: NURSE PRACTITIONER

## 2018-11-21 PROCEDURE — 36591 DRAW BLOOD OFF VENOUS DEVICE: CPT

## 2018-11-21 PROCEDURE — 25000128 H RX IP 250 OP 636: Mod: ZF | Performed by: NURSE PRACTITIONER

## 2018-11-21 RX ORDER — HEPARIN SODIUM (PORCINE) LOCK FLUSH IV SOLN 100 UNIT/ML 100 UNIT/ML
5 SOLUTION INTRAVENOUS
Status: COMPLETED | OUTPATIENT
Start: 2018-11-21 | End: 2018-11-21

## 2018-11-21 RX ADMIN — SODIUM CHLORIDE, PRESERVATIVE FREE 5 ML: 5 INJECTION INTRAVENOUS at 12:14

## 2018-11-21 RX ADMIN — INFLUENZA A VIRUS A/MICHIGAN/45/2015 X-275 (H1N1) ANTIGEN (FORMALDEHYDE INACTIVATED), INFLUENZA A VIRUS A/SINGAPORE/INFIMH-16-0019/2016 IVR-186 (H3N2) ANTIGEN (FORMALDEHYDE INACTIVATED), AND INFLUENZA B VIRUS B/MARYLAND/15/2016 BX-69A (A B/COLORADO/6/2017-LIKE VIRUS) ANTIGEN (FORMALDEHYDE INACTIVATED) 0.5 ML: 60; 60; 60 INJECTION, SUSPENSION INTRAMUSCULAR at 14:07

## 2018-11-21 ASSESSMENT — PAIN SCALES - GENERAL: PAINLEVEL: NO PAIN (0)

## 2018-11-21 NOTE — MR AVS SNAPSHOT
After Visit Summary   11/21/2018    Lu Smith    MRN: [id number]           Patient Information     Date Of Birth          1950        Visit Information        Provider Department      11/21/2018 12:40 PM Yasmine Zazueta APRN [facility]        Today's Diagnoses     Endometrial cancer (H)    -  1    Antineoplastic chemotherapy induced anemia        Female stress incontinence          Care Instructions    See Yasmine in three months for surveillance    Urine sample today and pelvic floor physical therapy referral for incontinence    Influenza vaccine today    Calcium/vitamin D supplement    Consider an iron supplement, take in foods high in iron such as leafy greens, iron fortified cereals, or red meat (sparingly)    See your PCP in January for annual exam and mammogram    Work on increasing your exercise, focus on eating a diet with a lot of colors and protein sources    Use your dilator at least three times per week    Port flush every six weeks            Follow-ups after your visit        Additional Services     [facility] PT, HAND, AND CHIROPRACTIC REFERRAL       Physical Therapy, Hand Therapy and Chiropractic Care are available through:  *[facility]  *Hand Therapy (Occupational Therapy or Physical Therapy)  *[facility]    Call one number to schedule at any of the above locations: [phone].    Physical therapy, Hand therapy and/or Chiropractic care has been recommended by your physician as an excellent treatment option to reduce pain and help people return to normal activities, including sports.  Therapy and/or chiropractic care services are a great complement or alternative to expensive and invasive surgery, injections, or long-term use of prescription medications. The primary goal is to identify the underlying problem and provide you the tools to manage your condition on your own.     Please be aware that coverage of  these services is subject to the terms and limitations of your health insurance plan.  Call member services at your health plan with any benefit or coverage questions.      Please bring the following to your appointment:  *Your personal calendar for scheduling future appointments  *Comfortable clothing                  Follow-up notes from your care team     Return in about 3 months (around 2/21/2019), or if symptoms worsen or fail to improve, for surveillance with Yasmine.      Your next 10 appointments already scheduled     Jan 02, 2019  9:30 AM CST   [facility] with  [facility]   [facility] ([facility])    [address] [place] [address]  [address] [place] [id number]   [phone]            Feb 20, 2019 12:00 PM CST   [facility] with  [facility]   [facility] ([facility])    [address] [place] [address]  [address] [place] [id number] [phone]            Feb 20, 2019 12:30 PM CST   (Arrive by 12:15 PM)   Return Visit with ANU Selby CNP   [facility] ([facility])    [address] [place] [address]  [address] [place] [id number] [phone]              Who to contact     If you have questions or need follow up information about today's clinic visit or your schedule please contact [facility] directly at [phone].  Normal or non-critical lab and imaging results will be communicated to you by MyChart, letter or phone within 4 business days after the clinic has received the results. If you do not hear from us within 7 days, please contact the clinic through MyChart or phone. If you have a critical or abnormal lab result, we will notify you by phone as soon as possible.  Submit refill requests through [employer] or call your pharmacy and they will forward the refill request to us. Please allow 3 business days for your refill to be  completed.          Additional Information About Your Visit        [employer] Information     [employer] gives you secure access to your electronic health record. If you see a primary care provider, you can also send messages to your care team and make appointments. If you have questions, please call your primary care clinic.  If you do not have a primary care provider, please call [phone] and they will assist you.        Care EveryWhere ID     This is your Care EveryWhere ID. This could be used by other organizations to access your [place] medical records  [id number]        Your Vitals Were     Pulse Temperature Respirations Pulse Oximetry BMI (Body Mass Index)       88 99.5  F (37.5  C) (Oral) 16 96% 44.86 kg/m2        Blood Pressure from Last 3 Encounters:   11/21/18 136/77   10/24/18 149/81   09/28/18 152/84    Weight from Last 3 Encounters:   11/21/18 122.3 kg (269 lb 9.6 oz)   10/24/18 125 kg (275 lb 9.6 oz)   09/28/18 124.1 kg (273 lb 9.6 oz)              We Performed the Following     UA with Microscopic reflex to Culture        Primary Care Provider Office Phone # Fax #    Levar Scott [phone]       [address]  [place] [facility] [address]        Equal Access to Services     CED COLLINS : Hadii aad ku hadasho Soomaali, waaxda luqadaha, qaybta kaalmada adeegyada, angela enriquez. So [facility] [phone].    ATENCIÓN: Si habla español, tiene a cortes disposición servicios gratuitos de asistencia lingüística. Llame al [phone].    We comply with applicable federal civil rights laws and [place] laws. We do not discriminate on the basis of race, color, national origin, age, disability, sex, sexual orientation, or gender identity.            Thank you!     Thank you for choosing [facility]  for your care. Our goal is always to provide you with excellent care. Hearing back from our patients is one way we can continue to improve  our services. Please take a few minutes to complete the written survey that you may receive in the mail after your visit with us. Thank you!             Your Updated Medication List - Protect others around you: Learn how to safely use, store and throw away your medicines at www.disposemymeds.org.          This list is accurate as of 11/21/18 11:59 PM.  Always use your most recent med list.                   Brand Name Dispense Instructions for use Diagnosis    ACETAMINOPHEN PO      Take 325 mg by mouth every 6 hours as needed for pain        fluticasone 50 MCG/ACT spray    FLONASE    1 Bottle    Spray 1-2 sprays into both nostrils daily    Seasonal allergic rhinitis, unspecified trigger       L-Glutamine 500 MG Caps           loperamide 2 MG capsule    IMODIUM     Take 2 mg by mouth 4 times daily as needed for diarrhea        loratadine 10 MG tablet    CLARITIN    30 tablet    Take 1 tablet (10 mg) by mouth daily    Pain in joint, multiple sites, Chronic seasonal allergic rhinitis, unspecified trigger       LORazepam 0.5 MG tablet    ATIVAN    30 tablet    Take 1 tablet (0.5 mg) by mouth every 6 hours as needed (nausea/vomiting, anxiety or sleep)    Endometrial cancer (H), Encounter for long-term (current) use of medications       METFORMIN HCL PO      Take 500 mg by mouth daily (with breakfast)        prochlorperazine 5 MG tablet    COMPAZINE    30 tablet    Take 1 tablet (5 mg) by mouth every 6 hours as needed (nausea/vomiting)    Endometrial cancer (H), Encounter for long-term (current) use of medications       pyridoxine 50 MG Tabs    VITAMIN B-6    60 tablet    Take 1 tablet (50 mg) by mouth 2 times daily    Chemotherapy-induced peripheral neuropathy (H)       triamterene-hydrochlorothiazide 37.5-25 MG per capsule    DYAZIDE     Take 1 capsule by mouth every morning

## 2018-11-21 NOTE — Clinical Note
In room. Needs a flu shot. Please schedule port flush in six weeks, Yasmine and port flush in three month.

## 2018-11-21 NOTE — PATIENT INSTRUCTIONS
See Yasmine in three months for surveillance    Urine sample today and pelvic floor physical therapy referral for incontinence    Influenza vaccine today    Calcium/vitamin D supplement    Consider an iron supplement, take in foods high in iron such as leafy greens, iron fortified cereals, or red meat (sparingly)    See your PCP in January for annual exam and mammogram    Work on increasing your exercise, focus on eating a diet with a lot of colors and protein sources    Use your dilator at least three times per week    Port flush every six weeks

## 2018-11-21 NOTE — NURSING NOTE
Lu Smith      1.  Has the patient received the information for the influenza vaccine? YES    2.  Does the patient have any of the following contraindications?     Allergy to eggs? No     Allergic reaction to previous influenza vaccines? No     Any other problems to previous influenza vaccines? No     Paralyzed by Guillain-Birdseye syndrome? No     Currently pregnant? NO     Current moderate or severe illness? No     Allergy to contact lens solution? No    3.  The vaccine has been administered in the usual fashion and the patient was instructed to wait 20 minutes before leaving the building in the event of an allergic reaction: YES    Vaccination given by Pamela Brice MA.  Recorded by Pamela Brice

## 2018-11-21 NOTE — PROGRESS NOTES
"GYNECOLOGIC ONCOLOGY SURVIVORSHIP NOTE    RE: Lu Smith  MRN: [id number]  : 1950  Date of Visit: 2018    CC: Lu Smith is a 68 year old year old female with a history of stage IIIC2 serous endometrial cancer. She presents today for a survivorship visit.    HPI: Lu Smith comes to the clinic unaccompanied. She completed treatment with sandwich chemo/radiation on 18. Today she is feeling \"pretty good.\" Fatigue improving, but recovering from a cold. She is recovering from treatment well with the following side effects: neuropathy in the balls of her feet (numbness only), diarrhea from radiation, urinary incontinence- worsening now with sneezing and coughing. She does have an implanted port-a-cath in place and has this flushed every four to six weeks.    Current health habits/health maintenance:  Diet: Focuses on vegetables, some fruit, has a sweet tooth but doesn't keep it around the house. Feels she could improve her protein intake- not much meat but drinks protein shakes at times. Tries to watch carbohydrates, doesn't eat much bread, potatoes, or pasta.  Exercise: Has a treadmill at home but has only used it twice. Working on increasing the length of time she is walking.  Sunscreen: SPF 50 every day  Calcium/vitamin D: Not taking any supplement, drinks a glass of milk at least every other day. Occasional dairy intake daily-some cheese.  Alcohol: 2 drinks when she goes out- bingo once per week and goes out once on the weekend  Smoking: Quit in the 's  Caffeine: Has a cup of coffee or tea daily  Sexually active: No, trying to use dilator once per week  PCP: due in January- [facility] Scott [facility]  Mammogram: Up to date  Colonoscopy: Up to date  DEXA: Up to date  Pap: No recent history of abnormal results, no longer indicated  Ophtho/dentist: Ophtho- yearly, dentist- it has been a year, plans to see again soon  Genetic counseling: Done, panel testing negative    Oncology " History:  Diagnosis: Stage IIIC2 serous endometrial cancer  Primary GYN oncologist: Dr. Kevin Henderson  Primary radiation oncologist: Dr. Ana Michelle  Surgery: 2/23/18: TLH-BSO, omentectomy, bilateral pelvic and para-aortic lymph node dissection, pelvic washings  Chemotherapy:  3/30/18-9/28/18: Six cycles of carboplatin AUC 6 and paclitaxel 175mg/m2, delivered sandwich style with pelvic radiation between cycles three and four  Radiation: 6/4/18-7/12/18: EBRT delivered to pelvis and para-aortic nodes, 5040 cGy in 28 fractions  7/16/18-7/20/18: HDR brachytherapy delivered to the vaginal cuff, 1200 cGy in two fractions  Complications: Transient neuropathy, mild thrombocytopenia and anemia  Genetic testing: Panel testing negative      Health Maintenance:  Cervical cancer screening:   Mammogram: 1/5/18, due 1/2019  Colonoscopy: 6/28/16, due 6/2021  DEXA: 1/5/18, normal bone density    Past Medical History:   Diagnosis Date     Diabetes (H)     Type II     Endometrial cancer determined by uterine biopsy (H) 02/2018     HTN (hypertension)      Obesity      Osteoarthritis        Past Surgical History:   Procedure Laterality Date     CHOLECYSTECTOMY  1993     CYSTOSCOPY N/A 2/23/2018    Procedure: CYSTOSCOPY;;  Surgeon: Kevin Henderson MD;  Location: [facility] OR     DAVINCI HYSTERECTOMY TOTAL, BILATERAL SALPINGO-OOPHORECTOMY, COMBINED N/A 2/23/2018    Procedure: COMBINED DAVINCI HYSTERECTOMY TOTAL, SALPINGO-OOPHORECTOMY;  DaVinci Assisted Total Laparoscopic Hysterectomy, Bilateral Salpingo-Oophorectomy, Cystoscopy,  Omental biopsy, omentectomy,bilateral  Pelvic And Para Aortic Lymph Node Dissection;  Surgeon: Kevin Henderson MD;  Location: [facility] OR     Partial lumbar discectomy  1998       Social History     Social History     Marital status:      Spouse name: N/A     Number of children: 3     Years of education: N/A     Occupational History     conroller      Social History Main Topics     Smoking status:  Former Smoker     Packs/day: 0.25     Years: 20.00     Quit date: 1991     Smokeless tobacco: Never Used      Comment: Quite smoking      Alcohol use Yes      Comment: 4-7/week if out 1-2x's/week     Drug use: No     Sexual activity: Not on file     Other Topics Concern     Not on file     Social History Narrative    Daughter  at age 25 from leukemia.  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       Family History   Problem Relation Age of Onset     Colon Cancer Mother      Breast Cancer Sister      Lymphoma Sister          ROS:  10 point ROS negative other than the symptoms noted above in the HPI.      OBJECTIVE:    /77 (BP Location: Right arm, Patient Position: Sitting, Cuff Size: Adult Large)  Pulse 88  Temp 99.5  F (37.5  C) (Oral)  Resp 16  Wt 122.3 kg (269 lb 9.6 oz)  SpO2 96%  BMI 44.86 kg/m2    Constitutional: Alert and oriented non-toxic appearing female in no acute distress  Psychologic: Pleasant and interactive, euthymic affect, makes appropriate eye contact, judgment intact, linear thought pattern    ASSESSMENT/PLAN:  1) History of stage IIIC2 serous endometrial cancer: Completed treatment and now in surveillance. Discussed surveillance recommendations- to be seen by the nurse practitioner team every 3 months x 2 years (through 2020) then every 6 months x3 years (through 2023) with pelvic/rectal exam. Discussed signs and symptoms of a recurrence, including but not limited to early satiety, bloating, abdominal pain, pelvic pain, vaginal bleeding, changes in urinary and bowel patterns, new masses in groin, abdomen, or neck, new swelling in the legs, unintended weight loss, shortness of breath, night sweats, and/or severe fatigue. Reviewed treatment summary and survivorship care plan, given written copies as well.    2) Implanted port-a-cath: Recommend port remains in place for one year post-treatment. Discussed port maintenance. To have port flushed every 4-6 weeks.  Discussed signs and symptoms of infection.    3) Post-chemotherapy side effects:   Fatigue: Improving, reviewed exercise recommendations and iron supplementation. CBC today to monitor her chemotherapy induced anemia- hgb 8.7 which is an improvement.   Neuropathy: Stable, no functional impact. Denies need for intervention.   Hair loss: Hair growing back, denies concerns       4) Post-surgical side effects:    Denies concerns     5) Post-radiation side effects:   Vaginal stenosis: Discussed importance of using vaginal dilator and/or having intercourse on a regular basis (3-5x weekly) to prevent stenosis and shortening of the vagina.   Diarrhea: Improving. Encouraged adequate intake of fiber and fluids.   Osteoporosis: Reviewed importance of calcium/vitamin D supplementation and following up with PCP for bone density screening given the impact of radiation on bone density.    6) Late effects:   Reviewed late effects including nephrotoxicity, cardiotoxicity, and secondary malignancy.    7) Emotional well being:   Currently doing well, denies need for intervention. Reviewed resources for support.    8) Genetic counseling:   Performed, panel testing negative.    9) Health maintenance:   Reviewed importance of and recommendations for annual wellness exam, breast cancer screening, colon cancer screening, and osteoporosis screening as well as the importance of regular dental and eye exams. Pap smears are no longer indicated in this patient. Influenza vaccine given in clinic.     10) Healthy lifestyle:   Discussed recommendations for healthy weight, diet, exercise, sun protection, calcium/vitamin D supplementation, and alcohol/tobacco/caffeine recommendations.    11) Urinary incontinence:   Becoming more bothersome. UA obtained, negative for infection. Referral for pelvic floor PT.     12) Patient verbalized understanding of and agreement with plan. Questions answered to the best of my ability.    >40 minutes were spent with  patient face to face, greater than 50% of which were spent in counseling, coordination of care, and patient education.    ANU Selby, FNP-C  Gynecologic Oncology  [facility]  Pager: [phone]

## 2018-11-21 NOTE — NURSING NOTE
"Chief Complaint   Patient presents with     Port Draw     labs drawn from port by rn.  vs taken     Port accessed with 20 gauge 3/4\" gripper needle and labs drawn by rn.  Port flushed with NS and heparin then de-accessed.  Pt tolerated well.  VS taken.  Pt checked in for next appt.  Madeleine Rhodes RN      "

## 2019-01-02 PROCEDURE — 25000128 H RX IP 250 OP 636: Performed by: OBSTETRICS & GYNECOLOGY

## 2019-01-02 RX ORDER — HEPARIN SODIUM (PORCINE) LOCK FLUSH IV SOLN 100 UNIT/ML 100 UNIT/ML
5 SOLUTION INTRAVENOUS
Status: DISCONTINUED | OUTPATIENT
Start: 2019-01-02 | End: 2019-01-03 | Stop reason: HOSPADM

## 2019-01-02 RX ADMIN — Medication 5 ML: at 09:22

## 2019-01-02 NOTE — NURSING NOTE
Chief Complaint   Patient presents with     Port Flush     Port accessed and line flushed with heparin then deaccessed     Mariah Brasher RN

## 2019-01-08 ENCOUNTER — THERAPY VISIT (OUTPATIENT)
Dept: PHYSICAL THERAPY | Facility: CLINIC | Age: 69
End: 2019-01-08
Payer: MEDICARE

## 2019-01-08 DIAGNOSIS — M62.89 PFD (PELVIC FLOOR DYSFUNCTION): ICD-10-CM

## 2019-01-08 DIAGNOSIS — N39.46 MIXED INCONTINENCE URGE AND STRESS (MALE)(FEMALE): ICD-10-CM

## 2019-01-08 DIAGNOSIS — R27.9 LACK OF COORDINATION: ICD-10-CM

## 2019-01-08 DIAGNOSIS — N39.3 FEMALE STRESS INCONTINENCE: ICD-10-CM

## 2019-01-08 PROCEDURE — 97530 THERAPEUTIC ACTIVITIES: CPT | Mod: GP | Performed by: PHYSICAL THERAPIST

## 2019-01-08 PROCEDURE — 97110 THERAPEUTIC EXERCISES: CPT | Mod: GP | Performed by: PHYSICAL THERAPIST

## 2019-01-08 PROCEDURE — 97161 PT EVAL LOW COMPLEX 20 MIN: CPT | Mod: GP | Performed by: PHYSICAL THERAPIST

## 2019-01-08 NOTE — LETTER
"DEPARTMENT OF HEALTH AND HUMAN SERVICES  CENTERS FOR MEDICARE & MEDICAID SERVICES    PLAN/UPDATED PLAN OF PROGRESS FOR OUTPATIENT REHABILITATION    PATIENTS NAME:  Lu Smith   : 1950  PROVIDER NUMBER:    [id number]  HICN:[id number]   PROVIDER NAME: [employer] CLARISSA SALDAÑA  MEDICAL RECORD NUMBER: [id number]   START OF CARE DATE:  SOC Date: 19   TYPE:  PT  PRIMARY/TREATMENT DIAGNOSIS: (Pertinent Medical Diagnosis)  Female stress incontinence  PFD (pelvic floor dysfunction)  Lack of coordination  Mixed incontinence urge and stress (male)(female)  VISITS FROM START OF CARE:  Rxs Used: 1     [facility] Initial Evaluation  Subjective:  The history is provided by the patient.   Lu Smith is a 68 year old female with a incontinence and pelvic dysfunction condition.  Condition occurred with:  Other reason (after cancer treatments).  Condition occurred: for unknown reasons.  This is a new condition  Pt is here to discuss her ongoing urinary incontinence since undergoing radiation and chemo therapies for her uterine cancer. Her leaking is slowly improving, but she is still going through several pads per day yet. Her last visit with the oncology dept was on 18 and was referred to PT.  Pt describes leaking urine w/lifting, carrying, urges, sneezing, coughing, and laughing. She wears large pads every day, changing up to 4x/day. On some days she can use the same pad all day and it is barely damp, other days she has to change 3-4 times and the pad is wet every time.    She is voiding about every 2 hrs, but she does note a weak stream. She is fearful of trying to wait out the urges because she will leak more. At night, she gets up 2-3x/night to void. She will sometimes \"push\" (bearing down) to fully empty her bladder.  Her BMs had been loose and watery 2-3 months ago, but that has been improving. She had been having some fecal incontinence, but this past month " it has been greatly improved. Her stools are still soft, and most days she has one BM, but other days up to 4 BMs.   She drinks seltzer water (2 cans) and bottled water (up to 2 per day), as well as cranberry or pomegranate juices. She will occasionally have a cup of shila tea and daily glass of milk..    Patient reports pain:  Lower lumbar spine.    Pain is described as aching and is intermittent and reported as 7/10.  Associated symptoms:  Fatigue. Pain is the same all the time.  Symptoms are exacerbated by coughing, laughing, sneezing and other Relieved by: time to allow tissues to heal.  Since onset symptoms are gradually improving.        General health as reported by patient is fair.  Pertinent medical history includes:  Cancer, incontinence, diabetes, anemia, overweight, osteoarthritis and high blood pressure.  Medical allergies: yes (listed in Epic).  Other surgeries include:  Cancer surgery and other (total hysterectomy for uterine cancer, previously had a partial hysterectomy, gallbladder).  Current medications:  High blood pressure medication and other (diabetes med's).  Current occupation is On disability.    Primary job tasks include:  Driving, lifting, prolonged sitting and repetitive tasks (computer work).  Barriers include:  None as reported by the patient (son has been staying with her).  Red flags: numbness and tingling in feet and finger tips.    Objective:  System  Pelvic Dysfunction Evaluation:    Bladder/Pelvic Problems:    Storage Problem:  Mixed incontinence and frequency  Abdominal Wall:  NA  Pelvic Clock Exam:  Pelvic clock exam: doesn't feel as much pressure on R though.  Ischiocavernosis pain:  -  Bulbocavernosis pain:  -  Transverse Perineal:  -  Levator ANI:  -  Perineal Body:  -  Reflex Testing:  normal  External Assessment:    Skin Condition:  Normal  Bearing Down/Coughing:  Cystourethrocele  Tissue Symmetry:  Normal  Introitus:  Normal  Muscle Contraction/Perineal Mobility:  Slight  lift, no urogential triangle descent  Internal Assessment:  Internal assessment pelvic: less sensation on R.  Contraction/Grade:  Fair squeeze, definite lift (3)  Accessory Muscle use-Gluteals:  Slight  Accessory Muscle use-Adductors:  None  Symmetry of Contraction Response:  L > R  SEMG Biofeedback:    Equipment:  MR-10 unit  Suraface electrode placement--Perianal:  Yes, bilaterally  Baseline EMG PM:  1 uV  Peak pelvic muscle contraction:  22 uV  Sustained contraction:  Avg of 9 uV but unsteady holds, needed cueing to keep the contraction going  EMG interpretation to fatigue:  3-5 seconds  Position:  SupineAdditional History:  Delivery History:  Vaginal delivery  Number of Pregnancies: 3  Number of Live Births: 3  Caffeine Consumption:  1 cup of coffee or tea          Assessment/Plan:    Patient is a 68 year old female with pelvic complaints.    Patient has the following significant findings with corresponding treatment plan.                Diagnosis 1:  PF dysfunction w/mixed incontinence after radiation  Pain -  hot/cold therapy, self management, education and home program  Decreased strength - therapeutic exercise and therapeutic activities  Impaired muscle performance - biofeedback and neuro re-education  Decreased function - therapeutic activities    Therapy Evaluation Codes:   1) History comprised of:   Personal factors that impact the plan of care:      Past/current experiences and Time since onset of symptoms.    Comorbidity factors that impact the plan of care are:      Cancer, Diabetes, High blood pressure, Numbness/tingling, Osteoarthritis, Overweight, Pain at night/rest, Weakness and anemia.     Medications impacting care: High blood pressure and diabetes med.  2) Examination of Body Systems comprised of:   Body structures and functions that impact the plan of care:      Pelvis.   Activity limitations that impact the plan of care are:      Urinary incontinence.  3) Clinical presentation characteristics  "are:   Stable/Uncomplicated.  4) Decision-Making    Low complexity using standardized patient assessment instrument and/or measureable assessment of functional outcome.  Cumulative Therapy Evaluation is: Low complexity.    Previous and current functional limitations:  (See Goal Flow Sheet for this information)    Short term and Long term goals: (See Goal Flow Sheet for this information)     Communication ability:  Patient appears to be able to clearly communicate and understand verbal and written communication and follow directions correctly.  Treatment Explanation - The following has been discussed with the patient:   RX ordered/plan of care  Anticipated outcomes  Possible risks and side effects  This patient would benefit from PT intervention to resume normal activities.   Rehab potential is good.    Frequency:  1 X week, once daily  Duration:  for 4 weeks tapering to 2 X a month over 2 months  Discharge Plan:  Achieve all LTG.  Independent in home treatment program.  Reach maximal therapeutic benefit.    Please refer to the daily flowsheet for treatment today, total treatment time and time spent performing 1:1 timed codes.   Caregiver Signature/Credentials _____________________________________________ Date ________        ERIC Bravo   I have reviewed and certified the need for these services and plan of treatment while under my care.        PHYSICIAN'S SIGNATURE:   ______________________________________________  Date___________     Yasmine Zazueta MD    Certification period:  Beginning of Cert date period: 01/08/19 to  End of Cert period date: 04/07/19   Functional Level Progress Report: Please see attached \"Goal Flow sheet for Functional level.\"  ____X____ Continue Services or       ________ DC Services              Service dates: From  SOC Date: 01/08/19 date to present                         "

## 2019-01-08 NOTE — PROGRESS NOTES
"[facility] Initial Evaluation  Subjective:  The history is provided by the patient.   Lu Smith is a 68 year old female with a incontinence and pelvic dysfunction condition.  Condition occurred with:  Other reason (after cancer treatments).  Condition occurred: for unknown reasons.  This is a new condition  Pt is here to discuss her ongoing urinary incontinence since undergoing radiation and chemo therapies for her uterine cancer. Her leaking is slowly improving, but she is still going through several pads per day yet. Her last visit with the oncology dept was on 11/21/18 and was referred to PT.  Pt describes leaking urine w/lifting, carrying, urges, sneezing, coughing, and laughing. She wears large pads every day, changing up to 4x/day. On some days she can use the same pad all day and it is barely damp, other days she has to change 3-4 times and the pad is wet every time.    She is voiding about every 2 hrs, but she does note a weak stream. She is fearful of trying to wait out the urges because she will leak more. At night, she gets up 2-3x/night to void. She will sometimes \"push\" (bearing down) to fully empty her bladder.  Her BMs had been loose and watery 2-3 months ago, but that has been improving. She had been having some fecal incontinence, but this past month it has been greatly improved. Her stools are still soft, and most days she has one BM, but other days up to 4 BMs.   She drinks seltzer water (2 cans) and bottled water (up to 2 per day), as well as cranberry or pomegranate juices. She will occasionally have a cup of shila tea and daily glass of milk..    Patient reports pain:  Lower lumbar spine.    Pain is described as aching and is intermittent and reported as 7/10.  Associated symptoms:  Fatigue. Pain is the same all the time.  Symptoms are exacerbated by coughing, laughing, sneezing and other Relieved by: time to allow tissues to heal.  Since onset symptoms are gradually " improving.        General health as reported by patient is fair.  Pertinent medical history includes:  Cancer, incontinence, diabetes, anemia, overweight, osteoarthritis and high blood pressure.  Medical allergies: yes (listed in Epic).  Other surgeries include:  Cancer surgery and other (total hysterectomy for uterine cancer, previously had a partial hysterectomy, gallbladder).  Current medications:  High blood pressure medication and other (diabetes med's).  Current occupation is On disability.    Primary job tasks include:  Driving, lifting, prolonged sitting and repetitive tasks (computer work).    Barriers include:  None as reported by the patient (son has been staying with her).    Red flags: numbness and tingling in feet and finger tips.                        Objective:  System                                 Pelvic Dysfunction Evaluation:    Bladder/Pelvic Problems:    Storage Problem:  Mixed incontinence and frequency            Abdominal Wall:  NA        Pelvic Clock Exam:  Pelvic clock exam: doesn't feel as much pressure on R though.  Ischiocavernosis pain:  -  Bulbocavernosis pain:  -  Transverse Perineal:  -  Levator ANI:  -  Perineal Body:  -    Reflex Testing:  normal    External Assessment:    Skin Condition:  Normal    Bearing Down/Coughing:  Cystourethrocele  Tissue Symmetry:  Normal  Introitus:  Normal  Muscle Contraction/Perineal Mobility:  Slight lift, no urogential triangle descent  Internal Assessment:  Internal assessment pelvic: less sensation on R.    Contraction/Grade:  Fair squeeze, definite lift (3)    Accessory Muscle use-Gluteals:  Slight  Accessory Muscle use-Adductors:  None  Symmetry of Contraction Response:  L > R  SEMG Biofeedback:    Equipment:  MR-10 unit    Suraface electrode placement--Perianal:  Yes, bilaterally  Baseline EMG PM:  1 uV    Peak pelvic muscle contraction:  22 uV  Sustained contraction:  Avg of 9 uV but unsteady holds, needed cueing to keep the contraction  going  EMG interpretation to fatigue:  3-5 seconds  Position:  SupineAdditional History:  Delivery History:  Vaginal delivery  Number of Pregnancies: 3  Number of Live Births: 3  Caffeine Consumption:  1 cup of coffee or tea                     General     ROS    Assessment/Plan:    Patient is a 68 year old female with pelvic complaints.    Patient has the following significant findings with corresponding treatment plan.                Diagnosis 1:  PF dysfunction w/mixed incontinence after radiation  Pain -  hot/cold therapy, self management, education and home program  Decreased strength - therapeutic exercise and therapeutic activities  Impaired muscle performance - biofeedback and neuro re-education  Decreased function - therapeutic activities    Therapy Evaluation Codes:   1) History comprised of:   Personal factors that impact the plan of care:      Past/current experiences and Time since onset of symptoms.    Comorbidity factors that impact the plan of care are:      Cancer, Diabetes, High blood pressure, Numbness/tingling, Osteoarthritis, Overweight, Pain at night/rest, Weakness and anemia.     Medications impacting care: High blood pressure and diabetes med.  2) Examination of Body Systems comprised of:   Body structures and functions that impact the plan of care:      Pelvis.   Activity limitations that impact the plan of care are:      Urinary incontinence.  3) Clinical presentation characteristics are:   Stable/Uncomplicated.  4) Decision-Making    Low complexity using standardized patient assessment instrument and/or measureable assessment of functional outcome.  Cumulative Therapy Evaluation is: Low complexity.    Previous and current functional limitations:  (See Goal Flow Sheet for this information)    Short term and Long term goals: (See Goal Flow Sheet for this information)     Communication ability:  Patient appears to be able to clearly communicate and understand verbal and written communication  and follow directions correctly.  Treatment Explanation - The following has been discussed with the patient:   RX ordered/plan of care  Anticipated outcomes  Possible risks and side effects  This patient would benefit from PT intervention to resume normal activities.   Rehab potential is good.    Frequency:  1 X week, once daily  Duration:  for 4 weeks tapering to 2 X a month over 2 months  Discharge Plan:  Achieve all LTG.  Independent in home treatment program.  Reach maximal therapeutic benefit.    Please refer to the daily flowsheet for treatment today, total treatment time and time spent performing 1:1 timed codes.

## 2019-01-25 ENCOUNTER — THERAPY VISIT (OUTPATIENT)
Dept: PHYSICAL THERAPY | Facility: CLINIC | Age: 69
End: 2019-01-25
Payer: MEDICARE

## 2019-01-25 DIAGNOSIS — M62.89 PFD (PELVIC FLOOR DYSFUNCTION): ICD-10-CM

## 2019-01-25 DIAGNOSIS — R27.9 LACK OF COORDINATION: ICD-10-CM

## 2019-01-25 DIAGNOSIS — N39.46 MIXED INCONTINENCE URGE AND STRESS (MALE)(FEMALE): ICD-10-CM

## 2019-01-25 PROCEDURE — 97110 THERAPEUTIC EXERCISES: CPT | Mod: GP | Performed by: PHYSICAL THERAPIST

## 2019-01-25 PROCEDURE — 97530 THERAPEUTIC ACTIVITIES: CPT | Mod: GP | Performed by: PHYSICAL THERAPIST

## 2019-02-01 ENCOUNTER — THERAPY VISIT (OUTPATIENT)
Dept: PHYSICAL THERAPY | Facility: CLINIC | Age: 69
End: 2019-02-01
Payer: MEDICARE

## 2019-02-01 DIAGNOSIS — R27.9 LACK OF COORDINATION: ICD-10-CM

## 2019-02-01 DIAGNOSIS — N39.46 MIXED INCONTINENCE URGE AND STRESS (MALE)(FEMALE): ICD-10-CM

## 2019-02-01 DIAGNOSIS — M62.89 PFD (PELVIC FLOOR DYSFUNCTION): ICD-10-CM

## 2019-02-01 PROCEDURE — 97530 THERAPEUTIC ACTIVITIES: CPT | Mod: GP | Performed by: PHYSICAL THERAPIST

## 2019-02-01 PROCEDURE — 97112 NEUROMUSCULAR REEDUCATION: CPT | Mod: GP | Performed by: PHYSICAL THERAPIST

## 2019-02-01 PROCEDURE — 97110 THERAPEUTIC EXERCISES: CPT | Mod: GP | Performed by: PHYSICAL THERAPIST

## 2019-02-07 ASSESSMENT — ENCOUNTER SYMPTOMS
BRUISES/BLEEDS EASILY: 1
VOMITING: 0
BOWEL INCONTINENCE: 1
RECTAL PAIN: 0
INCREASED ENERGY: 0
WEIGHT LOSS: 0
BLOATING: 0
CONSTIPATION: 0
ABDOMINAL PAIN: 0
POLYPHAGIA: 0
ALTERED TEMPERATURE REGULATION: 1
JAUNDICE: 0
CHILLS: 0
NIGHT SWEATS: 0
HEARTBURN: 1
POLYDIPSIA: 0
WEIGHT GAIN: 0
FLANK PAIN: 0
DIARRHEA: 1
NAUSEA: 0
HEMATURIA: 0
DIFFICULTY URINATING: 0
DECREASED APPETITE: 0
HALLUCINATIONS: 0
FEVER: 0
FATIGUE: 0
DYSURIA: 0
SWOLLEN GLANDS: 0
BLOOD IN STOOL: 0

## 2019-02-08 ENCOUNTER — THERAPY VISIT (OUTPATIENT)
Dept: PHYSICAL THERAPY | Facility: CLINIC | Age: 69
End: 2019-02-08
Payer: MEDICARE

## 2019-02-08 DIAGNOSIS — M62.89 PFD (PELVIC FLOOR DYSFUNCTION): ICD-10-CM

## 2019-02-08 DIAGNOSIS — N39.46 MIXED INCONTINENCE URGE AND STRESS (MALE)(FEMALE): ICD-10-CM

## 2019-02-08 DIAGNOSIS — R27.9 LACK OF COORDINATION: ICD-10-CM

## 2019-02-08 PROCEDURE — 97112 NEUROMUSCULAR REEDUCATION: CPT | Mod: GP | Performed by: PHYSICAL THERAPIST

## 2019-02-08 PROCEDURE — 97110 THERAPEUTIC EXERCISES: CPT | Mod: GP | Performed by: PHYSICAL THERAPIST

## 2019-02-18 NOTE — PROGRESS NOTES
Gynecologic Oncology Follow-Up Visit    RE: Lu Smith  MRN: [id number]  : 1950  Date of Visit: 2019    CC: Lu Smith  is a 68 year old female with a history of stage IIIC2 serous endometrial cancer. She completed treatment with sandwich chemotherapy/radiation on 18. She presents today for a three month surveillance visit.    HPI: Lu comes to the clinic feeling well without gynecologic concerns. Uses her dilator once per week without incident. No vaginal bleeding or pelvic pain. Doing pelvic PT with improvement in chronic fecal and urinary incontinence. Her neuropathy has greatly improved. Chronic issues with heartburn, feels this has improved since completing chemotherapy. She feels her energy level continues to improve as well, going to to [facility] again and seeing her friends. Recently went to [place] as well. Has her port flushed every six weeks. Up to date on colonoscopy and DEXA. She is due for mammogram and annual exam.    Oncology History:  Diagnosis: Stage IIIC2 serous endometrial cancer  Primary GYN oncologist: Dr. Kevin Henderson  Primary radiation oncologist: Dr. Ana Michelle  Surgery: 18: TLH-BSO, omentectomy, bilateral pelvic and para-aortic lymph node dissection, pelvic washings  Chemotherapy:  3/30/18-18: Six cycles of carboplatin AUC 6 and paclitaxel 175mg/m2, delivered sandwich style with pelvic radiation between cycles three and four  Radiation: 18-18: EBRT delivered to pelvis and para-aortic nodes, 5040 cGy in 28 fractions  18-18: HDR brachytherapy delivered to the vaginal cuff, 1200 cGy in two fractions  Complications: Transient neuropathy, mild thrombocytopenia and anemia  Genetic testing: Panel testing negative    Past Medical History:   Diagnosis Date     Diabetes (H)     Type II     Endometrial cancer determined by uterine biopsy (H) 2018     HTN (hypertension)      Obesity      Osteoarthritis        Past Surgical History:    Procedure Laterality Date     CHOLECYSTECTOMY       CYSTOSCOPY N/A 2018    Procedure: CYSTOSCOPY;;  Surgeon: Kevin Henderson MD;  Location: [facility] OR     DAVINCI HYSTERECTOMY TOTAL, BILATERAL SALPINGO-OOPHORECTOMY, COMBINED N/A 2018    Procedure: COMBINED DAVINCI HYSTERECTOMY TOTAL, SALPINGO-OOPHORECTOMY;  DaVinci Assisted Total Laparoscopic Hysterectomy, Bilateral Salpingo-Oophorectomy, Cystoscopy,  Omental biopsy, omentectomy,bilateral  Pelvic And Para Aortic Lymph Node Dissection;  Surgeon: Kevin Henderson MD;  Location: [facility] OR     Partial lumbar discectomy         Social History     Socioeconomic History     Marital status:      Spouse name: Not on file     Number of children: 3     Years of education: Not on file     Highest education level: Not on file   Social Needs     Financial resource strain: Not on file     Food insecurity - worry: Not on file     Food insecurity - inability: Not on file     Transportation needs - medical: Not on file     Transportation needs - non-medical: Not on file   Occupational History     Occupation: conroller   Tobacco Use     Smoking status: Former Smoker     Packs/day: 0.25     Years: 20.00     Pack years: 5.00     Last attempt to quit: 1991     Years since quittin.8     Smokeless tobacco: Never Used     Tobacco comment: Quite smoking    Substance and Sexual Activity     Alcohol use: Yes     Comment: 4-7/week if out 1-2x's/week     Drug use: No     Sexual activity: Not on file   Other Topics Concern     Not on file   Social History Narrative    Daughter  at age 25 from leukemia.  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       Family History   Problem Relation Age of Onset     Colon Cancer Mother      Breast Cancer Sister      Lymphoma Sister        Current Outpatient Medications   Medication     ACETAMINOPHEN PO     fluticasone (FLONASE) 50 MCG/ACT spray     L-Glutamine 500 MG CAPS     loperamide (IMODIUM) 2 MG capsule      loratadine (CLARITIN) 10 MG tablet     LORazepam (ATIVAN) 0.5 MG tablet     METFORMIN HCL PO     prochlorperazine (COMPAZINE) 5 MG tablet     pyridoxine (VITAMIN B-6) 50 MG TABS     triamterene-hydrochlorothiazide (DYAZIDE) 37.5-25 MG per capsule     No current facility-administered medications for this visit.           Allergies   Allergen Reactions     Ace Inhibitors Cough     Amoxicillin Hives       Review of Systems  Answers for HPI/ROS submitted by the patient on 2/7/2019   General Symptoms: Yes  Skin Symptoms: No  HENT Symptoms: No  EYE SYMPTOMS: No  HEART SYMPTOMS: No  LUNG SYMPTOMS: No  INTESTINAL SYMPTOMS: Yes  URINARY SYMPTOMS: Yes  GYNECOLOGIC SYMPTOMS: No  BREAST SYMPTOMS: No  SKELETAL SYMPTOMS: No  BLOOD SYMPTOMS: Yes  NERVOUS SYSTEM SYMPTOMS: No  MENTAL HEALTH SYMPTOMS: No  Fever: No  Loss of appetite: No  Weight loss: No  Weight gain: No  Fatigue: No  Night sweats: No  Chills: No  Increased stress: No  Excessive hunger: No  Excessive thirst: No  Feeling hot or cold when others believe the temperature is normal: Yes  Loss of height: No  Post-operative complications: No  Surgical site pain: No  Hallucinations: No  Change in or Loss of Energy: No  Hyperactivity: No  Confusion: No  Heart burn or indigestion: Yes  Nausea: No  Vomiting: No  Abdominal pain: No  Bloating: No  Constipation: No  Diarrhea: Yes  Blood in stool: No  Black stools: No  Rectal or Anal pain: No  Fecal incontinence: Yes  Yellowing of skin or eyes: No  Vomit with blood: No  Change in stools: No  Trouble holding urine or incontinence: Yes  Pain or burning: No  Trouble starting or stopping: No  Increased frequency of urination: No  Blood in urine: No  Decreased frequency of urination: No  Frequent nighttime urination: Yes  Flank pain: No  Difficulty emptying bladder: No  Anemia: No  Swollen glands: No  Easy bleeding or bruising: Yes  Edema or swelling: No      I have reviewed the patient's ROS and discussed all pertinent information  "as noted in the HPI.        OBJECTIVE:    Physical Exam:    /78 (BP Location: Right arm, Patient Position: Sitting, Cuff Size: Adult Large)   Pulse 83   Temp 97.8  F (36.6  C) (Oral)   Resp 16   Ht 1.651 m (5' 5\")   Wt 124.4 kg (274 lb 4.8 oz)   SpO2 94%   BMI 45.65 kg/m      CONSTITUTIONAL: Alert non-toxic appearing female in no acute distress  HEAD: Normocephalic, atraumatic  NECK: Neck supple without lymphadenopathy  RESPIRATORY: Lungs clear to auscultation, no increased work of breathing noted  CV: Regular rate and rhythm, S1S2, no clicks, murmurs, rubs, or gallops; bilateral lower extremities without edema, dorsalis pedis pulses 2+ bilaterally  GASTROINTESTINAL: Normoactive bowel sounds x4 quadrants, abdomen obese, soft, non-distended, and non-tender to palpation without masses or organomegaly  GENITOURINARY: External genitalia and urethral meatus pink without lesions, masses, or excoriation. Vagina pink and smooth without masses or lesions- radiation changes noted, a few superficial blood vessels noted to the right vaginal wall which bled a scant amount with speculum use but no bleeding masses or lesions. Vagina slightly foreshortened. Vaginal cuff without nodularity, masses, or lesions. Cervix surgically absent. Bimanual exam reveals no masses or fullness. Rectovaginal exam confirms these findings.  LYMPHATIC: Cervical, supraclavicular, and inguinal nodes without lymphadenopathy  MUSCULOSKELETAL: Moves all extremities, no obvious muscle wasting  NEUROLOGIC: No gross deficits, normal gait  SKIN: Appropriate color for race, warm and dry, no rashes or lesions to unclothed skin  PSYCHIATRIC: Pleasant and interactive, affect bright, makes appropriate eye contact, thought process linear    Assessment/Plan:  1) Stage IIIC2 endometrial cancer: No signs of recurrence. Continue surveillance every three months x2 years (through 9/2020) followed by every six months x3 years (through 9/2023) then annually " thereafter with pelvic/rectal exam. Continue port flushes q6wks. Recommend she increase dilator use to 2-3x weekly given her vaginal foreshortening. Reviewed signs and symptoms  of recurrence including but not limited to bleeding from vagina, bladder, or rectum, bloating, early satiety, swelling in the lower extremities, abdominal or lower back pain, changes in bowel or bladder patterns, shortness of breath, increased fatigue, unexplained weight loss, and night sweats. Reviewed recommendations from [facility] not to perform surveillance pap smears in women diagnosed with endometrial cancer as this does not improve detection of local recurrence. Reviewed signs and symptoms for when she should contact the clinic or seek additional care. Patient to contact the clinic with any questions or concerns in the interim.  2) Genetic testing: Panel testing negative  3) Health maintenance issues discussed today include to follow up with PCP for co-morbid conditions and non-gynecologic issues. She states she will schedule her mammogram and annual exam.  4) Patient verbalized understanding of and agreement with plan.    A total of 20 minutes of face to face time spent with patient, over 50% of which was spent in counseling and coordination of care.    ANU Selby, FNP-C  Division of Gynecologic Oncology  [facility]  Pager: [phone]

## 2019-02-20 ENCOUNTER — APPOINTMENT (OUTPATIENT)
Dept: LAB | Facility: CLINIC | Age: 69
End: 2019-02-20
Attending: OBSTETRICS & GYNECOLOGY
Payer: MEDICARE

## 2019-02-20 ENCOUNTER — ONCOLOGY VISIT (OUTPATIENT)
Dept: ONCOLOGY | Facility: CLINIC | Age: 69
End: 2019-02-20
Attending: NURSE PRACTITIONER
Payer: MEDICARE

## 2019-02-20 VITALS
SYSTOLIC BLOOD PRESSURE: 145 MMHG | RESPIRATION RATE: 16 BRPM | DIASTOLIC BLOOD PRESSURE: 78 MMHG | OXYGEN SATURATION: 94 % | BODY MASS INDEX: 45.7 KG/M2 | WEIGHT: 274.3 LBS | TEMPERATURE: 97.8 F | HEART RATE: 83 BPM | HEIGHT: 65 IN

## 2019-02-20 DIAGNOSIS — Z85.42 ENCOUNTER FOR FOLLOW-UP SURVEILLANCE OF ENDOMETRIAL CANCER: Primary | ICD-10-CM

## 2019-02-20 DIAGNOSIS — Z08 ENCOUNTER FOR FOLLOW-UP SURVEILLANCE OF ENDOMETRIAL CANCER: Primary | ICD-10-CM

## 2019-02-20 PROCEDURE — G0463 HOSPITAL OUTPT CLINIC VISIT: HCPCS | Mod: ZF

## 2019-02-20 PROCEDURE — 99213 OFFICE O/P EST LOW 20 MIN: CPT | Mod: ZP | Performed by: NURSE PRACTITIONER

## 2019-02-20 PROCEDURE — 36591 DRAW BLOOD OFF VENOUS DEVICE: CPT

## 2019-02-20 PROCEDURE — 25000128 H RX IP 250 OP 636: Mod: ZF | Performed by: NURSE PRACTITIONER

## 2019-02-20 RX ORDER — HEPARIN SODIUM (PORCINE) LOCK FLUSH IV SOLN 100 UNIT/ML 100 UNIT/ML
5 SOLUTION INTRAVENOUS ONCE
Status: COMPLETED | OUTPATIENT
Start: 2019-02-20 | End: 2019-02-20

## 2019-02-20 RX ORDER — HEPARIN SODIUM (PORCINE) LOCK FLUSH IV SOLN 100 UNIT/ML 100 UNIT/ML
5 SOLUTION INTRAVENOUS ONCE
Status: DISCONTINUED | OUTPATIENT
Start: 2019-02-20 | End: 2019-02-20 | Stop reason: CLARIF

## 2019-02-20 RX ADMIN — HEPARIN 5 ML: 100 SYRINGE at 11:41

## 2019-02-20 ASSESSMENT — MIFFLIN-ST. JEOR: SCORE: 1785.1

## 2019-02-20 ASSESSMENT — PAIN SCALES - GENERAL: PAINLEVEL: NO PAIN (0)

## 2019-02-20 NOTE — NURSING NOTE
"Chief Complaint   Patient presents with     Oncology Clinic Visit     Return Uterine Ca     Port Draw     port accessed and flushed by rn in lab.  vs taken.     Port accessed with 20g 3/4\" gripper needle, no labs ordered but memo red, green, purple JIC tubes by RN in lab.  Port flushed with saline and heparin.  Port de-accessed.    Karishma Humphrey RN    "

## 2019-02-20 NOTE — NURSING NOTE
"Oncology Rooming Note    February 20, 2019 12:00 PM   Lu Smith is a 68 year old female who presents for:    Chief Complaint   Patient presents with     Oncology Clinic Visit     Return Uterine Ca     Port Draw     port accessed and flushed by rn in lab.  vs taken.     Initial Vitals: /78 (BP Location: Right arm, Patient Position: Sitting, Cuff Size: Adult Large)   Pulse 83   Temp 97.8  F (36.6  C) (Oral)   Resp 16   Ht 1.651 m (5' 5\")   Wt 124.4 kg (274 lb 4.8 oz)   SpO2 94%   BMI 45.65 kg/m   Estimated body mass index is 45.65 kg/m  as calculated from the following:    Height as of this encounter: 1.651 m (5' 5\").    Weight as of this encounter: 124.4 kg (274 lb 4.8 oz). Body surface area is 2.39 meters squared.  No Pain (0) Comment: Data Unavailable   No LMP recorded. Patient has had a hysterectomy.  Allergies reviewed: Yes  Medications reviewed: Yes    Medications: Medication refills not needed today.  Pharmacy name entered into [facility]: [facility] - [place], [place] - [address]    Clinical concerns: No new concerns.         Martha Carroll CMA              "

## 2019-02-20 NOTE — LETTER
2019       RE: Lu Smith  [address] [address]     Dear Colleague,    Thank you for referring your patient, Lu Smith, to the [facility]. Please see a copy of my visit note below.    Gynecologic Oncology Follow-Up Visit    RE: Lu Smith  MRN: [id number]  : 1950  Date of Visit: 2019    CC: Lu Smith  is a 68 year old female with a history of stage IIIC2 serous endometrial cancer. She completed treatment with sandwich chemotherapy/radiation on 18. She presents today for a three month surveillance visit.    HPI: Lu comes to the clinic feeling well without gynecologic concerns. Uses her dilator once per week without incident. No vaginal bleeding or pelvic pain. Doing pelvic PT with improvement in chronic fecal and urinary incontinence. Her neuropathy has greatly improved. Chronic issues with heartburn, feels this has improved since completing chemotherapy. She feels her energy level continues to improve as well, going to to [facility] again and seeing her friends. Recently went to [place] as well. Has her port flushed every six weeks. Up to date on colonoscopy and DEXA. She is due for mammogram and annual exam.    Oncology History:  Diagnosis: Stage IIIC2 serous endometrial cancer  Primary GYN oncologist: Dr. Kevin Henderson  Primary radiation oncologist: Dr. Ana Michelle  Surgery: 18: TLH-BSO, omentectomy, bilateral pelvic and para-aortic lymph node dissection, pelvic washings  Chemotherapy:  3/30/18-18: Six cycles of carboplatin AUC 6 and paclitaxel 175mg/m2, delivered sandwich style with pelvic radiation between cycles three and four  Radiation: 18-18: EBRT delivered to pelvis and para-aortic nodes, 5040 cGy in 28 fractions  18-18: HDR brachytherapy delivered to the vaginal cuff, 1200 cGy in two fractions  Complications: Transient neuropathy, mild thrombocytopenia and anemia  Genetic testing: Panel testing  negative    Past Medical History:   Diagnosis Date     Diabetes (H)     Type II     Endometrial cancer determined by uterine biopsy (H) 2018     HTN (hypertension)      Obesity      Osteoarthritis        Past Surgical History:   Procedure Laterality Date     CHOLECYSTECTOMY       CYSTOSCOPY N/A 2018    Procedure: CYSTOSCOPY;;  Surgeon: Kevin Henderson MD;  Location: [facility] OR     DAVINCI HYSTERECTOMY TOTAL, BILATERAL SALPINGO-OOPHORECTOMY, COMBINED N/A 2018    Procedure: COMBINED DAVINCI HYSTERECTOMY TOTAL, SALPINGO-OOPHORECTOMY;  DaVinci Assisted Total Laparoscopic Hysterectomy, Bilateral Salpingo-Oophorectomy, Cystoscopy,  Omental biopsy, omentectomy,bilateral  Pelvic And Para Aortic Lymph Node Dissection;  Surgeon: Kevin Henderson MD;  Location: [facility] OR     Partial lumbar discectomy         Social History     Socioeconomic History     Marital status:      Spouse name: Not on file     Number of children: 3     Years of education: Not on file     Highest education level: Not on file   Social Needs     Financial resource strain: Not on file     Food insecurity - worry: Not on file     Food insecurity - inability: Not on file     Transportation needs - medical: Not on file     Transportation needs - non-medical: Not on file   Occupational History     Occupation: conroller   Tobacco Use     Smoking status: Former Smoker     Packs/day: 0.25     Years: 20.00     Pack years: 5.00     Last attempt to quit: 1991     Years since quittin.8     Smokeless tobacco: Never Used     Tobacco comment: Quite smoking    Substance and Sexual Activity     Alcohol use: Yes     Comment: 4-7/week if out 1-2x's/week     Drug use: No     Sexual activity: Not on file   Other Topics Concern     Not on file   Social History Narrative    Daughter  at age 25 from leukemia.  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       Family History   Problem Relation Age of Onset     Colon Cancer  "Mother      Breast Cancer Sister      Lymphoma Sister        Current Outpatient Medications   Medication     ACETAMINOPHEN PO     fluticasone (FLONASE) 50 MCG/ACT spray     L-Glutamine 500 MG CAPS     loperamide (IMODIUM) 2 MG capsule     loratadine (CLARITIN) 10 MG tablet     LORazepam (ATIVAN) 0.5 MG tablet     METFORMIN HCL PO     prochlorperazine (COMPAZINE) 5 MG tablet     pyridoxine (VITAMIN B-6) 50 MG TABS     triamterene-hydrochlorothiazide (DYAZIDE) 37.5-25 MG per capsule     No current facility-administered medications for this visit.           Allergies   Allergen Reactions     Ace Inhibitors Cough     Amoxicillin Hives     I have reviewed the patient's ROS and discussed all pertinent information as noted in the HPI.    OBJECTIVE:    Physical Exam:    /78 (BP Location: Right arm, Patient Position: Sitting, Cuff Size: Adult Large)   Pulse 83   Temp 97.8  F (36.6  C) (Oral)   Resp 16   Ht 1.651 m (5' 5\")   Wt 124.4 kg (274 lb 4.8 oz)   SpO2 94%   BMI 45.65 kg/m       CONSTITUTIONAL: Alert non-toxic appearing female in no acute distress  HEAD: Normocephalic, atraumatic  NECK: Neck supple without lymphadenopathy  RESPIRATORY: Lungs clear to auscultation, no increased work of breathing noted  CV: Regular rate and rhythm, S1S2, no clicks, murmurs, rubs, or gallops; bilateral lower extremities without edema, dorsalis pedis pulses 2+ bilaterally  GASTROINTESTINAL: Normoactive bowel sounds x4 quadrants, abdomen obese, soft, non-distended, and non-tender to palpation without masses or organomegaly  GENITOURINARY: External genitalia and urethral meatus pink without lesions, masses, or excoriation. Vagina pink and smooth without masses or lesions- radiation changes noted, a few superficial blood vessels noted to the right vaginal wall which bled a scant amount with speculum use but no bleeding masses or lesions. Vagina slightly foreshortened. Vaginal cuff without nodularity, masses, or lesions. Cervix " surgically absent. Bimanual exam reveals no masses or fullness. Rectovaginal exam confirms these findings.  LYMPHATIC: Cervical, supraclavicular, and inguinal nodes without lymphadenopathy  MUSCULOSKELETAL: Moves all extremities, no obvious muscle wasting  NEUROLOGIC: No gross deficits, normal gait  SKIN: Appropriate color for race, warm and dry, no rashes or lesions to unclothed skin  PSYCHIATRIC: Pleasant and interactive, affect bright, makes appropriate eye contact, thought process linear    Assessment/Plan:  1) Stage IIIC2 endometrial cancer: No signs of recurrence. Continue surveillance every three months x2 years (through 9/2020) followed by every six months x3 years (through 9/2023) then annually thereafter with pelvic/rectal exam. Continue port flushes q6wks. Recommend she increase dilator use to 2-3x weekly given her vaginal foreshortening. Reviewed signs and symptoms  of recurrence including but not limited to bleeding from vagina, bladder, or rectum, bloating, early satiety, swelling in the lower extremities, abdominal or lower back pain, changes in bowel or bladder patterns, shortness of breath, increased fatigue, unexplained weight loss, and night sweats. Reviewed recommendations from [facility] not to perform surveillance pap smears in women diagnosed with endometrial cancer as this does not improve detection of local recurrence. Reviewed signs and symptoms for when she should contact the clinic or seek additional care. Patient to contact the clinic with any questions or concerns in the interim.  2) Genetic testing: Panel testing negative  3) Health maintenance issues discussed today include to follow up with PCP for co-morbid conditions and non-gynecologic issues. She states she will schedule her mammogram and annual exam.  4) Patient verbalized understanding of and agreement with plan.    A total of 20 minutes of face to face time spent with patient, over 50% of which was spent in counseling and  coordination of care.    ANU Selby, FNP-C  Division of Gynecologic Oncology  [facility]  Pager: [phone]

## 2019-03-01 ENCOUNTER — THERAPY VISIT (OUTPATIENT)
Dept: PHYSICAL THERAPY | Facility: CLINIC | Age: 69
End: 2019-03-01
Payer: MEDICARE

## 2019-03-01 DIAGNOSIS — N39.46 MIXED INCONTINENCE URGE AND STRESS (MALE)(FEMALE): ICD-10-CM

## 2019-03-01 DIAGNOSIS — M62.89 PFD (PELVIC FLOOR DYSFUNCTION): ICD-10-CM

## 2019-03-01 DIAGNOSIS — R27.9 LACK OF COORDINATION: ICD-10-CM

## 2019-03-01 PROCEDURE — 97110 THERAPEUTIC EXERCISES: CPT | Mod: GP | Performed by: PHYSICAL THERAPIST

## 2019-03-01 PROCEDURE — 97112 NEUROMUSCULAR REEDUCATION: CPT | Mod: GP | Performed by: PHYSICAL THERAPIST

## 2019-03-01 PROCEDURE — 97530 THERAPEUTIC ACTIVITIES: CPT | Mod: GP | Performed by: PHYSICAL THERAPIST

## 2019-03-01 NOTE — PROGRESS NOTES
Subjective:  HPI                    Objective:  System    Physical Exam    General     ROS    Assessment/Plan:    DISCHARGE REPORT    Progress reporting period is from 1/8/19 to 3/1/19.       SUBJECTIVE  Subjective changes noted by patient:  Pt states that she hardly leaks anymore, feels that she has improved greatly since starting this.    Current pain level is  0/10.     Previous pain level was  0/10  .   Changes in function:  Yes (See Goal flowsheet attached for changes in current functional level)  Adverse reaction to treatment or activity: None    OBJECTIVE  Changes noted in objective findings:    Objective: PF strength is at functional levels, avg'ing 10 uV on phasic and 8 uV on long holds w/o substitutions or holding her breath.     ASSESSMENT/PLAN  Updated problem list and treatment plan: Diagnosis 1:  PF dysfunction and urinary incontinence    STG/LTGs have been met or progress has been made towards goals:  Yes (See Goal flow sheet completed today.)  Assessment of Progress: The patient has met all of their long term goals.  Self Management Plans:  Patient has been instructed in a home treatment program.  Patient is independent in a home treatment program.  Patient  has been instructed in self management of symptoms.  Patient is independent in self management of symptoms.    Lu continues to require the following intervention to meet STG and LTG's:  PT intervention is no longer required to meet STG/LTG.    Recommendations:  This patient is ready to be discharged from therapy and continue their home treatment program.    Please refer to the daily flowsheet for treatment today, total treatment time and time spent performing 1:1 timed codes.

## 2019-04-02 PROCEDURE — 25000128 H RX IP 250 OP 636: Performed by: NURSE PRACTITIONER

## 2019-04-02 PROCEDURE — 96523 IRRIG DRUG DELIVERY DEVICE: CPT

## 2019-04-02 RX ORDER — HEPARIN SODIUM (PORCINE) LOCK FLUSH IV SOLN 100 UNIT/ML 100 UNIT/ML
5 SOLUTION INTRAVENOUS
Status: COMPLETED | OUTPATIENT
Start: 2019-04-02 | End: 2019-04-02

## 2019-04-02 RX ADMIN — HEPARIN SODIUM (PORCINE) LOCK FLUSH IV SOLN 100 UNIT/ML 5 ML: 100 SOLUTION at 10:18

## 2019-04-02 NOTE — NURSING NOTE
"Chief Complaint   Patient presents with     Port Flush     port flushed by rn.       Port accessed with 20 gauge 3/4\" gripper needle by rn.  Port flushed with NS and heparin then de-accessed.  Pt tolerated well.      Madeleine Rhodes RN      "

## 2019-05-27 ASSESSMENT — ENCOUNTER SYMPTOMS
DECREASED CONCENTRATION: 0
DEPRESSION: 0
SYNCOPE: 0
INSOMNIA: 1
HEADACHES: 0
SLEEP DISTURBANCES DUE TO BREATHING: 0
ABDOMINAL PAIN: 0
NUMBNESS: 0
MUSCLE CRAMPS: 0
MYALGIAS: 0
PALPITATIONS: 0
CHILLS: 1
PANIC: 0
LIGHT-HEADEDNESS: 1
BOWEL INCONTINENCE: 1
ORTHOPNEA: 0
EXERCISE INTOLERANCE: 1
DIZZINESS: 1
PARALYSIS: 0
LOSS OF CONSCIOUSNESS: 0
MUSCLE WEAKNESS: 0
HEARTBURN: 1
BACK PAIN: 1
HYPERTENSION: 0
DIARRHEA: 0
TREMORS: 1
NERVOUS/ANXIOUS: 0
HEMATURIA: 0
CONSTIPATION: 0
DECREASED APPETITE: 0
DIFFICULTY URINATING: 0
SPEECH CHANGE: 0
WEAKNESS: 0
SEIZURES: 0
WEIGHT GAIN: 0
FEVER: 0
INCREASED ENERGY: 0
MEMORY LOSS: 0
DYSURIA: 0
STIFFNESS: 0
DISTURBANCES IN COORDINATION: 0
NAUSEA: 0
BLOATING: 0
POLYPHAGIA: 0
HYPOTENSION: 0
JOINT SWELLING: 1
NECK PAIN: 0
ALTERED TEMPERATURE REGULATION: 1
HALLUCINATIONS: 0
LEG PAIN: 0
BLOOD IN STOOL: 0
JAUNDICE: 0
FLANK PAIN: 0
RECTAL PAIN: 0
POLYDIPSIA: 0
NIGHT SWEATS: 0
TINGLING: 0
VOMITING: 0
ARTHRALGIAS: 1
WEIGHT LOSS: 0
FATIGUE: 1

## 2019-05-27 NOTE — PROGRESS NOTES
Follow Up Notes on Referred Patient    Date: 2019       Dr. Levar Scott  [address] [place] [address]       RE: Lu Smith  : 1950  HEIDY: 2019    Dear Dr. Levar Scott:    Lu Smith is a 69 year old woman with a diagnosis of stage IIIC2 serous endometrial cancer. She completed treatment with sandwich chemotherapy/radiation on 18. She presents today for a surveillance visit.       Oncology History:  18: TLH-BSO, omentectomy, bilateral pelvic and para-aortic lymph node dissection, pelvic washings    3/30/18-18: Six cycles of carboplatin AUC 6 and paclitaxel 175mg/m2, delivered sandwich style with pelvic radiation between cycles three and four  Radiation: 18-18: EBRT delivered to pelvis and para-aortic nodes, 5040 cGy in 28 fractions  18-18: HDR brachytherapy delivered to the vaginal cuff, 1200 cGy in two fractions        Today she comes to clinic and denies any new issues. She denies any vaginal bleeding, no changes in her bowel or bladder habits, no nausea/emesis, no lower extremity edema, and no difficulties eating or sleeping. She denies any abdominal discomfort/bloating, no fevers or chills, and no chest pain or shortness of breath. She is current with her mammogram, colonoscopy is due , and her annual is current (Sees PCP Q 6 mos). She is using her dilator about once a week and is not sexually active. She has some urge to have a bowel and needs to go quickly; this happens sometimes. She has completed pelvic floor therapy and reports this did help. She has OA in her left knee and this flares up periodically. She has been having her chest port flushed regularly.         Review of Systems     Constitutional:  Positive for fatigue and recent stressors. Negative for fever, chills, weight loss, weight gain, decreased appetite, night sweats, height loss, post-operative complications, incisional pain,  hallucinations, increased energy, hyperactivity and confused.   HENT:  Negative for ear pain, hearing loss, tinnitus, nosebleeds, trouble swallowing, hoarse voice, mouth sores, sore throat, ear discharge, tooth pain, gum tenderness, taste disturbance, smell disturbance, hearing aid, bleeding gums, dry mouth, sinus pain, sinus congestion and neck mass.    Eyes:  Negative for double vision, pain, redness, eye pain, decreased vision, eye watering, eye bulging, eye dryness, flashing lights, spots, floaters, strabismus, tunnel vision, jaundice and eye irritation.   Respiratory:   Negative for cough, hemoptysis, sputum production, shortness of breath, wheezing, sleep disturbances due to breathing, snores loudly, respiratory pain, dyspnea on exertion, cough disturbing sleep and postural dyspnea.    Cardiovascular:  Positive for light-headedness and exercise intolerance. Negative for dyspnea on exertion, palpitations, orthopnea, fingers/toes turn blue, hypertension, hypotension, syncope, history of heart murmur, pacemaker, few scattered varicosities, leg pain, sleep disturbances due to breathing and edema.   Gastrointestinal:  Positive for heartburn and bowel incontinence. Negative for nausea, vomiting, abdominal pain, diarrhea, constipation, blood in stool, melena, rectal pain, bloating, jaundice, coffee ground emesis and change in stool.   Genitourinary:  Positive for bladder incontinence. Negative for dysuria, urgency, hematuria, flank pain, vaginal discharge, difficulty urinating, genital sores, dyspareunia, decreased libido, nocturia, voiding less frequently, arousal difficulty, abnormal vaginal bleeding, excessive menstruation, menstrual changes, hot flashes, vaginal dryness and postmenopausal bleeding.   Musculoskeletal:  Positive for back pain, joint swelling and arthralgias. Negative for myalgias, stiffness, muscle cramps, neck pain, bone pain, muscle weakness and fracture.   Neurological:  Positive for dizziness,  tremors and light-headedness. Negative for tingling, speech change, seizures, loss of consciousness, weakness, numbness, headaches, disturbances in coordination, memory loss, difficulty walking and paralysis.   Endo/Heme:  Negative for anemia, swollen glands and bruises/bleeds easily.   Psychiatric/Behavioral:  Negative for depression, hallucinations, memory loss, decreased concentration, mood swings and panic attacks.    Breast:  Negative for breast discharge, breast mass, breast pain and nipple retraction.   Endocrine:  Positive for altered temperature regulation.Negative for polyphagia and polydipsia.          Past Medical History:    Past Medical History:   Diagnosis Date     Diabetes (H)     Type II     Endometrial cancer determined by uterine biopsy (H) 02/2018     HTN (hypertension)      Obesity      Osteoarthritis          Past Surgical History:    Past Surgical History:   Procedure Laterality Date     CHOLECYSTECTOMY  1993     CYSTOSCOPY N/A 2/23/2018    Procedure: CYSTOSCOPY;;  Surgeon: Kevin Henderson MD;  Location: [facility] OR     DAVINCI HYSTERECTOMY TOTAL, BILATERAL SALPINGO-OOPHORECTOMY, COMBINED N/A 2/23/2018    Procedure: COMBINED DAVINCI HYSTERECTOMY TOTAL, SALPINGO-OOPHORECTOMY;  DaVinci Assisted Total Laparoscopic Hysterectomy, Bilateral Salpingo-Oophorectomy, Cystoscopy,  Omental biopsy, omentectomy,bilateral  Pelvic And Para Aortic Lymph Node Dissection;  Surgeon: Kevin Henderson MD;  Location: [facility] OR     Partial lumbar discectomy  1998         Health Maintenance Due   Topic Date Due     DEXA  1950     HEPATITIS C SCREENING  1950     ADVANCED DIRECTIVE PLANNING  1950     MAMMO SCREENING  1950     COLONOSCOPY  04/07/1962     LIPID  04/07/1997     ZOSTER IMMUNIZATION (1 of 2) 04/07/2002     MEDICARE ANNUAL WELLNESS VISIT  04/07/2017     PHQ-2  01/01/2019       Current Medications:     Current Outpatient Medications   Medication Sig Dispense Refill     ACETAMINOPHEN PO  "Take 325 mg by mouth every 6 hours as needed for pain       L-Glutamine 500 MG CAPS        loperamide (IMODIUM) 2 MG capsule Take 2 mg by mouth 4 times daily as needed for diarrhea       magnesium 500 MG TABS        METFORMIN HCL PO Take 500 mg by mouth daily (with breakfast)        pyridoxine (VITAMIN B-6) 50 MG TABS Take 1 tablet (50 mg) by mouth 2 times daily 60 tablet 3     triamterene-hydrochlorothiazide (DYAZIDE) 37.5-25 MG per capsule Take 1 capsule by mouth every morning        fluticasone (FLONASE) 50 MCG/ACT spray Spray 1-2 sprays into both nostrils daily (Patient not taking: Reported on 2019) 1 Bottle 1     loratadine (CLARITIN) 10 MG tablet Take 1 tablet (10 mg) by mouth daily (Patient not taking: Reported on 2019) 30 tablet 3         Allergies:        Allergies   Allergen Reactions     Ace Inhibitors Cough     Amoxicillin Hives        Social History:     Social History     Tobacco Use     Smoking status: Former Smoker     Packs/day: 0.25     Years: 20.00     Pack years: 5.00     Last attempt to quit: 1991     Years since quittin.1     Smokeless tobacco: Never Used     Tobacco comment: Quite smoking    Substance Use Topics     Alcohol use: Yes     Comment: 4-7/week if out 1-2x's/week       History   Drug Use No         Family History:     The patient's family history is notable for:    Family History   Problem Relation Age of Onset     Colon Cancer Mother      Breast Cancer Sister      Lymphoma Sister          Physical Exam:     /84 (BP Location: Right arm, Patient Position: Sitting, Cuff Size: Adult Large)   Pulse 84   Temp 98.1  F (36.7  C) (Oral)   Resp 16   Ht 1.651 m (5' 5\")   Wt 124.8 kg (275 lb 1.6 oz)   SpO2 96%   BMI 45.78 kg/m    Body mass index is 45.78 kg/m .    General Appearance: healthy and alert, no distress     HEENT: no thyromegaly, no palpable nodules or masses        Cardiovascular: regular rate and rhythm, no gallops, rubs or " murmurs     Respiratory: lungs clear, no rales, rhonchi or wheezes, normal diaphragmatic excursion    Musculoskeletal: extremities non tender and without edema    Skin: no lesions or rashes     Neurological: normal gait, no gross defects     Psychiatric: appropriate mood and affect                               Hematological: normal cervical, supraclavicular and inguinal lymph nodes     Gastrointestinal:       abdomen soft, obese, non-tender, non-distended, no organomegaly or masses    Genitourinary: External genitalia and urethral meatus appears normal.  Vagina is smooth without nodularity or masses.  Cervix surgically absent.  Bimanual exam reveal no masses, nodularity or fullness--exam limited secondary to body habitus.  Recto-vaginal exam confirms these findings.      Assessment:    Lu Smith is a 69 year old woman with a diagnosis of stage IIIC2 serous endometrial cancer. She completed treatment with sandwich chemotherapy/radiation on 9/28/18. She presents today for a surveillance visit.    20 minutes were spent with this patient, over 50% of that time was spent in symptom management, treatment planning and in counseling and coordination of care.      Plan:     1.)        Patient to RTC in 3 months for her next surveillance visit. Reviewed recommendations from [facility] not to perform surveillance pap smears in women diagnosed with endometrial cancer as this does not improve detection of local recurrence. Reviewed signs and symptoms for when she should contact the clinic or seek additional care. Patient to contact the clinic with any questions or concerns in the interim. Continue to have regular port flushes every 4-6 weeks. Encouraged to use the dilator at least 2 times/week.      2.) Genetic risk factors were assessed and is negative for mutations in the APC, SHARIFA, BARD1, BRCA1, BRCA2, BRIP1, BMPR1A, CDH1, CDK4, CDKN2A, CHEK2, DICER1, EPCAM, HOXB13, GREM1, MLH1, MRE11A, MSH2, MSH6, MUTYH, NBN, NF1, PALB2, PMS2,  POLD1, POLE, PTEN, RAD50, RAD51C, RAD51D, SMAD4, SMARCA4, STK11, and TP53 genes.      3.) Labs and/or tests ordered include:  None.     4.) Health maintenance issues addressed today include annual health maintenance and non-gynecologic issues with PCP.    ANU Ramey, WHNP-BC, ANP-BC  Women's Health Nurse Practitioner  Adult Nurse Pracitioner  Division of Gynecologic Oncology          CC  Patient Care Team:  Levar Scott as PCP - General (Internal Medicine)  Jeanne Blancas MD as Referring Physician (OB/Gyn)  LEVAR SCOTT

## 2019-05-28 ENCOUNTER — APPOINTMENT (OUTPATIENT)
Dept: LAB | Facility: CLINIC | Age: 69
End: 2019-05-28
Attending: NURSE PRACTITIONER
Payer: MEDICARE

## 2019-05-28 ENCOUNTER — ONCOLOGY VISIT (OUTPATIENT)
Dept: ONCOLOGY | Facility: CLINIC | Age: 69
End: 2019-05-28
Attending: NURSE PRACTITIONER
Payer: MEDICARE

## 2019-05-28 VITALS
SYSTOLIC BLOOD PRESSURE: 143 MMHG | OXYGEN SATURATION: 96 % | RESPIRATION RATE: 16 BRPM | DIASTOLIC BLOOD PRESSURE: 84 MMHG | WEIGHT: 275.1 LBS | BODY MASS INDEX: 45.83 KG/M2 | HEART RATE: 84 BPM | HEIGHT: 65 IN | TEMPERATURE: 98.1 F

## 2019-05-28 DIAGNOSIS — C54.1 ENDOMETRIAL CANCER (H): Primary | ICD-10-CM

## 2019-05-28 PROCEDURE — 99213 OFFICE O/P EST LOW 20 MIN: CPT | Mod: ZP | Performed by: NURSE PRACTITIONER

## 2019-05-28 PROCEDURE — 36591 DRAW BLOOD OFF VENOUS DEVICE: CPT

## 2019-05-28 PROCEDURE — G0463 HOSPITAL OUTPT CLINIC VISIT: HCPCS | Mod: ZF

## 2019-05-28 PROCEDURE — 25000128 H RX IP 250 OP 636: Mod: ZF | Performed by: NURSE PRACTITIONER

## 2019-05-28 RX ORDER — HEPARIN SODIUM (PORCINE) LOCK FLUSH IV SOLN 100 UNIT/ML 100 UNIT/ML
5 SOLUTION INTRAVENOUS EVERY 8 HOURS
Status: DISCONTINUED | OUTPATIENT
Start: 2019-05-28 | End: 2019-05-28 | Stop reason: HOSPADM

## 2019-05-28 RX ADMIN — HEPARIN 5 ML: 100 SYRINGE at 18:02

## 2019-05-28 ASSESSMENT — ENCOUNTER SYMPTOMS
HOARSE VOICE: 0
SORE THROAT: 0
SWOLLEN GLANDS: 0
NERVOUS/ANXIOUS: 0
DYSURIA: 0
EYE PAIN: 0
WEIGHT LOSS: 0
WEIGHT GAIN: 0
WHEEZING: 0
TROUBLE SWALLOWING: 0
JOINT SWELLING: 1
DYSPNEA ON EXERTION: 0
NECK MASS: 0
PALPITATIONS: 0
DIFFICULTY URINATING: 0
DIARRHEA: 0
BRUISES/BLEEDS EASILY: 0
INSOMNIA: 1
COUGH DISTURBING SLEEP: 0
STIFFNESS: 0
HALLUCINATIONS: 0
NAUSEA: 0
HEARTBURN: 1
DECREASED APPETITE: 0
POLYPHAGIA: 0
SINUS CONGESTION: 0
NECK PAIN: 0
SHORTNESS OF BREATH: 0
HYPOTENSION: 0
PARALYSIS: 0
WEAKNESS: 0
MUSCLE CRAMPS: 0
NIGHT SWEATS: 0
HYPERTENSION: 0
SEIZURES: 0
ABDOMINAL PAIN: 0
FEVER: 0
MUSCLE WEAKNESS: 0
FATIGUE: 1
BOWEL INCONTINENCE: 1
DECREASED CONCENTRATION: 0
DECREASED LIBIDO: 0
SYNCOPE: 0
HOT FLASHES: 0
DEPRESSION: 0
SNORES LOUDLY: 0
POSTURAL DYSPNEA: 0
JAUNDICE: 0
EYE WATERING: 0
VOMITING: 0
TINGLING: 0
SPEECH CHANGE: 0
ARTHRALGIAS: 1
SINUS PAIN: 0
POLYDIPSIA: 0
ORTHOPNEA: 0
DISTURBANCES IN COORDINATION: 0
CONSTIPATION: 0
FLANK PAIN: 0
HEADACHES: 0
EYE REDNESS: 0
BLOATING: 0
RESPIRATORY PAIN: 0
NUMBNESS: 0
LIGHT-HEADEDNESS: 1
LEG PAIN: 0
DOUBLE VISION: 0
PANIC: 0
TASTE DISTURBANCE: 0
SPUTUM PRODUCTION: 0
BREAST PAIN: 0
BLOOD IN STOOL: 0
SLEEP DISTURBANCES DUE TO BREATHING: 0
COUGH: 0
CHILLS: 0
LOSS OF CONSCIOUSNESS: 0
HEMATURIA: 0
RECTAL PAIN: 0
HEMOPTYSIS: 0
TREMORS: 1
MEMORY LOSS: 0
BACK PAIN: 1
ALTERED TEMPERATURE REGULATION: 1
SMELL DISTURBANCE: 0
EXERCISE INTOLERANCE: 1
EYE IRRITATION: 0
MYALGIAS: 0
BREAST MASS: 0
INCREASED ENERGY: 0
DIZZINESS: 1

## 2019-05-28 ASSESSMENT — PAIN SCALES - GENERAL: PAINLEVEL: NO PAIN (0)

## 2019-05-28 ASSESSMENT — MIFFLIN-ST. JEOR: SCORE: 1783.73

## 2019-05-28 NOTE — LETTER
2019       RE: Lu Smith  [address]  [place] [address]     Dear Colleague,    Thank you for referring your patient, Lu Smith, to the [facility]. Please see a copy of my visit note below.                Follow Up Notes on Referred Patient    Date: 2019       Dr. Levar Scott  [address] [place] [address]       RE: Lu Smith  : 1950  HEIDY: 2019    Dear Dr. Levar Scott:    Lu Smith is a 69 year old woman with a diagnosis of stage IIIC2 serous endometrial cancer. She completed treatment with sandwich chemotherapy/radiation on 18. She presents today for a surveillance visit.       Oncology History:  18: TLH-BSO, omentectomy, bilateral pelvic and para-aortic lymph node dissection, pelvic washings    3/30/18-18: Six cycles of carboplatin AUC 6 and paclitaxel 175mg/m2, delivered sandwich style with pelvic radiation between cycles three and four  Radiation: 18-18: EBRT delivered to pelvis and para-aortic nodes, 5040 cGy in 28 fractions  18-18: HDR brachytherapy delivered to the vaginal cuff, 1200 cGy in two fractions        Today she comes to clinic and denies any new issues. She denies any vaginal bleeding, no changes in her bowel or bladder habits, no nausea/emesis, no lower extremity edema, and no difficulties eating or sleeping. She denies any abdominal discomfort/bloating, no fevers or chills, and no chest pain or shortness of breath. She is current with her mammogram, colonoscopy is due , and her annual is current (Sees PCP Q 6 mos). She is using her dilator about once a week and is not sexually active. She has some urge to have a bowel and needs to go quickly; this happens sometimes. She has completed pelvic floor therapy and reports this did help. She has OA in her left knee and this flares up periodically. She has been having her chest port flushed regularly.          Review of Systems     Constitutional:  Positive for fatigue and recent stressors. Negative for fever, chills, weight loss, weight gain, decreased appetite, night sweats, height loss, post-operative complications, incisional pain, hallucinations, increased energy, hyperactivity and confused.   HENT:  Negative for ear pain, hearing loss, tinnitus, nosebleeds, trouble swallowing, hoarse voice, mouth sores, sore throat, ear discharge, tooth pain, gum tenderness, taste disturbance, smell disturbance, hearing aid, bleeding gums, dry mouth, sinus pain, sinus congestion and neck mass.    Eyes:  Negative for double vision, pain, redness, eye pain, decreased vision, eye watering, eye bulging, eye dryness, flashing lights, spots, floaters, strabismus, tunnel vision, jaundice and eye irritation.   Respiratory:   Negative for cough, hemoptysis, sputum production, shortness of breath, wheezing, sleep disturbances due to breathing, snores loudly, respiratory pain, dyspnea on exertion, cough disturbing sleep and postural dyspnea.    Cardiovascular:  Positive for light-headedness and exercise intolerance. Negative for dyspnea on exertion, palpitations, orthopnea, fingers/toes turn blue, hypertension, hypotension, syncope, history of heart murmur, pacemaker, few scattered varicosities, leg pain, sleep disturbances due to breathing and edema.   Gastrointestinal:  Positive for heartburn and bowel incontinence. Negative for nausea, vomiting, abdominal pain, diarrhea, constipation, blood in stool, melena, rectal pain, bloating, jaundice, coffee ground emesis and change in stool.   Genitourinary:  Positive for bladder incontinence. Negative for dysuria, urgency, hematuria, flank pain, vaginal discharge, difficulty urinating, genital sores, dyspareunia, decreased libido, nocturia, voiding less frequently, arousal difficulty, abnormal vaginal bleeding, excessive menstruation, menstrual changes, hot flashes, vaginal dryness and  postmenopausal bleeding.   Musculoskeletal:  Positive for back pain, joint swelling and arthralgias. Negative for myalgias, stiffness, muscle cramps, neck pain, bone pain, muscle weakness and fracture.   Neurological:  Positive for dizziness, tremors and light-headedness. Negative for tingling, speech change, seizures, loss of consciousness, weakness, numbness, headaches, disturbances in coordination, memory loss, difficulty walking and paralysis.   Endo/Heme:  Negative for anemia, swollen glands and bruises/bleeds easily.   Psychiatric/Behavioral:  Negative for depression, hallucinations, memory loss, decreased concentration, mood swings and panic attacks.    Breast:  Negative for breast discharge, breast mass, breast pain and nipple retraction.   Endocrine:  Positive for altered temperature regulation.Negative for polyphagia and polydipsia.          Past Medical History:    Past Medical History:   Diagnosis Date     Diabetes (H)     Type II     Endometrial cancer determined by uterine biopsy (H) 02/2018     HTN (hypertension)      Obesity      Osteoarthritis          Past Surgical History:    Past Surgical History:   Procedure Laterality Date     CHOLECYSTECTOMY  1993     CYSTOSCOPY N/A 2/23/2018    Procedure: CYSTOSCOPY;;  Surgeon: Kevin Henderson MD;  Location: [facility] OR     DAVINCI HYSTERECTOMY TOTAL, BILATERAL SALPINGO-OOPHORECTOMY, COMBINED N/A 2/23/2018    Procedure: COMBINED DAVINCI HYSTERECTOMY TOTAL, SALPINGO-OOPHORECTOMY;  DaVinci Assisted Total Laparoscopic Hysterectomy, Bilateral Salpingo-Oophorectomy, Cystoscopy,  Omental biopsy, omentectomy,bilateral  Pelvic And Para Aortic Lymph Node Dissection;  Surgeon: Kevin Henderson MD;  Location: [facility] OR     Partial lumbar discectomy  1998         Health Maintenance Due   Topic Date Due     DEXA  1950     HEPATITIS C SCREENING  1950     ADVANCED DIRECTIVE PLANNING  1950     MAMMO SCREENING  1950     COLONOSCOPY  04/07/1962     LIPID  " 1997     ZOSTER IMMUNIZATION (1 of 2) 2002     MEDICARE ANNUAL WELLNESS VISIT  2017     PHQ-2  2019       Current Medications:     Current Outpatient Medications   Medication Sig Dispense Refill     ACETAMINOPHEN PO Take 325 mg by mouth every 6 hours as needed for pain       L-Glutamine 500 MG CAPS        loperamide (IMODIUM) 2 MG capsule Take 2 mg by mouth 4 times daily as needed for diarrhea       magnesium 500 MG TABS        METFORMIN HCL PO Take 500 mg by mouth daily (with breakfast)        pyridoxine (VITAMIN B-6) 50 MG TABS Take 1 tablet (50 mg) by mouth 2 times daily 60 tablet 3     triamterene-hydrochlorothiazide (DYAZIDE) 37.5-25 MG per capsule Take 1 capsule by mouth every morning        fluticasone (FLONASE) 50 MCG/ACT spray Spray 1-2 sprays into both nostrils daily (Patient not taking: Reported on 2019) 1 Bottle 1     loratadine (CLARITIN) 10 MG tablet Take 1 tablet (10 mg) by mouth daily (Patient not taking: Reported on 2019) 30 tablet 3         Allergies:        Allergies   Allergen Reactions     Ace Inhibitors Cough     Amoxicillin Hives        Social History:     Social History     Tobacco Use     Smoking status: Former Smoker     Packs/day: 0.25     Years: 20.00     Pack years: 5.00     Last attempt to quit: 1991     Years since quittin.1     Smokeless tobacco: Never Used     Tobacco comment: Quite smoking    Substance Use Topics     Alcohol use: Yes     Comment: 4-7/week if out 1-2x's/week       History   Drug Use No         Family History:     The patient's family history is notable for:    Family History   Problem Relation Age of Onset     Colon Cancer Mother      Breast Cancer Sister      Lymphoma Sister          Physical Exam:     /84 (BP Location: Right arm, Patient Position: Sitting, Cuff Size: Adult Large)   Pulse 84   Temp 98.1  F (36.7  C) (Oral)   Resp 16   Ht 1.651 m (5' 5\")   Wt 124.8 kg (275 lb 1.6 oz)   SpO2 96%   BMI 45.78 " kg/m     Body mass index is 45.78 kg/m .    General Appearance: healthy and alert, no distress     HEENT: no thyromegaly, no palpable nodules or masses        Cardiovascular: regular rate and rhythm, no gallops, rubs or murmurs     Respiratory: lungs clear, no rales, rhonchi or wheezes, normal diaphragmatic excursion    Musculoskeletal: extremities non tender and without edema    Skin: no lesions or rashes     Neurological: normal gait, no gross defects     Psychiatric: appropriate mood and affect                               Hematological: normal cervical, supraclavicular and inguinal lymph nodes     Gastrointestinal:       abdomen soft, obese, non-tender, non-distended, no organomegaly or masses    Genitourinary: External genitalia and urethral meatus appears normal.  Vagina is smooth without nodularity or masses.  Cervix surgically absent.  Bimanual exam reveal no masses, nodularity or fullness--exam limited secondary to body habitus.  Recto-vaginal exam confirms these findings.      Assessment:    Lu Smith is a 69 year old woman with a diagnosis of stage IIIC2 serous endometrial cancer. She completed treatment with sandwich chemotherapy/radiation on 9/28/18. She presents today for a surveillance visit.    20 minutes were spent with this patient, over 50% of that time was spent in symptom management, treatment planning and in counseling and coordination of care.      Plan:     1.)        Patient to RTC in 3 months for her next surveillance visit. Reviewed recommendations from [facility] not to perform surveillance pap smears in women diagnosed with endometrial cancer as this does not improve detection of local recurrence. Reviewed signs and symptoms for when she should contact the clinic or seek additional care. Patient to contact the clinic with any questions or concerns in the interim. Continue to have regular port flushes every 4-6 weeks. Encouraged to use the dilator at least 2 times/week.      2.) Genetic  risk factors were assessed and is negative for mutations in the APC, SHARIFA, BARD1, BRCA1, BRCA2, BRIP1, BMPR1A, CDH1, CDK4, CDKN2A, CHEK2, DICER1, EPCAM, HOXB13, GREM1, MLH1, MRE11A, MSH2, MSH6, MUTYH, NBN, NF1, PALB2, PMS2, POLD1, POLE, PTEN, RAD50, RAD51C, RAD51D, SMAD4, SMARCA4, STK11, and TP53 genes.      3.) Labs and/or tests ordered include:  None.     4.) Health maintenance issues addressed today include annual health maintenance and non-gynecologic issues with PCP.    ANU Ramey, WHNP-BC, ANP-BC  Women's Health Nurse Practitioner  Adult Nurse Pracitioner  Division of Gynecologic Oncology          CC  Patient Care Team:  Levar Scott as PCP - General (Internal Medicine)  Jeanne Blancas MD as Referring Physician (OB/Gyn)

## 2019-05-28 NOTE — NURSING NOTE
"Oncology Rooming Note    May 28, 2019 6:21 PM   Lu Smith is a 69 year old female who presents for:    Chief Complaint   Patient presents with     Blood Draw     Labs drawn via PORT by RN in lab. Line flushed with saline and heparin. VS taken.      RECHECK     2 month f/up      Initial Vitals: /84 (BP Location: Right arm, Patient Position: Sitting, Cuff Size: Adult Large)   Pulse 84   Temp 98.1  F (36.7  C) (Oral)   Resp 16   Ht 1.651 m (5' 5\")   Wt 124.8 kg (275 lb 1.6 oz)   SpO2 96%   BMI 45.78 kg/m   Estimated body mass index is 45.78 kg/m  as calculated from the following:    Height as of this encounter: 1.651 m (5' 5\").    Weight as of this encounter: 124.8 kg (275 lb 1.6 oz). Body surface area is 2.39 meters squared.  No Pain (0) Comment: Data Unavailable   No LMP recorded. Patient has had a hysterectomy.  Allergies reviewed: Yes  Medications reviewed: Yes    Medications: Medication refills not needed today.  Pharmacy name entered into [employer]: [facility] - [place], [place] - [address]    Clinical concerns: none        Tiarra Osiel, CMA              "

## 2019-05-28 NOTE — NURSING NOTE
Chief Complaint   Patient presents with     Blood Draw     Labs drawn via PORT by RN in lab. Line flushed with saline and heparin. VS taken.      Dave Morgan RN

## 2019-07-05 PROCEDURE — 96523 IRRIG DRUG DELIVERY DEVICE: CPT

## 2019-07-05 PROCEDURE — 25000128 H RX IP 250 OP 636: Performed by: OBSTETRICS & GYNECOLOGY

## 2019-07-05 RX ORDER — HEPARIN SODIUM (PORCINE) LOCK FLUSH IV SOLN 100 UNIT/ML 100 UNIT/ML
5 SOLUTION INTRAVENOUS EVERY 8 HOURS
Status: DISCONTINUED | OUTPATIENT
Start: 2019-07-05 | End: 2019-07-13 | Stop reason: HOSPADM

## 2019-07-05 RX ADMIN — HEPARIN 5 ML: 100 SYRINGE at 10:48

## 2019-08-05 ENCOUNTER — APPOINTMENT (OUTPATIENT)
Dept: LAB | Facility: CLINIC | Age: 69
End: 2019-08-05
Attending: OBSTETRICS & GYNECOLOGY
Payer: MEDICARE

## 2019-08-05 ENCOUNTER — ONCOLOGY VISIT (OUTPATIENT)
Dept: ONCOLOGY | Facility: CLINIC | Age: 69
End: 2019-08-05
Attending: OBSTETRICS & GYNECOLOGY
Payer: MEDICARE

## 2019-08-05 VITALS
BODY MASS INDEX: 45.58 KG/M2 | HEART RATE: 71 BPM | WEIGHT: 273.9 LBS | SYSTOLIC BLOOD PRESSURE: 156 MMHG | TEMPERATURE: 98 F | OXYGEN SATURATION: 96 % | DIASTOLIC BLOOD PRESSURE: 83 MMHG

## 2019-08-05 DIAGNOSIS — Z85.42 HISTORY OF ENDOMETRIAL CANCER: Primary | ICD-10-CM

## 2019-08-05 DIAGNOSIS — R91.8 PULMONARY NODULES: ICD-10-CM

## 2019-08-05 PROCEDURE — 25000128 H RX IP 250 OP 636: Mod: ZF | Performed by: NURSE PRACTITIONER

## 2019-08-05 PROCEDURE — G0463 HOSPITAL OUTPT CLINIC VISIT: HCPCS | Mod: ZF

## 2019-08-05 PROCEDURE — 99213 OFFICE O/P EST LOW 20 MIN: CPT | Mod: ZP | Performed by: NURSE PRACTITIONER

## 2019-08-05 RX ORDER — HEPARIN SODIUM (PORCINE) LOCK FLUSH IV SOLN 100 UNIT/ML 100 UNIT/ML
5 SOLUTION INTRAVENOUS ONCE
Status: COMPLETED | OUTPATIENT
Start: 2019-08-05 | End: 2019-08-05

## 2019-08-05 RX ADMIN — HEPARIN 5 ML: 100 SYRINGE at 11:27

## 2019-08-05 ASSESSMENT — PAIN SCALES - GENERAL: PAINLEVEL: NO PAIN (0)

## 2019-08-05 NOTE — NURSING NOTE
"Oncology Rooming Note    August 5, 2019 11:50 AM   Lu Smith is a 69 year old female who presents for:    Chief Complaint   Patient presents with     Port Flush     port flushed by RN in lab; No blood return     Oncology Clinic Visit     Endometrial cancer      Initial Vitals: BP (!) 156/83 (BP Location: Left arm, Patient Position: Chair, Cuff Size: Adult Regular)   Pulse 71   Temp 98  F (36.7  C) (Oral)   Wt 124.2 kg (273 lb 14.4 oz)   SpO2 96%   BMI 45.58 kg/m   Estimated body mass index is 45.58 kg/m  as calculated from the following:    Height as of 5/28/19: 1.651 m (5' 5\").    Weight as of this encounter: 124.2 kg (273 lb 14.4 oz). Body surface area is 2.39 meters squared.  No Pain (0) Comment: Data Unavailable   No LMP recorded. Patient has had a hysterectomy.  Allergies reviewed: Yes  Medications reviewed: Yes    Medications: MEDICATION REFILLS NEEDED TODAY. Provider was notified.  Pharmacy name entered into [facility]: [facility] - [place], [place] - [address]    Clinical concerns: Metformin and Hydrochlorothiazide refills requested.       YARI PURCELL CMA              "

## 2019-08-05 NOTE — PROGRESS NOTES
Gynecologic Oncology Follow-Up Visit    RE: Lu Smith  MRN: [id number]  : 1950  Date of Visit: 2019    CC: Lu Smith  is a 69 year old female with a history of stage IIIC2 serous endometrial cancer. She completed treatment with sandwich chemotherapy/radiation on 18. She presents today for a three month surveillance visit.    HPI: Lu comes to the clinic feeling well without gynecologic concerns. Uses her dilator once per week without incident. No vaginal bleeding or pelvic pain. Urinary incontinence greatly improved and fecal incontinence resolved with pelvic PT. Energy continues to improve, doing yard work and looking for a part time job. Plans to start [employer] for weight loss and to learn portion control. Up to date on colonoscopy, DEXA, mammogram, and annual exam. Has her port flushed every six weeks. No other concerns today.    Oncology History:  Diagnosis: Stage IIIC2 serous endometrial cancer  Primary GYN oncologist: Dr. Kevin Henderson  Primary radiation oncologist: Dr. Ana Michelle  Surgery: 18: TLH-BSO, omentectomy, bilateral pelvic and para-aortic lymph node dissection, pelvic washings  Chemotherapy:  3/30/18-18: Six cycles of carboplatin AUC 6 and paclitaxel 175mg/m2, delivered sandwich style with pelvic radiation between cycles three and four  Radiation: 18-18: EBRT delivered to pelvis and para-aortic nodes, 5040 cGy in 28 fractions  18-18: HDR brachytherapy delivered to the vaginal cuff, 1200 cGy in two fractions  Complications: Transient neuropathy, mild thrombocytopenia and anemia  Genetic testing: Panel testing negative    Past Medical History:   Diagnosis Date     Diabetes (H)     Type II     Endometrial cancer determined by uterine biopsy (H) 2018     HTN (hypertension)      Obesity      Osteoarthritis        Past Surgical History:   Procedure Laterality Date     CHOLECYSTECTOMY       CYSTOSCOPY N/A 2018    Procedure:  CYSTOSCOPY;;  Surgeon: Kevin Henderson MD;  Location: [facility] OR     DAVINCI HYSTERECTOMY TOTAL, BILATERAL SALPINGO-OOPHORECTOMY, COMBINED N/A 2018    Procedure: COMBINED DAVINCI HYSTERECTOMY TOTAL, SALPINGO-OOPHORECTOMY;  DaVinci Assisted Total Laparoscopic Hysterectomy, Bilateral Salpingo-Oophorectomy, Cystoscopy,  Omental biopsy, omentectomy,bilateral  Pelvic And Para Aortic Lymph Node Dissection;  Surgeon: Kevin Henderson MD;  Location: [facility] OR     Partial lumbar discectomy         Social History     Socioeconomic History     Marital status:      Spouse name: Not on file     Number of children: 3     Years of education: Not on file     Highest education level: Not on file   Occupational History     Occupation: conroller   Social Needs     Financial resource strain: Not on file     Food insecurity:     Worry: Not on file     Inability: Not on file     Transportation needs:     Medical: Not on file     Non-medical: Not on file   Tobacco Use     Smoking status: Former Smoker     Packs/day: 0.25     Years: 20.00     Pack years: 5.00     Last attempt to quit: 1991     Years since quittin.2     Smokeless tobacco: Never Used     Tobacco comment: Quite smoking    Substance and Sexual Activity     Alcohol use: Yes     Comment: 4-7/week if out 1-2x's/week     Drug use: No     Sexual activity: Not on file   Lifestyle     Physical activity:     Days per week: Not on file     Minutes per session: Not on file     Stress: Not on file   Relationships     Social connections:     Talks on phone: Not on file     Gets together: Not on file     Attends Orthodox service: Not on file     Active member of club or organization: Not on file     Attends meetings of clubs or organizations: Not on file     Relationship status: Not on file     Intimate partner violence:     Fear of current or ex partner: Not on file     Emotionally abused: Not on file     Physically abused: Not on file     Forced sexual  activity: Not on file   Other Topics Concern     Not on file   Social History Narrative    Daughter  at age 25 from leukemia.  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       Family History   Problem Relation Age of Onset     Colon Cancer Mother      Breast Cancer Sister      Lymphoma Sister        Current Outpatient Medications   Medication     ACETAMINOPHEN PO     fluticasone (FLONASE) 50 MCG/ACT spray     L-Glutamine 500 MG CAPS     loperamide (IMODIUM) 2 MG capsule     loratadine (CLARITIN) 10 MG tablet     magnesium 500 MG TABS     METFORMIN HCL PO     pyridoxine (VITAMIN B-6) 50 MG TABS     triamterene-hydrochlorothiazide (DYAZIDE) 37.5-25 MG per capsule     No current facility-administered medications for this visit.           Allergies   Allergen Reactions     Ace Inhibitors Cough     Amoxicillin Hives       Review of Systems  12 point ROS performed and negative except as listed in HPI      OBJECTIVE:    Physical Exam:    BP (!) 156/83 (BP Location: Left arm, Patient Position: Chair, Cuff Size: Adult Regular)   Pulse 71   Temp 98  F (36.7  C) (Oral)   Wt 124.2 kg (273 lb 14.4 oz)   SpO2 96%   BMI 45.58 kg/m      CONSTITUTIONAL: Alert non-toxic appearing female in no acute distress  HEAD: Normocephalic, atraumatic  NECK: Neck supple without lymphadenopathy  RESPIRATORY: Lungs clear to auscultation, no increased work of breathing noted  CV: Regular rate and rhythm, S1S2, no clicks, murmurs, rubs, or gallops; bilateral lower extremities with trace edema equal bilaterally, dorsalis pedis pulses 2+ bilaterally  GASTROINTESTINAL: Normoactive bowel sounds x4 quadrants, abdomen obese, soft, non-distended, and non-tender to palpation without masses or organomegaly  GENITOURINARY: External genitalia and urethral meatus pink without lesions, masses, or excoriation. Vagina pink and smooth without masses or lesions- radiation changes noted, a few superficial blood vessels noted to the right vaginal wall  but no bleeding masses or lesions. Vagina slightly foreshortened to 3-4cm. Vaginal cuff without nodularity, masses, or lesions. Cervix surgically absent. Bimanual exam reveals no masses or fullness. Rectovaginal exam confirms these findings.  LYMPHATIC: Cervical, supraclavicular, and inguinal nodes without lymphadenopathy  MUSCULOSKELETAL: Moves all extremities, no obvious muscle wasting  NEUROLOGIC: No gross deficits, normal gait  SKIN: Appropriate color for race, warm and dry, no rashes or lesions to unclothed skin  PSYCHIATRIC: Pleasant and interactive, affect bright, makes appropriate eye contact, thought process linear    Assessment/Plan:  1) Stage IIIC2 endometrial cancer: No evidence of recurrent disease. Continue surveillance every three months x2 years (through 9/2020) followed by every six months x3 years (through 9/2023) then annually thereafter with pelvic/rectal exam. Continue port flushes q6wks. Recommend she increase dilator use to 2-3x weekly given her vaginal foreshortening, reviewed setting alarm or making this a habit. Reviewed signs and symptoms  of recurrence including but not limited to bleeding from vagina, bladder, or rectum, bloating, early satiety, swelling in the lower extremities, abdominal or lower back pain, changes in bowel or bladder patterns, shortness of breath, increased fatigue, unexplained weight loss, and night sweats. Reviewed recommendations from [facility] not to perform surveillance pap smears in women diagnosed with endometrial cancer as this does not improve detection of local recurrence. Reviewed signs and symptoms for when she should contact the clinic or seek additional care. Patient to contact the clinic with any questions or concerns in the interim.  2) Genetic testing: Panel testing negative  3) Health maintenance issues discussed today include to follow up with PCP for co-morbid conditions and non-gynecologic issues. Repeat chest CT to evaluate pulmonary nodules found on  PET from 2/2018.  4) Patient verbalized understanding of and agreement with plan.    A total of 20 minutes of face to face time spent with patient, over 50% of which was spent in counseling and coordination of care.    ANU Selby, FNP-C  Division of Gynecologic Oncology  [facility]  Pager: [phone]

## 2019-08-05 NOTE — LETTER
2019       RE: Lu Smith  [address] [address]     Dear Colleague,    Thank you for referring your patient, Lu Smith, to the [facility]. Please see a copy of my visit note below.    Gynecologic Oncology Follow-Up Visit    RE: Lu Smith  MRN: [id number]  : 1950  Date of Visit: 2019    CC: Lu Smith  is a 69 year old female with a history of stage IIIC2 serous endometrial cancer. She completed treatment with sandwich chemotherapy/radiation on 18. She presents today for a three month surveillance visit.    HPI: Lu comes to the clinic feeling well without gynecologic concerns. Uses her dilator once per week without incident. No vaginal bleeding or pelvic pain. Urinary incontinence greatly improved and fecal incontinence resolved with pelvic PT. Energy continues to improve, doing yard work and looking for a part time job. Plans to start [employer] for weight loss and to learn portion control. Up to date on colonoscopy, DEXA, mammogram, and annual exam. Has her port flushed every six weeks. No other concerns today.    Oncology History:  Diagnosis: Stage IIIC2 serous endometrial cancer  Primary GYN oncologist: Dr. Kevin Henderson  Primary radiation oncologist: Dr. Ana Michelle  Surgery: 18: TLH-BSO, omentectomy, bilateral pelvic and para-aortic lymph node dissection, pelvic washings  Chemotherapy:  3/30/18-18: Six cycles of carboplatin AUC 6 and paclitaxel 175mg/m2, delivered sandwich style with pelvic radiation between cycles three and four  Radiation: 18-18: EBRT delivered to pelvis and para-aortic nodes, 5040 cGy in 28 fractions  18-18: HDR brachytherapy delivered to the vaginal cuff, 1200 cGy in two fractions  Complications: Transient neuropathy, mild thrombocytopenia and anemia  Genetic testing: Panel testing negative    Past Medical History:   Diagnosis Date     Diabetes (H)     Type II      Endometrial cancer determined by uterine biopsy (H) 2018     HTN (hypertension)      Obesity      Osteoarthritis        Past Surgical History:   Procedure Laterality Date     CHOLECYSTECTOMY       CYSTOSCOPY N/A 2018    Procedure: CYSTOSCOPY;;  Surgeon: Kevin Henderson MD;  Location: [facility] OR     DAVINCI HYSTERECTOMY TOTAL, BILATERAL SALPINGO-OOPHORECTOMY, COMBINED N/A 2018    Procedure: COMBINED DAVINCI HYSTERECTOMY TOTAL, SALPINGO-OOPHORECTOMY;  DaVinci Assisted Total Laparoscopic Hysterectomy, Bilateral Salpingo-Oophorectomy, Cystoscopy,  Omental biopsy, omentectomy,bilateral  Pelvic And Para Aortic Lymph Node Dissection;  Surgeon: Kevin Henderson MD;  Location: [facility] OR     Partial lumbar discectomy         Social History     Socioeconomic History     Marital status:      Spouse name: Not on file     Number of children: 3     Years of education: Not on file     Highest education level: Not on file   Occupational History     Occupation: conroller   Social Needs     Financial resource strain: Not on file     Food insecurity:     Worry: Not on file     Inability: Not on file     Transportation needs:     Medical: Not on file     Non-medical: Not on file   Tobacco Use     Smoking status: Former Smoker     Packs/day: 0.25     Years: 20.00     Pack years: 5.00     Last attempt to quit: 1991     Years since quittin.2     Smokeless tobacco: Never Used     Tobacco comment: Quite smoking    Substance and Sexual Activity     Alcohol use: Yes     Comment: 4-7/week if out 1-2x's/week     Drug use: No     Sexual activity: Not on file   Lifestyle     Physical activity:     Days per week: Not on file     Minutes per session: Not on file     Stress: Not on file   Relationships     Social connections:     Talks on phone: Not on file     Gets together: Not on file     Attends Caodaism service: Not on file     Active member of club or organization: Not on file     Attends meetings of  clubs or organizations: Not on file     Relationship status: Not on file     Intimate partner violence:     Fear of current or ex partner: Not on file     Emotionally abused: Not on file     Physically abused: Not on file     Forced sexual activity: Not on file   Other Topics Concern     Not on file   Social History Narrative    Daughter  at age 25 from leukemia.  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       Family History   Problem Relation Age of Onset     Colon Cancer Mother      Breast Cancer Sister      Lymphoma Sister        Current Outpatient Medications   Medication     ACETAMINOPHEN PO     fluticasone (FLONASE) 50 MCG/ACT spray     L-Glutamine 500 MG CAPS     loperamide (IMODIUM) 2 MG capsule     loratadine (CLARITIN) 10 MG tablet     magnesium 500 MG TABS     METFORMIN HCL PO     pyridoxine (VITAMIN B-6) 50 MG TABS     triamterene-hydrochlorothiazide (DYAZIDE) 37.5-25 MG per capsule     No current facility-administered medications for this visit.           Allergies   Allergen Reactions     Ace Inhibitors Cough     Amoxicillin Hives       Review of Systems  12 point ROS performed and negative except as listed in HPI      OBJECTIVE:    Physical Exam:    BP (!) 156/83 (BP Location: Left arm, Patient Position: Chair, Cuff Size: Adult Regular)   Pulse 71   Temp 98  F (36.7  C) (Oral)   Wt 124.2 kg (273 lb 14.4 oz)   SpO2 96%   BMI 45.58 kg/m       CONSTITUTIONAL: Alert non-toxic appearing female in no acute distress  HEAD: Normocephalic, atraumatic  NECK: Neck supple without lymphadenopathy  RESPIRATORY: Lungs clear to auscultation, no increased work of breathing noted  CV: Regular rate and rhythm, S1S2, no clicks, murmurs, rubs, or gallops; bilateral lower extremities with trace edema equal bilaterally, dorsalis pedis pulses 2+ bilaterally  GASTROINTESTINAL: Normoactive bowel sounds x4 quadrants, abdomen obese, soft, non-distended, and non-tender to palpation without masses or  organomegaly  GENITOURINARY: External genitalia and urethral meatus pink without lesions, masses, or excoriation. Vagina pink and smooth without masses or lesions- radiation changes noted, a few superficial blood vessels noted to the right vaginal wall but no bleeding masses or lesions. Vagina slightly foreshortened to 3-4cm. Vaginal cuff without nodularity, masses, or lesions. Cervix surgically absent. Bimanual exam reveals no masses or fullness. Rectovaginal exam confirms these findings.  LYMPHATIC: Cervical, supraclavicular, and inguinal nodes without lymphadenopathy  MUSCULOSKELETAL: Moves all extremities, no obvious muscle wasting  NEUROLOGIC: No gross deficits, normal gait  SKIN: Appropriate color for race, warm and dry, no rashes or lesions to unclothed skin  PSYCHIATRIC: Pleasant and interactive, affect bright, makes appropriate eye contact, thought process linear    Assessment/Plan:  1) Stage IIIC2 endometrial cancer: No evidence of recurrent disease. Continue surveillance every three months x2 years (through 9/2020) followed by every six months x3 years (through 9/2023) then annually thereafter with pelvic/rectal exam. Continue port flushes q6wks. Recommend she increase dilator use to 2-3x weekly given her vaginal foreshortening, reviewed setting alarm or making this a habit. Reviewed signs and symptoms  of recurrence including but not limited to bleeding from vagina, bladder, or rectum, bloating, early satiety, swelling in the lower extremities, abdominal or lower back pain, changes in bowel or bladder patterns, shortness of breath, increased fatigue, unexplained weight loss, and night sweats. Reviewed recommendations from [facility] not to perform surveillance pap smears in women diagnosed with endometrial cancer as this does not improve detection of local recurrence. Reviewed signs and symptoms for when she should contact the clinic or seek additional care. Patient to contact the clinic with any questions or  concerns in the interim.  2) Genetic testing: Panel testing negative  3) Health maintenance issues discussed today include to follow up with PCP for co-morbid conditions and non-gynecologic issues. Repeat chest CT to evaluate pulmonary nodules found on PET from 2/2018.  4) Patient verbalized understanding of and agreement with plan.    A total of 20 minutes of face to face time spent with patient, over 50% of which was spent in counseling and coordination of care.    ANU Selby, FNP-C  Division of Gynecologic Oncology  [facility]  Pager: [phone]

## 2019-08-05 NOTE — NURSING NOTE
Chief Complaint   Patient presents with     Port Flush     port flushed by RN in lab; No blood return     BP (!) 156/83 (BP Location: Left arm, Patient Position: Chair, Cuff Size: Adult Regular)   Pulse 71   Temp 98  F (36.7  C) (Oral)   Wt 124.2 kg (273 lb 14.4 oz)   SpO2 96%   BMI 45.58 kg/m      No lab orders.  Port accessed by RN in lab. Line flushed with NS & Heparin.   No blood return, port left accessed for TPA if ordered, clinic notified. Pt tolerated well.   Checked in for next appointment    Verona Lees RN

## 2019-09-16 ENCOUNTER — ANCILLARY PROCEDURE (OUTPATIENT)
Dept: CT IMAGING | Facility: CLINIC | Age: 69
End: 2019-09-16
Attending: NURSE PRACTITIONER
Payer: MEDICARE

## 2019-09-16 DIAGNOSIS — Z85.42 HISTORY OF ENDOMETRIAL CANCER: ICD-10-CM

## 2019-09-16 DIAGNOSIS — R91.8 PULMONARY NODULES: ICD-10-CM

## 2019-09-16 LAB — RADIOLOGIST FLAGS: NORMAL

## 2019-09-16 PROCEDURE — 96523 IRRIG DRUG DELIVERY DEVICE: CPT

## 2019-09-16 PROCEDURE — 25000128 H RX IP 250 OP 636: Performed by: OBSTETRICS & GYNECOLOGY

## 2019-09-16 RX ORDER — HEPARIN SODIUM (PORCINE) LOCK FLUSH IV SOLN 100 UNIT/ML 100 UNIT/ML
500 SOLUTION INTRAVENOUS ONCE
Status: COMPLETED | OUTPATIENT
Start: 2019-09-16 | End: 2019-09-16

## 2019-09-16 RX ORDER — HEPARIN SODIUM (PORCINE) LOCK FLUSH IV SOLN 100 UNIT/ML 100 UNIT/ML
5 SOLUTION INTRAVENOUS EVERY 8 HOURS PRN
Status: DISCONTINUED | OUTPATIENT
Start: 2019-09-16 | End: 2019-09-24 | Stop reason: HOSPADM

## 2019-09-16 RX ADMIN — HEPARIN 5 ML: 100 SYRINGE at 10:44

## 2019-09-16 RX ADMIN — HEPARIN SODIUM (PORCINE) LOCK FLUSH IV SOLN 100 UNIT/ML 500 UNITS: 100 SOLUTION at 12:06

## 2019-09-16 NOTE — NURSING NOTE
Chief Complaint   Patient presents with     Port Flush     Port flushed and hep locked by RN in lab. Ready for CT. Lab only appt.     Princess Overton RN

## 2019-09-18 DIAGNOSIS — R59.0 LYMPHADENOPATHY OF HEAD AND NECK REGION: Primary | ICD-10-CM

## 2019-09-18 DIAGNOSIS — Z85.42 HISTORY OF ENDOMETRIAL CANCER: ICD-10-CM

## 2019-09-19 NOTE — RESULT ENCOUNTER NOTE
1) Will re-eval pulmonary nodules 2/2020 for two year stability  2) See PCP for dilated main pulmonary artery  3) Possible IR biopsy of L subclavicular node    Called Lu with this information

## 2019-09-23 ENCOUNTER — TELEPHONE (OUTPATIENT)
Dept: ONCOLOGY | Facility: CLINIC | Age: 69
End: 2019-09-23

## 2019-09-23 DIAGNOSIS — Z85.42 HISTORY OF ENDOMETRIAL CANCER: Primary | ICD-10-CM

## 2019-09-23 DIAGNOSIS — R59.0 LYMPHADENOPATHY OF HEAD AND NECK REGION: ICD-10-CM

## 2019-09-23 NOTE — TELEPHONE ENCOUNTER
Called Lu- no safe window to biopsy L subclavicular node per IR. Per Dr. Henderson, obtain a PET scan. Called Lu with these recommendations, she verbalized understanding of and agreement with plan.  ANU Selby, FNP-C  Gynecologic Oncology  [facility]  Pager: [phone]

## 2019-09-25 ENCOUNTER — DOCUMENTATION ONLY (OUTPATIENT)
Dept: RADIOLOGY | Facility: CLINIC | Age: 69
End: 2019-09-25

## 2019-09-25 DIAGNOSIS — C54.1 ENDOMETRIAL CANCER (H): Primary | ICD-10-CM

## 2019-09-25 NOTE — PROGRESS NOTES
Interventional  Radiology consulted for a  biopsy of L subclavicular node.  Updated Ultrasound imaging required for planning. If safe to preform due to vascularity of the area of concern  Along with planing the projection required for optimal sampling    Discussed with Dr. Kasey Del Rio IR RPA  [phone] Call pager  [phone] pager

## 2019-09-26 DIAGNOSIS — C54.1 PRIMARY MALIGNANT NEOPLASM OF ENDOMETRIUM (H): Primary | ICD-10-CM

## 2019-09-26 DIAGNOSIS — C54.1 ENDOMETRIAL MALIGNANT NEOPLASM (H): ICD-10-CM

## 2019-09-28 ENCOUNTER — HOSPITAL ENCOUNTER (OUTPATIENT)
Dept: PET IMAGING | Facility: CLINIC | Age: 69
Discharge: HOME | End: 2019-09-28
Attending: NURSE PRACTITIONER | Admitting: NURSE PRACTITIONER
Payer: MEDICARE

## 2019-09-28 ENCOUNTER — HOSPITAL ENCOUNTER (OUTPATIENT)
Dept: PET IMAGING | Facility: CLINIC | Age: 69
End: 2019-09-28
Attending: NURSE PRACTITIONER
Payer: MEDICARE

## 2019-09-28 DIAGNOSIS — C54.1 ENDOMETRIAL MALIGNANT NEOPLASM (H): ICD-10-CM

## 2019-09-28 LAB
CREAT BLD-MCNC: 0.8 MG/DL (ref 0.52–1.04)
GFR SERPL CREATININE-BSD FRML MDRD: 72 ML/MIN/{1.73_M2}

## 2019-09-28 PROCEDURE — 71260 CT THORAX DX C+: CPT

## 2019-09-28 PROCEDURE — 82565 ASSAY OF CREATININE: CPT

## 2019-09-28 PROCEDURE — 34300033 ZZH RX 343: Performed by: NURSE PRACTITIONER

## 2019-09-28 PROCEDURE — A9552 F18 FDG: HCPCS | Performed by: NURSE PRACTITIONER

## 2019-09-28 PROCEDURE — 70491 CT SOFT TISSUE NECK W/DYE: CPT

## 2019-09-28 PROCEDURE — 25000128 H RX IP 250 OP 636: Performed by: NURSE PRACTITIONER

## 2019-09-28 PROCEDURE — 78816 PET IMAGE W/CT FULL BODY: CPT | Mod: PS

## 2019-09-28 RX ORDER — FUROSEMIDE 10 MG/ML
40 INJECTION INTRAMUSCULAR; INTRAVENOUS ONCE
Status: COMPLETED | OUTPATIENT
Start: 2019-09-28 | End: 2019-09-28

## 2019-09-28 RX ORDER — IOPAMIDOL 755 MG/ML
50-135 INJECTION, SOLUTION INTRAVASCULAR ONCE
Status: COMPLETED | OUTPATIENT
Start: 2019-09-28 | End: 2019-09-28

## 2019-09-28 RX ADMIN — FLUDEOXYGLUCOSE F-18 16.05 MCI.: 500 INJECTION, SOLUTION INTRAVENOUS at 08:15

## 2019-09-28 RX ADMIN — IOPAMIDOL 135 ML: 755 INJECTION, SOLUTION INTRAVENOUS at 09:30

## 2019-09-28 RX ADMIN — FUROSEMIDE 40 MG: 10 INJECTION, SOLUTION INTRAVENOUS at 09:45

## 2019-10-07 ASSESSMENT — ENCOUNTER SYMPTOMS
MUSCLE CRAMPS: 1
BRUISES/BLEEDS EASILY: 1
BACK PAIN: 1

## 2019-10-09 ENCOUNTER — ONCOLOGY VISIT (OUTPATIENT)
Dept: ONCOLOGY | Facility: CLINIC | Age: 69
End: 2019-10-09
Attending: OBSTETRICS & GYNECOLOGY
Payer: MEDICARE

## 2019-10-09 VITALS
TEMPERATURE: 98.8 F | SYSTOLIC BLOOD PRESSURE: 130 MMHG | RESPIRATION RATE: 12 BRPM | OXYGEN SATURATION: 96 % | WEIGHT: 271 LBS | BODY MASS INDEX: 45.1 KG/M2 | HEART RATE: 85 BPM | DIASTOLIC BLOOD PRESSURE: 84 MMHG

## 2019-10-09 DIAGNOSIS — C54.1 ENDOMETRIAL MALIGNANT NEOPLASM (H): Primary | ICD-10-CM

## 2019-10-09 PROCEDURE — 99214 OFFICE O/P EST MOD 30 MIN: CPT | Mod: ZP | Performed by: OBSTETRICS & GYNECOLOGY

## 2019-10-09 PROCEDURE — G0463 HOSPITAL OUTPT CLINIC VISIT: HCPCS | Mod: ZF

## 2019-10-09 ASSESSMENT — PAIN SCALES - GENERAL: PAINLEVEL: NO PAIN (0)

## 2019-10-09 NOTE — NURSING NOTE
"Oncology Rooming Note    October 9, 2019 11:01 AM   Lu Smith is a 69 year old female who presents for:    Chief Complaint   Patient presents with     Oncology Clinic Visit     Return; Endmetrial malignant neoplasm     Initial Vitals: /84 (BP Location: Right arm, Patient Position: Chair, Cuff Size: Adult Large)   Pulse 85   Temp 98.8  F (37.1  C) (Oral)   Resp 12   Wt 122.9 kg (271 lb)   SpO2 96%   BMI 45.10 kg/m   Estimated body mass index is 45.1 kg/m  as calculated from the following:    Height as of 5/28/19: 1.651 m (5' 5\").    Weight as of this encounter: 122.9 kg (271 lb). Body surface area is 2.37 meters squared.  No Pain (0) Comment: Data Unavailable   No LMP recorded. Patient has had a hysterectomy.  Allergies reviewed: Yes  Medications reviewed: Yes    Medications: Medication refills not needed today.  Pharmacy name entered into [email or web address]: [facility] [address] [place] [address] - [address] [place] [address]    Clinical concerns: Pt states that she had been having abdominal cramps at night. Pt denies any discharge. Pt here for Pet scan results Dr. Henderson was notified.      SMA Evette              "

## 2019-10-09 NOTE — PROGRESS NOTES
Follow Up Notes on Referred Patient    Date: 10/9/2019       Dr. Salvador Eugene MD  No address on file       RE: Lu Smith  : 1950  HEIDY: 10/9/2019    Dear Dr. 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     She has finished adjuvant chemoradiation using sandwich protocol.   Eating and drinking well.  No nausea, vomiting, fever or chills.  Normal urinary and bowel function.  She does have some lower abdominal cramping.  No vaginal bleeding or discharge.           Past Medical History:    Past Medical History:   Diagnosis Date     Diabetes (H)     Type II     Endometrial cancer determined by uterine biopsy (H) 2018     HTN (hypertension)      Obesity      Osteoarthritis          Past Surgical History:    Past Surgical History:   Procedure Laterality Date     CHOLECYSTECTOMY       CYSTOSCOPY N/A 2018    Procedure: CYSTOSCOPY;;  Surgeon: Kevin Henderson MD;  Location: [facility] OR     DAVINCI HYSTERECTOMY TOTAL, BILATERAL SALPINGO-OOPHORECTOMY, COMBINED N/A 2018    Procedure: COMBINED DAVINCI HYSTERECTOMY TOTAL, SALPINGO-OOPHORECTOMY;  DaVinci Assisted Total Laparoscopic Hysterectomy, Bilateral Salpingo-Oophorectomy, Cystoscopy,  Omental biopsy, omentectomy,bilateral  Pelvic And Para Aortic Lymph Node Dissection;  Surgeon: Kevin Henderson MD;  Location: [facility] OR     Partial lumbar discectomy           Health Maintenance Due   Topic Date Due     DEXA  1950     HEPATITIS C SCREENING  1950     ADVANCE CARE PLANNING  1950     LIPID  1997     ZOSTER IMMUNIZATION (1 of 2) 2002     MEDICARE ANNUAL WELLNESS VISIT  2017     PNEUMOCOCCAL IMMUNIZATION 65+ HIGH/HIGHEST RISK (2 of 2 - PPSV23) 2018     PHQ-2  2019     INFLUENZA VACCINE (1) 2019     FALL RISK ASSESSMENT  10/24/2019       Current Medications:     Current Outpatient Medications   Medication  Sig Dispense Refill     L-Glutamine 500 MG CAPS        magnesium 500 MG TABS        METFORMIN HCL PO Take 500 mg by mouth daily (with breakfast)        pyridoxine (VITAMIN B-6) 50 MG TABS Take 1 tablet (50 mg) by mouth 2 times daily 60 tablet 3     triamterene-hydrochlorothiazide (DYAZIDE) 37.5-25 MG per capsule Take 1 capsule by mouth every morning        ACETAMINOPHEN PO Take 325 mg by mouth every 6 hours as needed for pain       fluticasone (FLONASE) 50 MCG/ACT spray Spray 1-2 sprays into both nostrils daily (Patient not taking: Reported on 2019) 1 Bottle 1     loperamide (IMODIUM) 2 MG capsule Take 2 mg by mouth 4 times daily as needed for diarrhea       loratadine (CLARITIN) 10 MG tablet Take 1 tablet (10 mg) by mouth daily (Patient not taking: Reported on 2019) 30 tablet 3         Allergies:        Allergies   Allergen Reactions     Ace Inhibitors Cough     Amoxicillin Hives        Social History:     Social History     Tobacco Use     Smoking status: Former Smoker     Packs/day: 0.25     Years: 20.00     Pack years: 5.00     Last attempt to quit: 1991     Years since quittin.4     Smokeless tobacco: Never Used     Tobacco comment: Quite smoking    Substance Use Topics     Alcohol use: Yes     Comment: 4-7/week if out 1-2x's/week       History   Drug Use No         Family History:         Family History   Problem Relation Age of Onset     Colon Cancer Mother      Breast Cancer Sister      Lymphoma Sister          Physical Exam:     /84 (BP Location: Right arm, Patient Position: Chair, Cuff Size: Adult Large)   Pulse 85   Temp 98.8  F (37.1  C) (Oral)   Resp 12   Wt 122.9 kg (271 lb)   SpO2 96%   BMI 45.10 kg/m    Body mass index is 45.1 kg/m .    General Appearance: healthy and alert, no distress     Musculoskeletal: extremities non tender and without edema    Neurological: normal gait, no gross defects     Psychiatric: appropriate mood and affect                                HEMATOLOGIC: enlarged left subclavicular lymphnode   ABDOMEN:  Soft, nontender, nondistended.  Well-healed port site incisions.     PELVIC:  Normal external genitalia.  Normal vaginal mucosa.  Normal-appearing vaginal cuff.  No adnexal masses or tenderness.  Rectovaginal confirms.             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     A total of 25 minutes was spent with the patient, 20 minutes of which were spent in counseling the patient and/or treatment planning.      1.  Stage IIIC high-grade serous endometrial cancer.   2.  Possible disease recurrence.  No recurrence on PET scan.      Discussed with patient we will obtain a biopsy to confirm recurrence.  We will see her back after those results.  Patient agrees with the plan.  She is very appreciative of her care.  All questions were answered.       Kevin Henderson MD, MS    Department of Obstetrics and Gynecology   Division of Gynecologic Oncology   [facility]  Phone: [phone]        CC  Patient Care Team:  Levar Scott as PCP - General (Internal Medicine)  Jeanne Blancas MD as Referring Physician (OB/Gyn)  SELF, REFERRED

## 2019-10-09 NOTE — Clinical Note
10/9/2019       RE: Lu Smith  [address]  [place] [address]     Dear Colleague,    Thank you for referring your patient, Lu Smith, to the [facility]. Please see a copy of my visit note below.    No notes on file    Again, thank you for allowing me to participate in the care of your patient.      Sincerely,    Kevin Henderson MD

## 2019-10-09 NOTE — LETTER
10/9/2019      RE: Lu Smith  [address]  [place] [address]                   Follow Up Notes on Referred Patient    Date: 10/9/2019       Dr. Salvador Eugene MD  No address on file       RE: Lu Smith  : 1950  HEIDY: 10/9/2019    Dear Dr. 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     She has finished adjuvant chemoradiation using sandwich protocol.   Eating and drinking well.  No nausea, vomiting, fever or chills.  Normal urinary and bowel function.  She does have some lower abdominal cramping.  No vaginal bleeding or discharge.           Past Medical History:    Past Medical History:   Diagnosis Date     Diabetes (H)     Type II     Endometrial cancer determined by uterine biopsy (H) 2018     HTN (hypertension)      Obesity      Osteoarthritis          Past Surgical History:    Past Surgical History:   Procedure Laterality Date     CHOLECYSTECTOMY       CYSTOSCOPY N/A 2018    Procedure: CYSTOSCOPY;;  Surgeon: Kevin Henderson MD;  Location: [facility] OR     DAVINCI HYSTERECTOMY TOTAL, BILATERAL SALPINGO-OOPHORECTOMY, COMBINED N/A 2018    Procedure: COMBINED DAVINCI HYSTERECTOMY TOTAL, SALPINGO-OOPHORECTOMY;  DaVinci Assisted Total Laparoscopic Hysterectomy, Bilateral Salpingo-Oophorectomy, Cystoscopy,  Omental biopsy, omentectomy,bilateral  Pelvic And Para Aortic Lymph Node Dissection;  Surgeon: Kevin Henderson MD;  Location: [facility] OR     Partial lumbar discectomy           Health Maintenance Due   Topic Date Due     DEXA  1950     HEPATITIS C SCREENING  1950     ADVANCE CARE PLANNING  1950     LIPID  1997     ZOSTER IMMUNIZATION (1 of 2) 2002     MEDICARE ANNUAL WELLNESS VISIT  2017     PNEUMOCOCCAL IMMUNIZATION 65+ HIGH/HIGHEST RISK (2 of 2 - PPSV23) 2018     PHQ-2  2019     INFLUENZA VACCINE (1) 2019     FALL RISK ASSESSMENT   10/24/2019       Current Medications:     Current Outpatient Medications   Medication Sig Dispense Refill     L-Glutamine 500 MG CAPS        magnesium 500 MG TABS        METFORMIN HCL PO Take 500 mg by mouth daily (with breakfast)        pyridoxine (VITAMIN B-6) 50 MG TABS Take 1 tablet (50 mg) by mouth 2 times daily 60 tablet 3     triamterene-hydrochlorothiazide (DYAZIDE) 37.5-25 MG per capsule Take 1 capsule by mouth every morning        ACETAMINOPHEN PO Take 325 mg by mouth every 6 hours as needed for pain       fluticasone (FLONASE) 50 MCG/ACT spray Spray 1-2 sprays into both nostrils daily (Patient not taking: Reported on 2019) 1 Bottle 1     loperamide (IMODIUM) 2 MG capsule Take 2 mg by mouth 4 times daily as needed for diarrhea       loratadine (CLARITIN) 10 MG tablet Take 1 tablet (10 mg) by mouth daily (Patient not taking: Reported on 2019) 30 tablet 3         Allergies:        Allergies   Allergen Reactions     Ace Inhibitors Cough     Amoxicillin Hives        Social History:     Social History     Tobacco Use     Smoking status: Former Smoker     Packs/day: 0.25     Years: 20.00     Pack years: 5.00     Last attempt to quit: 1991     Years since quittin.4     Smokeless tobacco: Never Used     Tobacco comment: Quite smoking    Substance Use Topics     Alcohol use: Yes     Comment: 4-7/week if out 1-2x's/week       History   Drug Use No         Family History:         Family History   Problem Relation Age of Onset     Colon Cancer Mother      Breast Cancer Sister      Lymphoma Sister          Physical Exam:     /84 (BP Location: Right arm, Patient Position: Chair, Cuff Size: Adult Large)   Pulse 85   Temp 98.8  F (37.1  C) (Oral)   Resp 12   Wt 122.9 kg (271 lb)   SpO2 96%   BMI 45.10 kg/m     Body mass index is 45.1 kg/m .    General Appearance: healthy and alert, no distress     Musculoskeletal: extremities non tender and without edema    Neurological: normal gait, no  gross defects     Psychiatric: appropriate mood and affect                               HEMATOLOGIC: enlarged left subclavicular lymphnode   ABDOMEN:  Soft, nontender, nondistended.  Well-healed port site incisions.     PELVIC:  Normal external genitalia.  Normal vaginal mucosa.  Normal-appearing vaginal cuff.  No adnexal masses or tenderness.  Rectovaginal confirms.             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     A total of 25 minutes was spent with the patient, 20 minutes of which were spent in counseling the patient and/or treatment planning.      1.  Stage IIIC high-grade serous endometrial cancer.   2.  Possible disease recurrence.  No recurrence on PET scan.      Discussed with patient we will obtain a biopsy to confirm recurrence.  We will see her back after those results.  Patient agrees with the plan.  She is very appreciative of her care.  All questions were answered.       Kevin Henderson MD, MS    Department of Obstetrics and Gynecology   Division of Gynecologic Oncology   [facility]  Phone: [phone]        CC  Patient Care Team:  Levar Scott as PCP - General (Internal Medicine)  Jeanne Blancas MD as Referring Physician (OB/Gyn)

## 2019-10-09 NOTE — NURSING NOTE
Patient's rooming completed by Danita Boyd, Student MA.      All steps completed per rooming protocol.    Coby Anne CMA ([facility])

## 2019-10-10 ENCOUNTER — DOCUMENTATION ONLY (OUTPATIENT)
Dept: RADIOLOGY | Facility: CLINIC | Age: 69
End: 2019-10-10

## 2019-10-10 NOTE — PROGRESS NOTES
Interventional  Radiology consulted for a lymph node biopsy.  9/28/2019 PET Se. 5 Im 207 area can be biopsied this is a high risk biopsy   This patient needs to be seen in Interventional Radiology Clinic    Discussed with Dr.Talaie Shante Del Rio [facility]  [phone] Call pager  [phone] pager

## 2019-10-14 NOTE — TELEPHONE ENCOUNTER
RECORDS RECEIVED FROM: Lymph Node Biopsy consult // ayad rubi [facility] // Dr. Henderson referring // papito internal   DATE RECEIVED: 10.16.19   NOTES STATUS DETAILS   OFFICE NOTE from referring provider Internal 10.9.19 Dr. Kevin Henderson   OFFICE NOTE from other specialist Internal    DISCHARGE SUMMARY from hospital N/A    DISCHARGE REPORT from the ER N/A    OPERATIVE REPORT N/A    MEDICATION LIST Internal    XRAYS (IMAGES & REPORTS) Internal    PATHOLOGY  Slides & report N/A

## 2019-10-16 ENCOUNTER — TELEPHONE (OUTPATIENT)
Facility: CLINIC | Age: 69
End: 2019-10-16

## 2019-10-16 ENCOUNTER — PRE VISIT (OUTPATIENT)
Dept: VASCULAR SURGERY | Facility: CLINIC | Age: 69
End: 2019-10-16

## 2019-10-16 ENCOUNTER — TELEPHONE (OUTPATIENT)
Dept: GENERAL RADIOLOGY | Facility: CLINIC | Age: 69
End: 2019-10-16

## 2019-10-16 DIAGNOSIS — C54.1 ENDOMETRIAL CANCER (H): Primary | ICD-10-CM

## 2019-10-16 NOTE — TELEPHONE ENCOUNTER
IR has been consulted for biopsy in this patient. 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 She has finished adjuvant chemoradiation. During her f/u interval imaging she was noted to have multiple areas concerning for recurrence. PET scan was completed on 9/28. IR has been asked to obtain tissue sample to confirm recurrence. Please see note from Shante Del Rio dated 10/10. Plan for biopsy of left periaortic LN with CT guidance.     Discussed with the patient the procedure as well as the risks and benefits. She would like to proceed. Anticipate 5 business days after the bx before results are available. IR  will reach out to coordinate appt.     Discussed with Dr. Henderson and he would like the sample sent for surg path only.    Jeanne Lozada, STEPHANY, APRN  Interventional Radiology

## 2019-10-22 ENCOUNTER — TELEPHONE (OUTPATIENT)
Dept: INTERVENTIONAL RADIOLOGY/VASCULAR | Facility: CLINIC | Age: 69
End: 2019-10-22

## 2019-10-24 ENCOUNTER — TRANSFERRED RECORDS (OUTPATIENT)
Dept: HEALTH INFORMATION MANAGEMENT | Facility: CLINIC | Age: 69
End: 2019-10-24

## 2019-10-24 ENCOUNTER — APPOINTMENT (OUTPATIENT)
Dept: INTERVENTIONAL RADIOLOGY/VASCULAR | Facility: CLINIC | Age: 69
End: 2019-10-24
Attending: NURSE PRACTITIONER
Payer: MEDICARE

## 2019-10-24 ENCOUNTER — APPOINTMENT (OUTPATIENT)
Dept: MEDSURG UNIT | Facility: CLINIC | Age: 69
End: 2019-10-24
Attending: RADIOLOGY
Payer: MEDICARE

## 2019-10-24 ENCOUNTER — HOSPITAL ENCOUNTER (OUTPATIENT)
Facility: CLINIC | Age: 69
Discharge: HOME | End: 2019-10-24
Attending: RADIOLOGY | Admitting: RADIOLOGY
Payer: MEDICARE

## 2019-10-24 VITALS
TEMPERATURE: 98.2 F | WEIGHT: 274 LBS | BODY MASS INDEX: 45.65 KG/M2 | RESPIRATION RATE: 16 BRPM | HEIGHT: 65 IN | SYSTOLIC BLOOD PRESSURE: 127 MMHG | DIASTOLIC BLOOD PRESSURE: 48 MMHG | HEART RATE: 69 BPM | OXYGEN SATURATION: 94 %

## 2019-10-24 DIAGNOSIS — C54.1 ENDOMETRIAL CANCER (H): ICD-10-CM

## 2019-10-24 LAB
BASOPHILS # BLD AUTO: 0 10E9/L (ref 0–0.2)
BASOPHILS NFR BLD AUTO: 0.7 %
DIFFERENTIAL METHOD BLD: ABNORMAL
EOSINOPHIL # BLD AUTO: 0.2 10E9/L (ref 0–0.7)
EOSINOPHIL NFR BLD AUTO: 3 %
ERYTHROCYTE [DISTWIDTH] IN BLOOD BY AUTOMATED COUNT: 16 % (ref 10–15)
HCT VFR BLD AUTO: 31.5 % (ref 35–47)
HGB BLD-MCNC: 10 G/DL (ref 11.7–15.7)
IMM GRANULOCYTES # BLD: 0 10E9/L (ref 0–0.4)
IMM GRANULOCYTES NFR BLD: 0.3 %
INR PPP: 1.09 (ref 0.86–1.14)
LYMPHOCYTES # BLD AUTO: 1.4 10E9/L (ref 0.8–5.3)
LYMPHOCYTES NFR BLD AUTO: 22.2 %
MCH RBC QN AUTO: 27.9 PG (ref 26.5–33)
MCHC RBC AUTO-ENTMCNC: 31.7 G/DL (ref 31.5–36.5)
MCV RBC AUTO: 88 FL (ref 78–100)
MONOCYTES # BLD AUTO: 0.7 10E9/L (ref 0–1.3)
MONOCYTES NFR BLD AUTO: 10.7 %
NEUTROPHILS # BLD AUTO: 3.9 10E9/L (ref 1.6–8.3)
NEUTROPHILS NFR BLD AUTO: 63.1 %
NRBC # BLD AUTO: 0 10*3/UL
NRBC BLD AUTO-RTO: 0 /100
PLATELET # BLD AUTO: 263 10E9/L (ref 150–450)
RBC # BLD AUTO: 3.59 10E12/L (ref 3.8–5.2)
WBC # BLD AUTO: 6.1 10E9/L (ref 4–11)

## 2019-10-24 PROCEDURE — 40000929 ZZHCL STATISTIC FOUNDATION ONE GENE PANEL: Performed by: OBSTETRICS & GYNECOLOGY

## 2019-10-24 PROCEDURE — 88333 PATH CONSLTJ SURG CYTO XM 1: CPT | Performed by: OBSTETRICS & GYNECOLOGY

## 2019-10-24 PROCEDURE — 96374 THER/PROPH/DIAG INJ IV PUSH: CPT

## 2019-10-24 PROCEDURE — 27210909 ZZH NEEDLE CR5

## 2019-10-24 PROCEDURE — 85610 PROTHROMBIN TIME: CPT | Performed by: RADIOLOGY

## 2019-10-24 PROCEDURE — 99153 MOD SED SAME PHYS/QHP EA: CPT

## 2019-10-24 PROCEDURE — 25000125 ZZHC RX 250: Performed by: STUDENT IN AN ORGANIZED HEALTH CARE EDUCATION/TRAINING PROGRAM

## 2019-10-24 PROCEDURE — 25000128 H RX IP 250 OP 636: Performed by: STUDENT IN AN ORGANIZED HEALTH CARE EDUCATION/TRAINING PROGRAM

## 2019-10-24 PROCEDURE — 40000167 ZZH STATISTIC PP CARE STAGE 2

## 2019-10-24 PROCEDURE — 38505 NEEDLE BIOPSY LYMPH NODES: CPT

## 2019-10-24 PROCEDURE — 36592 COLLECT BLOOD FROM PICC: CPT

## 2019-10-24 PROCEDURE — 88305 TISSUE EXAM BY PATHOLOGIST: CPT | Performed by: OBSTETRICS & GYNECOLOGY

## 2019-10-24 PROCEDURE — 85025 COMPLETE CBC W/AUTO DIFF WBC: CPT | Performed by: RADIOLOGY

## 2019-10-24 RX ORDER — FENTANYL CITRATE 50 UG/ML
25-50 INJECTION, SOLUTION INTRAMUSCULAR; INTRAVENOUS EVERY 5 MIN PRN
Status: DISCONTINUED | OUTPATIENT
Start: 2019-10-24 | End: 2019-10-24 | Stop reason: HOSPADM

## 2019-10-24 RX ORDER — FLUMAZENIL 0.1 MG/ML
0.2 INJECTION, SOLUTION INTRAVENOUS
Status: DISCONTINUED | OUTPATIENT
Start: 2019-10-24 | End: 2019-10-24 | Stop reason: HOSPADM

## 2019-10-24 RX ORDER — ACETAMINOPHEN 325 MG/1
650 TABLET ORAL EVERY 4 HOURS PRN
Status: DISCONTINUED | OUTPATIENT
Start: 2019-10-24 | End: 2019-10-24 | Stop reason: HOSPADM

## 2019-10-24 RX ORDER — DEXTROSE MONOHYDRATE 25 G/50ML
25-50 INJECTION, SOLUTION INTRAVENOUS
Status: DISCONTINUED | OUTPATIENT
Start: 2019-10-24 | End: 2019-10-24 | Stop reason: HOSPADM

## 2019-10-24 RX ORDER — OXYCODONE AND ACETAMINOPHEN 5; 325 MG/1; MG/1
1 TABLET ORAL
Status: DISCONTINUED | OUTPATIENT
Start: 2019-10-24 | End: 2019-10-24 | Stop reason: HOSPADM

## 2019-10-24 RX ORDER — IBUPROFEN 200 MG
400 TABLET ORAL EVERY 6 HOURS PRN
Status: DISCONTINUED | OUTPATIENT
Start: 2019-10-24 | End: 2019-10-24 | Stop reason: HOSPADM

## 2019-10-24 RX ORDER — NALOXONE HYDROCHLORIDE 0.4 MG/ML
.1-.4 INJECTION, SOLUTION INTRAMUSCULAR; INTRAVENOUS; SUBCUTANEOUS
Status: DISCONTINUED | OUTPATIENT
Start: 2019-10-24 | End: 2019-10-24 | Stop reason: HOSPADM

## 2019-10-24 RX ORDER — NICOTINE POLACRILEX 4 MG
15-30 LOZENGE BUCCAL
Status: DISCONTINUED | OUTPATIENT
Start: 2019-10-24 | End: 2019-10-24 | Stop reason: HOSPADM

## 2019-10-24 RX ORDER — SODIUM CHLORIDE 9 MG/ML
INJECTION, SOLUTION INTRAVENOUS CONTINUOUS
Status: DISCONTINUED | OUTPATIENT
Start: 2019-10-24 | End: 2019-10-24 | Stop reason: HOSPADM

## 2019-10-24 RX ORDER — LIDOCAINE 40 MG/G
CREAM TOPICAL
Status: DISCONTINUED | OUTPATIENT
Start: 2019-10-24 | End: 2019-10-24 | Stop reason: HOSPADM

## 2019-10-24 RX ADMIN — MIDAZOLAM 1 MG: 1 INJECTION INTRAMUSCULAR; INTRAVENOUS at 13:53

## 2019-10-24 RX ADMIN — ALTEPLASE 2 MG: 2.2 INJECTION, POWDER, LYOPHILIZED, FOR SOLUTION INTRAVENOUS at 12:24

## 2019-10-24 RX ADMIN — FENTANYL CITRATE 50 MCG: 50 INJECTION INTRAMUSCULAR; INTRAVENOUS at 13:56

## 2019-10-24 RX ADMIN — LIDOCAINE HYDROCHLORIDE 10 ML: 10 INJECTION, SOLUTION EPIDURAL; INFILTRATION; INTRACAUDAL; PERINEURAL at 14:21

## 2019-10-24 RX ADMIN — Medication 500 UNITS: at 16:25

## 2019-10-24 ASSESSMENT — MIFFLIN-ST. JEOR: SCORE: 1778.74

## 2019-10-24 NOTE — PROCEDURES
[facility]    Procedure: IR Procedure Note  Date/Time: 10/24/2019 2:24 PM  Performed by: Reilly Eugene MD  Authorized by: Reilly Eugene MD   IR Fellow Physician: Reilly Eugene MD  Other(s) attending procedure: Rip Root MD    UNIVERSAL PROTOCOL   Site Marked: Yes  Prior Images Obtained and Reviewed:  Yes  Required items: Required blood products, implants, devices and special equipment available    Patient identity confirmed:  Verbally with patient  Patient was reevaluated immediately before administering moderate or deep sedation or anesthesia  Confirmation Checklist:  Patient's identity using two indicators, relevant allergies, procedure was appropriate and matched the consent or emergent situation and correct equipment/implants were available  Time out: Immediately prior to the procedure a time out was called    Preparation: Patient was prepped and draped in usual sterile fashion       ANESTHESIA    Anesthesia: See MAR for details  Local Anesthetic:  Lidocaine 1% without epinephrine      SEDATION    Patient Sedated: Yes    Sedation Type:  Moderate (conscious) sedation  Sedation:  Fentanyl and midazolam  Vital signs: Vital signs monitored during sedation    See dictated procedure note for full details.  Findings: Enlarged periaortic lymph node    Specimens: core needle biopsy specimens sent for pathological analysis    Complications: None    Condition: Stable    Plan: - 2 hours of post procedural observation on 2A  - Core tissue with pathology    PROCEDURE   Patient Tolerance:  Patient tolerated the procedure well with no immediate complications  Describe Procedure: CT guided biopsy of the enlarge periaortic lymph node  Time of Sedation in Minutes by Physician:  25

## 2019-10-24 NOTE — PROGRESS NOTES
1430  Returned to 2A from IR per cart accompanied by RN.  S/P Lymph Node Biopsy.  Site is at Left Flank,  No hematoma or drainage noted.  Denies any pain. Sister is at bedside.  1440   Nutrition of turkey sand & applesauce taken well.  No nausea. Drinking water.  CTRN

## 2019-10-24 NOTE — PROGRESS NOTES
Tolerated bedrest without issues.  Tolerated food, fluids, ambulation and urination.  Port heparin flushed and de-accessed.  Reviewed discharge instructions with patient and her sister.  Left low back site CDI.  Discharged to home with sister.

## 2019-10-24 NOTE — IP AVS SNAPSHOT
MRN:[id number]                      After Visit Summary   10/24/2019    Lu Smith    MRN: [id number]           Visit Information        Department      10/24/2019 11:07 AM [address] [facility]          Review of your medicines      UNREVIEWED medicines. Ask your doctor about these medicines       Dose / Directions   ACETAMINOPHEN PO      Dose:  325 mg  Take 325 mg by mouth every 6 hours as needed for pain  Refills:  0     fluticasone 50 MCG/ACT nasal spray  Commonly known as:  FLONASE  Used for:  Seasonal allergic rhinitis, unspecified trigger      Dose:  1-2 spray  Spray 1-2 sprays into both nostrils daily  Quantity:  1 Bottle  Refills:  1     L-Glutamine 500 MG Caps      Refills:  0     loperamide 2 MG capsule  Commonly known as:  IMODIUM      Dose:  2 mg  Take 2 mg by mouth 4 times daily as needed for diarrhea  Refills:  0     loratadine 10 MG tablet  Commonly known as:  CLARITIN  Used for:  Pain in joint, multiple sites, Chronic seasonal allergic rhinitis, unspecified trigger      Dose:  10 mg  Take 1 tablet (10 mg) by mouth daily  Quantity:  30 tablet  Refills:  3     magnesium 500 MG Tabs      Refills:  0     METFORMIN HCL PO      Dose:  500 mg  Take 500 mg by mouth daily (with breakfast)  Refills:  0     triamterene-HCTZ 37.5-25 MG capsule  Commonly known as:  DYAZIDE      Dose:  1 capsule  Take 1 capsule by mouth every morning  Refills:  0     vitamin B6 50 MG Tabs  Commonly known as:  pyridOXINE  Used for:  Chemotherapy-induced peripheral neuropathy (H)      Dose:  50 mg  Take 1 tablet (50 mg) by mouth 2 times daily  Quantity:  60 tablet  Refills:  3              Protect others around you: Learn how to safely use, store and throw away your medicines at www.disposemymeds.org.       Follow-ups after your visit       Your next 10 appointments already scheduled    Nov 08, 2019 10:00 AM [facility]  [facility] Draw with  [facility] DRAW  [facility] Draw ([facility]) [address]  [address]  [place] [address]  [phone]      Nov 08, 2019 10:30 AM CST  (Arrive by 10:15 AM)  Return Active Treatment with ANU Selby CNP  [facility] ([facility]) [address]  [address]  [place] [address]  [phone]         Care Instructions       Further instructions from your care team       [facility]    Interventional Radiology  Patient Instructions Following Biopsy of Lymph Node    AFTER YOU GO HOME  ? If you were given sedation DO NOT drive or operate machinery at home or at work for at least 24 hours  ? DO relax and take it easy for 48 hours, no strenuous activity for 24 hours  ? DO drink plenty of fluids  ? DO resume your regular diet, unless otherwise instructed by your Primary Physician  ? Keep the dressing dry and in place for 24 hours.  ? DO NOT SMOKE FOR AT LEAST 24 HOURS, if you have been given any medications that were to help you relax or sedate you during your procedure  ? DO NOT drink alcoholic beverages the day of your procedure  ? DO NOT do any strenuous exercise or lifting (> 10 lbs) for at least 7 days following your procedure  ? DO NOT take a bath or shower for at least 12 hours following your procedure  ? Remove dressing after shower the next day. Replace with Band aid for 2 days.  Never leave a wet dressing in place.  ? DO NOT make any important or legal decisions for 24 hours following your procedure  ? There should be minimum drainage from the biopsy site    CALL THE PHYSICIAN IF:  ? You start bleeding from the procedure site.  If you do start to bleed from that site, lie down flat and hold pressure on the site for a minimum of 10 minutes.  Your physician will tell you if you need to return to the hospital  ? You develop nausea or vomiting  ? You have excessive swelling, redness, or tenderness at the site  ? You have drainage that looks like it  "is infected.  ? You experience severe pain  ? You develop hives or a rash or unexplained itching  ? You develop shortness of breath  ? You develop a temperature of 101 degrees F or greater  ? You develop bloody clots or red urine after you are discharged  ? You develop chest pain or cough up blood, lightheadedness or fainting        [facility] INTERVENTIONAL RADIOLOGY DEPARTMENT  Procedure Physician:  Kerry Eugene and Ivett                                    Date of procedure: October 24, 2019  Telephone Numbers: [phone] Monday-Friday 8:00 am to 4:30 pm  [phone] After 4:30 pm Monday-Friday, Weekends & Holidays.   Ask for the Interventional Radiologist on call.  Someone is on call 24 hrs/day  [facility] toll free number: [phone] Monday-Friday 8:00 am to 4:30 pm  [facility] Emergency Dept: [phone]          Additional Information About Your Visit       [employer] Information    [employer] gives you secure access to your electronic health record. If you see a primary care provider, you can also send messages to your care team and make appointments. If you have questions, please call your primary care clinic.  If you do not have a primary care provider, please call [phone] and they will assist you.       Care EveryWhere ID    This is your Care EveryWhere ID. This could be used by other organizations to access your [place] medical records  [id number]       Your Vitals Were  Most recent update: 10/24/2019  3:52 PM    Blood Pressure   113/50          Pulse   69          Temperature   98.2  F (36.8  C) (Oral)          Respirations   16          Height   1.651 m (5' 5\")             Weight   124.3 kg (274 lb)    Pulse Oximetry   94%    BMI (Body Mass Index)   45.60 kg/m           Primary Care Provider Office Phone # Fax #    Levar Scott [phone]      Equal Access to Services    CED COLLINS AH: Geeta Pride, wavondada luqadaha, qaybta kaalrob dimas, angela freitas " lasam enriquez. So [facility] [phone].    ATENCIÓN: Si habla parisa, tiene a cortes disposición servicios gratuitos de asistencia lingüística. Arnold al [phone].    We comply with applicable federal civil rights laws and [place] laws. We do not discriminate on the basis of race, color, national origin, age, disability, sex, sexual orientation, or gender identity.           Thank you!    Thank you for choosing [facility] for your care. Our goal is always to provide you with excellent care. Hearing back from our patients is one way we can continue to improve our services. Please take a few minutes to complete the written survey that you may receive in the mail after you visit with us. Thank you!            Medication List      ASK your doctor about these medications          Morning Afternoon Evening Bedtime As Needed    ACETAMINOPHEN PO  INSTRUCTIONS:  Take 325 mg by mouth every 6 hours as needed for pain                     fluticasone 50 MCG/ACT nasal spray  Also known as:  FLONASE  INSTRUCTIONS:  Spray 1-2 sprays into both nostrils daily                     L-Glutamine 500 MG Caps                     loperamide 2 MG capsule  Also known as:  IMODIUM  INSTRUCTIONS:  Take 2 mg by mouth 4 times daily as needed for diarrhea                     loratadine 10 MG tablet  Also known as:  CLARITIN  INSTRUCTIONS:  Take 1 tablet (10 mg) by mouth daily                     magnesium 500 MG Tabs                     METFORMIN HCL PO  INSTRUCTIONS:  Take 500 mg by mouth daily (with breakfast)                     triamterene-HCTZ 37.5-25 MG capsule  Also known as:  DYAZIDE  INSTRUCTIONS:  Take 1 capsule by mouth every morning                     vitamin B6 50 MG Tabs  Also known as:  pyridOXINE  INSTRUCTIONS:  Take 1 tablet (50 mg) by mouth 2 times daily

## 2019-10-24 NOTE — PRE-PROCEDURE
GENERAL PRE-PROCEDURE:   Procedure:  Retroperitoneal lymph node biopsy - CT guided  Date/Time:  10/24/2019 12:36 PM    Written consent obtained?: Yes    Risks and benefits: Risks, benefits and alternatives were discussed    Consent given by:  Patient  Patient states understanding of procedure being performed: Yes    Patient's understanding of procedure matches consent: Yes    Procedure consent matches procedure scheduled: Yes    Expected level of sedation:  Moderate  Appropriately NPO:  Yes  ASA Class:  Class 3- Severe systemic disease, definite functional limitations  Mallampati  :  Grade 2- soft palate, base of uvula, tonsillar pillars, and portion of posterior pharyngeal wall visible  Lungs:  Lungs clear with good breath sounds bilaterally  Heart:  Normal heart sounds and rate  History & Physical reviewed:  History and physical reviewed and no updates needed  Statement of review:  I have reviewed the lab findings, diagnostic data, medications, and the plan for sedation

## 2019-10-24 NOTE — PROGRESS NOTES
Arrived from home for a lymph node biopsy.  VSS.  Denies pain.  Accessed Port, which infuses but does not draw back.  TpA dwelling,.  PIV placed and labs sent.  H&P current.  Consent obtained.  Awaiting lab results.

## 2019-10-24 NOTE — PROGRESS NOTES
Patient Name: Lu Smith  Medical Record Number: [id number]  Today's Date: 10/24/2019    Procedure:  CT guided left periaortic lymph node biopsy  Proceduralist:  Dr SISSY Eugene, Dr JULIO C Root    Sedation start time: 1352  Sedation end time: 1420  Sedation medications administered: Versed 1  Mg   Fentanyl 50 mcg  Total sedation time: 25 min  Sedation Notes: Tolerated well    Procedure start time: 1350  Puncture time: 1353  Procedure end time: 1425    Report given to: 2 A PEPE Mclean  : No    Other Notes: Pt arrived to IR room CT 2 from 2 A. Consent reviewed. Pt denies any questions or concerns regarding procedure. Pt positioned prone, prepped and monitored per protocol. Cores x 4 collected by Dr Eugene.  Pathology present to review adequacy of the specimens.  Pt tolerated procedure without any noted complications. Pt transferred back to 2 A

## 2019-10-24 NOTE — DISCHARGE INSTRUCTIONS
[facility]    Interventional Radiology  Patient Instructions Following Biopsy of Lymph Node    AFTER YOU GO HOME  ? If you were given sedation DO NOT drive or operate machinery at home or at work for at least 24 hours  ? DO relax and take it easy for 48 hours, no strenuous activity for 24 hours  ? DO drink plenty of fluids  ? DO resume your regular diet, unless otherwise instructed by your Primary Physician  ? Keep the dressing dry and in place for 24 hours.  ? DO NOT SMOKE FOR AT LEAST 24 HOURS, if you have been given any medications that were to help you relax or sedate you during your procedure  ? DO NOT drink alcoholic beverages the day of your procedure  ? DO NOT do any strenuous exercise or lifting (> 10 lbs) for at least 7 days following your procedure  ? DO NOT take a bath or shower for at least 12 hours following your procedure  ? Remove dressing after shower the next day. Replace with Band aid for 2 days.  Never leave a wet dressing in place.  ? DO NOT make any important or legal decisions for 24 hours following your procedure  ? There should be minimum drainage from the biopsy site    CALL THE PHYSICIAN IF:  ? You start bleeding from the procedure site.  If you do start to bleed from that site, lie down flat and hold pressure on the site for a minimum of 10 minutes.  Your physician will tell you if you need to return to the hospital  ? You develop nausea or vomiting  ? You have excessive swelling, redness, or tenderness at the site  ? You have drainage that looks like it is infected.  ? You experience severe pain  ? You develop hives or a rash or unexplained itching  ? You develop shortness of breath  ? You develop a temperature of 101 degrees F or greater  ? You develop bloody clots or red urine after you are discharged  ? You develop chest pain or cough up blood, lightheadedness or fainting        [facility] INTERVENTIONAL RADIOLOGY DEPARTMENT  Procedure Physician:  Kerry Eugene and Ivett                                     Date of procedure: October 24, 2019  Telephone Numbers: [phone] Monday-Friday 8:00 am to 4:30 pm  [phone] After 4:30 pm Monday-Friday, Weekends & Holidays.   Ask for the Interventional Radiologist on call.  Someone is on call 24 hrs/day  [facility] toll free number: [phone] Monday-Friday 8:00 am to 4:30 pm  [facility] Emergency Dept: [phone]

## 2019-10-24 NOTE — IP AVS SNAPSHOT
[address] [address]                                    After Visit Summary   10/24/2019    Lu Smith    MRN: [id number]           After Visit Summary Signature Page    I have received my discharge instructions, and my questions have been answered. I have discussed any challenges I see with this plan with the nurse or doctor.    ..........................................................................................................................................  Patient/Patient Representative Signature      ..........................................................................................................................................  Patient Representative Print Name and Relationship to Patient    ..................................................               ................................................  Date                                   Time    ..........................................................................................................................................  Reviewed by Signature/Title    ...................................................              ..............................................  Date                                               Time          22EPIC Rev 08/18

## 2019-10-24 NOTE — PRE-PROCEDURE
GENERAL PRE-PROCEDURE:   Procedure:  Retroperitoneal lymph node biopsy - CT guided  Date/Time:  10/24/2019 12:33 PM    Written consent obtained?: Yes    Risks and benefits: Risks, benefits and alternatives were discussed    Consent given by:  Patient  Patient states understanding of procedure being performed: Yes    Patient's understanding of procedure matches consent: Yes    Procedure consent matches procedure scheduled: Yes    Expected level of sedation:  Moderate  Appropriately NPO:  Yes  ASA Class:  Class 2- mild systemic disease, no acute problems, no functional limitations  Mallampati  :  Grade 2- soft palate, base of uvula, tonsillar pillars, and portion of posterior pharyngeal wall visible  Lungs:  Lungs clear with good breath sounds bilaterally  Heart:  Normal heart sounds and rate  History & Physical reviewed:  History and physical reviewed and no updates needed  Statement of review:  I have reviewed the lab findings, diagnostic data, medications, and the plan for sedation

## 2019-10-27 LAB — COPATH REPORT: NORMAL

## 2019-10-29 ASSESSMENT — ENCOUNTER SYMPTOMS
SKIN CHANGES: 1
BLOATING: 0
BOWEL INCONTINENCE: 0
TROUBLE SWALLOWING: 0
HOT FLASHES: 0
SORE THROAT: 0
RECTAL PAIN: 0
JAUNDICE: 0
DIARRHEA: 0
NAIL CHANGES: 0
NAUSEA: 1
SMELL DISTURBANCE: 0
BLOOD IN STOOL: 0
TASTE DISTURBANCE: 0
ABDOMINAL PAIN: 1
CONSTIPATION: 0
HEARTBURN: 1
DECREASED LIBIDO: 0
SINUS PAIN: 0
POOR WOUND HEALING: 0
SINUS CONGESTION: 1
NECK MASS: 0
HOARSE VOICE: 1
VOMITING: 0

## 2019-10-30 ENCOUNTER — TELEPHONE (OUTPATIENT)
Dept: ONCOLOGY | Facility: CLINIC | Age: 69
End: 2019-10-30

## 2019-10-30 ENCOUNTER — PATIENT OUTREACH (OUTPATIENT)
Dept: ONCOLOGY | Facility: CLINIC | Age: 69
End: 2019-10-30

## 2019-10-30 ENCOUNTER — ONCOLOGY VISIT (OUTPATIENT)
Dept: ONCOLOGY | Facility: CLINIC | Age: 69
End: 2019-10-30
Attending: OBSTETRICS & GYNECOLOGY
Payer: MEDICARE

## 2019-10-30 VITALS
HEART RATE: 70 BPM | DIASTOLIC BLOOD PRESSURE: 84 MMHG | TEMPERATURE: 98 F | RESPIRATION RATE: 18 BRPM | WEIGHT: 279 LBS | SYSTOLIC BLOOD PRESSURE: 151 MMHG | OXYGEN SATURATION: 98 % | BODY MASS INDEX: 46.43 KG/M2

## 2019-10-30 DIAGNOSIS — Z23 NEED FOR PROPHYLACTIC VACCINATION AND INOCULATION AGAINST INFLUENZA: Primary | ICD-10-CM

## 2019-10-30 PROCEDURE — G0463 HOSPITAL OUTPT CLINIC VISIT: HCPCS | Mod: ZF

## 2019-10-30 PROCEDURE — G0463 HOSPITAL OUTPT CLINIC VISIT: HCPCS | Mod: 25

## 2019-10-30 PROCEDURE — 99214 OFFICE O/P EST MOD 30 MIN: CPT | Mod: 25 | Performed by: OBSTETRICS & GYNECOLOGY

## 2019-10-30 PROCEDURE — 25000128 H RX IP 250 OP 636: Mod: ZF | Performed by: INTERNAL MEDICINE

## 2019-10-30 PROCEDURE — 90662 IIV NO PRSV INCREASED AG IM: CPT | Mod: ZF | Performed by: INTERNAL MEDICINE

## 2019-10-30 PROCEDURE — G0008 ADMIN INFLUENZA VIRUS VAC: HCPCS

## 2019-10-30 RX ADMIN — INFLUENZA A VIRUS A/MICHIGAN/45/2015 X-275 (H1N1) ANTIGEN (FORMALDEHYDE INACTIVATED), INFLUENZA A VIRUS A/SINGAPORE/INFIMH-16-0019/2016 IVR-186 (H3N2) ANTIGEN (FORMALDEHYDE INACTIVATED), AND INFLUENZA B VIRUS B/MARYLAND/15/2016 BX-69A (A B/COLORADO/6/2017-LIKE VIRUS) ANTIGEN (FORMALDEHYDE INACTIVATED) 0.5 ML: 60; 60; 60 INJECTION, SUSPENSION INTRAMUSCULAR at 09:02

## 2019-10-30 ASSESSMENT — PAIN SCALES - GENERAL: PAINLEVEL: MODERATE PAIN (4)

## 2019-10-30 NOTE — PROGRESS NOTES
Care Coordinator Note  Patient seen with Dr. Henderson.  Plan Taxol/Carbo/Avastin every 3 weeks  X 3 cycles then CT scan and return to see Dr. Henderson.  Chemotherapy teaching for Taxol/Carbo/Avastin completed and chemo cards given to patient.  Dr. Henderson requests that patient returns to his clinic prior to cycle #1.  Patient is having an echocardiogram this week and will see her primary care physician prior to starting chemo.  Patient has a chest port.  Patient verbalized back understanding of the above information discussed.     Lalitha Johns MSN, RN  Care Coordinator  Gynecologic Cancer   Office:  [phone]  Pager: [phone] #[phone]

## 2019-10-30 NOTE — PROGRESS NOTES
Follow Up Notes on Referred Patient    Date: 10/30/2019    Dr. 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 She is now found to have metastatic disease.    Today she feels anxious. She has some cramping in the LLQ. Has a good appetite. Constipation is controlled. Denies nausea, vomiting, fever or chills.  Normal urinary and bowel function.  She does have some lower abdominal cramping. No vaginal bleeding or discharge. She is expecting a grandchild in the next few days.    Past Medical History:  Past Medical History:   Diagnosis Date     Diabetes (H)     Type II     Endometrial cancer determined by uterine biopsy (H) 2018     HTN (hypertension)      Obesity      Osteoarthritis      Past Surgical History:  Past Surgical History:   Procedure Laterality Date     CHOLECYSTECTOMY       CYSTOSCOPY N/A 2018    Procedure: CYSTOSCOPY;;  Surgeon: Kevin Henderson MD;  Location: [facility] OR     DAVINCI HYSTERECTOMY TOTAL, BILATERAL SALPINGO-OOPHORECTOMY, COMBINED N/A 2018    Procedure: COMBINED DAVINCI HYSTERECTOMY TOTAL, SALPINGO-OOPHORECTOMY;  DaVinci Assisted Total Laparoscopic Hysterectomy, Bilateral Salpingo-Oophorectomy, Cystoscopy,  Omental biopsy, omentectomy,bilateral  Pelvic And Para Aortic Lymph Node Dissection;  Surgeon: Kevin Henderson MD;  Location: [facility] OR     Partial lumbar discectomy         Health Maintenance Due   Topic Date Due     DEXA  1950     HEPATITIS C SCREENING  1952     ADVANCE CARE PLANNING  1950     LIPID  1997     ZOSTER IMMUNIZATION (1 of 2) 2002     MEDICARE ANNUAL WELLNESS VISIT  2017     PNEUMOCOCCAL IMMUNIZATION 65+ HIGH/HIGHEST RISK (2 of 2 - PPSV23) 2018     PHQ-2  2019     INFLUENZA VACCINE (1) 2019      Current Medications:     Current Outpatient Medications   Medication Sig Dispense Refill     ACETAMINOPHEN PO Take 325 mg by mouth every 6 hours as needed for pain       fluticasone (FLONASE) 50 MCG/ACT spray Spray 1-2 sprays into both nostrils daily (Patient not taking: Reported on 2019) 1 Bottle 1     L-Glutamine 500 MG CAPS        loperamide (IMODIUM) 2 MG capsule Take 2 mg by mouth 4 times daily as needed for diarrhea       loratadine (CLARITIN) 10 MG tablet Take 1 tablet (10 mg) by mouth daily (Patient not taking: Reported on 2019) 30 tablet 3     magnesium 500 MG TABS        METFORMIN HCL PO Take 500 mg by mouth daily (with breakfast)        pyridoxine (VITAMIN B-6) 50 MG TABS Take 1 tablet (50 mg) by mouth 2 times daily 60 tablet 3     triamterene-hydrochlorothiazide (DYAZIDE) 37.5-25 MG per capsule Take 1 capsule by mouth every morning        Allergies:  Allergies   Allergen Reactions     Ace Inhibitors Cough     Amoxicillin Hives      Social History:     Social History     Tobacco Use     Smoking status: Former Smoker     Packs/day: 0.25     Years: 20.00     Pack years: 5.00     Last attempt to quit: 1991     Years since quittin.5     Smokeless tobacco: Never Used     Tobacco comment: Quite smoking    Substance Use Topics     Alcohol use: Yes     Comment: 4-7/week if out 1-2x's/week     History   Drug Use No     Family History:   Family History   Problem Relation Age of Onset     Colon Cancer Mother      Breast Cancer Sister      Lymphoma Sister      Physical Exam:   BP (!) 151/84   Pulse 70   Temp 98  F (36.7  C) (Oral)   Resp 18   Wt 126.6 kg (279 lb)   SpO2 98%   BMI 46.43 kg/m    Body mass index is 46.43 kg/m .    General Appearance: awake and alert, no distress  Eyes: No scleral icterus. Eyes exhibit no discharge.  ENT/Mouth: Oral mucosa pink and moist  Cardiovascular: Normal rate, regular rhythm, S1, S2. +2/6 systolic No murmur. + LE edema.  Respiratory: No  respiratory distress. Clear to auscultation bilaterally. No wheezes.  Gastrointestinal: Obese. Soft. No distension. No tenderness or garding.  Neurological: AAOX3, grossly non-focal  Psychiatric: Looks anxious.  Skin: Skin is warm, not diaphoretic.    PELVIC: Defered    PET/CT 9/28/19  IMPRESSION:   In this 69-year-old female with history of stage IIIC2 serous endometrial cancer status post total hysterectomy and bilateral salpingo-oophorectomy, omentectomy, bilateral pelvic and periaortic dissection, and radiation to the pelvis, and brachytherapy to the vaginal cuff.  1. Progression of disease with new intensely FDG avid para-aortic, mediastinal, and subclavicular lymphadenopathy. The subdiaphragmatic  periaortic lymphadenopathy spans from the diaphragmatic crura to the low abdominal aorta at the level of L4 with greater than 180 degree  involvement of the aorta. There is additional lymphadenopathy within the left axilla and the right inguinal region.       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    A total of 25 minutes was spent with the patient, 20 minutes of which were spent in counseling the patient and/or treatment planning.    1.  Stage IV IIIC high-grade serous endometrial cancer.  2.  Recurrent metastatic endometrial cancer     Disease recurrence confirmed by periaortic lymph node biopsy. Discussed metastatic endometrial cancer is generally not curable but treatable. We recommend chemotherapy with carboplatin, paclitaxel and bevacizumab every 3 weeks. Will repeat CT scan after 3 cycles. If responding, will give another 3 cycles then start bevacizumab maintenance.    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 She is scheduled to have an Echo in a few days. Her BP needs to be better controlled in order to get bevacizumab.    Patient agrees with the  plan.  She is very appreciative of her care.  All questions were answered.     Kevin Henderson MD, MS    Department of Obstetrics and Gynecology   Division of Gynecologic Oncology   [facility]  Phone: [phone]      CC  Patient Care Team:  Levar Scott as PCP - General (Internal Medicine)  Jeanne Blancas MD as Referring Physician (OB/Gyn)  SELF, REFERRED    Answers for HPI/ROS submitted by the patient on 10/29/2019   General Symptoms: No  Skin Symptoms: Yes  HENT Symptoms: Yes  EYE SYMPTOMS: No  HEART SYMPTOMS: No  LUNG SYMPTOMS: No  INTESTINAL SYMPTOMS: Yes  URINARY SYMPTOMS: No  GYNECOLOGIC SYMPTOMS: Yes  BREAST SYMPTOMS: No  SKELETAL SYMPTOMS: No  BLOOD SYMPTOMS: No  NERVOUS SYSTEM SYMPTOMS: No  MENTAL HEALTH SYMPTOMS: No  Changes in hair: No  Changes in moles/birth marks: Yes  Itching: No  Rashes: No  Changes in nails: No  Acne: No  Hair in places you don't want it: No  Change in facial hair: No  Warts: No  Non-healing sores: No  Scarring: No  Flaking of skin: No  Color changes of hands/feet in cold : No  Sun sensitivity: No  Skin thickening: No  Ear pain: No  Ear discharge: No  Hearing loss: No  Tinnitus: No  Nosebleeds: No  Congestion: Yes  Sinus pain: No  Trouble swallowing: No   Voice hoarseness: Yes  Mouth sores: Yes  Sore throat: No  Tooth pain: No  Gum tenderness: No  Bleeding gums: No  Change in taste: No  Change in sense of smell: No  Dry mouth: No  Hearing aid used: No  Neck lump: No  Heart burn or indigestion: Yes  Nausea: Yes  Vomiting: No  Abdominal pain: Yes  Bloating: No  Constipation: No  Diarrhea: No  Blood in stool: No  Black stools: No  Rectal or Anal pain: No  Fecal incontinence: No  Yellowing of skin or eyes: No  Vomit with blood: No  Change in stools: No  Bleeding or spotting between periods: Yes  Heavy or painful periods: No  Irregular periods: No  Vaginal discharge: No  Hot flashes: No  Vaginal dryness: No  Genital ulcers: No  Reduced  libido: No  Painful intercourse: No  Difficulty with sexual arousal: No  Post-menopausal bleeding: No

## 2019-10-30 NOTE — NURSING NOTE
Influenza vaccination give to patient in right arm.  Patient was instructed to wait 15 minutes after injection.    Patient tolerated injection, see ANDRES Anne CMA (AAMA)

## 2019-10-30 NOTE — LETTER
10/30/2019       RE: Lu Smith  [address] [facility] [address]     Dear Colleague,    Thank you for referring your patient, Lu Smith, to the [facility]. Please see a copy of my visit note below.    Follow Up Notes on Referred Patient    Date: 10/30/2019    Dr. 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 She is now found to have metastatic disease.    Today she feels anxious. She has some cramping in the LLQ. Has a good appetite. Constipation is controlled. Denies nausea, vomiting, fever or chills.  Normal urinary and bowel function.  She does have some lower abdominal cramping. No vaginal bleeding or discharge. She is expecting a grandchild in the next few days.    Past Medical History:  Past Medical History:   Diagnosis Date     Diabetes (H)     Type II     Endometrial cancer determined by uterine biopsy (H) 2018     HTN (hypertension)      Obesity      Osteoarthritis      Past Surgical History:  Past Surgical History:   Procedure Laterality Date     CHOLECYSTECTOMY       CYSTOSCOPY N/A 2018    Procedure: CYSTOSCOPY;;  Surgeon: Kevin Henderson MD;  Location: [facility] OR     DAVINCI HYSTERECTOMY TOTAL, BILATERAL SALPINGO-OOPHORECTOMY, COMBINED N/A 2018    Procedure: COMBINED DAVINCI HYSTERECTOMY TOTAL, SALPINGO-OOPHORECTOMY;  DaVinci Assisted Total Laparoscopic Hysterectomy, Bilateral Salpingo-Oophorectomy, Cystoscopy,  Omental biopsy, omentectomy,bilateral  Pelvic And Para Aortic Lymph Node Dissection;  Surgeon: Kevin Henderson MD;  Location: [facility] OR     Partial lumbar discectomy         Health Maintenance Due   Topic Date Due     DEXA  1952     HEPATITIS C SCREENING  1952     ADVANCE CARE PLANNING  1950     LIPID   1997     ZOSTER IMMUNIZATION (1 of 2) 2002     MEDICARE ANNUAL WELLNESS VISIT  2017     PNEUMOCOCCAL IMMUNIZATION 65+ HIGH/HIGHEST RISK (2 of 2 - PPSV23) 2018     PHQ-2  2019     INFLUENZA VACCINE (1) 2019     Current Medications:     Current Outpatient Medications   Medication Sig Dispense Refill     ACETAMINOPHEN PO Take 325 mg by mouth every 6 hours as needed for pain       fluticasone (FLONASE) 50 MCG/ACT spray Spray 1-2 sprays into both nostrils daily (Patient not taking: Reported on 2019) 1 Bottle 1     L-Glutamine 500 MG CAPS        loperamide (IMODIUM) 2 MG capsule Take 2 mg by mouth 4 times daily as needed for diarrhea       loratadine (CLARITIN) 10 MG tablet Take 1 tablet (10 mg) by mouth daily (Patient not taking: Reported on 2019) 30 tablet 3     magnesium 500 MG TABS        METFORMIN HCL PO Take 500 mg by mouth daily (with breakfast)        pyridoxine (VITAMIN B-6) 50 MG TABS Take 1 tablet (50 mg) by mouth 2 times daily 60 tablet 3     triamterene-hydrochlorothiazide (DYAZIDE) 37.5-25 MG per capsule Take 1 capsule by mouth every morning        Allergies:  Allergies   Allergen Reactions     Ace Inhibitors Cough     Amoxicillin Hives      Social History:     Social History     Tobacco Use     Smoking status: Former Smoker     Packs/day: 0.25     Years: 20.00     Pack years: 5.00     Last attempt to quit: 1991     Years since quittin.5     Smokeless tobacco: Never Used     Tobacco comment: Quite smoking    Substance Use Topics     Alcohol use: Yes     Comment: 4-7/week if out 1-2x's/week     History   Drug Use No     Family History:   Family History   Problem Relation Age of Onset     Colon Cancer Mother      Breast Cancer Sister      Lymphoma Sister      Physical Exam:   BP (!) 151/84   Pulse 70   Temp 98  F (36.7  C) (Oral)   Resp 18   Wt 126.6 kg (279 lb)   SpO2 98%   BMI 46.43 kg/m     Body mass index is 46.43 kg/m .    General  Appearance: awake and alert, no distress  Eyes: No scleral icterus. Eyes exhibit no discharge.  ENT/Mouth: Oral mucosa pink and moist  Cardiovascular: Normal rate, regular rhythm, S1, S2. +2/6 systolic No murmur. + LE edema.  Respiratory: No respiratory distress. Clear to auscultation bilaterally. No wheezes.  Gastrointestinal: Obese. Soft. No distension. No tenderness or garding.  Neurological: AAOX3, grossly non-focal  Psychiatric: Looks anxious.  Skin: Skin is warm, not diaphoretic.    PELVIC: Defered    PET/CT 9/28/19  IMPRESSION:   In this 69-year-old female with history of stage IIIC2 serous endometrial cancer status post total hysterectomy and bilateral salpingo-oophorectomy, omentectomy, bilateral pelvic and periaortic dissection, and radiation to the pelvis, and brachytherapy to the vaginal cuff.  1. Progression of disease with new intensely FDG avid para-aortic, mediastinal, and subclavicular lymphadenopathy. The subdiaphragmatic  periaortic lymphadenopathy spans from the diaphragmatic crura to the low abdominal aorta at the level of L4 with greater than 180 degree  involvement of the aorta. There is additional lymphadenopathy within the left axilla and the right inguinal region.       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    A total of 25 minutes was spent with the patient, 20 minutes of which were spent in counseling the patient and/or treatment planning.    1.  Stage IV IIIC high-grade serous endometrial cancer.  2.  Recurrent metastatic endometrial cancer     Disease recurrence confirmed by periaortic lymph node biopsy. Discussed metastatic endometrial cancer is generally not curable but treatable. We recommend chemotherapy with carboplatin, paclitaxel and bevacizumab every 3 weeks. Will repeat CT scan after 3 cycles. If responding, will give another 3 cycles then start bevacizumab  maintenance.    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 She is scheduled to have an Echo in a few days. Her BP needs to be better controlled in order to get bevacizumab.    Patient agrees with the plan.  She is very appreciative of her care.  All questions were answered.     Kevin Henderson MD, MS    Department of Obstetrics and Gynecology   Division of Gynecologic Oncology   [facility]  Phone: [phone]      CC  Patient Care Team:  Levar Scott as PCP - General (Internal Medicine)  eJanne Blancas MD as Referring Physician (OB/Gyn)  SELF, REFERRED    Answers for HPI/ROS submitted by the patient on 10/29/2019   General Symptoms: No  Skin Symptoms: Yes  HENT Symptoms: Yes  EYE SYMPTOMS: No  HEART SYMPTOMS: No  LUNG SYMPTOMS: No  INTESTINAL SYMPTOMS: Yes  URINARY SYMPTOMS: No  GYNECOLOGIC SYMPTOMS: Yes  BREAST SYMPTOMS: No  SKELETAL SYMPTOMS: No  BLOOD SYMPTOMS: No  NERVOUS SYSTEM SYMPTOMS: No  MENTAL HEALTH SYMPTOMS: No  Changes in hair: No  Changes in moles/birth marks: Yes  Itching: No  Rashes: No  Changes in nails: No  Acne: No  Hair in places you don't want it: No  Change in facial hair: No  Warts: No  Non-healing sores: No  Scarring: No  Flaking of skin: No  Color changes of hands/feet in cold : No  Sun sensitivity: No  Skin thickening: No  Ear pain: No  Ear discharge: No  Hearing loss: No  Tinnitus: No  Nosebleeds: No  Congestion: Yes  Sinus pain: No  Trouble swallowing: No   Voice hoarseness: Yes  Mouth sores: Yes  Sore throat: No  Tooth pain: No  Gum tenderness: No  Bleeding gums: No  Change in taste: No  Change in sense of smell: No  Dry mouth: No  Hearing aid used: No  Neck lump: No  Heart burn or indigestion: Yes  Nausea: Yes  Vomiting: No  Abdominal pain: Yes  Bloating: No  Constipation: No  Diarrhea: No  Blood in stool: No  Black stools: No  Rectal or Anal pain: No  Fecal incontinence: No  Yellowing of skin or  eyes: No  Vomit with blood: No  Change in stools: No  Bleeding or spotting between periods: Yes  Heavy or painful periods: No  Irregular periods: No  Vaginal discharge: No  Hot flashes: No  Vaginal dryness: No  Genital ulcers: No  Reduced libido: No  Painful intercourse: No  Difficulty with sexual arousal: No  Post-menopausal bleeding: No      Again, thank you for allowing me to participate in the care of your patient.      Sincerely,    Kevin Henderson MD

## 2019-10-30 NOTE — NURSING NOTE
"Oncology Rooming Note    October 30, 2019 8:15 AM   Lu Smith is a 69 year old female who presents for:    Chief Complaint   Patient presents with     Oncology Clinic Visit     Return; Endometrial Ca     Initial Vitals: BP (!) 155/84   Pulse 70   Temp 98  F (36.7  C) (Oral)   Resp 18   Wt 126.6 kg (279 lb)   SpO2 98%   BMI 46.43 kg/m   Estimated body mass index is 46.43 kg/m  as calculated from the following:    Height as of 10/24/19: 1.651 m (5' 5\").    Weight as of this encounter: 126.6 kg (279 lb). Body surface area is 2.41 meters squared.  Moderate Pain (4) Comment: Data Unavailable   No LMP recorded. Patient has had a hysterectomy.  Allergies reviewed: Yes  Medications reviewed: Yes    Medications: Medication refills not needed today.  Pharmacy name entered into "[employer]: [facility] [address] [place] [address]    Clinical concerns: Here to discuss treatment options. Dr Henderson was notified.      Coby Anne CMA              "

## 2019-10-31 ENCOUNTER — PATIENT OUTREACH (OUTPATIENT)
Dept: ONCOLOGY | Facility: CLINIC | Age: 69
End: 2019-10-31

## 2019-10-31 NOTE — PROGRESS NOTES
Care Coordinator Note  Patient notified regarding appointments on 11/11/19.    Patient verbalized back understanding of the above information discussed.     Lalitha MERCER, RN  Care Coordinator  Gynecologic Cancer   Office:  [phone]  Pager: [phone] #[phone]

## 2019-11-11 ENCOUNTER — ONCOLOGY VISIT (OUTPATIENT)
Dept: ONCOLOGY | Facility: CLINIC | Age: 69
End: 2019-11-11
Attending: OBSTETRICS & GYNECOLOGY
Payer: MEDICARE

## 2019-11-11 VITALS
SYSTOLIC BLOOD PRESSURE: 145 MMHG | BODY MASS INDEX: 45.91 KG/M2 | DIASTOLIC BLOOD PRESSURE: 81 MMHG | RESPIRATION RATE: 16 BRPM | WEIGHT: 275.9 LBS | OXYGEN SATURATION: 97 % | TEMPERATURE: 97.8 F | HEART RATE: 66 BPM

## 2019-11-11 DIAGNOSIS — C54.1 ENDOMETRIAL CANCER (H): ICD-10-CM

## 2019-11-11 DIAGNOSIS — Z79.899 ENCOUNTER FOR LONG-TERM (CURRENT) USE OF MEDICATIONS: Primary | ICD-10-CM

## 2019-11-11 DIAGNOSIS — Z79.899 ENCOUNTER FOR LONG-TERM (CURRENT) USE OF MEDICATIONS: ICD-10-CM

## 2019-11-11 DIAGNOSIS — C54.1 ENDOMETRIAL CANCER (H): Primary | ICD-10-CM

## 2019-11-11 PROBLEM — I10 HYPERTENSION: Status: ACTIVE | Noted: 2019-11-11

## 2019-11-11 PROBLEM — E66.9 OBESITY: Status: ACTIVE | Noted: 2019-11-11

## 2019-11-11 LAB
ALBUMIN SERPL-MCNC: 3.2 G/DL (ref 3.4–5)
ALBUMIN UR-MCNC: NEGATIVE MG/DL
ALP SERPL-CCNC: 56 U/L (ref 40–150)
ALT SERPL W P-5'-P-CCNC: 22 U/L (ref 0–50)
ANION GAP SERPL CALCULATED.3IONS-SCNC: 6 MMOL/L (ref 3–14)
AST SERPL W P-5'-P-CCNC: 15 U/L (ref 0–45)
BASOPHILS # BLD AUTO: 0.1 10E9/L (ref 0–0.2)
BASOPHILS NFR BLD AUTO: 1 %
BILIRUB SERPL-MCNC: 0.6 MG/DL (ref 0.2–1.3)
BUN SERPL-MCNC: 21 MG/DL (ref 7–30)
CALCIUM SERPL-MCNC: 9.4 MG/DL (ref 8.5–10.1)
CHLORIDE SERPL-SCNC: 103 MMOL/L (ref 94–109)
CO2 SERPL-SCNC: 27 MMOL/L (ref 20–32)
CREAT SERPL-MCNC: 0.66 MG/DL (ref 0.52–1.04)
DIFFERENTIAL METHOD BLD: ABNORMAL
EOSINOPHIL # BLD AUTO: 0.2 10E9/L (ref 0–0.7)
EOSINOPHIL NFR BLD AUTO: 3.7 %
ERYTHROCYTE [DISTWIDTH] IN BLOOD BY AUTOMATED COUNT: 15.8 % (ref 10–15)
GFR SERPL CREATININE-BSD FRML MDRD: >90 ML/MIN/{1.73_M2}
GLUCOSE SERPL-MCNC: 130 MG/DL (ref 70–99)
HCT VFR BLD AUTO: 32 % (ref 35–47)
HGB BLD-MCNC: 10.2 G/DL (ref 11.7–15.7)
IMM GRANULOCYTES # BLD: 0 10E9/L (ref 0–0.4)
IMM GRANULOCYTES NFR BLD: 0.3 %
LYMPHOCYTES # BLD AUTO: 1.4 10E9/L (ref 0.8–5.3)
LYMPHOCYTES NFR BLD AUTO: 22.9 %
MAGNESIUM SERPL-MCNC: 1.6 MG/DL (ref 1.6–2.3)
MCH RBC QN AUTO: 27.4 PG (ref 26.5–33)
MCHC RBC AUTO-ENTMCNC: 31.9 G/DL (ref 31.5–36.5)
MCV RBC AUTO: 86 FL (ref 78–100)
MONOCYTES # BLD AUTO: 0.7 10E9/L (ref 0–1.3)
MONOCYTES NFR BLD AUTO: 11.4 %
NEUTROPHILS # BLD AUTO: 3.6 10E9/L (ref 1.6–8.3)
NEUTROPHILS NFR BLD AUTO: 60.7 %
NRBC # BLD AUTO: 0 10*3/UL
NRBC BLD AUTO-RTO: 0 /100
PLATELET # BLD AUTO: 250 10E9/L (ref 150–450)
POTASSIUM SERPL-SCNC: 3.4 MMOL/L (ref 3.4–5.3)
PROT SERPL-MCNC: 7 G/DL (ref 6.8–8.8)
RBC # BLD AUTO: 3.72 10E12/L (ref 3.8–5.2)
SODIUM SERPL-SCNC: 136 MMOL/L (ref 133–144)
WBC # BLD AUTO: 6 10E9/L (ref 4–11)

## 2019-11-11 PROCEDURE — 96411 CHEMO IV PUSH ADDL DRUG: CPT

## 2019-11-11 PROCEDURE — 85025 COMPLETE CBC W/AUTO DIFF WBC: CPT | Performed by: OBSTETRICS & GYNECOLOGY

## 2019-11-11 PROCEDURE — 25000128 H RX IP 250 OP 636: Mod: ZF | Performed by: OBSTETRICS & GYNECOLOGY

## 2019-11-11 PROCEDURE — 80053 COMPREHEN METABOLIC PANEL: CPT | Performed by: OBSTETRICS & GYNECOLOGY

## 2019-11-11 PROCEDURE — G0463 HOSPITAL OUTPT CLINIC VISIT: HCPCS | Mod: ZF

## 2019-11-11 PROCEDURE — 96417 CHEMO IV INFUS EACH ADDL SEQ: CPT

## 2019-11-11 PROCEDURE — 25800030 ZZH RX IP 258 OP 636: Mod: ZF | Performed by: OBSTETRICS & GYNECOLOGY

## 2019-11-11 PROCEDURE — 25000125 ZZHC RX 250: Mod: ZF | Performed by: OBSTETRICS & GYNECOLOGY

## 2019-11-11 PROCEDURE — 96413 CHEMO IV INFUSION 1 HR: CPT

## 2019-11-11 PROCEDURE — 25000132 ZZH RX MED GY IP 250 OP 250 PS 637: Mod: GY,ZF | Performed by: OBSTETRICS & GYNECOLOGY

## 2019-11-11 PROCEDURE — 99214 OFFICE O/P EST MOD 30 MIN: CPT | Mod: ZP | Performed by: OBSTETRICS & GYNECOLOGY

## 2019-11-11 PROCEDURE — 81003 URINALYSIS AUTO W/O SCOPE: CPT | Performed by: OBSTETRICS & GYNECOLOGY

## 2019-11-11 PROCEDURE — 40000556 ZZH STATISTIC PERIPHERAL IV START W US GUIDANCE: Mod: ZF

## 2019-11-11 PROCEDURE — 83735 ASSAY OF MAGNESIUM: CPT | Performed by: OBSTETRICS & GYNECOLOGY

## 2019-11-11 PROCEDURE — 36593 DECLOT VASCULAR DEVICE: CPT

## 2019-11-11 PROCEDURE — 96375 TX/PRO/DX INJ NEW DRUG ADDON: CPT

## 2019-11-11 PROCEDURE — 96415 CHEMO IV INFUSION ADDL HR: CPT

## 2019-11-11 RX ORDER — DIPHENHYDRAMINE HCL 25 MG
50 CAPSULE ORAL ONCE
Status: COMPLETED | OUTPATIENT
Start: 2019-11-11 | End: 2019-11-11

## 2019-11-11 RX ORDER — LORAZEPAM 2 MG/ML
1 INJECTION INTRAMUSCULAR EVERY 6 HOURS PRN
Status: CANCELLED | OUTPATIENT
Start: 2019-11-11

## 2019-11-11 RX ORDER — HEPARIN SODIUM (PORCINE) LOCK FLUSH IV SOLN 100 UNIT/ML 100 UNIT/ML
5 SOLUTION INTRAVENOUS ONCE
Status: COMPLETED | OUTPATIENT
Start: 2019-11-11 | End: 2019-11-11

## 2019-11-11 RX ORDER — DIPHENHYDRAMINE HYDROCHLORIDE 50 MG/ML
50 INJECTION INTRAMUSCULAR; INTRAVENOUS
Status: CANCELLED
Start: 2019-11-11

## 2019-11-11 RX ORDER — EPINEPHRINE 0.3 MG/.3ML
0.3 INJECTION SUBCUTANEOUS EVERY 5 MIN PRN
Status: CANCELLED | OUTPATIENT
Start: 2019-11-11

## 2019-11-11 RX ORDER — SODIUM CHLORIDE 9 MG/ML
1000 INJECTION, SOLUTION INTRAVENOUS CONTINUOUS PRN
Status: CANCELLED
Start: 2019-11-11

## 2019-11-11 RX ORDER — NALOXONE HYDROCHLORIDE 0.4 MG/ML
.1-.4 INJECTION, SOLUTION INTRAMUSCULAR; INTRAVENOUS; SUBCUTANEOUS
Status: CANCELLED | OUTPATIENT
Start: 2019-11-11

## 2019-11-11 RX ORDER — ALBUTEROL SULFATE 90 UG/1
1-2 AEROSOL, METERED RESPIRATORY (INHALATION)
Status: CANCELLED
Start: 2019-11-11

## 2019-11-11 RX ORDER — HEPARIN SODIUM (PORCINE) LOCK FLUSH IV SOLN 100 UNIT/ML 100 UNIT/ML
5 SOLUTION INTRAVENOUS
Status: DISCONTINUED | OUTPATIENT
Start: 2019-11-11 | End: 2019-11-12 | Stop reason: HOSPADM

## 2019-11-11 RX ORDER — PROCHLORPERAZINE MALEATE 10 MG
10 TABLET ORAL EVERY 6 HOURS PRN
Qty: 30 TABLET | Refills: 2 | Status: SHIPPED | OUTPATIENT
Start: 2019-11-11 | End: 2019-11-11

## 2019-11-11 RX ORDER — ALBUTEROL SULFATE 0.83 MG/ML
2.5 SOLUTION RESPIRATORY (INHALATION)
Status: CANCELLED | OUTPATIENT
Start: 2019-11-11

## 2019-11-11 RX ORDER — EPINEPHRINE 1 MG/ML
0.3 INJECTION, SOLUTION INTRAMUSCULAR; SUBCUTANEOUS EVERY 5 MIN PRN
Status: CANCELLED | OUTPATIENT
Start: 2019-11-11

## 2019-11-11 RX ORDER — PROCHLORPERAZINE MALEATE 10 MG
5 TABLET ORAL EVERY 6 HOURS PRN
Qty: 30 TABLET | Refills: 2 | Status: SHIPPED | OUTPATIENT
Start: 2019-11-11 | End: 2020-01-01

## 2019-11-11 RX ORDER — PALONOSETRON 0.05 MG/ML
0.25 INJECTION, SOLUTION INTRAVENOUS ONCE
Status: COMPLETED | OUTPATIENT
Start: 2019-11-11 | End: 2019-11-11

## 2019-11-11 RX ORDER — METHYLPREDNISOLONE SODIUM SUCCINATE 125 MG/2ML
125 INJECTION, POWDER, LYOPHILIZED, FOR SOLUTION INTRAMUSCULAR; INTRAVENOUS
Status: CANCELLED
Start: 2019-11-11

## 2019-11-11 RX ORDER — MEPERIDINE HYDROCHLORIDE 25 MG/ML
25 INJECTION INTRAMUSCULAR; INTRAVENOUS; SUBCUTANEOUS EVERY 30 MIN PRN
Status: CANCELLED | OUTPATIENT
Start: 2019-11-11

## 2019-11-11 RX ADMIN — PACLITAXEL 422 MG: 6 INJECTION, SOLUTION INTRAVENOUS at 09:49

## 2019-11-11 RX ADMIN — SODIUM CHLORIDE 250 ML: 9 INJECTION, SOLUTION INTRAVENOUS at 09:00

## 2019-11-11 RX ADMIN — DIPHENHYDRAMINE HYDROCHLORIDE 50 MG: 25 CAPSULE ORAL at 09:01

## 2019-11-11 RX ADMIN — CARBOPLATIN 800 MG: 10 INJECTION, SOLUTION INTRAVENOUS at 13:33

## 2019-11-11 RX ADMIN — HEPARIN 5 ML: 100 SYRINGE at 14:32

## 2019-11-11 RX ADMIN — PALONOSETRON HYDROCHLORIDE 0.25 MG: 0.25 INJECTION, SOLUTION INTRAVENOUS at 09:01

## 2019-11-11 RX ADMIN — FAMOTIDINE 40 MG: 10 INJECTION, SOLUTION INTRAVENOUS at 09:01

## 2019-11-11 RX ADMIN — BEVACIZUMAB 1900 MG: 400 INJECTION, SOLUTION INTRAVENOUS at 14:26

## 2019-11-11 RX ADMIN — DEXAMETHASONE SODIUM PHOSPHATE 20 MG: 10 INJECTION, SOLUTION INTRAMUSCULAR; INTRAVENOUS at 09:00

## 2019-11-11 RX ADMIN — HEPARIN 5 ML: 100 SYRINGE at 07:18

## 2019-11-11 RX ADMIN — ALTEPLASE 2 MG: 2.2 INJECTION, POWDER, LYOPHILIZED, FOR SOLUTION INTRAVENOUS at 09:59

## 2019-11-11 ASSESSMENT — PAIN SCALES - GENERAL: PAINLEVEL: SEVERE PAIN (6)

## 2019-11-11 NOTE — LETTER
2019       RE: Lu Smith  [address] [facility] [address]     Dear Colleague,    Thank you for referring your patient, Lu Smith, to the [facility]. Please see a copy of my visit note below.                Follow Up Notes on Referred Patient    Date: 2019       Dr. Salvador Eugene MD  No address on file       RE: Lu Smith  : 1950  HEIDY: 2019    Dear Dr. 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 She is now found to have metastatic disease.   Today she feels anxious. She has some cramping in the LLQ. Has a good appetite. Constipation is controlled. Denies nausea, vomiting, fever or chills.  Normal urinary and bowel function.  She does have some lower abdominal cramping. No vaginal bleeding or discharge. She is expecting a grandchild in the next few days.      Past Medical History:    Past Medical History:   Diagnosis Date     Diabetes (H)     Type II     Endometrial cancer determined by uterine biopsy (H) 2018     HTN (hypertension)      Obesity      Osteoarthritis          Past Surgical History:    Past Surgical History:   Procedure Laterality Date     CHOLECYSTECTOMY       CYSTOSCOPY N/A 2018    Procedure: CYSTOSCOPY;;  Surgeon: Kevin Henderson MD;  Location: [facility] OR     DAVINCI HYSTERECTOMY TOTAL, BILATERAL SALPINGO-OOPHORECTOMY, COMBINED N/A 2018    Procedure: COMBINED DAVINCI HYSTERECTOMY TOTAL, SALPINGO-OOPHORECTOMY;  DaVinci Assisted Total Laparoscopic Hysterectomy, Bilateral Salpingo-Oophorectomy, Cystoscopy,  Omental biopsy, omentectomy,bilateral  Pelvic And Para Aortic Lymph Node Dissection;  Surgeon: Kevin Henderson MD;  Location: [facility] OR     Partial lumbar discectomy           Health Maintenance Due   Topic Date Due     DEXA  1952     LIPID  1952     MICROALBUMIN   1952     TSH W/FREE T4 REFLEX  1952     DIABETIC FOOT EXAM  1952     HEPATITIS C SCREENING  1952     ADVANCE CARE PLANNING  1952     EYE EXAM  1952     ZOSTER IMMUNIZATION (1 of 2) 2002     MEDICARE ANNUAL WELLNESS VISIT  2017     PNEUMOCOCCAL IMMUNIZATION 65+ HIGH/HIGHEST RISK (2 of 2 - PPSV23) 2018     A1C  2018     PHQ-2  2019     BMP  10/24/2019       Current Medications:     Current Outpatient Medications   Medication Sig Dispense Refill     ACETAMINOPHEN PO Take 325 mg by mouth every 6 hours as needed for pain       L-Glutamine 500 MG CAPS        loperamide (IMODIUM) 2 MG capsule Take 2 mg by mouth 4 times daily as needed for diarrhea       loratadine (CLARITIN) 10 MG tablet Take 1 tablet (10 mg) by mouth daily 30 tablet 3     magnesium 500 MG TABS        METFORMIN HCL PO Take 500 mg by mouth daily (with breakfast)        pyridoxine (VITAMIN B-6) 50 MG TABS Take 1 tablet (50 mg) by mouth 2 times daily 60 tablet 3     triamterene-hydrochlorothiazide (DYAZIDE) 37.5-25 MG per capsule Take 1 capsule by mouth every morning        fluticasone (FLONASE) 50 MCG/ACT spray Spray 1-2 sprays into both nostrils daily (Patient not taking: Reported on 2019) 1 Bottle 1         Allergies:        Allergies   Allergen Reactions     Ace Inhibitors Cough     Amoxicillin Hives        Social History:     Social History     Tobacco Use     Smoking status: Former Smoker     Packs/day: 0.25     Years: 20.00     Pack years: 5.00     Last attempt to quit: 1991     Years since quittin.5     Smokeless tobacco: Never Used     Tobacco comment: Quite smoking    Substance Use Topics     Alcohol use: Yes     Comment: 4-7/week if out 1-2x's/week       History   Drug Use No         Family History:       Family History   Problem Relation Age of Onset     Colon Cancer Mother      Breast Cancer Sister      Lymphoma Sister          Physical Exam:     BP (!) 145/81  (BP Location: Right arm, Patient Position: Sitting, Cuff Size: Adult Large)   Pulse 66   Temp 97.8  F (36.6  C) (Oral)   Resp 16   Wt 125.1 kg (275 lb 14.4 oz)   SpO2 97%   BMI 45.91 kg/m     Body mass index is 45.91 kg/m .    General Appearance:  awake and alert, no distress  Gastrointestinal: Obese. Soft. No distension. No tenderness or garding.  Neurological: AAOX3, grossly non-focal  Psychiatric: Looks anxious.  Skin: Skin is warm, not diaphoretic.    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     A total of 25 minutes was spent with the patient, 20 minutes of which were spent in counseling the patient and/or treatment planning.     1.  Stage IV IIIC high-grade serous endometrial cancer.  2.  Recurrent metastatic endometrial cancer      Disease recurrence confirmed by periaortic lymph node biopsy. Discussed metastatic endometrial cancer is generally not curable but treatable. We start today with chemotherapy with carboplatin AUC6, paclitaxel 175mg/ms and bevacizumab 15mg/kg every 3 weeks. Will repeat CT scan after 3 cycles. If responding, will give another 3 cycles then start bevacizumab maintenance.     Recent Echo shows no pulmonary hyertension. We will need to keep her BP well controlled for bevacizumab.     Patient agrees with the plan.  She is very appreciative of her care.  All questions were answered.      Kevin Henderson MD, MS    Department of Obstetrics and Gynecology   Division of Gynecologic Oncology   [facility]  Phone: [phone]      CC  Patient Care Team:  Levar Scott as PCP - General (Internal Medicine)  Jeanne Blancas MD as Referring Physician (OB/Gyn)

## 2019-11-11 NOTE — NURSING NOTE
"Oncology Rooming Note    November 11, 2019 7:39 AM   Lu Smith is a 69 year old female who presents for:    Chief Complaint   Patient presents with     Oncology Clinic Visit     Return Endometrail Ca     Blood Draw     Labs drawn via VPT by RN in lab. VS taken. Pt checked in for next appt     Initial Vitals: BP (!) 145/81 (BP Location: Right arm, Patient Position: Sitting, Cuff Size: Adult Large)   Pulse 66   Temp 97.8  F (36.6  C) (Oral)   Resp 16   Wt 125.1 kg (275 lb 14.4 oz)   SpO2 97%   BMI 45.91 kg/m   Estimated body mass index is 45.91 kg/m  as calculated from the following:    Height as of 10/24/19: 1.651 m (5' 5\").    Weight as of this encounter: 125.1 kg (275 lb 14.4 oz). Body surface area is 2.4 meters squared.  Severe Pain (6) Comment: Data Unavailable   No LMP recorded. Patient has had a hysterectomy.  Allergies reviewed: Yes  Medications reviewed: Yes    Medications: Medication refills not needed today.  Pharmacy name entered into [facility]: [facility] - [place], [place] - [address]    Clinical concerns: Blood pressure a little high;        Martha Carroll CMA              "

## 2019-11-11 NOTE — PROGRESS NOTES
Infusion Nursing Note:  Lu Smith presents today for C1D1 Taxol- (#7) Carboplatin-Avastin-Altepase.    Patient seen by provider today: Yes: Dr Henderson   present during visit today: Not Applicable.    Note: PIV placed while Altepase administered in PORT.      Intravenous Access:  Peripheral IV placed.  Implanted Port.    Treatment Conditions:  Lab Results   Component Value Date    HGB 10.2 11/11/2019     Lab Results   Component Value Date    WBC 6.0 11/11/2019      Lab Results   Component Value Date    ANEU 3.6 11/11/2019     Lab Results   Component Value Date     11/11/2019      Lab Results   Component Value Date     11/11/2019                   Lab Results   Component Value Date    POTASSIUM 3.4 11/11/2019           Lab Results   Component Value Date    MAG 1.6 11/11/2019            Lab Results   Component Value Date    CR 0.66 11/11/2019                   Lab Results   Component Value Date    MARYLOU 9.4 11/11/2019                Lab Results   Component Value Date    BILITOTAL 0.6 11/11/2019           Lab Results   Component Value Date    ALBUMIN 3.2 11/11/2019                    Lab Results   Component Value Date    ALT 22 11/11/2019           Lab Results   Component Value Date    AST 15 11/11/2019       Results reviewed, labs MET treatment parameters, ok to proceed with treatment.  Urine negative for protein.  BP:145/81.      Post Infusion Assessment:  Patient tolerated infusion without incident.  Blood return noted pre and post infusion.  Site patent and intact, free from redness, edema or discomfort.  No evidence of extravasations.  Access discontinued per protocol.       Discharge Plan:   Prescription refills given for Compazine.  Discharge instructions reviewed with: Patient.  Patient and/or family verbalized understanding of discharge instructions and all questions answered.  Copy of AVS reviewed with patient and/or family.   Checkout orders not completed by Dr Henderson prior  to discharge.    PT will follow up with scheduling and is aware chemotherapy is due around 12/2.  IB sent to [facility] for follow up.  Patient discharged in stable condition accompanied by: brother.  Departure Mode: Ambulatory.    Kenzie Joya RN

## 2019-11-11 NOTE — NURSING NOTE
Chief Complaint   Patient presents with     Oncology Clinic Visit     Return Endometrail Ca     Blood Draw     Labs drawn via VPT by RN in lab. VS taken. Pt checked in for next appt     Port accessed with 20g gripper needle by RN,no blood return, line flushed with saline and heparin.  Vitals taken. Pt checked in for appointment(s).  Labs collected via VPT by RN. No blood return from port.    Leah WALLER RN PHN BSN  BMT/Oncology Lab

## 2019-11-11 NOTE — PATIENT INSTRUCTIONS
Contact Numbers  [facility]: [phone]    After Hours:  [phone]  Triage: [phone]    Please call the [facility] Triage line if you experience a temperature greater than or equal to 100.5, shaking chills, have uncontrolled nausea, vomiting and/or diarrhea, dizziness, shortness of breath, chest pain, bleeding, unexplained bruising, or if you have any other new/concerning symptoms, questions or concerns.     If it is after hours, weekends, or holidays, please call the main hospital  at  [phone] and ask to speak to the Oncology doctor on call.     If you are having any concerning symptoms or wish to speak to a provider before your next infusion visit, please call your care coordinator or triage to notify them so we can adequately serve you.     If you need a refill on a narcotic prescription or other medication, please call triage before your infusion appointment.

## 2019-11-11 NOTE — PROGRESS NOTES
Follow Up Notes on Referred Patient    Date: 2019       Dr. Salvador Eugene MD  No address on file       RE: Lu Smith  : 1950  HEIDY: 2019    Dear Dr. 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 She is now found to have metastatic disease.   Today she feels anxious. She has some cramping in the LLQ. Has a good appetite. Constipation is controlled. Denies nausea, vomiting, fever or chills.  Normal urinary and bowel function.  She does have some lower abdominal cramping. No vaginal bleeding or discharge. She is expecting a grandchild in the next few days.      Past Medical History:    Past Medical History:   Diagnosis Date     Diabetes (H)     Type II     Endometrial cancer determined by uterine biopsy (H) 2018     HTN (hypertension)      Obesity      Osteoarthritis          Past Surgical History:    Past Surgical History:   Procedure Laterality Date     CHOLECYSTECTOMY       CYSTOSCOPY N/A 2018    Procedure: CYSTOSCOPY;;  Surgeon: Kevin Henderson MD;  Location: [facility] OR     DAVINCI HYSTERECTOMY TOTAL, BILATERAL SALPINGO-OOPHORECTOMY, COMBINED N/A 2018    Procedure: COMBINED DAVINCI HYSTERECTOMY TOTAL, SALPINGO-OOPHORECTOMY;  DaVinci Assisted Total Laparoscopic Hysterectomy, Bilateral Salpingo-Oophorectomy, Cystoscopy,  Omental biopsy, omentectomy,bilateral  Pelvic And Para Aortic Lymph Node Dissection;  Surgeon: Kevin Henderson MD;  Location: [facility] OR     Partial lumbar discectomy           Health Maintenance Due   Topic Date Due     DEXA  1952     LIPID  1952     MICROALBUMIN  1952     TSH W/FREE T4 REFLEX  1952     DIABETIC FOOT EXAM  1952     HEPATITIS C SCREENING  1952     ADVANCE CARE PLANNING  1952     EYE EXAM  1952     ZOSTER IMMUNIZATION (1 of 2) 2002     MEDICARE  ANNUAL WELLNESS VISIT  2017     PNEUMOCOCCAL IMMUNIZATION 65+ HIGH/HIGHEST RISK (2 of 2 - PPSV23) 2018     A1C  2018     PHQ-2  2019     BMP  10/24/2019       Current Medications:     Current Outpatient Medications   Medication Sig Dispense Refill     ACETAMINOPHEN PO Take 325 mg by mouth every 6 hours as needed for pain       L-Glutamine 500 MG CAPS        loperamide (IMODIUM) 2 MG capsule Take 2 mg by mouth 4 times daily as needed for diarrhea       loratadine (CLARITIN) 10 MG tablet Take 1 tablet (10 mg) by mouth daily 30 tablet 3     magnesium 500 MG TABS        METFORMIN HCL PO Take 500 mg by mouth daily (with breakfast)        pyridoxine (VITAMIN B-6) 50 MG TABS Take 1 tablet (50 mg) by mouth 2 times daily 60 tablet 3     triamterene-hydrochlorothiazide (DYAZIDE) 37.5-25 MG per capsule Take 1 capsule by mouth every morning        fluticasone (FLONASE) 50 MCG/ACT spray Spray 1-2 sprays into both nostrils daily (Patient not taking: Reported on 2019) 1 Bottle 1         Allergies:        Allergies   Allergen Reactions     Ace Inhibitors Cough     Amoxicillin Hives        Social History:     Social History     Tobacco Use     Smoking status: Former Smoker     Packs/day: 0.25     Years: 20.00     Pack years: 5.00     Last attempt to quit: 1991     Years since quittin.5     Smokeless tobacco: Never Used     Tobacco comment: Quite smoking    Substance Use Topics     Alcohol use: Yes     Comment: 4-7/week if out 1-2x's/week       History   Drug Use No         Family History:       Family History   Problem Relation Age of Onset     Colon Cancer Mother      Breast Cancer Sister      Lymphoma Sister          Physical Exam:     BP (!) 145/81 (BP Location: Right arm, Patient Position: Sitting, Cuff Size: Adult Large)   Pulse 66   Temp 97.8  F (36.6  C) (Oral)   Resp 16   Wt 125.1 kg (275 lb 14.4 oz)   SpO2 97%   BMI 45.91 kg/m    Body mass index is 45.91 kg/m .    General  Appearance:  awake and alert, no distress  Gastrointestinal: Obese. Soft. No distension. No tenderness or garding.  Neurological: AAOX3, grossly non-focal  Psychiatric: Looks anxious.  Skin: Skin is warm, not diaphoretic.    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     A total of 25 minutes was spent with the patient, 20 minutes of which were spent in counseling the patient and/or treatment planning.     1.  Stage IV IIIC high-grade serous endometrial cancer.  2.  Recurrent metastatic endometrial cancer      Disease recurrence confirmed by periaortic lymph node biopsy. Discussed metastatic endometrial cancer is generally not curable but treatable. We start today with chemotherapy with carboplatin AUC6, paclitaxel 175mg/ms and bevacizumab 15mg/kg every 3 weeks. Will repeat CT scan after 3 cycles. If responding, will give another 3 cycles then start bevacizumab maintenance.     Recent Echo shows no pulmonary hyertension. We will need to keep her BP well controlled for bevacizumab.     Patient agrees with the plan.  She is very appreciative of her care.  All questions were answered.      Kevin Henderson MD, MS    Department of Obstetrics and Gynecology   Division of Gynecologic Oncology   [facility]  Phone: [phone]      CC  Patient Care Team:  Levar Scott as PCP - General (Internal Medicine)  Jeanne Blancas MD as Referring Physician (OB/Gyn)  SELF, REFERRED

## 2019-11-13 LAB — LAB SCANNED RESULT: NORMAL

## 2019-11-14 ENCOUNTER — MYC MEDICAL ADVICE (OUTPATIENT)
Dept: ONCOLOGY | Facility: CLINIC | Age: 69
End: 2019-11-14

## 2019-11-15 LAB — LAB SCANNED RESULT: NORMAL

## 2019-11-18 ENCOUNTER — PATIENT OUTREACH (OUTPATIENT)
Dept: ONCOLOGY | Facility: CLINIC | Age: 69
End: 2019-11-18

## 2019-11-18 NOTE — PROGRESS NOTES
Care Coordinator Note  Called patient in response to MyChart message.  She states she is feeling much better today, eating more and stomach hurts less.  She will talk with her pharmacist about an over the counter medication for acid reflux.  She is taking a laxative and we discussed she may need to continue a bowel regimen until having normal bowel movements.  Reviewed next treatment appointments.        Patient verbalized back understanding of the above information discussed.     Lalitha MERCER, RN  Care Coordinator  Gynecologic Cancer   Office:  [phone]  Pager: [phone] #[phone]

## 2019-11-26 ENCOUNTER — TELEPHONE (OUTPATIENT)
Dept: CARE COORDINATION | Facility: CLINIC | Age: 69
End: 2019-11-26

## 2019-11-26 NOTE — TELEPHONE ENCOUNTER
Oncology Distress Screening Follow-up  Clinical Social Work  [place]    Identified Concern and Score From Distress Screening: How concerned are you about feeling anxious or very scared? : 6    Date of Distress Screenin2019    Data: Pt is a 69 year old female with endometrial cancer    Intervention: SW attempted to follow up by phone regarding distress screen. No answer to attempted call, voicemail message left with SW contact information and request for return call.    Education Provided: SW contact and availability    Follow-up Required: None, SW available to assist should needs be identified/pt returns call.       Soo Yeon Han, MSW, LICSW  Pager: [phone]  Phone: [phone]

## 2019-12-01 ASSESSMENT — ENCOUNTER SYMPTOMS
DIZZINESS: 0
NUMBNESS: 0
SPEECH CHANGE: 0
HEADACHES: 1
MUSCLE WEAKNESS: 1
TASTE DISTURBANCE: 0
RECTAL PAIN: 0
BOWEL INCONTINENCE: 0
TINGLING: 0
PARALYSIS: 0
WEAKNESS: 1
SEIZURES: 0
DISTURBANCES IN COORDINATION: 0
MYALGIAS: 1
BACK PAIN: 0
SORE THROAT: 0
NECK PAIN: 0
HOARSE VOICE: 0
STIFFNESS: 0
NECK MASS: 0
LOSS OF CONSCIOUSNESS: 0
SWOLLEN GLANDS: 0
JAUNDICE: 0
MUSCLE CRAMPS: 1
BRUISES/BLEEDS EASILY: 1
TREMORS: 0
MEMORY LOSS: 1
SMELL DISTURBANCE: 0
ARTHRALGIAS: 1
JOINT SWELLING: 0

## 2019-12-02 ENCOUNTER — DOCUMENTATION ONLY (OUTPATIENT)
Dept: ONCOLOGY | Facility: CLINIC | Age: 69
End: 2019-12-02

## 2019-12-02 ENCOUNTER — HOSPITAL ENCOUNTER (EMERGENCY)
Facility: CLINIC | Age: 69
Discharge: HOME | End: 2019-12-02
Attending: EMERGENCY MEDICINE | Admitting: EMERGENCY MEDICINE
Payer: MEDICARE

## 2019-12-02 ENCOUNTER — ONCOLOGY VISIT (OUTPATIENT)
Dept: ONCOLOGY | Facility: CLINIC | Age: 69
End: 2019-12-02
Attending: NURSE PRACTITIONER
Payer: MEDICARE

## 2019-12-02 ENCOUNTER — APPOINTMENT (OUTPATIENT)
Dept: LAB | Facility: CLINIC | Age: 69
End: 2019-12-02
Attending: OBSTETRICS & GYNECOLOGY
Payer: MEDICARE

## 2019-12-02 ENCOUNTER — INFUSION THERAPY VISIT (OUTPATIENT)
Dept: ONCOLOGY | Facility: CLINIC | Age: 69
End: 2019-12-02
Attending: OBSTETRICS & GYNECOLOGY
Payer: MEDICARE

## 2019-12-02 VITALS
OXYGEN SATURATION: 98 % | BODY MASS INDEX: 45.43 KG/M2 | HEART RATE: 80 BPM | WEIGHT: 273 LBS | DIASTOLIC BLOOD PRESSURE: 78 MMHG | RESPIRATION RATE: 16 BRPM | TEMPERATURE: 97.7 F | SYSTOLIC BLOOD PRESSURE: 142 MMHG

## 2019-12-02 VITALS
OXYGEN SATURATION: 94 % | RESPIRATION RATE: 18 BRPM | TEMPERATURE: 97.7 F | SYSTOLIC BLOOD PRESSURE: 149 MMHG | DIASTOLIC BLOOD PRESSURE: 78 MMHG | HEART RATE: 93 BPM

## 2019-12-02 VITALS
SYSTOLIC BLOOD PRESSURE: 147 MMHG | DIASTOLIC BLOOD PRESSURE: 91 MMHG | RESPIRATION RATE: 24 BRPM | OXYGEN SATURATION: 98 %

## 2019-12-02 DIAGNOSIS — R10.84 ABDOMINAL PAIN, GENERALIZED: ICD-10-CM

## 2019-12-02 DIAGNOSIS — T50.905A MEDICATION REACTION, INITIAL ENCOUNTER: ICD-10-CM

## 2019-12-02 DIAGNOSIS — C54.1 ENDOMETRIAL CANCER (H): Primary | ICD-10-CM

## 2019-12-02 DIAGNOSIS — Z79.899 ENCOUNTER FOR LONG-TERM (CURRENT) USE OF MEDICATIONS: ICD-10-CM

## 2019-12-02 DIAGNOSIS — G89.18 JOINT PAIN FOLLOWING CHEMOTHERAPY: ICD-10-CM

## 2019-12-02 DIAGNOSIS — R07.89 OTHER CHEST PAIN: ICD-10-CM

## 2019-12-02 DIAGNOSIS — T45.1X5A ANTINEOPLASTIC ANTIBIOTICS CAUSING ADVERSE EFFECT IN THERAPEUTIC USE: ICD-10-CM

## 2019-12-02 DIAGNOSIS — M25.50 JOINT PAIN FOLLOWING CHEMOTHERAPY: ICD-10-CM

## 2019-12-02 DIAGNOSIS — C54.1 ENDOMETRIAL CANCER (H): ICD-10-CM

## 2019-12-02 LAB
ALBUMIN SERPL-MCNC: 3 G/DL (ref 3.4–5)
ALBUMIN UR-MCNC: NEGATIVE MG/DL
ALP SERPL-CCNC: 67 U/L (ref 40–150)
ALT SERPL W P-5'-P-CCNC: 25 U/L (ref 0–50)
ANION GAP SERPL CALCULATED.3IONS-SCNC: 4 MMOL/L (ref 3–14)
AST SERPL W P-5'-P-CCNC: 16 U/L (ref 0–45)
BASOPHILS # BLD AUTO: 0.1 10E9/L (ref 0–0.2)
BASOPHILS NFR BLD AUTO: 1.1 %
BILIRUB SERPL-MCNC: 0.6 MG/DL (ref 0.2–1.3)
BUN SERPL-MCNC: 20 MG/DL (ref 7–30)
CALCIUM SERPL-MCNC: 8.8 MG/DL (ref 8.5–10.1)
CHLORIDE SERPL-SCNC: 104 MMOL/L (ref 94–109)
CO2 SERPL-SCNC: 28 MMOL/L (ref 20–32)
CREAT SERPL-MCNC: 0.74 MG/DL (ref 0.52–1.04)
DIFFERENTIAL METHOD BLD: ABNORMAL
EOSINOPHIL # BLD AUTO: 0.4 10E9/L (ref 0–0.7)
EOSINOPHIL NFR BLD AUTO: 4.9 %
ERYTHROCYTE [DISTWIDTH] IN BLOOD BY AUTOMATED COUNT: 16.1 % (ref 10–15)
GFR SERPL CREATININE-BSD FRML MDRD: 84 ML/MIN/{1.73_M2}
GLUCOSE SERPL-MCNC: 126 MG/DL (ref 70–99)
HCT VFR BLD AUTO: 32 % (ref 35–47)
HGB BLD-MCNC: 10.1 G/DL (ref 11.7–15.7)
IMM GRANULOCYTES # BLD: 0.1 10E9/L (ref 0–0.4)
IMM GRANULOCYTES NFR BLD: 1.3 %
LYMPHOCYTES # BLD AUTO: 1.4 10E9/L (ref 0.8–5.3)
LYMPHOCYTES NFR BLD AUTO: 19.7 %
MAGNESIUM SERPL-MCNC: 1.7 MG/DL (ref 1.6–2.3)
MCH RBC QN AUTO: 27.5 PG (ref 26.5–33)
MCHC RBC AUTO-ENTMCNC: 31.6 G/DL (ref 31.5–36.5)
MCV RBC AUTO: 87 FL (ref 78–100)
MONOCYTES # BLD AUTO: 0.8 10E9/L (ref 0–1.3)
MONOCYTES NFR BLD AUTO: 11.6 %
NEUTROPHILS # BLD AUTO: 4.4 10E9/L (ref 1.6–8.3)
NEUTROPHILS NFR BLD AUTO: 61.4 %
NRBC # BLD AUTO: 0 10*3/UL
NRBC BLD AUTO-RTO: 0 /100
PLATELET # BLD AUTO: 161 10E9/L (ref 150–450)
POTASSIUM SERPL-SCNC: 4.3 MMOL/L (ref 3.4–5.3)
PROT SERPL-MCNC: 7.1 G/DL (ref 6.8–8.8)
RBC # BLD AUTO: 3.67 10E12/L (ref 3.8–5.2)
SODIUM SERPL-SCNC: 136 MMOL/L (ref 133–144)
WBC # BLD AUTO: 7.1 10E9/L (ref 4–11)

## 2019-12-02 PROCEDURE — 83735 ASSAY OF MAGNESIUM: CPT | Performed by: OBSTETRICS & GYNECOLOGY

## 2019-12-02 PROCEDURE — 96372 THER/PROPH/DIAG INJ SC/IM: CPT | Mod: 59

## 2019-12-02 PROCEDURE — 99214 OFFICE O/P EST MOD 30 MIN: CPT | Mod: ZP | Performed by: NURSE PRACTITIONER

## 2019-12-02 PROCEDURE — 25000128 H RX IP 250 OP 636: Mod: ZF | Performed by: NURSE PRACTITIONER

## 2019-12-02 PROCEDURE — 96413 CHEMO IV INFUSION 1 HR: CPT

## 2019-12-02 PROCEDURE — 99283 EMERGENCY DEPT VISIT LOW MDM: CPT

## 2019-12-02 PROCEDURE — G0463 HOSPITAL OUTPT CLINIC VISIT: HCPCS | Mod: ZF

## 2019-12-02 PROCEDURE — 25000132 ZZH RX MED GY IP 250 OP 250 PS 637: Mod: GY,ZF | Performed by: NURSE PRACTITIONER

## 2019-12-02 PROCEDURE — 96375 TX/PRO/DX INJ NEW DRUG ADDON: CPT

## 2019-12-02 PROCEDURE — 25000125 ZZHC RX 250: Mod: ZF | Performed by: NURSE PRACTITIONER

## 2019-12-02 PROCEDURE — 96417 CHEMO IV INFUS EACH ADDL SEQ: CPT

## 2019-12-02 PROCEDURE — G0463 HOSPITAL OUTPT CLINIC VISIT: HCPCS | Mod: 25

## 2019-12-02 PROCEDURE — 96415 CHEMO IV INFUSION ADDL HR: CPT

## 2019-12-02 PROCEDURE — 99284 EMERGENCY DEPT VISIT MOD MDM: CPT | Mod: 25 | Performed by: FAMILY MEDICINE

## 2019-12-02 PROCEDURE — 81003 URINALYSIS AUTO W/O SCOPE: CPT | Performed by: OBSTETRICS & GYNECOLOGY

## 2019-12-02 PROCEDURE — 93010 ELECTROCARDIOGRAM REPORT: CPT | Mod: Z6 | Performed by: FAMILY MEDICINE

## 2019-12-02 PROCEDURE — 80053 COMPREHEN METABOLIC PANEL: CPT | Performed by: OBSTETRICS & GYNECOLOGY

## 2019-12-02 PROCEDURE — 25000128 H RX IP 250 OP 636: Performed by: FAMILY MEDICINE

## 2019-12-02 PROCEDURE — 93005 ELECTROCARDIOGRAM TRACING: CPT

## 2019-12-02 PROCEDURE — 25800030 ZZH RX IP 258 OP 636: Mod: ZF | Performed by: NURSE PRACTITIONER

## 2019-12-02 PROCEDURE — 85025 COMPLETE CBC W/AUTO DIFF WBC: CPT | Performed by: OBSTETRICS & GYNECOLOGY

## 2019-12-02 RX ORDER — HEPARIN SODIUM,PORCINE 10 UNIT/ML
5 VIAL (ML) INTRAVENOUS
Status: CANCELLED | OUTPATIENT
Start: 2019-12-02

## 2019-12-02 RX ORDER — EPINEPHRINE 1 MG/ML
0.3 INJECTION, SOLUTION INTRAMUSCULAR; SUBCUTANEOUS EVERY 5 MIN PRN
Status: DISCONTINUED | OUTPATIENT
Start: 2019-12-02 | End: 2019-12-02 | Stop reason: HOSPADM

## 2019-12-02 RX ORDER — DIPHENHYDRAMINE HCL 25 MG
50 CAPSULE ORAL ONCE
Status: COMPLETED | OUTPATIENT
Start: 2019-12-02 | End: 2019-12-02

## 2019-12-02 RX ORDER — EPINEPHRINE 1 MG/ML
0.3 INJECTION, SOLUTION INTRAMUSCULAR; SUBCUTANEOUS EVERY 5 MIN PRN
Status: CANCELLED | OUTPATIENT
Start: 2019-12-02

## 2019-12-02 RX ORDER — METHYLPREDNISOLONE SODIUM SUCCINATE 125 MG/2ML
125 INJECTION, POWDER, LYOPHILIZED, FOR SOLUTION INTRAMUSCULAR; INTRAVENOUS
Status: CANCELLED
Start: 2019-12-02

## 2019-12-02 RX ORDER — HEPARIN SODIUM (PORCINE) LOCK FLUSH IV SOLN 100 UNIT/ML 100 UNIT/ML
5 SOLUTION INTRAVENOUS EVERY 8 HOURS
Status: DISCONTINUED | OUTPATIENT
Start: 2019-12-02 | End: 2019-12-02 | Stop reason: HOSPADM

## 2019-12-02 RX ORDER — HEPARIN SODIUM (PORCINE) LOCK FLUSH IV SOLN 100 UNIT/ML 100 UNIT/ML
5 SOLUTION INTRAVENOUS
Status: DISCONTINUED | OUTPATIENT
Start: 2019-12-02 | End: 2019-12-02 | Stop reason: HOSPADM

## 2019-12-02 RX ORDER — MEPERIDINE HYDROCHLORIDE 25 MG/ML
25 INJECTION INTRAMUSCULAR; INTRAVENOUS; SUBCUTANEOUS EVERY 30 MIN PRN
Status: CANCELLED | OUTPATIENT
Start: 2019-12-02

## 2019-12-02 RX ORDER — SODIUM CHLORIDE 9 MG/ML
1000 INJECTION, SOLUTION INTRAVENOUS CONTINUOUS PRN
Status: CANCELLED
Start: 2019-12-02

## 2019-12-02 RX ORDER — ALBUTEROL SULFATE 0.83 MG/ML
2.5 SOLUTION RESPIRATORY (INHALATION)
Status: CANCELLED | OUTPATIENT
Start: 2019-12-02

## 2019-12-02 RX ORDER — HEPARIN SODIUM (PORCINE) LOCK FLUSH IV SOLN 100 UNIT/ML 100 UNIT/ML
5 SOLUTION INTRAVENOUS
Status: CANCELLED | OUTPATIENT
Start: 2019-12-02

## 2019-12-02 RX ORDER — DIPHENHYDRAMINE HYDROCHLORIDE 50 MG/ML
50 INJECTION INTRAMUSCULAR; INTRAVENOUS
Status: CANCELLED
Start: 2019-12-02

## 2019-12-02 RX ORDER — DIPHENHYDRAMINE HYDROCHLORIDE 50 MG/ML
50 INJECTION INTRAMUSCULAR; INTRAVENOUS
Status: COMPLETED | OUTPATIENT
Start: 2019-12-02 | End: 2019-12-02

## 2019-12-02 RX ORDER — LORAZEPAM 2 MG/ML
1 INJECTION INTRAMUSCULAR EVERY 6 HOURS PRN
Status: CANCELLED | OUTPATIENT
Start: 2019-12-02

## 2019-12-02 RX ORDER — DIPHENHYDRAMINE HCL 25 MG
50 CAPSULE ORAL ONCE
Status: CANCELLED
Start: 2019-12-02

## 2019-12-02 RX ORDER — ALBUTEROL SULFATE 90 UG/1
1-2 AEROSOL, METERED RESPIRATORY (INHALATION)
Status: CANCELLED
Start: 2019-12-02

## 2019-12-02 RX ORDER — NALOXONE HYDROCHLORIDE 0.4 MG/ML
.1-.4 INJECTION, SOLUTION INTRAMUSCULAR; INTRAVENOUS; SUBCUTANEOUS
Status: CANCELLED | OUTPATIENT
Start: 2019-12-02

## 2019-12-02 RX ORDER — METHYLPREDNISOLONE SODIUM SUCCINATE 125 MG/2ML
125 INJECTION, POWDER, LYOPHILIZED, FOR SOLUTION INTRAMUSCULAR; INTRAVENOUS
Status: DISCONTINUED | OUTPATIENT
Start: 2019-12-02 | End: 2019-12-02 | Stop reason: HOSPADM

## 2019-12-02 RX ORDER — SODIUM CHLORIDE 9 MG/ML
1000 INJECTION, SOLUTION INTRAVENOUS CONTINUOUS PRN
Status: DISCONTINUED | OUTPATIENT
Start: 2019-12-02 | End: 2019-12-02 | Stop reason: HOSPADM

## 2019-12-02 RX ORDER — PALONOSETRON 0.05 MG/ML
0.25 INJECTION, SOLUTION INTRAVENOUS ONCE
Status: COMPLETED | OUTPATIENT
Start: 2019-12-02 | End: 2019-12-02

## 2019-12-02 RX ORDER — HEPARIN SODIUM (PORCINE) LOCK FLUSH IV SOLN 100 UNIT/ML 100 UNIT/ML
5 SOLUTION INTRAVENOUS ONCE
Status: COMPLETED | OUTPATIENT
Start: 2019-12-02 | End: 2019-12-02

## 2019-12-02 RX ORDER — EPINEPHRINE 0.3 MG/.3ML
0.3 INJECTION SUBCUTANEOUS EVERY 5 MIN PRN
Status: CANCELLED | OUTPATIENT
Start: 2019-12-02

## 2019-12-02 RX ORDER — PALONOSETRON 0.05 MG/ML
0.25 INJECTION, SOLUTION INTRAVENOUS ONCE
Status: CANCELLED
Start: 2019-12-02

## 2019-12-02 RX ADMIN — METHYLPREDNISOLONE SODIUM SUCCINATE 125 MG: 125 INJECTION, POWDER, FOR SOLUTION INTRAMUSCULAR; INTRAVENOUS at 15:42

## 2019-12-02 RX ADMIN — PALONOSETRON HYDROCHLORIDE 0.25 MG: 0.25 INJECTION, SOLUTION INTRAVENOUS at 10:40

## 2019-12-02 RX ADMIN — DEXAMETHASONE SODIUM PHOSPHATE 20 MG: 10 INJECTION, SOLUTION INTRAMUSCULAR; INTRAVENOUS at 10:39

## 2019-12-02 RX ADMIN — HEPARIN 5 ML: 100 SYRINGE at 18:54

## 2019-12-02 RX ADMIN — SODIUM CHLORIDE 1000 ML: 9 INJECTION, SOLUTION INTRAVENOUS at 15:41

## 2019-12-02 RX ADMIN — HEPARIN 5 ML: 100 SYRINGE at 15:54

## 2019-12-02 RX ADMIN — HEPARIN 5 ML: 100 SYRINGE at 09:39

## 2019-12-02 RX ADMIN — EPINEPHRINE 0.3 MG: 1 INJECTION INTRAMUSCULAR; INTRAVENOUS; SUBCUTANEOUS at 15:46

## 2019-12-02 RX ADMIN — DIPHENHYDRAMINE HYDROCHLORIDE 50 MG: 25 CAPSULE ORAL at 10:40

## 2019-12-02 RX ADMIN — CARBOPLATIN 750 MG: 10 INJECTION, SOLUTION INTRAVENOUS at 14:51

## 2019-12-02 RX ADMIN — SODIUM CHLORIDE 250 ML: 9 INJECTION, SOLUTION INTRAVENOUS at 10:39

## 2019-12-02 RX ADMIN — FAMOTIDINE 40 MG: 10 INJECTION, SOLUTION INTRAVENOUS at 10:40

## 2019-12-02 RX ADMIN — EPINEPHRINE 0.3 MG: 1 INJECTION INTRAMUSCULAR; INTRAVENOUS; SUBCUTANEOUS at 15:40

## 2019-12-02 RX ADMIN — PACLITAXEL 422 MG: 6 INJECTION, SOLUTION INTRAVENOUS at 11:24

## 2019-12-02 RX ADMIN — DIPHENHYDRAMINE HYDROCHLORIDE 50 MG: 50 INJECTION, SOLUTION INTRAMUSCULAR; INTRAVENOUS at 15:41

## 2019-12-02 ASSESSMENT — ENCOUNTER SYMPTOMS
EYE REDNESS: 0
DIFFICULTY URINATING: 0
CONFUSION: 0
NECK STIFFNESS: 0
ARTHRALGIAS: 0
COLOR CHANGE: 0
HEADACHES: 0
SHORTNESS OF BREATH: 0
FATIGUE: 1
ABDOMINAL PAIN: 0
FEVER: 0

## 2019-12-02 ASSESSMENT — PAIN SCALES - GENERAL: PAINLEVEL: NO PAIN (0)

## 2019-12-02 NOTE — ED TRIAGE NOTES
Pt BIBA from clinic receiving endometrial chemotherapy and started having chest tightness and hoarse voice. No reaction to previous chemo treatment. Epi x2, benadryl, and 125mg solumedrol given with effectiveness. Pt currently denies CP/SOB, VSS.

## 2019-12-02 NOTE — CONSULTS
Gynecology Oncology Progress Note    HPI: Lu Smith is a 69 year old with Stage IIIC2 endometrial cancer, that presented to clinic today for chemotherapy infusion (Carboplatin/Paclitaxel/Bevacizumab). At approximately 3 pm, with 8 minutes left of Carboplatin dose #8 she developed chest tightness and hoarseness.  She was given SoluMedrol, Benadryl, and IM epinephrine with resolution of chest tightness. Per chart review patient has hoarse voice and possible oropharyngeal edema, thus was given a second dose of epinephrine. She was sent to the Emergency Department for continued observation.    Currently, she feels well. States she did not have any further episodes of chest tightness. Her voice hoarseness has resolved. Denies any skin changes or abdominal pain. No shortness of breath.    Cancer History:  Diagnosis: Stage IIIC2 serous endometrial cancer  Primary GYN oncologist: Dr. Kevin Henderson  Primary radiation oncologist: Dr. nAa Michelle  Surgery: 2/23/18: TLH-BSO, omentectomy, bilateral pelvic and para-aortic lymph node dissection, pelvic washings  Chemotherapy:  3/30/18-9/28/18: Six cycles of carboplatin AUC 6 and paclitaxel 175mg/m2, delivered sandwich style with pelvic radiation between cycles three and four  Radiation: 6/4/18-7/12/18: EBRT delivered to pelvis and para-aortic nodes, 5040 cGy in 28 fractions  7/16/18-7/20/18: HDR brachytherapy delivered to the vaginal cuff, 1200 cGy in two fractions  Complications: Transient neuropathy, mild thrombocytopenia and anemia  Genetic testing: Panel testing negative     9/16/19: Chest CT performed for pulmonary nodule follow up, concerning for recurrent disease  IMPRESSION:   1. Few bilateral sub-4 mm solid pulmonary nodules are unchanged  compared to 2/6/2018. No new or enlarging pulmonary nodules.  2. Dilated main pulmonary artery, nonspecific but may be seen with  pulmonary artery hypertension.  3. New 1.9 cm left subclavicular node.     9/28/19: PET  impression:  IMPRESSION:   In this 69-year-old female with history of stage IIIC2 serous  endometrial cancer status post total hysterectomy and bilateral  salpingo-oophorectomy, omentectomy, bilateral pelvic and periaortic  dissection, and radiation to the pelvis, and brachytherapy to the  vaginal cuff.  1. Progression of disease with new intensely FDG avid para-aortic,  mediastinal, and subclavicular lymphadenopathy. The subdiaphragmatic  periaortic lymphadenopathy spans from the diaphragmatic crura to the  low abdominal aorta at the level of L4 with greater than 180 degree  involvement of the aorta. There is additional lymphadenopathy within  the left axilla and the right inguinal region.     9/28/19: Neck CT impression:  1. Progression of metastatic disease with new intensely FDG avid  retroclavicular lymph node at the level of the left thoracic inlet.   2. On the fusion PET CT, there is no definite abnormal metabolic  activity in the mucosal space, soft tissues, or cervical jamel chains.     3. No evidence of mucosal or soft tissue abnormality on contrast  enhanced neck CT.  4. Please refer to the whole body PET CT performed as a separate  report, for the findings of the remainder of the body.     10/24/19: Lymph node biopsy:  Lymph node, periaortic, CT guided core biopsy:   -METASTATIC ADENOCARCINOMA   -Tumor morphology is consistent with metastasis/recurrence of endometrial   serous carcinoma      11/11/19: C1 paclitaxel 175mg/m2 + carboplatin AUC 6 + bevacizumab 15mg/kg     12/2/19: C2 paclitaxel 175mg/m2 + carboplatin AUC 6. Patient with reaction to carboplatin. Did not recieve bevacizumab. Presented to ED for observation.    O:  Patient Vitals for the past 6 hrs:   BP Temp Temp src Pulse Resp SpO2   12/02/19 1656 (!) 147/78 -- -- 74 -- 95 %   12/02/19 1628 (!) 155/86 97.7  F (36.5  C) Oral 77 18 95 %     Gen: Awake, alert, no acute distress  CV: Regular rate and rhythm  Lungs: Clear to ascultation  bilaterally  Ab: Soft, non-tender to palpation, non-distended  Extremities: No erythema, edema  Skin: No rashes visible    A/P: Lu Smith is a 69 year old with Stage IIIC2 endometrial cancer that presented to the ED following a reaction to her 8th dose of Carboplatin. She received Solumedrol, Benadryl, and Epi x 2. Currently asymptomatic. No significant findings on exam. Clinic already notified, and will contact patient to set up next infusion as an inpatient for carboplatin desensitization.     Triny Turner MD  OB/GYN PGY-2  12/02/19 6:49 PM

## 2019-12-02 NOTE — NURSING NOTE
"Oncology Rooming Note    December 2, 2019 8:58 AM   Lu Smith is a 69 year old female who presents for:    Chief Complaint   Patient presents with     Oncology Clinic Visit     Return Endometrial Ca     Initial Vitals: BP (!) 155/88   Pulse 80   Temp 97.7  F (36.5  C) (Oral)   Resp 16   Wt 123.8 kg (273 lb)   SpO2 98%   BMI 45.43 kg/m   Estimated body mass index is 45.43 kg/m  as calculated from the following:    Height as of 10/24/19: 1.651 m (5' 5\").    Weight as of this encounter: 123.8 kg (273 lb). Body surface area is 2.38 meters squared.  No Pain (0) Comment: Data Unavailable   No LMP recorded. Patient has had a hysterectomy.  Allergies reviewed: Yes  Medications reviewed: Yes    Medications: Medication refills not needed today.  Pharmacy name entered into [facility]: [facility] [address] [place] [address]    Clinical concerns: stomach pains last infusion; joint pains; muscle pain and mouth sores.      Martha Carroll CMA              "

## 2019-12-02 NOTE — PROGRESS NOTES
Gynecologic Oncology Follow-Up Note    RE: Lu Smith  MRN: [id number]  : 1950  Date of Visit: 2019    CC: Recurrent high grade serous endometrial cancer    HPI: Lu Smith is a 69 year old year old female presenting today for disease management with carboplatin/paclitaxel/bevacizumab. She is accompanied by her ex- Alvino who has been supportive during her recurrence thus far. She felt poorly the first week- had abdominal pain that was present after eating. She limited her food and fluid intake during that time- mary was helpful during this time, taking in mary tea and mary kombucha. She started Pepcid which was also helpful. No nausea or vomiting. She had constipation relieved with Dulcolax x1, had diarrhea once which resolved with loperamide. Had some small mouth sores which have resolved. Denies neuropathy. Had a few mild headaches relieved with ibuprofen. Fatigued but able to rest and complete ADLs. Did note bone pains that caused difficulty sleeping- took lorazepam at HS a few times which helped her sleep but did not help with the pain. She states she is anxious because she felt so poorly, worried about how she will feel in the future. She questions whether she would want more treatment if she continues to have discomfort but states she wants to be around to help take care of her son and grandkids as her daughter in law is currently in treatment for metastatic cancer as well. Eating and drinking well after the first week. No fevers, chills, bleeding. Feels ready for more treatment today.    Oncology History:  Diagnosis: Stage IIIC2 serous endometrial cancer  Primary GYN oncologist: Dr. Kevin Henderson  Primary radiation oncologist: Dr. Ana Michelle  Surgery: 18: TLH-BSO, omentectomy, bilateral pelvic and para-aortic lymph node dissection, pelvic washings  Chemotherapy:  3/30/18-18: Six cycles of carboplatin AUC 6 and paclitaxel 175mg/m2, delivered sandwich style  with pelvic radiation between cycles three and four  Radiation: 6/4/18-7/12/18: EBRT delivered to pelvis and para-aortic nodes, 5040 cGy in 28 fractions  7/16/18-7/20/18: HDR brachytherapy delivered to the vaginal cuff, 1200 cGy in two fractions  Complications: Transient neuropathy, mild thrombocytopenia and anemia  Genetic testing: Panel testing negative    9/16/19: Chest CT performed for pulmonary nodule follow up, concerning for recurrent disease  IMPRESSION:   1. Few bilateral sub-4 mm solid pulmonary nodules are unchanged  compared to 2/6/2018. No new or enlarging pulmonary nodules.  2. Dilated main pulmonary artery, nonspecific but may be seen with  pulmonary artery hypertension.  3. New 1.9 cm left subclavicular node.    9/28/19: PET impression:  IMPRESSION:   In this 69-year-old female with history of stage IIIC2 serous  endometrial cancer status post total hysterectomy and bilateral  salpingo-oophorectomy, omentectomy, bilateral pelvic and periaortic  dissection, and radiation to the pelvis, and brachytherapy to the  vaginal cuff.  1. Progression of disease with new intensely FDG avid para-aortic,  mediastinal, and subclavicular lymphadenopathy. The subdiaphragmatic  periaortic lymphadenopathy spans from the diaphragmatic crura to the  low abdominal aorta at the level of L4 with greater than 180 degree  involvement of the aorta. There is additional lymphadenopathy within  the left axilla and the right inguinal region.     9/28/19: Neck CT impression:  1. Progression of metastatic disease with new intensely FDG avid  retroclavicular lymph node at the level of the left thoracic inlet.   2. On the fusion PET CT, there is no definite abnormal metabolic  activity in the mucosal space, soft tissues, or cervical jamel chains.     3. No evidence of mucosal or soft tissue abnormality on contrast  enhanced neck CT.  4. Please refer to the whole body PET CT performed as a separate  report, for the findings of the  remainder of the body.    10/24/19: Lymph node biopsy:  Lymph node, periaortic, CT guided core biopsy:   -METASTATIC ADENOCARCINOMA   -Tumor morphology is consistent with metastasis/recurrence of endometrial   serous carcinoma     11/11/19: C1 paclitaxel 175mg/m2 + carboplatin AUC 6 + bevacizumab 15mg/kg    12/2/19: C1 paclitaxel 175mg/m2 + carboplatin AUC 6 + bevacizumab 15mg/kg    Past Medical History:   Diagnosis Date     Diabetes (H)     Type II     Endometrial cancer determined by uterine biopsy (H) 02/2018     HTN (hypertension)      Obesity      Osteoarthritis        Past Surgical History:   Procedure Laterality Date     CHOLECYSTECTOMY  1993     CYSTOSCOPY N/A 2/23/2018    Procedure: CYSTOSCOPY;;  Surgeon: Kevin Henderson MD;  Location: [facility] OR     DAVINCI HYSTERECTOMY TOTAL, BILATERAL SALPINGO-OOPHORECTOMY, COMBINED N/A 2/23/2018    Procedure: COMBINED DAVINCI HYSTERECTOMY TOTAL, SALPINGO-OOPHORECTOMY;  DaVinci Assisted Total Laparoscopic Hysterectomy, Bilateral Salpingo-Oophorectomy, Cystoscopy,  Omental biopsy, omentectomy,bilateral  Pelvic And Para Aortic Lymph Node Dissection;  Surgeon: Kevin Henderson MD;  Location: [facility] OR     Partial lumbar discectomy  1998       Current Outpatient Medications   Medication     ACETAMINOPHEN PO     fluticasone (FLONASE) 50 MCG/ACT spray     L-Glutamine 500 MG CAPS     loperamide (IMODIUM) 2 MG capsule     loratadine (CLARITIN) 10 MG tablet     magnesium 500 MG TABS     METFORMIN HCL PO     prochlorperazine (COMPAZINE) 10 MG tablet     pyridoxine (VITAMIN B-6) 50 MG TABS     triamterene-hydrochlorothiazide (DYAZIDE) 37.5-25 MG per capsule     No current facility-administered medications for this visit.        Allergies   Allergen Reactions     Ace Inhibitors Cough     Amoxicillin Hives       Family History   Problem Relation Age of Onset     Colon Cancer Mother      Breast Cancer Sister      Lymphoma Sister        Social History     Socioeconomic History      Marital status:      Spouse name: Not on file     Number of children: 3     Years of education: Not on file     Highest education level: Not on file   Occupational History     Occupation: conroller   Social Needs     Financial resource strain: Not on file     Food insecurity:     Worry: Not on file     Inability: Not on file     Transportation needs:     Medical: Not on file     Non-medical: Not on file   Tobacco Use     Smoking status: Former Smoker     Packs/day: 0.25     Years: 20.00     Pack years: 5.00     Last attempt to quit: 1991     Years since quittin.6     Smokeless tobacco: Never Used     Tobacco comment: Quite smoking    Substance and Sexual Activity     Alcohol use: Yes     Comment: 4-7/week if out 1-2x's/week     Drug use: No     Sexual activity: Not on file   Lifestyle     Physical activity:     Days per week: Not on file     Minutes per session: Not on file     Stress: Not on file   Relationships     Social connections:     Talks on phone: Not on file     Gets together: Not on file     Attends Muslim service: Not on file     Active member of club or organization: Not on file     Attends meetings of clubs or organizations: Not on file     Relationship status: Not on file     Intimate partner violence:     Fear of current or ex partner: Not on file     Emotionally abused: Not on file     Physically abused: Not on file     Forced sexual activity: Not on file   Other Topics Concern     Parent/sibling w/ CABG, MI or angioplasty before 65F 55M? No   Social History Narrative    Daughter  at age 25 from leukemia.  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           ROS  Answers for HPI/ROS submitted by the patient on 2019   General Symptoms: Yes  Skin Symptoms: No  HENT Symptoms: Yes  EYE SYMPTOMS: No  HEART SYMPTOMS: No  LUNG SYMPTOMS: No  INTESTINAL SYMPTOMS: Yes  URINARY SYMPTOMS: No  GYNECOLOGIC SYMPTOMS: No  BREAST SYMPTOMS: No  SKELETAL SYMPTOMS: Yes  BLOOD  SYMPTOMS: Yes  NERVOUS SYSTEM SYMPTOMS: Yes  MENTAL HEALTH SYMPTOMS: No   Voice hoarseness: No  Mouth sores: Yes  Sore throat: No  Gum tenderness: Yes  Bleeding gums: No  Change in taste: No  Change in sense of smell: No  Dry mouth: Yes  Hearing aid used: No  Neck lump: No  Black stools: No  Rectal or Anal pain: No  Fecal incontinence: No  Yellowing of skin or eyes: No  Vomit with blood: No  Change in stools: Yes  Back pain: No  Muscle aches: Yes  Neck pain: No  Swollen joints: No  Joint pain: Yes  Bone pain: No  Muscle cramps: Yes  Muscle weakness: Yes  Joint stiffness: No  Bone fracture: No  Anemia: No  Swollen glands: No  Easy bleeding or bruising: Yes  Edema or swelling: No  Trouble with coordination: No  Dizziness or trouble with balance: No  Fainting or black-out spells: No  Memory loss: Yes  Headache: Yes  Seizures: No  Speech problems: No  Tingling: No  Tremor: No  Weakness: Yes  Difficulty walking: No  Paralysis: No  Numbness: No    I have reviewed the patient's ROS and discussed all pertinent information as noted in the HPI.          Physical Exam:    BP (!) 142/78   Pulse 80   Temp 97.7  F (36.5  C) (Oral)   Resp 16   Wt 123.8 kg (273 lb)   SpO2 98%   BMI 45.43 kg/m      CONSTITUTIONAL: Alert non-toxic appearing female in no acute distress  HEAD: Normocephalic, atraumatic  EYES: PERRLA; no scleral icterus  ENT: Oropharynx pink without lesions  NECK: Neck supple without lymphadenopathy  RESPIRATORY: Lungs clear to auscultation, no increased work of breathing noted  CV: Regular rate and rhythm, S1S2, no clicks, murmurs, rubs, or gallops; bilateral lower extremities without edema, dorsalis pedis pulses 2+ bilaterally  GASTROINTESTINAL: Normoactive bowel sounds x4 quadrants, abdomen soft, non-distended, and non-tender to palpation without masses or organomegaly; no guarding, rebound tenderness, or rigidity  GENITOURINARY: Not indicated  LYMPHATIC: Cervical, supraclavicular, and inguinal nodes without  lymphadenopathy  MUSCULOSKELETAL: Moves all extremities, no obvious muscle wasting  NEUROLOGIC: No gross deficits, normal gait  SKIN: Appropriate color for race, warm and dry, no rashes or lesions to unclothed skin  PSYCHIATRIC: Pleasant and interactive, affect bright, makes appropriate eye contact, thought process linear    Labs:      12/2/2019  Day 1   Hemoglobin 11.7 - 15.7 g/dL 10.1 (A)   Hematocrit 35.0 - 47.0 % 32.0 (A)   Platelet Count 150 - 450 10e9/L 161   Absolute Neutrophil 1.6 - 8.3 10e9/L 4.4   Sodium 133 - 144 mmol/L 136   Potassium 3.4 - 5.3 mmol/L 4.3   Chloride 94 - 109 mmol/L 104   Carbon Dioxide 20 - 32 mmol/L 28   Urea Nitrogen 7 - 30 mg/dL 20   Creatinine 0.52 - 1.04 mg/dL 0.74   Calcium 8.5 - 10.1 mg/dL 8.8   Magnesium 1.6 - 2.3 mg/dL 1.7   Bilirubin Total 0.2 - 1.3 mg/dL 0.6   ALT 0 - 50 U/L 25   AST 0 - 45 U/L 16   Alkaline Phosphatase 40 - 150 U/L 67   Albumin 3.4 - 5.0 g/dL 3.0 (A)   Protein Total 6.8 - 8.8 g/dL 7.1   WBC 4.0 - 11.0 10e9/L 7.1   Urine protein negative    Assessment/Plan:  1) Recurrent endometrial cancer: Felt poorly this first cycle but without dose limiting effects. Does feel ready for more treatment- may proceed with cycle two of carboplatin AUC 6 + paclitaxel 175mg/m2 + bevacizumab 15mg/kg q21d as originally ordered by Dr. Henderson. To receive total of three cycles followed by imaging and treatment planning with Dr. Henderson. Reviewed signs and symptoms for when she should contact the clinic or seek additional care, including but not limited to fever, chills, inability to keep down food or fluids, nausea and vomiting not controlled with antiemetics, and diarrhea leading to dehydration. Patient to contact the clinic with any questions or concerns in the interim.  2) Chemotherapy/disease effects:   Abdominal pain: Afebrile, VSS, no peritoneal signs. Improved with starting famotidine- to continue this daily, may take Tums if needed as well. To stop ibuprofen as this  could be contributing to abdominal pain. Discussed Tylenol if needed, oxycodone- declines oxycodone at this time. Reviewed s/sxs bowel perforation with bevacizumab and when to seek emergency care.   Arthralgias: Bothersome the first week- to stop ibuprofen due to concerns with abdominal pain and potential for bleeding, renal impairment, HTN. May start Tylenol 1g TID, continue loratadine. Discussed oxycodone as her arthralgias prevented her from sleeping well but she declines. Discussed that lorazepam may be used for anxiety/sleep/nausea but is not a good medication for pain control.   Headaches: Mild, improved with ibuprofen. To try Tylenol instead if she develops another headache. Reviewed red flags with headaches and bevacizumab, when to seek further care.   Fatigue: Able to complete ADLs and rest. Encouraged physical activity as well.   Anxiety: Notes anxiety about symptoms and her future. Declines counseling or social work at this time but open to seeing palliative medicine- referral placed.   Mouth sores: Not evident on exam- reviewed salt and soda rinses   Hypoalbuminemia: Reviewed adding in protein supplements, small frequent meals   3) Genetic counseling: Panel testing negative  4) Health maintenance issues discussed include to follow up with PCP for non-gynecologic concerns and co-morbid conditions  5) Patient verbalized understanding of and agreement with plan    A total of 30 minutes of face to face time were spent with the patient with over 50% of that time spent in counseling, coordination of care, education, and symptom management.    ANU Selby, FNP-C  Division of Gynecologic Oncology  [facility]  Pager: [phone]

## 2019-12-02 NOTE — ED NOTES
Bed: IN04  Expected date:   Expected time:   Means of arrival:   Comments:  romy Smith    Allergic reaction to carboplatin   Chest tightness, hoarse voice     VSS, 's     2 doses epi, benadryl and solumedrol given     Symptoms improved, sent to ED for 2 hours of observation

## 2019-12-02 NOTE — LETTER
2019       RE: Lu Smith  [address] [place] [address]     Dear Colleague,    Thank you for referring your patient, Lu Smith, to the [facility]. Please see a copy of my visit note below.    Gynecologic Oncology Follow-Up Note    RE: Lu Smith  MRN: [id number]  : 1950  Date of Visit: 2019    CC: Recurrent high grade serous endometrial cancer    HPI: Lu Smith is a 69 year old year old female presenting today for disease management with carboplatin/paclitaxel/bevacizumab. She is accompanied by her ex- Alvino who has been supportive during her recurrence thus far. She felt poorly the first week- had abdominal pain that was present after eating. She limited her food and fluid intake during that time- mary was helpful during this time, taking in mary tea and mary kombucha. She started Pepcid which was also helpful. No nausea or vomiting. She had constipation relieved with Dulcolax x1, had diarrhea once which resolved with loperamide. Had some small mouth sores which have resolved. Denies neuropathy. Had a few mild headaches relieved with ibuprofen. Fatigued but able to rest and complete ADLs. Did note bone pains that caused difficulty sleeping- took lorazepam at HS a few times which helped her sleep but did not help with the pain. She states she is anxious because she felt so poorly, worried about how she will feel in the future. She questions whether she would want more treatment if she continues to have discomfort but states she wants to be around to help take care of her son and grandkids as her daughter in law is currently in treatment for metastatic cancer as well. Eating and drinking well after the first week. No fevers, chills, bleeding. Feels ready for more treatment today.    Oncology History:  Diagnosis: Stage IIIC2 serous endometrial cancer  Primary GYN oncologist: Dr. Kevin Henderson  Primary radiation oncologist: Dr. Perez  Michelle  Surgery: 2/23/18: TLH-BSO, omentectomy, bilateral pelvic and para-aortic lymph node dissection, pelvic washings  Chemotherapy:  3/30/18-9/28/18: Six cycles of carboplatin AUC 6 and paclitaxel 175mg/m2, delivered sandwich style with pelvic radiation between cycles three and four  Radiation: 6/4/18-7/12/18: EBRT delivered to pelvis and para-aortic nodes, 5040 cGy in 28 fractions  7/16/18-7/20/18: HDR brachytherapy delivered to the vaginal cuff, 1200 cGy in two fractions  Complications: Transient neuropathy, mild thrombocytopenia and anemia  Genetic testing: Panel testing negative    9/16/19: Chest CT performed for pulmonary nodule follow up, concerning for recurrent disease  IMPRESSION:   1. Few bilateral sub-4 mm solid pulmonary nodules are unchanged  compared to 2/6/2018. No new or enlarging pulmonary nodules.  2. Dilated main pulmonary artery, nonspecific but may be seen with  pulmonary artery hypertension.  3. New 1.9 cm left subclavicular node.    9/28/19: PET impression:  IMPRESSION:   In this 69-year-old female with history of stage IIIC2 serous  endometrial cancer status post total hysterectomy and bilateral  salpingo-oophorectomy, omentectomy, bilateral pelvic and periaortic  dissection, and radiation to the pelvis, and brachytherapy to the  vaginal cuff.  1. Progression of disease with new intensely FDG avid para-aortic,  mediastinal, and subclavicular lymphadenopathy. The subdiaphragmatic  periaortic lymphadenopathy spans from the diaphragmatic crura to the  low abdominal aorta at the level of L4 with greater than 180 degree  involvement of the aorta. There is additional lymphadenopathy within  the left axilla and the right inguinal region.     9/28/19: Neck CT impression:  1. Progression of metastatic disease with new intensely FDG avid  retroclavicular lymph node at the level of the left thoracic inlet.   2. On the fusion PET CT, there is no definite abnormal metabolic  activity in the mucosal  space, soft tissues, or cervical jamel chains.     3. No evidence of mucosal or soft tissue abnormality on contrast  enhanced neck CT.  4. Please refer to the whole body PET CT performed as a separate  report, for the findings of the remainder of the body.    10/24/19: Lymph node biopsy:  Lymph node, periaortic, CT guided core biopsy:   -METASTATIC ADENOCARCINOMA   -Tumor morphology is consistent with metastasis/recurrence of endometrial   serous carcinoma     11/11/19: C1 paclitaxel 175mg/m2 + carboplatin AUC 6 + bevacizumab 15mg/kg    12/2/19: C1 paclitaxel 175mg/m2 + carboplatin AUC 6 + bevacizumab 15mg/kg    Past Medical History:   Diagnosis Date     Diabetes (H)     Type II     Endometrial cancer determined by uterine biopsy (H) 02/2018     HTN (hypertension)      Obesity      Osteoarthritis        Past Surgical History:   Procedure Laterality Date     CHOLECYSTECTOMY  1993     CYSTOSCOPY N/A 2/23/2018    Procedure: CYSTOSCOPY;;  Surgeon: Kevin Henderson MD;  Location: [facility] OR     DAVINCI HYSTERECTOMY TOTAL, BILATERAL SALPINGO-OOPHORECTOMY, COMBINED N/A 2/23/2018    Procedure: COMBINED DAVINCI HYSTERECTOMY TOTAL, SALPINGO-OOPHORECTOMY;  DaVinci Assisted Total Laparoscopic Hysterectomy, Bilateral Salpingo-Oophorectomy, Cystoscopy,  Omental biopsy, omentectomy,bilateral  Pelvic And Para Aortic Lymph Node Dissection;  Surgeon: Kevin Henderson MD;  Location: [facility] OR     Partial lumbar discectomy  1998       Current Outpatient Medications   Medication     ACETAMINOPHEN PO     fluticasone (FLONASE) 50 MCG/ACT spray     L-Glutamine 500 MG CAPS     loperamide (IMODIUM) 2 MG capsule     loratadine (CLARITIN) 10 MG tablet     magnesium 500 MG TABS     METFORMIN HCL PO     prochlorperazine (COMPAZINE) 10 MG tablet     pyridoxine (VITAMIN B-6) 50 MG TABS     triamterene-hydrochlorothiazide (DYAZIDE) 37.5-25 MG per capsule     No current facility-administered medications for this visit.        Allergies   Allergen  Reactions     Ace Inhibitors Cough     Amoxicillin Hives       Family History   Problem Relation Age of Onset     Colon Cancer Mother      Breast Cancer Sister      Lymphoma Sister        Social History     Socioeconomic History     Marital status:      Spouse name: Not on file     Number of children: 3     Years of education: Not on file     Highest education level: Not on file   Occupational History     Occupation: conroller   Social Needs     Financial resource strain: Not on file     Food insecurity:     Worry: Not on file     Inability: Not on file     Transportation needs:     Medical: Not on file     Non-medical: Not on file   Tobacco Use     Smoking status: Former Smoker     Packs/day: 0.25     Years: 20.00     Pack years: 5.00     Last attempt to quit: 1991     Years since quittin.6     Smokeless tobacco: Never Used     Tobacco comment: Quite smoking    Substance and Sexual Activity     Alcohol use: Yes     Comment: 4-7/week if out 1-2x's/week     Drug use: No     Sexual activity: Not on file   Lifestyle     Physical activity:     Days per week: Not on file     Minutes per session: Not on file     Stress: Not on file   Relationships     Social connections:     Talks on phone: Not on file     Gets together: Not on file     Attends Roman [employer] service: Not on file     Active member of club or organization: Not on file     Attends meetings of clubs or organizations: Not on file     Relationship status: Not on file     Intimate partner violence:     Fear of current or ex partner: Not on file     Emotionally abused: Not on file     Physically abused: Not on file     Forced sexual activity: Not on file   Other Topics Concern     Parent/sibling w/ CABG, MI or angioplasty before 65F 55M? No   Social History Narrative    Daughter  at age 25 from leukemia.  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           ROS  Answers for HPI/ROS submitted by the patient on 2019   General  Symptoms: Yes  Skin Symptoms: No  HENT Symptoms: Yes  EYE SYMPTOMS: No  HEART SYMPTOMS: No  LUNG SYMPTOMS: No  INTESTINAL SYMPTOMS: Yes  URINARY SYMPTOMS: No  GYNECOLOGIC SYMPTOMS: No  BREAST SYMPTOMS: No  SKELETAL SYMPTOMS: Yes  BLOOD SYMPTOMS: Yes  NERVOUS SYSTEM SYMPTOMS: Yes  MENTAL HEALTH SYMPTOMS: No   Voice hoarseness: No  Mouth sores: Yes  Sore throat: No  Gum tenderness: Yes  Bleeding gums: No  Change in taste: No  Change in sense of smell: No  Dry mouth: Yes  Hearing aid used: No  Neck lump: No  Black stools: No  Rectal or Anal pain: No  Fecal incontinence: No  Yellowing of skin or eyes: No  Vomit with blood: No  Change in stools: Yes  Back pain: No  Muscle aches: Yes  Neck pain: No  Swollen joints: No  Joint pain: Yes  Bone pain: No  Muscle cramps: Yes  Muscle weakness: Yes  Joint stiffness: No  Bone fracture: No  Anemia: No  Swollen glands: No  Easy bleeding or bruising: Yes  Edema or swelling: No  Trouble with coordination: No  Dizziness or trouble with balance: No  Fainting or black-out spells: No  Memory loss: Yes  Headache: Yes  Seizures: No  Speech problems: No  Tingling: No  Tremor: No  Weakness: Yes  Difficulty walking: No  Paralysis: No  Numbness: No    I have reviewed the patient's ROS and discussed all pertinent information as noted in the HPI.          Physical Exam:    BP (!) 142/78   Pulse 80   Temp 97.7  F (36.5  C) (Oral)   Resp 16   Wt 123.8 kg (273 lb)   SpO2 98%   BMI 45.43 kg/m       CONSTITUTIONAL: Alert non-toxic appearing female in no acute distress  HEAD: Normocephalic, atraumatic  EYES: PERRLA; no scleral icterus  ENT: Oropharynx pink without lesions  NECK: Neck supple without lymphadenopathy  RESPIRATORY: Lungs clear to auscultation, no increased work of breathing noted  CV: Regular rate and rhythm, S1S2, no clicks, murmurs, rubs, or gallops; bilateral lower extremities without edema, dorsalis pedis pulses 2+ bilaterally  GASTROINTESTINAL: Normoactive bowel sounds x4  quadrants, abdomen soft, non-distended, and non-tender to palpation without masses or organomegaly; no guarding, rebound tenderness, or rigidity  GENITOURINARY: Not indicated  LYMPHATIC: Cervical, supraclavicular, and inguinal nodes without lymphadenopathy  MUSCULOSKELETAL: Moves all extremities, no obvious muscle wasting  NEUROLOGIC: No gross deficits, normal gait  SKIN: Appropriate color for race, warm and dry, no rashes or lesions to unclothed skin  PSYCHIATRIC: Pleasant and interactive, affect bright, makes appropriate eye contact, thought process linear    Labs:      12/2/2019  Day 1   Hemoglobin 11.7 - 15.7 g/dL 10.1 (A)   Hematocrit 35.0 - 47.0 % 32.0 (A)   Platelet Count 150 - 450 10e9/L 161   Absolute Neutrophil 1.6 - 8.3 10e9/L 4.4   Sodium 133 - 144 mmol/L 136   Potassium 3.4 - 5.3 mmol/L 4.3   Chloride 94 - 109 mmol/L 104   Carbon Dioxide 20 - 32 mmol/L 28   Urea Nitrogen 7 - 30 mg/dL 20   Creatinine 0.52 - 1.04 mg/dL 0.74   Calcium 8.5 - 10.1 mg/dL 8.8   Magnesium 1.6 - 2.3 mg/dL 1.7   Bilirubin Total 0.2 - 1.3 mg/dL 0.6   ALT 0 - 50 U/L 25   AST 0 - 45 U/L 16   Alkaline Phosphatase 40 - 150 U/L 67   Albumin 3.4 - 5.0 g/dL 3.0 (A)   Protein Total 6.8 - 8.8 g/dL 7.1   WBC 4.0 - 11.0 10e9/L 7.1   Urine protein negative    Assessment/Plan:  1) Recurrent endometrial cancer: Felt poorly this first cycle but without dose limiting effects. Does feel ready for more treatment- may proceed with cycle two of carboplatin AUC 6 + paclitaxel 175mg/m2 + bevacizumab 15mg/kg q21d as originally ordered by Dr. Henderson. To receive total of three cycles followed by imaging and treatment planning with Dr. Henderson. Reviewed signs and symptoms for when she should contact the clinic or seek additional care, including but not limited to fever, chills, inability to keep down food or fluids, nausea and vomiting not controlled with antiemetics, and diarrhea leading to dehydration. Patient to contact the clinic with any  questions or concerns in the interim.  2) Chemotherapy/disease effects:   Abdominal pain: Afebrile, VSS, no peritoneal signs. Improved with starting famotidine- to continue this daily, may take Tums if needed as well. To stop ibuprofen as this could be contributing to abdominal pain. Discussed Tylenol if needed, oxycodone- declines oxycodone at this time. Reviewed s/sxs bowel perforation with bevacizumab and when to seek emergency care.   Arthralgias: Bothersome the first week- to stop ibuprofen due to concerns with abdominal pain and potential for bleeding, renal impairment, HTN. May start Tylenol 1g TID, continue loratadine. Discussed oxycodone as her arthralgias prevented her from sleeping well but she declines. Discussed that lorazepam may be used for anxiety/sleep/nausea but is not a good medication for pain control.   Headaches: Mild, improved with ibuprofen. To try Tylenol instead if she develops another headache. Reviewed red flags with headaches and bevacizumab, when to seek further care.   Fatigue: Able to complete ADLs and rest. Encouraged physical activity as well.   Anxiety: Notes anxiety about symptoms and her future. Declines counseling or social work at this time but open to seeing palliative medicine- referral placed.   Mouth sores: Not evident on exam- reviewed salt and soda rinses   Hypoalbuminemia: Reviewed adding in protein supplements, small frequent meals   3) Genetic counseling: Panel testing negative  4) Health maintenance issues discussed include to follow up with PCP for non-gynecologic concerns and co-morbid conditions  5) Patient verbalized understanding of and agreement with plan    A total of 30 minutes of face to face time were spent with the patient with over 50% of that time spent in counseling, coordination of care, education, and symptom management.    ANU Selby, FNP-C  Division of Gynecologic Oncology  [facility]  Pager: [phone]

## 2019-12-02 NOTE — ED PROVIDER NOTES
History     Chief Complaint   Patient presents with     Allergic Reaction     HPI  Lu Smith is a 69 year old female with a history of serous endometrial cancer status post da Delfino assisted total hysterectomy, bilateral salpingo-oophorectomy, omentectomy, bilateral pelvic and para-aortic lymph node dissection, and pelvic washing (performed on 2/23/2018).  On 9/28 the patient had a PET scan performed which was concerning for recurrence and had a biopsy on 10/24 which revealed metastatic disease currently on Taxol, carboplatin #8, and Avastin.  The patient was at her infusion appointment today for cycle 2 of chemotherapy.  The patient states that she was premedicated with Decadron, Benadryl, and Pepcid.  It was towards the end of her treatment of carboplatin when she began having upper chest pressure, lung congestion, and hoarse voice.  The patient was subsequently given 2 injections of epinephrine in each thigh as well as warm Benadryl and was sent here for further evaluation.  Here, the patient reports that she continues to have a hoarse voice but her other symptoms have improved.  She currently denies having chest pain, shortness of breath, rash, or extremity swelling.  She just notes that she is feeling fatigued which she reports is normal following chemotherapy.    I have reviewed the Medications, Allergies, Past Medical and Surgical History, and Social History in the [employer] system.    Past Medical History:   Diagnosis Date     Diabetes (H)     Type II     Endometrial cancer determined by uterine biopsy (H) 02/2018     HTN (hypertension)      Obesity      Osteoarthritis      Past Surgical History:   Procedure Laterality Date     CHOLECYSTECTOMY  1993     CYSTOSCOPY N/A 2/23/2018    Procedure: CYSTOSCOPY;;  Surgeon: Kevin Henderson MD;  Location: [facility] OR     DAVINCI HYSTERECTOMY TOTAL, BILATERAL SALPINGO-OOPHORECTOMY, COMBINED N/A 2/23/2018    Procedure: COMBINED DAVINCI HYSTERECTOMY TOTAL,  SALPINGO-OOPHORECTOMY;  DaVinci Assisted Total Laparoscopic Hysterectomy, Bilateral Salpingo-Oophorectomy, Cystoscopy,  Omental biopsy, omentectomy,bilateral  Pelvic And Para Aortic Lymph Node Dissection;  Surgeon: Kevin Henderson MD;  Location: [facility] OR     Partial lumbar discectomy       Family History   Problem Relation Age of Onset     Colon Cancer Mother      Breast Cancer Sister      Lymphoma Sister      Social History     Tobacco Use     Smoking status: Former Smoker     Packs/day: 0.25     Years: 20.00     Pack years: 5.00     Last attempt to quit: 1991     Years since quittin.6     Smokeless tobacco: Never Used     Tobacco comment: Quite smoking    Substance Use Topics     Alcohol use: Yes     Comment: 4-7/week if out 1-2x's/week     No current facility-administered medications for this encounter.      Current Outpatient Medications   Medication     ACETAMINOPHEN PO     Famotidine (PEPCID PO)     fluticasone (FLONASE) 50 MCG/ACT spray     L-Glutamine 500 MG CAPS     loperamide (IMODIUM) 2 MG capsule     loratadine (CLARITIN) 10 MG tablet     magnesium 500 MG TABS     METFORMIN HCL PO     prochlorperazine (COMPAZINE) 10 MG tablet     pyridoxine (VITAMIN B-6) 50 MG TABS     triamterene-hydrochlorothiazide (DYAZIDE) 37.5-25 MG per capsule     Allergies   Allergen Reactions     Carboplatin Anaphylaxis     Chest tightness, hoarse voice, difficulty breathing     Ace Inhibitors Cough     Amoxicillin Hives      Review of Systems   Constitutional: Positive for fatigue. Negative for activity change, appetite change and fever.   HENT: Positive for voice change (better). Negative for congestion and trouble swallowing.    Eyes: Negative for redness and visual disturbance.   Respiratory: Positive for chest tightness (resolved). Negative for shortness of breath.    Cardiovascular: Negative for chest pain and leg swelling.   Gastrointestinal: Negative for abdominal pain, nausea and vomiting.    Genitourinary: Negative for difficulty urinating.   Musculoskeletal: Negative for arthralgias, back pain and neck stiffness.   Skin: Negative for color change and rash.   Allergic/Immunologic: Positive for immunocompromised state.   Neurological: Positive for weakness (general). Negative for seizures, syncope and headaches.   Hematological: Does not bruise/bleed easily.   Psychiatric/Behavioral: Positive for decreased concentration and dysphoric mood. Negative for confusion and hallucinations.   All other systems reviewed and are negative.    Physical Exam   BP: (!) 155/86  Pulse: 77  Temp: 97.7  F (36.5  C)  Resp: 18  SpO2: 95 %    Physical Exam  Vitals signs and nursing note reviewed.   Constitutional:       General: She is in acute distress.      Appearance: She is well-developed. She is not diaphoretic.      Comments: Patient with  sitting in chair is not stridorous respiratory distress feeling better not confused.  Her voice is just slightly raspy but no other distress noted.  She feels much better.   HENT:      Head: Normocephalic and atraumatic.      Mouth/Throat:      Mouth: Mucous membranes are moist.      Pharynx: Oropharynx is clear.   Eyes:      General: No scleral icterus.     Extraocular Movements: Extraocular movements intact.      Conjunctiva/sclera: Conjunctivae normal.      Pupils: Pupils are equal, round, and reactive to light.   Neck:      Musculoskeletal: Normal range of motion and neck supple.   Cardiovascular:      Rate and Rhythm: Normal rate.   Pulmonary:      Effort: Pulmonary effort is normal.      Breath sounds: No stridor. No wheezing.   Abdominal:      General: There is no distension.      Tenderness: There is no abdominal tenderness.   Musculoskeletal:         General: No tenderness or deformity.   Skin:     General: Skin is warm and dry.      Capillary Refill: Capillary refill takes less than 2 seconds.      Coloration: Skin is not jaundiced or pale.      Findings: No  erythema or rash.   Neurological:      General: No focal deficit present.      Mental Status: She is alert and oriented to person, place, and time.   Psychiatric:      Comments: Affect appropriate here         ED Course   5:20 PM  The patient was seen and examined by Dr. Benitez in [facility]       Procedures         Patient observed in the ER.  She otherwise stable improving taking orally feeling much better seen by GYN oncology also agree with plan.  I did do an EKG there is some nonspecific findings of right bundle branch block but nothing else hyperacute no previous EKGs to compare to.  Think this point certainly her findings are more consistent with a medication reaction.  Patient was observed for at least 3 hours post epinephrine administration without any signs of any recurrent rebound etc.  She feels fine she has Benadryl at home and Pepcid which she can use she wants to go home and she will follow-up with the GYN oncology clinic they will initiate the protocol for desensitization patient to return to concerns agrees with plan discharge with .       EKG Interpretation:      Interpreted by Sanchez Benitez MD  Time reviewed: 17:18  Symptoms at time of EKG: allergic reaction following chemotherapy   Rhythm: wide QRS rhythm  Rate: 97 bpm  Axis: Normal  Ectopy: none  Conduction: right bundle branch block  ST Segments/ T Waves: possible anterolateral infarct  Q Waves: none  Comparison to prior: Unchanged    Clinical Impression: abnormal EKG    Critical Care time:  none             Labs Ordered and Resulted from Time of ED Arrival Up to the Time of Departure from the ED - No data to display         Assessments & Plan (with Medical Decision Making)  69-year-old female with recurrent endometrial cancer is undergoing chemotherapy again.  She is had the chemotherapy today she been premedicated etc.  She then received the second of her 3 medications carboplatin.  Prior to this she has been finished she developed some  chest tightness some shortness of breath concerning more for allergic type reaction.  Patient treated with Benadryl Solu-Medrol given epinephrine etc.  Patient symptoms improved presented the ER for evaluation she was observed for at least 3+ hours post epinephrine administration without recurrent symptoms able take orally feeling much better and just a minimally raspy voice but otherwise no stridor other problems.  She feels fine otherwise EKG showed right bundle branch block no tachycardia I think this is this point is more nonspecific.  She feels much better wants to go home as none was seen by GYN oncology in the ER also.  We discharge she has Benadryl and Pepcid at home can use as needed we will follow-up with the clinic they will plan for desensitization protocol and she should return if any problems at all.  At this point appears most likely medication reaction is the cause of her symptoms that is improved         I have reviewed the nursing notes.    I have reviewed the findings, diagnosis, plan and need for follow up with the patient.  Discharge Medication List as of 12/2/2019  6:42 PM        Final diagnoses:   Medication reaction, initial encounter - carboplatin   Endometrial cancer (H)   Cesar KHAN, am serving as a trained medical scribe to document services personally performed by Sanchez Benitez MD, based on the provider's statements to me.   Sanchez KHAN MD, was physically present and have reviewed and verified the accuracy of this note documented by Cesar Constantino.     12/2/2019   [facility], EMERGENCY DEPARTMENT     Sanchez Benitez MD  12/03/19 [id number]

## 2019-12-02 NOTE — ED AVS SNAPSHOT
[facility], [place], Emergency Department  [address] [place] [id number]  Phone:  [phone]                                    Lu Smith   MRN: [id number]    Department:  [facility], Emergency Department   Date of Visit:  12/2/2019           After Visit Summary Signature Page    I have received my discharge instructions, and my questions have been answered. I have discussed any challenges I see with this plan with the nurse or doctor.    ..........................................................................................................................................  Patient/Patient Representative Signature      ..........................................................................................................................................  Patient Representative Print Name and Relationship to Patient    ..................................................               ................................................  Date                                   Time    ..........................................................................................................................................  Reviewed by Signature/Title    ...................................................              ..............................................  Date                                               Time          22EPIC Rev 08/18

## 2019-12-02 NOTE — PROGRESS NOTES
Notified that Lu developed chest tightness and voice hoarseness with eight minutes left of carboplatin dose #8. She received SoluMedrol, Benadryl, and epinephrine IM with resolution of chest tightness. Still had voice hoarseness but no difficulty swallowing, no shortness of breath. On exam, she was alert and oriented, scared but in no acute physical distress. Respirations unlabored, lungs clear, no stridor. Mild oropharyngeal edema, though this was somewhat difficult to assess. Regular rate and rhythm. Given voice hoarseness and possible oropharyngeal edema, epinephrine IM was given again. She was transferred via EMS to ED for further monitoring. Lalitha Johns RN notified, will need inpatient carboplatin desensitization in the future and will need bevacizumab rescheduled.  ANU Selby, FNP-C  Gynecologic Oncology  [facility]  Pager: [phone]

## 2019-12-02 NOTE — PATIENT INSTRUCTIONS
[facility] Triage and after hours / weekends / holidays:  [phone]    Please call the triage or after hours line if you experience a temperature greater than or equal to 100.5, shaking chills, have uncontrolled nausea, vomiting and/or diarrhea, dizziness, shortness of breath, chest pain, bleeding, unexplained bruising, or if you have any other new/concerning symptoms, questions or concerns.      If you are having any concerning symptoms or wish to speak to a provider before your next infusion visit, please call your care coordinator or triage to notify them so we can adequately serve you.     If you need a refill on a narcotic prescription or other medication, please call before your infusion appointment.

## 2019-12-02 NOTE — PROGRESS NOTES
Pt receiving Chemo, has a reaction. RN's administered HS protocol meds, called 911.    RRT standing by for airway support if needed.    911 transported pt to ED>

## 2019-12-02 NOTE — PATIENT INSTRUCTIONS
Salt and soda rinses for mouth sores:  1/2 tsp baking soda and 1/2 tsp salt in one liter of water. Swish and spit 3-4 times daily. This solution can stay in the fridge for 72 hours.    Start Pepcid once daily for the abdominal discomfort, Tums as needed    Stop ibuprofen and start Tylenol 1000mg three times daily as needed

## 2019-12-03 ENCOUNTER — PATIENT OUTREACH (OUTPATIENT)
Dept: ONCOLOGY | Facility: CLINIC | Age: 69
End: 2019-12-03

## 2019-12-03 LAB — INTERPRETATION ECG - MUSE: NORMAL

## 2019-12-03 RX ORDER — MEPERIDINE HYDROCHLORIDE 25 MG/ML
25 INJECTION INTRAMUSCULAR; INTRAVENOUS; SUBCUTANEOUS EVERY 30 MIN PRN
Status: CANCELLED | OUTPATIENT
Start: 2019-12-04

## 2019-12-03 RX ORDER — ALBUTEROL SULFATE 90 UG/1
1-2 AEROSOL, METERED RESPIRATORY (INHALATION)
Status: CANCELLED
Start: 2019-12-04

## 2019-12-03 RX ORDER — LORAZEPAM 2 MG/ML
1 INJECTION INTRAMUSCULAR EVERY 6 HOURS PRN
Status: CANCELLED | OUTPATIENT
Start: 2019-12-04

## 2019-12-03 RX ORDER — NALOXONE HYDROCHLORIDE 0.4 MG/ML
.1-.4 INJECTION, SOLUTION INTRAMUSCULAR; INTRAVENOUS; SUBCUTANEOUS
Status: CANCELLED | OUTPATIENT
Start: 2019-12-04

## 2019-12-03 RX ORDER — EPINEPHRINE 1 MG/ML
0.3 INJECTION, SOLUTION INTRAMUSCULAR; SUBCUTANEOUS EVERY 5 MIN PRN
Status: CANCELLED | OUTPATIENT
Start: 2019-12-04

## 2019-12-03 RX ORDER — DIPHENHYDRAMINE HYDROCHLORIDE 50 MG/ML
50 INJECTION INTRAMUSCULAR; INTRAVENOUS
Status: CANCELLED | OUTPATIENT
Start: 2019-12-04

## 2019-12-03 RX ORDER — METHYLPREDNISOLONE SODIUM SUCCINATE 125 MG/2ML
125 INJECTION, POWDER, LYOPHILIZED, FOR SOLUTION INTRAMUSCULAR; INTRAVENOUS
Status: CANCELLED | OUTPATIENT
Start: 2019-12-04

## 2019-12-03 RX ORDER — HEPARIN SODIUM,PORCINE 10 UNIT/ML
5 VIAL (ML) INTRAVENOUS
Status: CANCELLED | OUTPATIENT
Start: 2019-12-04

## 2019-12-03 RX ORDER — HEPARIN SODIUM (PORCINE) LOCK FLUSH IV SOLN 100 UNIT/ML 100 UNIT/ML
5 SOLUTION INTRAVENOUS
Status: CANCELLED | OUTPATIENT
Start: 2019-12-04

## 2019-12-03 RX ORDER — ALBUTEROL SULFATE 0.83 MG/ML
2.5 SOLUTION RESPIRATORY (INHALATION)
Status: CANCELLED | OUTPATIENT
Start: 2019-12-04

## 2019-12-03 RX ORDER — EPINEPHRINE 0.3 MG/.3ML
0.3 INJECTION SUBCUTANEOUS EVERY 5 MIN PRN
Status: CANCELLED | OUTPATIENT
Start: 2019-12-04

## 2019-12-03 RX ORDER — SODIUM CHLORIDE 9 MG/ML
1000 INJECTION, SOLUTION INTRAVENOUS CONTINUOUS PRN
Status: CANCELLED | OUTPATIENT
Start: 2019-12-04

## 2019-12-03 ASSESSMENT — ENCOUNTER SYMPTOMS
HALLUCINATIONS: 0
VOICE CHANGE: 1
VOMITING: 0
APPETITE CHANGE: 0
WEAKNESS: 1
NAUSEA: 0
CHEST TIGHTNESS: 1
BRUISES/BLEEDS EASILY: 0
DECREASED CONCENTRATION: 1
BACK PAIN: 0
TROUBLE SWALLOWING: 0
SEIZURES: 0
ACTIVITY CHANGE: 0
DYSPHORIC MOOD: 1

## 2019-12-03 NOTE — DISCHARGE INSTRUCTIONS
Home.  Monitor symptoms at home.  Use benadryl and pepcid if any recurrent symptoms.  Return if any concerns at all.  The infusion clinic should follow the desensitization protocol in the future.

## 2019-12-03 NOTE — PROGRESS NOTES
Care Coordinator Note  Patient states she is feeling fine today post Carbo reaction.  Reviewed plan for Avastin infusion on 12/4/19.  Discussed that patient's schedule for cycle #3 will remain as scheduled for 12/27/19.    Lalitha Johns MSN, RN  Care Coordinator  Gynecologic Cancer   Office:  [phone]  Pager: [phone] #[phone]

## 2019-12-04 ENCOUNTER — INFUSION THERAPY VISIT (OUTPATIENT)
Dept: ONCOLOGY | Facility: CLINIC | Age: 69
End: 2019-12-04
Attending: OBSTETRICS & GYNECOLOGY
Payer: MEDICARE

## 2019-12-04 VITALS
RESPIRATION RATE: 18 BRPM | TEMPERATURE: 97.9 F | HEART RATE: 59 BPM | OXYGEN SATURATION: 93 % | SYSTOLIC BLOOD PRESSURE: 145 MMHG | DIASTOLIC BLOOD PRESSURE: 88 MMHG

## 2019-12-04 DIAGNOSIS — Z79.899 ENCOUNTER FOR LONG-TERM (CURRENT) USE OF MEDICATIONS: Primary | ICD-10-CM

## 2019-12-04 DIAGNOSIS — C54.1 ENDOMETRIAL CANCER (H): ICD-10-CM

## 2019-12-04 PROCEDURE — 25800030 ZZH RX IP 258 OP 636: Mod: ZF | Performed by: NURSE PRACTITIONER

## 2019-12-04 PROCEDURE — 25000128 H RX IP 250 OP 636: Mod: ZF | Performed by: NURSE PRACTITIONER

## 2019-12-04 PROCEDURE — 25000125 ZZHC RX 250: Mod: ZF | Performed by: NURSE PRACTITIONER

## 2019-12-04 PROCEDURE — 96413 CHEMO IV INFUSION 1 HR: CPT

## 2019-12-04 PROCEDURE — 96375 TX/PRO/DX INJ NEW DRUG ADDON: CPT

## 2019-12-04 RX ORDER — HEPARIN SODIUM (PORCINE) LOCK FLUSH IV SOLN 100 UNIT/ML 100 UNIT/ML
5 SOLUTION INTRAVENOUS
Status: DISCONTINUED | OUTPATIENT
Start: 2019-12-04 | End: 2019-12-04 | Stop reason: HOSPADM

## 2019-12-04 RX ADMIN — BEVACIZUMAB 1900 MG: 400 INJECTION, SOLUTION INTRAVENOUS at 08:23

## 2019-12-04 RX ADMIN — HEPARIN 5 ML: 100 SYRINGE at 08:56

## 2019-12-04 RX ADMIN — FAMOTIDINE 20 MG: 10 INJECTION INTRAVENOUS at 08:03

## 2019-12-04 RX ADMIN — SODIUM CHLORIDE 250 ML: 9 INJECTION, SOLUTION INTRAVENOUS at 08:04

## 2019-12-04 ASSESSMENT — PAIN SCALES - GENERAL: PAINLEVEL: NO PAIN (0)

## 2019-12-04 NOTE — PROGRESS NOTES
Infusion Nursing Note:  Lu Smith presents today for Cycle 2 Day 3 Avastin.    Patient seen by provider today: No   present during visit today: Not Applicable.    Note: Patient presents to infusion today stating she feels well. She states she has felt tired the last couple of days but this is normal for her after chemotherapy. She states after discharging from the Emergency Department on 12/2, she has not had any issues or symptoms and has felt fatigued but ok. She denies any new symptoms or concerns. Patient denies any pain today.     Intravenous Access:  Implanted Port.    Treatment Conditions:  Lab Results   Component Value Date    HGB 10.1 12/02/2019     Lab Results   Component Value Date    WBC 7.1 12/02/2019      Lab Results   Component Value Date    ANEU 4.4 12/02/2019     Lab Results   Component Value Date     12/02/2019      Lab Results   Component Value Date     12/02/2019                   Lab Results   Component Value Date    POTASSIUM 4.3 12/02/2019           Lab Results   Component Value Date    MAG 1.7 12/02/2019            Lab Results   Component Value Date    CR 0.74 12/02/2019                   Lab Results   Component Value Date    MARYLOU 8.8 12/02/2019                Lab Results   Component Value Date    BILITOTAL 0.6 12/02/2019           Lab Results   Component Value Date    ALBUMIN 3.0 12/02/2019                    Lab Results   Component Value Date    ALT 25 12/02/2019           Lab Results   Component Value Date    AST 16 12/02/2019       Results reviewed, labs MET treatment parameters, ok to proceed with treatment.  Urine Protein: Negative.  Blood Pressure: WNL.      Post Infusion Assessment:  Patient tolerated infusion without incident.  Blood return noted pre and post infusion.  Site patent and intact, free from redness, edema or discomfort.  No evidence of extravasations.  Access discontinued per protocol.       Discharge Plan:   Patient declined prescription  refills.  Discharge instructions reviewed with: Patient.  Patient and/or family verbalized understanding of discharge instructions and all questions answered.  AVS to patient via [employer].  Patient will return 12/27/19 for next appointment.   Patient discharged in stable condition accompanied by: self.  Departure Mode: Ambulatory.    Kristy Serrano RN

## 2019-12-04 NOTE — PATIENT INSTRUCTIONS
Contact Numbers    [facility] Main Line/Triage and after hours / weekends / holidays: [phone]      Please call the triage or after hours line if you experience a temperature greater than or equal to 100.5, shaking chills, have uncontrolled nausea, vomiting and/or diarrhea, dizziness, shortness of breath, chest pain, bleeding, unexplained bruising, or if you have any other new/concerning symptoms, questions or concerns.      If you are having any concerning symptoms or wish to speak to a provider before your next infusion visit, please call your care coordinator or triage to notify them so we can adequately serve you.     If you need a refill on a narcotic prescription or other medication, please call before your infusion appointment.

## 2019-12-13 RX ORDER — ALBUTEROL SULFATE 0.83 MG/ML
2.5 SOLUTION RESPIRATORY (INHALATION)
Status: CANCELLED | OUTPATIENT
Start: 2019-12-27

## 2019-12-13 RX ORDER — NALOXONE HYDROCHLORIDE 0.4 MG/ML
.1-.4 INJECTION, SOLUTION INTRAMUSCULAR; INTRAVENOUS; SUBCUTANEOUS
Status: CANCELLED | OUTPATIENT
Start: 2019-12-27

## 2019-12-13 RX ORDER — PROCHLORPERAZINE MALEATE 5 MG
5-10 TABLET ORAL EVERY 6 HOURS PRN
Status: CANCELLED
Start: 2019-12-27

## 2019-12-13 RX ORDER — ALBUTEROL SULFATE 90 UG/1
1-2 AEROSOL, METERED RESPIRATORY (INHALATION)
Status: CANCELLED
Start: 2019-12-27

## 2019-12-13 RX ORDER — DIPHENHYDRAMINE HCL 25 MG
50 CAPSULE ORAL ONCE
Status: CANCELLED
Start: 2019-12-27

## 2019-12-13 RX ORDER — METHYLPREDNISOLONE SODIUM SUCCINATE 125 MG/2ML
125 INJECTION, POWDER, LYOPHILIZED, FOR SOLUTION INTRAMUSCULAR; INTRAVENOUS
Status: CANCELLED
Start: 2019-12-27

## 2019-12-13 RX ORDER — DIPHENHYDRAMINE HYDROCHLORIDE 50 MG/ML
50 INJECTION INTRAMUSCULAR; INTRAVENOUS
Status: CANCELLED
Start: 2019-12-27

## 2019-12-13 RX ORDER — ONDANSETRON 8 MG/1
8 TABLET, FILM COATED ORAL ONCE
Status: CANCELLED | OUTPATIENT
Start: 2019-12-27

## 2019-12-13 RX ORDER — DEXAMETHASONE 4 MG/1
8 TABLET ORAL EVERY MORNING
Status: CANCELLED | OUTPATIENT
Start: 2019-12-28

## 2019-12-13 RX ORDER — LORAZEPAM 2 MG/ML
.5-1 INJECTION INTRAMUSCULAR EVERY 6 HOURS PRN
Status: CANCELLED
Start: 2019-12-27

## 2019-12-13 RX ORDER — EPINEPHRINE 1 MG/ML
0.3 INJECTION, SOLUTION INTRAMUSCULAR; SUBCUTANEOUS EVERY 5 MIN PRN
Status: CANCELLED | OUTPATIENT
Start: 2019-12-27

## 2019-12-13 RX ORDER — LORAZEPAM 0.5 MG/1
.5-1 TABLET ORAL EVERY 6 HOURS PRN
Status: CANCELLED
Start: 2019-12-27

## 2019-12-13 RX ORDER — DEXAMETHASONE SODIUM PHOSPHATE 10 MG/ML
20 INJECTION, SOLUTION INTRAMUSCULAR; INTRAVENOUS ONCE
Status: CANCELLED | OUTPATIENT
Start: 2019-12-27

## 2019-12-13 RX ORDER — MEPERIDINE HYDROCHLORIDE 25 MG/ML
25 INJECTION INTRAMUSCULAR; INTRAVENOUS; SUBCUTANEOUS EVERY 30 MIN PRN
Status: CANCELLED | OUTPATIENT
Start: 2019-12-27

## 2019-12-13 RX ORDER — SODIUM CHLORIDE 9 MG/ML
1000 INJECTION, SOLUTION INTRAVENOUS CONTINUOUS PRN
Status: CANCELLED
Start: 2019-12-27

## 2019-12-20 ENCOUNTER — PATIENT OUTREACH (OUTPATIENT)
Dept: ONCOLOGY | Facility: CLINIC | Age: 69
End: 2019-12-20

## 2019-12-20 NOTE — PROGRESS NOTES
Care Coordinator Note  Left message for patient regarding plan for next inpatient chemotherapy, with brief outline of desensitization process.  I will call her on 12/23/19 to discuss pre Dexamethasone dose timing.      Lalitha Johns MSN, RN  Care Coordinator  Gynecologic Cancer   Office:  [phone]  Pager: [phone] #[phone]

## 2019-12-23 ENCOUNTER — PATIENT OUTREACH (OUTPATIENT)
Dept: ONCOLOGY | Facility: CLINIC | Age: 69
End: 2019-12-23

## 2019-12-23 DIAGNOSIS — C54.1 ENDOMETRIAL CANCER (H): Primary | ICD-10-CM

## 2019-12-23 RX ORDER — DEXAMETHASONE 4 MG/1
20 TABLET ORAL SEE ADMIN INSTRUCTIONS
Qty: 10 TABLET | Refills: 3 | Status: SHIPPED | OUTPATIENT
Start: 2019-12-27 | End: 2020-01-01

## 2019-12-23 NOTE — PROGRESS NOTES
Care Coordinator Note  Reviewed chemotherapy desensitization plan with patient.  Patient will take Dexamethasone at 4 am and 10 am on 12/27/19.  Script sent to local pharmacy.      Patient verbalized back understanding of the above information discussed.     Lalitha MERCER, RN  Care Coordinator  Gynecologic Cancer   Office:  [phone]  Pager: [phone] #[phone]

## 2019-12-24 NOTE — PROGRESS NOTES
Follow Up Notes on Referred Patient    Date: 2019       RE: Lu Smith  : 1950  HEIDY: 2019      Lu Smith is a 69 year old woman with a diagnosis of Recurrent high grade serous endometrial cancer.   She is here today for follow up and disease management.     Oncology History:  Diagnosis: Stage IIIC2 serous endometrial cancer  Primary GYN oncologist: Dr. Kevin Henderson  Primary radiation oncologist: Dr. Ana Michelle  Surgery: 18: TLH-BSO, omentectomy, bilateral pelvic and para-aortic lymph node dissection, pelvic washings  Chemotherapy:  3/30/18-18: Six cycles of carboplatin AUC 6 and paclitaxel 175mg/m2, delivered sandwich style with pelvic radiation between cycles three and four  Radiation: 18-18: EBRT delivered to pelvis and para-aortic nodes, 5040 cGy in 28 fractions  18-18: HDR brachytherapy delivered to the vaginal cuff, 1200 cGy in two fractions  Complications: Transient neuropathy, mild thrombocytopenia and anemia  Genetic testing: Panel testing negative     19: Chest CT performed for pulmonary nodule follow up, concerning for recurrent disease  IMPRESSION:   1. Few bilateral sub-4 mm solid pulmonary nodules are unchanged compared to 2018. No new or enlarging pulmonary nodules.  2. Dilated main pulmonary artery, nonspecific but may be seen with pulmonary artery hypertension.  3. New 1.9 cm left subclavicular node.     19: PET impression:  In this 69-year-old female with history of stage IIIC2 serous endometrial cancer status post total hysterectomy and bilateral salpingo-oophorectomy, omentectomy, bilateral pelvic and periaortic dissection, and radiation to the pelvis, and brachytherapy to the vaginal cuff.  1. Progression of disease with new intensely FDG avid para-aortic, mediastinal, and subclavicular lymphadenopathy. The subdiaphragmatic periaortic lymphadenopathy spans from the diaphragmatic crura to the  low  abdominal aorta at the level of L4 with greater than 180 degree involvement of the aorta. There is additional lymphadenopathy within the left axilla and the right inguinal region.     9/28/19: Neck CT impression:  1. Progression of metastatic disease with new intensely FDG avid retroclavicular lymph node at the level of the left thoracic inlet.   2. On the fusion PET CT, there is no definite abnormal metabolic activity in the mucosal space, soft tissues, or cervical jamel chains.  3. No evidence of mucosal or soft tissue abnormality on contrast enhanced neck CT.  4. Please refer to the whole body PET CT performed as a separate report, for the findings of the remainder of the body.     10/24/19: Lymph node biopsy:  Lymph node, periaortic, CT guided core biopsy:   -METASTATIC ADENOCARCINOMA   -Tumor morphology is consistent with metastasis/recurrence of endometrial serous carcinoma      11/11/19: C1 paclitaxel 175mg/m2 + carboplatin AUC 6 + bevacizumab 15mg/kg     12/2/19: C2 paclitaxel 175mg/m2 + carboplatin AUC 6 + bevacizumab 15mg/kg. Carboplatin reaction.    12/27/19: Cycle #3 Paclitaxel/Avastin/inpatient Carboplatin desensitization.         Today she comes to clinic and states she took her Decadron at 4 am and has her pills with her to take at 10 am. She is generally feeling well; she has noticed a slight decrease in her appetite and has been adding in a protein shake periodically. She denies any n/v. She has been having some sinus congestion and has noticed that certain foods will taste a little different. She has been using a salt water rinse to help with some small mouth sores she will get periodically. She denies any vaginal bleeding, no changes in her bowel or bladder habits, no nausea/emesis, no lower extremity edema, and no difficulties eating or sleeping. She denies any abdominal discomfort/bloating, no fevers or chills, and no chest pain or shortness of breath. She has Mg at home but has not been taking  it. She does not need any medications refilled.         Review of Systems     Constitutional:  Positive for fatigue and decreased appetite. Negative for fever, chills, weight loss, weight gain, night sweats, recent stressors, height loss, post-operative complications, incisional pain, hallucinations, increased energy, hyperactivity and confused.   HENT:  Positive for taste disturbance, sinus pain and sinus congestion. Negative for ear pain, hearing loss, tinnitus, nosebleeds, trouble swallowing, hoarse voice, mouth sores, sore throat, ear discharge, tooth pain, gum tenderness, smell disturbance, hearing aid, bleeding gums, dry mouth and neck mass.    Eyes:  Negative for double vision, pain, redness, eye pain, decreased vision, eye watering, eye bulging, eye dryness, flashing lights, spots, floaters, strabismus, tunnel vision, jaundice and eye irritation.   Respiratory:   Negative for cough, hemoptysis, sputum production, shortness of breath, wheezing, sleep disturbances due to breathing, snores loudly, respiratory pain, dyspnea on exertion, cough disturbing sleep and postural dyspnea.    Cardiovascular:  Positive for hypertension. Negative for chest pain, dyspnea on exertion, palpitations, orthopnea, claudication, leg swelling, fingers/toes turn blue, hypotension, syncope, history of heart murmur, chest pain on exertion, chest pain at rest, pacemaker, few scattered varicosities, leg pain, sleep disturbances due to breathing, tachycardia, light-headedness, exercise intolerance and edema.   Gastrointestinal:  Negative for heartburn, nausea, vomiting, abdominal pain, diarrhea, constipation, blood in stool, melena, rectal pain, bloating, hemorrhoids, bowel incontinence, jaundice, rectal bleeding, coffee ground emesis and change in stool.   Genitourinary:  Negative for bladder incontinence, dysuria, urgency, hematuria, flank pain, vaginal discharge, difficulty urinating, genital sores, dyspareunia, decreased libido,  nocturia, voiding less frequently, arousal difficulty, abnormal vaginal bleeding, excessive menstruation, menstrual changes, hot flashes, vaginal dryness and postmenopausal bleeding.   Musculoskeletal:  Negative for myalgias, back pain, joint swelling, arthralgias, stiffness, muscle cramps, neck pain, bone pain, muscle weakness and fracture.   Skin:  Positive for nail changes. Negative for itching, poor wound healing, rash, hair changes, skin changes, acne, warts, poor wound healing, scarring, flaky skin, Raynaud's phenomenon, sensitivity to sunlight and skin thickening.   Neurological:  Negative for dizziness, tingling, tremors, speech change, seizures, loss of consciousness, weakness, light-headedness, numbness, headaches, disturbances in coordination, extremity numbness, memory loss, difficulty walking and paralysis.   Endo/Heme:  Negative for anemia, swollen glands and bruises/bleeds easily.   Psychiatric/Behavioral:  Negative for depression, hallucinations, memory loss, decreased concentration, mood swings and panic attacks.    Breast:  Negative for breast discharge, breast mass, breast pain and nipple retraction.   Endocrine:  Negative for altered temperature regulation, polyphagia, polydipsia, unwanted hair growth and change in facial hair.  ,      Past Medical History:    Past Medical History:   Diagnosis Date     Diabetes (H)     Type II     Endometrial cancer determined by uterine biopsy (H) 02/2018     HTN (hypertension)      Obesity      Osteoarthritis          Past Surgical History:    Past Surgical History:   Procedure Laterality Date     CHOLECYSTECTOMY  1993     CYSTOSCOPY N/A 2/23/2018    Procedure: CYSTOSCOPY;;  Surgeon: Kevin Henderson MD;  Location: [facility] OR     DAVINCI HYSTERECTOMY TOTAL, BILATERAL SALPINGO-OOPHORECTOMY, COMBINED N/A 2/23/2018    Procedure: COMBINED DAVINCI HYSTERECTOMY TOTAL, SALPINGO-OOPHORECTOMY;  DaVinci Assisted Total Laparoscopic Hysterectomy, Bilateral  Salpingo-Oophorectomy, Cystoscopy,  Omental biopsy, omentectomy,bilateral  Pelvic And Para Aortic Lymph Node Dissection;  Surgeon: Kevin Henderson MD;  Location: [facility] OR     Partial lumbar discectomy  1998         Health Maintenance Due   Topic Date Due     DEXA  1950     LIPID  1950     MICROALBUMIN  1950     TSH W/FREE T4 REFLEX  1950     DIABETIC FOOT EXAM  1950     HEPATITIS C SCREENING  1950     ADVANCE CARE PLANNING  1950     EYE EXAM  1950     ZOSTER IMMUNIZATION (1 of 2) 04/07/2002     MEDICARE ANNUAL WELLNESS VISIT  04/07/2017     PNEUMOCOCCAL IMMUNIZATION 65+ HIGH/HIGHEST RISK (2 of 2 - PPSV23) 02/09/2018     A1C  05/09/2018     PHQ-2  01/01/2019       Current Medications:     Current Outpatient Medications   Medication Sig Dispense Refill     ACETAMINOPHEN PO Take 325 mg by mouth every 6 hours as needed for pain       dexamethasone (DECADRON) 4 MG tablet Take 5 tablets (20 mg) by mouth See Admin Instructions for 2 doses 10 tablet 3     Famotidine (PEPCID PO) Take 10 mg by mouth daily       fluticasone (FLONASE) 50 MCG/ACT spray Spray 1-2 sprays into both nostrils daily 1 Bottle 1     L-Glutamine 500 MG CAPS        loperamide (IMODIUM) 2 MG capsule Take 2 mg by mouth 4 times daily as needed for diarrhea       loratadine (CLARITIN) 10 MG tablet Take 1 tablet (10 mg) by mouth daily 30 tablet 3     magnesium 500 MG TABS        METFORMIN HCL PO Take 500 mg by mouth daily (with breakfast)        prochlorperazine (COMPAZINE) 10 MG tablet Take 0.5 tablets (5 mg) by mouth every 6 hours as needed (nausea/vomiting) 30 tablet 2     pyridoxine (VITAMIN B-6) 50 MG TABS Take 1 tablet (50 mg) by mouth 2 times daily 60 tablet 3     triamterene-hydrochlorothiazide (DYAZIDE) 37.5-25 MG per capsule Take 1 capsule by mouth every morning            Allergies:        Allergies   Allergen Reactions     Carboplatin Anaphylaxis     Chest tightness, hoarse voice, difficulty  breathing     Ace Inhibitors Cough     Amoxicillin Hives        Social History:     Social History     Tobacco Use     Smoking status: Former Smoker     Packs/day: 0.25     Years: 20.00     Pack years: 5.00     Last attempt to quit: 1991     Years since quittin.6     Smokeless tobacco: Never Used     Tobacco comment: Quite smoking    Substance Use Topics     Alcohol use: Yes     Comment: 4-7/week if out 1-2x's/week       History   Drug Use No         Family History:     The patient's family history is notable for:    Family History   Problem Relation Age of Onset     Colon Cancer Mother      Breast Cancer Sister      Lymphoma Sister          Physical Exam:     /83 (BP Location: Right arm, Patient Position: Sitting, Cuff Size: Adult Regular)   Pulse 86   Temp 98  F (36.7  C) (Oral)   Resp 18   Wt 121.1 kg (267 lb)   SpO2 98%   BMI 44.43 kg/m    Body mass index is 44.43 kg/m .    General Appearance: healthy and alert, no distress     HEENT: no thyromegaly, no palpable nodules or masses        Cardiovascular: regular rate and rhythm, no gallops, rubs or murmurs     Respiratory: lungs clear, no rales, rhonchi or wheezes, normal diaphragmatic excursion    Musculoskeletal: extremities non tender and without edema    Skin: no lesions or rashes     Neurological: normal gait, no gross defects     Psychiatric: appropriate mood and affect                               Hematological: normal cervical, supraclavicular lymph nodes     Gastrointestinal:       abdomen soft, non-tender, non-distended    Genitourinary: deferred      Assessment:    Lu Smith is a 69 year old woman with a diagnosis of Recurrent high grade serous endometrial cancer.  She is here today for follow up and disease management.     30 minutes were spent with this patient, over 50% of that time was spent in symptom management, treatment planning and in counseling and coordination of care.      Plan:     1.)        Ok to proceed  with planned inpatient chemotherapy pending labs are WNL. Patient to have imaging and then see Dr. Henderson for results. To add in some sodium rich foods (ie. Crackers, canned soup) and will continue to monitor. Reviewed signs and symptoms for when she should contact the clinic or seek additional care. Patient to contact the clinic with any questions or concerns in the interim.     2.) Genetic risk factors were assessed and her panel testing was negative.      3.) Labs and/or tests ordered include:  Chemo labs. CT cap.      4.) Health maintenance issues addressed today include annual health maintenance and non-gynecologic issues with PCP.    5.)        Hypomagnesemia: encouraged to take her Mg at home; continue to monitor.     ANU Ramey, WHNP-BC, ANP-BC  Women's Health Nurse Practitioner  Adult Nurse Pracitioner  Division of Gynecologic Oncology          CC  Patient Care Team:  Levar Scott as PCP - General (Internal Medicine)  Jeanne Blancas MD as Referring Physician (OB/Gyn)  SELF, REFERRED

## 2019-12-26 DIAGNOSIS — C54.1 ENDOMETRIAL CANCER (H): Primary | ICD-10-CM

## 2019-12-26 ASSESSMENT — ENCOUNTER SYMPTOMS
POLYDIPSIA: 0
SMELL DISTURBANCE: 0
NIGHT SWEATS: 0
TASTE DISTURBANCE: 1
WEIGHT GAIN: 0
FEVER: 0
NECK MASS: 0
SINUS CONGESTION: 1
POLYPHAGIA: 0
HALLUCINATIONS: 0
WEIGHT LOSS: 0
ALTERED TEMPERATURE REGULATION: 1
SKIN CHANGES: 0
HOARSE VOICE: 0
FATIGUE: 1
DECREASED APPETITE: 1
POOR WOUND HEALING: 0
SORE THROAT: 1
INCREASED ENERGY: 1
CHILLS: 0
SINUS PAIN: 1
TROUBLE SWALLOWING: 0
NAIL CHANGES: 1

## 2019-12-27 ENCOUNTER — HOSPITAL ENCOUNTER (OUTPATIENT)
Facility: CLINIC | Age: 69
Setting detail: OBSERVATION
Discharge: HOME | End: 2019-12-28
Attending: OBSTETRICS & GYNECOLOGY | Admitting: OBSTETRICS & GYNECOLOGY
Payer: MEDICARE

## 2019-12-27 ENCOUNTER — ONCOLOGY VISIT (OUTPATIENT)
Dept: ONCOLOGY | Facility: CLINIC | Age: 69
End: 2019-12-27
Attending: NURSE PRACTITIONER
Payer: MEDICARE

## 2019-12-27 ENCOUNTER — APPOINTMENT (OUTPATIENT)
Dept: LAB | Facility: CLINIC | Age: 69
End: 2019-12-27
Attending: OBSTETRICS & GYNECOLOGY
Payer: MEDICARE

## 2019-12-27 VITALS
RESPIRATION RATE: 18 BRPM | BODY MASS INDEX: 44.43 KG/M2 | DIASTOLIC BLOOD PRESSURE: 83 MMHG | TEMPERATURE: 98 F | WEIGHT: 267 LBS | OXYGEN SATURATION: 98 % | SYSTOLIC BLOOD PRESSURE: 128 MMHG | HEART RATE: 86 BPM

## 2019-12-27 DIAGNOSIS — C54.1 ENDOMETRIAL CANCER (H): ICD-10-CM

## 2019-12-27 DIAGNOSIS — R91.8 PULMONARY NODULES: ICD-10-CM

## 2019-12-27 DIAGNOSIS — I10 HYPERTENSION, UNSPECIFIED TYPE: ICD-10-CM

## 2019-12-27 DIAGNOSIS — C54.1 ENDOMETRIAL CANCER (H): Primary | ICD-10-CM

## 2019-12-27 DIAGNOSIS — Z79.899 ENCOUNTER FOR LONG-TERM (CURRENT) USE OF MEDICATIONS: ICD-10-CM

## 2019-12-27 DIAGNOSIS — Z79.899 ENCOUNTER FOR LONG-TERM (CURRENT) USE OF MEDICATIONS: Primary | ICD-10-CM

## 2019-12-27 DIAGNOSIS — Z51.11 ENCOUNTER FOR ANTINEOPLASTIC CHEMOTHERAPY: ICD-10-CM

## 2019-12-27 LAB
ALBUMIN SERPL-MCNC: 3.3 G/DL (ref 3.4–5)
ALBUMIN UR-MCNC: NEGATIVE MG/DL
ALP SERPL-CCNC: 64 U/L (ref 40–150)
ALT SERPL W P-5'-P-CCNC: 29 U/L (ref 0–50)
ANION GAP SERPL CALCULATED.3IONS-SCNC: 7 MMOL/L (ref 3–14)
AST SERPL W P-5'-P-CCNC: 18 U/L (ref 0–45)
BASOPHILS # BLD AUTO: 0 10E9/L (ref 0–0.2)
BASOPHILS NFR BLD AUTO: 0.4 %
BILIRUB SERPL-MCNC: 0.6 MG/DL (ref 0.2–1.3)
BUN SERPL-MCNC: 20 MG/DL (ref 7–30)
CALCIUM SERPL-MCNC: 9.3 MG/DL (ref 8.5–10.1)
CHLORIDE SERPL-SCNC: 98 MMOL/L (ref 94–109)
CO2 SERPL-SCNC: 26 MMOL/L (ref 20–32)
CREAT SERPL-MCNC: 0.72 MG/DL (ref 0.52–1.04)
DIFFERENTIAL METHOD BLD: ABNORMAL
EOSINOPHIL # BLD AUTO: 0.1 10E9/L (ref 0–0.7)
EOSINOPHIL NFR BLD AUTO: 0.6 %
ERYTHROCYTE [DISTWIDTH] IN BLOOD BY AUTOMATED COUNT: 17.6 % (ref 10–15)
GFR SERPL CREATININE-BSD FRML MDRD: 87 ML/MIN/{1.73_M2}
GLUCOSE BLDC GLUCOMTR-MCNC: 153 MG/DL (ref 70–99)
GLUCOSE BLDC GLUCOMTR-MCNC: 207 MG/DL (ref 70–99)
GLUCOSE SERPL-MCNC: 200 MG/DL (ref 70–99)
HCT VFR BLD AUTO: 32.6 % (ref 35–47)
HGB BLD-MCNC: 10.4 G/DL (ref 11.7–15.7)
IMM GRANULOCYTES # BLD: 0 10E9/L (ref 0–0.4)
IMM GRANULOCYTES NFR BLD: 0.5 %
LYMPHOCYTES # BLD AUTO: 1.3 10E9/L (ref 0.8–5.3)
LYMPHOCYTES NFR BLD AUTO: 15.8 %
MAGNESIUM SERPL-MCNC: 1.4 MG/DL (ref 1.6–2.3)
MAGNESIUM SERPL-MCNC: 2.2 MG/DL (ref 1.6–2.3)
MCH RBC QN AUTO: 27.9 PG (ref 26.5–33)
MCHC RBC AUTO-ENTMCNC: 31.9 G/DL (ref 31.5–36.5)
MCV RBC AUTO: 87 FL (ref 78–100)
MONOCYTES # BLD AUTO: 0.2 10E9/L (ref 0–1.3)
MONOCYTES NFR BLD AUTO: 2.4 %
NEUTROPHILS # BLD AUTO: 6.4 10E9/L (ref 1.6–8.3)
NEUTROPHILS NFR BLD AUTO: 80.3 %
NRBC # BLD AUTO: 0 10*3/UL
NRBC BLD AUTO-RTO: 0 /100
PLATELET # BLD AUTO: 178 10E9/L (ref 150–450)
POTASSIUM SERPL-SCNC: 3.8 MMOL/L (ref 3.4–5.3)
PROT SERPL-MCNC: 7.8 G/DL (ref 6.8–8.8)
RBC # BLD AUTO: 3.73 10E12/L (ref 3.8–5.2)
SODIUM SERPL-SCNC: 130 MMOL/L (ref 133–144)
WBC # BLD AUTO: 8 10E9/L (ref 4–11)

## 2019-12-27 PROCEDURE — 25800030 ZZH RX IP 258 OP 636: Performed by: OBSTETRICS & GYNECOLOGY

## 2019-12-27 PROCEDURE — 25000128 H RX IP 250 OP 636: Performed by: OBSTETRICS & GYNECOLOGY

## 2019-12-27 PROCEDURE — 80053 COMPREHEN METABOLIC PANEL: CPT | Performed by: NURSE PRACTITIONER

## 2019-12-27 PROCEDURE — 83735 ASSAY OF MAGNESIUM: CPT | Performed by: OBSTETRICS & GYNECOLOGY

## 2019-12-27 PROCEDURE — 96375 TX/PRO/DX INJ NEW DRUG ADDON: CPT

## 2019-12-27 PROCEDURE — 25000125 ZZHC RX 250: Performed by: OBSTETRICS & GYNECOLOGY

## 2019-12-27 PROCEDURE — 96415 CHEMO IV INFUSION ADDL HR: CPT

## 2019-12-27 PROCEDURE — 83735 ASSAY OF MAGNESIUM: CPT | Performed by: NURSE PRACTITIONER

## 2019-12-27 PROCEDURE — G0463 HOSPITAL OUTPT CLINIC VISIT: HCPCS | Mod: ZF

## 2019-12-27 PROCEDURE — 81003 URINALYSIS AUTO W/O SCOPE: CPT | Performed by: NURSE PRACTITIONER

## 2019-12-27 PROCEDURE — 25000128 H RX IP 250 OP 636: Mod: ZF | Performed by: NURSE PRACTITIONER

## 2019-12-27 PROCEDURE — G0463 HOSPITAL OUTPT CLINIC VISIT: HCPCS | Mod: 25

## 2019-12-27 PROCEDURE — 85025 COMPLETE CBC W/AUTO DIFF WBC: CPT | Performed by: NURSE PRACTITIONER

## 2019-12-27 PROCEDURE — 99214 OFFICE O/P EST MOD 30 MIN: CPT | Mod: ZP | Performed by: NURSE PRACTITIONER

## 2019-12-27 PROCEDURE — 00000146 ZZHCL STATISTIC GLUCOSE BY METER IP

## 2019-12-27 PROCEDURE — 25000132 ZZH RX MED GY IP 250 OP 250 PS 637: Mod: GY | Performed by: OBSTETRICS & GYNECOLOGY

## 2019-12-27 PROCEDURE — G0378 HOSPITAL OBSERVATION PER HR: HCPCS

## 2019-12-27 PROCEDURE — 96413 CHEMO IV INFUSION 1 HR: CPT

## 2019-12-27 PROCEDURE — 96417 CHEMO IV INFUS EACH ADDL SEQ: CPT

## 2019-12-27 PROCEDURE — 36415 COLL VENOUS BLD VENIPUNCTURE: CPT | Performed by: OBSTETRICS & GYNECOLOGY

## 2019-12-27 PROCEDURE — 25000128 H RX IP 250 OP 636: Performed by: STUDENT IN AN ORGANIZED HEALTH CARE EDUCATION/TRAINING PROGRAM

## 2019-12-27 RX ORDER — ONDANSETRON 4 MG/1
4 TABLET, ORALLY DISINTEGRATING ORAL EVERY 6 HOURS PRN
Status: DISCONTINUED | OUTPATIENT
Start: 2019-12-27 | End: 2019-12-28 | Stop reason: HOSPADM

## 2019-12-27 RX ORDER — ALBUTEROL SULFATE 0.83 MG/ML
2.5 SOLUTION RESPIRATORY (INHALATION)
Status: DISCONTINUED | OUTPATIENT
Start: 2019-12-27 | End: 2019-12-28 | Stop reason: HOSPADM

## 2019-12-27 RX ORDER — LOPERAMIDE HCL 2 MG
2 CAPSULE ORAL 4 TIMES DAILY PRN
Status: DISCONTINUED | OUTPATIENT
Start: 2019-12-27 | End: 2019-12-28 | Stop reason: HOSPADM

## 2019-12-27 RX ORDER — EPINEPHRINE 1 MG/ML
0.3 INJECTION, SOLUTION, CONCENTRATE INTRAVENOUS EVERY 5 MIN PRN
Status: DISCONTINUED | OUTPATIENT
Start: 2019-12-27 | End: 2019-12-28 | Stop reason: HOSPADM

## 2019-12-27 RX ORDER — METHYLPREDNISOLONE SODIUM SUCCINATE 125 MG/2ML
125 INJECTION, POWDER, LYOPHILIZED, FOR SOLUTION INTRAMUSCULAR; INTRAVENOUS
Status: DISCONTINUED | OUTPATIENT
Start: 2019-12-27 | End: 2019-12-28 | Stop reason: HOSPADM

## 2019-12-27 RX ORDER — TRIAMTERENE/HYDROCHLOROTHIAZID 37.5-25 MG
1 TABLET ORAL EVERY MORNING
Status: DISCONTINUED | OUTPATIENT
Start: 2019-12-27 | End: 2019-12-28 | Stop reason: HOSPADM

## 2019-12-27 RX ORDER — AMLODIPINE BESYLATE 5 MG/1
5 TABLET ORAL DAILY
Status: DISCONTINUED | OUTPATIENT
Start: 2019-12-27 | End: 2019-12-28 | Stop reason: HOSPADM

## 2019-12-27 RX ORDER — AMOXICILLIN 250 MG
1 CAPSULE ORAL 2 TIMES DAILY PRN
Status: DISCONTINUED | OUTPATIENT
Start: 2019-12-27 | End: 2019-12-28 | Stop reason: HOSPADM

## 2019-12-27 RX ORDER — DEXAMETHASONE SODIUM PHOSPHATE 10 MG/ML
20 INJECTION, SOLUTION INTRAMUSCULAR; INTRAVENOUS ONCE
Status: COMPLETED | OUTPATIENT
Start: 2019-12-27 | End: 2019-12-28

## 2019-12-27 RX ORDER — AMOXICILLIN 250 MG
2 CAPSULE ORAL 2 TIMES DAILY PRN
Status: DISCONTINUED | OUTPATIENT
Start: 2019-12-27 | End: 2019-12-28 | Stop reason: HOSPADM

## 2019-12-27 RX ORDER — NALOXONE HYDROCHLORIDE 0.4 MG/ML
.1-.4 INJECTION, SOLUTION INTRAMUSCULAR; INTRAVENOUS; SUBCUTANEOUS
Status: DISCONTINUED | OUTPATIENT
Start: 2019-12-27 | End: 2019-12-28 | Stop reason: HOSPADM

## 2019-12-27 RX ORDER — ONDANSETRON 8 MG/1
8 TABLET, FILM COATED ORAL ONCE
Status: COMPLETED | OUTPATIENT
Start: 2019-12-27 | End: 2019-12-27

## 2019-12-27 RX ORDER — ALBUTEROL SULFATE 90 UG/1
1-2 AEROSOL, METERED RESPIRATORY (INHALATION)
Status: DISCONTINUED | OUTPATIENT
Start: 2019-12-27 | End: 2019-12-28 | Stop reason: HOSPADM

## 2019-12-27 RX ORDER — LORAZEPAM 2 MG/ML
.5-1 INJECTION INTRAMUSCULAR EVERY 6 HOURS PRN
Status: DISCONTINUED | OUTPATIENT
Start: 2019-12-27 | End: 2019-12-28 | Stop reason: HOSPADM

## 2019-12-27 RX ORDER — PROCHLORPERAZINE MALEATE 5 MG
5 TABLET ORAL EVERY 6 HOURS PRN
Status: DISCONTINUED | OUTPATIENT
Start: 2019-12-27 | End: 2019-12-28 | Stop reason: HOSPADM

## 2019-12-27 RX ORDER — MEPERIDINE HYDROCHLORIDE 25 MG/ML
25 INJECTION INTRAMUSCULAR; INTRAVENOUS; SUBCUTANEOUS EVERY 30 MIN PRN
Status: DISCONTINUED | OUTPATIENT
Start: 2019-12-27 | End: 2019-12-28 | Stop reason: HOSPADM

## 2019-12-27 RX ORDER — DIPHENHYDRAMINE HCL 50 MG
50 CAPSULE ORAL ONCE
Status: COMPLETED | OUTPATIENT
Start: 2019-12-27 | End: 2019-12-28

## 2019-12-27 RX ORDER — PROCHLORPERAZINE MALEATE 5 MG
5-10 TABLET ORAL EVERY 6 HOURS PRN
Status: DISCONTINUED | OUTPATIENT
Start: 2019-12-27 | End: 2019-12-27

## 2019-12-27 RX ORDER — DEXAMETHASONE SODIUM PHOSPHATE 10 MG/ML
20 INJECTION, SOLUTION INTRAMUSCULAR; INTRAVENOUS ONCE
Status: COMPLETED | OUTPATIENT
Start: 2019-12-27 | End: 2019-12-27

## 2019-12-27 RX ORDER — DIPHENHYDRAMINE HCL 50 MG
50 CAPSULE ORAL ONCE
Status: COMPLETED | OUTPATIENT
Start: 2019-12-27 | End: 2019-12-27

## 2019-12-27 RX ORDER — FAMOTIDINE 10 MG
10 TABLET ORAL DAILY
Status: DISCONTINUED | OUTPATIENT
Start: 2019-12-28 | End: 2019-12-28 | Stop reason: HOSPADM

## 2019-12-27 RX ORDER — MAGNESIUM SULFATE HEPTAHYDRATE 40 MG/ML
4 INJECTION, SOLUTION INTRAVENOUS EVERY 4 HOURS PRN
Status: DISCONTINUED | OUTPATIENT
Start: 2019-12-27 | End: 2019-12-28 | Stop reason: HOSPADM

## 2019-12-27 RX ORDER — ACETAMINOPHEN 325 MG/1
650 TABLET ORAL EVERY 4 HOURS PRN
Status: DISCONTINUED | OUTPATIENT
Start: 2019-12-27 | End: 2019-12-27

## 2019-12-27 RX ORDER — PROCHLORPERAZINE 25 MG
12.5 SUPPOSITORY, RECTAL RECTAL EVERY 12 HOURS PRN
Status: DISCONTINUED | OUTPATIENT
Start: 2019-12-27 | End: 2019-12-28 | Stop reason: HOSPADM

## 2019-12-27 RX ORDER — ONDANSETRON 2 MG/ML
4 INJECTION INTRAMUSCULAR; INTRAVENOUS EVERY 6 HOURS PRN
Status: DISCONTINUED | OUTPATIENT
Start: 2019-12-27 | End: 2019-12-28 | Stop reason: HOSPADM

## 2019-12-27 RX ORDER — NALOXONE HYDROCHLORIDE 0.4 MG/ML
.1-.4 INJECTION, SOLUTION INTRAMUSCULAR; INTRAVENOUS; SUBCUTANEOUS
Status: DISCONTINUED | OUTPATIENT
Start: 2019-12-27 | End: 2019-12-27

## 2019-12-27 RX ORDER — DEXAMETHASONE 4 MG/1
TABLET ORAL
Qty: 2 DOSE | Refills: 2 | Status: SHIPPED | OUTPATIENT
Start: 2019-12-26 | End: 2020-01-01

## 2019-12-27 RX ORDER — LORAZEPAM 0.5 MG/1
.5-1 TABLET ORAL EVERY 6 HOURS PRN
Status: DISCONTINUED | OUTPATIENT
Start: 2019-12-27 | End: 2019-12-28 | Stop reason: HOSPADM

## 2019-12-27 RX ORDER — ACETAMINOPHEN 325 MG/1
325 TABLET ORAL EVERY 6 HOURS PRN
Status: DISCONTINUED | OUTPATIENT
Start: 2019-12-27 | End: 2019-12-28 | Stop reason: HOSPADM

## 2019-12-27 RX ORDER — HEPARIN SODIUM (PORCINE) LOCK FLUSH IV SOLN 100 UNIT/ML 100 UNIT/ML
5 SOLUTION INTRAVENOUS EVERY 8 HOURS
Status: DISCONTINUED | OUTPATIENT
Start: 2019-12-27 | End: 2019-12-27 | Stop reason: HOSPADM

## 2019-12-27 RX ORDER — DIPHENHYDRAMINE HYDROCHLORIDE 50 MG/ML
50 INJECTION INTRAMUSCULAR; INTRAVENOUS
Status: DISCONTINUED | OUTPATIENT
Start: 2019-12-27 | End: 2019-12-28 | Stop reason: HOSPADM

## 2019-12-27 RX ORDER — PROCHLORPERAZINE MALEATE 5 MG
5 TABLET ORAL EVERY 6 HOURS PRN
Status: DISCONTINUED | OUTPATIENT
Start: 2019-12-27 | End: 2019-12-27

## 2019-12-27 RX ORDER — SODIUM CHLORIDE 9 MG/ML
1000 INJECTION, SOLUTION INTRAVENOUS CONTINUOUS PRN
Status: DISCONTINUED | OUTPATIENT
Start: 2019-12-27 | End: 2019-12-28 | Stop reason: HOSPADM

## 2019-12-27 RX ORDER — DEXAMETHASONE 4 MG/1
8 TABLET ORAL EVERY MORNING
Status: DISCONTINUED | OUTPATIENT
Start: 2019-12-28 | End: 2019-12-28 | Stop reason: HOSPADM

## 2019-12-27 RX ADMIN — Medication 5 ML: at 08:16

## 2019-12-27 RX ADMIN — DEXAMETHASONE SODIUM PHOSPHATE 20 MG: 10 INJECTION, SOLUTION INTRAMUSCULAR; INTRAVENOUS at 14:13

## 2019-12-27 RX ADMIN — ONDANSETRON HYDROCHLORIDE 8 MG: 8 TABLET, FILM COATED ORAL at 14:08

## 2019-12-27 RX ADMIN — CARBOPLATIN 7.4 MG: 10 INJECTION, SOLUTION INTRAVENOUS at 22:11

## 2019-12-27 RX ADMIN — BEVACIZUMAB 1900 MG: 400 INJECTION, SOLUTION INTRAVENOUS at 19:17

## 2019-12-27 RX ADMIN — AMLODIPINE BESYLATE 5 MG: 5 TABLET ORAL at 18:17

## 2019-12-27 RX ADMIN — DIPHENHYDRAMINE HYDROCHLORIDE 50 MG: 50 CAPSULE ORAL at 14:09

## 2019-12-27 RX ADMIN — PACLITAXEL 417 MG: 6 INJECTION, SOLUTION INTRAVENOUS at 15:49

## 2019-12-27 RX ADMIN — DIPHENHYDRAMINE HYDROCHLORIDE 50 MG: 50 CAPSULE ORAL at 19:30

## 2019-12-27 RX ADMIN — SODIUM CHLORIDE 150 MG: 9 INJECTION, SOLUTION INTRAVENOUS at 14:24

## 2019-12-27 RX ADMIN — MAGNESIUM SULFATE HEPTAHYDRATE 4 G: 40 INJECTION, SOLUTION INTRAVENOUS at 11:49

## 2019-12-27 RX ADMIN — FAMOTIDINE 40 MG: 10 INJECTION, SOLUTION INTRAVENOUS at 14:10

## 2019-12-27 RX ADMIN — CARBOPLATIN 0.74 MG: 10 INJECTION, SOLUTION INTRAVENOUS at 20:26

## 2019-12-27 ASSESSMENT — ENCOUNTER SYMPTOMS
HEMATURIA: 0
FATIGUE: 1
HEADACHES: 0
NAUSEA: 0
SINUS PAIN: 1
NECK PAIN: 0
LEG SWELLING: 0
WEIGHT GAIN: 0
HYPOTENSION: 0
TASTE DISTURBANCE: 1
EYE PAIN: 0
VOMITING: 0
DIZZINESS: 0
INCREASED ENERGY: 0
DECREASED CONCENTRATION: 0
DIARRHEA: 0
WHEEZING: 0
TROUBLE SWALLOWING: 0
POLYPHAGIA: 0
WEAKNESS: 0
RECTAL BLEEDING: 0
DYSPNEA ON EXERTION: 0
SKIN CHANGES: 0
MEMORY LOSS: 0
CONSTIPATION: 0
BOWEL INCONTINENCE: 0
ABDOMINAL PAIN: 0
SWOLLEN GLANDS: 0
HOT FLASHES: 0
FEVER: 0
ALTERED TEMPERATURE REGULATION: 0
EYE IRRITATION: 0
BREAST MASS: 0
TINGLING: 0
NAIL CHANGES: 1
MUSCLE WEAKNESS: 0
EXERCISE INTOLERANCE: 0
HOARSE VOICE: 0
NIGHT SWEATS: 0
DISTURBANCES IN COORDINATION: 0
HEMOPTYSIS: 0
JOINT SWELLING: 0
NECK MASS: 0
SLEEP DISTURBANCES DUE TO BREATHING: 0
PARALYSIS: 0
BREAST PAIN: 0
SYNCOPE: 0
DECREASED APPETITE: 1
DEPRESSION: 0
EYE WATERING: 0
PANIC: 0
WEIGHT LOSS: 0
BRUISES/BLEEDS EASILY: 0
TACHYCARDIA: 0
DECREASED LIBIDO: 0
NUMBNESS: 0
SMELL DISTURBANCE: 0
POOR WOUND HEALING: 0
CHILLS: 0
SEIZURES: 0
TREMORS: 0
SORE THROAT: 0
NERVOUS/ANXIOUS: 0
PALPITATIONS: 0
HYPERTENSION: 1
MYALGIAS: 0
SHORTNESS OF BREATH: 0
DOUBLE VISION: 0
SINUS CONGESTION: 1
ORTHOPNEA: 0
INSOMNIA: 0
MUSCLE CRAMPS: 0
STIFFNESS: 0
POSTURAL DYSPNEA: 0
RECTAL PAIN: 0
SNORES LOUDLY: 0
HALLUCINATIONS: 0
LOSS OF CONSCIOUSNESS: 0
DYSURIA: 0
EXTREMITY NUMBNESS: 0
HEARTBURN: 0
EYE REDNESS: 0
LEG PAIN: 0
BACK PAIN: 0
JAUNDICE: 0
COUGH DISTURBING SLEEP: 0
DIFFICULTY URINATING: 0
SPUTUM PRODUCTION: 0
COUGH: 0
BLOOD IN STOOL: 0
LIGHT-HEADEDNESS: 0
ARTHRALGIAS: 0
RESPIRATORY PAIN: 0
CLAUDICATION: 0
BLOATING: 0
POLYDIPSIA: 0
FLANK PAIN: 0
SPEECH CHANGE: 0

## 2019-12-27 ASSESSMENT — ACTIVITIES OF DAILY LIVING (ADL)
BATHING: 0-->INDEPENDENT
DRESS: 0-->INDEPENDENT
RETIRED_COMMUNICATION: 0-->UNDERSTANDS/COMMUNICATES WITHOUT DIFFICULTY
SWALLOWING: 0-->SWALLOWS FOODS/LIQUIDS WITHOUT DIFFICULTY
RETIRED_EATING: 0-->INDEPENDENT
AMBULATION: 0-->INDEPENDENT
FALL_HISTORY_WITHIN_LAST_SIX_MONTHS: NO
TRANSFERRING: 0-->INDEPENDENT
COGNITION: 0 - NO COGNITION ISSUES REPORTED
TOILETING: 0-->INDEPENDENT

## 2019-12-27 ASSESSMENT — COLUMBIA-SUICIDE SEVERITY RATING SCALE - C-SSRS
2. HAVE YOU ACTUALLY HAD ANY THOUGHTS OF KILLING YOURSELF IN THE PAST MONTH?: NO
1. IN THE PAST MONTH, HAVE YOU WISHED YOU WERE DEAD OR WISHED YOU COULD GO TO SLEEP AND NOT WAKE UP?: NO
2. HAVE YOU ACTUALLY HAD ANY THOUGHTS OF KILLING YOURSELF SINCE LAST CONTACT?: NO

## 2019-12-27 ASSESSMENT — PAIN SCALES - GENERAL: PAINLEVEL: NO PAIN (0)

## 2019-12-27 NOTE — PLAN OF CARE
UAL.Denies pain,discomfort.Premeds given as ordered.Denies pain,discomfort.Goals not met,chemo not initiated.Magnesium replaced as ordered.

## 2019-12-27 NOTE — LETTER
2019       RE: Lu Smith  [address]  [facility] [address]     Dear Colleague,    Thank you for referring your patient, Lu Smith, to the [facility]. Please see a copy of my visit note below.                Follow Up Notes on Referred Patient    Date: 2019       RE: Lu Smith  : 1950  HEIDY: 2019      Lu Smith is a 69 year old woman with a diagnosis of Recurrent high grade serous endometrial cancer.   She is here today for follow up and disease management.     Oncology History:  Diagnosis: Stage IIIC2 serous endometrial cancer  Primary GYN oncologist: Dr. Kevin Henderson  Primary radiation oncologist: Dr. Ana Michelle  Surgery: 18: TLH-BSO, omentectomy, bilateral pelvic and para-aortic lymph node dissection, pelvic washings  Chemotherapy:  3/30/18-18: Six cycles of carboplatin AUC 6 and paclitaxel 175mg/m2, delivered sandwich style with pelvic radiation between cycles three and four  Radiation: 18-18: EBRT delivered to pelvis and para-aortic nodes, 5040 cGy in 28 fractions  18-18: HDR brachytherapy delivered to the vaginal cuff, 1200 cGy in two fractions  Complications: Transient neuropathy, mild thrombocytopenia and anemia  Genetic testing: Panel testing negative     19: Chest CT performed for pulmonary nodule follow up, concerning for recurrent disease  IMPRESSION:   1. Few bilateral sub-4 mm solid pulmonary nodules are unchanged compared to 2018. No new or enlarging pulmonary nodules.  2. Dilated main pulmonary artery, nonspecific but may be seen with pulmonary artery hypertension.  3. New 1.9 cm left subclavicular node.     19: PET impression:  In this 69-year-old female with history of stage IIIC2 serous endometrial cancer status post total hysterectomy and bilateral salpingo-oophorectomy, omentectomy, bilateral pelvic and periaortic dissection, and radiation to the pelvis, and brachytherapy to  the vaginal cuff.  1. Progression of disease with new intensely FDG avid para-aortic, mediastinal, and subclavicular lymphadenopathy. The subdiaphragmatic periaortic lymphadenopathy spans from the diaphragmatic crura to the  low abdominal aorta at the level of L4 with greater than 180 degree involvement of the aorta. There is additional lymphadenopathy within the left axilla and the right inguinal region.     9/28/19: Neck CT impression:  1. Progression of metastatic disease with new intensely FDG avid retroclavicular lymph node at the level of the left thoracic inlet.   2. On the fusion PET CT, there is no definite abnormal metabolic activity in the mucosal space, soft tissues, or cervical jamel chains.  3. No evidence of mucosal or soft tissue abnormality on contrast enhanced neck CT.  4. Please refer to the whole body PET CT performed as a separate report, for the findings of the remainder of the body.     10/24/19: Lymph node biopsy:  Lymph node, periaortic, CT guided core biopsy:   -METASTATIC ADENOCARCINOMA   -Tumor morphology is consistent with metastasis/recurrence of endometrial serous carcinoma      11/11/19: C1 paclitaxel 175mg/m2 + carboplatin AUC 6 + bevacizumab 15mg/kg     12/2/19: C2 paclitaxel 175mg/m2 + carboplatin AUC 6 + bevacizumab 15mg/kg. Carboplatin reaction.    12/27/19: Cycle #3 Paclitaxel/Avastin/inpatient Carboplatin desensitization.         Today she comes to clinic and states she took her Decadron at 4 am and has her pills with her to take at 10 am. She is generally feeling well; she has noticed a slight decrease in her appetite and has been adding in a protein shake periodically. She denies any n/v. She has been having some sinus congestion and has noticed that certain foods will taste a little different. She has been using a salt water rinse to help with some small mouth sores she will get periodically. She denies any vaginal bleeding, no changes in her bowel or bladder habits, no  nausea/emesis, no lower extremity edema, and no difficulties eating or sleeping. She denies any abdominal discomfort/bloating, no fevers or chills, and no chest pain or shortness of breath. She has Mg at home but has not been taking it. She does not need any medications refilled.         Review of Systems     Constitutional:  Positive for fatigue and decreased appetite. Negative for fever, chills, weight loss, weight gain, night sweats, recent stressors, height loss, post-operative complications, incisional pain, hallucinations, increased energy, hyperactivity and confused.   HENT:  Positive for taste disturbance, sinus pain and sinus congestion. Negative for ear pain, hearing loss, tinnitus, nosebleeds, trouble swallowing, hoarse voice, mouth sores, sore throat, ear discharge, tooth pain, gum tenderness, smell disturbance, hearing aid, bleeding gums, dry mouth and neck mass.    Eyes:  Negative for double vision, pain, redness, eye pain, decreased vision, eye watering, eye bulging, eye dryness, flashing lights, spots, floaters, strabismus, tunnel vision, jaundice and eye irritation.   Respiratory:   Negative for cough, hemoptysis, sputum production, shortness of breath, wheezing, sleep disturbances due to breathing, snores loudly, respiratory pain, dyspnea on exertion, cough disturbing sleep and postural dyspnea.    Cardiovascular:  Positive for hypertension. Negative for chest pain, dyspnea on exertion, palpitations, orthopnea, claudication, leg swelling, fingers/toes turn blue, hypotension, syncope, history of heart murmur, chest pain on exertion, chest pain at rest, pacemaker, few scattered varicosities, leg pain, sleep disturbances due to breathing, tachycardia, light-headedness, exercise intolerance and edema.   Gastrointestinal:  Negative for heartburn, nausea, vomiting, abdominal pain, diarrhea, constipation, blood in stool, melena, rectal pain, bloating, hemorrhoids, bowel incontinence, jaundice, rectal  bleeding, coffee ground emesis and change in stool.   Genitourinary:  Negative for bladder incontinence, dysuria, urgency, hematuria, flank pain, vaginal discharge, difficulty urinating, genital sores, dyspareunia, decreased libido, nocturia, voiding less frequently, arousal difficulty, abnormal vaginal bleeding, excessive menstruation, menstrual changes, hot flashes, vaginal dryness and postmenopausal bleeding.   Musculoskeletal:  Negative for myalgias, back pain, joint swelling, arthralgias, stiffness, muscle cramps, neck pain, bone pain, muscle weakness and fracture.   Skin:  Positive for nail changes. Negative for itching, poor wound healing, rash, hair changes, skin changes, acne, warts, poor wound healing, scarring, flaky skin, Raynaud's phenomenon, sensitivity to sunlight and skin thickening.   Neurological:  Negative for dizziness, tingling, tremors, speech change, seizures, loss of consciousness, weakness, light-headedness, numbness, headaches, disturbances in coordination, extremity numbness, memory loss, difficulty walking and paralysis.   Endo/Heme:  Negative for anemia, swollen glands and bruises/bleeds easily.   Psychiatric/Behavioral:  Negative for depression, hallucinations, memory loss, decreased concentration, mood swings and panic attacks.    Breast:  Negative for breast discharge, breast mass, breast pain and nipple retraction.   Endocrine:  Negative for altered temperature regulation, polyphagia, polydipsia, unwanted hair growth and change in facial hair.  ,      Past Medical History:    Past Medical History:   Diagnosis Date     Diabetes (H)     Type II     Endometrial cancer determined by uterine biopsy (H) 02/2018     HTN (hypertension)      Obesity      Osteoarthritis          Past Surgical History:    Past Surgical History:   Procedure Laterality Date     CHOLECYSTECTOMY  1993     CYSTOSCOPY N/A 2/23/2018    Procedure: CYSTOSCOPY;;  Surgeon: Kevin Henderson MD;  Location:  OR      DAVINCI HYSTERECTOMY TOTAL, BILATERAL SALPINGO-OOPHORECTOMY, COMBINED N/A 2/23/2018    Procedure: COMBINED DAVINCI HYSTERECTOMY TOTAL, SALPINGO-OOPHORECTOMY;  DaVinci Assisted Total Laparoscopic Hysterectomy, Bilateral Salpingo-Oophorectomy, Cystoscopy,  Omental biopsy, omentectomy,bilateral  Pelvic And Para Aortic Lymph Node Dissection;  Surgeon: Kevin Henderson MD;  Location: [facility] OR     Partial lumbar discectomy  1998         Health Maintenance Due   Topic Date Due     DEXA  1950     LIPID  1950     MICROALBUMIN  1950     TSH W/FREE T4 REFLEX  1950     DIABETIC FOOT EXAM  1950     HEPATITIS C SCREENING  1950     ADVANCE CARE PLANNING  1950     EYE EXAM  1950     ZOSTER IMMUNIZATION (1 of 2) 04/07/2002     MEDICARE ANNUAL WELLNESS VISIT  04/07/2017     PNEUMOCOCCAL IMMUNIZATION 65+ HIGH/HIGHEST RISK (2 of 2 - PPSV23) 02/09/2018     A1C  05/09/2018     PHQ-2  01/01/2019       Current Medications:     Current Outpatient Medications   Medication Sig Dispense Refill     ACETAMINOPHEN PO Take 325 mg by mouth every 6 hours as needed for pain       dexamethasone (DECADRON) 4 MG tablet Take 5 tablets (20 mg) by mouth See Admin Instructions for 2 doses 10 tablet 3     Famotidine (PEPCID PO) Take 10 mg by mouth daily       fluticasone (FLONASE) 50 MCG/ACT spray Spray 1-2 sprays into both nostrils daily 1 Bottle 1     L-Glutamine 500 MG CAPS        loperamide (IMODIUM) 2 MG capsule Take 2 mg by mouth 4 times daily as needed for diarrhea       loratadine (CLARITIN) 10 MG tablet Take 1 tablet (10 mg) by mouth daily 30 tablet 3     magnesium 500 MG TABS        METFORMIN HCL PO Take 500 mg by mouth daily (with breakfast)        prochlorperazine (COMPAZINE) 10 MG tablet Take 0.5 tablets (5 mg) by mouth every 6 hours as needed (nausea/vomiting) 30 tablet 2     pyridoxine (VITAMIN B-6) 50 MG TABS Take 1 tablet (50 mg) by mouth 2 times daily 60 tablet 3      triamterene-hydrochlorothiazide (DYAZIDE) 37.5-25 MG per capsule Take 1 capsule by mouth every morning            Allergies:        Allergies   Allergen Reactions     Carboplatin Anaphylaxis     Chest tightness, hoarse voice, difficulty breathing     Ace Inhibitors Cough     Amoxicillin Hives        Social History:     Social History     Tobacco Use     Smoking status: Former Smoker     Packs/day: 0.25     Years: 20.00     Pack years: 5.00     Last attempt to quit: 1991     Years since quittin.6     Smokeless tobacco: Never Used     Tobacco comment: Quite smoking    Substance Use Topics     Alcohol use: Yes     Comment: 4-7/week if out 1-2x's/week       History   Drug Use No         Family History:     The patient's family history is notable for:    Family History   Problem Relation Age of Onset     Colon Cancer Mother      Breast Cancer Sister      Lymphoma Sister          Physical Exam:     /83 (BP Location: Right arm, Patient Position: Sitting, Cuff Size: Adult Regular)   Pulse 86   Temp 98  F (36.7  C) (Oral)   Resp 18   Wt 121.1 kg (267 lb)   SpO2 98%   BMI 44.43 kg/m     Body mass index is 44.43 kg/m .    General Appearance: healthy and alert, no distress     HEENT: no thyromegaly, no palpable nodules or masses        Cardiovascular: regular rate and rhythm, no gallops, rubs or murmurs     Respiratory: lungs clear, no rales, rhonchi or wheezes, normal diaphragmatic excursion    Musculoskeletal: extremities non tender and without edema    Skin: no lesions or rashes     Neurological: normal gait, no gross defects     Psychiatric: appropriate mood and affect                               Hematological: normal cervical, supraclavicular lymph nodes     Gastrointestinal:       abdomen soft, non-tender, non-distended    Genitourinary: deferred      Assessment:    Lu Smith is a 69 year old woman with a diagnosis of Recurrent high grade serous endometrial cancer.  She is here today for  follow up and disease management.     30 minutes were spent with this patient, over 50% of that time was spent in symptom management, treatment planning and in counseling and coordination of care.      Plan:     1.)        Ok to proceed with planned inpatient chemotherapy pending labs are WNL. Patient to have imaging and then see Dr. Henderson for results. To add in some sodium rich foods (ie. Crackers, canned soup) and will continue to monitor. Reviewed signs and symptoms for when she should contact the clinic or seek additional care. Patient to contact the clinic with any questions or concerns in the interim.     2.) Genetic risk factors were assessed and her panel testing was negative.      3.) Labs and/or tests ordered include:  Chemo labs. CT cap.      4.) Health maintenance issues addressed today include annual health maintenance and non-gynecologic issues with PCP.    5.)        Hypomagnesemia: encouraged to take her Mg at home; continue to monitor.     ANU Ramey, WHNP-BC, ANP-BC  Women's Health Nurse Practitioner  Adult Nurse Pracitioner  Division of Gynecologic Oncology          CC  Patient Care Team:  Levar Scott as PCP - General (Internal Medicine)  Jeanne Blancas MD as Referring Physician (OB/Gyn)

## 2019-12-27 NOTE — NURSING NOTE
Chief Complaint   Patient presents with     Oncology Clinic Visit     Return -Endometrial Ca     Port Draw     Labs drawn via PORT by RN in lab. Line flushed with saline and heparin. VS taken.      Dave Morgan RN

## 2019-12-27 NOTE — PROGRESS NOTES
DATE/TIME  (DOT-TD, DOT-NOW) CHEMO CHECK ACTIVITY (REGIMEN & DOSE CHECK, DAY, DOSE #, NAME OF CHEMO #1)  CHEMO DRUG #2  CHEMO DRUG #3 NAME OF RN #1 (USE DOT-ME HERE) NAME OF RN#2 (2ND RN TO LOG IN SEPARATELY)   12/27/2019  10:07 AM   Chemo regimen and dose check    Paclitaxel   Carboplatin Desensitization Avastin Yue Lee, PEPE MANN, RN

## 2019-12-27 NOTE — H&P
Gynecology Oncology History and Physical    Lu Smith MRN# [id number]   Age: 69 year old YOB: 1950     Date of Admission:  12/27/2019             Chief Complaint:   Carboplatin desensitization         History of Present Illness:   This patient is a 69 year old female with recurrent stage IIIC serous endometrial cancer who presents for C3 carboplatin desensitization, paclitaxel, and bevacizumab. Carboplatin reaction occurred on 12/2/2019 with chest tightness, hoarseness, and difficulty breathing near the end of her carboplatin infusion. She received methylprednisolone; NaCl 0.9% Bolus; epinephrine x2, benadryl and recovered. She was evaluated in the ED thereafter and discharged home.     Today, reports overall feeling well though is feeling fatigued particularly after the holidays. She took decadron premedication as directed at 0400 and 1000 today. Has had decreased appetite since last chemotherapy and lost 6 pounds. Continues to have neuropathy in bilateral hands and feet. Mood is sometimes low and patient has been anxious. She has also noticed increased dyspnea with exertion. Denies fevers, chills, chest pain, palpitations, shortness of breath, abdominal pain, change in bowel habits, blood in stool, urinary symptoms, or other systemic complaints.          Cancer Treatment History:   Diagnosis: Stage IIIC2 serous endometrial cancer  Primary GYN oncologist: Dr. Kevin Henderson  Primary radiation oncologist: Dr. Ana Michelle  Surgery: 2/23/18: TLH-BSO, omentectomy, bilateral pelvic and para-aortic lymph node dissection, pelvic washings  Chemotherapy:  3/30/18-9/28/18: Six cycles of carboplatin AUC 6 and paclitaxel 175mg/m2, delivered sandwich style with pelvic radiation between cycles three and four  Radiation: 6/4/18-7/12/18: EBRT delivered to pelvis and para-aortic nodes, 5040 cGy in 28 fractions  7/16/18-7/20/18: HDR brachytherapy delivered to the vaginal cuff, 1200 cGy in two  fractions  Complications: Transient neuropathy, mild thrombocytopenia and anemia  Genetic testing: Panel testing negative     9/16/19: Chest CT performed for pulmonary nodule follow up, concerning for recurrent disease  IMPRESSION:   1. Few bilateral sub-4 mm solid pulmonary nodules are unchanged compared to 2/6/2018. No new or enlarging pulmonary nodules.  2. Dilated main pulmonary artery, nonspecific but may be seen with pulmonary artery hypertension.  3. New 1.9 cm left subclavicular node.     9/28/19: PET impression:  In this 69-year-old female with history of stage IIIC2 serous endometrial cancer status post total hysterectomy and bilateral salpingo-oophorectomy, omentectomy, bilateral pelvic and periaortic dissection, and radiation to the pelvis, and brachytherapy to the vaginal cuff.  1. Progression of disease with new intensely FDG avid para-aortic, mediastinal, and subclavicular lymphadenopathy. The subdiaphragmatic periaortic lymphadenopathy spans from the diaphragmatic crura to the  low abdominal aorta at the level of L4 with greater than 180 degree involvement of the aorta. There is additional lymphadenopathy within the left axilla and the right inguinal region.     9/28/19: Neck CT impression:  1. Progression of metastatic disease with new intensely FDG avid retroclavicular lymph node at the level of the left thoracic inlet.   2. On the fusion PET CT, there is no definite abnormal metabolic activity in the mucosal space, soft tissues, or cervical jamel chains.  3. No evidence of mucosal or soft tissue abnormality on contrast enhanced neck CT.  4. Please refer to the whole body PET CT performed as a separate report, for the findings of the remainder of the body.     10/24/19: Lymph node biopsy:  Lymph node, periaortic, CT guided core biopsy:   -METASTATIC ADENOCARCINOMA   -Tumor morphology is consistent with metastasis/recurrence of endometrial serous carcinoma      11/11/19: C1 paclitaxel 175mg/m2 +  carboplatin AUC 6 + bevacizumab 15mg/kg     19: C2 paclitaxel 175mg/m2 + carboplatin AUC 6 + bevacizumab 15mg/kg. Carboplatin reaction.     19: Cycle #3 Paclitaxel/Avastin/inpatient Carboplatin desensitization.          Past Medical History:     Past Medical History:   Diagnosis Date     Diabetes (H)     Type II     Endometrial cancer determined by uterine biopsy (H) 2018     HTN (hypertension)      Obesity      Osteoarthritis               Past Surgical History:      Past Surgical History:   Procedure Laterality Date     CHOLECYSTECTOMY       CYSTOSCOPY N/A 2018    Procedure: CYSTOSCOPY;;  Surgeon: Kevin Henderson MD;  Location: [facility] OR     DAVINCI HYSTERECTOMY TOTAL, BILATERAL SALPINGO-OOPHORECTOMY, COMBINED N/A 2018    Procedure: COMBINED DAVINCI HYSTERECTOMY TOTAL, SALPINGO-OOPHORECTOMY;  DaVinci Assisted Total Laparoscopic Hysterectomy, Bilateral Salpingo-Oophorectomy, Cystoscopy,  Omental biopsy, omentectomy,bilateral  Pelvic And Para Aortic Lymph Node Dissection;  Surgeon: Kevin Henderson MD;  Location: [facility] OR     Partial lumbar discectomy              Social History:     Social History     Tobacco Use     Smoking status: Former Smoker     Packs/day: 0.25     Years: 20.00     Pack years: 5.00     Last attempt to quit: 1991     Years since quittin.6     Smokeless tobacco: Never Used     Tobacco comment: Quite smoking    Substance Use Topics     Alcohol use: Yes     Comment: 4-7/week if out 1-2x's/week            Family History:     Family History   Problem Relation Age of Onset     Colon Cancer Mother      Breast Cancer Sister      Lymphoma Sister             Allergies:     Allergies   Allergen Reactions     Carboplatin Anaphylaxis     Chest tightness, hoarse voice, difficulty breathing     Ace Inhibitors Cough     Amoxicillin Hives            Medications:     Current Facility-Administered Medications   Medication     acetaminophen (TYLENOL) tablet 325 mg      albuterol (PROAIR HFA/PROVENTIL HFA/VENTOLIN HFA) 108 (90 Base) MCG/ACT inhaler 1-2 puff     albuterol (PROVENTIL) neb solution 2.5 mg     bevacizumab (AVASTIN) 1,900 mg in sodium chloride 0.9 % 186 mL infusion     CARBOplatin 0.74 mg in D5W 111 mL desensitization bag #1     CARBOplatin 660 mg in D5W 616 mL desensitization bag #4     CARBOplatin 7.4 mg in D5W 117 mL desensitization bag #2     CARBOplatin 74 mg in D5W 117 mL desensitization bag #3     Chemotherapy Infusing-Continuous Infusion     dexamethasone (DECADRON) injection 20 mg     dexamethasone (DECADRON) injection 20 mg     [START ON 12/28/2019] dexamethasone (DECADRON) tablet 8 mg     diphenhydrAMINE (BENADRYL) capsule 50 mg     diphenhydrAMINE (BENADRYL) capsule 50 mg     diphenhydrAMINE (BENADRYL) capsule 50 mg     diphenhydrAMINE (BENADRYL) injection 50 mg     EPINEPHrine PF (ADRENALIN) injection 0.3 mg     famotidine (PEPCID) injection 40 mg     fosaprepitant (EMEND) 150 mg in sodium chloride 0.9 % intermittent infusion     loperamide (IMODIUM) capsule 2 mg     LORazepam (ATIVAN) injection 0.5-1 mg     LORazepam (ATIVAN) tablet 0.5-1 mg     magnesium sulfate 4 g in 100 mL sterile water (premade)     MEDICATION INSTRUCTION     meperidine (DEMEROL) injection 25 mg     [START ON 12/28/2019] metFORMIN (GLUCOPHAGE) tablet 500 mg     methylPREDNISolone sodium succinate (solu-MEDROL) injection 125 mg     naloxone (NARCAN) injection 0.1-0.4 mg     ondansetron (ZOFRAN-ODT) ODT tab 4 mg    Or     ondansetron (ZOFRAN) injection 4 mg     ondansetron (ZOFRAN) tablet 8 mg     PACLitaxel (TAXOL) 417 mg in sodium chloride 0.9 % 620 mL infusion     prochlorperazine (COMPAZINE) injection 5 mg    Or     prochlorperazine (COMPAZINE) tablet 5 mg    Or     prochlorperazine (COMPAZINE) Suppository 12.5 mg     senna-docusate (SENOKOT-S/PERICOLACE) 8.6-50 MG per tablet 1 tablet    Or     senna-docusate (SENOKOT-S/PERICOLACE) 8.6-50 MG per tablet 2 tablet     sodium  chloride 0.9% infusion     triamterene-HCTZ (MAXZIDE-25) 37.5-25 MG per tablet 1 tablet     Facility-Administered Medications Ordered in Other Encounters   Medication     heparin 100 UNIT/ML injection 5 mL     sodium chloride (PF) 0.9% PF flush 20 mL            Review of Systems:   Systemic           +weight loss, +decreased appetite; no fever; no chills; no night sweats  Skin           no rashes, or lesions  Eye           no irritation; no changes in vision  Zahida-Laryngeal           no dysphagia; no hoarseness   Pulmonary    no cough; no shortness of breath, +dyspnea with exertion  Cardiovascular    no chest pain; no palpitations  Gastrointestinal    no diarrhea; +constipation; no abdominal pain; no changes in bowel  habits; no blood in stool, no nausea, no emesis  Genitourinary   no urinary frequency; no urinary urgency; no dysuria; no pain; no abnormal vaginal discharge; no abnormal vaginal bleeding    Musculoskeletal    no myalgias; no arthralgias; no back pain  Psychiatric           +depressed mood; +anxiety    Hematologic           no tender lymph nodes; no noticeable swellings or lumps   Endocrine    no hot flashes; +cold intolerance         Neurological   no tremor; +numbness and tingling bilateral hands and feet; +occasional headaches         Physical Exam:     Vitals:    12/27/19 0949 12/27/19 0958   BP: (!) 168/81    BP Location: Right arm    Pulse: 92    Resp: 18    Temp: 95.4  F (35.2  C)    TempSrc: Oral    SpO2: 97%    Weight:  120.7 kg (266 lb 1.6 oz)     General: NAD  CV: RRR  Resp: lungs CTAB, non-labored breathing on room air  Abdomen: soft, non-distended, non-tender  Extremities: 1+ edema bilaterally, WWP         Labs and Imaging:     Results for orders placed or performed in visit on 12/27/19 (from the past 24 hour(s))   Magnesium   Result Value Ref Range    Magnesium 1.4 (L) 1.6 - 2.3 mg/dL   Comprehensive metabolic panel   Result Value Ref Range    Sodium 130 (L) 133 - 144 mmol/L    Potassium  3.8 3.4 - 5.3 mmol/L    Chloride 98 94 - 109 mmol/L    Carbon Dioxide 26 20 - 32 mmol/L    Anion Gap 7 3 - 14 mmol/L    Glucose 200 (H) 70 - 99 mg/dL    Urea Nitrogen 20 7 - 30 mg/dL    Creatinine 0.72 0.52 - 1.04 mg/dL    GFR Estimate 87 >60 mL/min/[1.73_m2]    GFR Estimate If Black >90 >60 mL/min/[1.73_m2]    Calcium 9.3 8.5 - 10.1 mg/dL    Bilirubin Total 0.6 0.2 - 1.3 mg/dL    Albumin 3.3 (L) 3.4 - 5.0 g/dL    Protein Total 7.8 6.8 - 8.8 g/dL    Alkaline Phosphatase 64 40 - 150 U/L    ALT 29 0 - 50 U/L    AST 18 0 - 45 U/L   CBC with platelets differential   Result Value Ref Range    WBC 8.0 4.0 - 11.0 10e9/L    RBC Count 3.73 (L) 3.8 - 5.2 10e12/L    Hemoglobin 10.4 (L) 11.7 - 15.7 g/dL    Hematocrit 32.6 (L) 35.0 - 47.0 %    MCV 87 78 - 100 fl    MCH 27.9 26.5 - 33.0 pg    MCHC 31.9 31.5 - 36.5 g/dL    RDW 17.6 (H) 10.0 - 15.0 %    Platelet Count 178 150 - 450 10e9/L    Diff Method Automated Method     % Neutrophils 80.3 %    % Lymphocytes 15.8 %    % Monocytes 2.4 %    % Eosinophils 0.6 %    % Basophils 0.4 %    % Immature Granulocytes 0.5 %    Nucleated RBCs 0 0 /100    Absolute Neutrophil 6.4 1.6 - 8.3 10e9/L    Absolute Lymphocytes 1.3 0.8 - 5.3 10e9/L    Absolute Monocytes 0.2 0.0 - 1.3 10e9/L    Absolute Eosinophils 0.1 0.0 - 0.7 10e9/L    Absolute Basophils 0.0 0.0 - 0.2 10e9/L    Abs Immature Granulocytes 0.0 0 - 0.4 10e9/L    Absolute Nucleated RBC 0.0    Protein qualitative urine   Result Value Ref Range    Protein Albumin Urine Negative NEG^Negative mg/dL             Assessment and Plan:   Assessment: 69 year old with recurrent Stage IIIC2 serous endometrial cancer admitted for C#3 carboplatin desensitization/Paclitaxel/Avastin. Patient had initial carboplatin reaction on 12/2/2019 with chest tightness and hoarseness.     Active Problem List:  1. Recurrent serous endometrial cancer  2. T2DM  3. HTN  4. Osteoarthritis   5. Chemotherapy induced peripheral neuropathy   6. GERD  7. Anxiety    Plan:  Dz:  recurrent serous endometrial cancer   FEN: regular diet  # hypomagnesemia  - Mg 1.4> ERP ordered  # hyponatremia   - Na 130 today, no prior history of hyponatremia on review of labs. Will administer NS with chemotherapy infusions.   Pain: continued home acetaminophen   Heme:   # chronic normocytic anemia   - Hgb 10.4, stable.   CV:   # HTN   - continue home Dyazide  - BP elevated on admission, will continue to monitor. Hold Avastin if persistently greater than 150/100.   Pulm: No issues  GI: PRN compazine, PRN bowel regimen  #GERD  - continue home Pepcid  : No issues. Voiding spontaneously.   ID: No issues, afebrile   Endocrine:   # T2DM  -  Continue home metformin   Psych/Neuro: Reports low mood intermittently and anxiety. Not taking any medications. Consider outpatient follow up.   PPX: ambulation  Dispo: discharge home when chemo complete     Anoop Villagomez MD  Gyn Oncology Resident  [phone]  12/27/2019 10:25 AM

## 2019-12-27 NOTE — PLAN OF CARE
Pt admitted for 1st course of chemo desensitization.Had not reacted to carbo until last chemo,chest tightness and difficulty breathing.Hypertensive on admission.other vss for pt.had just walked up from admissions.UAL.Had taken dexamethasone as ordered.Denies pain.Port accessed with positive blood return.Educated on what to expect during chemo desensitization.Questions answered.Goals not met.Chemo not administered.

## 2019-12-27 NOTE — NURSING NOTE
"Oncology Rooming Note    December 27, 2019 8:40 AM   Lu Smith is a 69 year old female who presents for:    Chief Complaint   Patient presents with     Oncology Clinic Visit     Return -Endometrial Ca     Port Draw     Labs drawn via PORT by RN in lab. Line flushed with saline and heparin. VS taken.      Initial Vitals: /83 (BP Location: Right arm, Patient Position: Sitting, Cuff Size: Adult Regular)   Pulse 86   Temp 98  F (36.7  C) (Oral)   Resp 18   Wt 121.1 kg (267 lb)   SpO2 98%   BMI 44.43 kg/m   Estimated body mass index is 44.43 kg/m  as calculated from the following:    Height as of 10/24/19: 1.651 m (5' 5\").    Weight as of this encounter: 121.1 kg (267 lb). Body surface area is 2.36 meters squared.  No Pain (0) Comment: Data Unavailable   No LMP recorded. Patient has had a hysterectomy.  Allergies reviewed: Yes  Medications reviewed: Yes    Medications: Medication refills not needed today.  Pharmacy name entered into [employer]: [facility] [address] [place] [address] - [address] [place] [address]    Clinical concerns: Lab Results and continued concerns about increased fatigue and weakness.    Yannick Harris, EMT              "

## 2019-12-28 VITALS
BODY MASS INDEX: 44.28 KG/M2 | DIASTOLIC BLOOD PRESSURE: 68 MMHG | SYSTOLIC BLOOD PRESSURE: 142 MMHG | TEMPERATURE: 97.3 F | WEIGHT: 266.1 LBS | HEART RATE: 64 BPM | RESPIRATION RATE: 18 BRPM | OXYGEN SATURATION: 98 %

## 2019-12-28 LAB — MAGNESIUM SERPL-MCNC: 2 MG/DL (ref 1.6–2.3)

## 2019-12-28 PROCEDURE — 25000132 ZZH RX MED GY IP 250 OP 250 PS 637: Mod: GY | Performed by: OBSTETRICS & GYNECOLOGY

## 2019-12-28 PROCEDURE — 25800030 ZZH RX IP 258 OP 636: Performed by: OBSTETRICS & GYNECOLOGY

## 2019-12-28 PROCEDURE — 99220 ZZC INITIAL OBSERVATION CARE,LEVL III: CPT | Mod: AI | Performed by: OBSTETRICS & GYNECOLOGY

## 2019-12-28 PROCEDURE — 25000128 H RX IP 250 OP 636: Performed by: STUDENT IN AN ORGANIZED HEALTH CARE EDUCATION/TRAINING PROGRAM

## 2019-12-28 PROCEDURE — 83735 ASSAY OF MAGNESIUM: CPT | Performed by: OBSTETRICS & GYNECOLOGY

## 2019-12-28 PROCEDURE — 36415 COLL VENOUS BLD VENIPUNCTURE: CPT | Performed by: OBSTETRICS & GYNECOLOGY

## 2019-12-28 PROCEDURE — G0378 HOSPITAL OBSERVATION PER HR: HCPCS

## 2019-12-28 PROCEDURE — 96415 CHEMO IV INFUSION ADDL HR: CPT

## 2019-12-28 PROCEDURE — 96376 TX/PRO/DX INJ SAME DRUG ADON: CPT

## 2019-12-28 PROCEDURE — 25000128 H RX IP 250 OP 636: Performed by: OBSTETRICS & GYNECOLOGY

## 2019-12-28 PROCEDURE — 25000132 ZZH RX MED GY IP 250 OP 250 PS 637: Mod: GY | Performed by: STUDENT IN AN ORGANIZED HEALTH CARE EDUCATION/TRAINING PROGRAM

## 2019-12-28 PROCEDURE — 25000131 ZZH RX MED GY IP 250 OP 636 PS 637: Mod: GY | Performed by: OBSTETRICS & GYNECOLOGY

## 2019-12-28 RX ORDER — AMLODIPINE BESYLATE 5 MG/1
5 TABLET ORAL DAILY
Qty: 30 TABLET | Refills: 1 | Status: ON HOLD | OUTPATIENT
Start: 2019-12-29 | End: 2020-01-01

## 2019-12-28 RX ORDER — HEPARIN SODIUM,PORCINE 10 UNIT/ML
5-10 VIAL (ML) INTRAVENOUS EVERY 24 HOURS
Status: DISCONTINUED | OUTPATIENT
Start: 2019-12-28 | End: 2019-12-28 | Stop reason: HOSPADM

## 2019-12-28 RX ORDER — HEPARIN SODIUM (PORCINE) LOCK FLUSH IV SOLN 100 UNIT/ML 100 UNIT/ML
5 SOLUTION INTRAVENOUS
Status: DISCONTINUED | OUTPATIENT
Start: 2019-12-28 | End: 2019-12-28 | Stop reason: HOSPADM

## 2019-12-28 RX ORDER — HEPARIN SODIUM,PORCINE 10 UNIT/ML
5-10 VIAL (ML) INTRAVENOUS
Status: DISCONTINUED | OUTPATIENT
Start: 2019-12-28 | End: 2019-12-28 | Stop reason: HOSPADM

## 2019-12-28 RX ADMIN — DEXAMETHASONE SODIUM PHOSPHATE 20 MG: 10 INJECTION, SOLUTION INTRAMUSCULAR; INTRAVENOUS at 01:26

## 2019-12-28 RX ADMIN — DEXAMETHASONE 8 MG: 4 TABLET ORAL at 11:36

## 2019-12-28 RX ADMIN — METFORMIN HYDROCHLORIDE 500 MG: 500 TABLET ORAL at 11:35

## 2019-12-28 RX ADMIN — AMLODIPINE BESYLATE 5 MG: 5 TABLET ORAL at 11:35

## 2019-12-28 RX ADMIN — TRIAMTERENE AND HYDROCHLOROTHIAZIDE 1 TABLET: 37.5; 25 TABLET ORAL at 11:35

## 2019-12-28 RX ADMIN — CARBOPLATIN 660 MG: 10 INJECTION, SOLUTION INTRAVENOUS at 02:05

## 2019-12-28 RX ADMIN — FAMOTIDINE 10 MG: 10 TABLET, FILM COATED ORAL at 08:51

## 2019-12-28 RX ADMIN — CARBOPLATIN 74 MG: 10 INJECTION, SOLUTION INTRAVENOUS at 00:16

## 2019-12-28 RX ADMIN — DIPHENHYDRAMINE HYDROCHLORIDE 50 MG: 50 CAPSULE ORAL at 01:26

## 2019-12-28 RX ADMIN — Medication 5 ML: at 14:08

## 2019-12-28 NOTE — PROGRESS NOTES
Gynecology Oncology Progress Note  12/29/19    Lu Smith is a 69 year old HD# 2 for carboplatin desensitization    Dz: recurrent stage IIIC serous endometrial cancer     24 hour events:   - Cycle #3 of carboplatin desensitization, paclitaxel, Avastin    Subjective: Patient is feeling well. Has not had any symptoms from chemotherapy. Denies nausea, emesis. Was not able to sleep well last night, looking forward to resting at home. Denies CP, SOB, palpitations, headache, abdominal pain. Patient says she does not need any medications to go home with at discharge, has some antiemetics left over from last cycle.     Objective:   Vitals:    12/28/19 0245 12/28/19 0300 12/28/19 0315 12/28/19 0345   BP: (!) 156/75 (!) 164/71 (!) 163/64 (!) 140/67   BP Location:   Right arm Right arm   Pulse: 82 87 95 83   Resp:       Temp:       TempSrc:       SpO2: 95% 91% 95% 96%   Weight:           General: alert and oriented, in NAD  CV: regular rate and rhythm, no murmurs  Resp: clear bilaterally  Abdomen: soft, nontender, nondistended  Extremities: nontender, 1+ LE edema, SCDs in place    PO: 400cc//None documented  IVF: 1034cc//None documented  UOP: 1000cc//250cc + 1 unmeasured       BMP  Recent Labs   Lab 12/27/19  0822   *   POTASSIUM 3.8   CHLORIDE 98   MARYLOU 9.3   CO2 26   BUN 20   CR 0.72   *     CBC  Recent Labs   Lab 12/27/19  0822   WBC 8.0   RBC 3.73*   HGB 10.4*   HCT 32.6*   MCV 87   MCH 27.9   MCHC 31.9   RDW 17.6*          Results for orders placed or performed during the hospital encounter of 12/27/19 (from the past 24 hour(s))   Magnesium   Result Value Ref Range    Magnesium 2.2 1.6 - 2.3 mg/dL   Glucose by meter   Result Value Ref Range    Glucose 153 (H) 70 - 99 mg/dL   Glucose by meter   Result Value Ref Range    Glucose 207 (H) 70 - 99 mg/dL        Assessment: Lu Smith is a 69 year old HD# 2 for carboplatin desensitization. Received C#3 carboplatin desensitization, paclitaxel and  Avastin yesterday. Tolerated well.       Active Problem list:  1. Recurrent serous endometrial cancer  2. T2DM  3. HTN  4. Osteoarthritis   5. Chemotherapy induced peripheral neuropathy   6. GERD  7. Anxiety    Plan:    Dz: recurrent serous endometrial cancer   FEN: regular diet  # hypomagnesemia  - Mg 1.4> ERP   # mild hyponatremia   - Na 130 on admission. Administered NS with chemotherapy infusions.   Pain: continued home acetaminophen   Heme:   # chronic normocytic anemia   - Hgb 10.4, stable.   CV:   # HTN   - continue home Dyazide  - BP elevated on admission, amlodipine 5 mg added yesterday  Pulm: No issues  GI: PRN compazine, PRN bowel regimen  #GERD  - continue home Pepcid  : No issues. Voiding spontaneously.   ID: No issues, afebrile   Endocrine:   # T2DM  -  Continue home metformin   Psych/Neuro: Reports low mood intermittently and anxiety. Not taking any medications. Consider outpatient follow up.   PPX: ambulation  Dispo: discharge home today after chemotherapy     Josi Toure MD  OB/GYN Resident, PGY-2  12/28/2019

## 2019-12-28 NOTE — PROGRESS NOTES
Observation goals: Chemotherapy complete - Goal NOT met    Carbo desensitization bag #4 infusing, due to be completed at 1430.  Patient tolerating infusion with no signs/symptoms of reaction.  Will discharge home once chemo is completed.

## 2019-12-28 NOTE — PLAN OF CARE
Carbo desensitization bag #4 currently infusing. No signs or symptoms of reaction. +blood return to port. Denies pain and nausea. Last BM 12/27. Voiding spontaneously, not saving. Up independently, needs assistance unplugging IV pole. BP elevated, team aware. OVSS on room air. Plan for discharge home today following chemo completion.

## 2019-12-28 NOTE — PROVIDER NOTIFICATION
Notified MD of SBPs in the 160s-170s. Was told that no intervention was necessary unless SBP >180.

## 2019-12-28 NOTE — PLAN OF CARE
Observation goals:  Chemotherapy complete: not met    Tolerating chemo so far with no signs/symptoms of reaction. Currently on bag 2/4 of carboplatin. Brisk blood return noted from port. Hypertensive in the 160s-170s. Other VSS on room air. Denies pain and nausea. Tolerating regular diet. Voiding with adequate urine output. Last bowel movement this morning. Up independently.

## 2019-12-28 NOTE — DISCHARGE SUMMARY
[facility] Discharge Summary    Lu Smith MRN# [id number]   Age: 69 year old YOB: 1950     Date of Admission:  12/27/2019  Date of Discharge::  12/28/2019  Admitting Physician:  Kirstin Hernandez MD  Discharge Physician:  Kirstin Hernandez MD     [facility] clinic: [facility]           Admission Diagnoses:   1. Recurrent serous endometrial cancer  2. T2DM  3. HTN  4. Osteoarthritis   5. Chemotherapy induced peripheral neuropathy   6. GERD  7. Anxiety  8. H/o carboplatin reaction          Discharge Diagnosis:   Same, s/p cycle #3 carboplatin desensitization/paclitaxel/avastin     Problem List  Patient Active Problem List   Diagnosis     Gynecological disease     Gynecologic disease     Endometrial cancer (H)     Encounter for long-term (current) use of medications     Encounter for antineoplastic chemotherapy     Thyroid nodule     Pulmonary nodule     Obesity     Hypertension     Hyperlipidemia with target low density lipoprotein (LDL) cholesterol less than 100 mg/dL     Diabetes mellitus, type II (H)     Pulmonary nodules     Chemotherapy management, encounter for             Procedures:   C#3 carboplatin desensitization/paclitaxel/avastin          Medications Prior to Admission:     Medications Prior to Admission   Medication Sig Dispense Refill Last Dose     ACETAMINOPHEN PO Take 325 mg by mouth every 6 hours as needed for pain   12/27/2019 at Unknown time     dexamethasone (DECADRON) 4 MG tablet Take 5 tablets (20 mg) by mouth See Admin Instructions for 2 doses 10 tablet 3 12/27/2019 at Unknown time     Famotidine (PEPCID PO) Take 10 mg by mouth daily   Past Week at Unknown time     fluticasone (FLONASE) 50 MCG/ACT spray Spray 1-2 sprays into both nostrils daily 1 Bottle 1 Past Month at Unknown time     L-Glutamine 500 MG CAPS    Past Week at Unknown time     loperamide (IMODIUM) 2 MG capsule Take 2 mg by mouth 4 times daily as needed for diarrhea   Past Month at Unknown time     loratadine  (CLARITIN) 10 MG tablet Take 1 tablet (10 mg) by mouth daily 30 tablet 3 Past Week at Unknown time     magnesium 500 MG TABS    Past Week at Unknown time     METFORMIN HCL PO Take 500 mg by mouth daily (with breakfast)    12/27/2019 at Unknown time     prochlorperazine (COMPAZINE) 10 MG tablet Take 0.5 tablets (5 mg) by mouth every 6 hours as needed (nausea/vomiting) 30 tablet 2 Past Month at Unknown time     pyridoxine (VITAMIN B-6) 50 MG TABS Take 1 tablet (50 mg) by mouth 2 times daily 60 tablet 3 12/27/2019 at Unknown time     triamterene-hydrochlorothiazide (DYAZIDE) 37.5-25 MG per capsule Take 1 capsule by mouth every morning    12/27/2019 at Unknown time             Discharge Medications:     Current Discharge Medication List      START taking these medications    Details   amLODIPine (NORVASC) 5 MG tablet Take 1 tablet (5 mg) by mouth daily  Qty: 30 tablet, Refills: 1    Associated Diagnoses: Hypertension, unspecified type      !! dexamethasone (DECADRON) 4 MG tablet 20 mg, 12 and 6 hours before carboplatin doses. Start evening before chemotherapy, continue on morning of therapy.  Qty: 2 dose., Refills: 2    Associated Diagnoses: Encounter for long-term (current) use of medications; Endometrial cancer (H)       !! - Potential duplicate medications found. Please discuss with provider.      CONTINUE these medications which have NOT CHANGED    Details   ACETAMINOPHEN PO Take 325 mg by mouth every 6 hours as needed for pain      !! dexamethasone (DECADRON) 4 MG tablet Take 5 tablets (20 mg) by mouth See Admin Instructions for 2 doses  Qty: 10 tablet, Refills: 3    Comments: Take 20 mg (5 tablets) at 12  hours and 6 hours prior to chemo  Associated Diagnoses: Endometrial cancer (H)      Famotidine (PEPCID PO) Take 10 mg by mouth daily      fluticasone (FLONASE) 50 MCG/ACT spray Spray 1-2 sprays into both nostrils daily  Qty: 1 Bottle, Refills: 1    Associated Diagnoses: Seasonal allergic rhinitis, unspecified  trigger      L-Glutamine 500 MG CAPS       loperamide (IMODIUM) 2 MG capsule Take 2 mg by mouth 4 times daily as needed for diarrhea      loratadine (CLARITIN) 10 MG tablet Take 1 tablet (10 mg) by mouth daily  Qty: 30 tablet, Refills: 3    Associated Diagnoses: Pain in joint, multiple sites; Chronic seasonal allergic rhinitis, unspecified trigger      magnesium 500 MG TABS       METFORMIN HCL PO Take 500 mg by mouth daily (with breakfast)       prochlorperazine (COMPAZINE) 10 MG tablet Take 0.5 tablets (5 mg) by mouth every 6 hours as needed (nausea/vomiting)  Qty: 30 tablet, Refills: 2    Comments: TORB from Lalitha Johns RN.  Associated Diagnoses: Encounter for long-term (current) use of medications; Endometrial cancer (H)      pyridoxine (VITAMIN B-6) 50 MG TABS Take 1 tablet (50 mg) by mouth 2 times daily  Qty: 60 tablet, Refills: 3    Associated Diagnoses: Chemotherapy-induced peripheral neuropathy (H)      triamterene-hydrochlorothiazide (DYAZIDE) 37.5-25 MG per capsule Take 1 capsule by mouth every morning        !! - Potential duplicate medications found. Please discuss with provider.                Consultations:   None          Brief History of Illness:   This patient is a 69 year old female with recurrent stage IIIC serous endometrial cancer who presents for C3 carboplatin desensitization, paclitaxel, and bevacizumab. Carboplatin reaction occurred on 12/2/2019 with chest tightness, hoarseness, and difficulty breathing near the end of her carboplatin infusion. She received methylprednisolone; NaCl 0.9% Bolus; epinephrine x2, benadryl and recovered. She was evaluated in the ED thereafter and discharged home.      Today, reports overall feeling well though is feeling fatigued particularly after the holidays. She took decadron premedication as directed at 0400 and 1000 today. Has had decreased appetite since last chemotherapy and lost 6 pounds. Continues to have neuropathy in bilateral hands and feet. Mood is  sometimes low and patient has been anxious. She has also noticed increased dyspnea with exertion. Denies fevers, chills, chest pain, palpitations, shortness of breath, abdominal pain, change in bowel habits, blood in stool, urinary symptoms, or other systemic complaints.           Hospital Course:   Lu Smith was admitted and underwent  Cycle #3 of carboplatin desensitization/paclitaxel/avastin for recurrent stage IIIC serous endometrial cancer. She tolerated this well. She was noted to have elevated blood pressure up to the 160s/80s on HD#1 and was started on amlodipine 5mg daily for additional blood pressure control on top of her home Dyazide. She was also noted to have hypomagnesemia and mild hyponatremia on admission and received electrolyte repletion and normal saline for these respective issues. She had no complaints during her hospital stay.  Additionally in regards to her other medical conditions of GERD and T2DM she was maintained on her home medications (Pepcid and metformin) and had no exacerbations here. Her nausea was controlled with routine pre-chemo medications and she will be discharged to home with amlodipine.     By the day of discharge she was feeling well and ready for discharge to home. Please see progress note from day of discharge for full details of her condition. In brief her exam was non-focal and vital signs were stable.          Discharge Instructions and Follow-Up:   Discharge diet: Regular   Discharge instructions: 1. Follow careful handwashing protocol.   2. If fever greater than 100.4, chills, worsening pain please call the clinic or be evaluated immediately.   3. If unable to tolerate food by mouth, worsening nausea please call clinic for further instructions.    Discharge follow-up: Follow up with Dr. Henderson on 1/22/20          Discharge Disposition:   Discharged to home      Roya Weinstein MD  Ob/Gyn Resident, PGY-2  Pager: [phone]  12/28/19 2:54 PM

## 2019-12-28 NOTE — PLAN OF CARE
Observation Goals: Chemotherapy completed    Chemotherapy completed at 1400.  No signs/symptoms of reaction.  Discharge instructions given, follow-up appointments discussed.  Port de-accessed, meds to discharge pharmacy.  S/O at bedside.  Refused wheelchair transport.  Ambulated to discharge pharmacy and door with s/o at 1430.

## 2020-01-01 ENCOUNTER — APPOINTMENT (OUTPATIENT)
Dept: LAB | Facility: CLINIC | Age: 70
End: 2020-01-01
Attending: OBSTETRICS & GYNECOLOGY
Payer: MEDICARE

## 2020-01-01 ENCOUNTER — APPOINTMENT (OUTPATIENT)
Dept: INTERVENTIONAL RADIOLOGY/VASCULAR | Facility: CLINIC | Age: 70
End: 2020-01-01
Attending: OBSTETRICS & GYNECOLOGY
Payer: MEDICARE

## 2020-01-01 ENCOUNTER — PATIENT OUTREACH (OUTPATIENT)
Dept: PALLIATIVE CARE | Facility: CLINIC | Age: 70
End: 2020-01-01

## 2020-01-01 ENCOUNTER — INFUSION THERAPY VISIT (OUTPATIENT)
Dept: ONCOLOGY | Facility: CLINIC | Age: 70
End: 2020-01-01
Attending: OBSTETRICS & GYNECOLOGY
Payer: MEDICARE

## 2020-01-01 ENCOUNTER — DOCUMENTATION ONLY (OUTPATIENT)
Dept: ONCOLOGY | Facility: CLINIC | Age: 70
End: 2020-01-01

## 2020-01-01 ENCOUNTER — INFUSION THERAPY VISIT (OUTPATIENT)
Dept: ONCOLOGY | Facility: CLINIC | Age: 70
End: 2020-01-01
Attending: NURSE PRACTITIONER
Payer: MEDICARE

## 2020-01-01 ENCOUNTER — TELEPHONE (OUTPATIENT)
Dept: ONCOLOGY | Facility: CLINIC | Age: 70
End: 2020-01-01

## 2020-01-01 ENCOUNTER — ANCILLARY PROCEDURE (OUTPATIENT)
Dept: CT IMAGING | Facility: CLINIC | Age: 70
End: 2020-01-01
Attending: OBSTETRICS & GYNECOLOGY
Payer: MEDICARE

## 2020-01-01 ENCOUNTER — PATIENT OUTREACH (OUTPATIENT)
Dept: ONCOLOGY | Facility: CLINIC | Age: 70
End: 2020-01-01

## 2020-01-01 ENCOUNTER — APPOINTMENT (OUTPATIENT)
Dept: LAB | Facility: CLINIC | Age: 70
DRG: 186 | End: 2020-01-01
Attending: FAMILY MEDICINE
Payer: MEDICARE

## 2020-01-01 ENCOUNTER — VIRTUAL VISIT (OUTPATIENT)
Dept: ONCOLOGY | Facility: CLINIC | Age: 70
End: 2020-01-01
Attending: FAMILY MEDICINE
Payer: MEDICARE

## 2020-01-01 ENCOUNTER — APPOINTMENT (OUTPATIENT)
Dept: LAB | Facility: CLINIC | Age: 70
End: 2020-01-01
Payer: MEDICARE

## 2020-01-01 ENCOUNTER — APPOINTMENT (OUTPATIENT)
Dept: GENERAL RADIOLOGY | Facility: CLINIC | Age: 70
DRG: 186 | End: 2020-01-01
Attending: FAMILY MEDICINE
Payer: MEDICARE

## 2020-01-01 ENCOUNTER — APPOINTMENT (OUTPATIENT)
Dept: GENERAL RADIOLOGY | Facility: CLINIC | Age: 70
DRG: 186 | End: 2020-01-01
Attending: ORTHOPAEDIC SURGERY
Payer: MEDICARE

## 2020-01-01 ENCOUNTER — ANCILLARY PROCEDURE (OUTPATIENT)
Dept: RADIOLOGY | Facility: AMBULATORY SURGERY CENTER | Age: 70
End: 2020-01-01
Attending: NURSE PRACTITIONER
Payer: MEDICARE

## 2020-01-01 ENCOUNTER — APPOINTMENT (OUTPATIENT)
Dept: ULTRASOUND IMAGING | Facility: CLINIC | Age: 70
DRG: 186 | End: 2020-01-01
Payer: MEDICARE

## 2020-01-01 ENCOUNTER — APPOINTMENT (OUTPATIENT)
Dept: LAB | Facility: CLINIC | Age: 70
End: 2020-01-01
Attending: FAMILY MEDICINE
Payer: MEDICARE

## 2020-01-01 ENCOUNTER — PATIENT OUTREACH (OUTPATIENT)
Dept: ONCOLOGY | Facility: CLINIC | Age: 70
End: 2020-01-01
Payer: MEDICARE

## 2020-01-01 ENCOUNTER — HOME INFUSION (PRE-WILLOW HOME INFUSION) (OUTPATIENT)
Dept: PHARMACY | Facility: CLINIC | Age: 70
End: 2020-01-01

## 2020-01-01 ENCOUNTER — ANCILLARY PROCEDURE (OUTPATIENT)
Dept: ULTRASOUND IMAGING | Facility: CLINIC | Age: 70
End: 2020-01-01
Attending: ORTHOPAEDIC SURGERY
Payer: MEDICARE

## 2020-01-01 ENCOUNTER — INFUSION THERAPY VISIT (OUTPATIENT)
Dept: ONCOLOGY | Facility: CLINIC | Age: 70
End: 2020-01-01
Payer: MEDICARE

## 2020-01-01 ENCOUNTER — DOCUMENTATION ONLY (OUTPATIENT)
Dept: CARE COORDINATION | Facility: CLINIC | Age: 70
End: 2020-01-01

## 2020-01-01 ENCOUNTER — VIRTUAL VISIT (OUTPATIENT)
Dept: PALLIATIVE CARE | Facility: CLINIC | Age: 70
End: 2020-01-01
Attending: INTERNAL MEDICINE
Payer: MEDICARE

## 2020-01-01 ENCOUNTER — ONCOLOGY VISIT (OUTPATIENT)
Dept: ONCOLOGY | Facility: CLINIC | Age: 70
DRG: 186 | End: 2020-01-01
Attending: OBSTETRICS & GYNECOLOGY
Payer: MEDICARE

## 2020-01-01 ENCOUNTER — HOSPITAL ENCOUNTER (OUTPATIENT)
Facility: CLINIC | Age: 70
Setting detail: OBSERVATION
Discharge: HOME | End: 2020-01-23
Attending: OBSTETRICS & GYNECOLOGY | Admitting: OBSTETRICS & GYNECOLOGY
Payer: MEDICARE

## 2020-01-01 ENCOUNTER — ANCILLARY PROCEDURE (OUTPATIENT)
Dept: ULTRASOUND IMAGING | Facility: CLINIC | Age: 70
End: 2020-01-01
Attending: OBSTETRICS & GYNECOLOGY
Payer: MEDICARE

## 2020-01-01 ENCOUNTER — APPOINTMENT (OUTPATIENT)
Dept: LAB | Facility: CLINIC | Age: 70
End: 2020-01-01
Attending: NURSE PRACTITIONER
Payer: MEDICARE

## 2020-01-01 ENCOUNTER — APPOINTMENT (OUTPATIENT)
Dept: CARDIOLOGY | Facility: CLINIC | Age: 70
DRG: 186 | End: 2020-01-01
Attending: NURSE PRACTITIONER
Payer: MEDICARE

## 2020-01-01 ENCOUNTER — HOSPITAL ENCOUNTER (OUTPATIENT)
Facility: CLINIC | Age: 70
Setting detail: OBSERVATION
Discharge: HOME | End: 2020-03-06
Attending: OBSTETRICS & GYNECOLOGY | Admitting: OBSTETRICS & GYNECOLOGY
Payer: MEDICARE

## 2020-01-01 ENCOUNTER — HOSPITAL ENCOUNTER (OUTPATIENT)
Facility: CLINIC | Age: 70
Setting detail: OBSERVATION
Discharge: HOME | End: 2020-02-14
Attending: OBSTETRICS & GYNECOLOGY | Admitting: OBSTETRICS & GYNECOLOGY
Payer: MEDICARE

## 2020-01-01 ENCOUNTER — OFFICE VISIT (OUTPATIENT)
Dept: INFUSION THERAPY | Facility: CLINIC | Age: 70
End: 2020-01-01
Attending: OBSTETRICS & GYNECOLOGY
Payer: MEDICARE

## 2020-01-01 ENCOUNTER — HOSPITAL ENCOUNTER (OUTPATIENT)
Facility: CLINIC | Age: 70
Discharge: HOME | End: 2020-12-15
Attending: RADIOLOGY | Admitting: RADIOLOGY
Payer: MEDICARE

## 2020-01-01 ENCOUNTER — HOSPITAL ENCOUNTER (INPATIENT)
Facility: CLINIC | Age: 70
LOS: 3 days | Discharge: HOME | DRG: 186 | End: 2020-11-15
Attending: FAMILY MEDICINE | Admitting: OBSTETRICS & GYNECOLOGY
Payer: MEDICARE

## 2020-01-01 ENCOUNTER — HEALTH MAINTENANCE LETTER (OUTPATIENT)
Age: 70
End: 2020-01-01

## 2020-01-01 ENCOUNTER — NURSE TRIAGE (OUTPATIENT)
Dept: NURSING | Facility: CLINIC | Age: 70
End: 2020-01-01

## 2020-01-01 ENCOUNTER — HOSPITAL ENCOUNTER (OUTPATIENT)
Dept: OCCUPATIONAL THERAPY | Facility: CLINIC | Age: 70
Setting detail: THERAPIES SERIES
End: 2020-12-04
Attending: INTERNAL MEDICINE
Payer: MEDICARE

## 2020-01-01 ENCOUNTER — APPOINTMENT (OUTPATIENT)
Dept: CT IMAGING | Facility: CLINIC | Age: 70
DRG: 186 | End: 2020-01-01
Attending: FAMILY MEDICINE
Payer: MEDICARE

## 2020-01-01 ENCOUNTER — HOSPITAL ENCOUNTER (OUTPATIENT)
Dept: OCCUPATIONAL THERAPY | Facility: CLINIC | Age: 70
Setting detail: THERAPIES SERIES
End: 2020-12-09
Attending: INTERNAL MEDICINE
Payer: MEDICARE

## 2020-01-01 ENCOUNTER — VIRTUAL VISIT (OUTPATIENT)
Dept: ONCOLOGY | Facility: CLINIC | Age: 70
End: 2020-01-01
Payer: MEDICARE

## 2020-01-01 ENCOUNTER — HOSPITAL ENCOUNTER (OUTPATIENT)
Facility: AMBULATORY SURGERY CENTER | Age: 70
Discharge: HOME | End: 2020-12-03
Attending: RADIOLOGY | Admitting: RADIOLOGY
Payer: MEDICARE

## 2020-01-01 ENCOUNTER — ANCILLARY PROCEDURE (OUTPATIENT)
Dept: CT IMAGING | Facility: CLINIC | Age: 70
End: 2020-01-01
Attending: NURSE PRACTITIONER
Payer: MEDICARE

## 2020-01-01 VITALS
WEIGHT: 264.4 LBS | BODY MASS INDEX: 46.85 KG/M2 | SYSTOLIC BLOOD PRESSURE: 143 MMHG | OXYGEN SATURATION: 97 % | TEMPERATURE: 95.6 F | DIASTOLIC BLOOD PRESSURE: 76 MMHG | HEART RATE: 83 BPM | HEIGHT: 63 IN | RESPIRATION RATE: 16 BRPM

## 2020-01-01 VITALS
RESPIRATION RATE: 16 BRPM | DIASTOLIC BLOOD PRESSURE: 79 MMHG | WEIGHT: 246.6 LBS | OXYGEN SATURATION: 99 % | TEMPERATURE: 97.4 F | BODY MASS INDEX: 42.3 KG/M2 | SYSTOLIC BLOOD PRESSURE: 148 MMHG | HEART RATE: 77 BPM

## 2020-01-01 VITALS
DIASTOLIC BLOOD PRESSURE: 79 MMHG | RESPIRATION RATE: 16 BRPM | DIASTOLIC BLOOD PRESSURE: 65 MMHG | BODY MASS INDEX: 41.48 KG/M2 | OXYGEN SATURATION: 96 % | TEMPERATURE: 95.6 F | WEIGHT: 257.5 LBS | WEIGHT: 241.8 LBS | SYSTOLIC BLOOD PRESSURE: 118 MMHG | BODY MASS INDEX: 43.96 KG/M2 | HEIGHT: 64 IN | SYSTOLIC BLOOD PRESSURE: 128 MMHG | TEMPERATURE: 97.8 F | HEART RATE: 91 BPM | OXYGEN SATURATION: 96 %

## 2020-01-01 VITALS
DIASTOLIC BLOOD PRESSURE: 64 MMHG | HEART RATE: 88 BPM | RESPIRATION RATE: 18 BRPM | SYSTOLIC BLOOD PRESSURE: 100 MMHG | TEMPERATURE: 97.9 F | WEIGHT: 246 LBS | OXYGEN SATURATION: 99 % | BODY MASS INDEX: 42.2 KG/M2

## 2020-01-01 VITALS
RESPIRATION RATE: 20 BRPM | OXYGEN SATURATION: 96 % | HEART RATE: 91 BPM | DIASTOLIC BLOOD PRESSURE: 64 MMHG | WEIGHT: 239.7 LBS | BODY MASS INDEX: 41.12 KG/M2 | SYSTOLIC BLOOD PRESSURE: 99 MMHG | TEMPERATURE: 97.3 F

## 2020-01-01 VITALS
TEMPERATURE: 98 F | BODY MASS INDEX: 43.19 KG/M2 | WEIGHT: 253 LBS | RESPIRATION RATE: 14 BRPM | DIASTOLIC BLOOD PRESSURE: 79 MMHG | OXYGEN SATURATION: 97 % | SYSTOLIC BLOOD PRESSURE: 136 MMHG | HEART RATE: 103 BPM | HEIGHT: 64 IN

## 2020-01-01 VITALS
OXYGEN SATURATION: 98 % | WEIGHT: 245.7 LBS | HEART RATE: 82 BPM | BODY MASS INDEX: 41.95 KG/M2 | DIASTOLIC BLOOD PRESSURE: 71 MMHG | RESPIRATION RATE: 16 BRPM | SYSTOLIC BLOOD PRESSURE: 124 MMHG | HEIGHT: 64 IN | TEMPERATURE: 97.9 F

## 2020-01-01 VITALS
RESPIRATION RATE: 22 BRPM | TEMPERATURE: 97.8 F | SYSTOLIC BLOOD PRESSURE: 120 MMHG | DIASTOLIC BLOOD PRESSURE: 67 MMHG | BODY MASS INDEX: 39.82 KG/M2 | HEART RATE: 85 BPM | HEIGHT: 65 IN | OXYGEN SATURATION: 97 % | WEIGHT: 239 LBS

## 2020-01-01 VITALS
SYSTOLIC BLOOD PRESSURE: 130 MMHG | OXYGEN SATURATION: 97 % | WEIGHT: 243 LBS | TEMPERATURE: 96.9 F | DIASTOLIC BLOOD PRESSURE: 74 MMHG | BODY MASS INDEX: 41.69 KG/M2 | RESPIRATION RATE: 16 BRPM | HEART RATE: 78 BPM

## 2020-01-01 VITALS
BODY MASS INDEX: 43.13 KG/M2 | HEART RATE: 86 BPM | RESPIRATION RATE: 18 BRPM | TEMPERATURE: 98 F | WEIGHT: 251.4 LBS | DIASTOLIC BLOOD PRESSURE: 84 MMHG | SYSTOLIC BLOOD PRESSURE: 137 MMHG

## 2020-01-01 VITALS
HEART RATE: 83 BPM | BODY MASS INDEX: 40.94 KG/M2 | SYSTOLIC BLOOD PRESSURE: 107 MMHG | DIASTOLIC BLOOD PRESSURE: 67 MMHG | RESPIRATION RATE: 16 BRPM | TEMPERATURE: 97.7 F | OXYGEN SATURATION: 99 % | WEIGHT: 238.6 LBS

## 2020-01-01 VITALS
RESPIRATION RATE: 16 BRPM | SYSTOLIC BLOOD PRESSURE: 123 MMHG | TEMPERATURE: 97.8 F | WEIGHT: 253 LBS | HEART RATE: 90 BPM | BODY MASS INDEX: 43.41 KG/M2 | OXYGEN SATURATION: 98 % | DIASTOLIC BLOOD PRESSURE: 79 MMHG

## 2020-01-01 VITALS
WEIGHT: 245.2 LBS | SYSTOLIC BLOOD PRESSURE: 141 MMHG | DIASTOLIC BLOOD PRESSURE: 68 MMHG | HEART RATE: 84 BPM | TEMPERATURE: 97.7 F | RESPIRATION RATE: 16 BRPM | BODY MASS INDEX: 42.07 KG/M2 | OXYGEN SATURATION: 97 %

## 2020-01-01 VITALS
SYSTOLIC BLOOD PRESSURE: 131 MMHG | DIASTOLIC BLOOD PRESSURE: 84 MMHG | OXYGEN SATURATION: 97 % | RESPIRATION RATE: 16 BRPM | HEART RATE: 75 BPM | TEMPERATURE: 98.4 F

## 2020-01-01 VITALS
BODY MASS INDEX: 39.82 KG/M2 | HEIGHT: 65 IN | SYSTOLIC BLOOD PRESSURE: 138 MMHG | DIASTOLIC BLOOD PRESSURE: 85 MMHG | WEIGHT: 239 LBS | HEART RATE: 91 BPM | TEMPERATURE: 98 F | OXYGEN SATURATION: 91 %

## 2020-01-01 VITALS
BODY MASS INDEX: 42.27 KG/M2 | DIASTOLIC BLOOD PRESSURE: 77 MMHG | WEIGHT: 247.58 LBS | SYSTOLIC BLOOD PRESSURE: 129 MMHG | HEIGHT: 64 IN

## 2020-01-01 VITALS
SYSTOLIC BLOOD PRESSURE: 123 MMHG | WEIGHT: 247.5 LBS | BODY MASS INDEX: 42.46 KG/M2 | OXYGEN SATURATION: 97 % | DIASTOLIC BLOOD PRESSURE: 78 MMHG | HEART RATE: 83 BPM | TEMPERATURE: 97.5 F | RESPIRATION RATE: 16 BRPM

## 2020-01-01 VITALS
TEMPERATURE: 98.5 F | HEART RATE: 73 BPM | SYSTOLIC BLOOD PRESSURE: 150 MMHG | OXYGEN SATURATION: 98 % | DIASTOLIC BLOOD PRESSURE: 82 MMHG | RESPIRATION RATE: 16 BRPM | WEIGHT: 244.4 LBS | BODY MASS INDEX: 41.93 KG/M2

## 2020-01-01 VITALS
RESPIRATION RATE: 18 BRPM | HEART RATE: 78 BPM | SYSTOLIC BLOOD PRESSURE: 137 MMHG | BODY MASS INDEX: 41.48 KG/M2 | OXYGEN SATURATION: 94 % | HEIGHT: 64 IN | DIASTOLIC BLOOD PRESSURE: 70 MMHG | TEMPERATURE: 96.6 F | WEIGHT: 243 LBS

## 2020-01-01 VITALS
WEIGHT: 240.8 LBS | BODY MASS INDEX: 41.31 KG/M2 | SYSTOLIC BLOOD PRESSURE: 115 MMHG | DIASTOLIC BLOOD PRESSURE: 69 MMHG | OXYGEN SATURATION: 97 % | TEMPERATURE: 99 F | RESPIRATION RATE: 16 BRPM | HEART RATE: 78 BPM

## 2020-01-01 VITALS
TEMPERATURE: 97.4 F | HEART RATE: 77 BPM | SYSTOLIC BLOOD PRESSURE: 148 MMHG | DIASTOLIC BLOOD PRESSURE: 79 MMHG | BODY MASS INDEX: 42.1 KG/M2 | HEIGHT: 64 IN | WEIGHT: 246.6 LBS | RESPIRATION RATE: 16 BRPM | OXYGEN SATURATION: 99 %

## 2020-01-01 VITALS — TEMPERATURE: 98 F | SYSTOLIC BLOOD PRESSURE: 127 MMHG | DIASTOLIC BLOOD PRESSURE: 78 MMHG

## 2020-01-01 VITALS
SYSTOLIC BLOOD PRESSURE: 150 MMHG | DIASTOLIC BLOOD PRESSURE: 72 MMHG | RESPIRATION RATE: 20 BRPM | OXYGEN SATURATION: 98 % | BODY MASS INDEX: 41.16 KG/M2 | HEART RATE: 90 BPM | WEIGHT: 239.9 LBS | TEMPERATURE: 98.2 F

## 2020-01-01 VITALS
WEIGHT: 243.6 LBS | TEMPERATURE: 98 F | RESPIRATION RATE: 18 BRPM | DIASTOLIC BLOOD PRESSURE: 71 MMHG | BODY MASS INDEX: 41.79 KG/M2 | OXYGEN SATURATION: 97 % | SYSTOLIC BLOOD PRESSURE: 125 MMHG | HEART RATE: 85 BPM

## 2020-01-01 VITALS
SYSTOLIC BLOOD PRESSURE: 122 MMHG | HEART RATE: 70 BPM | RESPIRATION RATE: 18 BRPM | TEMPERATURE: 98.2 F | OXYGEN SATURATION: 97 % | DIASTOLIC BLOOD PRESSURE: 74 MMHG

## 2020-01-01 VITALS
HEART RATE: 88 BPM | WEIGHT: 244.2 LBS | RESPIRATION RATE: 16 BRPM | BODY MASS INDEX: 41.9 KG/M2 | DIASTOLIC BLOOD PRESSURE: 64 MMHG | OXYGEN SATURATION: 97 % | SYSTOLIC BLOOD PRESSURE: 99 MMHG | TEMPERATURE: 97.8 F

## 2020-01-01 VITALS
DIASTOLIC BLOOD PRESSURE: 77 MMHG | RESPIRATION RATE: 16 BRPM | TEMPERATURE: 98 F | SYSTOLIC BLOOD PRESSURE: 128 MMHG | OXYGEN SATURATION: 100 % | HEART RATE: 69 BPM

## 2020-01-01 VITALS
RESPIRATION RATE: 16 BRPM | WEIGHT: 250 LBS | SYSTOLIC BLOOD PRESSURE: 122 MMHG | TEMPERATURE: 99 F | TEMPERATURE: 98.3 F | BODY MASS INDEX: 41.26 KG/M2 | SYSTOLIC BLOOD PRESSURE: 113 MMHG | DIASTOLIC BLOOD PRESSURE: 79 MMHG | WEIGHT: 240.5 LBS | RESPIRATION RATE: 18 BRPM | BODY MASS INDEX: 42.89 KG/M2 | OXYGEN SATURATION: 98 % | OXYGEN SATURATION: 99 % | HEART RATE: 85 BPM | HEART RATE: 80 BPM | DIASTOLIC BLOOD PRESSURE: 73 MMHG

## 2020-01-01 VITALS
SYSTOLIC BLOOD PRESSURE: 135 MMHG | HEART RATE: 89 BPM | HEIGHT: 64 IN | OXYGEN SATURATION: 97 % | RESPIRATION RATE: 16 BRPM | WEIGHT: 259.6 LBS | DIASTOLIC BLOOD PRESSURE: 57 MMHG | BODY MASS INDEX: 44.32 KG/M2 | TEMPERATURE: 96.3 F

## 2020-01-01 VITALS
OXYGEN SATURATION: 79 % | SYSTOLIC BLOOD PRESSURE: 124 MMHG | DIASTOLIC BLOOD PRESSURE: 75 MMHG | HEART RATE: 83 BPM | TEMPERATURE: 98 F

## 2020-01-01 VITALS
TEMPERATURE: 98 F | OXYGEN SATURATION: 97 % | WEIGHT: 239 LBS | HEART RATE: 77 BPM | DIASTOLIC BLOOD PRESSURE: 62 MMHG | BODY MASS INDEX: 41 KG/M2 | RESPIRATION RATE: 16 BRPM | SYSTOLIC BLOOD PRESSURE: 100 MMHG

## 2020-01-01 VITALS
DIASTOLIC BLOOD PRESSURE: 79 MMHG | WEIGHT: 243.8 LBS | SYSTOLIC BLOOD PRESSURE: 136 MMHG | TEMPERATURE: 98 F | RESPIRATION RATE: 16 BRPM | BODY MASS INDEX: 41.83 KG/M2 | HEART RATE: 87 BPM | OXYGEN SATURATION: 97 %

## 2020-01-01 VITALS
BODY MASS INDEX: 44.08 KG/M2 | DIASTOLIC BLOOD PRESSURE: 83 MMHG | RESPIRATION RATE: 16 BRPM | TEMPERATURE: 98.1 F | WEIGHT: 264.9 LBS | SYSTOLIC BLOOD PRESSURE: 161 MMHG | HEART RATE: 88 BPM | OXYGEN SATURATION: 98 %

## 2020-01-01 VITALS
DIASTOLIC BLOOD PRESSURE: 77 MMHG | OXYGEN SATURATION: 97 % | BODY MASS INDEX: 46.18 KG/M2 | HEART RATE: 95 BPM | SYSTOLIC BLOOD PRESSURE: 139 MMHG | TEMPERATURE: 97.9 F | WEIGHT: 260.7 LBS | RESPIRATION RATE: 16 BRPM

## 2020-01-01 VITALS
BODY MASS INDEX: 44.18 KG/M2 | TEMPERATURE: 97.3 F | HEART RATE: 86 BPM | RESPIRATION RATE: 16 BRPM | WEIGHT: 258.8 LBS | SYSTOLIC BLOOD PRESSURE: 128 MMHG | OXYGEN SATURATION: 97 % | HEIGHT: 64 IN | DIASTOLIC BLOOD PRESSURE: 80 MMHG

## 2020-01-01 VITALS
TEMPERATURE: 98.9 F | DIASTOLIC BLOOD PRESSURE: 65 MMHG | OXYGEN SATURATION: 98 % | WEIGHT: 241.1 LBS | HEART RATE: 99 BPM | RESPIRATION RATE: 16 BRPM | SYSTOLIC BLOOD PRESSURE: 115 MMHG | BODY MASS INDEX: 41.36 KG/M2

## 2020-01-01 VITALS
TEMPERATURE: 97.3 F | HEART RATE: 86 BPM | OXYGEN SATURATION: 98 % | SYSTOLIC BLOOD PRESSURE: 114 MMHG | DIASTOLIC BLOOD PRESSURE: 71 MMHG | RESPIRATION RATE: 16 BRPM

## 2020-01-01 VITALS
BODY MASS INDEX: 41.74 KG/M2 | HEART RATE: 83 BPM | WEIGHT: 243.3 LBS | SYSTOLIC BLOOD PRESSURE: 90 MMHG | DIASTOLIC BLOOD PRESSURE: 54 MMHG | OXYGEN SATURATION: 89 % | RESPIRATION RATE: 20 BRPM | TEMPERATURE: 97.7 F

## 2020-01-01 VITALS
HEART RATE: 76 BPM | RESPIRATION RATE: 18 BRPM | DIASTOLIC BLOOD PRESSURE: 67 MMHG | TEMPERATURE: 97.8 F | SYSTOLIC BLOOD PRESSURE: 117 MMHG | OXYGEN SATURATION: 98 %

## 2020-01-01 DIAGNOSIS — C54.1 ENDOMETRIAL CANCER (H): Primary | ICD-10-CM

## 2020-01-01 DIAGNOSIS — Z79.899 ENCOUNTER FOR LONG-TERM (CURRENT) USE OF MEDICATIONS: Primary | ICD-10-CM

## 2020-01-01 DIAGNOSIS — R10.84 ABDOMINAL PAIN, GENERALIZED: ICD-10-CM

## 2020-01-01 DIAGNOSIS — C56.9 MALIGNANT NEOPLASM OF OVARY, UNSPECIFIED LATERALITY (H): ICD-10-CM

## 2020-01-01 DIAGNOSIS — Z79.899 ENCOUNTER FOR LONG-TERM (CURRENT) USE OF MEDICATIONS: ICD-10-CM

## 2020-01-01 DIAGNOSIS — D64.9 ANEMIA, UNSPECIFIED TYPE: ICD-10-CM

## 2020-01-01 DIAGNOSIS — C56.9 MALIGNANT NEOPLASM OF OVARY, UNSPECIFIED LATERALITY (H): Primary | ICD-10-CM

## 2020-01-01 DIAGNOSIS — C54.1 ENDOMETRIAL CANCER (H): ICD-10-CM

## 2020-01-01 DIAGNOSIS — J90 PLEURAL EFFUSION: ICD-10-CM

## 2020-01-01 DIAGNOSIS — R91.8 PULMONARY NODULES: ICD-10-CM

## 2020-01-01 DIAGNOSIS — R09.02 HYPOXIA: ICD-10-CM

## 2020-01-01 DIAGNOSIS — Z11.59 ENCOUNTER FOR SCREENING FOR OTHER VIRAL DISEASES: Primary | ICD-10-CM

## 2020-01-01 DIAGNOSIS — G89.3 CANCER RELATED PAIN: ICD-10-CM

## 2020-01-01 DIAGNOSIS — Z11.59 ENCOUNTER FOR SCREENING FOR OTHER VIRAL DISEASES: ICD-10-CM

## 2020-01-01 DIAGNOSIS — R06.02 SOB (SHORTNESS OF BREATH): ICD-10-CM

## 2020-01-01 DIAGNOSIS — C79.9 METASTATIC MALIGNANT NEOPLASM OF UNKNOWN PRIMARY SITE (H): Primary | ICD-10-CM

## 2020-01-01 DIAGNOSIS — R80.9 PROTEINURIA, UNSPECIFIED TYPE: ICD-10-CM

## 2020-01-01 DIAGNOSIS — R60.0 LOCALIZED EDEMA: ICD-10-CM

## 2020-01-01 DIAGNOSIS — D64.9 ANEMIA, UNSPECIFIED TYPE: Primary | ICD-10-CM

## 2020-01-01 DIAGNOSIS — I10 HYPERTENSION, UNSPECIFIED TYPE: ICD-10-CM

## 2020-01-01 DIAGNOSIS — I89.0 LYMPHEDEMA: ICD-10-CM

## 2020-01-01 DIAGNOSIS — D64.9 ANEMIA: ICD-10-CM

## 2020-01-01 DIAGNOSIS — Z95.828 PORT-A-CATH IN PLACE: Primary | ICD-10-CM

## 2020-01-01 DIAGNOSIS — R18.8 OTHER ASCITES: ICD-10-CM

## 2020-01-01 DIAGNOSIS — R18.0 MALIGNANT ASCITES (H): Primary | ICD-10-CM

## 2020-01-01 DIAGNOSIS — R18.0 MALIGNANT ASCITES (H): ICD-10-CM

## 2020-01-01 DIAGNOSIS — C56.9 OVARIAN CANCER (H): Primary | ICD-10-CM

## 2020-01-01 DIAGNOSIS — Z51.11 ENCOUNTER FOR ANTINEOPLASTIC CHEMOTHERAPY: ICD-10-CM

## 2020-01-01 DIAGNOSIS — I10 HYPERTENSION, UNSPECIFIED TYPE: Primary | ICD-10-CM

## 2020-01-01 DIAGNOSIS — R06.02 SHORTNESS OF BREATH: ICD-10-CM

## 2020-01-01 DIAGNOSIS — J18.9 BILATERAL UPPER LOBE COMMUNITY ACQUIRED PNEUMONIA: ICD-10-CM

## 2020-01-01 DIAGNOSIS — R06.09 DOE (DYSPNEA ON EXERTION): ICD-10-CM

## 2020-01-01 DIAGNOSIS — C56.9 OVARIAN CANCER (H): ICD-10-CM

## 2020-01-01 DIAGNOSIS — I89.0 LYMPHEDEMA: Primary | ICD-10-CM

## 2020-01-01 DIAGNOSIS — J18.1 LOBAR PNEUMONIA, UNSPECIFIED ORGANISM (H): ICD-10-CM

## 2020-01-01 DIAGNOSIS — G89.3 CANCER RELATED PAIN: Primary | ICD-10-CM

## 2020-01-01 DIAGNOSIS — Z20.828 CONTACT WITH AND (SUSPECTED) EXPOSURE TO OTHER VIRAL COMMUNICABLE DISEASES: ICD-10-CM

## 2020-01-01 DIAGNOSIS — C80.1 METASTATIC MALIGNANT NEOPLASM OF UNKNOWN PRIMARY SITE (H): Primary | ICD-10-CM

## 2020-01-01 DIAGNOSIS — R52 PAIN MANAGEMENT: ICD-10-CM

## 2020-01-01 DIAGNOSIS — Z87.891 HISTORY OF TOBACCO USE: ICD-10-CM

## 2020-01-01 DIAGNOSIS — R11.0 NAUSEA: ICD-10-CM

## 2020-01-01 LAB
ABO + RH BLD: NORMAL
ALBUMIN SERPL-MCNC: 2.5 G/DL (ref 3.4–5)
ALBUMIN SERPL-MCNC: 2.7 G/DL (ref 3.4–5)
ALBUMIN SERPL-MCNC: 3 G/DL (ref 3.4–5)
ALBUMIN SERPL-MCNC: 3 G/DL (ref 3.4–5)
ALBUMIN SERPL-MCNC: 3.2 G/DL (ref 3.4–5)
ALBUMIN SERPL-MCNC: 3.2 G/DL (ref 3.4–5)
ALBUMIN SERPL-MCNC: 3.3 G/DL (ref 3.4–5)
ALBUMIN UR-MCNC: 10 MG/DL
ALBUMIN UR-MCNC: 30 MG/DL
ALBUMIN UR-MCNC: NEGATIVE MG/DL
ALP SERPL-CCNC: 54 U/L (ref 40–150)
ALP SERPL-CCNC: 57 U/L (ref 40–150)
ALP SERPL-CCNC: 61 U/L (ref 40–150)
ALP SERPL-CCNC: 61 U/L (ref 40–150)
ALP SERPL-CCNC: 62 U/L (ref 40–150)
ALP SERPL-CCNC: 64 U/L (ref 40–150)
ALP SERPL-CCNC: 65 U/L (ref 40–150)
ALT SERPL W P-5'-P-CCNC: 18 U/L (ref 0–50)
ALT SERPL W P-5'-P-CCNC: 21 U/L (ref 0–50)
ALT SERPL W P-5'-P-CCNC: 25 U/L (ref 0–50)
ALT SERPL W P-5'-P-CCNC: 26 U/L (ref 0–50)
ALT SERPL W P-5'-P-CCNC: 29 U/L (ref 0–50)
ALT SERPL W P-5'-P-CCNC: 30 U/L (ref 0–50)
ALT SERPL W P-5'-P-CCNC: 37 U/L (ref 0–50)
ANION GAP SERPL CALCULATED.3IONS-SCNC: 5 MMOL/L (ref 3–14)
ANION GAP SERPL CALCULATED.3IONS-SCNC: 6 MMOL/L (ref 3–14)
ANION GAP SERPL CALCULATED.3IONS-SCNC: 7 MMOL/L (ref 3–14)
ANION GAP SERPL CALCULATED.3IONS-SCNC: 8 MMOL/L (ref 3–14)
APTT PPP: 26 SEC (ref 22–37)
AST SERPL W P-5'-P-CCNC: 13 U/L (ref 0–45)
AST SERPL W P-5'-P-CCNC: 17 U/L (ref 0–45)
AST SERPL W P-5'-P-CCNC: 18 U/L (ref 0–45)
AST SERPL W P-5'-P-CCNC: 19 U/L (ref 0–45)
AST SERPL W P-5'-P-CCNC: 20 U/L (ref 0–45)
AST SERPL W P-5'-P-CCNC: 24 U/L (ref 0–45)
AST SERPL W P-5'-P-CCNC: 25 U/L (ref 0–45)
BACTERIA SPEC CULT: NO GROWTH
BACTERIA SPEC CULT: NO GROWTH
BACTERIA SPEC CULT: NORMAL
BASE EXCESS BLDV CALC-SCNC: 4.3 MMOL/L
BASOPHILS # BLD AUTO: 0 10E9/L (ref 0–0.2)
BASOPHILS # BLD AUTO: 0.1 10E9/L (ref 0–0.2)
BASOPHILS NFR BLD AUTO: 0.4 %
BASOPHILS NFR BLD AUTO: 0.5 %
BASOPHILS NFR BLD AUTO: 0.7 %
BASOPHILS NFR BLD AUTO: 0.7 %
BASOPHILS NFR BLD AUTO: 0.8 %
BASOPHILS NFR BLD AUTO: 0.8 %
BASOPHILS NFR BLD AUTO: 0.9 %
BASOPHILS NFR BLD AUTO: 1 %
BASOPHILS NFR BLD AUTO: 1.1 %
BASOPHILS NFR BLD AUTO: 1.2 %
BASOPHILS NFR BLD AUTO: 1.2 %
BASOPHILS NFR BLD AUTO: 1.6 %
BASOPHILS NFR BLD AUTO: 1.8 %
BILIRUB SERPL-MCNC: 0.3 MG/DL (ref 0.2–1.3)
BILIRUB SERPL-MCNC: 0.4 MG/DL (ref 0.2–1.3)
BILIRUB SERPL-MCNC: 0.5 MG/DL (ref 0.2–1.3)
BILIRUB SERPL-MCNC: 0.5 MG/DL (ref 0.2–1.3)
BLD GP AB SCN SERPL QL: NORMAL
BLD PROD TYP BPU: NORMAL
BLD UNIT ID BPU: 0
BLOOD BANK CMNT PATIENT-IMP: NORMAL
BLOOD PRODUCT CODE: NORMAL
BPU ID: NORMAL
BUN SERPL-MCNC: 18 MG/DL (ref 7–30)
BUN SERPL-MCNC: 21 MG/DL (ref 7–30)
BUN SERPL-MCNC: 22 MG/DL (ref 7–30)
BUN SERPL-MCNC: 22 MG/DL (ref 7–30)
BUN SERPL-MCNC: 23 MG/DL (ref 7–30)
BUN SERPL-MCNC: 24 MG/DL (ref 7–30)
BUN SERPL-MCNC: 24 MG/DL (ref 7–30)
BUN SERPL-MCNC: 25 MG/DL (ref 7–30)
BUN SERPL-MCNC: 25 MG/DL (ref 7–30)
CALCIUM SERPL-MCNC: 8.5 MG/DL (ref 8.5–10.1)
CALCIUM SERPL-MCNC: 8.8 MG/DL (ref 8.5–10.1)
CALCIUM SERPL-MCNC: 9 MG/DL (ref 8.5–10.1)
CALCIUM SERPL-MCNC: 9.1 MG/DL (ref 8.5–10.1)
CALCIUM SERPL-MCNC: 9.4 MG/DL (ref 8.5–10.1)
CALCIUM SERPL-MCNC: 9.4 MG/DL (ref 8.5–10.1)
CALCIUM SERPL-MCNC: 9.7 MG/DL (ref 8.5–10.1)
CANCER AG125 SERPL-ACNC: 122 U/ML (ref 0–30)
CANCER AG125 SERPL-ACNC: 176 U/ML (ref 0–30)
CANCER AG125 SERPL-ACNC: 199 U/ML (ref 0–30)
CHLORIDE SERPL-SCNC: 101 MMOL/L (ref 94–109)
CHLORIDE SERPL-SCNC: 101 MMOL/L (ref 94–109)
CHLORIDE SERPL-SCNC: 102 MMOL/L (ref 94–109)
CHLORIDE SERPL-SCNC: 102 MMOL/L (ref 94–109)
CHLORIDE SERPL-SCNC: 103 MMOL/L (ref 94–109)
CHLORIDE SERPL-SCNC: 104 MMOL/L (ref 94–109)
CHLORIDE SERPL-SCNC: 105 MMOL/L (ref 94–109)
CHLORIDE SERPL-SCNC: 105 MMOL/L (ref 94–109)
CHLORIDE SERPL-SCNC: 99 MMOL/L (ref 94–109)
CO2 SERPL-SCNC: 25 MMOL/L (ref 20–32)
CO2 SERPL-SCNC: 26 MMOL/L (ref 20–32)
CO2 SERPL-SCNC: 27 MMOL/L (ref 20–32)
CO2 SERPL-SCNC: 28 MMOL/L (ref 20–32)
CO2 SERPL-SCNC: 28 MMOL/L (ref 20–32)
CO2 SERPL-SCNC: 30 MMOL/L (ref 20–32)
COLLECT DURATION TIME UR: 24 H
CREAT 24H UR-MRATE: 1.13 G/(24.H) (ref 0.8–1.8)
CREAT BLD-MCNC: 0.8 MG/DL (ref 0.52–1.04)
CREAT BLD-MCNC: 0.8 MG/DL (ref 0.52–1.04)
CREAT BLD-MCNC: 1.1 MG/DL (ref 0.52–1.04)
CREAT SERPL-MCNC: 0.64 MG/DL (ref 0.52–1.04)
CREAT SERPL-MCNC: 0.75 MG/DL (ref 0.52–1.04)
CREAT SERPL-MCNC: 0.91 MG/DL (ref 0.52–1.04)
CREAT SERPL-MCNC: 0.97 MG/DL (ref 0.52–1.04)
CREAT SERPL-MCNC: 1.03 MG/DL (ref 0.52–1.04)
CREAT SERPL-MCNC: 1.05 MG/DL (ref 0.52–1.04)
CREAT SERPL-MCNC: 1.06 MG/DL (ref 0.52–1.04)
CREAT SERPL-MCNC: 1.06 MG/DL (ref 0.52–1.04)
CREAT SERPL-MCNC: 1.08 MG/DL (ref 0.52–1.04)
CREAT UR-MCNC: 119 MG/DL
DIFFERENTIAL METHOD BLD: ABNORMAL
EOSINOPHIL # BLD AUTO: 0 10E9/L (ref 0–0.7)
EOSINOPHIL # BLD AUTO: 0.1 10E9/L (ref 0–0.7)
EOSINOPHIL # BLD AUTO: 0.2 10E9/L (ref 0–0.7)
EOSINOPHIL # BLD AUTO: 0.2 10E9/L (ref 0–0.7)
EOSINOPHIL # BLD AUTO: 0.3 10E9/L (ref 0–0.7)
EOSINOPHIL NFR BLD AUTO: 0.6 %
EOSINOPHIL NFR BLD AUTO: 0.7 %
EOSINOPHIL NFR BLD AUTO: 0.7 %
EOSINOPHIL NFR BLD AUTO: 0.9 %
EOSINOPHIL NFR BLD AUTO: 1 %
EOSINOPHIL NFR BLD AUTO: 1.1 %
EOSINOPHIL NFR BLD AUTO: 1.2 %
EOSINOPHIL NFR BLD AUTO: 1.3 %
EOSINOPHIL NFR BLD AUTO: 1.4 %
EOSINOPHIL NFR BLD AUTO: 1.5 %
EOSINOPHIL NFR BLD AUTO: 1.6 %
EOSINOPHIL NFR BLD AUTO: 2 %
EOSINOPHIL NFR BLD AUTO: 2 %
EOSINOPHIL NFR BLD AUTO: 2.2 %
EOSINOPHIL NFR BLD AUTO: 2.8 %
EOSINOPHIL NFR BLD AUTO: 2.8 %
EOSINOPHIL NFR BLD AUTO: 4.5 %
ERYTHROCYTE [DISTWIDTH] IN BLOOD BY AUTOMATED COUNT: 17.3 % (ref 10–15)
ERYTHROCYTE [DISTWIDTH] IN BLOOD BY AUTOMATED COUNT: 17.4 % (ref 10–15)
ERYTHROCYTE [DISTWIDTH] IN BLOOD BY AUTOMATED COUNT: 18 % (ref 10–15)
ERYTHROCYTE [DISTWIDTH] IN BLOOD BY AUTOMATED COUNT: 18.3 % (ref 10–15)
ERYTHROCYTE [DISTWIDTH] IN BLOOD BY AUTOMATED COUNT: 18.6 % (ref 10–15)
ERYTHROCYTE [DISTWIDTH] IN BLOOD BY AUTOMATED COUNT: 18.9 % (ref 10–15)
ERYTHROCYTE [DISTWIDTH] IN BLOOD BY AUTOMATED COUNT: 19.1 % (ref 10–15)
ERYTHROCYTE [DISTWIDTH] IN BLOOD BY AUTOMATED COUNT: 19.2 % (ref 10–15)
ERYTHROCYTE [DISTWIDTH] IN BLOOD BY AUTOMATED COUNT: 19.4 % (ref 10–15)
ERYTHROCYTE [DISTWIDTH] IN BLOOD BY AUTOMATED COUNT: 19.5 % (ref 10–15)
ERYTHROCYTE [DISTWIDTH] IN BLOOD BY AUTOMATED COUNT: 19.9 % (ref 10–15)
ERYTHROCYTE [DISTWIDTH] IN BLOOD BY AUTOMATED COUNT: 20.2 % (ref 10–15)
ERYTHROCYTE [DISTWIDTH] IN BLOOD BY AUTOMATED COUNT: 20.2 % (ref 10–15)
ERYTHROCYTE [DISTWIDTH] IN BLOOD BY AUTOMATED COUNT: 20.6 % (ref 10–15)
ERYTHROCYTE [DISTWIDTH] IN BLOOD BY AUTOMATED COUNT: 20.7 % (ref 10–15)
ERYTHROCYTE [DISTWIDTH] IN BLOOD BY AUTOMATED COUNT: 21 % (ref 10–15)
ERYTHROCYTE [DISTWIDTH] IN BLOOD BY AUTOMATED COUNT: 21 % (ref 10–15)
ERYTHROCYTE [DISTWIDTH] IN BLOOD BY AUTOMATED COUNT: 21.4 % (ref 10–15)
GFR SERPL CREATININE-BSD FRML MDRD: 49 ML/MIN/{1.73_M2}
GFR SERPL CREATININE-BSD FRML MDRD: 52 ML/MIN/{1.73_M2}
GFR SERPL CREATININE-BSD FRML MDRD: 54 ML/MIN/{1.73_M2}
GFR SERPL CREATININE-BSD FRML MDRD: 56 ML/MIN/{1.73_M2}
GFR SERPL CREATININE-BSD FRML MDRD: 60 ML/MIN/{1.73_M2}
GFR SERPL CREATININE-BSD FRML MDRD: 65 ML/MIN/{1.73_M2}
GFR SERPL CREATININE-BSD FRML MDRD: 71 ML/MIN/{1.73_M2}
GFR SERPL CREATININE-BSD FRML MDRD: 72 ML/MIN/{1.73_M2}
GFR SERPL CREATININE-BSD FRML MDRD: 82 ML/MIN/{1.73_M2}
GFR SERPL CREATININE-BSD FRML MDRD: >90 ML/MIN/{1.73_M2}
GLUCOSE BLDC GLUCOMTR-MCNC: 102 MG/DL (ref 70–99)
GLUCOSE BLDC GLUCOMTR-MCNC: 103 MG/DL (ref 70–99)
GLUCOSE BLDC GLUCOMTR-MCNC: 104 MG/DL (ref 70–99)
GLUCOSE BLDC GLUCOMTR-MCNC: 107 MG/DL (ref 70–99)
GLUCOSE BLDC GLUCOMTR-MCNC: 108 MG/DL (ref 70–99)
GLUCOSE BLDC GLUCOMTR-MCNC: 108 MG/DL (ref 70–99)
GLUCOSE BLDC GLUCOMTR-MCNC: 109 MG/DL (ref 70–99)
GLUCOSE BLDC GLUCOMTR-MCNC: 113 MG/DL (ref 70–99)
GLUCOSE BLDC GLUCOMTR-MCNC: 133 MG/DL (ref 70–99)
GLUCOSE BLDC GLUCOMTR-MCNC: 155 MG/DL (ref 70–99)
GLUCOSE BLDC GLUCOMTR-MCNC: 163 MG/DL (ref 70–99)
GLUCOSE BLDC GLUCOMTR-MCNC: 163 MG/DL (ref 70–99)
GLUCOSE BLDC GLUCOMTR-MCNC: 171 MG/DL (ref 70–99)
GLUCOSE BLDC GLUCOMTR-MCNC: 82 MG/DL (ref 70–99)
GLUCOSE BLDC GLUCOMTR-MCNC: 85 MG/DL (ref 70–99)
GLUCOSE BLDC GLUCOMTR-MCNC: 86 MG/DL (ref 70–99)
GLUCOSE BLDC GLUCOMTR-MCNC: 93 MG/DL (ref 70–99)
GLUCOSE BLDC GLUCOMTR-MCNC: 93 MG/DL (ref 70–99)
GLUCOSE BLDC GLUCOMTR-MCNC: 96 MG/DL (ref 70–99)
GLUCOSE BLDC GLUCOMTR-MCNC: 97 MG/DL (ref 70–99)
GLUCOSE SERPL-MCNC: 100 MG/DL (ref 70–99)
GLUCOSE SERPL-MCNC: 108 MG/DL (ref 70–99)
GLUCOSE SERPL-MCNC: 116 MG/DL (ref 70–99)
GLUCOSE SERPL-MCNC: 119 MG/DL (ref 70–99)
GLUCOSE SERPL-MCNC: 160 MG/DL (ref 70–99)
GLUCOSE SERPL-MCNC: 172 MG/DL (ref 70–99)
GLUCOSE SERPL-MCNC: 175 MG/DL (ref 70–99)
GLUCOSE SERPL-MCNC: 92 MG/DL (ref 70–99)
GLUCOSE SERPL-MCNC: 95 MG/DL (ref 70–99)
GRAM STN SPEC: NORMAL
HCO3 BLDV-SCNC: 31 MMOL/L (ref 21–28)
HCT VFR BLD AUTO: 23 % (ref 35–47)
HCT VFR BLD AUTO: 24.2 % (ref 35–47)
HCT VFR BLD AUTO: 24.3 % (ref 35–47)
HCT VFR BLD AUTO: 25 % (ref 35–47)
HCT VFR BLD AUTO: 25.1 % (ref 35–47)
HCT VFR BLD AUTO: 25.2 % (ref 35–47)
HCT VFR BLD AUTO: 25.5 % (ref 35–47)
HCT VFR BLD AUTO: 25.8 % (ref 35–47)
HCT VFR BLD AUTO: 26.1 % (ref 35–47)
HCT VFR BLD AUTO: 26.3 % (ref 35–47)
HCT VFR BLD AUTO: 26.7 % (ref 35–47)
HCT VFR BLD AUTO: 26.9 % (ref 35–47)
HCT VFR BLD AUTO: 27.7 % (ref 35–47)
HCT VFR BLD AUTO: 28.1 % (ref 35–47)
HCT VFR BLD AUTO: 28.3 % (ref 35–47)
HCT VFR BLD AUTO: 28.5 % (ref 35–47)
HCT VFR BLD AUTO: 28.9 % (ref 35–47)
HCT VFR BLD AUTO: 29.4 % (ref 35–47)
HCT VFR BLD AUTO: 29.5 % (ref 35–47)
HCT VFR BLD AUTO: 30.4 % (ref 35–47)
HGB BLD-MCNC: 7.3 G/DL (ref 11.7–15.7)
HGB BLD-MCNC: 7.5 G/DL (ref 11.7–15.7)
HGB BLD-MCNC: 7.6 G/DL (ref 11.7–15.7)
HGB BLD-MCNC: 7.8 G/DL (ref 11.7–15.7)
HGB BLD-MCNC: 7.9 G/DL (ref 11.7–15.7)
HGB BLD-MCNC: 8.2 G/DL (ref 11.7–15.7)
HGB BLD-MCNC: 8.3 G/DL (ref 11.7–15.7)
HGB BLD-MCNC: 8.4 G/DL (ref 11.7–15.7)
HGB BLD-MCNC: 8.4 G/DL (ref 11.7–15.7)
HGB BLD-MCNC: 8.5 G/DL (ref 11.7–15.7)
HGB BLD-MCNC: 8.8 G/DL (ref 11.7–15.7)
HGB BLD-MCNC: 8.8 G/DL (ref 11.7–15.7)
HGB BLD-MCNC: 8.9 G/DL (ref 11.7–15.7)
HGB BLD-MCNC: 9.2 G/DL (ref 11.7–15.7)
HGB BLD-MCNC: 9.2 G/DL (ref 11.7–15.7)
HGB BLD-MCNC: 9.3 G/DL (ref 11.7–15.7)
HGB BLD-MCNC: 9.6 G/DL (ref 11.7–15.7)
HGB BLD-MCNC: 9.7 G/DL (ref 11.7–15.7)
IMM GRANULOCYTES # BLD: 0 10E9/L (ref 0–0.4)
IMM GRANULOCYTES # BLD: 0.1 10E9/L (ref 0–0.4)
IMM GRANULOCYTES # BLD: 0.2 10E9/L (ref 0–0.4)
IMM GRANULOCYTES NFR BLD: 0.4 %
IMM GRANULOCYTES NFR BLD: 0.6 %
IMM GRANULOCYTES NFR BLD: 0.7 %
IMM GRANULOCYTES NFR BLD: 0.7 %
IMM GRANULOCYTES NFR BLD: 0.8 %
IMM GRANULOCYTES NFR BLD: 0.9 %
IMM GRANULOCYTES NFR BLD: 0.9 %
IMM GRANULOCYTES NFR BLD: 1 %
IMM GRANULOCYTES NFR BLD: 1.1 %
IMM GRANULOCYTES NFR BLD: 1.2 %
IMM GRANULOCYTES NFR BLD: 1.2 %
IMM GRANULOCYTES NFR BLD: 1.3 %
IMM GRANULOCYTES NFR BLD: 1.4 %
IMM GRANULOCYTES NFR BLD: 1.7 %
IMM GRANULOCYTES NFR BLD: 2.1 %
IMM GRANULOCYTES NFR BLD: 2.4 %
IMM GRANULOCYTES NFR BLD: 2.9 %
IMM GRANULOCYTES NFR BLD: 4.3 %
IMM GRANULOCYTES NFR BLD: 4.9 %
INR PPP: 1.02 (ref 0.86–1.14)
INR PPP: 1.15 (ref 0.86–1.14)
INTERPRETATION ECG - MUSE: NORMAL
LABORATORY COMMENT REPORT: NORMAL
LYMPHOCYTES # BLD AUTO: 0.8 10E9/L (ref 0.8–5.3)
LYMPHOCYTES # BLD AUTO: 1 10E9/L (ref 0.8–5.3)
LYMPHOCYTES # BLD AUTO: 1 10E9/L (ref 0.8–5.3)
LYMPHOCYTES # BLD AUTO: 1.1 10E9/L (ref 0.8–5.3)
LYMPHOCYTES # BLD AUTO: 1.1 10E9/L (ref 0.8–5.3)
LYMPHOCYTES # BLD AUTO: 1.2 10E9/L (ref 0.8–5.3)
LYMPHOCYTES # BLD AUTO: 1.2 10E9/L (ref 0.8–5.3)
LYMPHOCYTES # BLD AUTO: 1.3 10E9/L (ref 0.8–5.3)
LYMPHOCYTES # BLD AUTO: 1.5 10E9/L (ref 0.8–5.3)
LYMPHOCYTES # BLD AUTO: 1.6 10E9/L (ref 0.8–5.3)
LYMPHOCYTES # BLD AUTO: 1.7 10E9/L (ref 0.8–5.3)
LYMPHOCYTES # BLD AUTO: 1.8 10E9/L (ref 0.8–5.3)
LYMPHOCYTES # BLD AUTO: 1.8 10E9/L (ref 0.8–5.3)
LYMPHOCYTES # BLD AUTO: 1.9 10E9/L (ref 0.8–5.3)
LYMPHOCYTES NFR BLD AUTO: 15.4 %
LYMPHOCYTES NFR BLD AUTO: 18 %
LYMPHOCYTES NFR BLD AUTO: 19.5 %
LYMPHOCYTES NFR BLD AUTO: 19.7 %
LYMPHOCYTES NFR BLD AUTO: 20.2 %
LYMPHOCYTES NFR BLD AUTO: 21 %
LYMPHOCYTES NFR BLD AUTO: 22.1 %
LYMPHOCYTES NFR BLD AUTO: 24.1 %
LYMPHOCYTES NFR BLD AUTO: 25.3 %
LYMPHOCYTES NFR BLD AUTO: 25.3 %
LYMPHOCYTES NFR BLD AUTO: 28.6 %
LYMPHOCYTES NFR BLD AUTO: 30.5 %
LYMPHOCYTES NFR BLD AUTO: 33.6 %
LYMPHOCYTES NFR BLD AUTO: 33.9 %
LYMPHOCYTES NFR BLD AUTO: 35.6 %
LYMPHOCYTES NFR BLD AUTO: 35.7 %
LYMPHOCYTES NFR BLD AUTO: 46.8 %
LYMPHOCYTES NFR BLD AUTO: 53.6 %
LYMPHOCYTES NFR BLD AUTO: 9.7 %
Lab: NORMAL
MAGNESIUM SERPL-MCNC: 1.1 MG/DL (ref 1.6–2.3)
MAGNESIUM SERPL-MCNC: 1.3 MG/DL (ref 1.6–2.3)
MAGNESIUM SERPL-MCNC: 1.3 MG/DL (ref 1.6–2.3)
MAGNESIUM SERPL-MCNC: 1.4 MG/DL (ref 1.6–2.3)
MAGNESIUM SERPL-MCNC: 1.5 MG/DL (ref 1.6–2.3)
MAGNESIUM SERPL-MCNC: 1.6 MG/DL (ref 1.6–2.3)
MAGNESIUM SERPL-MCNC: 1.7 MG/DL (ref 1.6–2.3)
MAGNESIUM SERPL-MCNC: 2.5 MG/DL (ref 1.6–2.3)
MAGNESIUM SERPL-MCNC: 2.6 MG/DL (ref 1.6–2.3)
MCH RBC QN AUTO: 27.7 PG (ref 26.5–33)
MCH RBC QN AUTO: 27.7 PG (ref 26.5–33)
MCH RBC QN AUTO: 27.9 PG (ref 26.5–33)
MCH RBC QN AUTO: 27.9 PG (ref 26.5–33)
MCH RBC QN AUTO: 28 PG (ref 26.5–33)
MCH RBC QN AUTO: 28 PG (ref 26.5–33)
MCH RBC QN AUTO: 28.2 PG (ref 26.5–33)
MCH RBC QN AUTO: 28.3 PG (ref 26.5–33)
MCH RBC QN AUTO: 28.3 PG (ref 26.5–33)
MCH RBC QN AUTO: 28.4 PG (ref 26.5–33)
MCH RBC QN AUTO: 28.5 PG (ref 26.5–33)
MCH RBC QN AUTO: 28.6 PG (ref 26.5–33)
MCH RBC QN AUTO: 28.8 PG (ref 26.5–33)
MCH RBC QN AUTO: 29 PG (ref 26.5–33)
MCH RBC QN AUTO: 29 PG (ref 26.5–33)
MCH RBC QN AUTO: 29.1 PG (ref 26.5–33)
MCH RBC QN AUTO: 29.7 PG (ref 26.5–33)
MCH RBC QN AUTO: 30.7 PG (ref 26.5–33)
MCHC RBC AUTO-ENTMCNC: 30.2 G/DL (ref 31.5–36.5)
MCHC RBC AUTO-ENTMCNC: 30.9 G/DL (ref 31.5–36.5)
MCHC RBC AUTO-ENTMCNC: 31.1 G/DL (ref 31.5–36.5)
MCHC RBC AUTO-ENTMCNC: 31.1 G/DL (ref 31.5–36.5)
MCHC RBC AUTO-ENTMCNC: 31.3 G/DL (ref 31.5–36.5)
MCHC RBC AUTO-ENTMCNC: 31.3 G/DL (ref 31.5–36.5)
MCHC RBC AUTO-ENTMCNC: 31.4 G/DL (ref 31.5–36.5)
MCHC RBC AUTO-ENTMCNC: 31.5 G/DL (ref 31.5–36.5)
MCHC RBC AUTO-ENTMCNC: 31.6 G/DL (ref 31.5–36.5)
MCHC RBC AUTO-ENTMCNC: 31.7 G/DL (ref 31.5–36.5)
MCHC RBC AUTO-ENTMCNC: 31.8 G/DL (ref 31.5–36.5)
MCHC RBC AUTO-ENTMCNC: 31.9 G/DL (ref 31.5–36.5)
MCHC RBC AUTO-ENTMCNC: 32.2 G/DL (ref 31.5–36.5)
MCHC RBC AUTO-ENTMCNC: 32.2 G/DL (ref 31.5–36.5)
MCHC RBC AUTO-ENTMCNC: 32.6 G/DL (ref 31.5–36.5)
MCHC RBC AUTO-ENTMCNC: 32.9 G/DL (ref 31.5–36.5)
MCHC RBC AUTO-ENTMCNC: 32.9 G/DL (ref 31.5–36.5)
MCHC RBC AUTO-ENTMCNC: 33.2 G/DL (ref 31.5–36.5)
MCV RBC AUTO: 87 FL (ref 78–100)
MCV RBC AUTO: 87 FL (ref 78–100)
MCV RBC AUTO: 88 FL (ref 78–100)
MCV RBC AUTO: 89 FL (ref 78–100)
MCV RBC AUTO: 90 FL (ref 78–100)
MCV RBC AUTO: 91 FL (ref 78–100)
MCV RBC AUTO: 92 FL (ref 78–100)
MCV RBC AUTO: 92 FL (ref 78–100)
MCV RBC AUTO: 93 FL (ref 78–100)
MCV RBC AUTO: 93 FL (ref 78–100)
MONOCYTES # BLD AUTO: 0.2 10E9/L (ref 0–1.3)
MONOCYTES # BLD AUTO: 0.3 10E9/L (ref 0–1.3)
MONOCYTES # BLD AUTO: 0.5 10E9/L (ref 0–1.3)
MONOCYTES # BLD AUTO: 0.6 10E9/L (ref 0–1.3)
MONOCYTES # BLD AUTO: 0.6 10E9/L (ref 0–1.3)
MONOCYTES # BLD AUTO: 0.8 10E9/L (ref 0–1.3)
MONOCYTES # BLD AUTO: 0.9 10E9/L (ref 0–1.3)
MONOCYTES # BLD AUTO: 1 10E9/L (ref 0–1.3)
MONOCYTES # BLD AUTO: 1.1 10E9/L (ref 0–1.3)
MONOCYTES # BLD AUTO: 1.2 10E9/L (ref 0–1.3)
MONOCYTES # BLD AUTO: 1.2 10E9/L (ref 0–1.3)
MONOCYTES NFR BLD AUTO: 10.6 %
MONOCYTES NFR BLD AUTO: 10.6 %
MONOCYTES NFR BLD AUTO: 11.2 %
MONOCYTES NFR BLD AUTO: 11.2 %
MONOCYTES NFR BLD AUTO: 12.4 %
MONOCYTES NFR BLD AUTO: 12.9 %
MONOCYTES NFR BLD AUTO: 13.2 %
MONOCYTES NFR BLD AUTO: 13.4 %
MONOCYTES NFR BLD AUTO: 13.9 %
MONOCYTES NFR BLD AUTO: 14.2 %
MONOCYTES NFR BLD AUTO: 17.5 %
MONOCYTES NFR BLD AUTO: 18.9 %
MONOCYTES NFR BLD AUTO: 3.5 %
MONOCYTES NFR BLD AUTO: 4.5 %
MONOCYTES NFR BLD AUTO: 4.9 %
MONOCYTES NFR BLD AUTO: 8.3 %
MONOCYTES NFR BLD AUTO: 9.4 %
MONOCYTES NFR BLD AUTO: 9.5 %
MONOCYTES NFR BLD AUTO: 9.6 %
NEUTROPHILS # BLD AUTO: 1 10E9/L (ref 1.6–8.3)
NEUTROPHILS # BLD AUTO: 1.3 10E9/L (ref 1.6–8.3)
NEUTROPHILS # BLD AUTO: 1.5 10E9/L (ref 1.6–8.3)
NEUTROPHILS # BLD AUTO: 1.5 10E9/L (ref 1.6–8.3)
NEUTROPHILS # BLD AUTO: 2 10E9/L (ref 1.6–8.3)
NEUTROPHILS # BLD AUTO: 2 10E9/L (ref 1.6–8.3)
NEUTROPHILS # BLD AUTO: 2.7 10E9/L (ref 1.6–8.3)
NEUTROPHILS # BLD AUTO: 3 10E9/L (ref 1.6–8.3)
NEUTROPHILS # BLD AUTO: 3.2 10E9/L (ref 1.6–8.3)
NEUTROPHILS # BLD AUTO: 3.5 10E9/L (ref 1.6–8.3)
NEUTROPHILS # BLD AUTO: 3.6 10E9/L (ref 1.6–8.3)
NEUTROPHILS # BLD AUTO: 3.6 10E9/L (ref 1.6–8.3)
NEUTROPHILS # BLD AUTO: 3.8 10E9/L (ref 1.6–8.3)
NEUTROPHILS # BLD AUTO: 4 10E9/L (ref 1.6–8.3)
NEUTROPHILS # BLD AUTO: 4.3 10E9/L (ref 1.6–8.3)
NEUTROPHILS # BLD AUTO: 4.6 10E9/L (ref 1.6–8.3)
NEUTROPHILS # BLD AUTO: 5.9 10E9/L (ref 1.6–8.3)
NEUTROPHILS # BLD AUTO: 5.9 10E9/L (ref 1.6–8.3)
NEUTROPHILS # BLD AUTO: 6.4 10E9/L (ref 1.6–8.3)
NEUTROPHILS NFR BLD AUTO: 31.5 %
NEUTROPHILS NFR BLD AUTO: 38.4 %
NEUTROPHILS NFR BLD AUTO: 45 %
NEUTROPHILS NFR BLD AUTO: 47.6 %
NEUTROPHILS NFR BLD AUTO: 49.5 %
NEUTROPHILS NFR BLD AUTO: 52.4 %
NEUTROPHILS NFR BLD AUTO: 52.9 %
NEUTROPHILS NFR BLD AUTO: 54.2 %
NEUTROPHILS NFR BLD AUTO: 54.5 %
NEUTROPHILS NFR BLD AUTO: 56.8 %
NEUTROPHILS NFR BLD AUTO: 59.5 %
NEUTROPHILS NFR BLD AUTO: 60.3 %
NEUTROPHILS NFR BLD AUTO: 60.9 %
NEUTROPHILS NFR BLD AUTO: 63 %
NEUTROPHILS NFR BLD AUTO: 64 %
NEUTROPHILS NFR BLD AUTO: 72.5 %
NEUTROPHILS NFR BLD AUTO: 75 %
NEUTROPHILS NFR BLD AUTO: 75.6 %
NEUTROPHILS NFR BLD AUTO: 79.2 %
NRBC # BLD AUTO: 0 10*3/UL
NRBC # BLD AUTO: 0.1 10*3/UL
NRBC BLD AUTO-RTO: 0 /100
NRBC BLD AUTO-RTO: 1 /100
NRBC BLD AUTO-RTO: 2 /100
NRBC BLD AUTO-RTO: 3 /100
NRBC BLD AUTO-RTO: 4 /100
NT-PROBNP SERPL-MCNC: 308 PG/ML (ref 0–900)
NUM BPU REQUESTED: 1
NUM BPU REQUESTED: 1
NUM BPU REQUESTED: 2
O2/TOTAL GAS SETTING VFR VENT: 21 %
PCO2 BLDV: 52 MM HG (ref 40–50)
PH BLDV: 7.38 PH (ref 7.32–7.43)
PHOSPHATE SERPL-MCNC: 4 MG/DL (ref 2.5–4.5)
PLATELET # BLD AUTO: 175 10E9/L (ref 150–450)
PLATELET # BLD AUTO: 194 10E9/L (ref 150–450)
PLATELET # BLD AUTO: 232 10E9/L (ref 150–450)
PLATELET # BLD AUTO: 241 10E9/L (ref 150–450)
PLATELET # BLD AUTO: 249 10E9/L (ref 150–450)
PLATELET # BLD AUTO: 265 10E9/L (ref 150–450)
PLATELET # BLD AUTO: 267 10E9/L (ref 150–450)
PLATELET # BLD AUTO: 271 10E9/L (ref 150–450)
PLATELET # BLD AUTO: 272 10E9/L (ref 150–450)
PLATELET # BLD AUTO: 276 10E9/L (ref 150–450)
PLATELET # BLD AUTO: 277 10E9/L (ref 150–450)
PLATELET # BLD AUTO: 280 10E9/L (ref 150–450)
PLATELET # BLD AUTO: 286 10E9/L (ref 150–450)
PLATELET # BLD AUTO: 287 10E9/L (ref 150–450)
PLATELET # BLD AUTO: 290 10E9/L (ref 150–450)
PLATELET # BLD AUTO: 297 10E9/L (ref 150–450)
PLATELET # BLD AUTO: 301 10E9/L (ref 150–450)
PLATELET # BLD AUTO: 301 10E9/L (ref 150–450)
PLATELET # BLD AUTO: 98 10E9/L (ref 150–450)
PLATELET # BLD AUTO: 98 10E9/L (ref 150–450)
PO2 BLDV: 32 MM HG (ref 25–47)
POTASSIUM SERPL-SCNC: 3.2 MMOL/L (ref 3.4–5.3)
POTASSIUM SERPL-SCNC: 3.2 MMOL/L (ref 3.4–5.3)
POTASSIUM SERPL-SCNC: 3.3 MMOL/L (ref 3.4–5.3)
POTASSIUM SERPL-SCNC: 3.3 MMOL/L (ref 3.4–5.3)
POTASSIUM SERPL-SCNC: 3.4 MMOL/L (ref 3.4–5.3)
POTASSIUM SERPL-SCNC: 3.5 MMOL/L (ref 3.4–5.3)
POTASSIUM SERPL-SCNC: 3.6 MMOL/L (ref 3.4–5.3)
POTASSIUM SERPL-SCNC: 3.7 MMOL/L (ref 3.4–5.3)
POTASSIUM SERPL-SCNC: 3.8 MMOL/L (ref 3.4–5.3)
POTASSIUM SERPL-SCNC: 3.9 MMOL/L (ref 3.4–5.3)
POTASSIUM SERPL-SCNC: 3.9 MMOL/L (ref 3.4–5.3)
POTASSIUM SERPL-SCNC: 4 MMOL/L (ref 3.4–5.3)
POTASSIUM SERPL-SCNC: 4.1 MMOL/L (ref 3.4–5.3)
PROCALCITONIN SERPL-MCNC: 0.13 NG/ML
PROT 24H UR-MRATE: 0.15 G/(24.H) (ref 0.04–0.23)
PROT SERPL-MCNC: 6.2 G/DL (ref 6.8–8.8)
PROT SERPL-MCNC: 6.7 G/DL (ref 6.8–8.8)
PROT SERPL-MCNC: 7 G/DL (ref 6.8–8.8)
PROT SERPL-MCNC: 7.2 G/DL (ref 6.8–8.8)
PROT SERPL-MCNC: 7.3 G/DL (ref 6.8–8.8)
PROT SERPL-MCNC: 7.5 G/DL (ref 6.8–8.8)
PROT SERPL-MCNC: 7.5 G/DL (ref 6.8–8.8)
PROT UR-MCNC: 0.16 G/L
PROT/CREAT 24H UR: 0.13 G/G CR (ref 0–0.2)
RADIOLOGIST FLAGS: ABNORMAL
RBC # BLD AUTO: 2.61 10E12/L (ref 3.8–5.2)
RBC # BLD AUTO: 2.62 10E12/L (ref 3.8–5.2)
RBC # BLD AUTO: 2.7 10E12/L (ref 3.8–5.2)
RBC # BLD AUTO: 2.72 10E12/L (ref 3.8–5.2)
RBC # BLD AUTO: 2.74 10E12/L (ref 3.8–5.2)
RBC # BLD AUTO: 2.75 10E12/L (ref 3.8–5.2)
RBC # BLD AUTO: 2.88 10E12/L (ref 3.8–5.2)
RBC # BLD AUTO: 2.91 10E12/L (ref 3.8–5.2)
RBC # BLD AUTO: 2.93 10E12/L (ref 3.8–5.2)
RBC # BLD AUTO: 2.95 10E12/L (ref 3.8–5.2)
RBC # BLD AUTO: 3.01 10E12/L (ref 3.8–5.2)
RBC # BLD AUTO: 3.07 10E12/L (ref 3.8–5.2)
RBC # BLD AUTO: 3.09 10E12/L (ref 3.8–5.2)
RBC # BLD AUTO: 3.1 10E12/L (ref 3.8–5.2)
RBC # BLD AUTO: 3.11 10E12/L (ref 3.8–5.2)
RBC # BLD AUTO: 3.11 10E12/L (ref 3.8–5.2)
RBC # BLD AUTO: 3.2 10E12/L (ref 3.8–5.2)
RBC # BLD AUTO: 3.3 10E12/L (ref 3.8–5.2)
RBC # BLD AUTO: 3.35 10E12/L (ref 3.8–5.2)
RBC # BLD AUTO: 3.37 10E12/L (ref 3.8–5.2)
SARS-COV-2 RNA SPEC QL NAA+PROBE: NEGATIVE
SARS-COV-2 RNA SPEC QL NAA+PROBE: NORMAL
SARS-COV-2 RNA SPEC QL NAA+PROBE: NOT DETECTED
SODIUM SERPL-SCNC: 133 MMOL/L (ref 133–144)
SODIUM SERPL-SCNC: 134 MMOL/L (ref 133–144)
SODIUM SERPL-SCNC: 135 MMOL/L (ref 133–144)
SODIUM SERPL-SCNC: 135 MMOL/L (ref 133–144)
SODIUM SERPL-SCNC: 136 MMOL/L (ref 133–144)
SODIUM SERPL-SCNC: 137 MMOL/L (ref 133–144)
SODIUM SERPL-SCNC: 137 MMOL/L (ref 133–144)
SODIUM SERPL-SCNC: 138 MMOL/L (ref 133–144)
SODIUM SERPL-SCNC: 139 MMOL/L (ref 133–144)
SPECIMEN EXP DATE BLD: NORMAL
SPECIMEN SOURCE: NORMAL
SPECIMEN VOL UR: 948 ML
TRANSFUSION STATUS PATIENT QL: NORMAL
TROPONIN I SERPL-MCNC: <0.015 UG/L (ref 0–0.04)
WBC # BLD AUTO: 3.1 10E9/L (ref 4–11)
WBC # BLD AUTO: 3.2 10E9/L (ref 4–11)
WBC # BLD AUTO: 3.4 10E9/L (ref 4–11)
WBC # BLD AUTO: 3.5 10E9/L (ref 4–11)
WBC # BLD AUTO: 3.7 10E9/L (ref 4–11)
WBC # BLD AUTO: 4.1 10E9/L (ref 4–11)
WBC # BLD AUTO: 5 10E9/L (ref 4–11)
WBC # BLD AUTO: 5.3 10E9/L (ref 4–11)
WBC # BLD AUTO: 5.6 10E9/L (ref 4–11)
WBC # BLD AUTO: 5.6 10E9/L (ref 4–11)
WBC # BLD AUTO: 5.8 10E9/L (ref 4–11)
WBC # BLD AUTO: 5.9 10E9/L (ref 4–11)
WBC # BLD AUTO: 5.9 10E9/L (ref 4–11)
WBC # BLD AUTO: 6.3 10E9/L (ref 4–11)
WBC # BLD AUTO: 6.4 10E9/L (ref 4–11)
WBC # BLD AUTO: 6.4 10E9/L (ref 4–11)
WBC # BLD AUTO: 7.2 10E9/L (ref 4–11)
WBC # BLD AUTO: 7.4 10E9/L (ref 4–11)
WBC # BLD AUTO: 8.5 10E9/L (ref 4–11)
WBC # BLD AUTO: 9.2 10E9/L (ref 4–11)

## 2020-01-01 PROCEDURE — 96411 CHEMO IV PUSH ADDL DRUG: CPT

## 2020-01-01 PROCEDURE — 96413 CHEMO IV INFUSION 1 HR: CPT

## 2020-01-01 PROCEDURE — 250N000011 HC RX IP 250 OP 636: Mod: TEL | Performed by: OBSTETRICS & GYNECOLOGY

## 2020-01-01 PROCEDURE — 99443 PR PHYSICIAN TELEPHONE EVALUATION 21-30 MIN: CPT | Mod: 95 | Performed by: OBSTETRICS & GYNECOLOGY

## 2020-01-01 PROCEDURE — 96375 TX/PRO/DX INJ NEW DRUG ADDON: CPT

## 2020-01-01 PROCEDURE — 85025 COMPLETE CBC W/AUTO DIFF WBC: CPT | Performed by: OBSTETRICS & GYNECOLOGY

## 2020-01-01 PROCEDURE — 25000128 H RX IP 250 OP 636: Mod: ZF | Performed by: OBSTETRICS & GYNECOLOGY

## 2020-01-01 PROCEDURE — 25000125 ZZHC RX 250: Mod: ZF | Performed by: OBSTETRICS & GYNECOLOGY

## 2020-01-01 PROCEDURE — 86304 IMMUNOASSAY TUMOR CA 125: CPT | Performed by: OBSTETRICS & GYNECOLOGY

## 2020-01-01 PROCEDURE — 25000132 ZZH RX MED GY IP 250 OP 250 PS 637: Mod: GY | Performed by: STUDENT IN AN ORGANIZED HEALTH CARE EDUCATION/TRAINING PROGRAM

## 2020-01-01 PROCEDURE — 36593 DECLOT VASCULAR DEVICE: CPT

## 2020-01-01 PROCEDURE — 99214 OFFICE O/P EST MOD 30 MIN: CPT | Mod: ZP | Performed by: NURSE PRACTITIONER

## 2020-01-01 PROCEDURE — 999N001017 HC STATISTIC GLUCOSE BY METER IP

## 2020-01-01 PROCEDURE — 25000128 H RX IP 250 OP 636: Mod: ZF | Performed by: NURSE PRACTITIONER

## 2020-01-01 PROCEDURE — 120N000002 HC R&B MED SURG/OB UMMC

## 2020-01-01 PROCEDURE — 25000131 ZZH RX MED GY IP 250 OP 636 PS 637: Mod: GY | Performed by: STUDENT IN AN ORGANIZED HEALTH CARE EDUCATION/TRAINING PROGRAM

## 2020-01-01 PROCEDURE — C9803 HOPD COVID-19 SPEC COLLECT: HCPCS | Performed by: FAMILY MEDICINE

## 2020-01-01 PROCEDURE — 80053 COMPREHEN METABOLIC PANEL: CPT | Performed by: OBSTETRICS & GYNECOLOGY

## 2020-01-01 PROCEDURE — 96415 CHEMO IV INFUSION ADDL HR: CPT

## 2020-01-01 PROCEDURE — 250N000013 HC RX MED GY IP 250 OP 250 PS 637: Performed by: INTERNAL MEDICINE

## 2020-01-01 PROCEDURE — 96368 THER/DIAG CONCURRENT INF: CPT

## 2020-01-01 PROCEDURE — G0463 HOSPITAL OUTPT CLINIC VISIT: HCPCS | Mod: ZF,25

## 2020-01-01 PROCEDURE — 83735 ASSAY OF MAGNESIUM: CPT | Performed by: OBSTETRICS & GYNECOLOGY

## 2020-01-01 PROCEDURE — 99207 PR SATISFY VISIT NUMBER: CPT | Performed by: RADIOLOGY

## 2020-01-01 PROCEDURE — 250N000013 HC RX MED GY IP 250 OP 250 PS 637: Performed by: NURSE PRACTITIONER

## 2020-01-01 PROCEDURE — 25000131 ZZH RX MED GY IP 250 OP 636 PS 637: Mod: GY | Performed by: NURSE PRACTITIONER

## 2020-01-01 PROCEDURE — 86901 BLOOD TYPING SEROLOGIC RH(D): CPT | Performed by: OBSTETRICS & GYNECOLOGY

## 2020-01-01 PROCEDURE — 250N000011 HC RX IP 250 OP 636: Performed by: STUDENT IN AN ORGANIZED HEALTH CARE EDUCATION/TRAINING PROGRAM

## 2020-01-01 PROCEDURE — 84132 ASSAY OF SERUM POTASSIUM: CPT | Performed by: OBSTETRICS & GYNECOLOGY

## 2020-01-01 PROCEDURE — G0378 HOSPITAL OBSERVATION PER HR: HCPCS

## 2020-01-01 PROCEDURE — 36591 DRAW BLOOD OFF VENOUS DEVICE: CPT

## 2020-01-01 PROCEDURE — 83880 ASSAY OF NATRIURETIC PEPTIDE: CPT | Performed by: FAMILY MEDICINE

## 2020-01-01 PROCEDURE — 96365 THER/PROPH/DIAG IV INF INIT: CPT | Mod: 59 | Performed by: FAMILY MEDICINE

## 2020-01-01 PROCEDURE — 25000128 H RX IP 250 OP 636: Performed by: STUDENT IN AN ORGANIZED HEALTH CARE EDUCATION/TRAINING PROGRAM

## 2020-01-01 PROCEDURE — 80053 COMPREHEN METABOLIC PANEL: CPT | Performed by: NURSE PRACTITIONER

## 2020-01-01 PROCEDURE — 25800030 ZZH RX IP 258 OP 636: Mod: ZF | Performed by: OBSTETRICS & GYNECOLOGY

## 2020-01-01 PROCEDURE — 71045 X-RAY EXAM CHEST 1 VIEW: CPT | Mod: 26 | Performed by: RADIOLOGY

## 2020-01-01 PROCEDURE — 250N000011 HC RX IP 250 OP 636: Performed by: OBSTETRICS & GYNECOLOGY

## 2020-01-01 PROCEDURE — 25000132 ZZH RX MED GY IP 250 OP 250 PS 637: Mod: GY | Performed by: NURSE PRACTITIONER

## 2020-01-01 PROCEDURE — 25800030 ZZH RX IP 258 OP 636: Performed by: OBSTETRICS & GYNECOLOGY

## 2020-01-01 PROCEDURE — 25000128 H RX IP 250 OP 636: Performed by: NURSE PRACTITIONER

## 2020-01-01 PROCEDURE — 85610 PROTHROMBIN TIME: CPT | Performed by: FAMILY MEDICINE

## 2020-01-01 PROCEDURE — 99203 OFFICE O/P NEW LOW 30 MIN: CPT | Mod: 95 | Performed by: STUDENT IN AN ORGANIZED HEALTH CARE EDUCATION/TRAINING PROGRAM

## 2020-01-01 PROCEDURE — 83735 ASSAY OF MAGNESIUM: CPT | Performed by: NURSE PRACTITIONER

## 2020-01-01 PROCEDURE — 99207 ZZC CHGS TRANSFERRED TO HOSPITAL: CPT | Mod: ZP | Performed by: OBSTETRICS & GYNECOLOGY

## 2020-01-01 PROCEDURE — G0379 DIRECT REFER HOSPITAL OBSERV: HCPCS

## 2020-01-01 PROCEDURE — 96367 TX/PROPH/DG ADDL SEQ IV INF: CPT

## 2020-01-01 PROCEDURE — 36415 COLL VENOUS BLD VENIPUNCTURE: CPT | Performed by: OBSTETRICS & GYNECOLOGY

## 2020-01-01 PROCEDURE — G0463 HOSPITAL OUTPT CLINIC VISIT: HCPCS

## 2020-01-01 PROCEDURE — 81003 URINALYSIS AUTO W/O SCOPE: CPT | Performed by: NURSE PRACTITIONER

## 2020-01-01 PROCEDURE — 86850 RBC ANTIBODY SCREEN: CPT | Performed by: OBSTETRICS & GYNECOLOGY

## 2020-01-01 PROCEDURE — 36592 COLLECT BLOOD FROM PICC: CPT | Performed by: STUDENT IN AN ORGANIZED HEALTH CARE EDUCATION/TRAINING PROGRAM

## 2020-01-01 PROCEDURE — 84132 ASSAY OF SERUM POTASSIUM: CPT | Performed by: PATHOLOGY

## 2020-01-01 PROCEDURE — 250N000013 HC RX MED GY IP 250 OP 250 PS 637: Performed by: STUDENT IN AN ORGANIZED HEALTH CARE EDUCATION/TRAINING PROGRAM

## 2020-01-01 PROCEDURE — P9016 RBC LEUKOCYTES REDUCED: HCPCS | Performed by: OBSTETRICS & GYNECOLOGY

## 2020-01-01 PROCEDURE — 00000146 ZZHCL STATISTIC GLUCOSE BY METER IP

## 2020-01-01 PROCEDURE — 80053 COMPREHEN METABOLIC PANEL: CPT | Performed by: PATHOLOGY

## 2020-01-01 PROCEDURE — 96365 THER/PROPH/DIAG IV INF INIT: CPT

## 2020-01-01 PROCEDURE — G0463 HOSPITAL OUTPT CLINIC VISIT: HCPCS | Mod: 25

## 2020-01-01 PROCEDURE — U0003 INFECTIOUS AGENT DETECTION BY NUCLEIC ACID (DNA OR RNA); SEVERE ACUTE RESPIRATORY SYNDROME CORONAVIRUS 2 (SARS-COV-2) (CORONAVIRUS DISEASE [COVID-19]), AMPLIFIED PROBE TECHNIQUE, MAKING USE OF HIGH THROUGHPUT TECHNOLOGIES AS DESCRIBED BY CMS-2020-01-R: HCPCS | Performed by: OBSTETRICS & GYNECOLOGY

## 2020-01-01 PROCEDURE — 99233 SBSQ HOSP IP/OBS HIGH 50: CPT | Performed by: OBSTETRICS & GYNECOLOGY

## 2020-01-01 PROCEDURE — 99207 PR NO BILLABLE SERVICE THIS VISIT: CPT

## 2020-01-01 PROCEDURE — G0463 HOSPITAL OUTPT CLINIC VISIT: HCPCS | Mod: ZF

## 2020-01-01 PROCEDURE — 86901 BLOOD TYPING SEROLOGIC RH(D): CPT | Performed by: PATHOLOGY

## 2020-01-01 PROCEDURE — 71260 CT THORAX DX C+: CPT | Mod: GC | Performed by: RADIOLOGY

## 2020-01-01 PROCEDURE — 250N000009 HC RX 250: Performed by: OBSTETRICS & GYNECOLOGY

## 2020-01-01 PROCEDURE — 97140 MANUAL THERAPY 1/> REGIONS: CPT | Mod: GO,59

## 2020-01-01 PROCEDURE — 85025 COMPLETE CBC W/AUTO DIFF WBC: CPT | Performed by: NURSE PRACTITIONER

## 2020-01-01 PROCEDURE — 25000128 H RX IP 250 OP 636: Performed by: OBSTETRICS & GYNECOLOGY

## 2020-01-01 PROCEDURE — 99214 OFFICE O/P EST MOD 30 MIN: CPT | Performed by: NURSE PRACTITIONER

## 2020-01-01 PROCEDURE — 81003 URINALYSIS AUTO W/O SCOPE: CPT | Performed by: OBSTETRICS & GYNECOLOGY

## 2020-01-01 PROCEDURE — 96361 HYDRATE IV INFUSION ADD-ON: CPT

## 2020-01-01 PROCEDURE — 25000132 ZZH RX MED GY IP 250 OP 250 PS 637: Mod: GY,ZF | Performed by: OBSTETRICS & GYNECOLOGY

## 2020-01-01 PROCEDURE — 258N000003 HC RX IP 258 OP 636: Performed by: OBSTETRICS & GYNECOLOGY

## 2020-01-01 PROCEDURE — 71045 X-RAY EXAM CHEST 1 VIEW: CPT

## 2020-01-01 PROCEDURE — 96417 CHEMO IV INFUS EACH ADDL SEQ: CPT

## 2020-01-01 PROCEDURE — 82803 BLOOD GASES ANY COMBINATION: CPT | Performed by: STUDENT IN AN ORGANIZED HEALTH CARE EDUCATION/TRAINING PROGRAM

## 2020-01-01 PROCEDURE — 272N000602 HC WOUND GLUE CR1

## 2020-01-01 PROCEDURE — 87070 CULTURE OTHR SPECIMN AEROBIC: CPT | Performed by: NURSE PRACTITIONER

## 2020-01-01 PROCEDURE — 36592 COLLECT BLOOD FROM PICC: CPT | Performed by: OBSTETRICS & GYNECOLOGY

## 2020-01-01 PROCEDURE — 25800030 ZZH RX IP 258 OP 636: Performed by: NURSE PRACTITIONER

## 2020-01-01 PROCEDURE — 87075 CULTR BACTERIA EXCEPT BLOOD: CPT | Performed by: NURSE PRACTITIONER

## 2020-01-01 PROCEDURE — 87015 SPECIMEN INFECT AGNT CONCNTJ: CPT | Performed by: NURSE PRACTITIONER

## 2020-01-01 PROCEDURE — 99285 EMERGENCY DEPT VISIT HI MDM: CPT | Mod: 25 | Performed by: FAMILY MEDICINE

## 2020-01-01 PROCEDURE — 25000131 ZZH RX MED GY IP 250 OP 636 PS 637: Mod: GY | Performed by: OBSTETRICS & GYNECOLOGY

## 2020-01-01 PROCEDURE — 84100 ASSAY OF PHOSPHORUS: CPT | Performed by: STUDENT IN AN ORGANIZED HEALTH CARE EDUCATION/TRAINING PROGRAM

## 2020-01-01 PROCEDURE — 75989 ABSCESS DRAINAGE UNDER X-RAY: CPT

## 2020-01-01 PROCEDURE — 99214 OFFICE O/P EST MOD 30 MIN: CPT | Mod: GC | Performed by: OBSTETRICS & GYNECOLOGY

## 2020-01-01 PROCEDURE — 36592 COLLECT BLOOD FROM PICC: CPT | Performed by: NURSE PRACTITIONER

## 2020-01-01 PROCEDURE — 87040 BLOOD CULTURE FOR BACTERIA: CPT | Performed by: FAMILY MEDICINE

## 2020-01-01 PROCEDURE — 0W993ZZ DRAINAGE OF RIGHT PLEURAL CAVITY, PERCUTANEOUS APPROACH: ICD-10-PCS | Performed by: ORTHOPAEDIC SURGERY

## 2020-01-01 PROCEDURE — 83735 ASSAY OF MAGNESIUM: CPT | Performed by: STUDENT IN AN ORGANIZED HEALTH CARE EDUCATION/TRAINING PROGRAM

## 2020-01-01 PROCEDURE — 96372 THER/PROPH/DIAG INJ SC/IM: CPT

## 2020-01-01 PROCEDURE — 99214 OFFICE O/P EST MOD 30 MIN: CPT | Performed by: OBSTETRICS & GYNECOLOGY

## 2020-01-01 PROCEDURE — 99443 ZZC PHYSICIAN TELEPHONE EVALUATION 21-30 MIN: CPT | Performed by: OBSTETRICS & GYNECOLOGY

## 2020-01-01 PROCEDURE — 36430 TRANSFUSION BLD/BLD COMPNT: CPT

## 2020-01-01 PROCEDURE — 74177 CT ABD & PELVIS W/CONTRAST: CPT | Mod: GC | Performed by: RADIOLOGY

## 2020-01-01 PROCEDURE — 25000132 ZZH RX MED GY IP 250 OP 250 PS 637: Mod: GY | Performed by: OBSTETRICS & GYNECOLOGY

## 2020-01-01 PROCEDURE — 76705 ECHO EXAM OF ABDOMEN: CPT

## 2020-01-01 PROCEDURE — 250N000013 HC RX MED GY IP 250 OP 250 PS 637: Performed by: OBSTETRICS & GYNECOLOGY

## 2020-01-01 PROCEDURE — 85025 COMPLETE CBC W/AUTO DIFF WBC: CPT | Performed by: PATHOLOGY

## 2020-01-01 PROCEDURE — 99207 PR NO CHARGE LOS: CPT

## 2020-01-01 PROCEDURE — 86923 COMPATIBILITY TEST ELECTRIC: CPT | Performed by: OBSTETRICS & GYNECOLOGY

## 2020-01-01 PROCEDURE — 80048 BASIC METABOLIC PNL TOTAL CA: CPT | Performed by: STUDENT IN AN ORGANIZED HEALTH CARE EDUCATION/TRAINING PROGRAM

## 2020-01-01 PROCEDURE — 84484 ASSAY OF TROPONIN QUANT: CPT | Performed by: FAMILY MEDICINE

## 2020-01-01 PROCEDURE — 999N001193 HC VIDEO/TELEPHONE VISIT; NO CHARGE

## 2020-01-01 PROCEDURE — 250N000013 HC RX MED GY IP 250 OP 250 PS 637: Performed by: FAMILY MEDICINE

## 2020-01-01 PROCEDURE — 99215 OFFICE O/P EST HI 40 MIN: CPT | Performed by: OBSTETRICS & GYNECOLOGY

## 2020-01-01 PROCEDURE — 93970 EXTREMITY STUDY: CPT

## 2020-01-01 PROCEDURE — 250N000013 HC RX MED GY IP 250 OP 250 PS 637: Mod: GY | Performed by: OBSTETRICS & GYNECOLOGY

## 2020-01-01 PROCEDURE — 87205 SMEAR GRAM STAIN: CPT | Performed by: NURSE PRACTITIONER

## 2020-01-01 PROCEDURE — 97166 OT EVAL MOD COMPLEX 45 MIN: CPT | Mod: GO

## 2020-01-01 PROCEDURE — 96367 TX/PROPH/DG ADDL SEQ IV INF: CPT | Performed by: FAMILY MEDICINE

## 2020-01-01 PROCEDURE — 71275 CT ANGIOGRAPHY CHEST: CPT | Mod: 26 | Performed by: RADIOLOGY

## 2020-01-01 PROCEDURE — 83735 ASSAY OF MAGNESIUM: CPT | Performed by: PATHOLOGY

## 2020-01-01 PROCEDURE — 86850 RBC ANTIBODY SCREEN: CPT | Performed by: PATHOLOGY

## 2020-01-01 PROCEDURE — 93306 TTE W/DOPPLER COMPLETE: CPT

## 2020-01-01 PROCEDURE — U0003 INFECTIOUS AGENT DETECTION BY NUCLEIC ACID (DNA OR RNA); SEVERE ACUTE RESPIRATORY SYNDROME CORONAVIRUS 2 (SARS-COV-2) (CORONAVIRUS DISEASE [COVID-19]), AMPLIFIED PROBE TECHNIQUE, MAKING USE OF HIGH THROUGHPUT TECHNOLOGIES AS DESCRIBED BY CMS-2020-01-R: HCPCS | Performed by: FAMILY MEDICINE

## 2020-01-01 PROCEDURE — 25000125 ZZHC RX 250: Performed by: NURSE PRACTITIONER

## 2020-01-01 PROCEDURE — 40001009 ZZH VIDEO/TELEPHONE VISIT; NO CHARGE

## 2020-01-01 PROCEDURE — 86900 BLOOD TYPING SEROLOGIC ABO: CPT | Performed by: OBSTETRICS & GYNECOLOGY

## 2020-01-01 PROCEDURE — 250N000011 HC RX IP 250 OP 636: Performed by: PHYSICIAN ASSISTANT

## 2020-01-01 PROCEDURE — 93005 ELECTROCARDIOGRAM TRACING: CPT | Performed by: FAMILY MEDICINE

## 2020-01-01 PROCEDURE — 250N000011 HC RX IP 250 OP 636: Performed by: FAMILY MEDICINE

## 2020-01-01 PROCEDURE — 25000125 ZZHC RX 250: Performed by: OBSTETRICS & GYNECOLOGY

## 2020-01-01 PROCEDURE — G0463 HOSPITAL OUTPT CLINIC VISIT: HCPCS | Mod: TEL,ZF

## 2020-01-01 PROCEDURE — 86900 BLOOD TYPING SEROLOGIC ABO: CPT | Performed by: PATHOLOGY

## 2020-01-01 PROCEDURE — 250N000009 HC RX 250: Performed by: PHYSICIAN ASSISTANT

## 2020-01-01 PROCEDURE — 93010 ELECTROCARDIOGRAM REPORT: CPT | Performed by: FAMILY MEDICINE

## 2020-01-01 PROCEDURE — 85610 PROTHROMBIN TIME: CPT | Performed by: PHYSICIAN ASSISTANT

## 2020-01-01 PROCEDURE — 85025 COMPLETE CBC W/AUTO DIFF WBC: CPT | Performed by: STUDENT IN AN ORGANIZED HEALTH CARE EDUCATION/TRAINING PROGRAM

## 2020-01-01 PROCEDURE — 96372 THER/PROPH/DIAG INJ SC/IM: CPT | Mod: 59

## 2020-01-01 PROCEDURE — 84156 ASSAY OF PROTEIN URINE: CPT | Mod: TEL | Performed by: OBSTETRICS & GYNECOLOGY

## 2020-01-01 PROCEDURE — 32555 ASPIRATE PLEURA W/ IMAGING: CPT | Performed by: ORTHOPAEDIC SURGERY

## 2020-01-01 PROCEDURE — 99214 OFFICE O/P EST MOD 30 MIN: CPT | Mod: ZP | Performed by: OBSTETRICS & GYNECOLOGY

## 2020-01-01 PROCEDURE — 85027 COMPLETE CBC AUTOMATED: CPT | Performed by: STUDENT IN AN ORGANIZED HEALTH CARE EDUCATION/TRAINING PROGRAM

## 2020-01-01 PROCEDURE — 96376 TX/PRO/DX INJ SAME DRUG ADON: CPT

## 2020-01-01 PROCEDURE — U0003 INFECTIOUS AGENT DETECTION BY NUCLEIC ACID (DNA OR RNA); SEVERE ACUTE RESPIRATORY SYNDROME CORONAVIRUS 2 (SARS-COV-2) (CORONAVIRUS DISEASE [COVID-19]), AMPLIFIED PROBE TECHNIQUE, MAKING USE OF HIGH THROUGHPUT TECHNOLOGIES AS DESCRIBED BY CMS-2020-01-R: HCPCS | Performed by: PATHOLOGY

## 2020-01-01 PROCEDURE — 93306 TTE W/DOPPLER COMPLETE: CPT | Mod: 26 | Performed by: STUDENT IN AN ORGANIZED HEALTH CARE EDUCATION/TRAINING PROGRAM

## 2020-01-01 PROCEDURE — 36415 COLL VENOUS BLD VENIPUNCTURE: CPT | Performed by: FAMILY MEDICINE

## 2020-01-01 PROCEDURE — 99443 ZZC PHYSICIAN TELEPHONE EVALUATION 21-30 MIN: CPT | Mod: 95 | Performed by: OBSTETRICS & GYNECOLOGY

## 2020-01-01 PROCEDURE — 93970 EXTREMITY STUDY: CPT | Mod: 26 | Performed by: RADIOLOGY

## 2020-01-01 PROCEDURE — 99218 ZZC INITIAL OBSERVATION CARE,LEVL I: CPT | Mod: AI | Performed by: OBSTETRICS & GYNECOLOGY

## 2020-01-01 PROCEDURE — 272N000196 HC ACCESSORY CR5

## 2020-01-01 PROCEDURE — 25800030 ZZH RX IP 258 OP 636: Performed by: STUDENT IN AN ORGANIZED HEALTH CARE EDUCATION/TRAINING PROGRAM

## 2020-01-01 PROCEDURE — 71275 CT ANGIOGRAPHY CHEST: CPT

## 2020-01-01 PROCEDURE — 99215 OFFICE O/P EST HI 40 MIN: CPT | Mod: 95 | Performed by: OBSTETRICS & GYNECOLOGY

## 2020-01-01 PROCEDURE — 99222 1ST HOSP IP/OBS MODERATE 55: CPT | Mod: GC | Performed by: INTERNAL MEDICINE

## 2020-01-01 PROCEDURE — 258N000003 HC RX IP 258 OP 636: Performed by: FAMILY MEDICINE

## 2020-01-01 PROCEDURE — 999N000163 HC STATISTIC SIMPLE TUBE INSERTION/CHARGE, PORT, CATH, FISTULOGRAM

## 2020-01-01 PROCEDURE — 85730 THROMBOPLASTIN TIME PARTIAL: CPT | Performed by: FAMILY MEDICINE

## 2020-01-01 PROCEDURE — 84145 PROCALCITONIN (PCT): CPT | Performed by: STUDENT IN AN ORGANIZED HEALTH CARE EDUCATION/TRAINING PROGRAM

## 2020-01-01 PROCEDURE — 25000132 ZZH RX MED GY IP 250 OP 250 PS 637: Mod: GY

## 2020-01-01 PROCEDURE — 97140 MANUAL THERAPY 1/> REGIONS: CPT | Mod: GO

## 2020-01-01 PROCEDURE — 272N000147 HC KIT CR7

## 2020-01-01 PROCEDURE — 25000125 ZZHC RX 250: Performed by: STUDENT IN AN ORGANIZED HEALTH CARE EDUCATION/TRAINING PROGRAM

## 2020-01-01 PROCEDURE — 99443: CPT | Performed by: OBSTETRICS & GYNECOLOGY

## 2020-01-01 RX ORDER — HEPARIN SODIUM (PORCINE) LOCK FLUSH IV SOLN 100 UNIT/ML 100 UNIT/ML
5 SOLUTION INTRAVENOUS
Status: CANCELLED | OUTPATIENT
Start: 2020-01-01

## 2020-01-01 RX ORDER — MAGNESIUM SULFATE HEPTAHYDRATE 40 MG/ML
2 INJECTION, SOLUTION INTRAVENOUS ONCE
Status: COMPLETED | OUTPATIENT
Start: 2020-01-01 | End: 2020-01-01

## 2020-01-01 RX ORDER — POTASSIUM CHLORIDE 29.8 MG/ML
20 INJECTION INTRAVENOUS
Status: COMPLETED | OUTPATIENT
Start: 2020-01-01 | End: 2020-01-01

## 2020-01-01 RX ORDER — NALOXONE HYDROCHLORIDE 0.4 MG/ML
.1-.4 INJECTION, SOLUTION INTRAMUSCULAR; INTRAVENOUS; SUBCUTANEOUS
Status: CANCELLED | OUTPATIENT
Start: 2020-01-01

## 2020-01-01 RX ORDER — EPINEPHRINE 1 MG/ML
0.3 INJECTION, SOLUTION INTRAMUSCULAR; SUBCUTANEOUS EVERY 5 MIN PRN
Status: CANCELLED | OUTPATIENT
Start: 2020-01-01

## 2020-01-01 RX ORDER — ALBUTEROL SULFATE 0.83 MG/ML
2.5 SOLUTION RESPIRATORY (INHALATION)
Status: CANCELLED | OUTPATIENT
Start: 2020-01-01

## 2020-01-01 RX ORDER — AMLODIPINE BESYLATE 5 MG/1
5 TABLET ORAL DAILY
Status: DISCONTINUED | OUTPATIENT
Start: 2020-01-01 | End: 2020-01-01

## 2020-01-01 RX ORDER — POTASSIUM CHLORIDE 1500 MG/1
40 TABLET, EXTENDED RELEASE ORAL ONCE
Status: COMPLETED | OUTPATIENT
Start: 2020-01-01 | End: 2020-01-01

## 2020-01-01 RX ORDER — MAGNESIUM SULFATE HEPTAHYDRATE 40 MG/ML
4 INJECTION, SOLUTION INTRAVENOUS ONCE
Status: COMPLETED | OUTPATIENT
Start: 2020-01-01 | End: 2020-01-01

## 2020-01-01 RX ORDER — SODIUM CHLORIDE 9 MG/ML
1000 INJECTION, SOLUTION INTRAVENOUS CONTINUOUS PRN
Status: CANCELLED
Start: 2020-01-01

## 2020-01-01 RX ORDER — MEPERIDINE HYDROCHLORIDE 25 MG/ML
25 INJECTION INTRAMUSCULAR; INTRAVENOUS; SUBCUTANEOUS EVERY 30 MIN PRN
Status: CANCELLED | OUTPATIENT
Start: 2020-01-01

## 2020-01-01 RX ORDER — DEXAMETHASONE SODIUM PHOSPHATE 4 MG/ML
20 INJECTION, SOLUTION INTRA-ARTICULAR; INTRALESIONAL; INTRAMUSCULAR; INTRAVENOUS; SOFT TISSUE ONCE
Status: COMPLETED | OUTPATIENT
Start: 2020-01-01 | End: 2020-01-01

## 2020-01-01 RX ORDER — NALOXONE HYDROCHLORIDE 0.4 MG/ML
0.2 INJECTION, SOLUTION INTRAMUSCULAR; INTRAVENOUS; SUBCUTANEOUS
Status: DISCONTINUED | OUTPATIENT
Start: 2020-01-01 | End: 2020-01-01 | Stop reason: HOSPADM

## 2020-01-01 RX ORDER — HEPARIN SODIUM,PORCINE 10 UNIT/ML
5-10 VIAL (ML) INTRAVENOUS
Status: DISCONTINUED | OUTPATIENT
Start: 2020-01-01 | End: 2020-01-01 | Stop reason: HOSPADM

## 2020-01-01 RX ORDER — LIDOCAINE 40 MG/G
CREAM TOPICAL
Status: DISCONTINUED | OUTPATIENT
Start: 2020-01-01 | End: 2020-01-01

## 2020-01-01 RX ORDER — ALBUTEROL SULFATE 90 UG/1
1-2 AEROSOL, METERED RESPIRATORY (INHALATION)
Status: CANCELLED
Start: 2020-01-01

## 2020-01-01 RX ORDER — HEPARIN SODIUM (PORCINE) LOCK FLUSH IV SOLN 100 UNIT/ML 100 UNIT/ML
5 SOLUTION INTRAVENOUS ONCE
Status: COMPLETED | OUTPATIENT
Start: 2020-01-01 | End: 2020-01-01

## 2020-01-01 RX ORDER — DIPHENHYDRAMINE HCL 25 MG
25 CAPSULE ORAL ONCE
Status: CANCELLED
Start: 2020-01-01

## 2020-01-01 RX ORDER — DIPHENHYDRAMINE HCL 50 MG
50 CAPSULE ORAL ONCE
Status: COMPLETED | OUTPATIENT
Start: 2020-01-01 | End: 2020-01-01

## 2020-01-01 RX ORDER — ACETAMINOPHEN 500 MG
1000 TABLET ORAL 3 TIMES DAILY
Status: DISCONTINUED | OUTPATIENT
Start: 2020-01-01 | End: 2020-01-01 | Stop reason: HOSPADM

## 2020-01-01 RX ORDER — HEPARIN SODIUM (PORCINE) LOCK FLUSH IV SOLN 100 UNIT/ML 100 UNIT/ML
5 SOLUTION INTRAVENOUS
Status: COMPLETED | OUTPATIENT
Start: 2020-01-01 | End: 2020-01-01

## 2020-01-01 RX ORDER — HEPARIN SODIUM (PORCINE) LOCK FLUSH IV SOLN 100 UNIT/ML 100 UNIT/ML
5 SOLUTION INTRAVENOUS
Status: DISCONTINUED | OUTPATIENT
Start: 2020-01-01 | End: 2020-01-01 | Stop reason: HOSPADM

## 2020-01-01 RX ORDER — ACETAMINOPHEN 325 MG/1
325-650 TABLET ORAL EVERY 6 HOURS PRN
Status: DISCONTINUED | OUTPATIENT
Start: 2020-01-01 | End: 2020-01-01 | Stop reason: HOSPADM

## 2020-01-01 RX ORDER — DEXAMETHASONE 4 MG/1
8 TABLET ORAL EVERY MORNING
Status: CANCELLED | OUTPATIENT
Start: 2020-01-01

## 2020-01-01 RX ORDER — HEPARIN SODIUM,PORCINE 10 UNIT/ML
5 VIAL (ML) INTRAVENOUS
Status: CANCELLED | OUTPATIENT
Start: 2020-01-01

## 2020-01-01 RX ORDER — LORAZEPAM 2 MG/ML
1 INJECTION INTRAMUSCULAR EVERY 6 HOURS PRN
Status: CANCELLED
Start: 2020-01-01

## 2020-01-01 RX ORDER — METHYLPREDNISOLONE SODIUM SUCCINATE 125 MG/2ML
125 INJECTION, POWDER, LYOPHILIZED, FOR SOLUTION INTRAMUSCULAR; INTRAVENOUS
Status: CANCELLED
Start: 2020-01-01

## 2020-01-01 RX ORDER — ALBUTEROL SULFATE 90 UG/1
2 AEROSOL, METERED RESPIRATORY (INHALATION) EVERY 6 HOURS
Qty: 1 INHALER | Refills: 3 | Status: SHIPPED | OUTPATIENT
Start: 2020-01-01

## 2020-01-01 RX ORDER — EPINEPHRINE 0.3 MG/.3ML
0.3 INJECTION SUBCUTANEOUS EVERY 5 MIN PRN
Status: CANCELLED | OUTPATIENT
Start: 2020-01-01

## 2020-01-01 RX ORDER — ACETAMINOPHEN 325 MG/1
325 TABLET ORAL EVERY 6 HOURS PRN
Status: DISCONTINUED | OUTPATIENT
Start: 2020-01-01 | End: 2020-01-01 | Stop reason: HOSPADM

## 2020-01-01 RX ORDER — IOPAMIDOL 755 MG/ML
73 INJECTION, SOLUTION INTRAVASCULAR ONCE
Status: COMPLETED | OUTPATIENT
Start: 2020-01-01 | End: 2020-01-01

## 2020-01-01 RX ORDER — LORATADINE 10 MG/1
10 TABLET ORAL DAILY
Status: DISCONTINUED | OUTPATIENT
Start: 2020-01-01 | End: 2020-01-01 | Stop reason: HOSPADM

## 2020-01-01 RX ORDER — HEPARIN SODIUM (PORCINE) LOCK FLUSH IV SOLN 100 UNIT/ML 100 UNIT/ML
5 SOLUTION INTRAVENOUS EVERY 8 HOURS PRN
Status: DISCONTINUED | OUTPATIENT
Start: 2020-01-01 | End: 2020-01-01 | Stop reason: HOSPADM

## 2020-01-01 RX ORDER — DEXAMETHASONE SODIUM PHOSPHATE 10 MG/ML
20 INJECTION, SOLUTION INTRAMUSCULAR; INTRAVENOUS ONCE
Status: CANCELLED | OUTPATIENT
Start: 2020-01-01

## 2020-01-01 RX ORDER — IOPAMIDOL 755 MG/ML
135 INJECTION, SOLUTION INTRAVASCULAR ONCE
Status: COMPLETED | OUTPATIENT
Start: 2020-01-01 | End: 2020-01-01

## 2020-01-01 RX ORDER — METHYLPREDNISOLONE SODIUM SUCCINATE 125 MG/2ML
125 INJECTION, POWDER, LYOPHILIZED, FOR SOLUTION INTRAMUSCULAR; INTRAVENOUS
Status: DISCONTINUED | OUTPATIENT
Start: 2020-01-01 | End: 2020-01-01 | Stop reason: HOSPADM

## 2020-01-01 RX ORDER — POTASSIUM CHLORIDE 1.5 G/1.58G
40 POWDER, FOR SOLUTION ORAL ONCE
Status: COMPLETED | OUTPATIENT
Start: 2020-01-01 | End: 2020-01-01

## 2020-01-01 RX ORDER — OXYCODONE HYDROCHLORIDE 5 MG/1
5 TABLET ORAL EVERY 6 HOURS PRN
Qty: 20 TABLET | Refills: 0 | Status: ON HOLD | OUTPATIENT
Start: 2020-01-01 | End: 2020-01-01

## 2020-01-01 RX ORDER — DEXTROSE MONOHYDRATE 25 G/50ML
25-50 INJECTION, SOLUTION INTRAVENOUS
Status: DISCONTINUED | OUTPATIENT
Start: 2020-01-01 | End: 2020-01-01 | Stop reason: HOSPADM

## 2020-01-01 RX ORDER — ONDANSETRON 4 MG/1
4 TABLET, ORALLY DISINTEGRATING ORAL EVERY 6 HOURS PRN
Status: DISCONTINUED | OUTPATIENT
Start: 2020-01-01 | End: 2020-01-01 | Stop reason: HOSPADM

## 2020-01-01 RX ORDER — DIPHENHYDRAMINE HCL 25 MG
50 CAPSULE ORAL ONCE
Status: CANCELLED
Start: 2020-01-01

## 2020-01-01 RX ORDER — DEXAMETHASONE 4 MG/1
8 TABLET ORAL EVERY MORNING
Status: DISCONTINUED | OUTPATIENT
Start: 2020-01-01 | End: 2020-01-01 | Stop reason: HOSPADM

## 2020-01-01 RX ORDER — AMOXICILLIN 250 MG
1 CAPSULE ORAL 2 TIMES DAILY PRN
Status: DISCONTINUED | OUTPATIENT
Start: 2020-01-01 | End: 2020-01-01 | Stop reason: HOSPADM

## 2020-01-01 RX ORDER — LEVOFLOXACIN 750 MG/1
750 TABLET, FILM COATED ORAL EVERY 24 HOURS
Status: DISCONTINUED | OUTPATIENT
Start: 2020-01-01 | End: 2020-01-01 | Stop reason: HOSPADM

## 2020-01-01 RX ORDER — OXYCODONE HYDROCHLORIDE 5 MG/1
5 TABLET ORAL 2 TIMES DAILY PRN
Qty: 10 TABLET | Refills: 0 | Status: SHIPPED | OUTPATIENT
Start: 2020-01-01 | End: 2020-01-01

## 2020-01-01 RX ORDER — OXYCODONE HYDROCHLORIDE 5 MG/1
5 TABLET ORAL EVERY 4 HOURS PRN
Qty: 60 TABLET | Refills: 0 | Status: SHIPPED | OUTPATIENT
Start: 2020-01-01 | End: 2020-01-01

## 2020-01-01 RX ORDER — NALOXONE HYDROCHLORIDE 0.4 MG/ML
.1-.4 INJECTION, SOLUTION INTRAMUSCULAR; INTRAVENOUS; SUBCUTANEOUS
Status: DISCONTINUED | OUTPATIENT
Start: 2020-01-01 | End: 2020-01-01 | Stop reason: HOSPADM

## 2020-01-01 RX ORDER — PIPERACILLIN SODIUM, TAZOBACTAM SODIUM 4; .5 G/20ML; G/20ML
4.5 INJECTION, POWDER, LYOPHILIZED, FOR SOLUTION INTRAVENOUS EVERY 6 HOURS
Status: DISCONTINUED | OUTPATIENT
Start: 2020-01-01 | End: 2020-01-01

## 2020-01-01 RX ORDER — HEPARIN SODIUM,PORCINE 10 UNIT/ML
5-10 VIAL (ML) INTRAVENOUS EVERY 24 HOURS
Status: DISCONTINUED | OUTPATIENT
Start: 2020-01-01 | End: 2020-01-01 | Stop reason: HOSPADM

## 2020-01-01 RX ORDER — TRIAMTERENE AND HYDROCHLOROTHIAZIDE 37.5; 25 MG/1; MG/1
CAPSULE ORAL EVERY MORNING
COMMUNITY

## 2020-01-01 RX ORDER — FENTANYL CITRATE 50 UG/ML
25-50 INJECTION, SOLUTION INTRAMUSCULAR; INTRAVENOUS EVERY 5 MIN PRN
Status: DISCONTINUED | OUTPATIENT
Start: 2020-01-01 | End: 2020-01-01 | Stop reason: HOSPADM

## 2020-01-01 RX ORDER — DIPHENHYDRAMINE HYDROCHLORIDE 50 MG/ML
50 INJECTION INTRAMUSCULAR; INTRAVENOUS
Status: CANCELLED
Start: 2020-01-01

## 2020-01-01 RX ORDER — DIPHENHYDRAMINE HCL 25 MG
25 CAPSULE ORAL ONCE
Status: COMPLETED | OUTPATIENT
Start: 2020-01-01 | End: 2020-01-01

## 2020-01-01 RX ORDER — MAGNESIUM SULFATE HEPTAHYDRATE 40 MG/ML
4 INJECTION, SOLUTION INTRAVENOUS EVERY 4 HOURS PRN
Status: DISCONTINUED | OUTPATIENT
Start: 2020-01-01 | End: 2020-01-01 | Stop reason: HOSPADM

## 2020-01-01 RX ORDER — PROCHLORPERAZINE MALEATE 5 MG
5 TABLET ORAL EVERY 6 HOURS PRN
Status: CANCELLED
Start: 2020-01-01

## 2020-01-01 RX ORDER — NICOTINE POLACRILEX 4 MG
15-30 LOZENGE BUCCAL
Status: DISCONTINUED | OUTPATIENT
Start: 2020-01-01 | End: 2020-01-01 | Stop reason: HOSPADM

## 2020-01-01 RX ORDER — OXYCODONE HYDROCHLORIDE 5 MG/1
5 TABLET ORAL EVERY 6 HOURS PRN
Qty: 12 TABLET | Refills: 0 | Status: SHIPPED | OUTPATIENT
Start: 2020-01-01 | End: 2020-01-01

## 2020-01-01 RX ORDER — LIDOCAINE 40 MG/G
CREAM TOPICAL
Status: DISCONTINUED | OUTPATIENT
Start: 2020-01-01 | End: 2020-01-01 | Stop reason: HOSPADM

## 2020-01-01 RX ORDER — TRIAMTERENE/HYDROCHLOROTHIAZID 37.5-25 MG
1 TABLET ORAL EVERY MORNING
Status: DISCONTINUED | OUTPATIENT
Start: 2020-01-01 | End: 2020-01-01 | Stop reason: HOSPADM

## 2020-01-01 RX ORDER — PROCHLORPERAZINE MALEATE 5 MG
5 TABLET ORAL EVERY 6 HOURS PRN
Status: DISCONTINUED | OUTPATIENT
Start: 2020-01-01 | End: 2020-01-01 | Stop reason: HOSPADM

## 2020-01-01 RX ORDER — DEXAMETHASONE SODIUM PHOSPHATE 10 MG/ML
20 INJECTION, SOLUTION INTRAMUSCULAR; INTRAVENOUS ONCE
Status: COMPLETED | OUTPATIENT
Start: 2020-01-01 | End: 2020-01-01

## 2020-01-01 RX ORDER — LORAZEPAM 2 MG/ML
.5-1 INJECTION INTRAMUSCULAR EVERY 6 HOURS PRN
Status: CANCELLED
Start: 2020-01-01

## 2020-01-01 RX ORDER — FLUMAZENIL 0.1 MG/ML
0.2 INJECTION, SOLUTION INTRAVENOUS
Status: DISCONTINUED | OUTPATIENT
Start: 2020-01-01 | End: 2020-01-01 | Stop reason: HOSPADM

## 2020-01-01 RX ORDER — LORAZEPAM 0.5 MG/1
.5-1 TABLET ORAL EVERY 6 HOURS PRN
Status: DISCONTINUED | OUTPATIENT
Start: 2020-01-01 | End: 2020-01-01 | Stop reason: HOSPADM

## 2020-01-01 RX ORDER — LETROZOLE 2.5 MG/1
2.5 TABLET, FILM COATED ORAL DAILY
Qty: 30 TABLET | Refills: 11 | Status: SHIPPED | OUTPATIENT
Start: 2020-01-01

## 2020-01-01 RX ORDER — ONDANSETRON 8 MG/1
8 TABLET, FILM COATED ORAL ONCE
Status: COMPLETED | OUTPATIENT
Start: 2020-01-01 | End: 2020-01-01

## 2020-01-01 RX ORDER — AMLODIPINE BESYLATE 10 MG/1
10 TABLET ORAL DAILY
Status: DISCONTINUED | OUTPATIENT
Start: 2020-01-01 | End: 2020-01-01 | Stop reason: HOSPADM

## 2020-01-01 RX ORDER — OXYCODONE HYDROCHLORIDE 5 MG/1
5 TABLET ORAL ONCE
Status: COMPLETED | OUTPATIENT
Start: 2020-01-01 | End: 2020-01-01

## 2020-01-01 RX ORDER — LOPERAMIDE HCL 2 MG
2 CAPSULE ORAL 4 TIMES DAILY PRN
Status: DISCONTINUED | OUTPATIENT
Start: 2020-01-01 | End: 2020-01-01 | Stop reason: HOSPADM

## 2020-01-01 RX ORDER — ALBUTEROL SULFATE 0.83 MG/ML
2.5 SOLUTION RESPIRATORY (INHALATION)
Status: DISCONTINUED | OUTPATIENT
Start: 2020-01-01 | End: 2020-01-01 | Stop reason: HOSPADM

## 2020-01-01 RX ORDER — AMOXICILLIN 250 MG
2 CAPSULE ORAL 2 TIMES DAILY PRN
Status: DISCONTINUED | OUTPATIENT
Start: 2020-01-01 | End: 2020-01-01 | Stop reason: HOSPADM

## 2020-01-01 RX ORDER — MORPHINE SULFATE 15 MG/1
15 TABLET, FILM COATED, EXTENDED RELEASE ORAL EVERY 12 HOURS
Qty: 60 TABLET | Refills: 0 | Status: SHIPPED | OUTPATIENT
Start: 2020-01-01 | End: 2020-01-01

## 2020-01-01 RX ORDER — AMLODIPINE BESYLATE 10 MG/1
10 TABLET ORAL DAILY
Status: DISCONTINUED | OUTPATIENT
Start: 2020-01-01 | End: 2020-01-01

## 2020-01-01 RX ORDER — HEPARIN SODIUM (PORCINE) LOCK FLUSH IV SOLN 100 UNIT/ML 100 UNIT/ML
5 SOLUTION INTRAVENOUS EVERY 8 HOURS
Status: DISCONTINUED | OUTPATIENT
Start: 2020-01-01 | End: 2020-01-01 | Stop reason: HOSPADM

## 2020-01-01 RX ORDER — LORAZEPAM 2 MG/ML
.5-1 INJECTION INTRAMUSCULAR EVERY 6 HOURS PRN
Status: DISCONTINUED | OUTPATIENT
Start: 2020-01-01 | End: 2020-01-01 | Stop reason: HOSPADM

## 2020-01-01 RX ORDER — EPINEPHRINE 1 MG/ML
0.3 INJECTION, SOLUTION, CONCENTRATE INTRAVENOUS EVERY 5 MIN PRN
Status: DISCONTINUED | OUTPATIENT
Start: 2020-01-01 | End: 2020-01-01 | Stop reason: HOSPADM

## 2020-01-01 RX ORDER — POTASSIUM CHLORIDE 7.45 MG/ML
10 INJECTION INTRAVENOUS
Status: DISCONTINUED | OUTPATIENT
Start: 2020-01-01 | End: 2020-01-01 | Stop reason: HOSPADM

## 2020-01-01 RX ORDER — DIPHENHYDRAMINE HYDROCHLORIDE 50 MG/ML
50 INJECTION INTRAMUSCULAR; INTRAVENOUS
Status: DISCONTINUED | OUTPATIENT
Start: 2020-01-01 | End: 2020-01-01 | Stop reason: HOSPADM

## 2020-01-01 RX ORDER — NALOXONE HYDROCHLORIDE 0.4 MG/ML
0.4 INJECTION, SOLUTION INTRAMUSCULAR; INTRAVENOUS; SUBCUTANEOUS
Status: DISCONTINUED | OUTPATIENT
Start: 2020-01-01 | End: 2020-01-01 | Stop reason: HOSPADM

## 2020-01-01 RX ORDER — OXYCODONE HYDROCHLORIDE 5 MG/1
5 TABLET ORAL EVERY 6 HOURS PRN
COMMUNITY
End: 2020-01-01

## 2020-01-01 RX ORDER — OXYCODONE HCL 10 MG/1
10 TABLET, FILM COATED, EXTENDED RELEASE ORAL EVERY 12 HOURS
Qty: 60 TABLET | Refills: 0 | Status: SHIPPED | OUTPATIENT
Start: 2020-01-01 | End: 2020-01-01

## 2020-01-01 RX ORDER — ONDANSETRON 2 MG/ML
4 INJECTION INTRAMUSCULAR; INTRAVENOUS EVERY 6 HOURS PRN
Status: DISCONTINUED | OUTPATIENT
Start: 2020-01-01 | End: 2020-01-01 | Stop reason: HOSPADM

## 2020-01-01 RX ORDER — PYRIDOXINE HCL (VITAMIN B6) 50 MG
50 TABLET ORAL 2 TIMES DAILY
Status: DISCONTINUED | OUTPATIENT
Start: 2020-01-01 | End: 2020-01-01 | Stop reason: HOSPADM

## 2020-01-01 RX ORDER — LORAZEPAM 0.5 MG/1
.5-1 TABLET ORAL EVERY 6 HOURS PRN
Status: CANCELLED
Start: 2020-01-01

## 2020-01-01 RX ORDER — HEPARIN SODIUM,PORCINE 10 UNIT/ML
5 VIAL (ML) INTRAVENOUS
Status: DISCONTINUED | OUTPATIENT
Start: 2020-01-01 | End: 2020-01-01 | Stop reason: HOSPADM

## 2020-01-01 RX ORDER — DEXAMETHASONE SODIUM PHOSPHATE 10 MG/ML
20 INJECTION, SOLUTION INTRAMUSCULAR; INTRAVENOUS ONCE
Status: DISCONTINUED | OUTPATIENT
Start: 2020-01-01 | End: 2020-01-01

## 2020-01-01 RX ORDER — MAGNESIUM OXIDE 400 MG/1
400 TABLET ORAL 2 TIMES DAILY
Qty: 4 TABLET | Refills: 0 | Status: SHIPPED | OUTPATIENT
Start: 2020-01-01 | End: 2020-01-01

## 2020-01-01 RX ORDER — FLUTICASONE PROPIONATE 50 MCG
1-2 SPRAY, SUSPENSION (ML) NASAL DAILY PRN
Status: DISCONTINUED | OUTPATIENT
Start: 2020-01-01 | End: 2020-01-01 | Stop reason: CLARIF

## 2020-01-01 RX ORDER — OXYCODONE HYDROCHLORIDE 5 MG/1
5-10 TABLET ORAL EVERY 4 HOURS PRN
Qty: 60 TABLET | Refills: 0 | COMMUNITY
Start: 2020-01-01

## 2020-01-01 RX ORDER — MAGNESIUM SULFATE HEPTAHYDRATE 40 MG/ML
2 INJECTION, SOLUTION INTRAVENOUS DAILY PRN
Status: DISCONTINUED | OUTPATIENT
Start: 2020-01-01 | End: 2020-01-01

## 2020-01-01 RX ORDER — TRIAMTERENE/HYDROCHLOROTHIAZID 37.5-25 MG
1 TABLET ORAL DAILY
Status: DISCONTINUED | OUTPATIENT
Start: 2020-01-01 | End: 2020-01-01 | Stop reason: HOSPADM

## 2020-01-01 RX ORDER — ALBUTEROL SULFATE 90 UG/1
1-2 AEROSOL, METERED RESPIRATORY (INHALATION)
Status: DISCONTINUED | OUTPATIENT
Start: 2020-01-01 | End: 2020-01-01 | Stop reason: HOSPADM

## 2020-01-01 RX ORDER — MAGNESIUM SULFATE HEPTAHYDRATE 40 MG/ML
4 INJECTION, SOLUTION INTRAVENOUS EVERY 4 HOURS PRN
Status: DISCONTINUED | OUTPATIENT
Start: 2020-01-01 | End: 2020-01-01

## 2020-01-01 RX ORDER — MEPERIDINE HYDROCHLORIDE 25 MG/ML
25 INJECTION INTRAMUSCULAR; INTRAVENOUS; SUBCUTANEOUS EVERY 30 MIN PRN
Status: DISCONTINUED | OUTPATIENT
Start: 2020-01-01 | End: 2020-01-01 | Stop reason: HOSPADM

## 2020-01-01 RX ORDER — LORATADINE 10 MG/1
10 TABLET ORAL DAILY
Status: DISCONTINUED | OUTPATIENT
Start: 2020-01-01 | End: 2020-01-01

## 2020-01-01 RX ORDER — AMOXICILLIN 250 MG
1 CAPSULE ORAL 2 TIMES DAILY PRN
Qty: 20 TABLET | Refills: 0 | Status: SHIPPED | OUTPATIENT
Start: 2020-01-01

## 2020-01-01 RX ORDER — CALCIUM CARBONATE 500 MG/1
1000 TABLET, CHEWABLE ORAL 3 TIMES DAILY PRN
Status: DISCONTINUED | OUTPATIENT
Start: 2020-01-01 | End: 2020-01-01 | Stop reason: HOSPADM

## 2020-01-01 RX ORDER — NALOXONE HYDROCHLORIDE 0.4 MG/ML
.1-.4 INJECTION, SOLUTION INTRAMUSCULAR; INTRAVENOUS; SUBCUTANEOUS
Status: DISCONTINUED | OUTPATIENT
Start: 2020-01-01 | End: 2020-01-01

## 2020-01-01 RX ORDER — LEVOFLOXACIN 750 MG/1
750 TABLET, FILM COATED ORAL EVERY 24 HOURS
Qty: 7 TABLET | Refills: 0 | Status: SHIPPED | OUTPATIENT
Start: 2020-01-01 | End: 2020-01-01

## 2020-01-01 RX ORDER — FAMOTIDINE 10 MG
10 TABLET ORAL DAILY PRN
Status: DISCONTINUED | OUTPATIENT
Start: 2020-01-01 | End: 2020-01-01 | Stop reason: HOSPADM

## 2020-01-01 RX ORDER — SODIUM CHLORIDE 9 MG/ML
1000 INJECTION, SOLUTION INTRAVENOUS CONTINUOUS PRN
Status: DISCONTINUED | OUTPATIENT
Start: 2020-01-01 | End: 2020-01-01 | Stop reason: HOSPADM

## 2020-01-01 RX ORDER — TRIAMTERENE AND HYDROCHLOROTHIAZIDE 37.5; 25 MG/1; MG/1
1 CAPSULE ORAL EVERY MORNING
Status: DISCONTINUED | OUTPATIENT
Start: 2020-01-01 | End: 2020-01-01

## 2020-01-01 RX ORDER — MAGNESIUM OXIDE 400 MG/1
400 TABLET ORAL DAILY
Status: DISCONTINUED | OUTPATIENT
Start: 2020-01-01 | End: 2020-01-01 | Stop reason: HOSPADM

## 2020-01-01 RX ORDER — ONDANSETRON 8 MG/1
8 TABLET, FILM COATED ORAL ONCE
Status: DISCONTINUED | OUTPATIENT
Start: 2020-01-01 | End: 2020-01-01

## 2020-01-01 RX ORDER — AMLODIPINE BESYLATE 10 MG/1
10 TABLET ORAL DAILY
Qty: 30 TABLET | Refills: 0 | Status: SHIPPED | OUTPATIENT
Start: 2020-01-01 | End: 2020-01-01

## 2020-01-01 RX ORDER — POLYETHYLENE GLYCOL 3350 17 G/17G
17 POWDER, FOR SOLUTION ORAL DAILY PRN
Status: DISCONTINUED | OUTPATIENT
Start: 2020-01-01 | End: 2020-01-01 | Stop reason: HOSPADM

## 2020-01-01 RX ORDER — OXYCODONE HYDROCHLORIDE 5 MG/1
5 TABLET ORAL EVERY 4 HOURS PRN
Status: DISCONTINUED | OUTPATIENT
Start: 2020-01-01 | End: 2020-01-01 | Stop reason: HOSPADM

## 2020-01-01 RX ORDER — FENTANYL 12.5 UG/1
2 PATCH TRANSDERMAL
Qty: 10 PATCH | Refills: 0 | COMMUNITY
Start: 2020-01-01

## 2020-01-01 RX ORDER — OMEPRAZOLE 40 MG/1
40 CAPSULE, DELAYED RELEASE ORAL DAILY PRN
COMMUNITY
Start: 2020-01-01

## 2020-01-01 RX ORDER — CEFTRIAXONE 1 G/1
1 INJECTION, POWDER, FOR SOLUTION INTRAMUSCULAR; INTRAVENOUS ONCE
Status: COMPLETED | OUTPATIENT
Start: 2020-01-01 | End: 2020-01-01

## 2020-01-01 RX ORDER — FENTANYL 12.5 UG/1
1 PATCH TRANSDERMAL
Qty: 10 PATCH | Refills: 0 | Status: SHIPPED | OUTPATIENT
Start: 2020-01-01 | End: 2020-01-01

## 2020-01-01 RX ORDER — ALBUTEROL SULFATE 90 UG/1
2 AEROSOL, METERED RESPIRATORY (INHALATION) 4 TIMES DAILY PRN
Status: DISCONTINUED | OUTPATIENT
Start: 2020-01-01 | End: 2020-01-01 | Stop reason: HOSPADM

## 2020-01-01 RX ORDER — IBUPROFEN 200 MG
400 TABLET ORAL DAILY
COMMUNITY
End: 2020-01-01

## 2020-01-01 RX ORDER — FAMOTIDINE 10 MG
10 TABLET ORAL 2 TIMES DAILY PRN
Status: DISCONTINUED | OUTPATIENT
Start: 2020-01-01 | End: 2020-01-01 | Stop reason: HOSPADM

## 2020-01-01 RX ORDER — PROCHLORPERAZINE MALEATE 10 MG
10 TABLET ORAL EVERY 6 HOURS PRN
Qty: 30 TABLET | Refills: 2 | Status: SHIPPED | OUTPATIENT
Start: 2020-01-01 | End: 2020-01-01

## 2020-01-01 RX ORDER — CLINDAMYCIN PHOSPHATE 900 MG/50ML
900 INJECTION, SOLUTION INTRAVENOUS
Status: COMPLETED | OUTPATIENT
Start: 2020-01-01 | End: 2020-01-01

## 2020-01-01 RX ORDER — DIPHENHYDRAMINE HCL 25 MG
25 CAPSULE ORAL ONCE
Status: DISCONTINUED | OUTPATIENT
Start: 2020-01-01 | End: 2020-01-01 | Stop reason: HOSPADM

## 2020-01-01 RX ORDER — POLYETHYLENE GLYCOL 3350 17 G/17G
17 POWDER, FOR SOLUTION ORAL DAILY
Status: DISCONTINUED | OUTPATIENT
Start: 2020-01-01 | End: 2020-01-01 | Stop reason: HOSPADM

## 2020-01-01 RX ORDER — POTASSIUM CHLORIDE 29.8 MG/ML
20 INJECTION INTRAVENOUS
Status: DISCONTINUED | OUTPATIENT
Start: 2020-01-01 | End: 2020-01-01 | Stop reason: HOSPADM

## 2020-01-01 RX ORDER — ONDANSETRON 8 MG/1
8 TABLET, FILM COATED ORAL ONCE
Status: CANCELLED | OUTPATIENT
Start: 2020-01-01

## 2020-01-01 RX ORDER — ONDANSETRON 4 MG/1
4 TABLET, ORALLY DISINTEGRATING ORAL EVERY 6 HOURS PRN
Qty: 120 TABLET | Refills: 0 | Status: SHIPPED | OUTPATIENT
Start: 2020-01-01

## 2020-01-01 RX ORDER — PROCHLORPERAZINE MALEATE 10 MG
10 TABLET ORAL EVERY 6 HOURS PRN
Qty: 30 TABLET | Refills: 2 | Status: CANCELLED | OUTPATIENT
Start: 2020-01-01

## 2020-01-01 RX ORDER — HEPARIN SODIUM (PORCINE) LOCK FLUSH IV SOLN 100 UNIT/ML 100 UNIT/ML
500 SOLUTION INTRAVENOUS ONCE
Status: COMPLETED | OUTPATIENT
Start: 2020-01-01 | End: 2020-01-01

## 2020-01-01 RX ORDER — ACETAMINOPHEN 325 MG/1
650 TABLET ORAL EVERY 6 HOURS PRN
Status: DISCONTINUED | OUTPATIENT
Start: 2020-01-01 | End: 2020-01-01 | Stop reason: HOSPADM

## 2020-01-01 RX ORDER — AMLODIPINE BESYLATE 5 MG/1
5 TABLET ORAL DAILY
Qty: 30 TABLET | Refills: 0 | Status: ON HOLD | OUTPATIENT
Start: 2020-01-01 | End: 2020-01-01

## 2020-01-01 RX ORDER — ALBUTEROL SULFATE 90 UG/1
2 AEROSOL, METERED RESPIRATORY (INHALATION) EVERY 6 HOURS
COMMUNITY
End: 2020-01-01

## 2020-01-01 RX ORDER — ACETAMINOPHEN 325 MG/1
650 TABLET ORAL EVERY 4 HOURS PRN
Status: DISCONTINUED | OUTPATIENT
Start: 2020-01-01 | End: 2020-01-01

## 2020-01-01 RX ORDER — SENNOSIDES 8.6 MG
1-2 TABLET ORAL 2 TIMES DAILY
Status: DISCONTINUED | OUTPATIENT
Start: 2020-01-01 | End: 2020-01-01 | Stop reason: HOSPADM

## 2020-01-01 RX ORDER — PROCHLORPERAZINE MALEATE 10 MG
10 TABLET ORAL EVERY 6 HOURS PRN
Status: ON HOLD | COMMUNITY
End: 2020-01-01

## 2020-01-01 RX ORDER — POTASSIUM CHLORIDE 750 MG/1
20-40 TABLET, EXTENDED RELEASE ORAL
Status: DISCONTINUED | OUTPATIENT
Start: 2020-01-01 | End: 2020-01-01 | Stop reason: HOSPADM

## 2020-01-01 RX ORDER — POTASSIUM CL/LIDO/0.9 % NACL 10MEQ/0.1L
10 INTRAVENOUS SOLUTION, PIGGYBACK (ML) INTRAVENOUS
Status: DISCONTINUED | OUTPATIENT
Start: 2020-01-01 | End: 2020-01-01 | Stop reason: HOSPADM

## 2020-01-01 RX ORDER — POTASSIUM CHLORIDE 1.5 G/1.58G
20-40 POWDER, FOR SOLUTION ORAL
Status: DISCONTINUED | OUTPATIENT
Start: 2020-01-01 | End: 2020-01-01 | Stop reason: HOSPADM

## 2020-01-01 RX ORDER — LIDOCAINE HYDROCHLORIDE 10 MG/ML
20 INJECTION, SOLUTION EPIDURAL; INFILTRATION; INTRACAUDAL; PERINEURAL ONCE
Status: CANCELLED | OUTPATIENT
Start: 2020-01-01

## 2020-01-01 RX ORDER — ALBUTEROL SULFATE 90 UG/1
2 AEROSOL, METERED RESPIRATORY (INHALATION) 4 TIMES DAILY
Status: DISCONTINUED | OUTPATIENT
Start: 2020-01-01 | End: 2020-01-01 | Stop reason: HOSPADM

## 2020-01-01 RX ORDER — OXYCODONE HYDROCHLORIDE 5 MG/1
5 TABLET ORAL EVERY 8 HOURS
Status: DISCONTINUED | OUTPATIENT
Start: 2020-01-01 | End: 2020-01-01 | Stop reason: HOSPADM

## 2020-01-01 RX ORDER — OXYCODONE HCL 10 MG/1
10 TABLET, FILM COATED, EXTENDED RELEASE ORAL EVERY 12 HOURS
Qty: 60 TABLET | Refills: 0 | Status: SHIPPED | OUTPATIENT
Start: 2020-01-01 | End: 2020-01-01 | Stop reason: ALTCHOICE

## 2020-01-01 RX ADMIN — IOPAMIDOL 135 ML: 755 INJECTION, SOLUTION INTRAVASCULAR at 08:34

## 2020-01-01 RX ADMIN — ACETAMINOPHEN 650 MG: 325 TABLET, FILM COATED ORAL at 08:10

## 2020-01-01 RX ADMIN — BEVACIZUMAB 1900 MG: 400 INJECTION, SOLUTION INTRAVENOUS at 19:30

## 2020-01-01 RX ADMIN — ALBUTEROL SULFATE 2 PUFF: 90 AEROSOL, METERED RESPIRATORY (INHALATION) at 20:41

## 2020-01-01 RX ADMIN — PIPERACILLIN SODIUM AND TAZOBACTAM SODIUM 4.5 G: 4; .5 INJECTION, POWDER, LYOPHILIZED, FOR SOLUTION INTRAVENOUS at 10:12

## 2020-01-01 RX ADMIN — DIPHENHYDRAMINE HYDROCHLORIDE 50 MG: 50 CAPSULE ORAL at 00:28

## 2020-01-01 RX ADMIN — SODIUM CHLORIDE 250 ML: 9 INJECTION, SOLUTION INTRAVENOUS at 12:26

## 2020-01-01 RX ADMIN — TRIAMTERENE AND HYDROCHLOROTHIAZIDE 1 TABLET: 37.5; 25 TABLET ORAL at 13:29

## 2020-01-01 RX ADMIN — DEXAMETHASONE SODIUM PHOSPHATE 20 MG: 10 INJECTION INTRAMUSCULAR; INTRAVENOUS at 15:00

## 2020-01-01 RX ADMIN — Medication 5 ML: at 10:38

## 2020-01-01 RX ADMIN — FENTANYL CITRATE 50 MCG: 50 INJECTION, SOLUTION INTRAMUSCULAR; INTRAVENOUS at 09:26

## 2020-01-01 RX ADMIN — Medication 5 ML: at 14:15

## 2020-01-01 RX ADMIN — DEXAMETHASONE SODIUM PHOSPHATE 20 MG: 4 INJECTION, SOLUTION INTRAMUSCULAR; INTRAVENOUS at 13:31

## 2020-01-01 RX ADMIN — METFORMIN HYDROCHLORIDE 500 MG: 500 TABLET, FILM COATED ORAL at 08:22

## 2020-01-01 RX ADMIN — Medication 5 ML: at 09:02

## 2020-01-01 RX ADMIN — AMLODIPINE BESYLATE 10 MG: 10 TABLET ORAL at 08:43

## 2020-01-01 RX ADMIN — SODIUM CHLORIDE, PRESERVATIVE FREE 500 UNITS: 5 INJECTION INTRAVENOUS at 08:32

## 2020-01-01 RX ADMIN — ACETAMINOPHEN 1000 MG: 500 TABLET, FILM COATED ORAL at 14:32

## 2020-01-01 RX ADMIN — ALBUTEROL SULFATE 2 PUFF: 90 AEROSOL, METERED RESPIRATORY (INHALATION) at 16:54

## 2020-01-01 RX ADMIN — OMEPRAZOLE 40 MG: 20 CAPSULE, DELAYED RELEASE ORAL at 17:51

## 2020-01-01 RX ADMIN — Medication 5 ML: at 13:59

## 2020-01-01 RX ADMIN — FAMOTIDINE 40 MG: 10 INJECTION INTRAVENOUS at 11:38

## 2020-01-01 RX ADMIN — PIPERACILLIN SODIUM AND TAZOBACTAM SODIUM 4.5 G: 4; .5 INJECTION, POWDER, LYOPHILIZED, FOR SOLUTION INTRAVENOUS at 03:58

## 2020-01-01 RX ADMIN — DEXAMETHASONE SODIUM PHOSPHATE 12 MG: 10 INJECTION, SOLUTION INTRAMUSCULAR; INTRAVENOUS at 11:46

## 2020-01-01 RX ADMIN — ALBUTEROL SULFATE 2 PUFF: 90 AEROSOL, METERED RESPIRATORY (INHALATION) at 21:08

## 2020-01-01 RX ADMIN — SODIUM CHLORIDE 250 ML: 9 INJECTION, SOLUTION INTRAVENOUS at 15:11

## 2020-01-01 RX ADMIN — FAMOTIDINE 40 MG: 10 INJECTION INTRAVENOUS at 09:20

## 2020-01-01 RX ADMIN — CARBOPLATIN 620 MG: 10 INJECTION, SOLUTION INTRAVENOUS at 00:42

## 2020-01-01 RX ADMIN — SODIUM CHLORIDE 150 MG: 9 INJECTION, SOLUTION INTRAVENOUS at 14:55

## 2020-01-01 RX ADMIN — POTASSIUM CHLORIDE 20 MEQ: 29.8 INJECTION, SOLUTION INTRAVENOUS at 08:01

## 2020-01-01 RX ADMIN — DIPHENHYDRAMINE HYDROCHLORIDE 25 MG: 25 CAPSULE ORAL at 10:03

## 2020-01-01 RX ADMIN — OMEPRAZOLE 40 MG: 20 CAPSULE, DELAYED RELEASE ORAL at 22:10

## 2020-01-01 RX ADMIN — OXYCODONE HYDROCHLORIDE 5 MG: 5 TABLET ORAL at 16:54

## 2020-01-01 RX ADMIN — OXYCODONE HYDROCHLORIDE 5 MG: 5 TABLET ORAL at 21:07

## 2020-01-01 RX ADMIN — SODIUM CHLORIDE 250 ML: 9 INJECTION, SOLUTION INTRAVENOUS at 10:02

## 2020-01-01 RX ADMIN — BEVACIZUMAB-AWWB 1700 MG: 400 INJECTION, SOLUTION INTRAVENOUS at 10:24

## 2020-01-01 RX ADMIN — FAMOTIDINE 40 MG: 10 INJECTION INTRAVENOUS at 15:14

## 2020-01-01 RX ADMIN — ACETAMINOPHEN 1000 MG: 500 TABLET, FILM COATED ORAL at 22:38

## 2020-01-01 RX ADMIN — MIDAZOLAM HYDROCHLORIDE 0.5 MG: 1 INJECTION, SOLUTION INTRAMUSCULAR; INTRAVENOUS at 09:57

## 2020-01-01 RX ADMIN — SODIUM CHLORIDE 250 ML: 9 INJECTION, SOLUTION INTRAVENOUS at 09:09

## 2020-01-01 RX ADMIN — PACLITAXEL 180 MG: 6 INJECTION, SOLUTION INTRAVENOUS at 11:50

## 2020-01-01 RX ADMIN — SODIUM CHLORIDE 250 ML: 9 INJECTION, SOLUTION INTRAVENOUS at 09:19

## 2020-01-01 RX ADMIN — ALBUTEROL SULFATE 2 PUFF: 90 AEROSOL, METERED RESPIRATORY (INHALATION) at 22:10

## 2020-01-01 RX ADMIN — ACETAMINOPHEN 1000 MG: 500 TABLET, FILM COATED ORAL at 08:00

## 2020-01-01 RX ADMIN — PACLITAXEL 417 MG: 6 INJECTION, SOLUTION INTRAVENOUS at 16:18

## 2020-01-01 RX ADMIN — DIPHENHYDRAMINE HYDROCHLORIDE 25 MG: 25 CAPSULE ORAL at 15:13

## 2020-01-01 RX ADMIN — LORATADINE 10 MG: 10 TABLET ORAL at 08:44

## 2020-01-01 RX ADMIN — Medication 5 ML: at 13:38

## 2020-01-01 RX ADMIN — AZITHROMYCIN MONOHYDRATE 500 MG: 500 INJECTION, POWDER, LYOPHILIZED, FOR SOLUTION INTRAVENOUS at 16:00

## 2020-01-01 RX ADMIN — PACLITAXEL 180 MG: 6 INJECTION, SOLUTION INTRAVENOUS at 10:46

## 2020-01-01 RX ADMIN — IOPAMIDOL 73 ML: 755 INJECTION, SOLUTION INTRAVENOUS at 13:16

## 2020-01-01 RX ADMIN — Medication 5 ML: at 11:58

## 2020-01-01 RX ADMIN — MAGNESIUM OXIDE 400 MG: 400 TABLET ORAL at 08:44

## 2020-01-01 RX ADMIN — SODIUM CHLORIDE 250 ML: 9 INJECTION, SOLUTION INTRAVENOUS at 08:57

## 2020-01-01 RX ADMIN — FENTANYL CITRATE 25 MCG: 50 INJECTION, SOLUTION INTRAMUSCULAR; INTRAVENOUS at 10:11

## 2020-01-01 RX ADMIN — ONDANSETRON HYDROCHLORIDE 8 MG: 8 TABLET, FILM COATED ORAL at 15:00

## 2020-01-01 RX ADMIN — Medication 5 ML: at 10:55

## 2020-01-01 RX ADMIN — HEPARIN SODIUM (PORCINE) LOCK FLUSH IV SOLN 100 UNIT/ML 500 UNITS: 100 SOLUTION at 15:58

## 2020-01-01 RX ADMIN — Medication 5 ML: at 15:22

## 2020-01-01 RX ADMIN — ACETAMINOPHEN 1000 MG: 500 TABLET, FILM COATED ORAL at 08:37

## 2020-01-01 RX ADMIN — DIPHENHYDRAMINE HYDROCHLORIDE 25 MG: 25 CAPSULE ORAL at 14:56

## 2020-01-01 RX ADMIN — Medication 5 ML: at 10:58

## 2020-01-01 RX ADMIN — FAMOTIDINE 40 MG: 10 INJECTION, SOLUTION INTRAVENOUS at 13:30

## 2020-01-01 RX ADMIN — DIPHENHYDRAMINE HYDROCHLORIDE 25 MG: 25 CAPSULE ORAL at 08:56

## 2020-01-01 RX ADMIN — Medication 50 MG: at 08:47

## 2020-01-01 RX ADMIN — OXYCODONE HYDROCHLORIDE 5 MG: 5 TABLET ORAL at 05:11

## 2020-01-01 RX ADMIN — ENOXAPARIN SODIUM 40 MG: 40 INJECTION SUBCUTANEOUS at 20:41

## 2020-01-01 RX ADMIN — FAMOTIDINE 40 MG: 10 INJECTION INTRAVENOUS at 09:07

## 2020-01-01 RX ADMIN — BEVACIZUMAB-AWWB 1700 MG: 400 INJECTION, SOLUTION INTRAVENOUS at 11:26

## 2020-01-01 RX ADMIN — IOPAMIDOL 135 ML: 755 INJECTION, SOLUTION INTRAVASCULAR at 11:32

## 2020-01-01 RX ADMIN — Medication 5 ML: at 10:57

## 2020-01-01 RX ADMIN — INSULIN ASPART 1 UNITS: 100 INJECTION, SOLUTION INTRAVENOUS; SUBCUTANEOUS at 08:41

## 2020-01-01 RX ADMIN — Medication 5 ML: at 11:19

## 2020-01-01 RX ADMIN — Medication 5 ML: at 08:57

## 2020-01-01 RX ADMIN — BEVACIZUMAB-AWWB 1700 MG: 400 INJECTION, SOLUTION INTRAVENOUS at 16:31

## 2020-01-01 RX ADMIN — SODIUM CHLORIDE 250 ML: 9 INJECTION, SOLUTION INTRAVENOUS at 09:07

## 2020-01-01 RX ADMIN — SODIUM CHLORIDE 250 ML: 9 INJECTION, SOLUTION INTRAVENOUS at 14:56

## 2020-01-01 RX ADMIN — Medication 5 ML: at 13:19

## 2020-01-01 RX ADMIN — ACETAMINOPHEN 325 MG: 325 TABLET, FILM COATED ORAL at 16:10

## 2020-01-01 RX ADMIN — POTASSIUM CHLORIDE 20 MEQ: 29.8 INJECTION, SOLUTION INTRAVENOUS at 09:13

## 2020-01-01 RX ADMIN — Medication 5 ML: at 08:24

## 2020-01-01 RX ADMIN — ONDANSETRON HYDROCHLORIDE 8 MG: 8 TABLET, FILM COATED ORAL at 14:55

## 2020-01-01 RX ADMIN — DEXAMETHASONE SODIUM PHOSPHATE 20 MG: 10 INJECTION, SOLUTION INTRAMUSCULAR; INTRAVENOUS at 14:08

## 2020-01-01 RX ADMIN — MAGNESIUM SULFATE HEPTAHYDRATE 4 G: 40 INJECTION, SOLUTION INTRAVENOUS at 11:05

## 2020-01-01 RX ADMIN — DIPHENHYDRAMINE HYDROCHLORIDE 25 MG: 25 CAPSULE ORAL at 09:07

## 2020-01-01 RX ADMIN — PACLITAXEL 180 MG: 6 INJECTION, SOLUTION INTRAVENOUS at 09:56

## 2020-01-01 RX ADMIN — OXYCODONE HYDROCHLORIDE 5 MG: 5 TABLET ORAL at 14:32

## 2020-01-01 RX ADMIN — METFORMIN HYDROCHLORIDE 500 MG: 500 TABLET, FILM COATED ORAL at 08:44

## 2020-01-01 RX ADMIN — CARBOPLATIN 8 MG: 10 INJECTION, SOLUTION INTRAVENOUS at 21:45

## 2020-01-01 RX ADMIN — DIPHENHYDRAMINE HYDROCHLORIDE 25 MG: 25 CAPSULE ORAL at 09:20

## 2020-01-01 RX ADMIN — DIPHENHYDRAMINE HYDROCHLORIDE 25 MG: 25 CAPSULE ORAL at 12:31

## 2020-01-01 RX ADMIN — Medication 5 ML: at 12:55

## 2020-01-01 RX ADMIN — LORATADINE 10 MG: 10 TABLET ORAL at 10:08

## 2020-01-01 RX ADMIN — Medication 5 ML: at 07:51

## 2020-01-01 RX ADMIN — LORATADINE 10 MG: 10 TABLET ORAL at 08:22

## 2020-01-01 RX ADMIN — MAGNESIUM SULFATE HEPTAHYDRATE 2 G: 40 INJECTION, SOLUTION INTRAVENOUS at 09:42

## 2020-01-01 RX ADMIN — MAGNESIUM SULFATE 2 G: 2 INJECTION INTRAVENOUS at 14:19

## 2020-01-01 RX ADMIN — Medication 5 ML: at 13:14

## 2020-01-01 RX ADMIN — Medication 5 ML: at 09:29

## 2020-01-01 RX ADMIN — CARBOPLATIN 510 MG: 10 INJECTION, SOLUTION INTRAVENOUS at 01:04

## 2020-01-01 RX ADMIN — CARBOPLATIN 760 MG: 10 INJECTION, SOLUTION INTRAVENOUS at 01:54

## 2020-01-01 RX ADMIN — METFORMIN HYDROCHLORIDE 500 MG: 500 TABLET, FILM COATED ORAL at 07:48

## 2020-01-01 RX ADMIN — DEXAMETHASONE 8 MG: 4 TABLET ORAL at 08:43

## 2020-01-01 RX ADMIN — Medication 5 ML: at 12:44

## 2020-01-01 RX ADMIN — PACLITAXEL 417 MG: 6 INJECTION, SOLUTION INTRAVENOUS at 15:45

## 2020-01-01 RX ADMIN — PACLITAXEL 420 MG: 6 INJECTION, SOLUTION INTRAVENOUS at 14:31

## 2020-01-01 RX ADMIN — LORATADINE 10 MG: 10 TABLET ORAL at 09:32

## 2020-01-01 RX ADMIN — DIPHENHYDRAMINE HYDROCHLORIDE 50 MG: 50 CAPSULE ORAL at 14:55

## 2020-01-01 RX ADMIN — ACETAMINOPHEN 650 MG: 325 TABLET, FILM COATED ORAL at 16:54

## 2020-01-01 RX ADMIN — HEPARIN SODIUM (PORCINE) LOCK FLUSH IV SOLN 100 UNIT/ML 5 ML: 100 SOLUTION at 11:05

## 2020-01-01 RX ADMIN — DEXAMETHASONE SODIUM PHOSPHATE 12 MG: 10 INJECTION, SOLUTION INTRAMUSCULAR; INTRAVENOUS at 09:40

## 2020-01-01 RX ADMIN — DEXAMETHASONE SODIUM PHOSPHATE 12 MG: 10 INJECTION, SOLUTION INTRAMUSCULAR; INTRAVENOUS at 11:28

## 2020-01-01 RX ADMIN — FAMOTIDINE 40 MG: 10 INJECTION, SOLUTION INTRAVENOUS at 15:32

## 2020-01-01 RX ADMIN — Medication 5 ML: at 10:28

## 2020-01-01 RX ADMIN — Medication 5 ML: at 09:31

## 2020-01-01 RX ADMIN — Medication 5 ML: at 10:51

## 2020-01-01 RX ADMIN — Medication 5 ML: at 20:10

## 2020-01-01 RX ADMIN — POTASSIUM CHLORIDE 40 MEQ: 1.5 POWDER, FOR SOLUTION ORAL at 12:38

## 2020-01-01 RX ADMIN — Medication 5 ML: at 10:29

## 2020-01-01 RX ADMIN — IOPAMIDOL 135 ML: 755 INJECTION, SOLUTION INTRAVASCULAR at 11:01

## 2020-01-01 RX ADMIN — ALTEPLASE 2 MG: 2.2 INJECTION, POWDER, LYOPHILIZED, FOR SOLUTION INTRAVENOUS at 07:43

## 2020-01-01 RX ADMIN — Medication 5 ML: at 08:14

## 2020-01-01 RX ADMIN — ACETAMINOPHEN 650 MG: 325 TABLET, FILM COATED ORAL at 00:46

## 2020-01-01 RX ADMIN — DEXAMETHASONE 8 MG: 4 TABLET ORAL at 07:47

## 2020-01-01 RX ADMIN — MAGNESIUM SULFATE IN WATER 2 G: 40 INJECTION, SOLUTION INTRAVENOUS at 12:21

## 2020-01-01 RX ADMIN — PACLITAXEL 180 MG: 6 INJECTION, SOLUTION INTRAVENOUS at 15:39

## 2020-01-01 RX ADMIN — DIPHENHYDRAMINE HYDROCHLORIDE 25 MG: 25 CAPSULE ORAL at 09:10

## 2020-01-01 RX ADMIN — FAMOTIDINE 40 MG: 10 INJECTION INTRAVENOUS at 15:09

## 2020-01-01 RX ADMIN — FAMOTIDINE 40 MG: 10 INJECTION INTRAVENOUS at 09:02

## 2020-01-01 RX ADMIN — Medication 5 ML: at 18:11

## 2020-01-01 RX ADMIN — Medication 5 ML: at 11:35

## 2020-01-01 RX ADMIN — Medication 5 ML: at 07:05

## 2020-01-01 RX ADMIN — ENOXAPARIN SODIUM 40 MG: 40 INJECTION SUBCUTANEOUS at 21:08

## 2020-01-01 RX ADMIN — ALTEPLASE 2 MG: 2.2 INJECTION, POWDER, LYOPHILIZED, FOR SOLUTION INTRAVENOUS at 14:09

## 2020-01-01 RX ADMIN — BEVACIZUMAB 1900 MG: 400 INJECTION, SOLUTION INTRAVENOUS at 12:22

## 2020-01-01 RX ADMIN — Medication 5 ML: at 10:52

## 2020-01-01 RX ADMIN — SODIUM CHLORIDE 150 MG: 9 INJECTION, SOLUTION INTRAVENOUS at 15:00

## 2020-01-01 RX ADMIN — INSULIN ASPART 1 UNITS: 100 INJECTION, SOLUTION INTRAVENOUS; SUBCUTANEOUS at 12:04

## 2020-01-01 RX ADMIN — PACLITAXEL 180 MG: 6 INJECTION, SOLUTION INTRAVENOUS at 09:42

## 2020-01-01 RX ADMIN — DEXAMETHASONE SODIUM PHOSPHATE 12 MG: 10 INJECTION, SOLUTION INTRAMUSCULAR; INTRAVENOUS at 15:13

## 2020-01-01 RX ADMIN — Medication 5 ML: at 12:20

## 2020-01-01 RX ADMIN — ACETAMINOPHEN 650 MG: 325 TABLET, FILM COATED ORAL at 22:10

## 2020-01-01 RX ADMIN — SODIUM CHLORIDE 250 ML: 9 INJECTION, SOLUTION INTRAVENOUS at 11:36

## 2020-01-01 RX ADMIN — PACLITAXEL 180 MG: 6 INJECTION, SOLUTION INTRAVENOUS at 09:58

## 2020-01-01 RX ADMIN — DIPHENHYDRAMINE HYDROCHLORIDE 25 MG: 25 CAPSULE ORAL at 11:34

## 2020-01-01 RX ADMIN — HEPARIN SODIUM (PORCINE) LOCK FLUSH IV SOLN 100 UNIT/ML 5 ML: 100 SOLUTION at 11:45

## 2020-01-01 RX ADMIN — MIDAZOLAM HYDROCHLORIDE 1 MG: 1 INJECTION, SOLUTION INTRAMUSCULAR; INTRAVENOUS at 09:34

## 2020-01-01 RX ADMIN — MIDAZOLAM HYDROCHLORIDE 1 MG: 1 INJECTION, SOLUTION INTRAMUSCULAR; INTRAVENOUS at 09:43

## 2020-01-01 RX ADMIN — DEXAMETHASONE SODIUM PHOSPHATE 12 MG: 10 INJECTION, SOLUTION INTRAMUSCULAR; INTRAVENOUS at 10:08

## 2020-01-01 RX ADMIN — INSULIN ASPART 1 UNITS: 100 INJECTION, SOLUTION INTRAVENOUS; SUBCUTANEOUS at 16:22

## 2020-01-01 RX ADMIN — ALBUTEROL SULFATE 2 PUFF: 90 AEROSOL, METERED RESPIRATORY (INHALATION) at 08:37

## 2020-01-01 RX ADMIN — HEPARIN SODIUM (PORCINE) LOCK FLUSH IV SOLN 100 UNIT/ML 5 ML: 100 SOLUTION at 06:56

## 2020-01-01 RX ADMIN — Medication 50 MG: at 19:22

## 2020-01-01 RX ADMIN — Medication 5 ML: at 16:40

## 2020-01-01 RX ADMIN — POLYETHYLENE GLYCOL 3350 17 G: 17 POWDER, FOR SOLUTION ORAL at 08:38

## 2020-01-01 RX ADMIN — PACLITAXEL 180 MG: 6 INJECTION, SOLUTION INTRAVENOUS at 12:13

## 2020-01-01 RX ADMIN — AMLODIPINE BESYLATE 10 MG: 10 TABLET ORAL at 08:22

## 2020-01-01 RX ADMIN — IOPAMIDOL 135 ML: 755 INJECTION, SOLUTION INTRAVASCULAR at 11:14

## 2020-01-01 RX ADMIN — ALBUTEROL SULFATE 2 PUFF: 90 AEROSOL, METERED RESPIRATORY (INHALATION) at 12:37

## 2020-01-01 RX ADMIN — AMLODIPINE BESYLATE 10 MG: 10 TABLET ORAL at 07:48

## 2020-01-01 RX ADMIN — MAGNESIUM SULFATE IN WATER 2 G: 40 INJECTION, SOLUTION INTRAVENOUS at 15:36

## 2020-01-01 RX ADMIN — CARBOPLATIN 0.8 MG: 10 INJECTION, SOLUTION INTRAVENOUS at 19:56

## 2020-01-01 RX ADMIN — SODIUM CHLORIDE 150 MG: 9 INJECTION, SOLUTION INTRAVENOUS at 13:31

## 2020-01-01 RX ADMIN — HEPARIN SODIUM (PORCINE) LOCK FLUSH IV SOLN 100 UNIT/ML 5 ML: 100 SOLUTION at 11:20

## 2020-01-01 RX ADMIN — ENOXAPARIN SODIUM 40 MG: 40 INJECTION SUBCUTANEOUS at 21:03

## 2020-01-01 RX ADMIN — DIPHENHYDRAMINE HYDROCHLORIDE 50 MG: 50 CAPSULE ORAL at 17:23

## 2020-01-01 RX ADMIN — DEXAMETHASONE SODIUM PHOSPHATE 20 MG: 10 INJECTION INTRAMUSCULAR; INTRAVENOUS at 00:30

## 2020-01-01 RX ADMIN — ACETAMINOPHEN 1000 MG: 500 TABLET, FILM COATED ORAL at 20:41

## 2020-01-01 RX ADMIN — ACETAMINOPHEN 650 MG: 325 TABLET, FILM COATED ORAL at 03:58

## 2020-01-01 RX ADMIN — TRIAMTERENE AND HYDROCHLOROTHIAZIDE 1 TABLET: 37.5; 25 TABLET ORAL at 08:44

## 2020-01-01 RX ADMIN — DIPHENHYDRAMINE HYDROCHLORIDE 50 MG: 50 CAPSULE ORAL at 14:59

## 2020-01-01 RX ADMIN — Medication 5 ML: at 07:06

## 2020-01-01 RX ADMIN — DIPHENHYDRAMINE HYDROCHLORIDE 50 MG: 50 CAPSULE ORAL at 00:30

## 2020-01-01 RX ADMIN — CARBOPLATIN 5.7 MG: 10 INJECTION, SOLUTION INTRAVENOUS at 21:46

## 2020-01-01 RX ADMIN — Medication 500 UNITS: at 12:20

## 2020-01-01 RX ADMIN — OMEPRAZOLE 40 MG: 20 CAPSULE, DELAYED RELEASE ORAL at 08:44

## 2020-01-01 RX ADMIN — CEFTRIAXONE 1 G: 1 INJECTION, POWDER, FOR SOLUTION INTRAMUSCULAR; INTRAVENOUS at 15:37

## 2020-01-01 RX ADMIN — LORATADINE 10 MG: 10 TABLET ORAL at 07:48

## 2020-01-01 RX ADMIN — POTASSIUM CHLORIDE 40 MEQ: 1500 TABLET, EXTENDED RELEASE ORAL at 15:42

## 2020-01-01 RX ADMIN — FENTANYL CITRATE 50 MCG: 50 INJECTION, SOLUTION INTRAMUSCULAR; INTRAVENOUS at 09:43

## 2020-01-01 RX ADMIN — DEXAMETHASONE SODIUM PHOSPHATE 20 MG: 4 INJECTION, SOLUTION INTRAMUSCULAR; INTRAVENOUS at 00:50

## 2020-01-01 RX ADMIN — OXYCODONE HYDROCHLORIDE 5 MG: 5 TABLET ORAL at 12:08

## 2020-01-01 RX ADMIN — MIDAZOLAM HYDROCHLORIDE 0.5 MG: 1 INJECTION, SOLUTION INTRAMUSCULAR; INTRAVENOUS at 10:11

## 2020-01-01 RX ADMIN — OMEPRAZOLE 20 MG: 20 CAPSULE, DELAYED RELEASE ORAL at 08:22

## 2020-01-01 RX ADMIN — CARBOPLATIN 0.57 MG: 10 INJECTION, SOLUTION INTRAVENOUS at 20:04

## 2020-01-01 RX ADMIN — DEXAMETHASONE SODIUM PHOSPHATE 12 MG: 10 INJECTION, SOLUTION INTRAMUSCULAR; INTRAVENOUS at 09:18

## 2020-01-01 RX ADMIN — ALBUTEROL SULFATE 2 PUFF: 90 AEROSOL, METERED RESPIRATORY (INHALATION) at 12:09

## 2020-01-01 RX ADMIN — OMEPRAZOLE 40 MG: 20 CAPSULE, DELAYED RELEASE ORAL at 21:08

## 2020-01-01 RX ADMIN — DIPHENHYDRAMINE HYDROCHLORIDE 25 MG: 25 CAPSULE ORAL at 11:21

## 2020-01-01 RX ADMIN — POLYETHYLENE GLYCOL 3350 17 G: 17 POWDER, FOR SOLUTION ORAL at 08:00

## 2020-01-01 RX ADMIN — SODIUM CHLORIDE, PRESERVATIVE FREE 5 ML: 5 INJECTION INTRAVENOUS at 12:15

## 2020-01-01 RX ADMIN — Medication 5 ML: at 08:10

## 2020-01-01 RX ADMIN — Medication 5 ML: at 10:44

## 2020-01-01 RX ADMIN — BEVACIZUMAB 1900 MG: 400 INJECTION, SOLUTION INTRAVENOUS at 19:16

## 2020-01-01 RX ADMIN — CARBOPLATIN 57 MG: 10 INJECTION, SOLUTION INTRAVENOUS at 23:19

## 2020-01-01 RX ADMIN — DIPHENHYDRAMINE HYDROCHLORIDE 50 MG: 50 CAPSULE ORAL at 18:12

## 2020-01-01 RX ADMIN — SODIUM CHLORIDE 250 ML: 9 INJECTION, SOLUTION INTRAVENOUS at 11:18

## 2020-01-01 RX ADMIN — MAGNESIUM SULFATE HEPTAHYDRATE 4 G: 40 INJECTION, SOLUTION INTRAVENOUS at 14:27

## 2020-01-01 RX ADMIN — CARBOPLATIN 0.7 MG: 10 INJECTION, SOLUTION INTRAVENOUS at 18:59

## 2020-01-01 RX ADMIN — CARBOPLATIN 70 MG: 10 INJECTION, SOLUTION INTRAVENOUS at 22:24

## 2020-01-01 RX ADMIN — Medication 5 ML: at 11:01

## 2020-01-01 RX ADMIN — ONDANSETRON HYDROCHLORIDE 8 MG: 8 TABLET, FILM COATED ORAL at 13:30

## 2020-01-01 RX ADMIN — SODIUM CHLORIDE 250 ML: 9 INJECTION, SOLUTION INTRAVENOUS at 09:12

## 2020-01-01 RX ADMIN — Medication 5 ML: at 17:02

## 2020-01-01 RX ADMIN — BEVACIZUMAB-AWWB 1700 MG: 400 INJECTION, SOLUTION INTRAVENOUS at 15:02

## 2020-01-01 RX ADMIN — Medication 5 ML: at 12:02

## 2020-01-01 RX ADMIN — DEXAMETHASONE 8 MG: 4 TABLET ORAL at 08:22

## 2020-01-01 RX ADMIN — PACLITAXEL 180 MG: 6 INJECTION, SOLUTION INTRAVENOUS at 15:45

## 2020-01-01 RX ADMIN — HEPARIN SODIUM (PORCINE) LOCK FLUSH IV SOLN 100 UNIT/ML 5 ML: 100 SOLUTION at 09:31

## 2020-01-01 RX ADMIN — LEVOFLOXACIN 750 MG: 750 TABLET, FILM COATED ORAL at 14:37

## 2020-01-01 RX ADMIN — CARBOPLATIN 80 MG: 10 INJECTION, SOLUTION INTRAVENOUS at 23:39

## 2020-01-01 RX ADMIN — Medication 5 ML: at 16:48

## 2020-01-01 RX ADMIN — FENTANYL CITRATE 50 MCG: 50 INJECTION, SOLUTION INTRAMUSCULAR; INTRAVENOUS at 09:34

## 2020-01-01 RX ADMIN — AMLODIPINE BESYLATE 5 MG: 5 TABLET ORAL at 18:02

## 2020-01-01 RX ADMIN — MAGNESIUM SULFATE 2 G: 2 INJECTION INTRAVENOUS at 09:12

## 2020-01-01 RX ADMIN — PIPERACILLIN SODIUM AND TAZOBACTAM SODIUM 4.5 G: 4; .5 INJECTION, POWDER, LYOPHILIZED, FOR SOLUTION INTRAVENOUS at 21:02

## 2020-01-01 RX ADMIN — TRIAMTERENE AND HYDROCHLOROTHIAZIDE 1 TABLET: 37.5; 25 TABLET ORAL at 07:48

## 2020-01-01 RX ADMIN — DEXAMETHASONE SODIUM PHOSPHATE 12 MG: 10 INJECTION, SOLUTION INTRAMUSCULAR; INTRAVENOUS at 15:23

## 2020-01-01 RX ADMIN — FAMOTIDINE 40 MG: 10 INJECTION, SOLUTION INTRAVENOUS at 14:08

## 2020-01-01 RX ADMIN — CARBOPLATIN 7 MG: 10 INJECTION, SOLUTION INTRAVENOUS at 20:36

## 2020-01-01 RX ADMIN — ALBUTEROL SULFATE 2 PUFF: 90 AEROSOL, METERED RESPIRATORY (INHALATION) at 16:53

## 2020-01-01 RX ADMIN — LEVOFLOXACIN 750 MG: 750 TABLET, FILM COATED ORAL at 16:54

## 2020-01-01 RX ADMIN — DEXAMETHASONE SODIUM PHOSPHATE 12 MG: 10 INJECTION, SOLUTION INTRAMUSCULAR; INTRAVENOUS at 09:08

## 2020-01-01 RX ADMIN — FAMOTIDINE 40 MG: 10 INJECTION, SOLUTION INTRAVENOUS at 09:10

## 2020-01-01 RX ADMIN — ALBUTEROL SULFATE 2 PUFF: 90 AEROSOL, METERED RESPIRATORY (INHALATION) at 08:10

## 2020-01-01 RX ADMIN — BEVACIZUMAB-AWWB 1700 MG: 400 INJECTION, SOLUTION INTRAVENOUS at 09:07

## 2020-01-01 RX ADMIN — LIDOCAINE HYDROCHLORIDE 30 ML: 10 INJECTION, SOLUTION INFILTRATION; PERINEURAL at 10:05

## 2020-01-01 RX ADMIN — FAMOTIDINE 40 MG: 10 INJECTION INTRAVENOUS at 12:31

## 2020-01-01 RX ADMIN — MAGNESIUM SULFATE 2 G: 2 INJECTION INTRAVENOUS at 11:07

## 2020-01-01 RX ADMIN — FAMOTIDINE 40 MG: 10 INJECTION, SOLUTION INTRAVENOUS at 11:21

## 2020-01-01 RX ADMIN — SODIUM CHLORIDE 250 ML: 9 INJECTION, SOLUTION INTRAVENOUS at 11:20

## 2020-01-01 RX ADMIN — Medication 5 ML: at 07:19

## 2020-01-01 RX ADMIN — Medication 5 ML: at 12:22

## 2020-01-01 RX ADMIN — FAMOTIDINE 40 MG: 10 INJECTION INTRAVENOUS at 10:06

## 2020-01-01 RX ADMIN — OXYCODONE HYDROCHLORIDE 5 MG: 5 TABLET ORAL at 04:31

## 2020-01-01 RX ADMIN — DIPHENHYDRAMINE HYDROCHLORIDE 50 MG: 50 CAPSULE ORAL at 19:22

## 2020-01-01 RX ADMIN — ALBUTEROL SULFATE 2 PUFF: 90 AEROSOL, METERED RESPIRATORY (INHALATION) at 07:59

## 2020-01-01 RX ADMIN — ACETAMINOPHEN 650 MG: 325 TABLET, FILM COATED ORAL at 12:37

## 2020-01-01 RX ADMIN — OXYCODONE HYDROCHLORIDE 5 MG: 5 TABLET ORAL at 15:57

## 2020-01-01 RX ADMIN — POTASSIUM CHLORIDE 20 MEQ: 29.8 INJECTION, SOLUTION INTRAVENOUS at 15:47

## 2020-01-01 RX ADMIN — DEXAMETHASONE SODIUM PHOSPHATE 20 MG: 10 INJECTION, SOLUTION INTRAMUSCULAR; INTRAVENOUS at 23:49

## 2020-01-01 RX ADMIN — DIPHENHYDRAMINE HYDROCHLORIDE 50 MG: 50 CAPSULE ORAL at 13:30

## 2020-01-01 RX ADMIN — DEXAMETHASONE SODIUM PHOSPHATE 12 MG: 10 INJECTION, SOLUTION INTRAMUSCULAR; INTRAVENOUS at 09:25

## 2020-01-01 RX ADMIN — PACLITAXEL 180 MG: 6 INJECTION, SOLUTION INTRAVENOUS at 13:11

## 2020-01-01 RX ADMIN — Medication 5 ML: at 14:23

## 2020-01-01 RX ADMIN — OXYCODONE HYDROCHLORIDE 5 MG: 5 TABLET ORAL at 20:41

## 2020-01-01 RX ADMIN — DIPHENHYDRAMINE HYDROCHLORIDE 50 MG: 50 CAPSULE ORAL at 23:49

## 2020-01-01 RX ADMIN — IOPAMIDOL 135 ML: 755 INJECTION, SOLUTION INTRAVASCULAR at 15:54

## 2020-01-01 RX ADMIN — MAGNESIUM SULFATE IN WATER 2 G: 40 INJECTION, SOLUTION INTRAVENOUS at 14:15

## 2020-01-01 RX ADMIN — FENTANYL CITRATE 25 MCG: 50 INJECTION, SOLUTION INTRAMUSCULAR; INTRAVENOUS at 09:59

## 2020-01-01 RX ADMIN — MIDAZOLAM HYDROCHLORIDE 1 MG: 1 INJECTION, SOLUTION INTRAMUSCULAR; INTRAVENOUS at 09:25

## 2020-01-01 RX ADMIN — POTASSIUM CHLORIDE 20 MEQ: 29.8 INJECTION, SOLUTION INTRAVENOUS at 14:37

## 2020-01-01 RX ADMIN — MAGNESIUM SULFATE HEPTAHYDRATE 4 G: 40 INJECTION, SOLUTION INTRAVENOUS at 13:07

## 2020-01-01 RX ADMIN — CLINDAMYCIN IN 5 PERCENT DEXTROSE 900 MG: 18 INJECTION, SOLUTION INTRAVENOUS at 09:04

## 2020-01-01 RX ADMIN — Medication 5 ML: at 13:48

## 2020-01-01 RX ADMIN — DEXAMETHASONE SODIUM PHOSPHATE 12 MG: 10 INJECTION, SOLUTION INTRAMUSCULAR; INTRAVENOUS at 15:21

## 2020-01-01 RX ADMIN — Medication 5 ML: at 11:09

## 2020-01-01 RX ADMIN — Medication 5 ML: at 11:34

## 2020-01-01 RX ADMIN — POTASSIUM CHLORIDE 40 MEQ: 1500 TABLET, EXTENDED RELEASE ORAL at 09:41

## 2020-01-01 RX ADMIN — BEVACIZUMAB-AWWB 1700 MG: 400 INJECTION, SOLUTION INTRAVENOUS at 10:27

## 2020-01-01 RX ADMIN — TRIAMTERENE AND HYDROCHLOROTHIAZIDE 1 TABLET: 37.5; 25 TABLET ORAL at 08:22

## 2020-01-01 RX ADMIN — OMEPRAZOLE 40 MG: 20 CAPSULE, DELAYED RELEASE ORAL at 20:41

## 2020-01-01 RX ADMIN — Medication 5 ML: at 07:44

## 2020-01-01 RX ADMIN — DEXAMETHASONE SODIUM PHOSPHATE 12 MG: 10 INJECTION, SOLUTION INTRAMUSCULAR; INTRAVENOUS at 12:45

## 2020-01-01 RX ADMIN — Medication 5 ML: at 06:49

## 2020-01-01 RX ADMIN — PACLITAXEL 180 MG: 6 INJECTION, SOLUTION INTRAVENOUS at 09:47

## 2020-01-01 RX ADMIN — SODIUM CHLORIDE 250 ML: 9 INJECTION, SOLUTION INTRAVENOUS at 15:14

## 2020-01-01 ASSESSMENT — ENCOUNTER SYMPTOMS
HOARSE VOICE: 0
TROUBLE SWALLOWING: 0
LOSS OF CONSCIOUSNESS: 0
BLOOD IN STOOL: 0
DIZZINESS: 1
CONFUSION: 0
BACK PAIN: 0
SORE THROAT: 0
NERVOUS/ANXIOUS: 0
BLOOD IN STOOL: 0
TINGLING: 0
HYPOTENSION: 0
HYPOTENSION: 0
BREAST PAIN: 0
DIZZINESS: 0
SWOLLEN GLANDS: 0
DISTURBANCES IN COORDINATION: 0
INSOMNIA: 0
BLOATING: 0
SHORTNESS OF BREATH: 1
EYE REDNESS: 0
FEVER: 0
BRUISES/BLEEDS EASILY: 1
SMELL DISTURBANCE: 0
DIFFICULTY URINATING: 1
WOUND: 0
DEPRESSION: 1
HEMATURIA: 0
SINUS CONGESTION: 1
POSTURAL DYSPNEA: 0
LEG PAIN: 0
BACK PAIN: 0
ORTHOPNEA: 1
ACTIVITY CHANGE: 1
CHILLS: 1
NIGHT SWEATS: 0
SINUS PAIN: 1
WEIGHT GAIN: 0
BACK PAIN: 1
PARALYSIS: 0
LEG SWELLING: 0
BRUISES/BLEEDS EASILY: 0
VOMITING: 0
BLOOD IN STOOL: 0
BOWEL INCONTINENCE: 0
FATIGUE: 1
WEAKNESS: 1
POLYPHAGIA: 0
MUSCLE WEAKNESS: 1
HALLUCINATIONS: 0
SHORTNESS OF BREATH: 1
EYE REDNESS: 0
TREMORS: 1
DIARRHEA: 1
NUMBNESS: 0
NUMBNESS: 0
CHILLS: 0
TACHYCARDIA: 0
WHEEZING: 0
HEADACHES: 0
NAUSEA: 1
TROUBLE SWALLOWING: 0
POOR WOUND HEALING: 0
EYE PAIN: 0
BRUISES/BLEEDS EASILY: 1
RECTAL PAIN: 0
JOINT SWELLING: 0
EYE REDNESS: 0
SYNCOPE: 0
VOMITING: 0
DYSPNEA ON EXERTION: 1
ARTHRALGIAS: 0
ARTHRALGIAS: 0
NUMBNESS: 0
JOINT SWELLING: 0
DIZZINESS: 1
HEARTBURN: 0
DOUBLE VISION: 0
SPUTUM PRODUCTION: 1
HEADACHES: 1
HYPOTENSION: 0
DIZZINESS: 1
LOSS OF CONSCIOUSNESS: 0
HYPERTENSION: 0
FEVER: 0
TASTE DISTURBANCE: 1
SYNCOPE: 0
COUGH: 1
LIGHT-HEADEDNESS: 1
MEMORY LOSS: 1
RECTAL PAIN: 0
MEMORY LOSS: 0
BRUISES/BLEEDS EASILY: 1
WEIGHT LOSS: 1
BOWEL INCONTINENCE: 0
POLYDIPSIA: 1
ARTHRALGIAS: 0
WEAKNESS: 1
ORTHOPNEA: 1
PALPITATIONS: 0
HEADACHES: 0
DISTURBANCES IN COORDINATION: 0
JOINT SWELLING: 0
EYE WATERING: 1
SLEEP DISTURBANCES DUE TO BREATHING: 1
HEARTBURN: 1
RECTAL PAIN: 0
SPEECH CHANGE: 0
INCREASED ENERGY: 1
PARALYSIS: 0
SINUS CONGESTION: 0
TREMORS: 0
NECK PAIN: 0
LOSS OF CONSCIOUSNESS: 0
ABDOMINAL PAIN: 1
LIGHT-HEADEDNESS: 1
INSOMNIA: 0
DECREASED APPETITE: 1
SYNCOPE: 1
LEG PAIN: 0
INCREASED ENERGY: 1
FEVER: 0
SEIZURES: 0
JOINT SWELLING: 0
ALTERED TEMPERATURE REGULATION: 1
SORE THROAT: 0
TINGLING: 0
FATIGUE: 1
PALPITATIONS: 0
SPUTUM PRODUCTION: 0
BLOATING: 1
SNORES LOUDLY: 1
BREAST MASS: 0
POOR WOUND HEALING: 0
TREMORS: 1
FATIGUE: 1
CHILLS: 1
SINUS PAIN: 1
CONSTIPATION: 1
NAUSEA: 1
BLOATING: 1
VOMITING: 0
VOMITING: 1
BOWEL INCONTINENCE: 0
RECTAL PAIN: 0
ARTHRALGIAS: 1
TASTE DISTURBANCE: 0
CONSTIPATION: 1
CONSTIPATION: 1
ARTHRALGIAS: 0
FATIGUE: 1
HOARSE VOICE: 1
JAUNDICE: 0
EXERCISE INTOLERANCE: 0
EYE PAIN: 0
DECREASED CONCENTRATION: 0
HALLUCINATIONS: 0
ABDOMINAL PAIN: 0
SINUS PAIN: 1
MYALGIAS: 1
DECREASED APPETITE: 1
WEIGHT LOSS: 0
POLYPHAGIA: 0
NAUSEA: 1
NIGHT SWEATS: 0
EYE WATERING: 1
DYSURIA: 0
POLYPHAGIA: 0
EYE IRRITATION: 0
EYE REDNESS: 0
DECREASED CONCENTRATION: 0
HEARTBURN: 0
NIGHT SWEATS: 0
SLEEP DISTURBANCES DUE TO BREATHING: 0
LEG PAIN: 0
BREAST PAIN: 0
SINUS PAIN: 0
SKIN CHANGES: 0
STIFFNESS: 0
CONSTIPATION: 1
DIFFICULTY URINATING: 0
DECREASED CONCENTRATION: 0
HEMOPTYSIS: 0
NAUSEA: 1
NECK PAIN: 1
NECK PAIN: 0
SEIZURES: 0
COUGH DISTURBING SLEEP: 0
MUSCLE WEAKNESS: 1
DIARRHEA: 1
STIFFNESS: 0
MUSCLE CRAMPS: 0
NECK MASS: 0
SPEECH CHANGE: 0
NUMBNESS: 1
EYE WATERING: 0
MEMORY LOSS: 0
ALTERED TEMPERATURE REGULATION: 0
ALTERED TEMPERATURE REGULATION: 1
FEVER: 0
BOWEL INCONTINENCE: 0
COUGH: 0
NECK STIFFNESS: 0
DYSURIA: 0
COUGH DISTURBING SLEEP: 1
MEMORY LOSS: 1
TINGLING: 0
BLOATING: 0
DOUBLE VISION: 0
NECK MASS: 0
MYALGIAS: 1
JAUNDICE: 0
EYE PAIN: 0
COUGH: 1
POSTURAL DYSPNEA: 1
BOWEL INCONTINENCE: 0
DISTURBANCES IN COORDINATION: 0
CLAUDICATION: 0
ORTHOPNEA: 0
NECK MASS: 0
FEVER: 0
DIARRHEA: 1
BLOATING: 0
PARALYSIS: 0
JAUNDICE: 0
NECK MASS: 0
DYSPNEA ON EXERTION: 0
NAUSEA: 1
NAIL CHANGES: 1
MEMORY LOSS: 0
EXERCISE INTOLERANCE: 1
MUSCLE CRAMPS: 0
SORE THROAT: 0
BACK PAIN: 0
DYSPNEA ON EXERTION: 1
EYE IRRITATION: 1
POSTURAL DYSPNEA: 1
WEIGHT GAIN: 0
INCREASED ENERGY: 1
WHEEZING: 1
POOR WOUND HEALING: 0
CHILLS: 0
PANIC: 0
MUSCLE WEAKNESS: 1
LIGHT-HEADEDNESS: 0
ABDOMINAL PAIN: 1
SEIZURES: 0
MUSCLE CRAMPS: 0
POLYDIPSIA: 0
NIGHT SWEATS: 0
HYPOTENSION: 0
JAUNDICE: 0
EXERCISE INTOLERANCE: 1
HEARTBURN: 0
NECK PAIN: 1
TROUBLE SWALLOWING: 0
SWOLLEN GLANDS: 0
SLEEP DISTURBANCES DUE TO BREATHING: 0
HALLUCINATIONS: 0
FLANK PAIN: 0
BLOOD IN STOOL: 0
STIFFNESS: 0
RECTAL PAIN: 0
BRUISES/BLEEDS EASILY: 1
EYE PAIN: 0
LOSS OF CONSCIOUSNESS: 1
DOUBLE VISION: 0
CHILLS: 1
VOMITING: 0
CHILLS: 0
ORTHOPNEA: 0
TROUBLE SWALLOWING: 0
MYALGIAS: 1
PANIC: 0
HALLUCINATIONS: 0
SEIZURES: 0
DIARRHEA: 1
MYALGIAS: 1
INCREASED ENERGY: 0
TINGLING: 0
PALPITATIONS: 1
SWOLLEN GLANDS: 0
HALLUCINATIONS: 0
DYSPHORIC MOOD: 0
DISTURBANCES IN COORDINATION: 0
SPEECH CHANGE: 0
VOMITING: 0
ALTERED TEMPERATURE REGULATION: 1
HYPOTENSION: 1
SYNCOPE: 0
BOWEL INCONTINENCE: 0
ALTERED TEMPERATURE REGULATION: 1
SLEEP DISTURBANCES DUE TO BREATHING: 0
DEPRESSION: 0
SMELL DISTURBANCE: 0
NIGHT SWEATS: 0
DISTURBANCES IN COORDINATION: 0
TASTE DISTURBANCE: 1
DEPRESSION: 1
FATIGUE: 1
SORE THROAT: 0
SKIN CHANGES: 0
SPUTUM PRODUCTION: 0
WEIGHT LOSS: 0
HOARSE VOICE: 0
BREAST MASS: 0
DECREASED LIBIDO: 0
LEG PAIN: 0
DECREASED APPETITE: 1
BACK PAIN: 1
SYNCOPE: 0
HEARTBURN: 0
EYE REDNESS: 0
LOSS OF CONSCIOUSNESS: 0
SORE THROAT: 0
TROUBLE SWALLOWING: 0
SPEECH CHANGE: 0
VOMITING: 1
PALPITATIONS: 0
POOR WOUND HEALING: 0
NUMBNESS: 1
DIARRHEA: 1
APPETITE CHANGE: 0
HOT FLASHES: 0
DYSURIA: 0
EYE IRRITATION: 0
WEIGHT GAIN: 0
POLYDIPSIA: 0
DIZZINESS: 1
HOT FLASHES: 0
HYPERTENSION: 0
BLOOD IN STOOL: 0
WEAKNESS: 1
HYPERTENSION: 0
HEMATURIA: 0
SEIZURES: 0
SLEEP DISTURBANCES DUE TO BREATHING: 0
JOINT SWELLING: 1
PARALYSIS: 0
MYALGIAS: 1
MEMORY LOSS: 0
WEAKNESS: 1
SPEECH CHANGE: 0
LOSS OF CONSCIOUSNESS: 0
VOMITING: 1
SWOLLEN GLANDS: 0
TINGLING: 0
ALTERED TEMPERATURE REGULATION: 1
STIFFNESS: 0
LIGHT-HEADEDNESS: 1
HYPERTENSION: 1
DECREASED CONCENTRATION: 0
HEADACHES: 1
ABDOMINAL PAIN: 1
TASTE DISTURBANCE: 1
WEAKNESS: 1
RESPIRATORY PAIN: 0
EXERCISE INTOLERANCE: 1
DECREASED APPETITE: 1
TINGLING: 0
SKIN CHANGES: 0
TREMORS: 0
STIFFNESS: 0
CHILLS: 1
MUSCLE WEAKNESS: 1
WEAKNESS: 1
SPEECH CHANGE: 0
PARALYSIS: 0
TREMORS: 1
WEIGHT LOSS: 1
WEAKNESS: 1
POLYPHAGIA: 0
ORTHOPNEA: 0
COUGH: 1
HEARTBURN: 1
SMELL DISTURBANCE: 0
ABDOMINAL PAIN: 1
SMELL DISTURBANCE: 1
DOUBLE VISION: 0
SEIZURES: 0
COLOR CHANGE: 0
HOARSE VOICE: 1
BLOATING: 0
NAIL CHANGES: 1
CONSTIPATION: 1
BACK PAIN: 1
TASTE DISTURBANCE: 1
ABDOMINAL PAIN: 1
DIARRHEA: 0
MUSCLE WEAKNESS: 1
HEMOPTYSIS: 0
SINUS CONGESTION: 1
TASTE DISTURBANCE: 1
INSOMNIA: 1
EYE IRRITATION: 0
NERVOUS/ANXIOUS: 1
PALPITATIONS: 0
EYE PAIN: 0
TROUBLE SWALLOWING: 0
EYE WATERING: 1
MUSCLE CRAMPS: 0
WEIGHT GAIN: 0
SINUS CONGESTION: 1
SHORTNESS OF BREATH: 0
INCREASED ENERGY: 1
POLYDIPSIA: 1
SORE THROAT: 0
NAUSEA: 0
POOR WOUND HEALING: 0
MUSCLE CRAMPS: 1
CONSTIPATION: 1
EYE REDNESS: 0
HEADACHES: 0
SINUS CONGESTION: 1
RECTAL PAIN: 0
INCREASED ENERGY: 1
HEMOPTYSIS: 0
SMELL DISTURBANCE: 0
NECK MASS: 0
SMELL DISTURBANCE: 0
PARALYSIS: 0
JAUNDICE: 0
DECREASED LIBIDO: 0
POLYPHAGIA: 0
COUGH DISTURBING SLEEP: 1
NUMBNESS: 1
PANIC: 0
DISTURBANCES IN COORDINATION: 0
SINUS PAIN: 0
EYE PAIN: 0
HEADACHES: 1
EYE REDNESS: 0
SHORTNESS OF BREATH: 1
NECK PAIN: 1
FLANK PAIN: 0
TREMORS: 1
LEG PAIN: 0
EYE IRRITATION: 0
FATIGUE: 1
LIGHT-HEADEDNESS: 1
SKIN CHANGES: 0
SNORES LOUDLY: 0
BLOOD IN STOOL: 0
SWOLLEN GLANDS: 0
DIZZINESS: 1
JAUNDICE: 0
PALPITATIONS: 0
NAIL CHANGES: 0
DOUBLE VISION: 0
NAUSEA: 0
WHEEZING: 1
DIARRHEA: 1
HEADACHES: 1
ARTHRALGIAS: 1
HOARSE VOICE: 0
POLYDIPSIA: 0
ABDOMINAL PAIN: 1
DIFFICULTY URINATING: 0
EYE WATERING: 0
NAIL CHANGES: 0
EXERCISE INTOLERANCE: 1
EYE IRRITATION: 0
SORE THROAT: 0
HYPERTENSION: 0
NECK MASS: 1
NERVOUS/ANXIOUS: 1
SNORES LOUDLY: 1
DOUBLE VISION: 0
EYE WATERING: 1

## 2020-01-01 ASSESSMENT — PAIN SCALES - GENERAL
PAINLEVEL: NO PAIN (1)
PAINLEVEL: MILD PAIN (2)
PAINLEVEL: MILD PAIN (3)
PAINLEVEL: NO PAIN (0)
PAINLEVEL: MODERATE PAIN (4)
PAINLEVEL: NO PAIN (1)
PAINLEVEL: NO PAIN (0)
PAINLEVEL: SEVERE PAIN (6)
PAINLEVEL: MILD PAIN (2)
PAINLEVEL: MODERATE PAIN (4)
PAINLEVEL: NO PAIN (1)
PAINLEVEL: NO PAIN (0)
PAINLEVEL: MILD PAIN (3)
PAINLEVEL: NO PAIN (0)
PAINLEVEL: MILD PAIN (2)
PAINLEVEL: MILD PAIN (2)
PAINLEVEL: NO PAIN (0)
PAINLEVEL: MILD PAIN (3)
PAINLEVEL: MODERATE PAIN (5)
PAINLEVEL: NO PAIN (0)
PAINLEVEL: MODERATE PAIN (4)
PAINLEVEL: NO PAIN (0)
PAINLEVEL: MODERATE PAIN (5)
PAINLEVEL: NO PAIN (0)
PAINLEVEL: NO PAIN (0)
PAINLEVEL: MODERATE PAIN (5)
PAINLEVEL: MILD PAIN (2)
PAINLEVEL: NO PAIN (0)
PAINLEVEL: NO PAIN (0)
PAINLEVEL: SEVERE PAIN (6)

## 2020-01-01 ASSESSMENT — ACTIVITIES OF DAILY LIVING (ADL)
ADLS_ACUITY_SCORE: 15
ADLS_ACUITY_SCORE: 13
ADLS_ACUITY_SCORE: 15

## 2020-01-01 ASSESSMENT — MIFFLIN-ST. JEOR
SCORE: 1617.24
SCORE: 1688
SCORE: 1629.81
SCORE: 1697.54
SCORE: 1633.82
SCORE: 1638.25
SCORE: 1667.85
SCORE: 1703.44
SCORE: 1694.16
SCORE: 1607.04
SCORE: 1607.04

## 2020-01-01 ASSESSMENT — COLUMBIA-SUICIDE SEVERITY RATING SCALE - C-SSRS
1. IN THE PAST MONTH, HAVE YOU WISHED YOU WERE DEAD OR WISHED YOU COULD GO TO SLEEP AND NOT WAKE UP?: NO
2. HAVE YOU ACTUALLY HAD ANY THOUGHTS OF KILLING YOURSELF IN THE PAST MONTH?: NO
2. HAVE YOU ACTUALLY HAD ANY THOUGHTS OF KILLING YOURSELF IN THE PAST MONTH?: NO
1. IN THE PAST MONTH, HAVE YOU WISHED YOU WERE DEAD OR WISHED YOU COULD GO TO SLEEP AND NOT WAKE UP?: NO

## 2020-01-01 ASSESSMENT — PAIN DESCRIPTION - DESCRIPTORS
DESCRIPTORS: ACHING
DESCRIPTORS: ACHING;HEADACHE
DESCRIPTORS: HEADACHE

## 2020-01-21 NOTE — PROGRESS NOTES
Care Coordinator Note  Left message for patient to take pre chemo Dexamethasone at 2 am and 8 am prior to admission.       Lalitha Johns MSN, RN  Care Coordinator  Gynecologic Cancer   Office:  [phone]  Pager: [phone] #[phone]

## 2020-01-22 PROBLEM — C54.1 ENDOMETRIAL CANCER (H): Status: ACTIVE | Noted: 2018-01-17

## 2020-01-22 NOTE — H&P
[facility] History and Physical    Lu Smith MRN# [id number]   Age: 69 year old YOB: 1950     Date of Admission:  1/22/2020    Primary care provider: [facility]             Chief Complaint:   Cycle #4 Carboplatin desensitization, paclitaxel, bevacizumab  Recurrent stage IIIC serous endometrial cancer         History of Present Illness:   This patient is a 69 year old female with recurrent stage IIIC serous endometrial cancer who presents for C4 carboplatin desensitization, paclitaxel, and bevacizumab. History of a carboplatin reaction on 12/2/2019 with chest tightness, hoarseness, and difficulty breathing near the end of her carboplatin infusion. She received methylprednisolone; IVF Bolus; epinephrine x2, benadryl and recovered.     Today, reports overall feeling well though is feeling fatigued particularly after the holidays. She took decadron premedication as directed. Has had decreased appetite. Continues to have neuropathy in bilateral hands and feet. She has also noticed increased dyspnea with exertion. Denies fevers, chills, chest pain, palpitations, shortness of breath, abdominal pain, change in bowel habits, blood in stool, urinary symptoms, or other systemic complaints.          Cancer Treatment History:   Diagnosis: Stage IIIC2 serous endometrial cancer  Primary GYN oncologist: Dr. Kevin Henderson  Primary radiation oncologist: Dr. Ana Michelle    Surgery: 2/23/18: TLH-BSO, omentectomy, bilateral pelvic and para-aortic lymph node dissection, pelvic washings.  Chemotherapy:  3/30/18-9/28/18: Six cycles of carboplatin AUC 6 and paclitaxel 175mg/m2, delivered sandwich style with pelvic radiation between cycles three and four  Radiation: 6/4/18-7/12/18: EBRT delivered to pelvis and para-aortic nodes, 5040 cGy in 28 fractions  7/16/18-7/20/18: HDR brachytherapy delivered to the vaginal cuff, 1200 cGy in two fractions  Complications: Transient neuropathy, mild  thrombocytopenia and anemia  Genetic testing: Panel testing negative     9/16/19: Chest CT performed for pulmonary nodule follow up, concerning for recurrent disease  IMPRESSION:   1. Few bilateral sub-4 mm solid pulmonary nodules are unchanged compared to 2/6/2018. No new or enlarging pulmonary nodules.  2. Dilated main pulmonary artery, nonspecific but may be seen with pulmonary artery hypertension.  3. New 1.9 cm left subclavicular node.     9/28/19: PET impression: Progression of disease with new intensely FDG avid para-aortic, mediastinal, and subclavicular lymphadenopathy. The subdiaphragmatic periaortic lymphadenopathy spans from the diaphragmatic crura to the low abdominal aorta at the level of L4 with greater than 180 degree involvement of the aorta. There is additional lymphadenopathy within the left axilla and the right inguinal region.     9/28/19: Neck CT impression:  1. Progression of metastatic disease with new intensely FDG avid retroclavicular lymph node at the level of the left thoracic inlet.   2. On the fusion PET CT, there is no definite abnormal metabolic activity in the mucosal space, soft tissues, or cervical jamel chains.  3. No evidence of mucosal or soft tissue abnormality on contrast enhanced neck CT.  4. Please refer to the whole body PET CT performed as a separate report, for the findings of the remainder of the body.     10/24/19: Lymph node biopsy:  Lymph node, periaortic, CT guided core biopsy:   -METASTATIC ADENOCARCINOMA   -Tumor morphology is consistent with metastasis/recurrence of endometrial serous carcinoma      11/11/19: C1 paclitaxel 175mg/m2 + carboplatin AUC 6 + bevacizumab 15mg/kg  12/2/19: C2 paclitaxel 175mg/m2 + carboplatin AUC 6 + bevacizumab 15mg/kg. Carboplatin reaction.  12/27/19: Cycle 3 Paclitaxel/Avastin/inpatient Carboplatin desensitization.   1/22/20: Cycle 4 paclitaxel/bevacizumab/carboplatin desensitization         Past Medical History:     Past Medical  History:   Diagnosis Date     Diabetes (H)     Type II     Endometrial cancer determined by uterine biopsy (H) 2018     HTN (hypertension)      Obesity      Osteoarthritis             Past Surgical History:      Past Surgical History:   Procedure Laterality Date     CHOLECYSTECTOMY       CYSTOSCOPY N/A 2018    Procedure: CYSTOSCOPY;;  Surgeon: Kevin Henderson MD;  Location: [facility] OR     DAVINCI HYSTERECTOMY TOTAL, BILATERAL SALPINGO-OOPHORECTOMY, COMBINED N/A 2018    Procedure: COMBINED DAVINCI HYSTERECTOMY TOTAL, SALPINGO-OOPHORECTOMY;  DaVinci Assisted Total Laparoscopic Hysterectomy, Bilateral Salpingo-Oophorectomy, Cystoscopy,  Omental biopsy, omentectomy,bilateral  Pelvic And Para Aortic Lymph Node Dissection;  Surgeon: Kevin Henderson MD;  Location: [facility] OR     Partial lumbar discectomy              Social History:     Social History     Tobacco Use     Smoking status: Former Smoker     Packs/day: 0.25     Years: 20.00     Pack years: 5.00     Last attempt to quit: 1991     Years since quittin.7     Smokeless tobacco: Never Used     Tobacco comment: Quite smoking    Substance Use Topics     Alcohol use: Yes     Comment: 4-7/week if out 1-2x's/week            Family History:     Family History   Problem Relation Age of Onset     Colon Cancer Mother      Breast Cancer Sister      Lymphoma Sister             Immunizations:     Immunization History   Administered Date(s) Administered     DTaP, Unspecified 2011     Influenza (High Dose) 3 valent vaccine 2018, 10/30/2019     Pneumo Conj 13-V (2010&after) 12/15/2017            Allergies:     Allergies   Allergen Reactions     Carboplatin Anaphylaxis     Chest tightness, hoarse voice, difficulty breathing     Ace Inhibitors Cough     Amoxicillin Hives            Medications:     No current facility-administered medications on file prior to encounter.   ACETAMINOPHEN PO, Take 325 mg by mouth every 6 hours as needed  "for pain  amLODIPine (NORVASC) 5 MG tablet, Take 1 tablet (5 mg) by mouth daily  dexamethasone (DECADRON) 4 MG tablet, 20 mg, 12 and 6 hours before carboplatin doses. Start evening before chemotherapy, continue on morning of therapy.  dexamethasone (DECADRON) 4 MG tablet, Take 5 tablets (20 mg) by mouth See Admin Instructions for 2 doses  Famotidine (PEPCID PO), Take 10 mg by mouth as needed   fluticasone (FLONASE) 50 MCG/ACT spray, Spray 1-2 sprays into both nostrils daily  L-Glutamine 500 MG CAPS,   loperamide (IMODIUM) 2 MG capsule, Take 2 mg by mouth 4 times daily as needed for diarrhea  loratadine (CLARITIN) 10 MG tablet, Take 1 tablet (10 mg) by mouth daily  magnesium 500 MG TABS,   METFORMIN HCL PO, Take 500 mg by mouth daily (with breakfast)   prochlorperazine (COMPAZINE) 10 MG tablet, Take 0.5 tablets (5 mg) by mouth every 6 hours as needed (nausea/vomiting)  pyridoxine (VITAMIN B-6) 50 MG TABS, Take 1 tablet (50 mg) by mouth 2 times daily  triamterene-hydrochlorothiazide (DYAZIDE) 37.5-25 MG per capsule, Take 1 capsule by mouth every morning            Review of Systems:     CONSTITUTIONAL:  + fatigue, decrease appetite for a week after chemo, forgetfulness/chemo brain stable  RESPIRATORY:  SOB with exertion. No cough.  CARDIOVASCULAR:  No CP or heart palp.  GASTROINTESTINAL:  No N/V, abd pain. Occasional soft stools. Denies diarrhea or constipation.  GENITOURINARY:  No dysuria, hematuria, vaginal discharge/bleeding         Physical Exam:     Vitals:    01/22/20 0821 01/22/20 0928 01/22/20 0936   BP: (!) 161/77 137/61 138/77   BP Location: Right arm Left arm Left arm   Cuff Size:   Adult Large   Resp: 16     Temp: 96.7  F (35.9  C)     TempSrc: Oral     SpO2: 98%     Weight: 119.9 kg (264 lb 6.4 oz)     Height: 1.6 m (5' 3\")       General: Alert and oriented.   CV: HR regular  Resp: LS clear  Abdomen: Soft, NT  Extremities: No edema         Data:     Results for orders placed or performed in visit on " 01/22/20 (from the past 24 hour(s))   CBC with platelets differential   Result Value Ref Range    WBC 7.4 4.0 - 11.0 10e9/L    RBC Count 3.35 (L) 3.8 - 5.2 10e12/L    Hemoglobin 9.7 (L) 11.7 - 15.7 g/dL    Hematocrit 29.5 (L) 35.0 - 47.0 %    MCV 88 78 - 100 fl    MCH 29.0 26.5 - 33.0 pg    MCHC 32.9 31.5 - 36.5 g/dL    RDW 19.5 (H) 10.0 - 15.0 %    Platelet Count 175 150 - 450 10e9/L    Diff Method Automated Method     % Neutrophils 79.2 %    % Lymphocytes 15.4 %    % Monocytes 3.5 %    % Eosinophils 0.7 %    % Basophils 0.5 %    % Immature Granulocytes 0.7 %    Nucleated RBCs 0 0 /100    Absolute Neutrophil 5.9 1.6 - 8.3 10e9/L    Absolute Lymphocytes 1.1 0.8 - 5.3 10e9/L    Absolute Monocytes 0.3 0.0 - 1.3 10e9/L    Absolute Eosinophils 0.1 0.0 - 0.7 10e9/L    Absolute Basophils 0.0 0.0 - 0.2 10e9/L    Abs Immature Granulocytes 0.1 0 - 0.4 10e9/L    Absolute Nucleated RBC 0.0    Comprehensive metabolic panel   Result Value Ref Range    Sodium 137 133 - 144 mmol/L    Potassium 3.9 3.4 - 5.3 mmol/L    Chloride 105 94 - 109 mmol/L    Carbon Dioxide 26 20 - 32 mmol/L    Anion Gap 6 3 - 14 mmol/L    Glucose 172 (H) 70 - 99 mg/dL    Urea Nitrogen 22 7 - 30 mg/dL    Creatinine 0.64 0.52 - 1.04 mg/dL    GFR Estimate >90 >60 mL/min/[1.73_m2]    GFR Estimate If Black >90 >60 mL/min/[1.73_m2]    Calcium 9.7 8.5 - 10.1 mg/dL    Bilirubin Total 0.4 0.2 - 1.3 mg/dL    Albumin 3.2 (L) 3.4 - 5.0 g/dL    Protein Total 7.5 6.8 - 8.8 g/dL    Alkaline Phosphatase 61 40 - 150 U/L    ALT 30 0 - 50 U/L    AST 18 0 - 45 U/L   Magnesium   Result Value Ref Range    Magnesium 1.5 (L) 1.6 - 2.3 mg/dL   Protein qualitative urine   Result Value Ref Range    Protein Albumin Urine Negative NEG^Negative mg/dL             Assessment and Plan:     Assessment/Plan: 69 year old with recurrent Stage IIIC2 serous endometrial cancer admitted for cycle 4 carboplatin desensitization/Taxol/Avastin. Patient had initial carboplatin reaction on 12/2/2019  with chest tightness and hoarseness.      Active Problem List:  Recurrent serous endometrial cancer  T2DM  HTN  Osteoarthritis   Chemotherapy induced peripheral neuropathy   GERD  Anxiety  Hypomagnesemia     Plan:  Dz: Recurrent serous endometrial cancer. Cycle #4 carboplatin desensitization/taxol/avastin. Took premed prior to admission   FEN: regular diet. Hypomagnesemia Mg 1.5> ERP ordered  Pain: continued home acetaminophen PRN  Heme: chronic normocytic anemia H/H 9.7/29.5, stable. Chemo induced anemia.  CV: HTN continue home Dyazide and Novasc. BP elevated on admission, will continue to monitor. Hold Avastin if persistently greater than 150/100.   Pulm: No issues  GI: PRN compazine, PRN bowel regimen. GERD Patient will receive [id number].  : No issues. Voiding spontaneously.   ID: No issues, afebrile   Endocrine: T2DM. Continue home metformin   Psych/Neuro/MSK: Reports low mood intermittently and anxiety. Not taking any medications. Osteoarthritis PRN tylenol  PPX: ambulation  Dispo: discharge home when chemo complete      ANU Morton DNP, CNP  1/22/2020 8:23 AM

## 2020-01-22 NOTE — LETTER
2020      RE: Lu Smith  [address]  [facility] [address]                   Follow Up Notes on Referred Patient    Date: 2020       Dr. Kevin Henderson MD  [address] [place] [address]       RE: Lu Smith  : 1950  HEIDY: 2020    Dear Dr. Kevin Henderson:    Lu Smith is a 69 year old woman with a diagnosis of recurrent serous endometrial cancer.    The patient presents today for followup. She has been doing fairly well on chemotherapy.  She is eating and drinking okay.  No nausea or vomiting, fever or chills.  Normal urinary and bowel function.  No vaginal bleeding or discharge.            Oncology History:  Diagnosis: Stage IIIC2 serous endometrial cancer  Primary GYN oncologist: Dr. Kevin Henderson  Primary radiation oncologist: Dr. Ana Michelle  Surgery: 18: TLH-BSO, omentectomy, bilateral pelvic and para-aortic lymph node dissection, pelvic washings  Chemotherapy:  3/30/18-18: Six cycles of carboplatin AUC 6 and paclitaxel 175mg/m2, delivered sandwich style with pelvic radiation between cycles three and four  Radiation: 18-18: EBRT delivered to pelvis and para-aortic nodes, 5040 cGy in 28 fractions  18-18: HDR brachytherapy delivered to the vaginal cuff, 1200 cGy in two fractions  Complications: Transient neuropathy, mild thrombocytopenia and anemia  Genetic testing: Panel testing negative     19: Chest CT performed for pulmonary nodule follow up, concerning for recurrent disease  IMPRESSION:   1. Few bilateral sub-4 mm solid pulmonary nodules are unchanged compared to 2018. No new or enlarging pulmonary nodules.  2. Dilated main pulmonary artery, nonspecific but may be seen with pulmonary artery hypertension.  3. New 1.9 cm left subclavicular node.     19: PET impression:  In this 69-year-old female with history of stage IIIC2 serous endometrial cancer status post total hysterectomy and bilateral  salpingo-oophorectomy, omentectomy, bilateral pelvic and periaortic dissection, and radiation to the pelvis, and brachytherapy to the vaginal cuff.  1. Progression of disease with new intensely FDG avid para-aortic, mediastinal, and subclavicular lymphadenopathy. The subdiaphragmatic periaortic lymphadenopathy spans from the diaphragmatic crura to the  low abdominal aorta at the level of L4 with greater than 180 degree involvement of the aorta. There is additional lymphadenopathy within the left axilla and the right inguinal region.     9/28/19: Neck CT impression:  1. Progression of metastatic disease with new intensely FDG avid retroclavicular lymph node at the level of the left thoracic inlet.   2. On the fusion PET CT, there is no definite abnormal metabolic activity in the mucosal space, soft tissues, or cervical jamel chains.  3. No evidence of mucosal or soft tissue abnormality on contrast enhanced neck CT.  4. Please refer to the whole body PET CT performed as a separate report, for the findings of the remainder of the body.     10/24/19: Lymph node biopsy:  Lymph node, periaortic, CT guided core biopsy:   -METASTATIC ADENOCARCINOMA   -Tumor morphology is consistent with metastasis/recurrence of endometrial serous carcinoma      11/11/19: C1 paclitaxel 175mg/m2 + carboplatin AUC 6 + bevacizumab 15mg/kg     12/2/19: C2 paclitaxel 175mg/m2 + carboplatin AUC 6 + bevacizumab 15mg/kg. Carboplatin reaction.     12/27/19: Cycle #3 Paclitaxel/Avastin/inpatient Carboplatin desensitization.     01/22/20: Cycle #4 Paclitaxel/Avastin/inpatient Carboplatin desensitization.       Past Medical History:    Past Medical History:   Diagnosis Date     Diabetes (H)     Type II     Endometrial cancer determined by uterine biopsy (H) 02/2018     HTN (hypertension)      Obesity      Osteoarthritis          Past Surgical History:    Past Surgical History:   Procedure Laterality Date     CHOLECYSTECTOMY  1993     CYSTOSCOPY N/A  2018    Procedure: CYSTOSCOPY;;  Surgeon: Kevin Henderson MD;  Location: [facility] OR     DAVINCI HYSTERECTOMY TOTAL, BILATERAL SALPINGO-OOPHORECTOMY, COMBINED N/A 2018    Procedure: COMBINED DAVINCI HYSTERECTOMY TOTAL, SALPINGO-OOPHORECTOMY;  DaVinci Assisted Total Laparoscopic Hysterectomy, Bilateral Salpingo-Oophorectomy, Cystoscopy,  Omental biopsy, omentectomy,bilateral  Pelvic And Para Aortic Lymph Node Dissection;  Surgeon: Kevin Henderson MD;  Location: [facility] OR     Partial lumbar discectomy           Health Maintenance Due   Topic Date Due     DEXA  1952     LIPID  1952     MICROALBUMIN  1952     TSH W/FREE T4 REFLEX  1952     DIABETIC FOOT EXAM  1952     HEPATITIS C SCREENING  1952     ADVANCE CARE PLANNING  1952     EYE EXAM  1952     ZOSTER IMMUNIZATION (1 of 2) 2002     MEDICARE ANNUAL WELLNESS VISIT  2017     PNEUMOCOCCAL IMMUNIZATION 65+ HIGH/HIGHEST RISK (2 of 2 - PPSV23) 2018     A1C  2018     PHQ-2  2020       Current Medications:     Current Outpatient Medications   Medication Sig Dispense Refill     amLODIPine (NORVASC) 10 MG tablet Take 1 tablet (10 mg) by mouth daily 30 tablet 0         Allergies:        Allergies   Allergen Reactions     Carboplatin Anaphylaxis     Chest tightness, hoarse voice, difficulty breathing     Ace Inhibitors Cough     Amoxicillin Hives        Social History:     Social History     Tobacco Use     Smoking status: Former Smoker     Packs/day: 0.25     Years: 20.00     Pack years: 5.00     Last attempt to quit: 1991     Years since quittin.7     Smokeless tobacco: Never Used     Tobacco comment: Quite smoking    Substance Use Topics     Alcohol use: Yes     Comment: 4-7/week if out 1-2x's/week       History   Drug Use No         Family History:       Family History   Problem Relation Age of Onset     Colon Cancer Mother      Breast Cancer Sister      Lymphoma Sister           Physical Exam:     BP (!) 161/83 (BP Location: Right arm, Patient Position: Sitting, Cuff Size: Adult Large)   Pulse 88   Temp 98.1  F (36.7  C) (Oral)   Resp 16   Wt 120.2 kg (264 lb 14.4 oz)   SpO2 98%   BMI 44.08 kg/m     Body mass index is 44.08 kg/m .    General Appearance: healthy and alert, no distress     Musculoskeletal: extremities non tender and without edema    Neurological: normal gait, no gross defects     Psychiatric: appropriate mood and affect                               ABDOMEN:  Soft, nontender and nondistended.  No organomegaly.                Assessment:    Lu Smith is a 69 year old woman with a diagnosis of recurrent serous endometrial cancer.     A total of 25 minutes was spent with the patient, 20 minutes of which were spent in counseling the patient and/or treatment planning.      1.  Recurrent serous endometrial carcinoma.   2.  Positive treatment response to carboplatin, paclitaxel, bevacizumab.   3.  Carbo desensitization.   4.  MSI stable tumor.   5.  Low tumor patient burden.   6.  PD-L1 0% expression.      I discussed with the patient she has had a positive treatment response.  We will continue with another 3 cycles and complete 6 cycles of carboplatin, paclitaxel, bevacizumab and repeat a CT scan of the chest, abdomen and pelvis.  Depending on those findings, then we might switch her to maintenance bevacizumab.  The patient agrees with this plan.  She is very appreciative of her care.  All questions were answered.       Yen Farias MD, MS    Department of Obstetrics and Gynecology   Division of Gynecologic Oncology   [facility]  Phone: [phone]        CC  Patient Care Team:  Levar Scott as PCP - General (Internal Medicine)  Jeanne Blancas MD as Referring Physician (OB/Gyn)  Gabriela Johns RN as Specialty Care Coordinator (Gyn-Onc)  YEN FARIAS MD

## 2020-01-22 NOTE — PLAN OF CARE
"Recurrent serous endometrial cancer  Cycle #4 carboplatin desensitization/paclitaxel/graeme    Pain: denies   Nausea: denies- receiving anti-nausea meds for chemo tx plan   Lab: Mag 1.5, replacement complete   BP (!) 156/74 (BP Location: Left arm, Cuff Size: Adult Large)   Pulse 91   Temp 96.7  F (35.9  C) (Oral)   Resp 16   Ht 1.6 m (5' 3\")   Wt 119.9 kg (264 lb 6.4 oz)   SpO2 97%   BMI 46.84 kg/m   - Hypertensive, not within notifying parameters   Output: Adequately voiding,  not saving   Diet: Regular, tolerating well   Activity: UAL   Bowel Function: Bowel sounds hypoactive in all quadrants, passing flatus, bm x 2 this shift per pt report   Lines: R port, blood return noted, infusing   Plan: Continue to monitor VS, nausea, pain, bowel function, and output. No signs and symptoms of chemo rxn at this time        -vital signs normal or at patient baseline: met, pt hypertensive at baseline  -tolerating oral intake to maintain hydration: met   -safe disposition plan has been identified: pending  -chemo complete : not met, paclitaxel started  "

## 2020-01-22 NOTE — NURSING NOTE
"Chief Complaint   Patient presents with     Port Draw     labs drawn via port by rn. vs taken      Port accessed by RN with 20 G 3/4\" power needle, labs drawn from port in lab. Flushed with saline and heparin. VS taken. Pt checked into next appointment.   Mag Arteaga, RN     "

## 2020-01-22 NOTE — PROGRESS NOTES
OBS GOALS  -vital signs normal or at patient baseline: met, pt hypertensive at baseline  -tolerating oral intake to maintain hydration: met   -safe disposition plan has been identified: pending  -chemo complete : not met

## 2020-01-22 NOTE — NURSING NOTE
"Oncology Rooming Note    January 22, 2020 7:22 AM   Lu Smith is a 69 year old female who presents for:    Chief Complaint   Patient presents with     Port Draw     labs drawn via port by rn. vs taken      Oncology Clinic Visit     Patient with Endometrial cancer here for provider visit and lab review      Initial Vitals: BP (!) 161/83 (BP Location: Right arm, Patient Position: Sitting, Cuff Size: Adult Large)   Pulse 88   Temp 98.1  F (36.7  C) (Oral)   Resp 16   Wt 120.2 kg (264 lb 14.4 oz)   SpO2 98%   BMI 44.08 kg/m   Estimated body mass index is 44.08 kg/m  as calculated from the following:    Height as of 10/24/19: 1.651 m (5' 5\").    Weight as of this encounter: 120.2 kg (264 lb 14.4 oz). Body surface area is 2.35 meters squared.  Moderate Pain (5) Comment: Data Unavailable   No LMP recorded. Patient has had a hysterectomy.  Allergies reviewed: Yes  Medications reviewed: Yes    Medications: Medication refills not needed today.  Pharmacy name entered into [facility]: [facility] - [place], [place] - [address]    Clinical concerns:       Meme Ness CMA              "

## 2020-01-22 NOTE — LETTER
2020       RE: Lu Smith  [address]  [facility] [address]     Dear Colleague,    Thank you for referring your patient, Lu Smith, to the [facility] at [facility]. Please see a copy of my visit note below.                Follow Up Notes on Referred Patient    Date: 2020       Dr. Yen Henderson MD  [address] [place] [address]       RE: Lu Smith  : 1950  HEIDY: 2020    Dear Dr. Yen Henderson:    Lu Smith is a 69 year old woman with a diagnosis of recurrent serous endometrial cancer.     Dictation on: 2020 11:54 AM by: YEN HENDERSON [[email or web address]]          Oncology History:  Diagnosis: Stage IIIC2 serous endometrial cancer  Primary GYN oncologist: Dr. Yen Henderson  Primary radiation oncologist: Dr. Ana Michelle  Surgery: 18: TLH-BSO, omentectomy, bilateral pelvic and para-aortic lymph node dissection, pelvic washings  Chemotherapy:  3/30/18-18: Six cycles of carboplatin AUC 6 and paclitaxel 175mg/m2, delivered sandwich style with pelvic radiation between cycles three and four  Radiation: 18-18: EBRT delivered to pelvis and para-aortic nodes, 5040 cGy in 28 fractions  18-18: HDR brachytherapy delivered to the vaginal cuff, 1200 cGy in two fractions  Complications: Transient neuropathy, mild thrombocytopenia and anemia  Genetic testing: Panel testing negative     19: Chest CT performed for pulmonary nodule follow up, concerning for recurrent disease  IMPRESSION:   1. Few bilateral sub-4 mm solid pulmonary nodules are unchanged compared to 2018. No new or enlarging pulmonary nodules.  2. Dilated main pulmonary artery, nonspecific but may be seen with pulmonary artery hypertension.  3. New 1.9 cm left subclavicular node.     19: PET impression:  In this 69-year-old female with history of stage IIIC2 serous endometrial cancer status post total  hysterectomy and bilateral salpingo-oophorectomy, omentectomy, bilateral pelvic and periaortic dissection, and radiation to the pelvis, and brachytherapy to the vaginal cuff.  1. Progression of disease with new intensely FDG avid para-aortic, mediastinal, and subclavicular lymphadenopathy. The subdiaphragmatic periaortic lymphadenopathy spans from the diaphragmatic crura to the  low abdominal aorta at the level of L4 with greater than 180 degree involvement of the aorta. There is additional lymphadenopathy within the left axilla and the right inguinal region.     9/28/19: Neck CT impression:  1. Progression of metastatic disease with new intensely FDG avid retroclavicular lymph node at the level of the left thoracic inlet.   2. On the fusion PET CT, there is no definite abnormal metabolic activity in the mucosal space, soft tissues, or cervical jamel chains.  3. No evidence of mucosal or soft tissue abnormality on contrast enhanced neck CT.  4. Please refer to the whole body PET CT performed as a separate report, for the findings of the remainder of the body.     10/24/19: Lymph node biopsy:  Lymph node, periaortic, CT guided core biopsy:   -METASTATIC ADENOCARCINOMA   -Tumor morphology is consistent with metastasis/recurrence of endometrial serous carcinoma      11/11/19: C1 paclitaxel 175mg/m2 + carboplatin AUC 6 + bevacizumab 15mg/kg     12/2/19: C2 paclitaxel 175mg/m2 + carboplatin AUC 6 + bevacizumab 15mg/kg. Carboplatin reaction.     12/27/19: Cycle #3 Paclitaxel/Avastin/inpatient Carboplatin desensitization.     01/22/20: Cycle #4 Paclitaxel/Avastin/inpatient Carboplatin desensitization.       Past Medical History:    Past Medical History:   Diagnosis Date     Diabetes (H)     Type II     Endometrial cancer determined by uterine biopsy (H) 02/2018     HTN (hypertension)      Obesity      Osteoarthritis          Past Surgical History:    Past Surgical History:   Procedure Laterality Date     CHOLECYSTECTOMY        CYSTOSCOPY N/A 2018    Procedure: CYSTOSCOPY;;  Surgeon: Kevin Henderson MD;  Location: [facility] OR     DAVINCI HYSTERECTOMY TOTAL, BILATERAL SALPINGO-OOPHORECTOMY, COMBINED N/A 2018    Procedure: COMBINED DAVINCI HYSTERECTOMY TOTAL, SALPINGO-OOPHORECTOMY;  DaVinci Assisted Total Laparoscopic Hysterectomy, Bilateral Salpingo-Oophorectomy, Cystoscopy,  Omental biopsy, omentectomy,bilateral  Pelvic And Para Aortic Lymph Node Dissection;  Surgeon: Kevin Henderson MD;  Location: [facility] OR     Partial lumbar discectomy           Health Maintenance Due   Topic Date Due     DEXA  1952     LIPID  1952     MICROALBUMIN  1952     TSH W/FREE T4 REFLEX  1952     DIABETIC FOOT EXAM  1952     HEPATITIS C SCREENING  1952     ADVANCE CARE PLANNING  1952     EYE EXAM  1952     ZOSTER IMMUNIZATION (1 of 2) 2002     MEDICARE ANNUAL WELLNESS VISIT  2017     PNEUMOCOCCAL IMMUNIZATION 65+ HIGH/HIGHEST RISK (2 of 2 - PPSV23) 2018     A1C  2018     PHQ-2  2020       Current Medications:     Current Outpatient Medications   Medication Sig Dispense Refill     amLODIPine (NORVASC) 10 MG tablet Take 1 tablet (10 mg) by mouth daily 30 tablet 0         Allergies:        Allergies   Allergen Reactions     Carboplatin Anaphylaxis     Chest tightness, hoarse voice, difficulty breathing     Ace Inhibitors Cough     Amoxicillin Hives        Social History:     Social History     Tobacco Use     Smoking status: Former Smoker     Packs/day: 0.25     Years: 20.00     Pack years: 5.00     Last attempt to quit: 1991     Years since quittin.7     Smokeless tobacco: Never Used     Tobacco comment: Quite smoking    Substance Use Topics     Alcohol use: Yes     Comment: 4-7/week if out 1-2x's/week       History   Drug Use No         Family History:       Family History   Problem Relation Age of Onset     Colon Cancer Mother      Breast Cancer  Sister      Lymphoma Sister          Physical Exam:     BP (!) 161/83 (BP Location: Right arm, Patient Position: Sitting, Cuff Size: Adult Large)   Pulse 88   Temp 98.1  F (36.7  C) (Oral)   Resp 16   Wt 120.2 kg (264 lb 14.4 oz)   SpO2 98%   BMI 44.08 kg/m     Body mass index is 44.08 kg/m .    General Appearance: healthy and alert, no distress     Musculoskeletal: extremities non tender and without edema    Neurological: normal gait, no gross defects     Psychiatric: appropriate mood and affect                                Dictation on: 01/23/2020 11:55 AM by: YEN FARIAS [[email or web address]]                Assessment:    Lu Smith is a 69 year old woman with a diagnosis of recurrent serous endometrial cancer.     A total of 25 minutes was spent with the patient, 20 minutes of which were spent in counseling the patient and/or treatment planning.       Dictation on: 01/23/2020 11:58 AM by: YEN FARIAS [[email or web address]]     Yen Farias MD, MS    Department of Obstetrics and Gynecology   Division of Gynecologic Oncology   [facility]  Phone: [phone]        CC  Patient Care Team:  Levar Scott as PCP - General (Internal Medicine)  Jeanne Blancas MD as Referring Physician (OB/Gyn)  Gabriela Johns, RN as Specialty Care Coordinator (Gyn-Onc)  YEN FARIAS    The patient presents today for followup.  She ***  She has been doing fairly well on chemotherapy.  She is eating and drinking okay.  No nausea or vomiting, fever or chills.  Normal urinary and bowel function.  No vaginal bleeding or discharge.     ABDOMEN:  Soft, nontender and nondistended.  No organomegaly.     1.  Recurrent serous endometrial carcinoma.   2.  Positive treatment response to carboplatin, paclitaxel, bevacizumab.   3.  ***desensitization.   4.  MSI stable tumor.   5.  Low tumor patient burden.   6.  PD-L1 0% expression.      I discussed with the patient she has had a positive  treatment response.  We will continue with another 3 cycles and complete 6 cycles of carboplatin, paclitaxel, bevacizumab and repeat a CT scan of the chest, abdomen and pelvis.  Depending on those findings, then we might switch her to maintenance bevacizumab.  The patient agrees with this plan.  She is very appreciative of her care.  All questions were answered.      Again, thank you for allowing me to participate in the care of your patient.      Sincerely,    Kevin Henderson MD

## 2020-01-22 NOTE — PROVIDER NOTIFICATION
DATE/TIME  (DOT-TD, DOT-NOW) CHEMO CHECK ACTIVITY (REGIMEN & DOSE CHECK, DAY, DOSE #, NAME OF CHEMO #1)  CHEMO DRUG #2  CHEMO DRUG #3 NAME OF RN #1 (USE DOT-ME HERE) NAME OF RN#2 (2ND RN TO LOG IN SEPARATELY)   1/22/2020  9:25 AM Paclitaxel Avastin Carboplatin  ERIK BUSTAMANTE, RN Yue Lee, RN

## 2020-01-22 NOTE — LETTER
2020     RE: Lu Smith  [address]  [facility] [address]     Dear Colleague,    Thank you for referring your patient, Lu Smith, to the [facility]. Please see a copy of my visit note below.                Follow Up Notes on Referred Patient    Date: 2020       Dr. Yen Henderson MD  [address] [place] [address]       RE: Lu Smith  : 1950  HEIDY: 2020    Dear Dr. Yen Henderson:    Lu Smith is a 69 year old woman with a diagnosis of recurrent serous endometrial cancer.     Dictation on: 2020 11:54 AM by: YEN HENDERSON [[email or web address]]          Oncology History:  Diagnosis: Stage IIIC2 serous endometrial cancer  Primary GYN oncologist: Dr. Yen Henderson  Primary radiation oncologist: Dr. Ana Michelle  Surgery: 18: TLH-BSO, omentectomy, bilateral pelvic and para-aortic lymph node dissection, pelvic washings  Chemotherapy:  3/30/18-18: Six cycles of carboplatin AUC 6 and paclitaxel 175mg/m2, delivered sandwich style with pelvic radiation between cycles three and four  Radiation: 18-18: EBRT delivered to pelvis and para-aortic nodes, 5040 cGy in 28 fractions  18-18: HDR brachytherapy delivered to the vaginal cuff, 1200 cGy in two fractions  Complications: Transient neuropathy, mild thrombocytopenia and anemia  Genetic testing: Panel testing negative     19: Chest CT performed for pulmonary nodule follow up, concerning for recurrent disease  IMPRESSION:   1. Few bilateral sub-4 mm solid pulmonary nodules are unchanged compared to 2018. No new or enlarging pulmonary nodules.  2. Dilated main pulmonary artery, nonspecific but may be seen with pulmonary artery hypertension.  3. New 1.9 cm left subclavicular node.     19: PET impression:  In this 69-year-old female with history of stage IIIC2 serous endometrial cancer status post total hysterectomy and bilateral salpingo-oophorectomy,  omentectomy, bilateral pelvic and periaortic dissection, and radiation to the pelvis, and brachytherapy to the vaginal cuff.  1. Progression of disease with new intensely FDG avid para-aortic, mediastinal, and subclavicular lymphadenopathy. The subdiaphragmatic periaortic lymphadenopathy spans from the diaphragmatic crura to the  low abdominal aorta at the level of L4 with greater than 180 degree involvement of the aorta. There is additional lymphadenopathy within the left axilla and the right inguinal region.     9/28/19: Neck CT impression:  1. Progression of metastatic disease with new intensely FDG avid retroclavicular lymph node at the level of the left thoracic inlet.   2. On the fusion PET CT, there is no definite abnormal metabolic activity in the mucosal space, soft tissues, or cervical jamel chains.  3. No evidence of mucosal or soft tissue abnormality on contrast enhanced neck CT.  4. Please refer to the whole body PET CT performed as a separate report, for the findings of the remainder of the body.     10/24/19: Lymph node biopsy:  Lymph node, periaortic, CT guided core biopsy:   -METASTATIC ADENOCARCINOMA   -Tumor morphology is consistent with metastasis/recurrence of endometrial serous carcinoma      11/11/19: C1 paclitaxel 175mg/m2 + carboplatin AUC 6 + bevacizumab 15mg/kg     12/2/19: C2 paclitaxel 175mg/m2 + carboplatin AUC 6 + bevacizumab 15mg/kg. Carboplatin reaction.     12/27/19: Cycle #3 Paclitaxel/Avastin/inpatient Carboplatin desensitization.     01/22/20: Cycle #4 Paclitaxel/Avastin/inpatient Carboplatin desensitization.       Past Medical History:    Past Medical History:   Diagnosis Date     Diabetes (H)     Type II     Endometrial cancer determined by uterine biopsy (H) 02/2018     HTN (hypertension)      Obesity      Osteoarthritis          Past Surgical History:    Past Surgical History:   Procedure Laterality Date     CHOLECYSTECTOMY  1993     CYSTOSCOPY N/A 2/23/2018    Procedure:  CYSTOSCOPY;;  Surgeon: Kevin Henderson MD;  Location: [facility] OR     DAVINCI HYSTERECTOMY TOTAL, BILATERAL SALPINGO-OOPHORECTOMY, COMBINED N/A 2018    Procedure: COMBINED DAVINCI HYSTERECTOMY TOTAL, SALPINGO-OOPHORECTOMY;  DaVinci Assisted Total Laparoscopic Hysterectomy, Bilateral Salpingo-Oophorectomy, Cystoscopy,  Omental biopsy, omentectomy,bilateral  Pelvic And Para Aortic Lymph Node Dissection;  Surgeon: Kevin Henderson MD;  Location: [facility] OR     Partial lumbar discectomy           Health Maintenance Due   Topic Date Due     DEXA  1952     LIPID  1952     MICROALBUMIN  1952     TSH W/FREE T4 REFLEX  1952     DIABETIC FOOT EXAM  1952     HEPATITIS C SCREENING  1952     ADVANCE CARE PLANNING  1952     EYE EXAM  1952     ZOSTER IMMUNIZATION (1 of 2) 2002     MEDICARE ANNUAL WELLNESS VISIT  2017     PNEUMOCOCCAL IMMUNIZATION 65+ HIGH/HIGHEST RISK (2 of 2 - PPSV23) 2018     A1C  2018     PHQ-2  2020       Current Medications:     Current Outpatient Medications   Medication Sig Dispense Refill     amLODIPine (NORVASC) 10 MG tablet Take 1 tablet (10 mg) by mouth daily 30 tablet 0         Allergies:        Allergies   Allergen Reactions     Carboplatin Anaphylaxis     Chest tightness, hoarse voice, difficulty breathing     Ace Inhibitors Cough     Amoxicillin Hives        Social History:     Social History     Tobacco Use     Smoking status: Former Smoker     Packs/day: 0.25     Years: 20.00     Pack years: 5.00     Last attempt to quit: 1991     Years since quittin.7     Smokeless tobacco: Never Used     Tobacco comment: Quite smoking    Substance Use Topics     Alcohol use: Yes     Comment: 4-7/week if out 1-2x's/week       History   Drug Use No         Family History:       Family History   Problem Relation Age of Onset     Colon Cancer Mother      Breast Cancer Sister      Lymphoma Sister          Physical Exam:      BP (!) 161/83 (BP Location: Right arm, Patient Position: Sitting, Cuff Size: Adult Large)   Pulse 88   Temp 98.1  F (36.7  C) (Oral)   Resp 16   Wt 120.2 kg (264 lb 14.4 oz)   SpO2 98%   BMI 44.08 kg/m     Body mass index is 44.08 kg/m .    General Appearance: healthy and alert, no distress     Musculoskeletal: extremities non tender and without edema    Neurological: normal gait, no gross defects     Psychiatric: appropriate mood and affect                                Dictation on: 01/23/2020 11:55 AM by: YEN FARIAS [[email or web address]]                Assessment:    Lu Smith is a 69 year old woman with a diagnosis of recurrent serous endometrial cancer.     A total of 25 minutes was spent with the patient, 20 minutes of which were spent in counseling the patient and/or treatment planning.       Dictation on: 01/23/2020 11:58 AM by: YEN FARIAS [[email or web address]]     Yen Farias MD, MS    Department of Obstetrics and Gynecology   Division of Gynecologic Oncology   [facility]  Phone: [phone]      CC  Patient Care Team:  Levra Scott as PCP - General (Internal Medicine)  Jeanne Blancas MD as Referring Physician (OB/Gyn)  Gabriela Johns, RN as Specialty Care Coordinator (Gyn-Onc)  YEN FARIAS    The patient presents today for followup.  She ***  She has been doing fairly well on chemotherapy.  She is eating and drinking okay.  No nausea or vomiting, fever or chills.  Normal urinary and bowel function.  No vaginal bleeding or discharge.     ABDOMEN:  Soft, nontender and nondistended.  No organomegaly.     1.  Recurrent serous endometrial carcinoma.   2.  Positive treatment response to carboplatin, paclitaxel, bevacizumab.   3.  ***desensitization.   4.  MSI stable tumor.   5.  Low tumor patient burden.   6.  PD-L1 0% expression.      I discussed with the patient she has had a positive treatment response.  We will continue with another 3 cycles  and complete 6 cycles of carboplatin, paclitaxel, bevacizumab and repeat a CT scan of the chest, abdomen and pelvis.  Depending on those findings, then we might switch her to maintenance bevacizumab.  The patient agrees with this plan.  She is very appreciative of her care.  All questions were answered.      Again, thank you for allowing me to participate in the care of your patient.      Sincerely,    Kevin Henderson MD

## 2020-01-22 NOTE — DISCHARGE SUMMARY
Gynecologic Oncology Discharge Summary    Lu Smith  [id number]    Admit Date: 1/22/2020  Discharge Date: 1/23/20  Admitting Provider: Dr Hernandez  Discharge Provider: Dr Hernandez    Admission Dx:   Recurrent serous endometrial cancer  Cycle #4 carboplatin desensitization/paclitaxel/graeme  Hx carbo reaction  T2DM  HTN  Osteoarthritis   Chemotherapy induced peripheral neuropathy   GERD  Anxiety  Hypomagnesemia    Discharge Dx:  Recurrent serous endometrial cancer  Cycle #4 carboplatin desensitization/paclitaxel/graeme  Hx carbo reaction  T2DM  HTN  Osteoarthritis   Chemotherapy induced peripheral neuropathy   GERD  Anxiety  Hypomagnesemia    Patient Active Problem List   Diagnosis     Gynecological disease     Gynecologic disease     Endometrial cancer (H)     Encounter for long-term (current) use of medications     Encounter for antineoplastic chemotherapy     Thyroid nodule     Pulmonary nodule     Obesity     Hypertension     Hyperlipidemia with target low density lipoprotein (LDL) cholesterol less than 100 mg/dL     Diabetes mellitus, type II (H)     Pulmonary nodules     Chemotherapy management, encounter for       Prior to Admission Medications:  Medications Prior to Admission   Medication Sig Dispense Refill Last Dose     ACETAMINOPHEN PO Take 325 mg by mouth every 6 hours as needed for pain   Taking     amLODIPine (NORVASC) 5 MG tablet Take 1 tablet (5 mg) by mouth daily 30 tablet 1 Taking     dexamethasone (DECADRON) 4 MG tablet 20 mg, 12 and 6 hours before carboplatin doses. Start evening before chemotherapy, continue on morning of therapy. 2 dose. 2 Taking     dexamethasone (DECADRON) 4 MG tablet Take 5 tablets (20 mg) by mouth See Admin Instructions for 2 doses 10 tablet 3 Taking     Famotidine (PEPCID PO) Take 10 mg by mouth as needed    Taking     fluticasone (FLONASE) 50 MCG/ACT spray Spray 1-2 sprays into both nostrils daily 1 Bottle 1 Taking     L-Glutamine 500 MG CAPS    Taking     loperamide  (IMODIUM) 2 MG capsule Take 2 mg by mouth 4 times daily as needed for diarrhea   Taking     loratadine (CLARITIN) 10 MG tablet Take 1 tablet (10 mg) by mouth daily 30 tablet 3 Taking     magnesium 500 MG TABS    Taking     METFORMIN HCL PO Take 500 mg by mouth daily (with breakfast)    Taking     prochlorperazine (COMPAZINE) 10 MG tablet Take 0.5 tablets (5 mg) by mouth every 6 hours as needed (nausea/vomiting) 30 tablet 2 Taking     pyridoxine (VITAMIN B-6) 50 MG TABS Take 1 tablet (50 mg) by mouth 2 times daily 60 tablet 3 Taking     triamterene-hydrochlorothiazide (DYAZIDE) 37.5-25 MG per capsule Take 1 capsule by mouth every morning    Taking       Discharge Medications:     Review of your medicines      CONTINUE these medicines which may have CHANGED, or have new prescriptions. If we are uncertain of the size of tablets/capsules you have at home, strength may be listed as something that might have changed.      Dose / Directions   amLODIPine 10 MG tablet  Commonly known as:  NORVASC  This may have changed:      medication strength    how much to take  Used for:  Endometrial cancer (H)      Dose:  10 mg  Take 1 tablet (10 mg) by mouth daily  Quantity:  30 tablet  Refills:  0        CONTINUE these medicines which have NOT CHANGED      Dose / Directions   ACETAMINOPHEN PO      Dose:  325 mg  Take 325 mg by mouth every 6 hours as needed for pain  Refills:  0     * dexamethasone 4 MG tablet  Commonly known as:  DECADRON  Used for:  Encounter for long-term (current) use of medications, Endometrial cancer (H)      20 mg, 12 and 6 hours before carboplatin doses. Start evening before chemotherapy, continue on morning of therapy.  Quantity:  2 dose.  Refills:  2     * dexamethasone 4 MG tablet  Commonly known as:  DECADRON  Used for:  Endometrial cancer (H)      Dose:  20 mg  Take 5 tablets (20 mg) by mouth See Admin Instructions for 2 doses  Quantity:  10 tablet  Refills:  3     fluticasone 50 MCG/ACT nasal  spray  Commonly known as:  FLONASE  Used for:  Seasonal allergic rhinitis, unspecified trigger      Dose:  1-2 spray  Spray 1-2 sprays into both nostrils daily  Quantity:  1 Bottle  Refills:  1     L-Glutamine 500 MG Caps      Refills:  0     loperamide 2 MG capsule  Commonly known as:  IMODIUM      Dose:  2 mg  Take 2 mg by mouth 4 times daily as needed for diarrhea  Refills:  0     loratadine 10 MG tablet  Commonly known as:  Claritin  Used for:  Pain in joint, multiple sites, Chronic seasonal allergic rhinitis, unspecified trigger      Dose:  10 mg  Take 1 tablet (10 mg) by mouth daily  Quantity:  30 tablet  Refills:  3     magnesium 500 MG Tabs      Refills:  0     METFORMIN HCL PO      Dose:  500 mg  Take 500 mg by mouth daily (with breakfast)  Refills:  0     PEPCID PO      Dose:  10 mg  Take 10 mg by mouth as needed  Refills:  0     prochlorperazine 10 MG tablet  Commonly known as:  COMPAZINE  Used for:  Encounter for long-term (current) use of medications, Endometrial cancer (H)      Dose:  5 mg  Take 0.5 tablets (5 mg) by mouth every 6 hours as needed (nausea/vomiting)  Quantity:  30 tablet  Refills:  2     triamterene-HCTZ 37.5-25 MG capsule  Commonly known as:  DYAZIDE      Dose:  1 capsule  Take 1 capsule by mouth every morning  Refills:  0     vitamin B6 50 MG Tabs  Commonly known as:  pyridOXINE  Used for:  Chemotherapy-induced peripheral neuropathy (H)      Dose:  50 mg  Take 1 tablet (50 mg) by mouth 2 times daily  Quantity:  60 tablet  Refills:  3         * This list has 2 medication(s) that are the same as other medications prescribed for you. Read the directions carefully, and ask your doctor or other care provider to review them with you.               Where to get your medicines      These medications were sent to [facility] [place], [place] - [address]  [address], [facility] [address]    Phone:  [phone]     amLODIPine 10 MG tablet        Consultations:  None    Brief History of Illness:  69 year old female with recurrent stage IIIC serous endometrial cancer who presents for C4 carboplatin desensitization, paclitaxel, and bevacizumab. Carboplatin reaction occurred on 12/2/2019 with chest tightness, hoarseness, and difficulty breathing near the end of her carboplatin infusion.    Hospital Course:  Lu Smith was admitted and underwent Cycle #4 of carboplatin desensitization/paclitaxel/avastin for recurrent stage IIIC serous endometrial cancer. She tolerated chemo well. She was noted to have elevated blood pressure up to the 160s/80s on admission HD#1. She received an additional 5mg dose of amlodipine prior to initiating avastin. Continued on her home Dyazide/Norvasc. Her norvasc was increased to 10 mg daily and she will follow up with her PCP in 1 week. She had hypomagnesemia and received electrolyte repletion. She had no complaints during her hospital stay.  Additionally in regards to her other medical conditions of GERD and T2DM she was maintained on her home medications (Pepcid and metformin) and had no exacerbations. Her nausea was controlled with routine pre-chemo medications and she will be discharged to home with resumption of all home meds.      By the day of discharge she was feeling well and ready for discharge to home. Please see progress note from day of discharge for full details of her condition. In brief her exam was non-focal and vital signs were stable.    Discharge Instructions and Follow up:  Ms. Lu Smith was discharged from the hospital with follow up for     Discharge Diet: Regular  Discharge Activity: Activity as tolerated  Discharge Follow up: 2/13/20 Lisa Barroso CNP    Discharge Disposition:  Discharged to home    Discharge Staff: Dr David Fatima DNP, CNP  1/23/2020 10:45 AM

## 2020-01-23 NOTE — PLAN OF CARE
Observation goals:     -vital signs normal or at patient baseline: met  -tolerating oral intake to maintain hydration: met   -safe disposition plan has been identified: pending  -chemo complete : not met- Carboplatin bag #4 currently infusing    A&O. VSS- continuous pulse ox in place. HTN at baseline. Denies pain. Port infusing Carboplatin bag #4. No s/s of reactions. Port with good blood return. Spontaneously voiding- not saving. Passing gas, no BM. Regular diet- denies nausea. Independent. Continue with plan of care.

## 2020-01-23 NOTE — PROGRESS NOTES
Follow Up Notes on Referred Patient    Date: 2020       Dr. Kevin Henderson MD  [address] [place] [address]       RE: Lu Smith  : 1950  HEIDY: 2020    Dear Dr. Kevin Henderson:    Lu Smith is a 69 year old woman with a diagnosis of recurrent serous endometrial cancer.    The patient presents today for followup. She has been doing fairly well on chemotherapy.  She is eating and drinking okay.  No nausea or vomiting, fever or chills.  Normal urinary and bowel function.  No vaginal bleeding or discharge.            Oncology History:  Diagnosis: Stage IIIC2 serous endometrial cancer  Primary GYN oncologist: Dr. Kevin Henderson  Primary radiation oncologist: Dr. Ana Michelle  Surgery: 18: TLH-BSO, omentectomy, bilateral pelvic and para-aortic lymph node dissection, pelvic washings  Chemotherapy:  3/30/18-18: Six cycles of carboplatin AUC 6 and paclitaxel 175mg/m2, delivered sandwich style with pelvic radiation between cycles three and four  Radiation: 18-18: EBRT delivered to pelvis and para-aortic nodes, 5040 cGy in 28 fractions  18-18: HDR brachytherapy delivered to the vaginal cuff, 1200 cGy in two fractions  Complications: Transient neuropathy, mild thrombocytopenia and anemia  Genetic testing: Panel testing negative     19: Chest CT performed for pulmonary nodule follow up, concerning for recurrent disease  IMPRESSION:   1. Few bilateral sub-4 mm solid pulmonary nodules are unchanged compared to 2018. No new or enlarging pulmonary nodules.  2. Dilated main pulmonary artery, nonspecific but may be seen with pulmonary artery hypertension.  3. New 1.9 cm left subclavicular node.     19: PET impression:  In this 69-year-old female with history of stage IIIC2 serous endometrial cancer status post total hysterectomy and bilateral salpingo-oophorectomy, omentectomy, bilateral pelvic and periaortic dissection, and  radiation to the pelvis, and brachytherapy to the vaginal cuff.  1. Progression of disease with new intensely FDG avid para-aortic, mediastinal, and subclavicular lymphadenopathy. The subdiaphragmatic periaortic lymphadenopathy spans from the diaphragmatic crura to the  low abdominal aorta at the level of L4 with greater than 180 degree involvement of the aorta. There is additional lymphadenopathy within the left axilla and the right inguinal region.     9/28/19: Neck CT impression:  1. Progression of metastatic disease with new intensely FDG avid retroclavicular lymph node at the level of the left thoracic inlet.   2. On the fusion PET CT, there is no definite abnormal metabolic activity in the mucosal space, soft tissues, or cervical jamel chains.  3. No evidence of mucosal or soft tissue abnormality on contrast enhanced neck CT.  4. Please refer to the whole body PET CT performed as a separate report, for the findings of the remainder of the body.     10/24/19: Lymph node biopsy:  Lymph node, periaortic, CT guided core biopsy:   -METASTATIC ADENOCARCINOMA   -Tumor morphology is consistent with metastasis/recurrence of endometrial serous carcinoma      11/11/19: C1 paclitaxel 175mg/m2 + carboplatin AUC 6 + bevacizumab 15mg/kg     12/2/19: C2 paclitaxel 175mg/m2 + carboplatin AUC 6 + bevacizumab 15mg/kg. Carboplatin reaction.     12/27/19: Cycle #3 Paclitaxel/Avastin/inpatient Carboplatin desensitization.     01/22/20: Cycle #4 Paclitaxel/Avastin/inpatient Carboplatin desensitization.       Past Medical History:    Past Medical History:   Diagnosis Date     Diabetes (H)     Type II     Endometrial cancer determined by uterine biopsy (H) 02/2018     HTN (hypertension)      Obesity      Osteoarthritis          Past Surgical History:    Past Surgical History:   Procedure Laterality Date     CHOLECYSTECTOMY  1993     CYSTOSCOPY N/A 2/23/2018    Procedure: CYSTOSCOPY;;  Surgeon: Kevin Henderson MD;  Location:  OR      DAVINCI HYSTERECTOMY TOTAL, BILATERAL SALPINGO-OOPHORECTOMY, COMBINED N/A 2018    Procedure: COMBINED DAVINCI HYSTERECTOMY TOTAL, SALPINGO-OOPHORECTOMY;  DaVinci Assisted Total Laparoscopic Hysterectomy, Bilateral Salpingo-Oophorectomy, Cystoscopy,  Omental biopsy, omentectomy,bilateral  Pelvic And Para Aortic Lymph Node Dissection;  Surgeon: Kevin Henderson MD;  Location: [facility] OR     Partial lumbar discectomy           Health Maintenance Due   Topic Date Due     DEXA  1952     LIPID  1952     MICROALBUMIN  1952     TSH W/FREE T4 REFLEX  1952     DIABETIC FOOT EXAM  1952     HEPATITIS C SCREENING  1952     ADVANCE CARE PLANNING  1952     EYE EXAM  1952     ZOSTER IMMUNIZATION (1 of 2) 2002     MEDICARE ANNUAL WELLNESS VISIT  2017     PNEUMOCOCCAL IMMUNIZATION 65+ HIGH/HIGHEST RISK (2 of 2 - PPSV23) 2018     A1C  2018     PHQ-2  2020       Current Medications:     Current Outpatient Medications   Medication Sig Dispense Refill     amLODIPine (NORVASC) 10 MG tablet Take 1 tablet (10 mg) by mouth daily 30 tablet 0         Allergies:        Allergies   Allergen Reactions     Carboplatin Anaphylaxis     Chest tightness, hoarse voice, difficulty breathing     Ace Inhibitors Cough     Amoxicillin Hives        Social History:     Social History     Tobacco Use     Smoking status: Former Smoker     Packs/day: 0.25     Years: 20.00     Pack years: 5.00     Last attempt to quit: 1991     Years since quittin.7     Smokeless tobacco: Never Used     Tobacco comment: Quite smoking    Substance Use Topics     Alcohol use: Yes     Comment: 4-7/week if out 1-2x's/week       History   Drug Use No         Family History:       Family History   Problem Relation Age of Onset     Colon Cancer Mother      Breast Cancer Sister      Lymphoma Sister          Physical Exam:     BP (!) 161/83 (BP Location: Right arm, Patient Position:  Sitting, Cuff Size: Adult Large)   Pulse 88   Temp 98.1  F (36.7  C) (Oral)   Resp 16   Wt 120.2 kg (264 lb 14.4 oz)   SpO2 98%   BMI 44.08 kg/m    Body mass index is 44.08 kg/m .    General Appearance: healthy and alert, no distress     Musculoskeletal: extremities non tender and without edema    Neurological: normal gait, no gross defects     Psychiatric: appropriate mood and affect                               ABDOMEN:  Soft, nontender and nondistended.  No organomegaly.                Assessment:    Lu Smith is a 69 year old woman with a diagnosis of recurrent serous endometrial cancer.     A total of 25 minutes was spent with the patient, 20 minutes of which were spent in counseling the patient and/or treatment planning.      1.  Recurrent serous endometrial carcinoma.   2.  Positive treatment response to carboplatin, paclitaxel, bevacizumab.   3.  Carbo desensitization.   4.  MSI stable tumor.   5.  Low tumor patient burden.   6.  PD-L1 0% expression.      I discussed with the patient she has had a positive treatment response.  We will continue with another 3 cycles and complete 6 cycles of carboplatin, paclitaxel, bevacizumab and repeat a CT scan of the chest, abdomen and pelvis.  Depending on those findings, then we might switch her to maintenance bevacizumab.  The patient agrees with this plan.  She is very appreciative of her care.  All questions were answered.       Yen Farias MD, MS    Department of Obstetrics and Gynecology   Division of Gynecologic Oncology   [facility]  Phone: [phone]        CC  Patient Care Team:  Levar Scott as PCP - General (Internal Medicine)  Jeanne Blancas MD as Referring Physician (OB/Gyn)  Gabriela Johns, PEPE as Specialty Care Coordinator (Gyn-Onc)  YEN FARIAS

## 2020-01-23 NOTE — PROGRESS NOTES
Gynecology Oncology Progress Note    HD#2 C4 carboplatin desensitization, paclitaxel, and bevacizumab    Disease: Recurrent serous endometrial cancer    24 hour events:   - No acute events    Subjective:   Patient is feeling well this morning. Was not able to sleep much overnight. Denies chest pain or shortness of breath. Tolerating PO without nausea or vomiting. Passing gas. Voiding spontaneously.    Objective:   Vitals:    01/23/20 0245 01/23/20 0300 01/23/20 0317 01/23/20 0350   BP: (!) 140/72 (!) 146/69 134/68 136/70   BP Location: Left arm Left arm Left arm Left arm   Pulse: 82 86 81 85   Resp:       Temp:       TempSrc:       SpO2: 97% 95% 94% 95%   Weight:       Height:       Exam  General: NAD, appears comfortable in bed  CV: RRR, no m/r/g  Resp: CTAB, no wheezes  Abdomen: Soft, nontender, nondistended  Extremities: Warm, well-perfused, nontender, no edema    Intake and Output  24 hours (ml)//Since midnight (ml)  PO: 720//-  IV: -//626  U: 5x//1x  S: 2x//-    Labs/imaging  1/22:  WBC 7.4  Hgb 9.7  Plt 175  Mag 1.5>2.6    Assessment: 69 year old with recurrent Stage IIIC2 serous endometrial cancer admitted for cycle 4 Carboplatin desensitization/Taxol/Avastin. Patient had initial carboplatin reaction on 12/2/2019 with chest tightness and hoarseness.     Active Problem list:  Recurrent serous endometrial cancer  T2DM  HTN  Osteoarthritis   Chemotherapy induced peripheral neuropathy   GERD  Anxiety  Hypomagnesemia    Plan:    Dz: Recurrent serous endometrial cancer. Cycle #4 carboplatin desensitization/taxol/avastin.  FEN: Regular diet. Hypomagnesemia> ERP, resolved  Pain: Continued home acetaminophen PRN  Heme: Chronic normocytic anemia/chemo induced anemia, stable  CV:   #HTN   - Continue home triamterene-hydrochlorothiazide and amlodipine, plan to increase amlodipine to 10 mg daily  - BP elevated on admission, received 5 mg x 1 with improvement. Avastin given.  Pulm: No issues  GI: PRN compazine, PRN bowel  regimen. GERD, H2 Blocker.  : No issues. Voiding spontaneously.   ID: No issues, afebrile.   Endocrine: T2DM, continue home metformin.   Psych/Neuro: Low mood intermittently, anxiety. No meds.   PPX: Ambulation  Dispo: Discharge home when chemo complete     Santosh Rapp MD  OBGYN PGY-2  Pager [phone]

## 2020-01-23 NOTE — PROGRESS NOTES
Observation goals:     -vital signs normal or at patient baseline: met  -tolerating oral intake to maintain hydration: met   -safe disposition plan has been identified: pending  -chemo complete : not met- Carboplatin bag #4 currently infusing

## 2020-01-23 NOTE — PROGRESS NOTES
Observation goals:    -vital signs normal or at patient baseline: met, pt hypertensive at baseline  -tolerating oral intake to maintain hydration: met   -safe disposition plan has been identified: pending  -chemo complete : not met, Avastin currently infusing

## 2020-01-23 NOTE — PLAN OF CARE
Hypertensive, OVSS. Prior to Avastin infusion Pt's BP was above infusion parameters. MD ordered extra dose of amlodipine. Pt's BP subsequently came down and Avastin was given. Carboplatin bag # 2 currently infusing. Brisk blood return noted via port throughout infusions. Tolerating regular diet. Up ad latonya. Voids spont, not saving. Cont. POC.

## 2020-01-23 NOTE — PLAN OF CARE
"Recurrent serous endometrial cancer  Cycle #4 carboplatin desensitization/paclitaxel/graeme     Pain: denies   Nausea: denies- receiving anti-nausea meds for chemo tx plan   BP (!) 143/76 (BP Location: Left arm, Cuff Size: Adult Large)   Pulse 83   Temp 95.6  F (35.3  C) (Oral)   Resp 16   Ht 1.6 m (5' 3\")   Wt 119.9 kg (264 lb 6.4 oz)   SpO2 97%   BMI 46.84 kg/m    Output: Adequately voiding,  not saving   Diet: Regular, tolerating well   Activity: UAL   Bowel Function: Bowel sounds hypoactive in all quadrants, passing flatus, no bm this shift   Plan: Discharge paperwork reviewed and completed. Discharged home        -vital signs normal or at patient baseline: met, pt hypertensive at baseline  -tolerating oral intake to maintain hydration: met   -safe disposition plan has been identified: met  -chemo complete : met        "

## 2020-02-01 NOTE — TELEPHONE ENCOUNTER
FNA triage call :  [facility] for uterine cancer that spread to lymph system and last chemo 1/22/20 by Dr BELINDA Henderson .   Presenting problem : Pt called . Can eat and drink better for last 72 hours , but dizzy and tired has last 10 days , normally better .   But still  lightheaded when up  and tired starting 1/25/20 , instruction to call if tireness and dizziness and has not left the house for the last week  . Currently : no fever  /91 and pulse 92 ,   1&0 is good and no fever. Wt is down 7.5 lb since last chemo on 1/22/20 . Voided  now , and felt like my have rash or maybe burning with urination , and declines further triage on UTI symptoms  but  Pt agrees to go into be seen at Urgent Care today .   Guideline used : Dizziness - lightheadedness A AH .   Disposition and recommendations :  See provider in 4 hours and upgraded to ED but  Pt agrees to go into be seen at Urgent care now  Instead  .  Caller verbalizes understanding and denies further questions and will call back if further symptoms to triage or questions  . Jo Couch RN  - [place] Nurse Advisor     Reason for Disposition    [1] Dizziness caused by heat exposure, sudden standing, or poor fluid intake AND [2] no improvement after 2 hours of rest and fluids    Additional Information    Negative: Severe difficulty breathing (e.g., struggling for each breath, speaks in single words)    Negative: [1] Difficulty breathing or swallowing AND [2] started suddenly after medicine, an allergic food or bee sting    Negative: Shock suspected (e.g., cold/pale/clammy skin, too weak to stand, low BP, rapid pulse)    Negative: Difficult to awaken or acting confused (e.g., disoriented, slurred speech)    Negative: [1] Weakness (i.e., paralysis, loss of muscle strength) of the face, arm or leg on one side of the body AND [2] sudden onset AND [3] present now    Negative: [1] Numbness (i.e., loss of sensation) of the face, arm or leg on one side of the body  "AND [2] sudden onset AND [3] present now    Negative: [1] Loss of speech or garbled speech AND [2] sudden onset AND [3] present now    Negative: Overdose (accidental or intentional) of medications    Negative: [1] Fainted > 15 minutes ago AND [2] still feels too weak or dizzy to stand    Negative: Heart beating < 50 beats per minute OR > 140 beats per minute    Negative: Sounds like a life-threatening emergency to the triager    Negative: Chest pain    Negative: Rectal bleeding, bloody stool, or tarry-black stool    Negative: [1] Vomiting AND [2] contains red blood or black (\"coffee ground\") material    Negative: Vomiting is main symptom    Negative: Diarrhea is main symptom    Negative: Headache is main symptom    Negative: Patient states that he/she is having an anxiety/panic attack    Negative: Dizziness is described as a spinning sensation (i.e., vertigo)    Negative: Heat exhaustion suspected (i.e., dehydration from heat exposure)    Negative: Difficulty breathing    Negative: SEVERE dizziness (e.g., unable to stand, requires support to walk, feels like passing out now)    Negative: Extra heart beats OR irregular heart beating  (i.e., \"palpitations\")    Negative: [1] Drinking very little AND [2] dehydration suspected (e.g., no urine > 12 hours, very dry mouth, very lightheaded)    Negative: Patient sounds very sick or weak to the triager    Commented on: Dizziness from low blood sugar (i.e., < 60 mg/dl or 3.5 mmol/l)     Doesn't have Blood sugar meter , none order by PCP yet.    Protocols used: DIZZINESS - [id number]      "

## 2020-02-06 NOTE — PROGRESS NOTES
Care Coordinator Note  Patient states she has been tired, dizzy this last week.  Her primary care physician is treating her for a yeast and UTI infection, she has just started the antibiotic.  She states she is eating and drinking, no problem with bowels, she is urinating without problem.  She feels like she has not gained back the 10 lbs she states she has lost with this last chemo.  Patient will take her pre Dexamethasone at 3 am and 9 am prior to her next chemo on 2/13/20.  Reviewed care coordinator contact information.      Patient verbalized back understanding of the above information discussed.     Lalitha MERCER, RN  Care Coordinator  Gynecologic Cancer   Office:  [phone]  Pager: [phone] #[phone]

## 2020-02-11 NOTE — PROGRESS NOTES
Follow Up Notes on Referred Patient    Date: 2020         RE: Lu Smith  : 1950  HEIDY: 2020        Lu Smith is a 69 year old woman with a diagnosis of Recurrent high grade serous endometrial cancer.   She is here today for follow up and disease management.      Oncology History:  Diagnosis: Stage IIIC2 serous endometrial cancer  Primary GYN oncologist: Dr. Kevin Henderson  Primary radiation oncologist: Dr. Ana Michelle  Surgery: 18: TLH-BSO, omentectomy, bilateral pelvic and para-aortic lymph node dissection, pelvic washings  Chemotherapy:  3/30/18-18: Six cycles of carboplatin AUC 6 and paclitaxel 175mg/m2, delivered sandwich style with pelvic radiation between cycles three and four  Radiation: 18-18: EBRT delivered to pelvis and para-aortic nodes, 5040 cGy in 28 fractions  18-18: HDR brachytherapy delivered to the vaginal cuff, 1200 cGy in two fractions  Complications: Transient neuropathy, mild thrombocytopenia and anemia  Genetic testing: Panel testing negative     19: Chest CT performed for pulmonary nodule follow up, concerning for recurrent disease  IMPRESSION:   1. Few bilateral sub-4 mm solid pulmonary nodules are unchanged compared to 2018. No new or enlarging pulmonary nodules.  2. Dilated main pulmonary artery, nonspecific but may be seen with pulmonary artery hypertension.  3. New 1.9 cm left subclavicular node.     19: PET impression:  In this 69-year-old female with history of stage IIIC2 serous endometrial cancer status post total hysterectomy and bilateral salpingo-oophorectomy, omentectomy, bilateral pelvic and periaortic dissection, and radiation to the pelvis, and brachytherapy to the vaginal cuff.  1. Progression of disease with new intensely FDG avid para-aortic, mediastinal, and subclavicular lymphadenopathy. The subdiaphragmatic periaortic lymphadenopathy spans from the diaphragmatic crura to the  low  abdominal aorta at the level of L4 with greater than 180 degree involvement of the aorta. There is additional lymphadenopathy within the left axilla and the right inguinal region.     9/28/19: Neck CT impression:  1. Progression of metastatic disease with new intensely FDG avid retroclavicular lymph node at the level of the left thoracic inlet.   2. On the fusion PET CT, there is no definite abnormal metabolic activity in the mucosal space, soft tissues, or cervical jamel chains.  3. No evidence of mucosal or soft tissue abnormality on contrast enhanced neck CT.  4. Please refer to the whole body PET CT performed as a separate report, for the findings of the remainder of the body.     10/24/19: Lymph node biopsy:  Lymph node, periaortic, CT guided core biopsy:   -METASTATIC ADENOCARCINOMA   -Tumor morphology is consistent with metastasis/recurrence of endometrial serous carcinoma      11/11/19: Cycle #1 paclitaxel 175mg/m2 + carboplatin AUC 6 + bevacizumab 15mg/kg  12/2/19: Cycle #2 paclitaxel 175mg/m2 + carboplatin AUC 6 + bevacizumab 15mg/kg. Carboplatin reaction.  12/27/19: Cycle #3 Paclitaxel/Avastin/inpatient Carboplatin desensitization.   1/22/2020: Cycle #4 Paclitaxel/Avastin/inpatient Carboplatin desensitization.   2/13/2020: Cycle #5 Paclitaxel/Avastin/inpatient Carboplatin desensitization.         Today she comes to clinic and states she was treated for a UTI and yeast infection; she completed her treatment earlier this week; she denies any sx today. She did take her Decadron at 3 am and has brought her pills with her to take at 9 am. She is taking an OTC Mg BID. She has some neuropathy in the tip of her thumbs. She does have DM and has not yet picked up a glucometer but is planning on it. She does use her husbands BP cuff and reports readings of 140's/80's; she did see her PCP last week and she is aware of her HTN. She denies any vaginal bleeding, no changes in her bowel or bladder habits, no  nausea/emesis, no lower extremity edema, and no difficulties eating or sleeping. She denies any abdominal discomfort/bloating, no fevers or chills, and no chest pain or shortness of breath but will have PELAEZ at times. She has not been taking her Claritin but has some at home. She will take Compazine as needed; she has been taking Prilosec more often for her GERD; she had more while on her antibiotics. She has been taking her B6 and L-glutamine. She does not need any medications refilled.           Review of Systems:    Systemic           no weight changes; no fever; no chills; no night sweats; no appetite changes  Skin           no rashes, or lesions  Eye           no irritation; no changes in vision  Zahida-Laryngeal           no dysphagia; no hoarseness   Pulmonary    no cough; no shortness of breath  Cardiovascular    no chest pain; no palpitations; + HTN  Gastrointestinal    no diarrhea; no constipation; no abdominal pain; no changes in bowel habits; no blood in stool  Genitourinary   no urinary frequency; no urinary urgency; no dysuria; no pain; no abnormal vaginal discharge; no abnormal vaginal bleeding  Breast    no breast discharge; no breast changes; no breast pain  Musculoskeletal    no myalgias; no arthralgias; no back pain  Psychiatric           no depressed mood; no anxiety    Hematologic              no tender lymph nodes; no noticeable swellings or lumps   Endocrine    no hot flashes; no heat/cold intolerance; + DM         Neurological   no tremor; + numbness and tingling; no headaches; no difficulty sleeping      Past Medical History:    Past Medical History:   Diagnosis Date     Diabetes (H)     Type II     Endometrial cancer determined by uterine biopsy (H) 02/2018     HTN (hypertension)      Obesity      Osteoarthritis          Past Surgical History:    Past Surgical History:   Procedure Laterality Date     CHOLECYSTECTOMY  1993     CYSTOSCOPY N/A 2/23/2018    Procedure: CYSTOSCOPY;;  Surgeon:  Kevin Henderson MD;  Location: [facility] OR     DAVINCI HYSTERECTOMY TOTAL, BILATERAL SALPINGO-OOPHORECTOMY, COMBINED N/A 2/23/2018    Procedure: COMBINED DAVINCI HYSTERECTOMY TOTAL, SALPINGO-OOPHORECTOMY;  DaVinci Assisted Total Laparoscopic Hysterectomy, Bilateral Salpingo-Oophorectomy, Cystoscopy,  Omental biopsy, omentectomy,bilateral  Pelvic And Para Aortic Lymph Node Dissection;  Surgeon: Kevin Henderson MD;  Location: [facility] OR     Partial lumbar discectomy  1998         Health Maintenance Due   Topic Date Due     DEXA  1950     LIPID  1950     MICROALBUMIN  1950     TSH W/FREE T4 REFLEX  1950     DIABETIC FOOT EXAM  1950     HEPATITIS C SCREENING  1950     ADVANCE CARE PLANNING  1950     EYE EXAM  1950     ZOSTER IMMUNIZATION (1 of 2) 04/07/2002     MEDICARE ANNUAL WELLNESS VISIT  04/07/2017     PNEUMOCOCCAL IMMUNIZATION 65+ HIGH/HIGHEST RISK (2 of 2 - PPSV23) 02/09/2018     A1C  05/09/2018     PHQ-2  01/01/2020       Current Medications:     Current Outpatient Medications   Medication Sig Dispense Refill     ACETAMINOPHEN PO Take 325 mg by mouth every 6 hours as needed for pain       amLODIPine (NORVASC) 10 MG tablet Take 1 tablet (10 mg) by mouth daily 30 tablet 0     dexamethasone (DECADRON) 4 MG tablet Take 5 tablets (20 mg) by mouth See Admin Instructions for 2 doses 10 tablet 3     Famotidine (PEPCID PO) Take 10 mg by mouth as needed        fluticasone (FLONASE) 50 MCG/ACT spray Spray 1-2 sprays into both nostrils daily 1 Bottle 1     L-Glutamine 500 MG CAPS        loperamide (IMODIUM) 2 MG capsule Take 2 mg by mouth 4 times daily as needed for diarrhea       loratadine (CLARITIN) 10 MG tablet Take 1 tablet (10 mg) by mouth daily 30 tablet 3     magnesium 500 MG TABS        METFORMIN HCL PO Take 500 mg by mouth daily (with breakfast)        omeprazole (PRILOSEC) 40 MG DR capsule        prochlorperazine (COMPAZINE) 10 MG tablet Take 0.5 tablets (5 mg) by  mouth every 6 hours as needed (nausea/vomiting) 30 tablet 2     pyridoxine (VITAMIN B-6) 50 MG TABS Take 1 tablet (50 mg) by mouth 2 times daily 60 tablet 3     triamterene-hydrochlorothiazide (DYAZIDE) 37.5-25 MG per capsule Take 1 capsule by mouth every morning            Allergies:        Allergies   Allergen Reactions     Carboplatin Anaphylaxis     Chest tightness, hoarse voice, difficulty breathing     Ace Inhibitors Cough     Amoxicillin Hives        Social History:     Social History     Tobacco Use     Smoking status: Former Smoker     Packs/day: 0.25     Years: 20.00     Pack years: 5.00     Last attempt to quit: 1991     Years since quittin.8     Smokeless tobacco: Never Used     Tobacco comment: Quite smoking    Substance Use Topics     Alcohol use: Yes     Comment: 4-7/week if out 1-2x's/week       History   Drug Use No         Family History:     The patient's family history is notable for:    Family History   Problem Relation Age of Onset     Colon Cancer Mother      Breast Cancer Sister      Lymphoma Sister          Physical Exam:     /77 (BP Location: Left arm, Patient Position: Sitting, Cuff Size: Adult Large)   Pulse 95   Temp 97.9  F (36.6  C) (Oral)   Resp 16   Wt 118.3 kg (260 lb 11.2 oz)   SpO2 97%   BMI 46.18 kg/m    Body mass index is 46.18 kg/m .    General Appearance: healthy and alert, no distress     HEENT: no thyromegaly, no palpable nodules or masses        Cardiovascular: regular rate and rhythm, no gallops, rubs or murmurs     Respiratory: lungs clear, no rales, rhonchi or wheezes, normal diaphragmatic excursion    Musculoskeletal: extremities non tender and without edema    Skin: no lesions or rashes     Neurological: normal gait, no gross defects     Psychiatric: appropriate mood and affect                               Hematological: normal cervical, supraclavicular lymph nodes     Gastrointestinal:       abdomen soft, non-tender,  non-distended    Genitourinary: deferred      Assessment:    Lu Smith is a 69 year old woman with a diagnosis of Recurrent high grade serous endometrial cancer.   She is here today for follow up and disease management.      25 minutes were spent with this patient, over 50% of that time was spent in symptom management, treatment planning and in counseling and coordination of care.      Plan:     1.)        Ok to proceed with planned inpatient chemotherapy pending labs are WNL; ok to proceed with platelets of 98; bleeding precautions reviewed. She will need to do a 24 h urine prior to her next cycle (to be dropped off 2 days beforehand). She will return in 3 weeks for her next cycle. Reviewed signs and symptoms for when she should contact the clinic or seek additional care. Patient to contact the clinic with any questions or concerns in the interim. Continue with B6, L-glutamine, and Claritin. Answered all of her questions to the best of my ability.      2.) Genetic risk factors were assessed and she was negative.    3.) Labs and/or tests ordered include:  Chemo labs. 24 h urine.      4.) Health maintenance issues addressed today include annual health maintenance and non-gynecologic issues with PCP. Continue to monitor BP at home.     ANU Ramey, WHNP-BC, ANP-BC  Women's Health Nurse Practitioner  Adult Nurse Pracitioner  Division of Gynecologic Oncology          CC  Patient Care Team:  Levar Scott as PCP - General (Internal Medicine)  Jeanne Blancas MD as Referring Physician (OB/Gyn)  SELF, REFERRED

## 2020-02-13 PROBLEM — N94.9: Status: ACTIVE | Noted: 2018-02-23

## 2020-02-13 PROBLEM — N94.9 GYNECOLOGICAL DISEASE: Status: ACTIVE | Noted: 2018-02-23

## 2020-02-13 PROBLEM — C56.9 OVARIAN CANCER (H): Status: ACTIVE | Noted: 2020-01-01

## 2020-02-13 PROBLEM — R80.9 PROTEINURIA, UNSPECIFIED TYPE: Status: ACTIVE | Noted: 2020-01-01

## 2020-02-13 NOTE — NURSING NOTE
"Oncology Rooming Note    February 13, 2020 7:30 AM   Lu Smith is a 69 year old female who presents for:    Chief Complaint   Patient presents with     Port Draw     labs drawn via port by rn. vs taken      Oncology Clinic Visit     Return Endometrial Ca     Initial Vitals: /77 (BP Location: Left arm, Patient Position: Sitting, Cuff Size: Adult Large)   Pulse 95   Temp 97.9  F (36.6  C) (Oral)   Resp 16   Wt 118.3 kg (260 lb 11.2 oz)   SpO2 97%   BMI 46.18 kg/m   Estimated body mass index is 46.18 kg/m  as calculated from the following:    Height as of 1/22/20: 1.6 m (5' 3\").    Weight as of this encounter: 118.3 kg (260 lb 11.2 oz). Body surface area is 2.29 meters squared.  No Pain (0) Comment: Data Unavailable   No LMP recorded. Patient has had a hysterectomy.  Allergies reviewed: Yes  Medications reviewed: Yes    Medications: Medication refills not needed today.  Pharmacy name entered into [employer]: [facility] - [place], [place] - [address]    Clinical concerns: Feels better from the bladder and yeast infection; blurry vision;       Martha Carroll [facility]              "

## 2020-02-13 NOTE — DISCHARGE SUMMARY
Gynecologic Oncology Discharge Summary    Lu Smith  [id number]    Admit Date: 2/13/2020  Discharge Date: 2/14/20  Admitting Provider: Dr Cadena  Discharge Provider: Dr Cadena    Admission Dx:   Recurrent serous endometrial cancer  Cycle #5 carboplatin desensitization/paclitaxel/graeme  Hx carbo reaction  T2DM  HTN  Osteoarthritis   Chemotherapy induced peripheral neuropathy   GERD  Anxiety  Proteinuria    Discharge Dx:  Recurrent serous endometrial cancer  Cycle #5 carboplatin desensitization/paclitaxel/graeme  Hx carbo reaction  T2DM  HTN  Osteoarthritis   Chemotherapy induced peripheral neuropathy   GERD  Anxiety  Proteinuria    Patient Active Problem List   Diagnosis     Gynecological disease     Gynecologic disease     Endometrial cancer (H)     Encounter for long-term (current) use of medications     Encounter for antineoplastic chemotherapy     Thyroid nodule     Pulmonary nodule     Obesity     Hypertension     Hyperlipidemia with target low density lipoprotein (LDL) cholesterol less than 100 mg/dL     Diabetes mellitus, type II (H)     Pulmonary nodules     Chemotherapy management, encounter for     Ovarian cancer (H)       Prior to Admission Medications:  Medications Prior to Admission   Medication Sig Dispense Refill Last Dose     ACETAMINOPHEN PO Take 325 mg by mouth every 6 hours as needed for pain   Taking     amLODIPine (NORVASC) 10 MG tablet Take 1 tablet (10 mg) by mouth daily 30 tablet 0 Taking     dexamethasone (DECADRON) 4 MG tablet Take 5 tablets (20 mg) by mouth See Admin Instructions for 2 doses 10 tablet 3 Taking     Famotidine (PEPCID PO) Take 10 mg by mouth as needed    Taking     fluticasone (FLONASE) 50 MCG/ACT spray Spray 1-2 sprays into both nostrils daily 1 Bottle 1 Taking     L-Glutamine 500 MG CAPS    Taking     loperamide (IMODIUM) 2 MG capsule Take 2 mg by mouth 4 times daily as needed for diarrhea   Taking     loratadine (CLARITIN) 10 MG tablet Take 1 tablet (10 mg) by mouth daily 30  tablet 3 Taking     magnesium 500 MG TABS    Taking     METFORMIN HCL PO Take 500 mg by mouth daily (with breakfast)    Taking     omeprazole (PRILOSEC) 40 MG DR capsule    Taking     prochlorperazine (COMPAZINE) 10 MG tablet Take 0.5 tablets (5 mg) by mouth every 6 hours as needed (nausea/vomiting) 30 tablet 2 Taking     pyridoxine (VITAMIN B-6) 50 MG TABS Take 1 tablet (50 mg) by mouth 2 times daily 60 tablet 3 Taking     triamterene-hydrochlorothiazide (DYAZIDE) 37.5-25 MG per capsule Take 1 capsule by mouth every morning    Taking       Discharge Medications:     Review of your medicines      CONTINUE these medicines which have NOT CHANGED      Dose / Directions   ACETAMINOPHEN PO      Dose:  325 mg  Take 325 mg by mouth every 6 hours as needed for pain  Refills:  0     amLODIPine 10 MG tablet  Commonly known as:  NORVASC  Used for:  Endometrial cancer (H)      Dose:  10 mg  Take 1 tablet (10 mg) by mouth daily  Quantity:  30 tablet  Refills:  0     dexamethasone 4 MG tablet  Commonly known as:  DECADRON  Used for:  Endometrial cancer (H)      Dose:  20 mg  Take 5 tablets (20 mg) by mouth See Admin Instructions for 2 doses  Quantity:  10 tablet  Refills:  3     fluticasone 50 MCG/ACT nasal spray  Commonly known as:  FLONASE  Used for:  Seasonal allergic rhinitis, unspecified trigger      Dose:  1-2 spray  Spray 1-2 sprays into both nostrils daily  Quantity:  1 Bottle  Refills:  1     L-Glutamine 500 MG Caps      Refills:  0     loperamide 2 MG capsule  Commonly known as:  IMODIUM      Dose:  2 mg  Take 2 mg by mouth 4 times daily as needed for diarrhea  Refills:  0     loratadine 10 MG tablet  Commonly known as:  Claritin  Used for:  Pain in joint, multiple sites, Chronic seasonal allergic rhinitis, unspecified trigger      Dose:  10 mg  Take 1 tablet (10 mg) by mouth daily  Quantity:  30 tablet  Refills:  3     magnesium 500 MG Tabs      Refills:  0     METFORMIN HCL PO      Dose:  500 mg  Take 500 mg by mouth  daily (with breakfast)  Refills:  0     omeprazole 40 MG DR capsule  Commonly known as:  priLOSEC      Refills:  0     PEPCID PO      Dose:  10 mg  Take 10 mg by mouth as needed  Refills:  0     prochlorperazine 10 MG tablet  Commonly known as:  COMPAZINE  Used for:  Encounter for long-term (current) use of medications, Endometrial cancer (H)      Dose:  5 mg  Take 0.5 tablets (5 mg) by mouth every 6 hours as needed (nausea/vomiting)  Quantity:  30 tablet  Refills:  2     triamterene-HCTZ 37.5-25 MG capsule  Commonly known as:  DYAZIDE      Dose:  1 capsule  Take 1 capsule by mouth every morning  Refills:  0     vitamin B6 50 MG Tabs  Commonly known as:  pyridOXINE  Used for:  Chemotherapy-induced peripheral neuropathy (H)      Dose:  50 mg  Take 1 tablet (50 mg) by mouth 2 times daily  Quantity:  60 tablet  Refills:  3            Consultations:  None    Brief History of Illness:  69 year old female with recurrent stage IIIC serous endometrial cancer who presents for C4 carboplatin desensitization, paclitaxel, and bevacizumab. Carboplatin reaction occurred on 12/2/2019 with chest tightness, hoarseness, and difficulty breathing near the end of her carboplatin infusion.    Hospital Course:  Dz:   -  Recurrent stage IIIC serous endometrial cancer. S/p cycle #5 carboplatin desensitization/paclitaxel/avastin. She will follow-up in clinic prior to next cycle of chemo. Will complete a 24 hour urine protein prior to next cycle of chemo.  FEN:   - She was tolerating a regular diet without nausea and vomiting and able to maintain her hydration without IVF supplementation. Magnesium level within normal limits. Continue mag supplement at home.  Pain:   - PRN tylenol.  Her pain was well controlled on this and she was discharged home with these medications.  CV:   - She has a history of HTN.  Continue home meds and follow up with PCP as instructed. Her vital signs were stable while in house and she had no acute CV  issues.  PULM:   - She has no history of pulmonary issues.  Her O2 sats were greater than 90% on RA.  She had no acute pulmonary issues while in house.  HEME:   - Her Hgb was 9.2.  Plt 98. Thrombocytopenic precautions provided. She had no other acute heme issues while in house.  GI:   - She was tolerating a regular diet without nausea and vomiting.  She will be discharged with a bowel regimen to prevent constipation.  She had no acute GI issues while in house.  :    -The patient was voiding spontaneously without difficulty.  She had no acute  issues while in house.  ID:   - The patient was AF during her hospitalization.    ENDO:   - No acute issues  PSYCH/NEURO:   - No acute issues  PPX:    - She was given SCDs and H2B during her hospital course.       Discharge Instructions and Follow up:  Ms. Lu Smith was discharged from the hospital with follow up for     Discharge Diet: Regular  Discharge Activity: Activity as tolerated  Discharge Follow up: 3/5/20 Lisa Barroso CNP      Discharge Disposition:  Discharged to home    Discharge Staff: Dr Henderson.     Myah Alanis MD  Gynecologic Oncology, PGY-1  02/14/20 12:40 PM       Provider Disclosure:   I agree with above History, Review of Systems, Physical exam and Plan. I have reviewed the content of the documentation and have edited it as needed. I have seen and personally performed the services documented here and the documentation accurately represents those services and the decisions I have made.     Electronically signed by:   Kevin Henderson MD   Gynecologic Oncology   [facility]

## 2020-02-13 NOTE — PROGRESS NOTES
DATE/TIME  (DOT-TD, DOT-NOW) CHEMO CHECK ACTIVITY (REGIMEN & DOSE CHECK, DAY, DOSE #, NAME OF CHEMO #1)  CHEMO DRUG #2  CHEMO DRUG #3 NAME OF RN #1 (USE DOT-ME HERE) NAME OF RN#2 (2ND RN TO LOG IN SEPARATELY)   02/13/2020  9:22 AM   Regimen and dose check, Bevacizumab Paclitaxel Carboplatin Deborah Madden, RN   SONJA GRIMALDO RN

## 2020-02-13 NOTE — LETTER
2020       RE: Lu Smith  [address]  [facility] [address]     Dear Colleague,    Thank you for referring your patient, Lu Smith, to the [facility]. Please see a copy of my visit note below.                Follow Up Notes on Referred Patient    Date: 2020         RE: Lu Smith  : 1950  HEIDY: 2020        Lu Smith is a 69 year old woman with a diagnosis of Recurrent high grade serous endometrial cancer.   She is here today for follow up and disease management.      Oncology History:  Diagnosis: Stage IIIC2 serous endometrial cancer  Primary GYN oncologist: Dr. Kevin Henderson  Primary radiation oncologist: Dr. Ana Michelle  Surgery: 18: TLH-BSO, omentectomy, bilateral pelvic and para-aortic lymph node dissection, pelvic washings  Chemotherapy:  3/30/18-18: Six cycles of carboplatin AUC 6 and paclitaxel 175mg/m2, delivered sandwich style with pelvic radiation between cycles three and four  Radiation: 18-18: EBRT delivered to pelvis and para-aortic nodes, 5040 cGy in 28 fractions  18-18: HDR brachytherapy delivered to the vaginal cuff, 1200 cGy in two fractions  Complications: Transient neuropathy, mild thrombocytopenia and anemia  Genetic testing: Panel testing negative     19: Chest CT performed for pulmonary nodule follow up, concerning for recurrent disease  IMPRESSION:   1. Few bilateral sub-4 mm solid pulmonary nodules are unchanged compared to 2018. No new or enlarging pulmonary nodules.  2. Dilated main pulmonary artery, nonspecific but may be seen with pulmonary artery hypertension.  3. New 1.9 cm left subclavicular node.     19: PET impression:  In this 69-year-old female with history of stage IIIC2 serous endometrial cancer status post total hysterectomy and bilateral salpingo-oophorectomy, omentectomy, bilateral pelvic and periaortic dissection, and radiation to the pelvis, and brachytherapy to  the vaginal cuff.  1. Progression of disease with new intensely FDG avid para-aortic, mediastinal, and subclavicular lymphadenopathy. The subdiaphragmatic periaortic lymphadenopathy spans from the diaphragmatic crura to the  low abdominal aorta at the level of L4 with greater than 180 degree involvement of the aorta. There is additional lymphadenopathy within the left axilla and the right inguinal region.     9/28/19: Neck CT impression:  1. Progression of metastatic disease with new intensely FDG avid retroclavicular lymph node at the level of the left thoracic inlet.   2. On the fusion PET CT, there is no definite abnormal metabolic activity in the mucosal space, soft tissues, or cervical jamel chains.  3. No evidence of mucosal or soft tissue abnormality on contrast enhanced neck CT.  4. Please refer to the whole body PET CT performed as a separate report, for the findings of the remainder of the body.     10/24/19: Lymph node biopsy:  Lymph node, periaortic, CT guided core biopsy:   -METASTATIC ADENOCARCINOMA   -Tumor morphology is consistent with metastasis/recurrence of endometrial serous carcinoma      11/11/19: Cycle #1 paclitaxel 175mg/m2 + carboplatin AUC 6 + bevacizumab 15mg/kg  12/2/19: Cycle #2 paclitaxel 175mg/m2 + carboplatin AUC 6 + bevacizumab 15mg/kg. Carboplatin reaction.  12/27/19: Cycle #3 Paclitaxel/Avastin/inpatient Carboplatin desensitization.   1/22/2020: Cycle #4 Paclitaxel/Avastin/inpatient Carboplatin desensitization.   2/13/2020: Cycle #5 Paclitaxel/Avastin/inpatient Carboplatin desensitization.         Today she comes to clinic and states she was treated for a UTI and yeast infection; she completed her treatment earlier this week; she denies any sx today. She did take her Decadron at 3 am and has brought her pills with her to take at 9 am. She is taking an OTC Mg BID. She has some neuropathy in the tip of her thumbs. She does have DM and has not yet picked up a glucometer but is  planning on it. She does use her husbands BP cuff and reports readings of 140's/80's; she did see her PCP last week and she is aware of her HTN. She denies any vaginal bleeding, no changes in her bowel or bladder habits, no nausea/emesis, no lower extremity edema, and no difficulties eating or sleeping. She denies any abdominal discomfort/bloating, no fevers or chills, and no chest pain or shortness of breath but will have PELAEZ at times. She has not been taking her Claritin but has some at home. She will take Compazine as needed; she has been taking Prilosec more often for her GERD; she had more while on her antibiotics. She has been taking her B6 and L-glutamine. She does not need any medications refilled.           Review of Systems:    Systemic           no weight changes; no fever; no chills; no night sweats; no appetite changes  Skin           no rashes, or lesions  Eye           no irritation; no changes in vision  Zahida-Laryngeal           no dysphagia; no hoarseness   Pulmonary    no cough; no shortness of breath  Cardiovascular    no chest pain; no palpitations; + HTN  Gastrointestinal    no diarrhea; no constipation; no abdominal pain; no changes in bowel habits; no blood in stool  Genitourinary   no urinary frequency; no urinary urgency; no dysuria; no pain; no abnormal vaginal discharge; no abnormal vaginal bleeding  Breast    no breast discharge; no breast changes; no breast pain  Musculoskeletal    no myalgias; no arthralgias; no back pain  Psychiatric           no depressed mood; no anxiety    Hematologic              no tender lymph nodes; no noticeable swellings or lumps   Endocrine    no hot flashes; no heat/cold intolerance; + DM         Neurological   no tremor; + numbness and tingling; no headaches; no difficulty sleeping      Past Medical History:    Past Medical History:   Diagnosis Date     Diabetes (H)     Type II     Endometrial cancer determined by uterine biopsy (H) 02/2018     HTN  (hypertension)      Obesity      Osteoarthritis          Past Surgical History:    Past Surgical History:   Procedure Laterality Date     CHOLECYSTECTOMY  1993     CYSTOSCOPY N/A 2/23/2018    Procedure: CYSTOSCOPY;;  Surgeon: Kevin Henderson MD;  Location: [facility] OR     DAVINCI HYSTERECTOMY TOTAL, BILATERAL SALPINGO-OOPHORECTOMY, COMBINED N/A 2/23/2018    Procedure: COMBINED DAVINCI HYSTERECTOMY TOTAL, SALPINGO-OOPHORECTOMY;  DaVinci Assisted Total Laparoscopic Hysterectomy, Bilateral Salpingo-Oophorectomy, Cystoscopy,  Omental biopsy, omentectomy,bilateral  Pelvic And Para Aortic Lymph Node Dissection;  Surgeon: Kevin Henderson MD;  Location: [facility] OR     Partial lumbar discectomy  1998         Health Maintenance Due   Topic Date Due     DEXA  1950     LIPID  1950     MICROALBUMIN  1950     TSH W/FREE T4 REFLEX  1950     DIABETIC FOOT EXAM  1950     HEPATITIS C SCREENING  1950     ADVANCE CARE PLANNING  1950     EYE EXAM  1950     ZOSTER IMMUNIZATION (1 of 2) 04/07/2002     MEDICARE ANNUAL WELLNESS VISIT  04/07/2017     PNEUMOCOCCAL IMMUNIZATION 65+ HIGH/HIGHEST RISK (2 of 2 - PPSV23) 02/09/2018     A1C  05/09/2018     PHQ-2  01/01/2020       Current Medications:     Current Outpatient Medications   Medication Sig Dispense Refill     ACETAMINOPHEN PO Take 325 mg by mouth every 6 hours as needed for pain       amLODIPine (NORVASC) 10 MG tablet Take 1 tablet (10 mg) by mouth daily 30 tablet 0     dexamethasone (DECADRON) 4 MG tablet Take 5 tablets (20 mg) by mouth See Admin Instructions for 2 doses 10 tablet 3     Famotidine (PEPCID PO) Take 10 mg by mouth as needed        fluticasone (FLONASE) 50 MCG/ACT spray Spray 1-2 sprays into both nostrils daily 1 Bottle 1     L-Glutamine 500 MG CAPS        loperamide (IMODIUM) 2 MG capsule Take 2 mg by mouth 4 times daily as needed for diarrhea       loratadine (CLARITIN) 10 MG tablet Take 1 tablet (10 mg) by mouth daily 30  tablet 3     magnesium 500 MG TABS        METFORMIN HCL PO Take 500 mg by mouth daily (with breakfast)        omeprazole (PRILOSEC) 40 MG DR capsule        prochlorperazine (COMPAZINE) 10 MG tablet Take 0.5 tablets (5 mg) by mouth every 6 hours as needed (nausea/vomiting) 30 tablet 2     pyridoxine (VITAMIN B-6) 50 MG TABS Take 1 tablet (50 mg) by mouth 2 times daily 60 tablet 3     triamterene-hydrochlorothiazide (DYAZIDE) 37.5-25 MG per capsule Take 1 capsule by mouth every morning            Allergies:        Allergies   Allergen Reactions     Carboplatin Anaphylaxis     Chest tightness, hoarse voice, difficulty breathing     Ace Inhibitors Cough     Amoxicillin Hives        Social History:     Social History     Tobacco Use     Smoking status: Former Smoker     Packs/day: 0.25     Years: 20.00     Pack years: 5.00     Last attempt to quit: 1991     Years since quittin.8     Smokeless tobacco: Never Used     Tobacco comment: Quite smoking    Substance Use Topics     Alcohol use: Yes     Comment: 4-7/week if out 1-2x's/week       History   Drug Use No         Family History:     The patient's family history is notable for:    Family History   Problem Relation Age of Onset     Colon Cancer Mother      Breast Cancer Sister      Lymphoma Sister          Physical Exam:     /77 (BP Location: Left arm, Patient Position: Sitting, Cuff Size: Adult Large)   Pulse 95   Temp 97.9  F (36.6  C) (Oral)   Resp 16   Wt 118.3 kg (260 lb 11.2 oz)   SpO2 97%   BMI 46.18 kg/m     Body mass index is 46.18 kg/m .    General Appearance: healthy and alert, no distress     HEENT: no thyromegaly, no palpable nodules or masses        Cardiovascular: regular rate and rhythm, no gallops, rubs or murmurs     Respiratory: lungs clear, no rales, rhonchi or wheezes, normal diaphragmatic excursion    Musculoskeletal: extremities non tender and without edema    Skin: no lesions or rashes     Neurological: normal gait, no  gross defects     Psychiatric: appropriate mood and affect                               Hematological: normal cervical, supraclavicular lymph nodes     Gastrointestinal:       abdomen soft, non-tender, non-distended    Genitourinary: deferred      Assessment:    Lu Smith is a 69 year old woman with a diagnosis of Recurrent high grade serous endometrial cancer.   She is here today for follow up and disease management.      25 minutes were spent with this patient, over 50% of that time was spent in symptom management, treatment planning and in counseling and coordination of care.      Plan:     1.)        Ok to proceed with planned inpatient chemotherapy pending labs are WNL; ok to proceed with platelets of 98; bleeding precautions reviewed. She will need to do a 24 h urine prior to her next cycle (to be dropped off 2 days beforehand). She will return in 3 weeks for her next cycle. Reviewed signs and symptoms for when she should contact the clinic or seek additional care. Patient to contact the clinic with any questions or concerns in the interim. Continue with B6, L-glutamine, and Claritin. Answered all of her questions to the best of my ability.      2.) Genetic risk factors were assessed and she was negative.    3.) Labs and/or tests ordered include:  Chemo labs. 24 h urine.      4.) Health maintenance issues addressed today include annual health maintenance and non-gynecologic issues with PCP. Continue to monitor BP at home.     ANU Ramey, WHNP-BC, ANP-BC  Women's Health Nurse Practitioner  Adult Nurse Pracitioner  Division of Gynecologic Oncology          CC  Patient Care Team:  Levar Scott as PCP - General (Internal Medicine)  Jeanne Blancas MD as Referring Physician (OB/Gyn)

## 2020-02-13 NOTE — PATIENT INSTRUCTIONS
Need to do a 24 hour urine which needs to be dropped off 3/3 at the [facility]

## 2020-02-13 NOTE — H&P
[facility] History and Physical    Lu Smith MRN# [id number]   Age: 69 year old YOB: 1950     Date of Admission:  2/13/2020    Primary care provider: Levar Scott             Chief Complaint:   Cycle #5 Carboplatin desensitization, paclitaxel, bevacizumab  Recurrent stage IIIC serous endometrial cancer         History of Present Illness:   This patient is a 69 year old female with recurrent stage IIIC serous endometrial cancer who presents for C5 carboplatin desensitization, paclitaxel, and bevacizumab. History of a carboplatin reaction on 12/2/2019 with chest tightness, hoarseness, and difficulty breathing near the end of her carboplatin infusion.      She reports overall feeling well though has noticed increased fatigued. She saw her PCP last week for UTI/yeast infection and HTN follow up. Her BP has been 140s/80s and she is keeping a record to give to her doctor.She was treated for a UTI and yeast infection since her last cycle of chemo and completed her treatment earlier this week. Reports hematuria and vulvar/vaginal irritation has resolved. She took decadron premedication as directed. Has had decreased appetite. Continues to have neuropathy in bilateral hands and feet that has not worsended. She continues to have dyspnea with exertion. Takes magnesium supplement BID for hx of hypomagnesemia. Denies fevers, chills, chest pain, palpitations, shortness of breath, abdominal pain, change in bowel habits, blood in stool, urinary symptoms, or other systemic complaints. She denies any abdominal discomfort/bloating, N/V. Prilosec has been helpful for her GERD.          Cancer Treatment History:     2/23/18: TLH-BSO, omentectomy, bilateral pelvic and para-aortic lymph node dissection, pelvic washings.  3/30/18-9/28/18: Six cycles of carboplatin AUC 6 and paclitaxel 175mg/m2, delivered sandwich style with pelvic radiation between cycles three and four  6/4/18-7/12/18: EBRT delivered to pelvis  and para-aortic nodes, 5040 cGy in 28 fractions  7/16/18-7/20/18: HDR brachytherapy delivered to the vaginal cuff, 1200 cGy in two fractions  Complications: Transient neuropathy, mild thrombocytopenia and anemia  Genetic testing: Panel testing negative     9/28/19: PET impression: Progression of disease with new intensely FDG avid para-aortic, mediastinal, and subclavicular lymphadenopathy. The subdiaphragmatic periaortic lymphadenopathy spans from the diaphragmatic crura to the low abdominal aorta at the level of L4 with greater than 180 degree involvement of the aorta. There is additional lymphadenopathy within the left axilla and the right inguinal region.     9/28/19: Neck CT impression:  1. Progression of metastatic disease with new intensely FDG avid retroclavicular lymph node at the level of the left thoracic inlet.   2. On the fusion PET CT, there is no definite abnormal metabolic activity in the mucosal space, soft tissues, or cervical jamel chains.  3. No evidence of mucosal or soft tissue abnormality on contrast enhanced neck CT.  4. Please refer to the whole body PET CT performed as a separate report, for the findings of the remainder of the body.     10/24/19: Lymph node biopsy:  Lymph node, periaortic, CT guided core biopsy:   -METASTATIC ADENOCARCINOMA   -Tumor morphology is consistent with metastasis/recurrence of endometrial serous carcinoma      11/11/19: C1 paclitaxel 175mg/m2 + carboplatin AUC 6 + bevacizumab 15mg/kg  12/2/19: C2 paclitaxel 175mg/m2 + carboplatin AUC 6 + bevacizumab 15mg/kg. Carboplatin reaction.  12/27/19: Cycle 3 Paclitaxel/Avastin/inpatient Carboplatin desensitization.   1/22/20: Cycle 4 paclitaxel/bevacizumab/carboplatin desensitization  2/13/20: Cycle 5 paclitaxel/bevacizumab/carboplatin desensitization         Past Medical History:     Past Medical History:   Diagnosis Date     Diabetes (H)     Type II     Endometrial cancer determined by uterine biopsy (H) 02/2018     HTN  (hypertension)      Obesity      Osteoarthritis             Past Surgical History:      Past Surgical History:   Procedure Laterality Date     CHOLECYSTECTOMY       CYSTOSCOPY N/A 2018    Procedure: CYSTOSCOPY;;  Surgeon: Kevin Henderson MD;  Location: [facility] OR     DAVINCI HYSTERECTOMY TOTAL, BILATERAL SALPINGO-OOPHORECTOMY, COMBINED N/A 2018    Procedure: COMBINED DAVINCI HYSTERECTOMY TOTAL, SALPINGO-OOPHORECTOMY;  DaVinci Assisted Total Laparoscopic Hysterectomy, Bilateral Salpingo-Oophorectomy, Cystoscopy,  Omental biopsy, omentectomy,bilateral  Pelvic And Para Aortic Lymph Node Dissection;  Surgeon: Kevin Henderson MD;  Location: [facility] OR     Partial lumbar discectomy              Social History:     Social History     Tobacco Use     Smoking status: Former Smoker     Packs/day: 0.25     Years: 20.00     Pack years: 5.00     Last attempt to quit: 1991     Years since quittin.8     Smokeless tobacco: Never Used     Tobacco comment: Quite smoking    Substance Use Topics     Alcohol use: Yes     Comment: 4-7/week if out 1-2x's/week            Family History:     Family History   Problem Relation Age of Onset     Colon Cancer Mother      Breast Cancer Sister      Lymphoma Sister             Immunizations:     Immunization History   Administered Date(s) Administered     DTaP, Unspecified 2011     Influenza (High Dose) 3 valent vaccine 2018, 10/30/2019     Pneumo Conj 13-V (2010&after) 12/15/2017            Allergies:     Allergies   Allergen Reactions     Carboplatin Anaphylaxis     Chest tightness, hoarse voice, difficulty breathing     Ace Inhibitors Cough     Amoxicillin Hives            Medications:     Medications Prior to Admission   Medication Sig Dispense Refill Last Dose     ACETAMINOPHEN PO Take 325 mg by mouth every 6 hours as needed for pain   Taking     amLODIPine (NORVASC) 10 MG tablet Take 1 tablet (10 mg) by mouth daily 30 tablet 0 Taking      dexamethasone (DECADRON) 4 MG tablet Take 5 tablets (20 mg) by mouth See Admin Instructions for 2 doses 10 tablet 3 Taking     Famotidine (PEPCID PO) Take 10 mg by mouth as needed    Taking     fluticasone (FLONASE) 50 MCG/ACT spray Spray 1-2 sprays into both nostrils daily 1 Bottle 1 Taking     L-Glutamine 500 MG CAPS    Taking     loperamide (IMODIUM) 2 MG capsule Take 2 mg by mouth 4 times daily as needed for diarrhea   Taking     loratadine (CLARITIN) 10 MG tablet Take 1 tablet (10 mg) by mouth daily 30 tablet 3 Taking     magnesium 500 MG TABS    Taking     METFORMIN HCL PO Take 500 mg by mouth daily (with breakfast)    Taking     omeprazole (PRILOSEC) 40 MG DR capsule    Taking     prochlorperazine (COMPAZINE) 10 MG tablet Take 0.5 tablets (5 mg) by mouth every 6 hours as needed (nausea/vomiting) 30 tablet 2 Taking     pyridoxine (VITAMIN B-6) 50 MG TABS Take 1 tablet (50 mg) by mouth 2 times daily 60 tablet 3 Taking     triamterene-hydrochlorothiazide (DYAZIDE) 37.5-25 MG per capsule Take 1 capsule by mouth every morning    Taking              Review of Systems:     Systemic               no weight changes; no fever; no chills; no night sweats; + decrease appetite changes  Skin               no rashes, or lesions  Eye               no irritation; no changes in vision  Pulmonary               no cough; no shortness of breath  Cardiovascular               no chest pain; no palpitations; + HTN + chronic LE edema L>R  Gastrointestinal               no diarrhea; + occasioinal constipation; no abdominal pain; no changes in bowel habits; no blood in stool  Genitourinary              no urinary frequency; no urinary urgency; no dysuria; no pain; no abnormal vaginal discharge; no abnormal vaginal bleeding  Musculoskeletal               no myalgias; no arthralgias; no back pain  Psychiatric               no depressed mood; no anxiety    Hematologic              no tender lymph nodes; no noticeable swellings or lumps  "  Endocrine               no hot flashes; no heat/cold intolerance; + DM         Neurological              no tremor; + numbness and tingling; no headaches; no difficulty sleeping         Physical Exam:     Vitals:    02/13/20 0902 02/13/20 0926   BP: (!) 154/76 139/75   BP Location: Left arm Left arm   Cuff Size:  Adult Large   Pulse: 93 80   Resp: 18    Temp: 95.5  F (35.3  C)    TempSrc: Oral    SpO2: 98%    Weight: 117.8 kg (259 lb 9.6 oz)    Height: 1.626 m (5' 4\")      General: Alert and oriented. Pleasant.  CV: HR regular  Resp: LS clear  GI: Soft, NT, No rebound tenderness or guarding  Extremities: Trace edema in BLE L>R  Neuro: Moving all extremities without difficulties, no noticeable deficits  Psych: Appropriate mood and affect  Skin: No rashes, bruises notable.    Labs/Imaging/Diagnostic Tests     Results for orders placed or performed in visit on 02/13/20 (from the past 24 hour(s))   Protein qualitative urine   Result Value Ref Range    Protein Albumin Urine 30 (A) NEG^Negative mg/dL   CBC with platelets differential   Result Value Ref Range    WBC 6.4 4.0 - 11.0 10e9/L    RBC Count 3.10 (L) 3.8 - 5.2 10e12/L    Hemoglobin 9.2 (L) 11.7 - 15.7 g/dL    Hematocrit 27.7 (L) 35.0 - 47.0 %    MCV 89 78 - 100 fl    MCH 29.7 26.5 - 33.0 pg    MCHC 33.2 31.5 - 36.5 g/dL    RDW 20.7 (H) 10.0 - 15.0 %    Platelet Count 98 (L) 150 - 450 10e9/L    Diff Method Automated Method     % Neutrophils 72.5 %    % Lymphocytes 19.5 %    % Monocytes 4.9 %    % Eosinophils 2.0 %    % Basophils 0.5 %    % Immature Granulocytes 0.6 %    Nucleated RBCs 0 0 /100    Absolute Neutrophil 4.6 1.6 - 8.3 10e9/L    Absolute Lymphocytes 1.2 0.8 - 5.3 10e9/L    Absolute Monocytes 0.3 0.0 - 1.3 10e9/L    Absolute Eosinophils 0.1 0.0 - 0.7 10e9/L    Absolute Basophils 0.0 0.0 - 0.2 10e9/L    Abs Immature Granulocytes 0.0 0 - 0.4 10e9/L    Absolute Nucleated RBC 0.0    Comprehensive metabolic panel   Result Value Ref Range    Sodium 137 133 - " 144 mmol/L    Potassium 3.9 3.4 - 5.3 mmol/L    Chloride 105 94 - 109 mmol/L    Carbon Dioxide 28 20 - 32 mmol/L    Anion Gap 5 3 - 14 mmol/L    Glucose 160 (H) 70 - 99 mg/dL    Urea Nitrogen 21 7 - 30 mg/dL    Creatinine 0.75 0.52 - 1.04 mg/dL    GFR Estimate 82 >60 mL/min/[1.73_m2]    GFR Estimate If Black >90 >60 mL/min/[1.73_m2]    Calcium 9.1 8.5 - 10.1 mg/dL    Bilirubin Total 0.3 0.2 - 1.3 mg/dL    Albumin 3.2 (L) 3.4 - 5.0 g/dL    Protein Total 7.3 6.8 - 8.8 g/dL    Alkaline Phosphatase 65 40 - 150 U/L    ALT 37 0 - 50 U/L    AST 24 0 - 45 U/L   Magnesium   Result Value Ref Range    Magnesium 1.7 1.6 - 2.3 mg/dL               Assessment and Plan:   Assessment & Plan: 69 year old with recurrent Stage IIIC2 serous endometrial cancer admitted for cycle 5 carboplatin desensitization/Taxol/Avastin. Patient had initial carboplatin reaction on 12/2/2019 with chest tightness and hoarseness.      Active Problem List:  Recurrent serous endometrial cancer  T2DM  HTN  Osteoarthritis   Chemotherapy induced peripheral neuropathy   GERD  Anxiety  Proteinuria     Dz: Recurrent serous endometrial cancer. Cycle #5 carboplatin desensitization/taxol/avastin. Took premed prior to admission. Plan for 24 hour urine protein prior to next cycle.  FEN: Regular diet. Electrolytes wnl  Pain: continued home acetaminophen PRN  Heme: chronic normocytic anemia. Chemo induced anemia, thrombocytopenia. Values acceptable to continue with chemo today.  CV: HTN continue home Dyazide and Novasc. Hold Avastin if BP persistently greater than 150/100.  Pulm: No issues  GI: PRN compazine, PRN bowel regimen. GERD - Patient will receive [id number] and home PPI.  : No issues. Voiding spontaneously.   ID: No issues, afebrile   Endocrine: T2DM. Continue home metformin   Psych/Neuro/MSK: Reports low mood intermittently and anxiety. Not taking any medications. Osteoarthritis PRN tylenol  PPX: ambulation  Dispo: discharge home when chemo  complete       Thais Fatima CNP  2/13/2020 10:22 AM         I have discussed this patient's assessment and plan with DESTINY Fatima. I did not personally see the patient. Agree with plan above.    Yulisa Cadena MD, MS, FACOG, FACS  2/14/2020  4:23 PM

## 2020-02-13 NOTE — PLAN OF CARE
Admitted/transferred from: Admissions for chemotherapy   2 RN full   skin assessment completed by Deborah Madden RN and Savita Franco, PEPE.  Skin assessment finding: ecchymosis to underarm of RUE, right chest Port-a-cath accessed with needle, darkened skin to BLE on shins at baseline.  Interventions/actions: Standard Harry Interventions implemented. Patient educated on thrombocytopenia and bruise/bleeding prevention.    Will continue to monitor.     Deborah Madden RN on 2/13/2020 at 10:15 AM

## 2020-02-14 NOTE — PLAN OF CARE
[id number] Carbo desensitization bag #4 currently infusing, tolerating well. No signs or symptoms of reaction. Port with +blood return. Tylenol given x1 for headache. Passing flatus, no BM this shift. Voiding spontaneously, not saving. On regular diet. Denies nausea. Up ad latonya. BP elevated x1 - 157/76, MD notified. OVSS on room air. Plans for discharge home today after chemo is complete.

## 2020-02-14 NOTE — PROGRESS NOTES
Observation Goals:   - vital signs normal or at patient baseline: Met.    - tolerating oral intake to maintain hydration: Met.  - safe disposition plan has been identified: Met.  - chemo complete: Met. Chemo complete.    Observation goals met. Pt ready to discharge.    AVSS per baseline. Denies pain and nausea. On a regular diet, tolerating well. Passing flatus and last BM was yesterday. Voiding spontaneously. Up ab latonya. Carbo bag number 4 done at 1240. Port with good blood return and needle de accessed. Pt discharged home via family car at 1300.

## 2020-02-14 NOTE — PROGRESS NOTES
Observation Goals:      1. vital signs normal or at patient baseline- MET  2. tolerating oral intake to maintain hydration- MET  3. safe disposition plan has been identified- MET  4. chemo complete- NOT MET

## 2020-02-14 NOTE — PROGRESS NOTES
Observation Goals:   - vital signs normal or at patient baseline: Met. BP slightly elevated 157/76, MD notified.   - tolerating oral intake to maintain hydration: Met.  - safe disposition plan has been identified: Met.  - chemo complete: Not met.  Nurse to notify provider when observation goals have been met and patient is ready for discharge.

## 2020-02-14 NOTE — PROGRESS NOTES
Gynecologic Oncology Progress Note         Date: 2/14/20  Subjective:   Patient is feeling a little congested, otherwise well this AM. Tolerated carboplatin overnight w/o difficulty. Eating and drinking. Voiding frequently. Does not need any meds for discharge.     Objective:   Patient Vitals for the past 12 hrs:   BP Temp Temp src Pulse Resp SpO2   02/14/20 0330 (!) 157/76 96.4  F (35.8  C) Oral 88 16 97 %   02/14/20 0300 134/68 -- -- 82 -- 94 %   02/14/20 0255 135/71 -- -- 85 -- 96 %   02/14/20 0130 (!) 140/72 -- -- 84 -- 95 %   02/14/20 0115 128/68 -- -- 87 -- 94 %   02/14/20 0100 136/73 -- -- 97 -- 97 %   02/14/20 0045 133/71 -- -- 85 -- 95 %   02/14/20 0030 138/73 96.6  F (35.9  C) Oral 86 16 96 %   02/14/20 0015 134/68 -- -- 85 -- 97 %   02/14/20 0000 137/69 -- -- 85 -- 94 %   02/13/20 2345 (!) 144/69 -- -- 89 -- 95 %   02/13/20 2330 (!) 145/81 -- -- 92 -- 95 %   02/13/20 2315 (!) 142/78 -- -- 93 -- 95 %   02/13/20 2300 (!) 140/78 -- -- 87 -- 95 %   02/13/20 2245 (!) 144/78 -- -- 88 -- 95 %   02/13/20 2234 (!) 148/77 -- -- 85 16 96 %   02/13/20 2230 (!) 146/79 96.3  F (35.7  C) Oral 86 16 96 %   02/13/20 2145 (!) 143/75 -- -- 86 -- 94 %   02/13/20 2130 125/62 -- -- 88 -- 95 %   02/13/20 2115 132/71 -- -- 85 -- 96 %   02/13/20 2100 133/69 -- -- 84 -- 96 %   02/13/20 2045 (!) 145/77 96.8  F (36  C) -- 86 16 97 %   02/13/20 2000 133/71 -- -- 88 -- 96 %   02/13/20 1930 138/73 -- -- 92 -- 97 %   02/13/20 1915 125/68 -- -- 91 -- 96 %   02/13/20 1900 122/68 96.9  F (36.1  C) Oral 87 -- 96 %     EXAM:   General: appears comfortable, in NAD  CV: RRR, no murmurs noted  Resp: CTAB, no increased work of breathing  Abdomen: soft, nontender, nondistended  Extremities: nontender, no edema    Intake/Output (24 Hrs // Since MN)   mL // not recorded  IVF // 612 mL     UOP 3 x // 3x    Labs/Imaging:  No new labs this AM    Assessment: Lu Smith is a 69 year old female HD#2 for cycle 5 carboplatin  desensitization/Taxol/Avastin.    Dz: Recurrent serous endometrial cancer. Cycle #5 carboplatin desensitization/taxol/avastin. Initial rxn on 12/2/2019 with chest tightness and hoarseness.   FEN: Regular diet. Electrolytes wnl.  Pain: PTA Tylenol PRN  Heme: Chronic normocytic/chemo induced anemia.   CV: HTN , PTA Dyazide and Novasc.   GI: PRN compazine, bowel regimen. GERD - H2 Blocker.  : Elevated urine protein on admission, collecting 24 hr protein.   ID: Afebrile, no neutropenia.   Endocrine: T2DM, PTA metformin   Psych/Neuro/MSK: Low mood/anxiety, no meds.  PPX: Ambulation  Dispo: Discharge home when chemo complete     Courtney Benjamin MD  Ob/Gyn, PGY4      Provider Disclosure:   I agree with above History, Review of Systems, Physical exam and Plan. I have reviewed the content of the documentation and have edited it as needed. I have seen and personally performed the services documented here and the documentation accurately represents those services and the decisions I have made.     Electronically signed by:   Kevin Henderson MD   Gynecologic Oncology   [facility]

## 2020-02-14 NOTE — PLAN OF CARE
VSS. Pt up ad latonya. Voids spont not saving. Tolerating regular diet. Taxol given. Carboplatin bag #2 currently infusing. Brisk blood return noted through port. Pt tolerating infusions well. Cont. POC.

## 2020-02-14 NOTE — PROGRESS NOTES
Observation Goals:   - vital signs normal or at patient baseline: Met.    - tolerating oral intake to maintain hydration: Met.  - safe disposition plan has been identified: Met.  - chemo complete: Not met. Chemo to be done around 1240.   Nurse to notify provider when observation goals have been met and patient is ready for discharge

## 2020-02-14 NOTE — PROGRESS NOTES
Observation Goals:   - vital signs normal or at patient baseline: Met.  - tolerating oral intake to maintain hydration: Met.  - safe disposition plan has been identified: Met.  - chemo complete: Not met.  Nurse to notify provider when observation goals have been met and patient is ready for discharge.

## 2020-02-17 NOTE — PROGRESS NOTES
I called Lu for post hospital follow up.     She started feeling crummy yesterday, but her sister is coming to help her. She feels a little congested but is doing ok. We talked about making sure she is hydrated and eating small meals to keep her strength. She will also be trying protein shakes. She knows to call with symptoms.    Astrid Bernal RN

## 2020-02-28 NOTE — TELEPHONE ENCOUNTER
Spoke to Lu to reminder her of her 24 hour urine collection that will be due next Tuesday 3/3/2020.  Patient is planning on it and will drop urine collection to 1st floor pharmacy next Tuesday.

## 2020-03-02 NOTE — PROGRESS NOTES
Follow Up Notes on Referred Patient    Date: 3/5/2020        RE: Lu Smith  : 1950  HEIDY: 3/5/2020      Lu Smith is a 69 year old woman with a diagnosis of recurrent high grade serous endometrial cancer.   She is here today for follow up and disease management.      Oncology History:  Diagnosis: Stage IIIC2 serous endometrial cancer  Primary GYN oncologist: Dr. Kevin Henderson  Primary radiation oncologist: Dr. Ana Michelle  Surgery: 18: TLH-BSO, omentectomy, bilateral pelvic and para-aortic lymph node dissection, pelvic washings  Chemotherapy:  3/30/18-18: Six cycles of carboplatin AUC 6 and paclitaxel 175mg/m2, delivered sandwich style with pelvic radiation between cycles three and four  Radiation: 18-18: EBRT delivered to pelvis and para-aortic nodes, 5040 cGy in 28 fractions  18-18: HDR brachytherapy delivered to the vaginal cuff, 1200 cGy in two fractions  Complications: Transient neuropathy, mild thrombocytopenia and anemia  Genetic testing: Panel testing negative     19: Chest CT performed for pulmonary nodule follow up, concerning for recurrent disease  IMPRESSION:   1. Few bilateral sub-4 mm solid pulmonary nodules are unchanged compared to 2018. No new or enlarging pulmonary nodules.  2. Dilated main pulmonary artery, nonspecific but may be seen with pulmonary artery hypertension.  3. New 1.9 cm left subclavicular node.     19: PET impression:  In this 69-year-old female with history of stage IIIC2 serous endometrial cancer status post total hysterectomy and bilateral salpingo-oophorectomy, omentectomy, bilateral pelvic and periaortic dissection, and radiation to the pelvis, and brachytherapy to the vaginal cuff.  1. Progression of disease with new intensely FDG avid para-aortic, mediastinal, and subclavicular lymphadenopathy. The subdiaphragmatic periaortic lymphadenopathy spans from the diaphragmatic crura to the  low abdominal  aorta at the level of L4 with greater than 180 degree involvement of the aorta. There is additional lymphadenopathy within the left axilla and the right inguinal region.     9/28/19: Neck CT impression:  1. Progression of metastatic disease with new intensely FDG avid retroclavicular lymph node at the level of the left thoracic inlet.   2. On the fusion PET CT, there is no definite abnormal metabolic activity in the mucosal space, soft tissues, or cervical jamel chains.  3. No evidence of mucosal or soft tissue abnormality on contrast enhanced neck CT.  4. Please refer to the whole body PET CT performed as a separate report, for the findings of the remainder of the body.     10/24/19: Lymph node biopsy:  Lymph node, periaortic, CT guided core biopsy:   -METASTATIC ADENOCARCINOMA   -Tumor morphology is consistent with metastasis/recurrence of endometrial serous carcinoma      11/11/19: Cycle #1 paclitaxel 175mg/m2 + carboplatin AUC 6 + bevacizumab 15mg/kg  12/2/19: Cycle #2 paclitaxel 175mg/m2 + carboplatin AUC 6 + bevacizumab 15mg/kg. Carboplatin reaction.  12/27/19: Cycle #3 Paclitaxel/Avastin/inpatient Carboplatin desensitization.   1/20/2020: CT cap IMPRESSION:  1. Mosaic attenuation throughout the lungs with associated dilated main pulmonary artery likely due to pulmonary arterial hypertension in the appropriate clinical setting.  2. Stable sub-4 mm bilateral pulmonary nodules. No new or enlarging pulmonary nodules.  3. Interval improvement of previously seen enlarged lymph nodes in the lower neck, chest, abdomen and pelvis. No new or enlarging lymph nodes in the present study.  4. Stable soft tissue mesenteric nodules as well as mesenteric root fat stranding.  5. Hepatic steatosis. Additional incidental findings as described above.  1/22/2020: Cycle #4 Paclitaxel/Avastin/inpatient Carboplatin desensitization.   2/13/2020: Cycle #5 Paclitaxel/Avastin/inpatient Carboplatin desensitization.   3/3/2020: 24 h urine  "protein 0.15  3/5/2020: Cycle #6 Paclitaxel/Avastin/inpatient Carboplatin desensitization.         Today she comes to clinic feeling generally well; she has noticed some more fatigue as her treatment continues. She did take her Decadron at 3 am and has her pills with her to take at 9 am. She checks her BP at home and reports readings of 120's/70-80's; her BP is improved since her Norvasc was increased. She will take Compazine as needed and does not need any medications refilled. She is taking her Claritin for 7 days following her chemo. She has slight neuropathy in her thumbs and the balls of her feet but has had this for many years. She denies any vaginal bleeding, no changes in her bowel or bladder habits, no nausea/emesis, no lower extremity edema, and no difficulties eating or sleeping. She denies any abdominal discomfort/bloating, no fevers or chills, and no chest pain or shortness of breath. She reports being \"anemic\" most of her life. She has DM and does not check her BG; she is taking Metformin. She is taking the B6 and L-glutamine. She is taking an OTC Mg.         Review of Systems     Constitutional:  Positive for fatigue and decreased appetite. Negative for fever, chills, weight loss, weight gain, night sweats, recent stressors, height loss, post-operative complications, incisional pain, hallucinations, increased energy, hyperactivity and confused.   HENT:  Positive for mouth sores, taste disturbance, dry mouth, sinus pain and sinus congestion. Negative for ear pain, hearing loss, tinnitus, nosebleeds, trouble swallowing, hoarse voice, sore throat, ear discharge, tooth pain, gum tenderness, smell disturbance, hearing aid, bleeding gums and neck mass.    Eyes:  Positive for eye watering. Negative for double vision, pain, redness, eye pain, decreased vision, eye bulging, eye dryness, flashing lights, spots, floaters, strabismus, tunnel vision, jaundice and eye irritation.   Respiratory:   Negative for " cough, hemoptysis, sputum production, shortness of breath, wheezing, sleep disturbances due to breathing, snores loudly, respiratory pain, dyspnea on exertion, cough disturbing sleep and postural dyspnea.    Cardiovascular:  Negative for chest pain, dyspnea on exertion, palpitations, orthopnea, claudication, leg swelling, fingers/toes turn blue, hypertension, hypotension, syncope, history of heart murmur, chest pain on exertion, chest pain at rest, pacemaker, few scattered varicosities, leg pain, sleep disturbances due to breathing, tachycardia, light-headedness, exercise intolerance and edema.   Gastrointestinal:  Positive for nausea, abdominal pain and constipation. Negative for heartburn, vomiting, diarrhea, blood in stool, melena, rectal pain, bloating, bowel incontinence, jaundice, coffee ground emesis and change in stool.   Genitourinary:  Negative for bladder incontinence, dysuria, urgency, hematuria, flank pain, vaginal discharge, difficulty urinating, genital sores, dyspareunia, decreased libido, nocturia, voiding less frequently, arousal difficulty, abnormal vaginal bleeding, excessive menstruation, menstrual changes, hot flashes, vaginal dryness and postmenopausal bleeding.   Musculoskeletal:  Positive for myalgias, back pain and muscle weakness. Negative for joint swelling, arthralgias, stiffness, muscle cramps, neck pain, bone pain and fracture.   Skin:  Negative for nail changes, itching, poor wound healing, rash, hair changes, skin changes, acne, warts, poor wound healing, scarring, flaky skin, Raynaud's phenomenon, sensitivity to sunlight and skin thickening.   Neurological:  Positive for dizziness, tremors, weakness, headaches, memory loss and difficulty walking. Negative for tingling, speech change, seizures, loss of consciousness, light-headedness, numbness, disturbances in coordination and paralysis.   Endo/Heme:  Negative for anemia, swollen glands and bruises/bleeds easily.    Psychiatric/Behavioral:  Positive for memory loss. Negative for depression, hallucinations, decreased concentration, mood swings and panic attacks.    Breast:  Negative for breast discharge, breast mass, breast pain and nipple retraction.   Endocrine:  Positive for polydipsia.Negative for altered temperature regulation, polyphagia, unwanted hair growth and change in facial hair.          Past Medical History:    Past Medical History:   Diagnosis Date     Diabetes (H)     Type II     Endometrial cancer determined by uterine biopsy (H) 02/2018     HTN (hypertension)      Obesity      Osteoarthritis          Past Surgical History:    Past Surgical History:   Procedure Laterality Date     CHOLECYSTECTOMY  1993     CYSTOSCOPY N/A 2/23/2018    Procedure: CYSTOSCOPY;;  Surgeon: Kevin Henderson MD;  Location: [facility] OR     DAVINCI HYSTERECTOMY TOTAL, BILATERAL SALPINGO-OOPHORECTOMY, COMBINED N/A 2/23/2018    Procedure: COMBINED DAVINCI HYSTERECTOMY TOTAL, SALPINGO-OOPHORECTOMY;  DaVinci Assisted Total Laparoscopic Hysterectomy, Bilateral Salpingo-Oophorectomy, Cystoscopy,  Omental biopsy, omentectomy,bilateral  Pelvic And Para Aortic Lymph Node Dissection;  Surgeon: Kevin Henderson MD;  Location: [facility] OR     Partial lumbar discectomy  1998         Health Maintenance Due   Topic Date Due     DEXA  1950     LIPID  1950     MICROALBUMIN  1950     DIABETIC FOOT EXAM  1950     HEPATITIS C SCREENING  1950     ADVANCE CARE PLANNING  1950     EYE EXAM  1950     ZOSTER IMMUNIZATION (1 of 2) 04/07/2002     MEDICARE ANNUAL WELLNESS VISIT  04/07/2017     PNEUMOCOCCAL IMMUNIZATION 65+ HIGH/HIGHEST RISK (2 of 2 - PPSV23) 02/09/2018     A1C  05/09/2018     PHQ-2  01/01/2020       Current Medications:     Current Outpatient Medications   Medication Sig Dispense Refill     ACETAMINOPHEN PO Take 325 mg by mouth every 6 hours as needed for pain       amLODIPine (NORVASC) 10 MG tablet Take 1  tablet (10 mg) by mouth daily 30 tablet 0     dexamethasone (DECADRON) 4 MG tablet Take 5 tablets (20 mg) by mouth See Admin Instructions for 2 doses 10 tablet 3     Famotidine (PEPCID PO) Take 10 mg by mouth as needed        fluticasone (FLONASE) 50 MCG/ACT spray Spray 1-2 sprays into both nostrils daily 1 Bottle 1     L-Glutamine 500 MG CAPS        loperamide (IMODIUM) 2 MG capsule Take 2 mg by mouth 4 times daily as needed for diarrhea       loratadine (CLARITIN) 10 MG tablet Take 1 tablet (10 mg) by mouth daily 30 tablet 3     magnesium 500 MG TABS        METFORMIN HCL PO Take 500 mg by mouth daily (with breakfast)        omeprazole (PRILOSEC) 40 MG DR capsule        prochlorperazine (COMPAZINE) 10 MG tablet Take 0.5 tablets (5 mg) by mouth every 6 hours as needed (nausea/vomiting) 30 tablet 2     pyridoxine (VITAMIN B-6) 50 MG TABS Take 1 tablet (50 mg) by mouth 2 times daily 60 tablet 3     triamterene-hydrochlorothiazide (DYAZIDE) 37.5-25 MG per capsule Take 1 capsule by mouth every morning            Allergies:        Allergies   Allergen Reactions     Carboplatin Anaphylaxis     Chest tightness, hoarse voice, difficulty breathing     Ace Inhibitors Cough     Amoxicillin Hives        Social History:     Social History     Tobacco Use     Smoking status: Former Smoker     Packs/day: 0.25     Years: 20.00     Pack years: 5.00     Last attempt to quit: 1991     Years since quittin.8     Smokeless tobacco: Never Used     Tobacco comment: Quit smoking    Substance Use Topics     Alcohol use: Yes     Comment: 4-7/week if out 1-2x's/week       History   Drug Use No         Family History:     The patient's family history is notable for:    Family History   Problem Relation Age of Onset     Colon Cancer Mother      Breast Cancer Sister      Lymphoma Sister          Physical Exam:     /80 (BP Location: Left arm, Patient Position: Sitting, Cuff Size: Adult Large)   Pulse 86   Temp 97.3  F (36.3  " C) (Oral)   Resp 16   Ht 1.626 m (5' 4.02\")   Wt 117.4 kg (258 lb 12.8 oz)   SpO2 97%   BMI 44.40 kg/m    Body mass index is 44.4 kg/m .    General Appearance: healthy and alert, no distress     HEENT: no thyromegaly, no palpable nodules or masses        Cardiovascular: regular rate and rhythm, no gallops, rubs or murmurs     Respiratory: lungs clear, no rales, rhonchi or wheezes, normal diaphragmatic excursion    Musculoskeletal: extremities non tender and without edema    Skin: no lesions or rashes     Neurological: normal gait, no gross defects     Psychiatric: appropriate mood and affect                               Hematological: normal cervical, supraclavicular lymph nodes     Gastrointestinal:       abdomen soft, non-tender, non-distended    Genitourinary: deferred      Assessment:    Lu Smith is a 69 year old woman with a diagnosis of recurrent high grade serous endometrial cancer.   She is here today for follow up and disease management.     25 minutes were spent with this patient, over 50% of that time was spent in symptom management, treatment planning and in counseling and coordination of care.      Plan:     1.)        Ok to proceed with planned chemotherapy pending labs are WNL; her 24 h urine was ok so no routine urine was done today. She will have imaging and then see. Dr. Henderson for results. Reviewed signs and symptoms for when she should contact the clinic or seek additional care. Patient to contact the clinic with any questions or concerns in the interim.     2.) Genetic risk factors were assessed and she is negative.     3.) Labs and/or tests ordered include:  Chemo labs. CT cap.      4.) Health maintenance issues addressed today include annual health maintenance and non-gynecologic issues with PCP.    5.)        Anemia: discussed eating iron rich foods as well as starting VitronC. Hgb at cycle #1 was 10.2.    ANU Ramey, WHNP-BC, ANP-BC  Women's Health Nurse " Practitioner  Adult Nurse Pracitioner  Division of Gynecologic Oncology          CC  Patient Care Team:  Levar Scott as PCP - General (Internal Medicine)  Jeanne Blancas MD as Referring Physician (OB/Gyn)  SELF, REFERRED

## 2020-03-05 PROBLEM — E83.52 HYPERCALCEMIA: Status: ACTIVE | Noted: 2020-01-01

## 2020-03-05 PROBLEM — Z51.11 CHEMOTHERAPY MANAGEMENT, ENCOUNTER FOR: Status: ACTIVE | Noted: 2019-12-27

## 2020-03-05 PROBLEM — E83.42 HYPOMAGNESEMIA: Status: ACTIVE | Noted: 2020-01-01

## 2020-03-05 PROBLEM — Z53.1: Status: ACTIVE | Noted: 2020-01-01

## 2020-03-05 PROBLEM — C80.1: Status: ACTIVE | Noted: 2020-01-01

## 2020-03-05 PROBLEM — Z80.0 FAMILY HISTORY OF MALIGNANT NEOPLASM OF GASTROINTESTINAL TRACT: Status: ACTIVE | Noted: 2020-01-01

## 2020-03-05 PROBLEM — K21.9 GASTROESOPHAGEAL REFLUX DISEASE: Status: ACTIVE | Noted: 2020-01-01

## 2020-03-05 PROBLEM — C79.9: Status: ACTIVE | Noted: 2020-01-01

## 2020-03-05 PROBLEM — D61.810 PANCYTOPENIA DUE TO CHEMOTHERAPY (H): Status: ACTIVE | Noted: 2020-01-01

## 2020-03-05 PROBLEM — M17.9 OSTEOARTHRITIS OF KNEE: Status: ACTIVE | Noted: 2020-01-01

## 2020-03-05 NOTE — LETTER
3/5/2020       RE: Lu Smith  [address]  [facility] [address]     Dear Colleague,    Thank you for referring your patient, Lu Smith, to the [facility]. Please see a copy of my visit note below.                Follow Up Notes on Referred Patient    Date: 3/5/2020        RE: Lu Smith  : 1950  HEIDY: 3/5/2020      Lu Smith is a 69 year old woman with a diagnosis of recurrent high grade serous endometrial cancer.   She is here today for follow up and disease management.      Oncology History:  Diagnosis: Stage IIIC2 serous endometrial cancer  Primary GYN oncologist: Dr. Kevin Henderson  Primary radiation oncologist: Dr. Ana Michelle  Surgery: 18: TLH-BSO, omentectomy, bilateral pelvic and para-aortic lymph node dissection, pelvic washings  Chemotherapy:  3/30/18-18: Six cycles of carboplatin AUC 6 and paclitaxel 175mg/m2, delivered sandwich style with pelvic radiation between cycles three and four  Radiation: 18-18: EBRT delivered to pelvis and para-aortic nodes, 5040 cGy in 28 fractions  18-18: HDR brachytherapy delivered to the vaginal cuff, 1200 cGy in two fractions  Complications: Transient neuropathy, mild thrombocytopenia and anemia  Genetic testing: Panel testing negative     19: Chest CT performed for pulmonary nodule follow up, concerning for recurrent disease  IMPRESSION:   1. Few bilateral sub-4 mm solid pulmonary nodules are unchanged compared to 2018. No new or enlarging pulmonary nodules.  2. Dilated main pulmonary artery, nonspecific but may be seen with pulmonary artery hypertension.  3. New 1.9 cm left subclavicular node.     19: PET impression:  In this 69-year-old female with history of stage IIIC2 serous endometrial cancer status post total hysterectomy and bilateral salpingo-oophorectomy, omentectomy, bilateral pelvic and periaortic dissection, and radiation to the pelvis, and brachytherapy to the  vaginal cuff.  1. Progression of disease with new intensely FDG avid para-aortic, mediastinal, and subclavicular lymphadenopathy. The subdiaphragmatic periaortic lymphadenopathy spans from the diaphragmatic crura to the  low abdominal aorta at the level of L4 with greater than 180 degree involvement of the aorta. There is additional lymphadenopathy within the left axilla and the right inguinal region.     9/28/19: Neck CT impression:  1. Progression of metastatic disease with new intensely FDG avid retroclavicular lymph node at the level of the left thoracic inlet.   2. On the fusion PET CT, there is no definite abnormal metabolic activity in the mucosal space, soft tissues, or cervical jamel chains.  3. No evidence of mucosal or soft tissue abnormality on contrast enhanced neck CT.  4. Please refer to the whole body PET CT performed as a separate report, for the findings of the remainder of the body.     10/24/19: Lymph node biopsy:  Lymph node, periaortic, CT guided core biopsy:   -METASTATIC ADENOCARCINOMA   -Tumor morphology is consistent with metastasis/recurrence of endometrial serous carcinoma      11/11/19: Cycle #1 paclitaxel 175mg/m2 + carboplatin AUC 6 + bevacizumab 15mg/kg  12/2/19: Cycle #2 paclitaxel 175mg/m2 + carboplatin AUC 6 + bevacizumab 15mg/kg. Carboplatin reaction.  12/27/19: Cycle #3 Paclitaxel/Avastin/inpatient Carboplatin desensitization.   1/20/2020: CT cap IMPRESSION:  1. Mosaic attenuation throughout the lungs with associated dilated main pulmonary artery likely due to pulmonary arterial hypertension in the appropriate clinical setting.  2. Stable sub-4 mm bilateral pulmonary nodules. No new or enlarging pulmonary nodules.  3. Interval improvement of previously seen enlarged lymph nodes in the lower neck, chest, abdomen and pelvis. No new or enlarging lymph nodes in the present study.  4. Stable soft tissue mesenteric nodules as well as mesenteric root fat stranding.  5. Hepatic  "steatosis. Additional incidental findings as described above.  1/22/2020: Cycle #4 Paclitaxel/Avastin/inpatient Carboplatin desensitization.   2/13/2020: Cycle #5 Paclitaxel/Avastin/inpatient Carboplatin desensitization.   3/3/2020: 24 h urine protein 0.15  3/5/2020: Cycle #6 Paclitaxel/Avastin/inpatient Carboplatin desensitization.         Today she comes to clinic feeling generally well; she has noticed some more fatigue as her treatment continues. She did take her Decadron at 3 am and has her pills with her to take at 9 am. She checks her BP at home and reports readings of 120's/70-80's; her BP is improved since her Norvasc was increased. She will take Compazine as needed and does not need any medications refilled. She is taking her Claritin for 7 days following her chemo. She has slight neuropathy in her thumbs and the balls of her feet but has had this for many years. She denies any vaginal bleeding, no changes in her bowel or bladder habits, no nausea/emesis, no lower extremity edema, and no difficulties eating or sleeping. She denies any abdominal discomfort/bloating, no fevers or chills, and no chest pain or shortness of breath. She reports being \"anemic\" most of her life. She has DM and does not check her BG; she is taking Metformin. She is taking the B6 and L-glutamine. She is taking an OTC Mg.         Review of Systems     Constitutional:  Positive for fatigue and decreased appetite. Negative for fever, chills, weight loss, weight gain, night sweats, recent stressors, height loss, post-operative complications, incisional pain, hallucinations, increased energy, hyperactivity and confused.   HENT:  Positive for mouth sores, taste disturbance, dry mouth, sinus pain and sinus congestion. Negative for ear pain, hearing loss, tinnitus, nosebleeds, trouble swallowing, hoarse voice, sore throat, ear discharge, tooth pain, gum tenderness, smell disturbance, hearing aid, bleeding gums and neck mass.    Eyes:  " Positive for eye watering. Negative for double vision, pain, redness, eye pain, decreased vision, eye bulging, eye dryness, flashing lights, spots, floaters, strabismus, tunnel vision, jaundice and eye irritation.   Respiratory:   Negative for cough, hemoptysis, sputum production, shortness of breath, wheezing, sleep disturbances due to breathing, snores loudly, respiratory pain, dyspnea on exertion, cough disturbing sleep and postural dyspnea.    Cardiovascular:  Negative for chest pain, dyspnea on exertion, palpitations, orthopnea, claudication, leg swelling, fingers/toes turn blue, hypertension, hypotension, syncope, history of heart murmur, chest pain on exertion, chest pain at rest, pacemaker, few scattered varicosities, leg pain, sleep disturbances due to breathing, tachycardia, light-headedness, exercise intolerance and edema.   Gastrointestinal:  Positive for nausea, abdominal pain and constipation. Negative for heartburn, vomiting, diarrhea, blood in stool, melena, rectal pain, bloating, bowel incontinence, jaundice, coffee ground emesis and change in stool.   Genitourinary:  Negative for bladder incontinence, dysuria, urgency, hematuria, flank pain, vaginal discharge, difficulty urinating, genital sores, dyspareunia, decreased libido, nocturia, voiding less frequently, arousal difficulty, abnormal vaginal bleeding, excessive menstruation, menstrual changes, hot flashes, vaginal dryness and postmenopausal bleeding.   Musculoskeletal:  Positive for myalgias, back pain and muscle weakness. Negative for joint swelling, arthralgias, stiffness, muscle cramps, neck pain, bone pain and fracture.   Skin:  Negative for nail changes, itching, poor wound healing, rash, hair changes, skin changes, acne, warts, poor wound healing, scarring, flaky skin, Raynaud's phenomenon, sensitivity to sunlight and skin thickening.   Neurological:  Positive for dizziness, tremors, weakness, headaches, memory loss and difficulty  walking. Negative for tingling, speech change, seizures, loss of consciousness, light-headedness, numbness, disturbances in coordination and paralysis.   Endo/Heme:  Negative for anemia, swollen glands and bruises/bleeds easily.   Psychiatric/Behavioral:  Positive for memory loss. Negative for depression, hallucinations, decreased concentration, mood swings and panic attacks.    Breast:  Negative for breast discharge, breast mass, breast pain and nipple retraction.   Endocrine:  Positive for polydipsia.Negative for altered temperature regulation, polyphagia, unwanted hair growth and change in facial hair.          Past Medical History:    Past Medical History:   Diagnosis Date     Diabetes (H)     Type II     Endometrial cancer determined by uterine biopsy (H) 02/2018     HTN (hypertension)      Obesity      Osteoarthritis          Past Surgical History:    Past Surgical History:   Procedure Laterality Date     CHOLECYSTECTOMY  1993     CYSTOSCOPY N/A 2/23/2018    Procedure: CYSTOSCOPY;;  Surgeon: Kevin Henderson MD;  Location: [facility] OR     DAVINCI HYSTERECTOMY TOTAL, BILATERAL SALPINGO-OOPHORECTOMY, COMBINED N/A 2/23/2018    Procedure: COMBINED DAVINCI HYSTERECTOMY TOTAL, SALPINGO-OOPHORECTOMY;  DaVinci Assisted Total Laparoscopic Hysterectomy, Bilateral Salpingo-Oophorectomy, Cystoscopy,  Omental biopsy, omentectomy,bilateral  Pelvic And Para Aortic Lymph Node Dissection;  Surgeon: Kevin Henderson MD;  Location: [facility] OR     Partial lumbar discectomy  1998         Health Maintenance Due   Topic Date Due     DEXA  1950     LIPID  1950     MICROALBUMIN  1950     DIABETIC FOOT EXAM  1950     HEPATITIS C SCREENING  1950     ADVANCE CARE PLANNING  1950     EYE EXAM  1950     ZOSTER IMMUNIZATION (1 of 2) 04/07/2002     MEDICARE ANNUAL WELLNESS VISIT  04/07/2017     PNEUMOCOCCAL IMMUNIZATION 65+ HIGH/HIGHEST RISK (2 of 2 - PPSV23) 02/09/2018     A1C  05/09/2018     PHQ-2   2020       Current Medications:     Current Outpatient Medications   Medication Sig Dispense Refill     ACETAMINOPHEN PO Take 325 mg by mouth every 6 hours as needed for pain       amLODIPine (NORVASC) 10 MG tablet Take 1 tablet (10 mg) by mouth daily 30 tablet 0     dexamethasone (DECADRON) 4 MG tablet Take 5 tablets (20 mg) by mouth See Admin Instructions for 2 doses 10 tablet 3     Famotidine (PEPCID PO) Take 10 mg by mouth as needed        fluticasone (FLONASE) 50 MCG/ACT spray Spray 1-2 sprays into both nostrils daily 1 Bottle 1     L-Glutamine 500 MG CAPS        loperamide (IMODIUM) 2 MG capsule Take 2 mg by mouth 4 times daily as needed for diarrhea       loratadine (CLARITIN) 10 MG tablet Take 1 tablet (10 mg) by mouth daily 30 tablet 3     magnesium 500 MG TABS        METFORMIN HCL PO Take 500 mg by mouth daily (with breakfast)        omeprazole (PRILOSEC) 40 MG DR capsule        prochlorperazine (COMPAZINE) 10 MG tablet Take 0.5 tablets (5 mg) by mouth every 6 hours as needed (nausea/vomiting) 30 tablet 2     pyridoxine (VITAMIN B-6) 50 MG TABS Take 1 tablet (50 mg) by mouth 2 times daily 60 tablet 3     triamterene-hydrochlorothiazide (DYAZIDE) 37.5-25 MG per capsule Take 1 capsule by mouth every morning            Allergies:        Allergies   Allergen Reactions     Carboplatin Anaphylaxis     Chest tightness, hoarse voice, difficulty breathing     Ace Inhibitors Cough     Amoxicillin Hives        Social History:     Social History     Tobacco Use     Smoking status: Former Smoker     Packs/day: 0.25     Years: 20.00     Pack years: 5.00     Last attempt to quit: 1991     Years since quittin.8     Smokeless tobacco: Never Used     Tobacco comment: Quit smoking    Substance Use Topics     Alcohol use: Yes     Comment: 4-7/week if out 1-2x's/week       History   Drug Use No         Family History:     The patient's family history is notable for:    Family History   Problem Relation Age  "of Onset     Colon Cancer Mother      Breast Cancer Sister      Lymphoma Sister          Physical Exam:     /80 (BP Location: Left arm, Patient Position: Sitting, Cuff Size: Adult Large)   Pulse 86   Temp 97.3  F (36.3  C) (Oral)   Resp 16   Ht 1.626 m (5' 4.02\")   Wt 117.4 kg (258 lb 12.8 oz)   SpO2 97%   BMI 44.40 kg/m     Body mass index is 44.4 kg/m .    General Appearance: healthy and alert, no distress     HEENT: no thyromegaly, no palpable nodules or masses        Cardiovascular: regular rate and rhythm, no gallops, rubs or murmurs     Respiratory: lungs clear, no rales, rhonchi or wheezes, normal diaphragmatic excursion    Musculoskeletal: extremities non tender and without edema    Skin: no lesions or rashes     Neurological: normal gait, no gross defects     Psychiatric: appropriate mood and affect                               Hematological: normal cervical, supraclavicular lymph nodes     Gastrointestinal:       abdomen soft, non-tender, non-distended    Genitourinary: deferred      Assessment:    Lu Smith is a 69 year old woman with a diagnosis of recurrent high grade serous endometrial cancer.   She is here today for follow up and disease management.     25 minutes were spent with this patient, over 50% of that time was spent in symptom management, treatment planning and in counseling and coordination of care.      Plan:     1.)        Ok to proceed with planned chemotherapy pending labs are WNL; her 24 h urine was ok so no routine urine was done today. She will have imaging and then see. Dr. Henderson for results. Reviewed signs and symptoms for when she should contact the clinic or seek additional care. Patient to contact the clinic with any questions or concerns in the interim.     2.) Genetic risk factors were assessed and she is negative.     3.) Labs and/or tests ordered include:  Chemo labs. CT cap.      4.) Health maintenance issues addressed today include annual health " maintenance and non-gynecologic issues with PCP.    5.)        Anemia: discussed eating iron rich foods as well as starting VitronC. Hgb at cycle #1 was 10.2.    ANU Ramey, WHNP-BC, ANP-BC  Women's Health Nurse Practitioner  Adult Nurse Pracitioner  Division of Gynecologic Oncology      CC  Patient Care Team:  Levar Scott as PCP - General (Internal Medicine)  Jeanne Blancas MD as Referring Physician (OB/Gyn)

## 2020-03-05 NOTE — DISCHARGE SUMMARY
Gynecologic Oncology Discharge Summary    Lu Smith  [id number]    Admit Date: 3/5/20  Discharge Date: 03/06/20  Admitting Provider: Kevin Henderson MD  Discharge Provider: Nida Treviño    Admission Dx:   Recurrent serous endometrial cancer  Cycle #6 carboplatin desensitization/paclitaxel/graeme  Hx carbo reaction  T2DM  HTN  Osteoarthritis   Chemotherapy induced peripheral neuropathy   GERD  Anxiety  Proteinuria    Discharge Dx:  Recurrent serous endometrial cancer  Cycle #6 carboplatin desensitization/paclitaxel/graeme  Hx carbo reaction  T2DM  HTN  Osteoarthritis   Chemotherapy induced peripheral neuropathy   GERD  Anxiety  Proteinuria    Prior to Admission Medications:  Medications Prior to Admission   Medication Sig Dispense Refill Last Dose     ACETAMINOPHEN PO Take 325 mg by mouth every 6 hours as needed for pain   Taking     amLODIPine (NORVASC) 10 MG tablet Take 1 tablet (10 mg) by mouth daily 30 tablet 0 Taking     dexamethasone (DECADRON) 4 MG tablet Take 5 tablets (20 mg) by mouth See Admin Instructions for 2 doses 10 tablet 3 Taking     Famotidine (PEPCID PO) Take 10 mg by mouth as needed    Taking     fluticasone (FLONASE) 50 MCG/ACT spray Spray 1-2 sprays into both nostrils daily 1 Bottle 1 Taking     L-Glutamine 500 MG CAPS    Taking     loperamide (IMODIUM) 2 MG capsule Take 2 mg by mouth 4 times daily as needed for diarrhea   Taking     loratadine (CLARITIN) 10 MG tablet Take 1 tablet (10 mg) by mouth daily 30 tablet 3 Taking     magnesium 500 MG TABS    Taking     METFORMIN HCL PO Take 500 mg by mouth daily (with breakfast)    Taking     omeprazole (PRILOSEC) 40 MG DR capsule    Taking     prochlorperazine (COMPAZINE) 10 MG tablet Take 0.5 tablets (5 mg) by mouth every 6 hours as needed (nausea/vomiting) 30 tablet 2 Taking     pyridoxine (VITAMIN B-6) 50 MG TABS Take 1 tablet (50 mg) by mouth 2 times daily 60 tablet 3 Taking     triamterene-hydrochlorothiazide (DYAZIDE) 37.5-25 MG per capsule  Take 1 capsule by mouth every morning    Taking       Discharge Medications:     Review of your medicines      CONTINUE these medicines which have NOT CHANGED      Dose / Directions   ACETAMINOPHEN PO      Dose:  325 mg  Take 325 mg by mouth every 6 hours as needed for pain  Refills:  0     amLODIPine 10 MG tablet  Commonly known as:  NORVASC  Used for:  Endometrial cancer (H)      Dose:  10 mg  Take 1 tablet (10 mg) by mouth daily  Quantity:  30 tablet  Refills:  0     dexamethasone 4 MG tablet  Commonly known as:  DECADRON  Used for:  Endometrial cancer (H)      Dose:  20 mg  Take 5 tablets (20 mg) by mouth See Admin Instructions for 2 doses  Quantity:  10 tablet  Refills:  3     fluticasone 50 MCG/ACT nasal spray  Commonly known as:  FLONASE  Used for:  Seasonal allergic rhinitis, unspecified trigger      Dose:  1-2 spray  Spray 1-2 sprays into both nostrils daily  Quantity:  1 Bottle  Refills:  1     L-Glutamine 500 MG Caps      Refills:  0     loperamide 2 MG capsule  Commonly known as:  IMODIUM      Dose:  2 mg  Take 2 mg by mouth 4 times daily as needed for diarrhea  Refills:  0     loratadine 10 MG tablet  Commonly known as:  Claritin  Used for:  Pain in joint, multiple sites, Chronic seasonal allergic rhinitis, unspecified trigger      Dose:  10 mg  Take 1 tablet (10 mg) by mouth daily  Quantity:  30 tablet  Refills:  3     magnesium 500 MG Tabs      Refills:  0     METFORMIN HCL PO      Dose:  500 mg  Take 500 mg by mouth daily (with breakfast)  Refills:  0     omeprazole 40 MG DR capsule  Commonly known as:  priLOSEC      Refills:  0     PEPCID PO      Dose:  10 mg  Take 10 mg by mouth as needed  Refills:  0     prochlorperazine 10 MG tablet  Commonly known as:  COMPAZINE  Used for:  Encounter for long-term (current) use of medications, Endometrial cancer (H)      Dose:  5 mg  Take 0.5 tablets (5 mg) by mouth every 6 hours as needed (nausea/vomiting)  Quantity:  30 tablet  Refills:  2     triamterene-HCTZ  37.5-25 MG capsule  Commonly known as:  DYAZIDE      Dose:  1 capsule  Take 1 capsule by mouth every morning  Refills:  0     vitamin B6 50 MG Tabs  Commonly known as:  pyridOXINE  Used for:  Chemotherapy-induced peripheral neuropathy (H)      Dose:  50 mg  Take 1 tablet (50 mg) by mouth 2 times daily  Quantity:  60 tablet  Refills:  3          Consultations:  None    Brief History of Illness:  69 year old female with recurrent stage IIIC serous endometrial cancer who presents for C#6 carboplatin desensitization, paclitaxel, and bevacizumab. Carboplatin reaction occurred on 12/2/2019 with chest tightness, hoarseness, and difficulty breathing near the end of her carboplatin infusion.    Hospital Course:  Dz:   -  Recurrent stage IIIC serous endometrial cancer. S/p cycle #6 carboplatin desensitization/paclitaxel/avastin. She will follow-up in clinic prior to next cycle of chemo.  FEN:   - She was tolerating a regular diet without nausea and vomiting and able to maintain her hydration without IVF supplementation. She received magnesium supplementation while inpatient. She will increase her mag supplement at home back up to 1000mg daily.  Pain:   - PRN tylenol.  Her pain was well controlled on this and she was discharged home with these medications.  CV:   - She has a history of HTN. She was maintained on her home medications while hospitalized. Her vital signs were stable while in house and she had no acute CV issues.  PULM:   - She has no history of pulmonary issues.  Her O2 sats were greater than 90% on RA.  She had no acute pulmonary issues while in house.  HEME:   - Her Hgb was 8.3  Plt 93. Thrombocytopenic precautions provided. She had no other acute heme issues while in house.  GI:   - She was tolerating a regular diet without nausea and vomiting.  She will be discharged with a bowel regimen to prevent constipation.  She had no acute GI issues while in house.  :    -The patient was voiding spontaneously without  difficulty.  She had no acute  issues while in house.  ID:   - The patient was afebrile during her hospitalization.    ENDO:   - No acute issues  PSYCH/NEURO:   - No acute issues  PPX:    - She was given SCDs during her hospital course.     Discharge Instructions and Follow up:  Ms. Lu Smith was discharged from the hospital with follow up for a CT on 3/23 and with Dr. Henderson on 3/25.    Discharge Diet: Regular  Discharge Activity: Activity as tolerated    Discharge Disposition:  Discharged to home    Discharge Staff: Dr Treviño.     Kaveh Marion MD MPH  OB/Gyn PGY-1  03/06/20 9:19 PM    Nida Treviño MD    Department of Ob/Gyn and Women's Health  Division of Gynecologic Oncology  [facility]  [phone]    I saw and evaluated the patient with the resident.  I edited and reviewed the above note.

## 2020-03-05 NOTE — H&P
"[facility] History and Physical    Lu Smith MRN# [id number]   Age: 69 year old YOB: 1950     Date of Admission:  03/05/20    Primary care provider: Levar Scott             Chief Complaint:   Cycle #6 Carboplatin desensitization, paclitaxel, bevacizumab  Recurrent stage IIIC serous endometrial cancer         History of Present Illness:   This patient is a 69 year old female with recurrent stage IIIC serous endometrial cancer who presents for C6 carboplatin desensitization, paclitaxel, and bevacizumab. History of a carboplatin reaction on 12/2/2019 with chest tightness, hoarseness, and difficulty breathing near the end of her carboplatin infusion.      HPI from clinic today below. Lu endorses no changes since then.   \"Today she is feeling generally well; she has noticed some more fatigue as her treatment continues. She did take her Decadron at 3 am and has her pills with her to take at 9 am. She checks her BP at home and reports readings of 120's/70-80's; her BP is improved since her Norvasc was increased. She will take Compazine as needed and does not need any medications refilled. She is taking her Claritin for 7 days following her chemo. She has slight neuropathy in her thumbs and the balls of her feet but has had this for many years. She denies any vaginal bleeding, no changes in her bowel or bladder habits, no nausea/emesis, no lower extremity edema, and no difficulties eating or sleeping. She denies any abdominal discomfort/bloating, no fevers or chills, and no chest pain or shortness of breath. She reports being \"anemic\" most of her life. She has DM and does not check her BG; she is taking Metformin. She is taking the B6 and L-glutamine. She is taking an OTC Mg.\"    Review of Systems      Constitutional:  Positive for fatigue and decreased appetite. Negative for fever, chills, weight loss, weight gain, night sweats, recent stressors, height loss, post-operative complications, " incisional pain, hallucinations, increased energy, hyperactivity and confused.   HENT:  Positive for taste disturbance, dry mouth, sinus pain and sinus congestion. Negative for ear pain, hearing loss, tinnitus, nosebleeds, trouble swallowing, hoarse voice, sore throat, ear discharge, tooth pain, gum tenderness, smell disturbance, hearing aid, bleeding gums and neck mass.    Eyes:  Positive for eye watering. Negative for double vision, pain, redness, eye pain, decreased vision, eye bulging, eye dryness, flashing lights, spots, floaters, strabismus, tunnel vision, jaundice and eye irritation.   Respiratory:   Negative for cough, hemoptysis, sputum production, shortness of breath, wheezing, sleep disturbances due to breathing, snores loudly, respiratory pain, dyspnea on exertion, cough disturbing sleep and postural dyspnea.    Cardiovascular:  Negative for chest pain, dyspnea on exertion, palpitations, orthopnea, claudication, leg swelling, fingers/toes turn blue, hypertension, hypotension, syncope, history of heart murmur, chest pain on exertion, chest pain at rest, pacemaker, few scattered varicosities, leg pain, sleep disturbances due to breathing, tachycardia, light-headedness, exercise intolerance and edema.   Gastrointestinal:  Positive for nausea, abdominal pain and constipation. Negative for heartburn, vomiting, diarrhea, blood in stool, melena, rectal pain, bloating, bowel incontinence, jaundice, coffee ground emesis and change in stool.   Genitourinary:  Negative for bladder incontinence, dysuria, urgency, hematuria, flank pain, vaginal discharge, difficulty urinating, genital sores, dyspareunia, decreased libido, nocturia, voiding less frequently, arousal difficulty, abnormal vaginal bleeding, excessive menstruation, menstrual changes, hot flashes, vaginal dryness and postmenopausal bleeding.   Musculoskeletal:  Positive for myalgias, back pain and muscle weakness. Negative for joint swelling, arthralgias,  stiffness, muscle cramps, neck pain, bone pain and fracture.   Skin:  Negative for nail changes, itching, poor wound healing, rash, hair changes, skin changes, acne, warts, poor wound healing, scarring, flaky skin, Raynaud's phenomenon, sensitivity to sunlight and skin thickening.   Neurological:  Positive for dizziness, tremors, weakness, headaches, memory loss and difficulty walking. Negative for tingling, speech change, seizures, loss of consciousness, light-headedness, numbness, disturbances in coordination and paralysis.   Endo/Heme:  Negative for anemia, swollen glands and bruises/bleeds easily.   Psychiatric/Behavioral:  Positive for memory loss. Negative for depression, hallucinations, decreased concentration, mood swings and panic attacks.    Breast:  Negative for breast discharge, breast mass, breast pain and nipple retraction.   Endocrine:  Positive for polydipsia.Negative for altered temperature regulation, polyphagia, unwanted hair growth and change in facial hair.         Cancer Treatment History:     2/23/18: TLH-BSO, omentectomy, bilateral pelvic and para-aortic lymph node dissection, pelvic washings.  3/30/18-9/28/18: Six cycles of carboplatin AUC 6 and paclitaxel 175mg/m2, delivered sandwich style with pelvic radiation between cycles three and four  6/4/18-7/12/18: EBRT delivered to pelvis and para-aortic nodes, 5040 cGy in 28 fractions  7/16/18-7/20/18: HDR brachytherapy delivered to the vaginal cuff, 1200 cGy in two fractions  Complications: Transient neuropathy, mild thrombocytopenia and anemia  Genetic testing: Panel testing negative     9/28/19: PET impression: Progression of disease with new intensely FDG avid para-aortic, mediastinal, and subclavicular lymphadenopathy. The subdiaphragmatic periaortic lymphadenopathy spans from the diaphragmatic crura to the low abdominal aorta at the level of L4 with greater than 180 degree involvement of the aorta. There is additional lymphadenopathy within  the left axilla and the right inguinal region.     9/28/19: Neck CT impression:  1. Progression of metastatic disease with new intensely FDG avid retroclavicular lymph node at the level of the left thoracic inlet.   2. On the fusion PET CT, there is no definite abnormal metabolic activity in the mucosal space, soft tissues, or cervical jamel chains.  3. No evidence of mucosal or soft tissue abnormality on contrast enhanced neck CT.  4. Please refer to the whole body PET CT performed as a separate report, for the findings of the remainder of the body.     10/24/19: Lymph node biopsy:  Lymph node, periaortic, CT guided core biopsy:   -METASTATIC ADENOCARCINOMA   -Tumor morphology is consistent with metastasis/recurrence of endometrial serous carcinoma      11/11/19: C1 paclitaxel 175mg/m2 + carboplatin AUC 6 + bevacizumab 15mg/kg  12/2/19: C2 paclitaxel 175mg/m2 + carboplatin AUC 6 + bevacizumab 15mg/kg. Carboplatin reaction.  12/27/19: Cycle 3 Paclitaxel/Avastin/inpatient Carboplatin desensitization.   1/22/20: Cycle 4 paclitaxel/bevacizumab/carboplatin desensitization  2/13/20: Cycle 5 paclitaxel/bevacizumab/carboplatin desensitization  3/5/20: Cycle 6 paclitaxel/bevacizumab/carboplatin desensitization         Past Medical History:     Past Medical History:   Diagnosis Date     Diabetes (H)     Type II     Endometrial cancer determined by uterine biopsy (H) 02/2018     HTN (hypertension)      Obesity      Osteoarthritis             Past Surgical History:      Past Surgical History:   Procedure Laterality Date     CHOLECYSTECTOMY 1993     CYSTOSCOPY N/A 2/23/2018    Procedure: CYSTOSCOPY;;  Surgeon: Kevin Henderson MD;  Location: [facility] OR     DAVINCI HYSTERECTOMY TOTAL, BILATERAL SALPINGO-OOPHORECTOMY, COMBINED N/A 2/23/2018    Procedure: COMBINED DAVINCI HYSTERECTOMY TOTAL, SALPINGO-OOPHORECTOMY;  DaVinci Assisted Total Laparoscopic Hysterectomy, Bilateral Salpingo-Oophorectomy, Cystoscopy,  Omental biopsy,  omentectomy,bilateral  Pelvic And Para Aortic Lymph Node Dissection;  Surgeon: Kevin Henderson MD;  Location: [facility] OR     Partial lumbar discectomy              Social History:     Social History     Tobacco Use     Smoking status: Former Smoker     Packs/day: 0.25     Years: 20.00     Pack years: 5.00     Last attempt to quit: 1991     Years since quittin.8     Smokeless tobacco: Never Used     Tobacco comment: Quit smoking    Substance Use Topics     Alcohol use: Yes     Comment: 4-7/week if out 1-2x's/week          Family History:     Family History   Problem Relation Age of Onset     Colon Cancer Mother      Breast Cancer Sister      Lymphoma Sister           Allergies:     Allergies   Allergen Reactions     Carboplatin Anaphylaxis     Chest tightness, hoarse voice, difficulty breathing     Ace Inhibitors Cough     Amoxicillin Hives          Medications:     Medications Prior to Admission   Medication Sig Dispense Refill Last Dose     ACETAMINOPHEN PO Take 325 mg by mouth every 6 hours as needed for pain   Taking     amLODIPine (NORVASC) 10 MG tablet Take 1 tablet (10 mg) by mouth daily 30 tablet 0 Taking     dexamethasone (DECADRON) 4 MG tablet Take 5 tablets (20 mg) by mouth See Admin Instructions for 2 doses 10 tablet 3 Taking     Famotidine (PEPCID PO) Take 10 mg by mouth as needed    Taking     fluticasone (FLONASE) 50 MCG/ACT spray Spray 1-2 sprays into both nostrils daily 1 Bottle 1 Taking     L-Glutamine 500 MG CAPS    Taking     loperamide (IMODIUM) 2 MG capsule Take 2 mg by mouth 4 times daily as needed for diarrhea   Taking     loratadine (CLARITIN) 10 MG tablet Take 1 tablet (10 mg) by mouth daily 30 tablet 3 Taking     magnesium 500 MG TABS    Taking     METFORMIN HCL PO Take 500 mg by mouth daily (with breakfast)    Taking     omeprazole (PRILOSEC) 40 MG DR capsule    Taking     prochlorperazine (COMPAZINE) 10 MG tablet Take 0.5 tablets (5 mg) by mouth every 6 hours as needed  "(nausea/vomiting) 30 tablet 2 Taking     pyridoxine (VITAMIN B-6) 50 MG TABS Take 1 tablet (50 mg) by mouth 2 times daily 60 tablet 3 Taking     triamterene-hydrochlorothiazide (DYAZIDE) 37.5-25 MG per capsule Take 1 capsule by mouth every morning    Taking          Physical Exam:     Patient Vitals for the past 4 hrs:   BP Temp Heart Rate Resp SpO2 Height Weight   03/05/20 1200 120/62 96  F (35.6  C) 94 18 99 % -- --   03/05/20 1158 -- -- -- -- -- 1.626 m (5' 4\") 116.8 kg (257 lb 8 oz)     General: Alert and oriented. Pleasant.  CV: HR regular  Resp: CTAB  GI: Soft, NT, No rebound tenderness or guarding  Extremities: Trace edema in BLE  Neuro: Moving all extremities without difficulties, no noticeable deficits  Psych: Appropriate mood and affect  Skin: No rashes, bruises notable.    Labs/Imaging/Diagnostic Tests     Results for orders placed or performed in visit on 03/05/20 (from the past 24 hour(s))   CBC with platelets differential   Result Value Ref Range    WBC 5.3 4.0 - 11.0 10e9/L    RBC Count 2.70 (L) 3.8 - 5.2 10e12/L    Hemoglobin 8.3 (L) 11.7 - 15.7 g/dL    Hematocrit 25.2 (L) 35.0 - 47.0 %    MCV 93 78 - 100 fl    MCH 30.7 26.5 - 33.0 pg    MCHC 32.9 31.5 - 36.5 g/dL    RDW 21.4 (H) 10.0 - 15.0 %    Platelet Count 98 (L) 150 - 450 10e9/L    Diff Method Automated Method     % Neutrophils 75.6 %    % Lymphocytes 18.0 %    % Monocytes 4.5 %    % Eosinophils 0.6 %    % Basophils 0.4 %    % Immature Granulocytes 0.9 %    Nucleated RBCs 0 0 /100    Absolute Neutrophil 4.0 1.6 - 8.3 10e9/L    Absolute Lymphocytes 1.0 0.8 - 5.3 10e9/L    Absolute Monocytes 0.2 0.0 - 1.3 10e9/L    Absolute Eosinophils 0.0 0.0 - 0.7 10e9/L    Absolute Basophils 0.0 0.0 - 0.2 10e9/L    Abs Immature Granulocytes 0.1 0 - 0.4 10e9/L    Absolute Nucleated RBC 0.0    Comprehensive metabolic panel   Result Value Ref Range    Sodium 135 133 - 144 mmol/L    Potassium 3.7 3.4 - 5.3 mmol/L    Chloride 101 94 - 109 mmol/L    Carbon Dioxide " 26 20 - 32 mmol/L    Anion Gap 8 3 - 14 mmol/L    Glucose 175 (H) 70 - 99 mg/dL    Urea Nitrogen 23 7 - 30 mg/dL    Creatinine 0.97 0.52 - 1.04 mg/dL    GFR Estimate 60 (L) >60 mL/min/[1.73_m2]    GFR Estimate If Black 70 >60 mL/min/[1.73_m2]    Calcium 9.1 8.5 - 10.1 mg/dL    Bilirubin Total 0.4 0.2 - 1.3 mg/dL    Albumin 3.3 (L) 3.4 - 5.0 g/dL    Protein Total 7.0 6.8 - 8.8 g/dL    Alkaline Phosphatase 64 40 - 150 U/L    ALT 29 0 - 50 U/L    AST 25 0 - 45 U/L   Magnesium   Result Value Ref Range    Magnesium 1.4 (L) 1.6 - 2.3 mg/dL             Assessment and Plan:   Assessment & Plan: 69 year old with recurrent Stage IIIC2 serous endometrial cancer admitted for cycle 6 carboplatin desensitization/Taxol/Avastin. Patient had initial carboplatin reaction on 12/2/2019 with chest tightness and hoarseness.      Active Problem List:  Recurrent serous endometrial cancer  T2DM  HTN  Osteoarthritis   Chemotherapy induced peripheral neuropathy   GERD  Anxiety  Proteinuria     Dz: Recurrent serous endometrial cancer. Cycle #6 carboplatin desensitization/taxol/avastin. Took premeds prior to admission.   FEN: Regular diet. Magnesium ERP  Pain: continued home acetaminophen PRN  Heme: chronic normocytic anemia. Chemo induced anemia, thrombocytopenia. Values acceptable to continue with chemo today.  CV: HTN continue home Dyazide and Novasc. Hold Avastin if BP persistently greater than 150/100.  Pulm: No issues  GI: PRN compazine, PRN bowel regimen. GERD - PTA [id number] and home PPI.  : No issues. Voiding spontaneously.   ID: No issues, afebrile   Endocrine: T2DM. Continue home metformin. BG AC and at bedtime w/ MSSI.  Psych/Neuro/MSK: Reports low mood intermittently and anxiety. Not taking any medications. Osteoarthritis PRN tylenol  PPX: ambulation, SCDs while in bed  Dispo: discharge home when chemo complete     Courtney Benjamin MD  Ob/Gyn, PGY4      Provider Disclosure:   I agree with above History, Review of Systems, Physical  exam and Plan. I have reviewed the content of the documentation and have edited it as needed. I have seen and personally performed the services documented here and the documentation accurately represents those services and the decisions I have made.     Electronically signed by:   Kevin Henderson MD   Gynecologic Oncology   [facility]

## 2020-03-05 NOTE — PROGRESS NOTES
DATE/TIME  (DOT-TD, DOT-NOW) CHEMO CHECK ACTIVITY (REGIMEN & DOSE CHECK, DAY, DOSE #, NAME OF CHEMO #1)  CHEMO DRUG #2  CHEMO DRUG #3 NAME OF RN #1 (USE DOT-ME HERE) NAME OF RN#2 (2ND RN TO LOG IN SEPARATELY)   3/5/2020  11:45 AM   Regimen and dose check, paclitaxel bevacizumab carboplatin desensitization  SONJA GRIMALDO, RN   FRANCISCO OGDEN RN

## 2020-03-05 NOTE — NURSING NOTE
"Chief Complaint   Patient presents with     Port Draw     labs drawn from port by rn.  vs taken     Port accessed with 20 gauge 3/4\" Power needle and labs drawn by rn.  Port flushed with NS and heparin.  Pt tolerated well.  VS taken.  Pt checked in for next appt.    Madeleine Rhodes RN      "

## 2020-03-05 NOTE — NURSING NOTE
"Oncology Rooming Note    March 5, 2020 8:07 AM   Lu Smith is a 69 year old female who presents for:    Chief Complaint   Patient presents with     Port Draw     labs drawn from port by rn.  vs taken     Oncology Clinic Visit     Return; Ovarian Ca     Initial Vitals: /80 (BP Location: Left arm, Patient Position: Sitting, Cuff Size: Adult Large)   Pulse 86   Temp 97.3  F (36.3  C) (Oral)   Resp 16   Ht 1.626 m (5' 4.02\")   Wt 117.4 kg (258 lb 12.8 oz)   SpO2 97%   BMI 44.40 kg/m   Estimated body mass index is 44.4 kg/m  as calculated from the following:    Height as of this encounter: 1.626 m (5' 4.02\").    Weight as of this encounter: 117.4 kg (258 lb 12.8 oz). Body surface area is 2.3 meters squared.  Moderate Pain (4) Comment: Data Unavailable   No LMP recorded. Patient has had a hysterectomy.  Allergies reviewed: Yes  Medications reviewed: Yes    Medications: Medication refills not needed today.  Pharmacy name entered into [facility]: [facility] - [place], [place] - [address]    Clinical concerns:  Not feeling well, just tired and weak.        Dayana Valerio [facility]              "

## 2020-03-05 NOTE — PLAN OF CARE
Observation goals PRIOR TO DISCHARGE     Comments: - chemotherapy complete : not met  Nurse to notify provider when observation goals have been met and patient is ready for discharge.    Admit for chemo.  Declines 2 RN skin assessment.  Mg+ 1.4, replacement ordered for prior to chemo, currently infusing.  Plan for chemo per protocol and discharge tomorrow.

## 2020-03-05 NOTE — RESULT ENCOUNTER NOTE
Chemo labs reviewed & ok to proceed with plt 98. To start VitronC and iron rich foods. Continue on Magnesium at home.

## 2020-03-06 NOTE — PROGRESS NOTES
Observation goals PRIOR TO DISCHARGE     Comments: - chemotherapy complete-not met, chemotherapy infusing.

## 2020-03-06 NOTE — PROGRESS NOTES
Observation goals PRIOR TO DISCHARGE     Comments: - chemotherapy complete -not met, chemotherapy infusing.

## 2020-03-06 NOTE — PLAN OF CARE
AVSS. Carbo bag #4 currently infusing; will be complete around 1300. Pt tolerating well, no s/s of reaction. Port with good blood return. Up ad latonya. Voiding, not saving. Plan to discharge home later today once chemo complete.    Observation goals:      Chemo Complete - Not met, bag #4 of carbo infusing; will finish approx 1300.

## 2020-03-06 NOTE — PROGRESS NOTES
Gynecologic Oncology Progress Note         Date: 3/6/20  Subjective:   Patient is feeling ok this AM. Didn't sleep well overnight d/t blood pressure checksand waking up to urinate. Denies pain. Eating and drinking w/o nausea. Ambulating w/o difficulty. Denies shortness of breath, rash, pruritis, chest pain.    Objective:  Patient Vitals for the past 4 hrs:   BP Temp Temp src Heart Rate SpO2   03/06/20 0510 129/70 -- -- 82 95 %   03/06/20 0410 139/64 -- -- 78 95 %   03/06/20 0315 130/67 -- -- 69 95 %   03/06/20 0245 (!) 143/76 -- -- 80 95 %   03/06/20 0215 (!) 149/79 96.2  F (35.7  C) Oral 79 95 %   03/06/20 0200 (!) 152/78 -- -- 85 95 %      EXAM:   General: appears comfortable, in NAD  CV: RRR  Resp: Breathing comfortably on room air  Abdomen: soft, nontender    Intake/Output (24 Hrs // Since [place])   mL // 500 mL   mL // 116 mL     UOP 3x // 3x    Labs/Imaging:  No new labs this AM    Assessment & Plan: 69 year old with recurrent Stage IIIC2 serous endometrial cancer admitted for cycle 6 carboplatin desensitization/Taxol/Avastin. Patient had initial carboplatin reaction on 12/2/2019 with chest tightness and hoarseness.      Active Problem List:  Recurrent serous endometrial cancer  T2DM  HTN  Osteoarthritis   Chemotherapy induced peripheral neuropathy   GERD  Anxiety  Proteinuria     Dz: Recurrent serous endometrial cancer. Cycle #6 carboplatin desensitization/taxol/avastin  FEN: Regular diet. Magnesium replaced.  Pain: continued home acetaminophen PRN  Heme: chronic normocytic anemia. Chemo induced anemia, thrombocytopenia.  CV: HTN continue home Dyazide and Novasc, well controlled overnight.  Pulm: No issues  GI: PRN compazine, PRN bowel regimen. GERD - PTA [id number] and home PPI.  : No issues. Voiding spontaneously.   ID: No issues, afebrile   Endocrine: T2DM. Continue home metformin. BG AC and at bedtime w/ MSSI.  Psych/Neuro/MSK: Reports low mood intermittently and anxiety. Not taking any  medications. Osteoarthritis PRN tylenol  PPX: ambulation, SCDs while in bed  Dispo: discharge home when chemo complete     Courtney Benjamin MD  Ob/Gyn, PGY4    Nida Treviño MD    Department of Ob/Gyn and Women's Health  Division of Gynecologic Oncology  [facility]  [phone]    I saw and evaluated the patient with the resident.  I edited and reviewed the above note.

## 2020-03-06 NOTE — PLAN OF CARE
Observation goals:      Chemo Complete - Not met, taxol/avastin done, bag #1 of carbo currently infusing.     Responsible for patient [id number]. AVSS on RA. A+OX4. Denies pain/nausea. Regular diet, good appetite. UAL, steady on feet. Voiding. Last BM yesterday.     Plan: continue carboplatin desensitization, discharge to home tomorrow.     Update: Bag 4 of carbo hung 0105. No s/sx of transfusion reaction noted at this time. Continue to monitor.

## 2020-03-06 NOTE — PLAN OF CARE
Carboplatin bag #4 infused without any problems, positive blood return.Tolerating regular diet, voiding spont, up ad latonya.Discharge instructions reviewed with patient and discharged home.

## 2020-03-25 NOTE — NURSING NOTE
"Oncology Rooming Note    March 25, 2020 8:45 AM   Lu Smith is a 69 year old female who presents for:    Chief Complaint   Patient presents with     Oncology Clinic Visit     Return; Ovarian Ca, Endometrial Ca     Initial Vitals: /79   Pulse 103   Temp 98  F (36.7  C) (Oral)   Resp 14   Ht 1.626 m (5' 4.02\")   Wt 114.8 kg (253 lb)   SpO2 97%   BMI 43.41 kg/m   Estimated body mass index is 43.41 kg/m  as calculated from the following:    Height as of this encounter: 1.626 m (5' 4.02\").    Weight as of this encounter: 114.8 kg (253 lb). Body surface area is 2.28 meters squared.  No Pain (0) Comment: Data Unavailable   No LMP recorded. Patient has had a hysterectomy.  Allergies reviewed: Yes  Medications reviewed: Yes    Medications: MEDICATION REFILLS NEEDED TODAY. Provider was notified.  Pharmacy name entered into [facility]: [facility] [address] [place] [address] [address] [facility]    Clinical concerns: Had rash after last infusion.       Dayana Valerio CMA              "

## 2020-03-25 NOTE — LETTER
3/25/2020       RE: Lu Smith  [address]  [place] [address]     Dear Colleague,    Thank you for referring your patient, Lu Smith, to the [facility]. Please see a copy of my visit note below.                Follow Up Notes on Referred Patient    Date: 2020       Dr. Salvador Eugene MD  No address on file       RE: Lu Smith  : 1950  HEIDY: 3/25/2020    Dear Dr. Salvador Eugene:    Lu Smith is a 69 year old woman with a diagnosis of recurrent serous endometrial cancer.    The patient presents today for followup. She has been doing fairly well on chemotherapy.  She is eating and drinking okay.  No nausea or vomiting, fever or chills.  Normal urinary and bowel function.  No vaginal bleeding or discharge.     Oncology History:  Diagnosis: Stage IIIC2 serous endometrial cancer  Primary GYN oncologist: Dr. Kevin Henderson  Primary radiation oncologist: Dr. Ana Michelle  Surgery: 18: TLH-BSO, omentectomy, bilateral pelvic and para-aortic lymph node dissection, pelvic washings  Chemotherapy:  3/30/18-18: Six cycles of carboplatin AUC 6 and paclitaxel 175mg/m2, delivered sandwich style with pelvic radiation between cycles three and four  Radiation: 18-18: EBRT delivered to pelvis and para-aortic nodes, 5040 cGy in 28 fractions  18-18: HDR brachytherapy delivered to the vaginal cuff, 1200 cGy in two fractions  Complications: Transient neuropathy, mild thrombocytopenia and anemia  Genetic testing: Panel testing negative     19: Chest CT performed for pulmonary nodule follow up, concerning for recurrent disease  IMPRESSION:   1. Few bilateral sub-4 mm solid pulmonary nodules are unchanged compared to 2018. No new or enlarging pulmonary nodules.  2. Dilated main pulmonary artery, nonspecific but may be seen with pulmonary artery hypertension.  3. New 1.9 cm left subclavicular node.     19: PET impression:  In this 69-year-old  female with history of stage IIIC2 serous endometrial cancer status post total hysterectomy and bilateral salpingo-oophorectomy, omentectomy, bilateral pelvic and periaortic dissection, and radiation to the pelvis, and brachytherapy to the vaginal cuff.  1. Progression of disease with new intensely FDG avid para-aortic, mediastinal, and subclavicular lymphadenopathy. The subdiaphragmatic periaortic lymphadenopathy spans from the diaphragmatic crura to the  low abdominal aorta at the level of L4 with greater than 180 degree involvement of the aorta. There is additional lymphadenopathy within the left axilla and the right inguinal region.     9/28/19: Neck CT impression:  1. Progression of metastatic disease with new intensely FDG avid retroclavicular lymph node at the level of the left thoracic inlet.   2. On the fusion PET CT, there is no definite abnormal metabolic activity in the mucosal space, soft tissues, or cervical jamel chains.  3. No evidence of mucosal or soft tissue abnormality on contrast enhanced neck CT.  4. Please refer to the whole body PET CT performed as a separate report, for the findings of the remainder of the body.     10/24/19: Lymph node biopsy:  Lymph node, periaortic, CT guided core biopsy:   -METASTATIC ADENOCARCINOMA   -Tumor morphology is consistent with metastasis/recurrence of endometrial serous carcinoma      11/11/19: Cycle #1 paclitaxel 175mg/m2 + carboplatin AUC 6 + bevacizumab 15mg/kg  12/2/19: Cycle #2 paclitaxel 175mg/m2 + carboplatin AUC 6 + bevacizumab 15mg/kg. Carboplatin reaction.  12/27/19: Cycle #3 Paclitaxel/Avastin/inpatient Carboplatin desensitization.   1/20/2020: CT cap IMPRESSION:  1. Mosaic attenuation throughout the lungs with associated dilated main pulmonary artery likely due to pulmonary arterial hypertension in the appropriate clinical setting.  2. Stable sub-4 mm bilateral pulmonary nodules. No new or enlarging pulmonary nodules.  3. Interval improvement of  previously seen enlarged lymph nodes in the lower neck, chest, abdomen and pelvis. No new or enlarging lymph nodes in the present study.  4. Stable soft tissue mesenteric nodules as well as mesenteric root fat stranding.  5. Hepatic steatosis. Additional incidental findings as described above.  1/22/2020: Cycle #4 Paclitaxel/Avastin/inpatient Carboplatin desensitization.   2/13/2020: Cycle #5 Paclitaxel/Avastin/inpatient Carboplatin desensitization.   3/3/2020: 24 h urine protein 0.15  3/5/2020: Cycle #6 Paclitaxel/Avastin/inpatient Carboplatin desensitization.     Past Medical History:    Past Medical History:   Diagnosis Date     Diabetes (H)     Type II     Endometrial cancer determined by uterine biopsy (H) 02/2018     HTN (hypertension)      Obesity      Osteoarthritis          Past Surgical History:    Past Surgical History:   Procedure Laterality Date     CHOLECYSTECTOMY  1993     CYSTOSCOPY N/A 2/23/2018    Procedure: CYSTOSCOPY;;  Surgeon: Kevin Henderson MD;  Location: [facility] OR     DAVINCI HYSTERECTOMY TOTAL, BILATERAL SALPINGO-OOPHORECTOMY, COMBINED N/A 2/23/2018    Procedure: COMBINED DAVINCI HYSTERECTOMY TOTAL, SALPINGO-OOPHORECTOMY;  DaVinci Assisted Total Laparoscopic Hysterectomy, Bilateral Salpingo-Oophorectomy, Cystoscopy,  Omental biopsy, omentectomy,bilateral  Pelvic And Para Aortic Lymph Node Dissection;  Surgeon: Kevin Henderson MD;  Location: [facility] OR     Partial lumbar discectomy  1998         Health Maintenance Due   Topic Date Due     DEXA  1950     LIPID  1950     MICROALBUMIN  1950     DIABETIC FOOT EXAM  1950     HEPATITIS C SCREENING  1950     ADVANCE CARE PLANNING  1950     EYE EXAM  1950     ZOSTER IMMUNIZATION (1 of 2) 04/07/2002     MEDICARE ANNUAL WELLNESS VISIT  04/07/2017     PNEUMOCOCCAL IMMUNIZATION 65+ HIGH/HIGHEST RISK (2 of 2 - PPSV23) 02/09/2018     A1C  05/09/2018     PHQ-2  01/01/2020       Current Medications:     Current  Outpatient Medications   Medication Sig Dispense Refill     ACETAMINOPHEN PO Take 325 mg by mouth every 6 hours as needed for pain       dexamethasone (DECADRON) 4 MG tablet Take 5 tablets (20 mg) by mouth See Admin Instructions for 2 doses (Patient not taking: Reported on 4/1/2020) 10 tablet 3     Famotidine (PEPCID PO) Take 10 mg by mouth as needed        fluticasone (FLONASE) 50 MCG/ACT spray Spray 1-2 sprays into both nostrils daily (Patient not taking: Reported on 4/1/2020) 1 Bottle 1     iohexol (OMNIPAQUE) 140 MG/ML solution for oral use Mix entire bottle (50ml) of contast with 600ml (20 ounces) of water and drink half 2 hrs prior to CT scan and half 1 hr prior to scan (Patient not taking: Reported on 4/1/2020) 50 mL 0     L-Glutamine 500 MG CAPS        loperamide (IMODIUM) 2 MG capsule Take 2 mg by mouth 4 times daily as needed for diarrhea       loratadine (CLARITIN) 10 MG tablet Take 1 tablet (10 mg) by mouth daily (Patient not taking: Reported on 4/1/2020) 30 tablet 3     magnesium 500 MG TABS        METFORMIN HCL PO Take 500 mg by mouth daily (with breakfast)        omeprazole (PRILOSEC) 40 MG DR capsule        prochlorperazine (COMPAZINE) 10 MG tablet Take 0.5 tablets (5 mg) by mouth every 6 hours as needed (nausea/vomiting) (Patient not taking: Reported on 4/1/2020) 30 tablet 2     pyridoxine (VITAMIN B-6) 50 MG TABS Take 1 tablet (50 mg) by mouth 2 times daily 60 tablet 3     triamterene-hydrochlorothiazide (DYAZIDE) 37.5-25 MG per capsule Take 1 capsule by mouth every morning        amLODIPine (NORVASC) 10 MG tablet Take 1 tablet (10 mg) by mouth daily 30 tablet 0         Allergies:        Allergies   Allergen Reactions     Carboplatin Anaphylaxis     Chest tightness, hoarse voice, difficulty breathing     Ace Inhibitors Cough     Amoxicillin Hives        Social History:     Social History     Tobacco Use     Smoking status: Former Smoker     Packs/day: 0.25     Years: 20.00     Pack years: 5.00      "Last attempt to quit: 1991     Years since quittin.9     Smokeless tobacco: Never Used     Tobacco comment: Quit smoking    Substance Use Topics     Alcohol use: Yes     Comment: 4-7/week if out 1-2x's/week       History   Drug Use No         Family History:       Family History   Problem Relation Age of Onset     Colon Cancer Mother      Breast Cancer Sister      Lymphoma Sister          Physical Exam:     /79   Pulse 103   Temp 98  F (36.7  C) (Oral)   Resp 14   Ht 1.626 m (5' 4.02\")   Wt 114.8 kg (253 lb)   SpO2 97%   BMI 43.41 kg/m    Body mass index is 43.41 kg/m .    General Appearance:  healthy and alert, no distress     Musculoskeletal:         extremities non tender and without edema     Neurological:   normal gait, no gross defects                Psychiatric:      appropriate mood and affect                               ABDOMEN:  Soft, nontender and nondistended.  No organomegaly.                                Assessment:     Lu Smith is a 69 year old woman with a diagnosis of recurrent serous endometrial cancer.      A total of 25 minutes was spent with the patient, 20 minutes of which were spent in counseling the patient and/or treatment planning.        1.  Recurrent serous endometrial carcinoma.   2.  Positive treatment response to carboplatin, paclitaxel, bevacizumab.   3.  Carbo desensitization.   4.  MSI stable tumor.   5.  Low tumor mutation burden.   6.  PD-L1 0% expression.      We reviewed the recent scan I discussed with the patient she has had a positive treatment response. We will continue with 3 cycles of 15mg/kg every 3 weeks bevacizumab maintenance and repeat a CT scan of the chest, abdomen and pelvis. The patient agrees with this plan.  She is very appreciative of her care.  All questions were answered.         Kevin Henderson MD, MS    Department of Obstetrics and Gynecology   Division of Gynecologic Oncology   [facility]" [place]  Phone: [phone]      CC  Patient Care Team:  Levar Scott as PCP - General (Internal Medicine)  Jeanne Blancas MD as Referring Physician (OB/Gyn)  Alondra Ruiz RN as Specialty Care Coordinator (Gyn-Onc)

## 2020-03-27 NOTE — PROGRESS NOTES
Gave patient written literature on Bevacizumab and went over most common side effects of this chemotherapy. Patient will have 3 cycles each 21 days apart then a CT scan 3 weeks after her 3rd cycle.  Patient verbalizes understanding and agrees with plan.

## 2020-04-01 NOTE — PATIENT INSTRUCTIONS
[facility] Triage and after hours / weekends / holidays:  [phone]    Please call the triage or after hours line if you experience a temperature greater than or equal to 100.5, shaking chills, have uncontrolled nausea, vomiting and/or diarrhea, dizziness, shortness of breath, chest pain, bleeding, unexplained bruising, or if you have any other new/concerning symptoms, questions or concerns.      If you are having any concerning symptoms or wish to speak to a provider before your next infusion visit, please call your care coordinator or triage to notify them so we can adequately serve you.     If you need a refill on a narcotic prescription or other medication, please call before your infusion appointment.           April 2020 Sunday Monday Tuesday Wednesday Thursday Friday Saturday                  1    [facility]   1:30 PM   (15 min.)   [facility]    [facility] ONC INFUSION 60   2:00 PM   (60 min.)    ONCOLOGY INFUSION   [facility] 2     3     4       5     6     7  Happy Birthday!     8     9     10     11       12     13     14     15     16     17     18       19     20     21     22    [facility]  12:30 PM   (15 min.)    [facility]    [facility] RETURN  12:45 PM   (20 min.)   Kevin Henderson MD   [facility]   2:00 PM   (60 min.)    ONCOLOGY INFUSION   [facility] 23     24     25       26     27     28     29     30                           May 2020      Derrek Monday Tuesday Wednesday Thursday Friday Saturday                            1     2       3     4     5     6     7     8     9       10     11     12     13    [facility]RAW   9:45 AM   (15 min.)   [facility]   [facility] Lab Draw    [facility] RETURN  10:05 AM   (20 min.)   Kevin Henderson MD   [facility] 60  11:00  AM   (60 min.)    ONCOLOGY INFUSION   [facility] 14     15     16       17     18     19     20     21     22     23       24     25     26     27     28     29     30       31                                                  Recent Results (from the past 24 hour(s))   Potassium    Collection Time: 04/01/20  1:44 PM   Result Value Ref Range    Potassium 3.4 3.4 - 5.3 mmol/L   Magnesium    Collection Time: 04/01/20  1:44 PM   Result Value Ref Range    Magnesium 1.1 (L) 1.6 - 2.3 mg/dL   Protein qualitative urine    Collection Time: 04/01/20  1:45 PM   Result Value Ref Range    Protein Albumin Urine 10 (A) NEG^Negative mg/dL         [facility] Triage and after hours / weekends / holidays:  [phone]    Please call the triage or after hours line if you experience a temperature greater than or equal to 100.5, shaking chills, have uncontrolled nausea, vomiting and/or diarrhea, dizziness, shortness of breath, chest pain, bleeding, unexplained bruising, or if you have any other new/concerning symptoms, questions or concerns.      If you are having any concerning symptoms or wish to speak to a provider before your next infusion visit, please call your care coordinator or triage to notify them so we can adequately serve you.     If you need a refill on a narcotic prescription or other medication, please call before your infusion appointment.             April 2020 Sunday Monday Tuesday Wednesday Thursday Friday Saturday                  1    [facility] DRAW   1:30 PM   (15 min.)    [facility] DRAW   [facility] Lab Draw    [facility] ONC INFUSION 60   2:00 PM   (60 min.)    ONCOLOGY INFUSION   [facility] 2     3     4       5     6     7  Happy Birthday!     8     9     10     11       12     13     14     15     16     17     18       19     20     21     22    [facility] DRAW  12:30 PM   (15 min.)    [facility]   [facility]    P  RETURN  12:45 PM   (20 min.)   Kevin Henderson MD   [facility] ONC INFUSION 60   2:00 PM   (60 min.)    ONCOLOGY INFUSION   [facility] 23     24     25       26     27     28     29     30                           May 2020      Derrek Monday Tuesday Wednesday Thursday Friday Saturday                            1     2       3     4     5     6     7     8     9       10     11     12     13    [facility] DRAW   9:45 AM   (15 min.)    [facility] DRAW   [facility]    [facility] RETURN  10:05 AM   (20 min.)   Kevin Henderson MD   [facility] ONC INFUSION 60  11:00 AM   (60 min.)    ONCOLOGY INFUSION   [facility] 14     15     16       17     18     19     20     21     22     23       24     25     26     27     28     29     30       31                                                  Recent Results (from the past 24 hour(s))   Potassium    Collection Time: 04/01/20  1:44 PM   Result Value Ref Range    Potassium 3.4 3.4 - 5.3 mmol/L   Magnesium    Collection Time: 04/01/20  1:44 PM   Result Value Ref Range    Magnesium 1.1 (L) 1.6 - 2.3 mg/dL   Protein qualitative urine    Collection Time: 04/01/20  1:45 PM   Result Value Ref Range    Protein Albumin Urine 10 (A) NEG^Negative mg/dL

## 2020-04-01 NOTE — PROGRESS NOTES
Infusion Nursing Note:   uL Smith presents today for cycle 1, day 1 MVASI, magnesium replacement.    Patient seen by provider today: No   present during visit today: Not Applicable.    Note: Patient has had avastin previously, it is not new for her today.  She states that overall she is doing ok, just feeling tired and weak. She is trying to make herself stay active daily as she knows this helps.  Her appetite and taste changes have improved.      Intravenous Access:  Implanted Port.    Treatment Conditions:    Lab Results   Component Value Date    POTASSIUM 3.4 04/01/2020           Lab Results   Component Value Date    MAG 1.1 04/01/2020            Results reviewed, labs MET treatment parameters, ok to proceed with treatment.  Urine protein: 10mg/dL  Blood Pressure: 119/62 and 123/79.  Magnesium replaced IV per protocol.  Patient's provider inbasketed to see if patient should increase her PO magnesium dose at home, currently taking one 500mg OTC tablet daily      Post Infusion Assessment:  Patient tolerated infusion without incident.  Blood return noted pre and post infusion.  Site patent and intact, free from redness, edema or discomfort.  No evidence of extravasations.  Access discontinued per protocol.       Discharge Plan:   Discharge instructions reviewed with: Patient.  Patient and/or family verbalized understanding of discharge instructions and all questions answered.  AVS to patient via [employer].  Patient will return 4/22/20 for next appointment.   Patient discharged in stable condition accompanied by: self.  Departure Mode: Ambulatory.  Face to Face time: 0.    Madison Velasquez RN

## 2020-04-01 NOTE — NURSING NOTE
Chief Complaint   Patient presents with     Port Draw     Labs drawn via PORT by RN in lab. VS taken.      Dave Morgan RN,

## 2020-04-02 NOTE — PROGRESS NOTES
Spoke to Lu about her magnesium level still being low.  Dr. Henderson would like to her to increase her daily supplement of magnesium to 1000 mg daily versus 500 mg.  Patient verbalizes understanding and agrees with plan.

## 2020-04-02 NOTE — PROGRESS NOTES
Follow Up Notes on Referred Patient    Date: 2020       Dr. Salvador Eugene MD  No address on file       RE: Lu Smith  : 1950  HEIDY: 3/25/2020    Dear Dr. Salvador Eugene:    Lu Smith is a 69 year old woman with a diagnosis of recurrent serous endometrial cancer.    The patient presents today for followup. She has been doing fairly well on chemotherapy.  She is eating and drinking okay.  No nausea or vomiting, fever or chills.  Normal urinary and bowel function.  No vaginal bleeding or discharge.     Oncology History:  Diagnosis: Stage IIIC2 serous endometrial cancer  Primary GYN oncologist: Dr. Kevin Henderson  Primary radiation oncologist: Dr. Ana Michelle  Surgery: 18: TLH-BSO, omentectomy, bilateral pelvic and para-aortic lymph node dissection, pelvic washings  Chemotherapy:  3/30/18-18: Six cycles of carboplatin AUC 6 and paclitaxel 175mg/m2, delivered sandwich style with pelvic radiation between cycles three and four  Radiation: 18-18: EBRT delivered to pelvis and para-aortic nodes, 5040 cGy in 28 fractions  18-18: HDR brachytherapy delivered to the vaginal cuff, 1200 cGy in two fractions  Complications: Transient neuropathy, mild thrombocytopenia and anemia  Genetic testing: Panel testing negative     19: Chest CT performed for pulmonary nodule follow up, concerning for recurrent disease  IMPRESSION:   1. Few bilateral sub-4 mm solid pulmonary nodules are unchanged compared to 2018. No new or enlarging pulmonary nodules.  2. Dilated main pulmonary artery, nonspecific but may be seen with pulmonary artery hypertension.  3. New 1.9 cm left subclavicular node.     19: PET impression:  In this 69-year-old female with history of stage IIIC2 serous endometrial cancer status post total hysterectomy and bilateral salpingo-oophorectomy, omentectomy, bilateral pelvic and periaortic dissection, and radiation to the pelvis, and brachytherapy  to the vaginal cuff.  1. Progression of disease with new intensely FDG avid para-aortic, mediastinal, and subclavicular lymphadenopathy. The subdiaphragmatic periaortic lymphadenopathy spans from the diaphragmatic crura to the  low abdominal aorta at the level of L4 with greater than 180 degree involvement of the aorta. There is additional lymphadenopathy within the left axilla and the right inguinal region.     9/28/19: Neck CT impression:  1. Progression of metastatic disease with new intensely FDG avid retroclavicular lymph node at the level of the left thoracic inlet.   2. On the fusion PET CT, there is no definite abnormal metabolic activity in the mucosal space, soft tissues, or cervical jamel chains.  3. No evidence of mucosal or soft tissue abnormality on contrast enhanced neck CT.  4. Please refer to the whole body PET CT performed as a separate report, for the findings of the remainder of the body.     10/24/19: Lymph node biopsy:  Lymph node, periaortic, CT guided core biopsy:   -METASTATIC ADENOCARCINOMA   -Tumor morphology is consistent with metastasis/recurrence of endometrial serous carcinoma      11/11/19: Cycle #1 paclitaxel 175mg/m2 + carboplatin AUC 6 + bevacizumab 15mg/kg  12/2/19: Cycle #2 paclitaxel 175mg/m2 + carboplatin AUC 6 + bevacizumab 15mg/kg. Carboplatin reaction.  12/27/19: Cycle #3 Paclitaxel/Avastin/inpatient Carboplatin desensitization.   1/20/2020: CT cap IMPRESSION:  1. Mosaic attenuation throughout the lungs with associated dilated main pulmonary artery likely due to pulmonary arterial hypertension in the appropriate clinical setting.  2. Stable sub-4 mm bilateral pulmonary nodules. No new or enlarging pulmonary nodules.  3. Interval improvement of previously seen enlarged lymph nodes in the lower neck, chest, abdomen and pelvis. No new or enlarging lymph nodes in the present study.  4. Stable soft tissue mesenteric nodules as well as mesenteric root fat stranding.  5. Hepatic  steatosis. Additional incidental findings as described above.  1/22/2020: Cycle #4 Paclitaxel/Avastin/inpatient Carboplatin desensitization.   2/13/2020: Cycle #5 Paclitaxel/Avastin/inpatient Carboplatin desensitization.   3/3/2020: 24 h urine protein 0.15  3/5/2020: Cycle #6 Paclitaxel/Avastin/inpatient Carboplatin desensitization.     Past Medical History:    Past Medical History:   Diagnosis Date     Diabetes (H)     Type II     Endometrial cancer determined by uterine biopsy (H) 02/2018     HTN (hypertension)      Obesity      Osteoarthritis          Past Surgical History:    Past Surgical History:   Procedure Laterality Date     CHOLECYSTECTOMY  1993     CYSTOSCOPY N/A 2/23/2018    Procedure: CYSTOSCOPY;;  Surgeon: Kevin Henderson MD;  Location: [facility] OR     DAVINCI HYSTERECTOMY TOTAL, BILATERAL SALPINGO-OOPHORECTOMY, COMBINED N/A 2/23/2018    Procedure: COMBINED DAVINCI HYSTERECTOMY TOTAL, SALPINGO-OOPHORECTOMY;  DaVinci Assisted Total Laparoscopic Hysterectomy, Bilateral Salpingo-Oophorectomy, Cystoscopy,  Omental biopsy, omentectomy,bilateral  Pelvic And Para Aortic Lymph Node Dissection;  Surgeon: Kevin Henderson MD;  Location: [facility] OR     Partial lumbar discectomy  1998         Health Maintenance Due   Topic Date Due     DEXA  1950     LIPID  1950     MICROALBUMIN  1952     DIABETIC FOOT EXAM  1952     HEPATITIS C SCREENING  1952     ADVANCE CARE PLANNING  1950     EYE EXAM  1950     ZOSTER IMMUNIZATION (1 of 2) 04/07/2002     MEDICARE ANNUAL WELLNESS VISIT  04/07/2017     PNEUMOCOCCAL IMMUNIZATION 65+ HIGH/HIGHEST RISK (2 of 2 - PPSV23) 02/09/2018     A1C  05/09/2018     PHQ-2  01/01/2020       Current Medications:     Current Outpatient Medications   Medication Sig Dispense Refill     ACETAMINOPHEN PO Take 325 mg by mouth every 6 hours as needed for pain       dexamethasone (DECADRON) 4 MG tablet Take 5 tablets (20 mg) by mouth See Admin Instructions for 2  doses (Patient not taking: Reported on 2020) 10 tablet 3     Famotidine (PEPCID PO) Take 10 mg by mouth as needed        fluticasone (FLONASE) 50 MCG/ACT spray Spray 1-2 sprays into both nostrils daily (Patient not taking: Reported on 2020) 1 Bottle 1     iohexol (OMNIPAQUE) 140 MG/ML solution for oral use Mix entire bottle (50ml) of contast with 600ml (20 ounces) of water and drink half 2 hrs prior to CT scan and half 1 hr prior to scan (Patient not taking: Reported on 2020) 50 mL 0     L-Glutamine 500 MG CAPS        loperamide (IMODIUM) 2 MG capsule Take 2 mg by mouth 4 times daily as needed for diarrhea       loratadine (CLARITIN) 10 MG tablet Take 1 tablet (10 mg) by mouth daily (Patient not taking: Reported on 2020) 30 tablet 3     magnesium 500 MG TABS        METFORMIN HCL PO Take 500 mg by mouth daily (with breakfast)        omeprazole (PRILOSEC) 40 MG DR capsule        prochlorperazine (COMPAZINE) 10 MG tablet Take 0.5 tablets (5 mg) by mouth every 6 hours as needed (nausea/vomiting) (Patient not taking: Reported on 2020) 30 tablet 2     pyridoxine (VITAMIN B-6) 50 MG TABS Take 1 tablet (50 mg) by mouth 2 times daily 60 tablet 3     triamterene-hydrochlorothiazide (DYAZIDE) 37.5-25 MG per capsule Take 1 capsule by mouth every morning        amLODIPine (NORVASC) 10 MG tablet Take 1 tablet (10 mg) by mouth daily 30 tablet 0         Allergies:        Allergies   Allergen Reactions     Carboplatin Anaphylaxis     Chest tightness, hoarse voice, difficulty breathing     Ace Inhibitors Cough     Amoxicillin Hives        Social History:     Social History     Tobacco Use     Smoking status: Former Smoker     Packs/day: 0.25     Years: 20.00     Pack years: 5.00     Last attempt to quit: 1991     Years since quittin.9     Smokeless tobacco: Never Used     Tobacco comment: Quit smoking    Substance Use Topics     Alcohol use: Yes     Comment: 4-7/week if out 1-2x's/week       History  "  Drug Use No         Family History:       Family History   Problem Relation Age of Onset     Colon Cancer Mother      Breast Cancer Sister      Lymphoma Sister          Physical Exam:     /79   Pulse 103   Temp 98  F (36.7  C) (Oral)   Resp 14   Ht 1.626 m (5' 4.02\")   Wt 114.8 kg (253 lb)   SpO2 97%   BMI 43.41 kg/m    Body mass index is 43.41 kg/m .    General Appearance:  healthy and alert, no distress     Musculoskeletal:         extremities non tender and without edema     Neurological:   normal gait, no gross defects                Psychiatric:      appropriate mood and affect                               ABDOMEN:  Soft, nontender and nondistended.  No organomegaly.                                Assessment:     Lu Smith is a 69 year old woman with a diagnosis of recurrent serous endometrial cancer.      A total of 25 minutes was spent with the patient, 20 minutes of which were spent in counseling the patient and/or treatment planning.        1.  Recurrent serous endometrial carcinoma.   2.  Positive treatment response to carboplatin, paclitaxel, bevacizumab.   3.  Carbo desensitization.   4.  MSI stable tumor.   5.  Low tumor mutation burden.   6.  PD-L1 0% expression.      We reviewed the recent scan I discussed with the patient she has had a positive treatment response. We will continue with 3 cycles of 15mg/kg every 3 weeks bevacizumab maintenance and repeat a CT scan of the chest, abdomen and pelvis. The patient agrees with this plan.  She is very appreciative of her care.  All questions were answered.         Kevin Henderson MD, MS    Department of Obstetrics and Gynecology   Division of Gynecologic Oncology   [facility]  Phone: [phone]      CC  Patient Care Team:  Levar Scott as PCP - General (Internal Medicine)  Jeanne Blancas MD as Referring Physician (OB/Gyn)  Alondra Ruiz RN as Specialty Care Coordinator (Gyn-Onc)  SELF, " REFERRED

## 2020-04-22 NOTE — PROGRESS NOTES
"Lu Smith is a 70 year old female who is being evaluated via a billable telephone visit.      The patient has been notified of following:     \"This telephone visit will be conducted via a call between you and your physician/provider. We have found that certain health care needs can be provided without the need for a physical exam.  This service lets us provide the care you need with a short phone conversation.  If a prescription is necessary we can send it directly to your pharmacy.  If lab work is needed we can place an order for that and you can then stop by our lab to have the test done at a later time.    Telephone visits are billed at different rates depending on your insurance coverage. During this emergency period, for some insurers they may be billed the same as an in-person visit.  Please reach out to your insurance provider with any questions.     If during the course of the call the physician/provider feels a telephone visit is not appropriate, you will not be charged for this service.\"    Patient has given verbal consent for Telephone visit?  Yes    How would you like to obtain your AVS? [facility]     [phone]    Martha Carroll CMA      Phone call duration: 25 minutes                Follow Up Notes on Referred Patient    Date: 2020       Dr. Salvador Eugene MD  No address on file       RE: Lu Smith  : 1950  HEIDY: 2020    Lu Smith is a 69 year old woman with a diagnosis of recurrent serous endometrial cancer.    The patient presents today for followup. She has been doing fairly well on bevacizumab maintenance.  She is eating and drinking okay.  No nausea or vomiting, fever or chills.  Normal urinary and bowel function.  No vaginal bleeding or discharge.      Oncology History:  Diagnosis: Stage IIIC2 serous endometrial cancer  Primary GYN oncologist: Dr. Kevin Henderson  Primary radiation oncologist: Dr. Ana Michelle  Surgery: 18: TLH-BSO, omentectomy, " bilateral pelvic and para-aortic lymph node dissection, pelvic washings  Chemotherapy:  3/30/18-9/28/18: Six cycles of carboplatin AUC 6 and paclitaxel 175mg/m2, delivered sandwich style with pelvic radiation between cycles three and four  Radiation: 6/4/18-7/12/18: EBRT delivered to pelvis and para-aortic nodes, 5040 cGy in 28 fractions  7/16/18-7/20/18: HDR brachytherapy delivered to the vaginal cuff, 1200 cGy in two fractions  Complications: Transient neuropathy, mild thrombocytopenia and anemia  Genetic testing: Panel testing negative     9/16/19: Chest CT performed for pulmonary nodule follow up, concerning for recurrent disease  IMPRESSION:   1. Few bilateral sub-4 mm solid pulmonary nodules are unchanged compared to 2/6/2018. No new or enlarging pulmonary nodules.  2. Dilated main pulmonary artery, nonspecific but may be seen with pulmonary artery hypertension.  3. New 1.9 cm left subclavicular node.     9/28/19: PET impression:  In this 69-year-old female with history of stage IIIC2 serous endometrial cancer status post total hysterectomy and bilateral salpingo-oophorectomy, omentectomy, bilateral pelvic and periaortic dissection, and radiation to the pelvis, and brachytherapy to the vaginal cuff.  1. Progression of disease with new intensely FDG avid para-aortic, mediastinal, and subclavicular lymphadenopathy. The subdiaphragmatic periaortic lymphadenopathy spans from the diaphragmatic crura to the  low abdominal aorta at the level of L4 with greater than 180 degree involvement of the aorta. There is additional lymphadenopathy within the left axilla and the right inguinal region.     9/28/19: Neck CT impression:  1. Progression of metastatic disease with new intensely FDG avid retroclavicular lymph node at the level of the left thoracic inlet.   2. On the fusion PET CT, there is no definite abnormal metabolic activity in the mucosal space, soft tissues, or cervical jamel chains.  3. No evidence of mucosal  or soft tissue abnormality on contrast enhanced neck CT.  4. Please refer to the whole body PET CT performed as a separate report, for the findings of the remainder of the body.     10/24/19: Lymph node biopsy:  Lymph node, periaortic, CT guided core biopsy:   -METASTATIC ADENOCARCINOMA   -Tumor morphology is consistent with metastasis/recurrence of endometrial serous carcinoma      11/11/19: Cycle #1 paclitaxel 175mg/m2 + carboplatin AUC 6 + bevacizumab 15mg/kg  12/2/19: Cycle #2 paclitaxel 175mg/m2 + carboplatin AUC 6 + bevacizumab 15mg/kg. Carboplatin reaction.  12/27/19: Cycle #3 Paclitaxel/Avastin/inpatient Carboplatin desensitization.   1/20/2020: CT cap IMPRESSION:  1. Mosaic attenuation throughout the lungs with associated dilated main pulmonary artery likely due to pulmonary arterial hypertension in the appropriate clinical setting.  2. Stable sub-4 mm bilateral pulmonary nodules. No new or enlarging pulmonary nodules.  3. Interval improvement of previously seen enlarged lymph nodes in the lower neck, chest, abdomen and pelvis. No new or enlarging lymph nodes in the present study.  4. Stable soft tissue mesenteric nodules as well as mesenteric root fat stranding.  5. Hepatic steatosis. Additional incidental findings as described above.  1/22/2020: Cycle #4 Paclitaxel/Avastin/inpatient Carboplatin desensitization.   2/13/2020: Cycle #5 Paclitaxel/Avastin/inpatient Carboplatin desensitization.   3/3/2020: 24 h urine protein 0.15  3/5/2020: Cycle #6 Paclitaxel/Avastin/inpatient Carboplatin desensitization.   3/25/2020: Cycle #1 Avastin   4/22/2020: Cycle #2 Avastin       Past Medical History:    Past Medical History:   Diagnosis Date     Diabetes (H)     Type II     Endometrial cancer determined by uterine biopsy (H) 02/2018     HTN (hypertension)      Obesity      Osteoarthritis          Past Surgical History:    Past Surgical History:   Procedure Laterality Date     CHOLECYSTECTOMY  1993     CYSTOSCOPY N/A  2/23/2018    Procedure: CYSTOSCOPY;;  Surgeon: Kevin Henderson MD;  Location: [facility] OR     DAVINCI HYSTERECTOMY TOTAL, BILATERAL SALPINGO-OOPHORECTOMY, COMBINED N/A 2/23/2018    Procedure: COMBINED DAVINCI HYSTERECTOMY TOTAL, SALPINGO-OOPHORECTOMY;  DaVinci Assisted Total Laparoscopic Hysterectomy, Bilateral Salpingo-Oophorectomy, Cystoscopy,  Omental biopsy, omentectomy,bilateral  Pelvic And Para Aortic Lymph Node Dissection;  Surgeon: Kevin Henderson MD;  Location: [facility] OR     Partial lumbar discectomy  1998         Health Maintenance Due   Topic Date Due     DEXA  1950     LIPID  1950     MICROALBUMIN  1952     DIABETIC FOOT EXAM  1952     HEPATITIS C SCREENING  1952     ADVANCE CARE PLANNING  1950     EYE EXAM  1950     ZOSTER IMMUNIZATION (1 of 2) 04/07/2002     MEDICARE ANNUAL WELLNESS VISIT  04/07/2017     PNEUMOCOCCAL IMMUNIZATION 65+ HIGH/HIGHEST RISK (2 of 2 - PPSV23) 02/09/2018     A1C  05/09/2018     PHQ-2  01/01/2020       Current Medications:     Current Outpatient Medications   Medication Sig Dispense Refill     ACETAMINOPHEN PO Take 325 mg by mouth every 6 hours as needed for pain       amLODIPine (NORVASC) 10 MG tablet Take 1 tablet (10 mg) by mouth daily 30 tablet 0     Famotidine (PEPCID PO) Take 10 mg by mouth as needed        fluticasone (FLONASE) 50 MCG/ACT spray Spray 1-2 sprays into both nostrils daily 1 Bottle 1     L-Glutamine 500 MG CAPS        loperamide (IMODIUM) 2 MG capsule Take 2 mg by mouth 4 times daily as needed for diarrhea       loratadine (CLARITIN) 10 MG tablet Take 1 tablet (10 mg) by mouth daily 30 tablet 3     magnesium 500 MG TABS        METFORMIN HCL PO Take 500 mg by mouth daily (with breakfast)        omeprazole (PRILOSEC) 40 MG DR capsule        prochlorperazine (COMPAZINE) 10 MG tablet Take 0.5 tablets (5 mg) by mouth every 6 hours as needed (nausea/vomiting) 30 tablet 2     pyridoxine (VITAMIN B-6) 50 MG TABS Take 1  tablet (50 mg) by mouth 2 times daily 60 tablet 3     triamterene-hydrochlorothiazide (DYAZIDE) 37.5-25 MG per capsule Take 1 capsule by mouth every morning            Allergies:        Allergies   Allergen Reactions     Carboplatin Anaphylaxis     Chest tightness, hoarse voice, difficulty breathing     Ace Inhibitors Cough     Amoxicillin Hives        Social History:     Social History     Tobacco Use     Smoking status: Former Smoker     Packs/day: 0.25     Years: 20.00     Pack years: 5.00     Last attempt to quit: 1991     Years since quittin.0     Smokeless tobacco: Never Used     Tobacco comment: Quit smoking    Substance Use Topics     Alcohol use: Yes     Comment: 4-7/week if out 1-2x's/week       History   Drug Use No         Family History:         Family History   Problem Relation Age of Onset     Colon Cancer Mother      Breast Cancer Sister      Lymphoma Sister          Physical Exam:     There were no vitals taken for this visit.  There is no height or weight on file to calculate BMI.    General Appearance:  healthy and alert, no distress                Psychiatric:      appropriate mood and affect                                                              Assessment:     Lu Smith is a 69 year old woman with a diagnosis of recurrent serous endometrial cancer.      A total of 25 minutes was spent with the patient, 20 minutes of which were spent in counseling the patient and/or treatment planning.        1.  Recurrent serous endometrial carcinoma.   2.  Positive treatment response to carboplatin, paclitaxel, bevacizumab.   3.  Carbo desensitization.   4.  MSI stable tumor.   5.  Low tumor mutation burden.   6.  PD-L1 0% expression.      We reviewed the recent scan I discussed with the patient she has had a positive treatment response. We will continue with 3 cycles of 15mg/kg every 3 weeks bevacizumab maintenance and repeat a CT scan of the chest, abdomen and pelvis. The patient  agrees with this plan.  She is very appreciative of her care.  All questions were answered.         Kevin Henderson MD, MS    Department of Obstetrics and Gynecology   Division of Gynecologic Oncology   [facility]  Phone: [phone]    CC  Patient Care Team:  Levar Scott as PCP - General (Internal Medicine)  Jeanne Blancas MD as Referring Physician (OB/Gyn)  Alondra Ruiz RN as Specialty Care Coordinator (Gyn-Onc)  SELF, REFERRED

## 2020-04-22 NOTE — PROGRESS NOTES
Infusion Nursing Note:  Lu Smith presents today for C2 D1 bevacizumab-awwb ([facility]AS[facility])/Magnesium replacement.    Patient seen by provider today: Yes: Dr. Henderson   present during visit today: Not Applicable.    Note: Patient arrives feeling well. Per lab RN, no blood return from port. TPA ordered in infusion, instilled and dwelled for 40 minutes. Checked blood return but no success. Reinstilled for an additional 30 minutes while Avastin running through PIV. Brisk blood return noted upon recheck. Pt states she has needed TPA in her port before.    Intravenous Access:  Peripheral IV placed.  Implanted Port.    Treatment Conditions:  Lab Results   Component Value Date    HGB 8.3 03/05/2020     Lab Results   Component Value Date    WBC 5.3 03/05/2020      Lab Results   Component Value Date    ANEU 4.0 03/05/2020     Lab Results   Component Value Date    PLT 98 03/05/2020      Lab Results   Component Value Date     03/05/2020                   Lab Results   Component Value Date    POTASSIUM 3.7 04/22/2020           Lab Results   Component Value Date    MAG 1.5 04/22/2020            Lab Results   Component Value Date    CR 0.97 03/05/2020                   Lab Results   Component Value Date    MARYLOU 9.1 03/05/2020                Lab Results   Component Value Date    BILITOTAL 0.4 03/05/2020           Lab Results   Component Value Date    ALBUMIN 3.3 03/05/2020                    Lab Results   Component Value Date    ALT 29 03/05/2020           Lab Results   Component Value Date    AST 25 03/05/2020       Results reviewed, labs MET treatment parameters, ok to proceed with treatment.  Urine protein negative.  BP taken x 2, 10 minutes apart. Both readings WNL.      Post Infusion Assessment:  Patient tolerated infusion without incident.  Blood return noted pre and post infusion.  Site patent and intact, free from redness, edema or discomfort.  No evidence of extravasations.  Access discontinued per  protocol.       Discharge Plan:   Patient declined prescription refills.  Discharge instructions reviewed with: Patient.  Patient and/or family verbalized understanding of discharge instructions and all questions answered.  AVS to patient via [employer].  Patient will return 5/13 for next appointment.   Patient discharged in stable condition accompanied by: self.  Departure Mode: Ambulatory.    Madison Antunez RN

## 2020-04-22 NOTE — NURSING NOTE
Port accessed with 20 g flat needle and labs drawn by RN.  Port flushed with NS and heparin. No blood return noted on port: IV placed for labs and infusion. RN caring for patient in infusion notified that patient may require tpa.  Pt tolerated well.  VS taken.  Pt checked in for next appt.    Administrations This Visit     heparin 100 UNIT/ML injection 5 mL     Admin Date  04/22/2020 Action  Given Dose  5 mL Route  Intracatheter Administered By  Kandis Garay, RN                Kandis Garay, RN

## 2020-04-22 NOTE — PATIENT INSTRUCTIONS
Contact Numbers    [facility] (for Scheduling/Triage/After Hours Nurse Line): [phone]    Please call the [facility] nurse triage or the after hours nurse line if you experience a temperature greater than or equal to 100.5, shaking chills, have uncontrolled nausea, vomiting and/or diarrhea, dizziness, lightheadedness, shortness of breath, chest pain, bleeding, unexplained bruising, or if you have any other new/concerning symptoms, questions or concerns.     If you are having any concerning symptoms or wish to speak to a provider before your next infusion visit, please call your care coordinator or triage to notify them so we can adequately serve you.     If you need a refill on a narcotic prescription or other medication, please call triage before your infusion appointment.       April 2020 Sunday Monday Tuesday Wednesday Thursday Friday Saturday                  1    [facility]   1:30 PM   (15 min.)    [facility]   [facility] Lab Draw    P ONC INFUSION 60   2:00 PM   (60 min.)    ONCOLOGY INFUSION   [facility] 2     3     4       5     6     7  Happy Birthday!     8     9     10     11       12     13     14     15     16     17     18       19     20     21     22    [facility]  12:30 PM   (15 min.)    [facility] D[facility]   [facility] Draw    TELEPHONE VISIT RETURN   1:00 PM   (20 min.)   Kevin Henderson MD   [facility]    [facility] ONC INFUSION 60   2:00 PM   (60 min.)    ONCOLOGY INFUSION   [facility] 23     24     25       26     27     28     29     30                           May 2020      Derrek Monday Tuesday Wednesday Thursday Friday Saturday                            1     2       3     4     5     6     7     8     9       10     11     12     13    [facility]   9:45 AM   (15 min.)   [facility]   [facility] Draw    [facility] RETURN  10:05 AM   (20 min.)    Kevin Henderson MD [facility] ONC INFUSION 60  11:00 AM   (60 min.)   [facility] ONCOLOGY INFUSION   [facility] 14     15     16       17     18     19     20     21     22     23       24     25     26     27     28     29     30       31                                                   Lab Results:  Recent Results (from the past 12 hour(s))   Potassium    Collection Time: 04/22/20  1:06 PM   Result Value Ref [place]    Potassium 3.7 3.4 - 5.3 mmol/L   Magnesium    Collection Time: 04/22/20  1:06 PM   Result Value Ref [place]    Magnesium 1.5 (L) 1.6 - 2.3 mg/dL   Protein qualitative urine    Collection Time: 04/22/20  1:13 PM   Result Value Ref [place]    Protein Albumin Urine Negative NEG^Negative mg/dL

## 2020-05-13 NOTE — PROGRESS NOTES
Infusion Nursing Note:  Lu Smith presents today for Day 1 Cycle 3 Bevacizumab-awwb (MVASI).    Patient seen by provider today: Yes: Dr. Henderson   present during visit today: Not Applicable.    Note: Patient presents to infusion feeling well. Patient denies pain and states no acute complaints or concerns not addressed during Dr. Henderson's appointment today.    Intravenous Access:  Implanted Port.    Treatment Conditions:  Lab Results   Component Value Date     03/05/2020                   Lab Results   Component Value Date    POTASSIUM 3.5 05/13/2020           Lab Results   Component Value Date    MAG 1.6 05/13/2020            Lab Results   Component Value Date    CR 0.97 03/05/2020                   Lab Results   Component Value Date    MARYLOU 9.1 03/05/2020                Lab Results   Component Value Date    BILITOTAL 0.4 03/05/2020           Lab Results   Component Value Date    ALBUMIN 3.3 03/05/2020                    Lab Results   Component Value Date    ALT 29 03/05/2020           Lab Results   Component Value Date    AST 25 03/05/2020       Urine negative for protein). Magnesium level greater then 1.5 (1.6) and Potassium level greater then 3.3 (3.5), therefore electrolyte replacement not needed today.  BP x2 less than 150/100. See flow sheet for data       Post Infusion Assessment:  Patient tolerated infusion without incident.  Blood return noted pre and post infusion.  Site patent and intact, free from redness, edema or discomfort.  No evidence of extravasations.  Access discontinued per protocol.       Discharge Plan:   Patient declined prescription refills.  Discharge instructions reviewed with: Patient.  Patient and/or family verbalized understanding of discharge instructions and all questions answered.  Copy of AVS reviewed with patient and/or family.  Patient will return 6/3 for next appointment.  Patient discharged in stable condition accompanied by: self.  Departure Mode:  Ambulatory.  Face to Face time: 0 minutes.    Manny Victoria RN

## 2020-05-13 NOTE — PROGRESS NOTES
"Lu Smith is a 70 year old female who is being evaluated via a billable telephone visit.      The patient has been notified of following:     \"This telephone visit will be conducted via a call between you and your physician/provider. We have found that certain health care needs can be provided without the need for a physical exam.  This service lets us provide the care you need with a short phone conversation.  If a prescription is necessary we can send it directly to your pharmacy.  If lab work is needed we can place an order for that and you can then stop by our lab to have the test done at a later time.    Telephone visits are billed at different rates depending on your insurance coverage. During this emergency period, for some insurers they may be billed the same as an in-person visit.  Please reach out to your insurance provider with any questions.    If during the course of the call the physician/provider feels a telephone visit is not appropriate, you will not be charged for this service.\"    Patient has given verbal consent for Telephone visit?  Yes    What phone number would you like to be contacted at? [phone]    How would you like to obtain your AVS? MyChart      Concerns: Right eye floaters, would like referral for eye dr. Coby Anne CMA ([facility])      Phone visit: 25 minutes.                Follow Up Notes on Referred Patient    Date: 2020       Dr. Salvador Eugene MD  No address on file       RE: Lu Smith  : 1950  HEIDY: 2020    Dear Dr. Salvador Eugene:    Lu Smith is a 69 year old woman with a diagnosis of recurrent serous endometrial cancer.    The patient presents today for followup. She has been doing fairly well on bevacizumab maintenance.  She is eating and drinking okay.  No nausea had two epsiodes of vomiting over the weekend, which she thinks might be related to some food, no fever or chills.  Normal urinary function and one loose bowel movement per day. "  No vaginal bleeding or discharge. She does have some upper and lower back pain that resolves with warm bath, no SOB or CP.     Oncology History:  Diagnosis: Stage IIIC2 serous endometrial cancer  Primary GYN oncologist: Dr. Kevin Henderson  Primary radiation oncologist: Dr. Ana Michelle  Surgery: 2/23/18: TLH-BSO, omentectomy, bilateral pelvic and para-aortic lymph node dissection, pelvic washings  Chemotherapy:  3/30/18-9/28/18: Six cycles of carboplatin AUC 6 and paclitaxel 175mg/m2, delivered sandwich style with pelvic radiation between cycles three and four  Radiation: 6/4/18-7/12/18: EBRT delivered to pelvis and para-aortic nodes, 5040 cGy in 28 fractions  7/16/18-7/20/18: HDR brachytherapy delivered to the vaginal cuff, 1200 cGy in two fractions  Complications: Transient neuropathy, mild thrombocytopenia and anemia  Genetic testing: Panel testing negative     9/16/19: Chest CT performed for pulmonary nodule follow up, concerning for recurrent disease  IMPRESSION:   1. Few bilateral sub-4 mm solid pulmonary nodules are unchanged compared to 2/6/2018. No new or enlarging pulmonary nodules.  2. Dilated main pulmonary artery, nonspecific but may be seen with pulmonary artery hypertension.  3. New 1.9 cm left subclavicular node.     9/28/19: PET impression:  In this 69-year-old female with history of stage IIIC2 serous endometrial cancer status post total hysterectomy and bilateral salpingo-oophorectomy, omentectomy, bilateral pelvic and periaortic dissection, and radiation to the pelvis, and brachytherapy to the vaginal cuff.  1. Progression of disease with new intensely FDG avid para-aortic, mediastinal, and subclavicular lymphadenopathy. The subdiaphragmatic periaortic lymphadenopathy spans from the diaphragmatic crura to the  low abdominal aorta at the level of L4 with greater than 180 degree involvement of the aorta. There is additional lymphadenopathy within the left axilla and the right inguinal  region.     9/28/19: Neck CT impression:  1. Progression of metastatic disease with new intensely FDG avid retroclavicular lymph node at the level of the left thoracic inlet.   2. On the fusion PET CT, there is no definite abnormal metabolic activity in the mucosal space, soft tissues, or cervical jamel chains.  3. No evidence of mucosal or soft tissue abnormality on contrast enhanced neck CT.  4. Please refer to the whole body PET CT performed as a separate report, for the findings of the remainder of the body.     10/24/19: Lymph node biopsy:  Lymph node, periaortic, CT guided core biopsy:   -METASTATIC ADENOCARCINOMA   -Tumor morphology is consistent with metastasis/recurrence of endometrial serous carcinoma      11/11/19: Cycle #1 paclitaxel 175mg/m2 + carboplatin AUC 6 + bevacizumab 15mg/kg  12/2/19: Cycle #2 paclitaxel 175mg/m2 + carboplatin AUC 6 + bevacizumab 15mg/kg. Carboplatin reaction.  12/27/19: Cycle #3 Paclitaxel/Avastin/inpatient Carboplatin desensitization.   1/20/2020: CT cap IMPRESSION:  1. Mosaic attenuation throughout the lungs with associated dilated main pulmonary artery likely due to pulmonary arterial hypertension in the appropriate clinical setting.  2. Stable sub-4 mm bilateral pulmonary nodules. No new or enlarging pulmonary nodules.  3. Interval improvement of previously seen enlarged lymph nodes in the lower neck, chest, abdomen and pelvis. No new or enlarging lymph nodes in the present study.  4. Stable soft tissue mesenteric nodules as well as mesenteric root fat stranding.  5. Hepatic steatosis. Additional incidental findings as described above.  1/22/2020: Cycle #4 Paclitaxel/Avastin/inpatient Carboplatin desensitization.   2/13/2020: Cycle #5 Paclitaxel/Avastin/inpatient Carboplatin desensitization.   3/3/2020: 24 h urine protein 0.15  3/5/2020: Cycle #6 Paclitaxel/Avastin/inpatient Carboplatin desensitization.   3/25/2020: Cycle #1 Avastin   4/22/2020: Cycle #2 Avastin    5/13/2020: Cycle #3 Avastin      Past Medical History:    Past Medical History:   Diagnosis Date     Diabetes (H)     Type II     Endometrial cancer determined by uterine biopsy (H) 02/2018     HTN (hypertension)      Obesity      Osteoarthritis          Past Surgical History:    Past Surgical History:   Procedure Laterality Date     CHOLECYSTECTOMY  1993     CYSTOSCOPY N/A 2/23/2018    Procedure: CYSTOSCOPY;;  Surgeon: Kevin Henderson MD;  Location: [facility] OR     DAVINCI HYSTERECTOMY TOTAL, BILATERAL SALPINGO-OOPHORECTOMY, COMBINED N/A 2/23/2018    Procedure: COMBINED DAVINCI HYSTERECTOMY TOTAL, SALPINGO-OOPHORECTOMY;  DaVinci Assisted Total Laparoscopic Hysterectomy, Bilateral Salpingo-Oophorectomy, Cystoscopy,  Omental biopsy, omentectomy,bilateral  Pelvic And Para Aortic Lymph Node Dissection;  Surgeon: Kevin Henderson MD;  Location: [facility] OR     Partial lumbar discectomy  1998         Health Maintenance Due   Topic Date Due     DEXA  1950     LIPID  1950     MICROALBUMIN  1950     DIABETIC FOOT EXAM  1950     HEPATITIS C SCREENING  1950     ADVANCE CARE PLANNING  1950     EYE EXAM  1950     ZOSTER IMMUNIZATION (1 of 2) 04/07/2002     MEDICARE ANNUAL WELLNESS VISIT  04/07/2017     PNEUMOCOCCAL IMMUNIZATION 65+ HIGH/HIGHEST RISK (2 of 2 - PPSV23) 02/09/2018     A1C  05/09/2018     PHQ-2  01/01/2020       Current Medications:     Current Outpatient Medications   Medication Sig Dispense Refill     ACETAMINOPHEN PO Take 325 mg by mouth every 6 hours as needed for pain       amLODIPine (NORVASC) 10 MG tablet Take 1 tablet (10 mg) by mouth daily 30 tablet 0     Famotidine (PEPCID PO) Take 10 mg by mouth as needed        fluticasone (FLONASE) 50 MCG/ACT spray Spray 1-2 sprays into both nostrils daily 1 Bottle 1     L-Glutamine 500 MG CAPS        loperamide (IMODIUM) 2 MG capsule Take 2 mg by mouth 4 times daily as needed for diarrhea       loratadine (CLARITIN) 10 MG  tablet Take 1 tablet (10 mg) by mouth daily 30 tablet 3     magnesium 500 MG TABS        METFORMIN HCL PO Take 500 mg by mouth daily (with breakfast)        omeprazole (PRILOSEC) 40 MG DR capsule        prochlorperazine (COMPAZINE) 10 MG tablet Take 0.5 tablets (5 mg) by mouth every 6 hours as needed (nausea/vomiting) 30 tablet 2     pyridoxine (VITAMIN B-6) 50 MG TABS Take 1 tablet (50 mg) by mouth 2 times daily 60 tablet 3     triamterene-hydrochlorothiazide (DYAZIDE) 37.5-25 MG per capsule Take 1 capsule by mouth every morning        [START ON 2020] amLODIPine (NORVASC) 5 MG tablet Take 1 tablet (5 mg) by mouth daily 30 tablet 0         Allergies:        Allergies   Allergen Reactions     Carboplatin Anaphylaxis     Chest tightness, hoarse voice, difficulty breathing     Ace Inhibitors Cough     Amoxicillin Hives        Social History:     Social History     Tobacco Use     Smoking status: Former Smoker     Packs/day: 0.25     Years: 20.00     Pack years: 5.00     Last attempt to quit: 1991     Years since quittin.0     Smokeless tobacco: Never Used     Tobacco comment: Quit smoking    Substance Use Topics     Alcohol use: Yes     Comment: 4-7/week if out 1-2x's/week       History   Drug Use No         Family History:         Family History   Problem Relation Age of Onset     Colon Cancer Mother      Breast Cancer Sister      Lymphoma Sister          Physical Exam:     There were no vitals taken for this visit.  There is no height or weight on file to calculate BMI.       General Appearance:  healthy and alert, no distress                Psychiatric:      appropriate mood and affect                                                               Assessment:     Lu Smith is a 69 year old woman with a diagnosis of recurrent serous endometrial cancer.      A total of 25 minutes was spent with the patient, 20 minutes of which were spent in counseling the patient and/or treatment  planning.        1.  Recurrent serous endometrial carcinoma.   2.  Positive treatment response to carboplatin, paclitaxel, bevacizumab.   3.  Carbo desensitization.   4.  MSI stable tumor.   5.  Low tumor mutation burden.   6.  PD-L1 0% expression.      We reviewed the recent scan I discussed with the patient she has had a positive treatment response. We will continue with 3 cycles of 15mg/kg every 3 weeks bevacizumab maintenance and repeat a CT scan of the chest, abdomen and pelvis. The patient agrees with this plan.  She is very appreciative of her care.  All questions were answered.         Kevin Henderson MD, MS    Department of Obstetrics and Gynecology   Division of Gynecologic Oncology   [facility]  Phone: [phone]       CC  Patient Care Team:  Levar Scott as PCP - General (Internal Medicine)  Jeanne Blancas MD as Referring Physician (OB/Gyn)  Alondra Ruiz RN as Specialty Care Coordinator (Gyn-Onc)  SELF, REFERRED

## 2020-05-13 NOTE — PATIENT INSTRUCTIONS
[facility] Triage and after hours / weekends / holidays:  [phone]    Please call the triage or after hours line if you experience a temperature greater than or equal to 100.5, shaking chills, have uncontrolled nausea, vomiting and/or diarrhea, dizziness, shortness of breath, chest pain, bleeding, unexplained bruising, or if you have any other new/concerning symptoms, questions or concerns.      If you are having any concerning symptoms or wish to speak to a provider before your next infusion visit, please call your care coordinator or triage to notify them so we can adequately serve you.     If you need a refill on a narcotic prescription or other medication, please call before your infusion appointment.                 May 2020      Derrek Monday Tuesday Wednesday Thursday Friday Saturday                            1     2       3     4     5     6     7     8     9       10     11     12     13    [facility]   9:45 AM   (15 min.)    [facility] DRAW   [facility] Lab Draw    TELEPHONE VISIT RETURN  10:20 AM   (20 min.)   Kevin Henderson MD   [facility] ONC INFUSION 60  11:00 AM   (60 min.)    ONCOLOGY INFUSION   [facility] 14     15     16       17     18     19     20     21     22     23       24     25     26     27     28     29     30       31                                               June 2020 Sunday Monday Tuesday Wednesday Thursday Friday Saturday        1     2     3    [facility] DRAW   7:30 AM   (15 min.)    [facility] DRAW   [facility] Lab Draw    TELEPHONE VISIT RETURN   8:20 AM   (20 min.)   Kevin Henderson MD   [facility] ED ONC INFUSION 60   9:00 AM   (60 min.)    ONCOLOGY INFUSION   [facility] 4     5     6       7     8     9     10     11     12     13       14     15     16     17     18     19     20       21     22     23     24     25    [facility]  [facility] LAB DRAW   7:00 AM   (15 min.)   [facility] LAB DRAW   [facility] Lab Draw    TELEPHONE VISIT RETURN   7:40 AM   (40 min.)   Lisa Barroso APRN CNP   [facility] ONC INFUSION 60   8:30 AM   (60 min.)    ONCOLOGY INFUSION   [facility] 26     27       28     29     30                                         Lab Results:  Recent Results (from the past 12 hour(s))   Potassium    Collection Time: 05/13/20 10:30 AM   Result Value Ref [place]    Potassium 3.5 3.4 - 5.3 mmol/L   Magnesium    Collection Time: 05/13/20 10:30 AM   Result Value Ref [place]    Magnesium 1.6 1.6 - 2.3 mg/dL   Protein qualitative urine    Collection Time: 05/13/20 10:48 AM   Result Value Ref [place]    Protein Albumin Urine Negative NEG^Negative mg/dL

## 2020-05-28 NOTE — DISCHARGE INSTRUCTIONS
\[id number]\What to expect when you have contrast    During your exam, we will inject  contrast  into your vein or artery. (Contrast is a clear liquid with iodine in it. It shows up on X-rays.)    You may feel warm or hot. You may have a metal taste in your mouth and a slight upset stomach. You may also feel pressure near the kidneys and bladder. These effects will last about 1 to 3 minutes.    Please tell us if you have:    Sneezing     Itching    Hives     Swelling in the face    A hoarse voice    Breathing problems    Other new symptoms    Serious problems are rare.  They may include:    Irregular heartbeat     Seizures    Kidney failure              Tissue damage    Shock      Death    If you have any problems during the exam, we  will treat them right away.    When you get home    Call your hospital if you have any new symptoms in the next 2 days, like hives or swelling. (Phone numbers are at the bottom of this page.) Or call your family doctor.     If you have wheezing or trouble breathing, call 911.    Self-care  -Drink at least 4 extra glasses of water today.   This reduces the stress on your kidneys.  -Keep taking your regular medicines.    The contrast will pass out of your body in your  Urine(pee). This will happen in the next 24 hours. You  will not feel this. Your urine will not  change color.    If you have kidney problems or take metformin    Drink 4 to 8 large glasses of water for the next  2 days, if you are not on a fluid restriction.    ?If you take metformin (Glucophage or Glucovance) for diabetes, keep taking it.      ?Your kidney function tests are abnormal.  If you take Metformin, do not take it for 48 hours. Please go to your clinic for a blood test within 3 days after your exam before the restarting this medicine.     (Note to provider:please give patient prescription for lab tests.)    ?Special instructions: ***    I have  read and understand the above information.    Patient Sign Here:______________________________________Date:________Time:______    Staff Sign Here:________________________________________Date:_______Time:______      Radiology Departments:     ?[facility]: [phone] ?[place]: [phone]     ?[place]: [phone] ?[facility]:[phone]      ?[place]: [phone]  ?[place]: [phone]  ?[place]:[phone]    ?[facility]:[phone]  ?[facility]:[phone]

## 2020-06-03 NOTE — PROGRESS NOTES
"Lu Smith is a 70 year old female who is being evaluated via a billable telephone visit.      The patient has been notified of following:     \"This telephone visit will be conducted via a call between you and your physician/provider. We have found that certain health care needs can be provided without the need for a physical exam.  This service lets us provide the care you need with a short phone conversation.  If a prescription is necessary we can send it directly to your pharmacy.  If lab work is needed we can place an order for that and you can then stop by our lab to have the test done at a later time.    Telephone visits are billed at different rates depending on your insurance coverage. During this emergency period, for some insurers they may be billed the same as an in-person visit.  Please reach out to your insurance provider with any questions.    If during the course of the call the physician/provider feels a telephone visit is not appropriate, you will not be charged for this service.\"    Patient has given verbal consent for Telephone visit?  Yes    What phone number would you like to be contacted at? [phone]     How would you like to obtain your AVS? MyChart     I have reviewed and updated the patient's allergies and medication list.    Concerns: No concerns  Refills: No refills needed.        Dayana Valerio CMA      Phone call duration:25 minutes                Follow Up Notes on Referred Patient    Date: 6/3/2020       Dr. Salvador Eugene MD  No address on file       RE: Lu Smith  : 1950  HEIDY: 6/3/2020    Dear Dr. Salvador Eugene:    Lu Smith is a 69 year old woman with a diagnosis of recurrent serous endometrial cancer.    The patient presents today for followup. She has been doing fairly well on bevacizumab maintenance.  She is eating and drinking okay.  Occasional nausea had no vomiting, no fever or chills.  Normal urinary function and one loose bowel movement per day. "  No vaginal bleeding or discharge. She does have some upper and lower back pain that resolves with warm bath, no SOB or CP. No lumps in the right groin.     Oncology History:  Diagnosis: Stage IIIC2 serous endometrial cancer  Primary GYN oncologist: Dr. Kevin Henderson  Primary radiation oncologist: Dr. Ana Michelle  Surgery: 2/23/18: TLH-BSO, omentectomy, bilateral pelvic and para-aortic lymph node dissection, pelvic washings  Chemotherapy:  3/30/18-9/28/18: Six cycles of carboplatin AUC 6 and paclitaxel 175mg/m2, delivered sandwich style with pelvic radiation between cycles three and four  Radiation: 6/4/18-7/12/18: EBRT delivered to pelvis and para-aortic nodes, 5040 cGy in 28 fractions  7/16/18-7/20/18: HDR brachytherapy delivered to the vaginal cuff, 1200 cGy in two fractions  Complications: Transient neuropathy, mild thrombocytopenia and anemia  Genetic testing: Panel testing negative     9/16/19: Chest CT performed for pulmonary nodule follow up, concerning for recurrent disease  IMPRESSION:   1. Few bilateral sub-4 mm solid pulmonary nodules are unchanged compared to 2/6/2018. No new or enlarging pulmonary nodules.  2. Dilated main pulmonary artery, nonspecific but may be seen with pulmonary artery hypertension.  3. New 1.9 cm left subclavicular node.     9/28/19: PET impression:  In this 69-year-old female with history of stage IIIC2 serous endometrial cancer status post total hysterectomy and bilateral salpingo-oophorectomy, omentectomy, bilateral pelvic and periaortic dissection, and radiation to the pelvis, and brachytherapy to the vaginal cuff.  1. Progression of disease with new intensely FDG avid para-aortic, mediastinal, and subclavicular lymphadenopathy. The subdiaphragmatic periaortic lymphadenopathy spans from the diaphragmatic crura to the  low abdominal aorta at the level of L4 with greater than 180 degree involvement of the aorta. There is additional lymphadenopathy within the left axilla  and the right inguinal region.     9/28/19: Neck CT impression:  1. Progression of metastatic disease with new intensely FDG avid retroclavicular lymph node at the level of the left thoracic inlet.   2. On the fusion PET CT, there is no definite abnormal metabolic activity in the mucosal space, soft tissues, or cervical jamel chains.  3. No evidence of mucosal or soft tissue abnormality on contrast enhanced neck CT.  4. Please refer to the whole body PET CT performed as a separate report, for the findings of the remainder of the body.     10/24/19: Lymph node biopsy:  Lymph node, periaortic, CT guided core biopsy:   -METASTATIC ADENOCARCINOMA   -Tumor morphology is consistent with metastasis/recurrence of endometrial serous carcinoma      11/11/19: Cycle #1 paclitaxel 175mg/m2 + carboplatin AUC 6 + bevacizumab 15mg/kg  12/2/19: Cycle #2 paclitaxel 175mg/m2 + carboplatin AUC 6 + bevacizumab 15mg/kg. Carboplatin reaction.  12/27/19: Cycle #3 Paclitaxel/Avastin/inpatient Carboplatin desensitization.   1/20/2020: CT cap IMPRESSION:  1. Mosaic attenuation throughout the lungs with associated dilated main pulmonary artery likely due to pulmonary arterial hypertension in the appropriate clinical setting.  2. Stable sub-4 mm bilateral pulmonary nodules. No new or enlarging pulmonary nodules.  3. Interval improvement of previously seen enlarged lymph nodes in the lower neck, chest, abdomen and pelvis. No new or enlarging lymph nodes in the present study.  4. Stable soft tissue mesenteric nodules as well as mesenteric root fat stranding.  5. Hepatic steatosis. Additional incidental findings as described above.  1/22/2020: Cycle #4 Paclitaxel/Avastin/inpatient Carboplatin desensitization.   2/13/2020: Cycle #5 Paclitaxel/Avastin/inpatient Carboplatin desensitization.   3/3/2020: 24 h urine protein 0.15  3/5/2020: Cycle #6 Paclitaxel/Avastin/inpatient Carboplatin desensitization.   3/25/2020: Cycle #1 Avastin   4/22/2020: Cycle  #2 Avastin   5/13/2020: Cycle #3 Avastin       Past Medical History:    Past Medical History:   Diagnosis Date     Diabetes (H)     Type II     Endometrial cancer determined by uterine biopsy (H) 02/2018     HTN (hypertension)      Obesity      Osteoarthritis          Past Surgical History:    Past Surgical History:   Procedure Laterality Date     CHOLECYSTECTOMY  1993     CYSTOSCOPY N/A 2/23/2018    Procedure: CYSTOSCOPY;;  Surgeon: Kevin Henderson MD;  Location: [facility] OR     DAVINCI HYSTERECTOMY TOTAL, BILATERAL SALPINGO-OOPHORECTOMY, COMBINED N/A 2/23/2018    Procedure: COMBINED DAVINCI HYSTERECTOMY TOTAL, SALPINGO-OOPHORECTOMY;  DaVinci Assisted Total Laparoscopic Hysterectomy, Bilateral Salpingo-Oophorectomy, Cystoscopy,  Omental biopsy, omentectomy,bilateral  Pelvic And Para Aortic Lymph Node Dissection;  Surgeon: Kevin Henderson MD;  Location: [facility] OR     Partial lumbar discectomy  1998         Health Maintenance Due   Topic Date Due     DEXA  1950     LIPID  1950     MICROALBUMIN  1950     DIABETIC FOOT EXAM  1952     HEPATITIS C SCREENING  1952     ADVANCE CARE PLANNING  1950     EYE EXAM  1950     ZOSTER IMMUNIZATION (1 of 2) 04/07/2002     MEDICARE ANNUAL WELLNESS VISIT  04/07/2017     PNEUMOCOCCAL IMMUNIZATION 65+ HIGH/HIGHEST RISK (2 of 2 - PPSV23) 02/09/2018     A1C  05/09/2018     PHQ-2  01/01/2020       Current Medications:     Current Outpatient Medications   Medication Sig Dispense Refill     ACETAMINOPHEN PO Take 325 mg by mouth every 6 hours as needed for pain       amLODIPine (NORVASC) 5 MG tablet Take 1 tablet (5 mg) by mouth daily 30 tablet 0     Famotidine (PEPCID PO) Take 10 mg by mouth as needed        fluticasone (FLONASE) 50 MCG/ACT spray Spray 1-2 sprays into both nostrils daily 1 Bottle 1     L-Glutamine 500 MG CAPS        loperamide (IMODIUM) 2 MG capsule Take 2 mg by mouth 4 times daily as needed for diarrhea       loratadine (CLARITIN)  "10 MG tablet Take 1 tablet (10 mg) by mouth daily 30 tablet 3     magnesium 500 MG TABS        METFORMIN HCL PO Take 500 mg by mouth daily (with breakfast)        omeprazole (PRILOSEC) 40 MG DR capsule        prochlorperazine (COMPAZINE) 10 MG tablet Take 0.5 tablets (5 mg) by mouth every 6 hours as needed (nausea/vomiting) 30 tablet 2     pyridoxine (VITAMIN B-6) 50 MG TABS Take 1 tablet (50 mg) by mouth 2 times daily 60 tablet 3     triamterene-hydrochlorothiazide (DYAZIDE) 37.5-25 MG per capsule Take 1 capsule by mouth every morning        amLODIPine (NORVASC) 10 MG tablet Take 1 tablet (10 mg) by mouth daily 30 tablet 0         Allergies:        Allergies   Allergen Reactions     Carboplatin Anaphylaxis     Chest tightness, hoarse voice, difficulty breathing     Ace Inhibitors Cough     Amoxicillin Hives        Social History:     Social History     Tobacco Use     Smoking status: Former Smoker     Packs/day: 0.25     Years: 20.00     Pack years: 5.00     Last attempt to quit: 1991     Years since quittin.1     Smokeless tobacco: Never Used     Tobacco comment: Quit smoking    Substance Use Topics     Alcohol use: Yes     Comment: 4-7/week if out 1-2x's/week       History   Drug Use No         Family History:       Family History   Problem Relation Age of Onset     Colon Cancer Mother      Breast Cancer Sister      Lymphoma Sister          Physical Exam:     /77   Ht 1.626 m (5' 4.02\")   Wt 112.3 kg (247 lb 9.2 oz)   BMI 42.48 kg/m    Body mass index is 42.48 kg/m .    General Appearance:  healthy and alert, no distress                Psychiatric:      appropriate mood and affect                                                               Assessment:     Lu Smith is a 69 year old woman with a diagnosis of recurrent serous endometrial cancer.      A total of 25 minutes was spent with the patient, 20 minutes of which were spent in counseling the patient and/or treatment " planning.        1.  Recurrent serous endometrial carcinoma.   2.  Positive treatment response to carboplatin, paclitaxel, bevacizumab.   3.  Carbo desensitization.   4.  MSI stable tumor.   5.  Low tumor mutation burden.   6.  PD-L1 0% expression.      We did review the CT scan showed stable disease. There are some enlarged lymphnodes in the right inguinal area, which might be reactive and not cancer related. We will see her in person next visit to examine the groin.  We will continue with 3 cycles of 15mg/kg every 3 weeks bevacizumab maintenance and repeat a CT scan of the chest, abdomen and pelvis. The patient agrees with this plan.  She is very appreciative of her care.  All questions were answered.         Kevin Henderson MD, MS    Department of Obstetrics and Gynecology   Division of Gynecologic Oncology   [facility]  Phone: [phone]      CC  Patient Care Team:  Levar Scott as PCP - General (Internal Medicine)  Jeanne Blancas MD as Referring Physician (OB/Gyn)  Alondra Ruiz RN as Specialty Care Coordinator (Gyn-Onc)  Kevin Henderson MD as MD (Gyn-Onc)  SELF, REFERRED

## 2020-06-03 NOTE — PROGRESS NOTES
Infusion Nursing Note:  Lu Smith presents today for C4 D1 bevacizumab-awwb (MVASI).    Patient seen by provider today: Yes: Dr. Henderson   present during visit today: Not Applicable.    Note: Patient arrives feeling well. CT recently completed and plan to continue MVASI for 3 more cycles per pt/Dr. Henderson's orders. Patient feeling grateful about remaining on this therapy.    Intravenous Access:  Implanted Port.    Treatment Conditions:  Lab Results   Component Value Date     03/05/2020                   Lab Results   Component Value Date    POTASSIUM 4.1 06/03/2020           Lab Results   Component Value Date    MAG 1.6 06/03/2020            Lab Results   Component Value Date    CR 0.97 03/05/2020                   Lab Results   Component Value Date    MARYLOU 9.1 03/05/2020                Lab Results   Component Value Date    BILITOTAL 0.4 03/05/2020           Lab Results   Component Value Date    ALBUMIN 3.3 03/05/2020                    Lab Results   Component Value Date    ALT 29 03/05/2020           Lab Results   Component Value Date    AST 25 03/05/2020     Urine protein negative. Mg and K WNL.  BP WNL x2.      Post Infusion Assessment:  Patient tolerated infusion without incident.  Blood return noted pre and post infusion.  Site patent and intact, free from redness, edema or discomfort.  No evidence of extravasations.  Access discontinued per protocol.       Discharge Plan:   Patient declined prescription refills.  Discharge instructions reviewed with: Patient.  Patient and/or family verbalized understanding of discharge instructions and all questions answered.  AVS to patient via [employer].  Patient will return 6/25 for next appointment.   Patient discharged in stable condition accompanied by: self.  Departure Mode: Ambulatory.    Madison Antunez RN

## 2020-06-03 NOTE — LETTER
"    6/3/2020         RE: Lu Smith  [address]  [place] [address]        Dear Colleague,    Thank you for referring your patient, Lu Smith, to the [facility]. Please see a copy of my visit note below.    Lu Smith is a 70 year old female who is being evaluated via a billable telephone visit.      The patient has been notified of following:     \"This telephone visit will be conducted via a call between you and your physician/provider. We have found that certain health care needs can be provided without the need for a physical exam.  This service lets us provide the care you need with a short phone conversation.  If a prescription is necessary we can send it directly to your pharmacy.  If lab work is needed we can place an order for that and you can then stop by our lab to have the test done at a later time.    Telephone visits are billed at different rates depending on your insurance coverage. During this emergency period, for some insurers they may be billed the same as an in-person visit.  Please reach out to your insurance provider with any questions.    If during the course of the call the physician/provider feels a telephone visit is not appropriate, you will not be charged for this service.\"    Patient has given verbal consent for Telephone visit?  Yes    What phone number would you like to be contacted at? [phone]     How would you like to obtain your AVS? Bernice     I have reviewed and updated the patient's allergies and medication list.    Concerns: No concerns  Refills: No refills needed.        Dayana Valerio CMA      Phone call duration:25 minutes                Follow Up Notes on Referred Patient    Date: 6/3/2020       Dr. Salvador Eugene MD  No address on file       RE: Lu Smith  : 1950  HEIDY: 6/3/2020    Dear Dr. Salvador Eugene:    Lu Smith is a 69 year old woman with a diagnosis of recurrent serous endometrial cancer.    The patient " presents today for followup. She has been doing fairly well on bevacizumab maintenance.  She is eating and drinking okay.  Occasional nausea had no vomiting, no fever or chills.  Normal urinary function and one loose bowel movement per day.  No vaginal bleeding or discharge. She does have some upper and lower back pain that resolves with warm bath, no SOB or CP. No lumps in the right groin.     Oncology History:  Diagnosis: Stage IIIC2 serous endometrial cancer  Primary GYN oncologist: Dr. Kevin Henderson  Primary radiation oncologist: Dr. Ana Michelle  Surgery: 2/23/18: TLH-BSO, omentectomy, bilateral pelvic and para-aortic lymph node dissection, pelvic washings  Chemotherapy:  3/30/18-9/28/18: Six cycles of carboplatin AUC 6 and paclitaxel 175mg/m2, delivered sandwich style with pelvic radiation between cycles three and four  Radiation: 6/4/18-7/12/18: EBRT delivered to pelvis and para-aortic nodes, 5040 cGy in 28 fractions  7/16/18-7/20/18: HDR brachytherapy delivered to the vaginal cuff, 1200 cGy in two fractions  Complications: Transient neuropathy, mild thrombocytopenia and anemia  Genetic testing: Panel testing negative     9/16/19: Chest CT performed for pulmonary nodule follow up, concerning for recurrent disease  IMPRESSION:   1. Few bilateral sub-4 mm solid pulmonary nodules are unchanged compared to 2/6/2018. No new or enlarging pulmonary nodules.  2. Dilated main pulmonary artery, nonspecific but may be seen with pulmonary artery hypertension.  3. New 1.9 cm left subclavicular node.     9/28/19: PET impression:  In this 69-year-old female with history of stage IIIC2 serous endometrial cancer status post total hysterectomy and bilateral salpingo-oophorectomy, omentectomy, bilateral pelvic and periaortic dissection, and radiation to the pelvis, and brachytherapy to the vaginal cuff.  1. Progression of disease with new intensely FDG avid para-aortic, mediastinal, and subclavicular lymphadenopathy.  The subdiaphragmatic periaortic lymphadenopathy spans from the diaphragmatic crura to the  low abdominal aorta at the level of L4 with greater than 180 degree involvement of the aorta. There is additional lymphadenopathy within the left axilla and the right inguinal region.     9/28/19: Neck CT impression:  1. Progression of metastatic disease with new intensely FDG avid retroclavicular lymph node at the level of the left thoracic inlet.   2. On the fusion PET CT, there is no definite abnormal metabolic activity in the mucosal space, soft tissues, or cervical jamel chains.  3. No evidence of mucosal or soft tissue abnormality on contrast enhanced neck CT.  4. Please refer to the whole body PET CT performed as a separate report, for the findings of the remainder of the body.     10/24/19: Lymph node biopsy:  Lymph node, periaortic, CT guided core biopsy:   -METASTATIC ADENOCARCINOMA   -Tumor morphology is consistent with metastasis/recurrence of endometrial serous carcinoma      11/11/19: Cycle #1 paclitaxel 175mg/m2 + carboplatin AUC 6 + bevacizumab 15mg/kg  12/2/19: Cycle #2 paclitaxel 175mg/m2 + carboplatin AUC 6 + bevacizumab 15mg/kg. Carboplatin reaction.  12/27/19: Cycle #3 Paclitaxel/Avastin/inpatient Carboplatin desensitization.   1/20/2020: CT cap IMPRESSION:  1. Mosaic attenuation throughout the lungs with associated dilated main pulmonary artery likely due to pulmonary arterial hypertension in the appropriate clinical setting.  2. Stable sub-4 mm bilateral pulmonary nodules. No new or enlarging pulmonary nodules.  3. Interval improvement of previously seen enlarged lymph nodes in the lower neck, chest, abdomen and pelvis. No new or enlarging lymph nodes in the present study.  4. Stable soft tissue mesenteric nodules as well as mesenteric root fat stranding.  5. Hepatic steatosis. Additional incidental findings as described above.  1/22/2020: Cycle #4 Paclitaxel/Avastin/inpatient Carboplatin  desensitization.   2/13/2020: Cycle #5 Paclitaxel/Avastin/inpatient Carboplatin desensitization.   3/3/2020: 24 h urine protein 0.15  3/5/2020: Cycle #6 Paclitaxel/Avastin/inpatient Carboplatin desensitization.   3/25/2020: Cycle #1 Avastin   4/22/2020: Cycle #2 Avastin   5/13/2020: Cycle #3 Avastin       Past Medical History:    Past Medical History:   Diagnosis Date     Diabetes (H)     Type II     Endometrial cancer determined by uterine biopsy (H) 02/2018     HTN (hypertension)      Obesity      Osteoarthritis          Past Surgical History:    Past Surgical History:   Procedure Laterality Date     CHOLECYSTECTOMY  1993     CYSTOSCOPY N/A 2/23/2018    Procedure: CYSTOSCOPY;;  Surgeon: Kevin Henderson MD;  Location: [facility] OR     DAVINCI HYSTERECTOMY TOTAL, BILATERAL SALPINGO-OOPHORECTOMY, COMBINED N/A 2/23/2018    Procedure: COMBINED DAVINCI HYSTERECTOMY TOTAL, SALPINGO-OOPHORECTOMY;  DaVinci Assisted Total Laparoscopic Hysterectomy, Bilateral Salpingo-Oophorectomy, Cystoscopy,  Omental biopsy, omentectomy,bilateral  Pelvic And Para Aortic Lymph Node Dissection;  Surgeon: Kevin Henderson MD;  Location: [facility] OR     Partial lumbar discectomy  1998         Health Maintenance Due   Topic Date Due     DEXA  1950     LIPID  1950     MICROALBUMIN  1952     DIABETIC FOOT EXAM  1952     HEPATITIS C SCREENING  1952     ADVANCE CARE PLANNING  1950     EYE EXAM  1950     ZOSTER IMMUNIZATION (1 of 2) 04/07/2002     MEDICARE ANNUAL WELLNESS VISIT  04/07/2017     PNEUMOCOCCAL IMMUNIZATION 65+ HIGH/HIGHEST RISK (2 of 2 - PPSV23) 02/09/2018     A1C  05/09/2018     PHQ-2  01/01/2020       Current Medications:     Current Outpatient Medications   Medication Sig Dispense Refill     ACETAMINOPHEN PO Take 325 mg by mouth every 6 hours as needed for pain       amLODIPine (NORVASC) 5 MG tablet Take 1 tablet (5 mg) by mouth daily 30 tablet 0     Famotidine (PEPCID PO) Take 10 mg by mouth as  "needed        fluticasone (FLONASE) 50 MCG/ACT spray Spray 1-2 sprays into both nostrils daily 1 Bottle 1     L-Glutamine 500 MG CAPS        loperamide (IMODIUM) 2 MG capsule Take 2 mg by mouth 4 times daily as needed for diarrhea       loratadine (CLARITIN) 10 MG tablet Take 1 tablet (10 mg) by mouth daily 30 tablet 3     magnesium 500 MG TABS        METFORMIN HCL PO Take 500 mg by mouth daily (with breakfast)        omeprazole (PRILOSEC) 40 MG DR capsule        prochlorperazine (COMPAZINE) 10 MG tablet Take 0.5 tablets (5 mg) by mouth every 6 hours as needed (nausea/vomiting) 30 tablet 2     pyridoxine (VITAMIN B-6) 50 MG TABS Take 1 tablet (50 mg) by mouth 2 times daily 60 tablet 3     triamterene-hydrochlorothiazide (DYAZIDE) 37.5-25 MG per capsule Take 1 capsule by mouth every morning        amLODIPine (NORVASC) 10 MG tablet Take 1 tablet (10 mg) by mouth daily 30 tablet 0         Allergies:        Allergies   Allergen Reactions     Carboplatin Anaphylaxis     Chest tightness, hoarse voice, difficulty breathing     Ace Inhibitors Cough     Amoxicillin Hives        Social History:     Social History     Tobacco Use     Smoking status: Former Smoker     Packs/day: 0.25     Years: 20.00     Pack years: 5.00     Last attempt to quit: 1991     Years since quittin.1     Smokeless tobacco: Never Used     Tobacco comment: Quit smoking    Substance Use Topics     Alcohol use: Yes     Comment: 4-7/week if out 1-2x's/week       History   Drug Use No         Family History:       Family History   Problem Relation Age of Onset     Colon Cancer Mother      Breast Cancer Sister      Lymphoma Sister          Physical Exam:     /77   Ht 1.626 m (5' 4.02\")   Wt 112.3 kg (247 lb 9.2 oz)   BMI 42.48 kg/m    Body mass index is 42.48 kg/m .    General Appearance:  healthy and alert, no distress                Psychiatric:      appropriate mood and " affect                                                               Assessment:     Lu Smith is a 69 year old woman with a diagnosis of recurrent serous endometrial cancer.      A total of 25 minutes was spent with the patient, 20 minutes of which were spent in counseling the patient and/or treatment planning.        1.  Recurrent serous endometrial carcinoma.   2.  Positive treatment response to carboplatin, paclitaxel, bevacizumab.   3.  Carbo desensitization.   4.  MSI stable tumor.   5.  Low tumor mutation burden.   6.  PD-L1 0% expression.      We did review the CT scan showed stable disease. There are some enlarged lymphnodes in the right inguinal area, which might be reactive and not cancer related. We will see her in person next visit to examine the groin.  We will continue with 3 cycles of 15mg/kg every 3 weeks bevacizumab maintenance and repeat a CT scan of the chest, abdomen and pelvis. The patient agrees with this plan.  She is very appreciative of her care.  All questions were answered.         Kevin Henderson MD, MS    Department of Obstetrics and Gynecology   Division of Gynecologic Oncology   [facility]  Phone: [phone]      CC  Patient Care Team:  Levar Scott as PCP - General (Internal Medicine)  Jeanne Blancas MD as Referring Physician (OB/Gyn)  Alondra Ruiz RN as Specialty Care Coordinator (Gyn-Onc)

## 2020-06-03 NOTE — NURSING NOTE
"Chief Complaint   Patient presents with     Port Draw     labs drawn from port by rn.  vs taken     Port accessed with 20 gauge 3/4\" gripper needle and JIC labs drawn by rn; JIC urine collected and sent as well.  Port flushed with NS and heparin.  Pt tolerated well.  VS taken.    Madeleine Rhodes, RN      "

## 2020-06-25 NOTE — PROGRESS NOTES
Infusion Nursing Note:  Lu Smith presents today for Day 1 Cycle 5 bevacizumab awwb.    Patient seen by provider today: Yes: Dr. Cadena   present during visit today: Not Applicable.    Note: Lu arrives to infusion today doing well. Mg 1.4, replace per protocol via IV per patient preference due to diarrhea with po magnesium.     Intravenous Access:  Implanted Port.    Treatment Conditions:  Lab Results   Component Value Date    POTASSIUM 3.5 06/25/2020           Lab Results   Component Value Date    MAG 1.4 06/25/2020     Results reviewed, labs MET treatment parameters, ok to proceed with treatment.  Urine protein: NEGATIVE.    Post Infusion Assessment:  Patient tolerated infusion without incident.  Blood return noted pre and post infusion.  Access discontinued per protocol.     Discharge Plan:   Patient declined prescription refills.  Copy of AVS reviewed with patient.  Patient will return 7/1 for MD appt and 7/16 for next infusion appointment.  Patient discharged in stable condition accompanied by: self.  Departure Mode: Ambulatory.    Kennedi Mantilla RN

## 2020-06-25 NOTE — PATIENT INSTRUCTIONS
Contact Numbers  [facility]: [phone] (for symptom and scheduling needs)    Please call the [facility] Triage line if you experience a temperature greater than or equal to 100.4, shaking chills, have uncontrolled nausea, vomiting and/or diarrhea, dizziness, shortness of breath, chest pain, bleeding, unexplained bruising, or if you have any other new/concerning symptoms, questions or concerns.     If you are having any concerning symptoms or wish to speak to a provider before your next infusion visit, please call your care coordinator or triage to notify them so we can adequately serve you.     If you need a refill on a narcotic prescription or other medication, please call triage before your infusion appointment.          June 2020 Sunday Monday Tuesday Wednesday Thursday Friday Saturday        1     2     3    [facility] DRAW   7:30 AM   (15 min.)   [facility]   [facility] Lab Draw    TELEPHONE VISIT RETURN   8:20 AM   (20 min.)   Kevin Henderson MD   [facility] within [facility] ONC INFUSION 60   9:00 AM   (60 min.)    ONCOLOGY INFUSION   [facility] 4     5     6       7     8     9     10     11     12     13       14     15     16     17     18     19     20       21     22     23     24     25    [facility] DRAW   7:00 AM   (15 min.)   [facility]   [facility] Lab Draw    TELEPHONE VISIT RETURN   7:40 AM   (30 min.)   Yulisa Cadena MD   [facility] ONC INFUSION 60   8:30 AM   (60 min.)    ONCOLOGY INFUSION   [facility] 26     27       28     29     30                                     July 2020 Sunday Monday Tuesday Wednesday Thursday Friday Saturday                  1    [facility] RETURN   8:45 AM   (20 min.)   Kevin Henderson MD   [facility] 2     3     4       5     6     7     8     9     10     11       12     13     14     15     16    P  [facility] LAB DRAW   7:00 AM   (15 min.)   [facility] LAB DRAW   [facility] Lab Draw    TELEPHONE VISIT RETURN   7:40 AM   (40 min.)   Lisa Barroso APRN CNP   [facility]    [facility] ONC INFUSION 60   8:30 AM   (60 min.)    ONCOLOGY INFUSION   [facility] 17     18       19     20     21     22     23     24     25       26     27     28     29    TELEPHONE VISIT RETURN   9:20 AM   (20 min.)   Kevin Henderson MD   [facility] 30     31                          Lab Results:  Recent Results (from the past 12 hour(s))   Protein qualitative urine    Collection Time: 06/25/20  7:20 AM   Result Value Ref Range    Protein Albumin Urine Negative NEG^Negative mg/dL   Potassium    Collection Time: 06/25/20  7:25 AM   Result Value Ref Range    Potassium 3.5 3.4 - 5.3 mmol/L   Magnesium    Collection Time: 06/25/20  7:25 AM   Result Value Ref Range    Magnesium 1.4 (L) 1.6 - 2.3 mg/dL

## 2020-06-25 NOTE — NURSING NOTE
Chief Complaint   Patient presents with     Port Draw     Labs drawn via port by RN In lab. VS taken. Patient checked in for next appt.     Port accessed with 20 gauge gripper needle by RN, labs collected, line flushed with saline and heparin.  Vitals taken. Pt checked in for appointment. Urine specimen collected.    Aydee Baird RN

## 2020-07-01 NOTE — NURSING NOTE
"Oncology Rooming Note    July 1, 2020 9:10 AM   Lu Smith is a 70 year old female who presents for:    Chief Complaint   Patient presents with     Oncology Clinic Visit     Return: New Injguinal Lympadenopathy     Initial Vitals: /71 (BP Location: Left arm, Patient Position: Sitting, Cuff Size: Adult Large)   Pulse 82   Temp 97.9  F (36.6  C) (Tympanic)   Resp 16   Ht 1.626 m (5' 4.02\")   Wt 111.4 kg (245 lb 11.2 oz)   SpO2 98%   BMI 42.15 kg/m   Estimated body mass index is 42.15 kg/m  as calculated from the following:    Height as of this encounter: 1.626 m (5' 4.02\").    Weight as of this encounter: 111.4 kg (245 lb 11.2 oz). Body surface area is 2.24 meters squared.  No Pain (0) Comment: Data Unavailable   No LMP recorded. Patient has had a hysterectomy.  Allergies reviewed: Yes  Medications reviewed: Yes    Medications: Medication refills not needed today.  Pharmacy name entered into [facility]: [facility] [place], [place] - [address]    Clinical concerns: N/A       Beth Liang CMA              "

## 2020-07-07 NOTE — PROGRESS NOTES
Follow Up Notes on Referred Patient    Date: 2020       Dr. Salvador Eugene MD  No address on file       RE: Lu Smith  : 1950  HEIDY: 2020    Lu Smith is a 69 year old woman with a diagnosis of recurrent serous endometrial cancer.    The patient presents today for followup. She has been doing fairly well on bevacizumab maintenance.  She is eating and drinking okay.  Occasional nausea had no vomiting, no fever or chills.  Normal urinary function and one loose bowel movement per day.  No vaginal bleeding or discharge. She does have some upper and lower back pain that resolves with warm bath, no SOB or CP. the lump in the right groin has reduced in size.     Oncology History:  Diagnosis: Stage IIIC2 serous endometrial cancer  Primary GYN oncologist: Dr. Kevin Henderson  Primary radiation oncologist: Dr. Ana Michelle  Surgery: 18: TLH-BSO, omentectomy, bilateral pelvic and para-aortic lymph node dissection, pelvic washings  Chemotherapy:  3/30/18-18: Six cycles of carboplatin AUC 6 and paclitaxel 175mg/m2, delivered sandwich style with pelvic radiation between cycles three and four  Radiation: 18-18: EBRT delivered to pelvis and para-aortic nodes, 5040 cGy in 28 fractions  18-18: HDR brachytherapy delivered to the vaginal cuff, 1200 cGy in two fractions  Complications: Transient neuropathy, mild thrombocytopenia and anemia  Genetic testing: Panel testing negative     19: Chest CT performed for pulmonary nodule follow up, concerning for recurrent disease  IMPRESSION:   1. Few bilateral sub-4 mm solid pulmonary nodules are unchanged compared to 2018. No new or enlarging pulmonary nodules.  2. Dilated main pulmonary artery, nonspecific but may be seen with pulmonary artery hypertension.  3. New 1.9 cm left subclavicular node.     19: PET impression:  In this 69-year-old female with history of stage IIIC2 serous endometrial cancer status  post total hysterectomy and bilateral salpingo-oophorectomy, omentectomy, bilateral pelvic and periaortic dissection, and radiation to the pelvis, and brachytherapy to the vaginal cuff.  1. Progression of disease with new intensely FDG avid para-aortic, mediastinal, and subclavicular lymphadenopathy. The subdiaphragmatic periaortic lymphadenopathy spans from the diaphragmatic crura to the  low abdominal aorta at the level of L4 with greater than 180 degree involvement of the aorta. There is additional lymphadenopathy within the left axilla and the right inguinal region.     9/28/19: Neck CT impression:  1. Progression of metastatic disease with new intensely FDG avid retroclavicular lymph node at the level of the left thoracic inlet.   2. On the fusion PET CT, there is no definite abnormal metabolic activity in the mucosal space, soft tissues, or cervical jamel chains.  3. No evidence of mucosal or soft tissue abnormality on contrast enhanced neck CT.  4. Please refer to the whole body PET CT performed as a separate report, for the findings of the remainder of the body.     10/24/19: Lymph node biopsy:  Lymph node, periaortic, CT guided core biopsy:   -METASTATIC ADENOCARCINOMA   -Tumor morphology is consistent with metastasis/recurrence of endometrial serous carcinoma      11/11/19: Cycle #1 paclitaxel 175mg/m2 + carboplatin AUC 6 + bevacizumab 15mg/kg  12/2/19: Cycle #2 paclitaxel 175mg/m2 + carboplatin AUC 6 + bevacizumab 15mg/kg. Carboplatin reaction.  12/27/19: Cycle #3 Paclitaxel/Avastin/inpatient Carboplatin desensitization.   1/20/2020: CT cap IMPRESSION:  1. Mosaic attenuation throughout the lungs with associated dilated main pulmonary artery likely due to pulmonary arterial hypertension in the appropriate clinical setting.  2. Stable sub-4 mm bilateral pulmonary nodules. No new or enlarging pulmonary nodules.  3. Interval improvement of previously seen enlarged lymph nodes in the lower neck, chest, abdomen  and pelvis. No new or enlarging lymph nodes in the present study.  4. Stable soft tissue mesenteric nodules as well as mesenteric root fat stranding.  5. Hepatic steatosis. Additional incidental findings as described above.  1/22/2020: Cycle #4 Paclitaxel/Avastin/inpatient Carboplatin desensitization.   2/13/2020: Cycle #5 Paclitaxel/Avastin/inpatient Carboplatin desensitization.   3/3/2020: 24 h urine protein 0.15  3/5/2020: Cycle #6 Paclitaxel/Avastin/inpatient Carboplatin desensitization.   3/25/2020: Cycle #1 Avastin   4/22/2020: Cycle #2 Avastin   5/13/2020: Cycle #3 Avastin   6/3/2020: Cycle #4 Avastin   6/25/2020: Cycle #5 Avastin     Past Medical History:    Past Medical History:   Diagnosis Date     Diabetes (H)     Type II     Endometrial cancer determined by uterine biopsy (H) 02/2018     HTN (hypertension)      Obesity      Osteoarthritis          Past Surgical History:    Past Surgical History:   Procedure Laterality Date     CHOLECYSTECTOMY  1993     CYSTOSCOPY N/A 2/23/2018    Procedure: CYSTOSCOPY;;  Surgeon: Kevin Henderson MD;  Location: [facility] OR     DAVINCI HYSTERECTOMY TOTAL, BILATERAL SALPINGO-OOPHORECTOMY, COMBINED N/A 2/23/2018    Procedure: COMBINED DAVINCI HYSTERECTOMY TOTAL, SALPINGO-OOPHORECTOMY;  DaVinci Assisted Total Laparoscopic Hysterectomy, Bilateral Salpingo-Oophorectomy, Cystoscopy,  Omental biopsy, omentectomy,bilateral  Pelvic And Para Aortic Lymph Node Dissection;  Surgeon: Kevin Henderson MD;  Location: [facility] OR     Partial lumbar discectomy  1998         Health Maintenance Due   Topic Date Due     DEXA  1950     LIPID  1950     MICROALBUMIN  1950     DIABETIC FOOT EXAM  1950     HEPATITIS C SCREENING  1950     ADVANCE CARE PLANNING  1950     EYE EXAM  1950     ZOSTER IMMUNIZATION (1 of 2) 04/07/2002     MEDICARE ANNUAL WELLNESS VISIT  04/07/2017     PNEUMOCOCCAL IMMUNIZATION 65+ HIGH/HIGHEST RISK (2 of 2 - PPSV23) 02/09/2018      A1C  2018     PHQ-2  2020       Current Medications:     Current Outpatient Medications   Medication Sig Dispense Refill     ACETAMINOPHEN PO Take 325 mg by mouth every 6 hours as needed for pain       amLODIPine (NORVASC) 5 MG tablet Take 1 tablet (5 mg) by mouth daily 30 tablet 0     Famotidine (PEPCID PO) Take 10 mg by mouth as needed        fluticasone (FLONASE) 50 MCG/ACT spray Spray 1-2 sprays into both nostrils daily 1 Bottle 1     L-Glutamine 500 MG CAPS        loperamide (IMODIUM) 2 MG capsule Take 2 mg by mouth 4 times daily as needed for diarrhea       loratadine (CLARITIN) 10 MG tablet Take 1 tablet (10 mg) by mouth daily 30 tablet 3     magnesium 500 MG TABS        METFORMIN HCL PO Take 500 mg by mouth daily (with breakfast)        omeprazole (PRILOSEC) 40 MG DR capsule        prochlorperazine (COMPAZINE) 10 MG tablet Take 0.5 tablets (5 mg) by mouth every 6 hours as needed (nausea/vomiting) 30 tablet 2     pyridoxine (VITAMIN B-6) 50 MG TABS Take 1 tablet (50 mg) by mouth 2 times daily 60 tablet 3     triamterene-hydrochlorothiazide (DYAZIDE) 37.5-25 MG per capsule Take 1 capsule by mouth every morning        amLODIPine (NORVASC) 10 MG tablet Take 1 tablet (10 mg) by mouth daily 30 tablet 0         Allergies:        Allergies   Allergen Reactions     Carboplatin Anaphylaxis     Chest tightness, hoarse voice, difficulty breathing     Ace Inhibitors Cough     Amoxicillin Hives        Social History:     Social History     Tobacco Use     Smoking status: Former Smoker     Packs/day: 0.25     Years: 20.00     Pack years: 5.00     Last attempt to quit: 1991     Years since quittin.2     Smokeless tobacco: Never Used     Tobacco comment: Quit smoking    Substance Use Topics     Alcohol use: Yes     Comment: 4-7/week if out 1-2x's/week       History   Drug Use No         Family History:       Family History   Problem Relation Age of Onset     Colon Cancer Mother      Breast Cancer  "Sister      Lymphoma Sister          Physical Exam:     /71 (BP Location: Left arm, Patient Position: Sitting, Cuff Size: Adult Large)   Pulse 82   Temp 97.9  F (36.6  C) (Tympanic)   Resp 16   Ht 1.626 m (5' 4.02\")   Wt 111.4 kg (245 lb 11.2 oz)   SpO2 98%   BMI 42.15 kg/m    Body mass index is 42.15 kg/m .    General Appearance: healthy and alert, no distress     Musculoskeletal: extremities non tender and without edema    Skin: no lesions or rashes     Neurological: normal gait, no gross defects     Psychiatric: appropriate mood and affect                               Hematological: normal cervical, supraclavicular and left inguinal lymph nodes.  Right inguinal lymphnode about 1 cm palpable mobile none tender.     Gastrointestinal:       abdomen soft, non-tender, non-distended, no organomegaly or masses    ABDOMEN:  Soft, nontender, nondistended.  No organomegaly.  No organomegaly. Well-healed port site incisions.       PELVIC:  Normal external genitalia, normal vaginal mucosa, well healed vaginal cuff, intact, confirmed on bimanual exam. No adnexal masses or tenderness confirmed on rectovaginal exam.      A total of 25 minutes was spent with the patient, 20 minutes of which were spent in counseling the patient and/or treatment planning.        1.  Recurrent serous endometrial carcinoma.   2.  Positive treatment response to carboplatin, paclitaxel, bevacizumab.   3.  Carbo desensitization.   4.  MSI stable tumor.   5.  Low tumor mutation burden.   6.  PD-L1 0% expression.      We did review the CT scan showed stable disease. The right inguinal lymphnode has decreased in size on exam and was most likely reactive, which might and not cancer related.  We will continue with 3 cycles of 15mg/kg every 3 weeks bevacizumab maintenance and repeat a CT scan of the chest, abdomen and pelvis. The patient agrees with this plan.  She is very appreciative of her care.  All questions were answered.         Kevin JEFFERSON" FAB Henderson MD, MS    Department of Obstetrics and Gynecology   Division of Gynecologic Oncology   [facility]  Phone: [phone]  CC  Patient Care Team:  Levar Scott as PCP - General (Internal Medicine)  Jeanne Blancas MD as Referring Physician (OB/Gyn)  Alondra Ruiz, RN as Specialty Care Coordinator (Gyn-Onc)  Kevin Henderson MD as MD (Gyn-Onc)  SELF, REFERRED

## 2020-07-14 NOTE — PROGRESS NOTES
"Lu Smith is a 70 year old female who is being evaluated via a billable telephone visit.      The patient has been notified of following:     \"This telephone visit will be conducted via a call between you and your physician/provider. We have found that certain health care needs can be provided without the need for a physical exam.  This service lets us provide the care you need with a short phone conversation.  If a prescription is necessary we can send it directly to your pharmacy.  If lab work is needed we can place an order for that and you can then stop by our lab to have the test done at a later time.    Telephone visits are billed at different rates depending on your insurance coverage. During this emergency period, for some insurers they may be billed the same as an in-person visit.  Please reach out to your insurance provider with any questions.    If during the course of the call the physician/provider feels a telephone visit is not appropriate, you will not be charged for this service.\"    Patient has given verbal consent for Telephone visit?  Yes    What phone number would you like to be contacted at? [phone]    How would you like to obtain your AVS? [facility]   Vitals:    20 0732   BP: (!) 148/79   BP Location: Left arm   Pulse: 77   Resp: 16   Temp: 97.4  F (36.3  C)   TempSrc: Oral   SpO2: 99%   Weight: 111.9 kg (246 lb 9.6 oz)   Height: 1.626 m (5' 4.02\")       Nam Montague LPN                    Follow Up Notes on Referred Patient    Date: 2020        RE: Lu Smith  : 1950  HEIDY: 2020      Lu Smith is a 70 year old woman with a diagnosis of recurrent serous endometrial cancer.  She is here today for follow up and disease management. She is currently on maintenance Avastin. In light of the recent COVID19 pandemic, and in accordance with our group and national guidelines this patients care has been reviewed and it is felt that presenting for her visit would " be more likely to increase rather than decrease her relative risks.  Therefore this visit was conducted by phone.      Oncology History:  Diagnosis: Stage IIIC2 serous endometrial cancer  Primary GYN oncologist: Dr. Kevin Henderson  Primary radiation oncologist: Dr. Ana Michelle  Surgery: 2/23/18: TLH-BSO, omentectomy, bilateral pelvic and para-aortic lymph node dissection, pelvic washings  Chemotherapy:  3/30/18-9/28/18: Six cycles of carboplatin AUC 6 and paclitaxel 175mg/m2, delivered sandwich style with pelvic radiation between cycles three and four  Radiation: 6/4/18-7/12/18: EBRT delivered to pelvis and para-aortic nodes, 5040 cGy in 28 fractions  7/16/18-7/20/18: HDR brachytherapy delivered to the vaginal cuff, 1200 cGy in two fractions  Complications: Transient neuropathy, mild thrombocytopenia and anemia  Genetic testing: Panel testing negative     9/16/19: Chest CT performed for pulmonary nodule follow up, concerning for recurrent disease  IMPRESSION:   1. Few bilateral sub-4 mm solid pulmonary nodules are unchanged compared to 2/6/2018. No new or enlarging pulmonary nodules.  2. Dilated main pulmonary artery, nonspecific but may be seen with pulmonary artery hypertension.  3. New 1.9 cm left subclavicular node.     9/28/19: PET impression:  In this 69-year-old female with history of stage IIIC2 serous endometrial cancer status post total hysterectomy and bilateral salpingo-oophorectomy, omentectomy, bilateral pelvic and periaortic dissection, and radiation to the pelvis, and brachytherapy to the vaginal cuff.  1. Progression of disease with new intensely FDG avid para-aortic, mediastinal, and subclavicular lymphadenopathy. The subdiaphragmatic periaortic lymphadenopathy spans from the diaphragmatic crura to the  low abdominal aorta at the level of L4 with greater than 180 degree involvement of the aorta. There is additional lymphadenopathy within the left axilla and the right inguinal  region.     9/28/19: Neck CT impression:  1. Progression of metastatic disease with new intensely FDG avid retroclavicular lymph node at the level of the left thoracic inlet.   2. On the fusion PET CT, there is no definite abnormal metabolic activity in the mucosal space, soft tissues, or cervical jamel chains.  3. No evidence of mucosal or soft tissue abnormality on contrast enhanced neck CT.  4. Please refer to the whole body PET CT performed as a separate report, for the findings of the remainder of the body.     10/24/19: Lymph node biopsy:  Lymph node, periaortic, CT guided core biopsy:   -METASTATIC ADENOCARCINOMA   -Tumor morphology is consistent with metastasis/recurrence of endometrial serous carcinoma      11/11/19: Cycle #1 paclitaxel 175mg/m2 + carboplatin AUC 6 + bevacizumab 15mg/kg  12/2/19: Cycle #2 paclitaxel 175mg/m2 + carboplatin AUC 6 + bevacizumab 15mg/kg. Carboplatin reaction.  12/27/19: Cycle #3 Paclitaxel/Avastin/inpatient Carboplatin desensitization.   1/20/2020: CT cap IMPRESSION:  1. Mosaic attenuation throughout the lungs with associated dilated main pulmonary artery likely due to pulmonary arterial hypertension in the appropriate clinical setting.  2. Stable sub-4 mm bilateral pulmonary nodules. No new or enlarging pulmonary nodules.  3. Interval improvement of previously seen enlarged lymph nodes in the lower neck, chest, abdomen and pelvis. No new or enlarging lymph nodes in the present study.  4. Stable soft tissue mesenteric nodules as well as mesenteric root fat stranding.  5. Hepatic steatosis. Additional incidental findings as described above.  1/22/2020: Cycle #4 Paclitaxel/Avastin/inpatient Carboplatin desensitization.   2/13/2020: Cycle #5 Paclitaxel/Avastin/inpatient Carboplatin desensitization.   3/3/2020: 24 h urine protein 0.15  3/5/2020: Cycle #6 Paclitaxel/Avastin/inpatient Carboplatin desensitization.   3/25/2020: Cycle #1 Avastin   4/22/2020: Cycle #2  "Avastin   5/13/2020: Cycle #3 Avastin   5/28/2020: CT cap IMPRESSION:  In this patient with a history of recurrent serous endometrial cancer:  1. New inguinal lymphadenopathy on the right. Inflammatory versus progressive disease.  Amenable to biopsy.  2. No change in soft tissue about the abdominal aorta just superior to the bifurcation. (note was hypermetabolic on 9/28/2019 PET scan, then  decreased in size on CT 1/20/2020)  3. Stable sub-5 mm pulmonary nodules.  4. Scattered peritoneal soft tissue attenuating deposits along the anterior abdominal wall, not hypermetabolic on PET 9/28/2018.   5. Main pulmonary artery is stably enlarged. Findings can the seen in pulmonary arterial hypertension.    Plan:continue with 3 cycles of 15mg/kg every 3 weeks bevacizumab maintenance and repeat a CT scan of the chest, abdomen and pelvis.   6/3/2020: Cycle #4 Avastin   6/25/2020: Cycle #5 Avastin   7/16/2020: Cycle #6 Avastin.         Today she reports she is doing about the same as her last visit. She denies any vaginal bleeding, no changes in her bladder habits, no nausea/emesis, no lower extremity edema, and no difficulties eating (says is eating better recently) or sleeping. She denies any abdominal discomfort/bloating, no fevers or chills, and no chest pain or shortness of breath. She has not been monitoring her BP this past week but previously her readings were in the 120-130's; she had been on Norvasc 10 mg but is currently on 5 mg. She continues to notice the masses in her groin but it is not as painful as before. She has had more issues with constipation/hard stools the past week or so and has stool softener to take if needed. She has not needed to take her antiemetics but has it at home in case. she has noticed some \"cramping\" in her LLQ over th past month or so; she states is is usually a \"dull ache\" and she takes Tylenol for it which helps. She has been taking Mg 500 mg BID and did take it this morning. She is " wondering why her urine is checked for proteins.         Review of Systems:    Systemic           no weight changes; no fever; no chills; no night sweats; no appetite changes  Skin           no rashes, or lesions  Eye           no irritation; no changes in vision  Zahida-Laryngeal           no dysphagia; no hoarseness   Pulmonary    no cough; no shortness of breath  Cardiovascular    no chest pain; no palpitations; + HTN  Gastrointestinal    no diarrhea; no constipation; no abdominal pain; no changes in bowel habits; no blood in stool  Genitourinary   no urinary frequency; no urinary urgency; no dysuria; no pain; no abnormal vaginal discharge; no abnormal vaginal bleeding  Breast    no breast discharge; no breast changes; no breast pain  Musculoskeletal    no myalgias; no arthralgias; no back pain  Psychiatric           no depressed mood; no anxiety    Hematologic               no tender lymph nodes; no noticeable swellings or lumps   Endocrine    no hot flashes; no heat/cold intolerance: + DM       Neurological   no tremor; no numbness and tingling; no headaches; no difficulty sleeping      Past Medical History:    Past Medical History:   Diagnosis Date     Diabetes (H)     Type II     Endometrial cancer determined by uterine biopsy (H) 02/2018     HTN (hypertension)      Obesity      Osteoarthritis          Past Surgical History:    Past Surgical History:   Procedure Laterality Date     CHOLECYSTECTOMY  1993     CYSTOSCOPY N/A 2/23/2018    Procedure: CYSTOSCOPY;;  Surgeon: Kevin Henderson MD;  Location: [facility] OR     DAVINCI HYSTERECTOMY TOTAL, BILATERAL SALPINGO-OOPHORECTOMY, COMBINED N/A 2/23/2018    Procedure: COMBINED DAVINCI HYSTERECTOMY TOTAL, SALPINGO-OOPHORECTOMY;  DaVinci Assisted Total Laparoscopic Hysterectomy, Bilateral Salpingo-Oophorectomy, Cystoscopy,  Omental biopsy, omentectomy,bilateral  Pelvic And Para Aortic Lymph Node Dissection;  Surgeon: Kevin Henderson MD;  Location: [facility] OR     Partial  lumbar discectomy  1998         Health Maintenance Due   Topic Date Due     DEXA  1950     LIPID  1950     MICROALBUMIN  1950     DIABETIC FOOT EXAM  1950     HEPATITIS C SCREENING  1950     ADVANCE CARE PLANNING  1950     EYE EXAM  1950     HEPATITIS B IMMUNIZATION (1 of 3 - Risk 3-dose series) 04/07/1971     ZOSTER IMMUNIZATION (1 of 2) 04/07/2002     MEDICARE ANNUAL WELLNESS VISIT  04/07/2017     PNEUMOCOCCAL IMMUNIZATION 65+ HIGH/HIGHEST RISK (2 of 2 - PPSV23) 02/09/2018     A1C  05/09/2018     PHQ-2  01/01/2020       Current Medications:     Current Outpatient Medications   Medication Sig Dispense Refill     ACETAMINOPHEN PO Take 325 mg by mouth every 6 hours as needed for pain       amLODIPine (NORVASC) 10 MG tablet Take 1 tablet (10 mg) by mouth daily 30 tablet 0     amLODIPine (NORVASC) 5 MG tablet Take 1 tablet (5 mg) by mouth daily 30 tablet 0     Famotidine (PEPCID PO) Take 10 mg by mouth as needed        fluticasone (FLONASE) 50 MCG/ACT spray Spray 1-2 sprays into both nostrils daily 1 Bottle 1     L-Glutamine 500 MG CAPS        loperamide (IMODIUM) 2 MG capsule Take 2 mg by mouth 4 times daily as needed for diarrhea       loratadine (CLARITIN) 10 MG tablet Take 1 tablet (10 mg) by mouth daily 30 tablet 3     magnesium 500 MG TABS        METFORMIN HCL PO Take 500 mg by mouth daily (with breakfast)        omeprazole (PRILOSEC) 40 MG DR capsule        prochlorperazine (COMPAZINE) 10 MG tablet Take 0.5 tablets (5 mg) by mouth every 6 hours as needed (nausea/vomiting) 30 tablet 2     pyridoxine (VITAMIN B-6) 50 MG TABS Take 1 tablet (50 mg) by mouth 2 times daily 60 tablet 3     triamterene-hydrochlorothiazide (DYAZIDE) 37.5-25 MG per capsule Take 1 capsule by mouth every morning            Allergies:        Allergies   Allergen Reactions     Carboplatin Anaphylaxis     Chest tightness, hoarse voice, difficulty breathing     Ace Inhibitors Cough     Amoxicillin Hives  "       Social History:     Social History     Tobacco Use     Smoking status: Former Smoker     Packs/day: 0.25     Years: 20.00     Pack years: 5.00     Last attempt to quit: 1991     Years since quittin.2     Smokeless tobacco: Never Used     Tobacco comment: Quit smoking    Substance Use Topics     Alcohol use: Yes     Comment: 4-7/week if out 1-2x's/week       History   Drug Use No         Family History:     The patient's family history is notable for:    Family History   Problem Relation Age of Onset     Colon Cancer Mother      Breast Cancer Sister      Lymphoma Sister          Physical Exam:     BP (!) 148/79 (BP Location: Left arm)   Pulse 77   Temp 97.4  F (36.3  C) (Oral)   Resp 16   Ht 1.626 m (5' 4.02\")   Wt 111.9 kg (246 lb 9.6 oz)   SpO2 99%   BMI 42.31 kg/m    Body mass index is 42.31 kg/m .    Respiratory: No audible wheeze, cough.      Psychiatric: appropriate mood and affect. Mentation appears normal, affect normal/bright, judgement and insight intact, normal speech           Assessment:    Lu Smith is a 70 year old woman with a diagnosis of  recurrent serous endometrial cancer.   She is here today for follow up and disease management. She is currently on maintenance Avastin. In light of the recent COVID19 pandemic, and in accordance with our group and national guidelines this patients care has been reviewed and it is felt that presenting for her visit would be more likely to increase rather than decrease her relative risks.  Therefore this visit was conducted by phone.    25 minutes were spent with this patient by phone. Over 50% of that time was spent in symptom management, treatment planning and in counseling and coordination of care. See rooming note for consent.         Plan:     1.)         Ok to proceed with planned chemotherapy pending labs are WNL. She will have imaging and then see Dr. Henderson for results. Reviewed signs and symptoms for when she should " contact the clinic or seek additional care. Patient to contact the clinic with any questions or concerns in the interim. Discussed importance of adequate hydration and monitoring her bowels (taking stool softeners as needed). Answered her questions to the best of my ability.     2.) Genetic risk factors were assessed and she is negative. PDLI 0%      3.) Labs and/or tests ordered include:  Chemo labs.      4.) Health maintenance issues addressed today include annual health maintenance and non-gynecologic issues with PCP. Encouraged to monitor her BP at home and follow up with her PCP if her readings remain >140 systolic.     5.)         Hypomagnesemia: continue to take Mg at home and monitor results. To be replaced in infusion today.     ANU Ramey, WHNP-BC, ANP-BC  Women's Health Nurse Practitioner  Adult Nurse Pracitioner  Division of Gynecologic Oncology          CC  Patient Care Team:  Levar Scott as PCP - General (Internal Medicine)  Jeanne Blancas MD as Referring Physician (OB/Gyn)  Alondra Ruiz RN as Specialty Care Coordinator (Gyn-Onc)  Kevin Henderson MD as MD (Gyn-Onc)  SELF, REFERRED

## 2020-07-16 NOTE — LETTER
"    2020         RE: Lu Smith  [address]  [facility] [address]        Dear Colleague,    Thank you for referring your patient, Lu Smith, to the [facility]. Please see a copy of my visit note below.    Lu Smith is a 70 year old female who is being evaluated via a billable telephone visit.      The patient has been notified of following:     \"This telephone visit will be conducted via a call between you and your physician/provider. We have found that certain health care needs can be provided without the need for a physical exam.  This service lets us provide the care you need with a short phone conversation.  If a prescription is necessary we can send it directly to your pharmacy.  If lab work is needed we can place an order for that and you can then stop by our lab to have the test done at a later time.    Telephone visits are billed at different rates depending on your insurance coverage. During this emergency period, for some insurers they may be billed the same as an in-person visit.  Please reach out to your insurance provider with any questions.    If during the course of the call the physician/provider feels a telephone visit is not appropriate, you will not be charged for this service.\"    Patient has given verbal consent for Telephone visit?  Yes    What phone number would you like to be contacted at? [phone]    How would you like to obtain your AVS? Bernice   Vitals:    20 0732   BP: (!) 148/79   BP Location: Left arm   Pulse: 77   Resp: 16   Temp: 97.4  F (36.3  C)   TempSrc: Oral   SpO2: 99%   Weight: 111.9 kg (246 lb 9.6 oz)   Height: 1.626 m (5' 4.02\")       Nam Montague LPN                    Follow Up Notes on Referred Patient    Date: 2020        RE: Lu Smith  : 1950  HEIDY: 2020      Lu Smith is a 70 year old woman with a diagnosis of recurrent serous endometrial cancer.  She is here today for follow up and " disease management. She is currently on maintenance Avastin. In light of the recent COVID19 pandemic, and in accordance with our group and national guidelines this patients care has been reviewed and it is felt that presenting for her visit would be more likely to increase rather than decrease her relative risks.  Therefore this visit was conducted by phone.      Oncology History:  Diagnosis: Stage IIIC2 serous endometrial cancer  Primary GYN oncologist: Dr. Kevin Henderson  Primary radiation oncologist: Dr. Ana Michelle  Surgery: 2/23/18: TLH-BSO, omentectomy, bilateral pelvic and para-aortic lymph node dissection, pelvic washings  Chemotherapy:  3/30/18-9/28/18: Six cycles of carboplatin AUC 6 and paclitaxel 175mg/m2, delivered sandwich style with pelvic radiation between cycles three and four  Radiation: 6/4/18-7/12/18: EBRT delivered to pelvis and para-aortic nodes, 5040 cGy in 28 fractions  7/16/18-7/20/18: HDR brachytherapy delivered to the vaginal cuff, 1200 cGy in two fractions  Complications: Transient neuropathy, mild thrombocytopenia and anemia  Genetic testing: Panel testing negative     9/16/19: Chest CT performed for pulmonary nodule follow up, concerning for recurrent disease  IMPRESSION:   1. Few bilateral sub-4 mm solid pulmonary nodules are unchanged compared to 2/6/2018. No new or enlarging pulmonary nodules.  2. Dilated main pulmonary artery, nonspecific but may be seen with pulmonary artery hypertension.  3. New 1.9 cm left subclavicular node.     9/28/19: PET impression:  In this 69-year-old female with history of stage IIIC2 serous endometrial cancer status post total hysterectomy and bilateral salpingo-oophorectomy, omentectomy, bilateral pelvic and periaortic dissection, and radiation to the pelvis, and brachytherapy to the vaginal cuff.  1. Progression of disease with new intensely FDG avid para-aortic, mediastinal, and subclavicular lymphadenopathy. The subdiaphragmatic periaortic  lymphadenopathy spans from the diaphragmatic crura to the  low abdominal aorta at the level of L4 with greater than 180 degree involvement of the aorta. There is additional lymphadenopathy within the left axilla and the right inguinal region.     9/28/19: Neck CT impression:  1. Progression of metastatic disease with new intensely FDG avid retroclavicular lymph node at the level of the left thoracic inlet.   2. On the fusion PET CT, there is no definite abnormal metabolic activity in the mucosal space, soft tissues, or cervical jamel chains.  3. No evidence of mucosal or soft tissue abnormality on contrast enhanced neck CT.  4. Please refer to the whole body PET CT performed as a separate report, for the findings of the remainder of the body.     10/24/19: Lymph node biopsy:  Lymph node, periaortic, CT guided core biopsy:   -METASTATIC ADENOCARCINOMA   -Tumor morphology is consistent with metastasis/recurrence of endometrial serous carcinoma      11/11/19: Cycle #1 paclitaxel 175mg/m2 + carboplatin AUC 6 + bevacizumab 15mg/kg  12/2/19: Cycle #2 paclitaxel 175mg/m2 + carboplatin AUC 6 + bevacizumab 15mg/kg. Carboplatin reaction.  12/27/19: Cycle #3 Paclitaxel/Avastin/inpatient Carboplatin desensitization.   1/20/2020: CT cap IMPRESSION:  1. Mosaic attenuation throughout the lungs with associated dilated main pulmonary artery likely due to pulmonary arterial hypertension in the appropriate clinical setting.  2. Stable sub-4 mm bilateral pulmonary nodules. No new or enlarging pulmonary nodules.  3. Interval improvement of previously seen enlarged lymph nodes in the lower neck, chest, abdomen and pelvis. No new or enlarging lymph nodes in the present study.  4. Stable soft tissue mesenteric nodules as well as mesenteric root fat stranding.  5. Hepatic steatosis. Additional incidental findings as described above.  1/22/2020: Cycle #4 Paclitaxel/Avastin/inpatient Carboplatin desensitization.   2/13/2020: Cycle #5  Paclitaxel/Avastin/inpatient Carboplatin desensitization.   3/3/2020: 24 h urine protein 0.15  3/5/2020: Cycle #6 Paclitaxel/Avastin/inpatient Carboplatin desensitization.   3/25/2020: Cycle #1 Avastin   4/22/2020: Cycle #2 Avastin   5/13/2020: Cycle #3 Avastin   5/28/2020: CT cap IMPRESSION:  In this patient with a history of recurrent serous endometrial cancer:  1. New inguinal lymphadenopathy on the right. Inflammatory versus progressive disease.  Amenable to biopsy.  2. No change in soft tissue about the abdominal aorta just superior to the bifurcation. (note was hypermetabolic on 9/28/2019 PET scan, then  decreased in size on CT 1/20/2020)  3. Stable sub-5 mm pulmonary nodules.  4. Scattered peritoneal soft tissue attenuating deposits along the anterior abdominal wall, not hypermetabolic on PET 9/28/2018.   5. Main pulmonary artery is stably enlarged. Findings can the seen in pulmonary arterial hypertension.    Plan:continue with 3 cycles of 15mg/kg every 3 weeks bevacizumab maintenance and repeat a CT scan of the chest, abdomen and pelvis.   6/3/2020: Cycle #4 Avastin   6/25/2020: Cycle #5 Avastin   7/16/2020: Cycle #6 Avastin.         Today she reports she is doing about the same as her last visit. She denies any vaginal bleeding, no changes in her bladder habits, no nausea/emesis, no lower extremity edema, and no difficulties eating (says is eating better recently) or sleeping. She denies any abdominal discomfort/bloating, no fevers or chills, and no chest pain or shortness of breath. She has not been monitoring her BP this past week but previously her readings were in the 120-130's; she had been on Norvasc 10 mg but is currently on 5 mg. She continues to notice the masses in her groin but it is not as painful as before. She has had more issues with constipation/hard stools the past week or so and has stool softener to take if needed. She has not needed to take her antiemetics but has it at home in case. she  "has noticed some \"cramping\" in her LLQ over th past month or so; she states is is usually a \"dull ache\" and she takes Tylenol for it which helps. She has been taking Mg 500 mg BID and did take it this morning. She is wondering why her urine is checked for proteins.         Review of Systems:    Systemic           no weight changes; no fever; no chills; no night sweats; no appetite changes  Skin           no rashes, or lesions  Eye           no irritation; no changes in vision  Zahida-Laryngeal           no dysphagia; no hoarseness   Pulmonary    no cough; no shortness of breath  Cardiovascular    no chest pain; no palpitations; + HTN  Gastrointestinal    no diarrhea; no constipation; no abdominal pain; no changes in bowel habits; no blood in stool  Genitourinary   no urinary frequency; no urinary urgency; no dysuria; no pain; no abnormal vaginal discharge; no abnormal vaginal bleeding  Breast    no breast discharge; no breast changes; no breast pain  Musculoskeletal    no myalgias; no arthralgias; no back pain  Psychiatric           no depressed mood; no anxiety    Hematologic               no tender lymph nodes; no noticeable swellings or lumps   Endocrine    no hot flashes; no heat/cold intolerance: + DM       Neurological   no tremor; no numbness and tingling; no headaches; no difficulty sleeping      Past Medical History:    Past Medical History:   Diagnosis Date     Diabetes (H)     Type II     Endometrial cancer determined by uterine biopsy (H) 02/2018     HTN (hypertension)      Obesity      Osteoarthritis          Past Surgical History:    Past Surgical History:   Procedure Laterality Date     CHOLECYSTECTOMY  1993     CYSTOSCOPY N/A 2/23/2018    Procedure: CYSTOSCOPY;;  Surgeon: Kevin Henderson MD;  Location: [facility] OR     DAVINCI HYSTERECTOMY TOTAL, BILATERAL SALPINGO-OOPHORECTOMY, COMBINED N/A 2/23/2018    Procedure: COMBINED DAVINCI HYSTERECTOMY TOTAL, SALPINGO-OOPHORECTOMY;  DaVinci Assisted Total " Laparoscopic Hysterectomy, Bilateral Salpingo-Oophorectomy, Cystoscopy,  Omental biopsy, omentectomy,bilateral  Pelvic And Para Aortic Lymph Node Dissection;  Surgeon: Kevin Henderson MD;  Location: [facility] OR     Partial lumbar discectomy  1998         Health Maintenance Due   Topic Date Due     DEXA  1950     LIPID  1950     MICROALBUMIN  1950     DIABETIC FOOT EXAM  1950     HEPATITIS C SCREENING  1950     ADVANCE CARE PLANNING  1950     EYE EXAM  1950     HEPATITIS B IMMUNIZATION (1 of 3 - Risk 3-dose series) 04/07/1971     ZOSTER IMMUNIZATION (1 of 2) 04/07/2002     MEDICARE ANNUAL WELLNESS VISIT  04/07/2017     PNEUMOCOCCAL IMMUNIZATION 65+ HIGH/HIGHEST RISK (2 of 2 - PPSV23) 02/09/2018     A1C  05/09/2018     PHQ-2  01/01/2020       Current Medications:     Current Outpatient Medications   Medication Sig Dispense Refill     ACETAMINOPHEN PO Take 325 mg by mouth every 6 hours as needed for pain       amLODIPine (NORVASC) 10 MG tablet Take 1 tablet (10 mg) by mouth daily 30 tablet 0     amLODIPine (NORVASC) 5 MG tablet Take 1 tablet (5 mg) by mouth daily 30 tablet 0     Famotidine (PEPCID PO) Take 10 mg by mouth as needed        fluticasone (FLONASE) 50 MCG/ACT spray Spray 1-2 sprays into both nostrils daily 1 Bottle 1     L-Glutamine 500 MG CAPS        loperamide (IMODIUM) 2 MG capsule Take 2 mg by mouth 4 times daily as needed for diarrhea       loratadine (CLARITIN) 10 MG tablet Take 1 tablet (10 mg) by mouth daily 30 tablet 3     magnesium 500 MG TABS        METFORMIN HCL PO Take 500 mg by mouth daily (with breakfast)        omeprazole (PRILOSEC) 40 MG DR capsule        prochlorperazine (COMPAZINE) 10 MG tablet Take 0.5 tablets (5 mg) by mouth every 6 hours as needed (nausea/vomiting) 30 tablet 2     pyridoxine (VITAMIN B-6) 50 MG TABS Take 1 tablet (50 mg) by mouth 2 times daily 60 tablet 3     triamterene-hydrochlorothiazide (DYAZIDE) 37.5-25 MG per capsule Take 1  "capsule by mouth every morning            Allergies:        Allergies   Allergen Reactions     Carboplatin Anaphylaxis     Chest tightness, hoarse voice, difficulty breathing     Ace Inhibitors Cough     Amoxicillin Hives        Social History:     Social History     Tobacco Use     Smoking status: Former Smoker     Packs/day: 0.25     Years: 20.00     Pack years: 5.00     Last attempt to quit: 1991     Years since quittin.2     Smokeless tobacco: Never Used     Tobacco comment: Quit smoking    Substance Use Topics     Alcohol use: Yes     Comment: 4-7/week if out 1-2x's/week       History   Drug Use No         Family History:     The patient's family history is notable for:    Family History   Problem Relation Age of Onset     Colon Cancer Mother      Breast Cancer Sister      Lymphoma Sister          Physical Exam:     BP (!) 148/79 (BP Location: Left arm)   Pulse 77   Temp 97.4  F (36.3  C) (Oral)   Resp 16   Ht 1.626 m (5' 4.02\")   Wt 111.9 kg (246 lb 9.6 oz)   SpO2 99%   BMI 42.31 kg/m    Body mass index is 42.31 kg/m .    Respiratory: No audible wheeze, cough.      Psychiatric: appropriate mood and affect. Mentation appears normal, affect normal/bright, judgement and insight intact, normal speech           Assessment:    Lu Smith is a 70 year old woman with a diagnosis of  recurrent serous endometrial cancer.   She is here today for follow up and disease management. She is currently on maintenance Avastin. In light of the recent COVID19 pandemic, and in accordance with our group and national guidelines this patients care has been reviewed and it is felt that presenting for her visit would be more likely to increase rather than decrease her relative risks.  Therefore this visit was conducted by phone.    25 minutes were spent with this patient by phone. Over 50% of that time was spent in symptom management, treatment planning and in counseling and coordination of care. See rooming " note for consent.         Plan:     1.)         Ok to proceed with planned chemotherapy pending labs are WNL. She will have imaging and then see Dr. Henderson for results. Reviewed signs and symptoms for when she should contact the clinic or seek additional care. Patient to contact the clinic with any questions or concerns in the interim. Discussed importance of adequate hydration and monitoring her bowels (taking stool softeners as needed). Answered her questions to the best of my ability.     2.) Genetic risk factors were assessed and she is negative. PDLI 0%      3.) Labs and/or tests ordered include:  Chemo labs.      4.) Health maintenance issues addressed today include annual health maintenance and non-gynecologic issues with PCP. Encouraged to monitor her BP at home and follow up with her PCP if her readings remain >140 systolic.     5.)         Hypomagnesemia: continue to take Mg at home and monitor results. To be replaced in infusion today.     ANU Ramey, WHNP-BC, ANP-BC  Women's Health Nurse Practitioner  Adult Nurse Pracitioner  Division of Gynecologic Oncology          CC  Patient Care Team:  Levar Scott as PCP - General (Internal Medicine)  Jeanne Blancas MD as Referring Physician (OB/Gyn)  Alondra Ruiz, RN as Specialty Care Coordinator (Gyn-Onc)  Kevin Henderson MD as MD (Gyn-Onc)  SELF, REFERRED    Again, thank you for allowing me to participate in the care of your patient.        Sincerely,        ANU Coulter CNP

## 2020-07-16 NOTE — PROGRESS NOTES
Infusion Nursing Note:  Lu Smith presents today for Cycle 6 Day 1 MVASI and magnesium replacement    Patient seen by provider today: No   present during visit today: Not Applicable.    Note:     Intravenous Access:  Implanted Port.                    Lab Results   Component Value Date    POTASSIUM 3.6 07/16/2020           Lab Results   Component Value Date    MAG 1.4- replaced per protocol 07/16/2020            Lab Results   Component Value Date    CR 0.97 03/05/2020      Urine negative.      Post Infusion Assessment:  Patient tolerated infusion without incident.  Blood return noted pre and post infusion.  No evidence of extravasations.  Access discontinued per protocol.       Discharge Plan:   [facility]  Patient will return 7/27 for a CT scan and provider appointment following and then 8/6 to see Dr. Cadena/labs and next infusion  Departure Mode: Ambulatory.    Quin Linares RN

## 2020-07-29 NOTE — PROGRESS NOTES
"Lu Smith is a 70 year old female who is being evaluated via a billable video visit.      The patient has been notified of following:     \"This video visit will be conducted via a call between you and your physician/provider. We have found that certain health care needs can be provided without the need for an in-person physical exam.  This service lets us provide the care you need with a video conversation.  If a prescription is necessary we can send it directly to your pharmacy.  If lab work is needed we can place an order for that and you can then stop by our lab to have the test done at a later time.    Video visits are billed at different rates depending on your insurance coverage.  Please reach out to your insurance provider with any questions.    If during the course of the call the physician/provider feels a video visit is not appropriate, you will not be charged for this service.\"    Patient has given verbal consent for Video visit? Yes  How would you like to obtain your AVS? MyChart  If you are dropped from the video visit, the video invite should be resent to: Text to cell phone: [phone]  Will anyone else be joining your video visit? No      Patient did not have any vitals to report.  6/10 pain scale.     Refill(s) on on loratadine if okay.     Kristy Bolden CMA        Video-Visit Details    Type of service:  Video Visit    Video Start Time: 9:30  Video End Time: 10:10    Originating Location (pt. Location): Home    Distant Location (provider location):  [facility]     Platform used for Video Visit: Mike Henderson MD, MS    Department of Obstetrics and Gynecology   Division of Gynecologic Oncology   [facility]  Phone: [phone]                Follow Up Notes on Referred Patient    Date: 7/29/2020       Dr. Kevin Henderson MD  [address] [place] [id number]       RE: Lu" Luis  : 1950  HEIDY: 2020    Lu Smith is a 69 year old woman with a diagnosis of recurrent serous endometrial cancer.    The patient presents today for followup. She has been doing fairly well on bevacizumab maintenance.  She is eating and drinking okay.  Occasional nausea had no vomiting, no fever or chills.  Normal urinary function and one loose bowel movement per day.  No vaginal bleeding or discharge. She does have some upper and lower back pain that resolves with warm bath, no SOB or CP.      Oncology History:  Diagnosis: Stage IIIC2 serous endometrial cancer  Primary GYN oncologist: Dr. Kevin Henderson  Primary radiation oncologist: Dr. Ana Michelle  Surgery: 18: TLH-BSO, omentectomy, bilateral pelvic and para-aortic lymph node dissection, pelvic washings  Chemotherapy:  3/30/18-18: Six cycles of carboplatin AUC 6 and paclitaxel 175mg/m2, delivered sandwich style with pelvic radiation between cycles three and four  Radiation: 18-18: EBRT delivered to pelvis and para-aortic nodes, 5040 cGy in 28 fractions  18-18: HDR brachytherapy delivered to the vaginal cuff, 1200 cGy in two fractions  Complications: Transient neuropathy, mild thrombocytopenia and anemia  Genetic testing: Panel testing negative     19: Chest CT performed for pulmonary nodule follow up, concerning for recurrent disease  IMPRESSION:   1. Few bilateral sub-4 mm solid pulmonary nodules are unchanged compared to 2018. No new or enlarging pulmonary nodules.  2. Dilated main pulmonary artery, nonspecific but may be seen with pulmonary artery hypertension.  3. New 1.9 cm left subclavicular node.     19: PET impression:  In this 69-year-old female with history of stage IIIC2 serous endometrial cancer status post total hysterectomy and bilateral salpingo-oophorectomy, omentectomy, bilateral pelvic and periaortic dissection, and radiation to the pelvis, and brachytherapy to the vaginal  cuff.  1. Progression of disease with new intensely FDG avid para-aortic, mediastinal, and subclavicular lymphadenopathy. The subdiaphragmatic periaortic lymphadenopathy spans from the diaphragmatic crura to the  low abdominal aorta at the level of L4 with greater than 180 degree involvement of the aorta. There is additional lymphadenopathy within the left axilla and the right inguinal region.     9/28/19: Neck CT impression:  1. Progression of metastatic disease with new intensely FDG avid retroclavicular lymph node at the level of the left thoracic inlet.   2. On the fusion PET CT, there is no definite abnormal metabolic activity in the mucosal space, soft tissues, or cervical jamel chains.  3. No evidence of mucosal or soft tissue abnormality on contrast enhanced neck CT.  4. Please refer to the whole body PET CT performed as a separate report, for the findings of the remainder of the body.     10/24/19: Lymph node biopsy:  Lymph node, periaortic, CT guided core biopsy:   -METASTATIC ADENOCARCINOMA   -Tumor morphology is consistent with metastasis/recurrence of endometrial serous carcinoma      11/11/19: Cycle #1 paclitaxel 175mg/m2 + carboplatin AUC 6 + bevacizumab 15mg/kg  12/2/19: Cycle #2 paclitaxel 175mg/m2 + carboplatin AUC 6 + bevacizumab 15mg/kg. Carboplatin reaction.  12/27/19: Cycle #3 Paclitaxel/Avastin/inpatient Carboplatin desensitization.   1/20/2020: CT cap IMPRESSION:  1. Mosaic attenuation throughout the lungs with associated dilated main pulmonary artery likely due to pulmonary arterial hypertension in the appropriate clinical setting.  2. Stable sub-4 mm bilateral pulmonary nodules. No new or enlarging pulmonary nodules.  3. Interval improvement of previously seen enlarged lymph nodes in the lower neck, chest, abdomen and pelvis. No new or enlarging lymph nodes in the present study.  4. Stable soft tissue mesenteric nodules as well as mesenteric root fat stranding.  5. Hepatic steatosis.  Additional incidental findings as described above.  1/22/2020: Cycle #4 Paclitaxel/Avastin/inpatient Carboplatin desensitization.   2/13/2020: Cycle #5 Paclitaxel/Avastin/inpatient Carboplatin desensitization.   3/3/2020: 24 h urine protein 0.15  3/5/2020: Cycle #6 Paclitaxel/Avastin/inpatient Carboplatin desensitization.   3/25/2020: Cycle #1 Avastin   4/22/2020: Cycle #2 Avastin   5/13/2020: Cycle #3 Avastin   6/3/2020: Cycle #4 Avastin   6/25/2020: Cycle #5 Avastin   7/16/25/2020: Cycle #6 Avastin     Past Medical History:    Past Medical History:   Diagnosis Date     Diabetes (H)     Type II     Endometrial cancer determined by uterine biopsy (H) 02/2018     HTN (hypertension)      Obesity      Osteoarthritis          Past Surgical History:    Past Surgical History:   Procedure Laterality Date     CHOLECYSTECTOMY  1993     CYSTOSCOPY N/A 2/23/2018    Procedure: CYSTOSCOPY;;  Surgeon: Kevin Henderson MD;  Location: [facility] OR     DAVINCI HYSTERECTOMY TOTAL, BILATERAL SALPINGO-OOPHORECTOMY, COMBINED N/A 2/23/2018    Procedure: COMBINED DAVINCI HYSTERECTOMY TOTAL, SALPINGO-OOPHORECTOMY;  DaVinci Assisted Total Laparoscopic Hysterectomy, Bilateral Salpingo-Oophorectomy, Cystoscopy,  Omental biopsy, omentectomy,bilateral  Pelvic And Para Aortic Lymph Node Dissection;  Surgeon: Kevin Henderson MD;  Location: [facility] OR     Partial lumbar discectomy  1998         Health Maintenance Due   Topic Date Due     DEXA  1950     LIPID  1950     MICROALBUMIN  1952     DIABETIC FOOT EXAM  1952     HEPATITIS C SCREENING  1952     ADVANCE CARE PLANNING  1950     EYE EXAM  1950     HEPATITIS B IMMUNIZATION (1 of 3 - Risk 3-dose series) 04/07/1971     ZOSTER IMMUNIZATION (1 of 2) 04/07/2002     MEDICARE ANNUAL WELLNESS VISIT  04/07/2017     PNEUMOCOCCAL IMMUNIZATION 65+ HIGH/HIGHEST RISK (2 of 2 - PPSV23) 02/09/2018     A1C  05/09/2018     PHQ-2  01/01/2020       Current Medications:      Current Outpatient Medications   Medication Sig Dispense Refill     ACETAMINOPHEN PO Take 325 mg by mouth every 6 hours as needed for pain       amLODIPine (NORVASC) 10 MG tablet Take 1 tablet (10 mg) by mouth daily 30 tablet 0     amLODIPine (NORVASC) 5 MG tablet Take 1 tablet (5 mg) by mouth daily 30 tablet 0     Famotidine (PEPCID PO) Take 10 mg by mouth as needed        fluticasone (FLONASE) 50 MCG/ACT spray Spray 1-2 sprays into both nostrils daily 1 Bottle 1     L-Glutamine 500 MG CAPS        loperamide (IMODIUM) 2 MG capsule Take 2 mg by mouth 4 times daily as needed for diarrhea       loratadine (CLARITIN) 10 MG tablet Take 1 tablet (10 mg) by mouth daily 30 tablet 3     magnesium 500 MG TABS        METFORMIN HCL PO Take 500 mg by mouth daily (with breakfast)        omeprazole (PRILOSEC) 40 MG DR capsule        prochlorperazine (COMPAZINE) 10 MG tablet Take 0.5 tablets (5 mg) by mouth every 6 hours as needed (nausea/vomiting) 30 tablet 2     pyridoxine (VITAMIN B-6) 50 MG TABS Take 1 tablet (50 mg) by mouth 2 times daily 60 tablet 3     triamterene-hydrochlorothiazide (DYAZIDE) 37.5-25 MG per capsule Take 1 capsule by mouth every morning            Allergies:        Allergies   Allergen Reactions     Carboplatin Anaphylaxis     Chest tightness, hoarse voice, difficulty breathing     Ace Inhibitors Cough     Amoxicillin Hives        Social History:     Social History     Tobacco Use     Smoking status: Former Smoker     Packs/day: 0.25     Years: 20.00     Pack years: 5.00     Last attempt to quit: 1991     Years since quittin.2     Smokeless tobacco: Never Used     Tobacco comment: Quit smoking    Substance Use Topics     Alcohol use: Yes     Comment: 4-7/week if out 1-2x's/week       History   Drug Use No         Family History:         Family History   Problem Relation Age of Onset     Colon Cancer Mother      Breast Cancer Sister      Lymphoma Sister          Physical Exam:     There  were no vitals taken for this visit.  There is no height or weight on file to calculate BMI.    General Appearance:  healthy and alert, no distress                Psychiatric:      appropriate mood and affect                                    A total of 40 minutes was spent with the patient, 30 minutes of which were spent in counseling the patient and/or treatment planning.        1.  Recurrent serous endometrial carcinoma.   2.  Positive treatment response to carboplatin, paclitaxel, bevacizumab.   3.  Carbo desensitization.   4.  MSI stable tumor.   5.  Low tumor mutation burden.   6.  PD-L1 0% expression.      We did review the CT scan which showed disease progression. We will switch treatment to weekly taxol. The patient agrees with this plan.  She is very appreciative of her care.  All questions were answered.         Yen Farias MD, MS    Department of Obstetrics and Gynecology   Division of Gynecologic Oncology   [facility]  Phone: [phone]    CC  Patient Care Team:  Levar Scott as PCP - General (Internal Medicine)  Jeanne Blancas MD as Referring Physician (OB/Gyn)  Alondra Ruiz RN as Specialty Care Coordinator (Gyn-Onc)  Yen Farias MD as MD (Gyn-Onc)  YEN FARIAS

## 2020-07-29 NOTE — LETTER
2020         RE: Lu Smith  [address] [facility] [address]        Dear Colleague,    Thank you for referring your patient, Lu Smith, to the [facility]. Please see a copy of my visit note below.    Lu Smith is a 70 year old female who is being evaluated via a billable video visit.          Patient did not have any vitals to report.  6/10 pain scale.     Refill(s) on on loratadine if okay.     Kristy Bolden CMA        Video-Visit Details    Type of service:  Video Visit    Video Start Time: 9:30  Video End Time: 10:10    Originating Location (pt. Location): Home    Distant Location (provider location):  [facility]     Platform used for Video Visit: Mike Henderson MD, MS    Department of Obstetrics and Gynecology   Division of Gynecologic Oncology   [facility]  Phone: [phone]                Follow Up Notes on Referred Patient    Date: 2020       Dr. Kevin Henderson MD  [address] [place] [id number]       RE: Lu Smith  : 1950  HEIDY: 2020    Lu Smith is a 69 year old woman with a diagnosis of recurrent serous endometrial cancer.    The patient presents today for followup. She has been doing fairly well on bevacizumab maintenance.  She is eating and drinking okay.  Occasional nausea had no vomiting, no fever or chills.  Normal urinary function and one loose bowel movement per day.  No vaginal bleeding or discharge. She does have some upper and lower back pain that resolves with warm bath, no SOB or CP.      Oncology History:  Diagnosis: Stage IIIC2 serous endometrial cancer  Primary GYN oncologist: Dr. Kevin Henderson  Primary radiation oncologist: Dr. Ana Michelle  Surgery: 18: TLH-BSO, omentectomy, bilateral pelvic and para-aortic lymph node dissection, pelvic washings  Chemotherapy:  3/30/18-18: Six cycles of carboplatin AUC 6 and  paclitaxel 175mg/m2, delivered sandwich style with pelvic radiation between cycles three and four  Radiation: 6/4/18-7/12/18: EBRT delivered to pelvis and para-aortic nodes, 5040 cGy in 28 fractions  7/16/18-7/20/18: HDR brachytherapy delivered to the vaginal cuff, 1200 cGy in two fractions  Complications: Transient neuropathy, mild thrombocytopenia and anemia  Genetic testing: Panel testing negative     9/16/19: Chest CT performed for pulmonary nodule follow up, concerning for recurrent disease  IMPRESSION:   1. Few bilateral sub-4 mm solid pulmonary nodules are unchanged compared to 2/6/2018. No new or enlarging pulmonary nodules.  2. Dilated main pulmonary artery, nonspecific but may be seen with pulmonary artery hypertension.  3. New 1.9 cm left subclavicular node.     9/28/19: PET impression:  In this 69-year-old female with history of stage IIIC2 serous endometrial cancer status post total hysterectomy and bilateral salpingo-oophorectomy, omentectomy, bilateral pelvic and periaortic dissection, and radiation to the pelvis, and brachytherapy to the vaginal cuff.  1. Progression of disease with new intensely FDG avid para-aortic, mediastinal, and subclavicular lymphadenopathy. The subdiaphragmatic periaortic lymphadenopathy spans from the diaphragmatic crura to the  low abdominal aorta at the level of L4 with greater than 180 degree involvement of the aorta. There is additional lymphadenopathy within the left axilla and the right inguinal region.     9/28/19: Neck CT impression:  1. Progression of metastatic disease with new intensely FDG avid retroclavicular lymph node at the level of the left thoracic inlet.   2. On the fusion PET CT, there is no definite abnormal metabolic activity in the mucosal space, soft tissues, or cervical jamel chains.  3. No evidence of mucosal or soft tissue abnormality on contrast enhanced neck CT.  4. Please refer to the whole body PET CT performed as a separate report, for the  findings of the remainder of the body.     10/24/19: Lymph node biopsy:  Lymph node, periaortic, CT guided core biopsy:   -METASTATIC ADENOCARCINOMA   -Tumor morphology is consistent with metastasis/recurrence of endometrial serous carcinoma      11/11/19: Cycle #1 paclitaxel 175mg/m2 + carboplatin AUC 6 + bevacizumab 15mg/kg  12/2/19: Cycle #2 paclitaxel 175mg/m2 + carboplatin AUC 6 + bevacizumab 15mg/kg. Carboplatin reaction.  12/27/19: Cycle #3 Paclitaxel/Avastin/inpatient Carboplatin desensitization.   1/20/2020: CT cap IMPRESSION:  1. Mosaic attenuation throughout the lungs with associated dilated main pulmonary artery likely due to pulmonary arterial hypertension in the appropriate clinical setting.  2. Stable sub-4 mm bilateral pulmonary nodules. No new or enlarging pulmonary nodules.  3. Interval improvement of previously seen enlarged lymph nodes in the lower neck, chest, abdomen and pelvis. No new or enlarging lymph nodes in the present study.  4. Stable soft tissue mesenteric nodules as well as mesenteric root fat stranding.  5. Hepatic steatosis. Additional incidental findings as described above.  1/22/2020: Cycle #4 Paclitaxel/Avastin/inpatient Carboplatin desensitization.   2/13/2020: Cycle #5 Paclitaxel/Avastin/inpatient Carboplatin desensitization.   3/3/2020: 24 h urine protein 0.15  3/5/2020: Cycle #6 Paclitaxel/Avastin/inpatient Carboplatin desensitization.   3/25/2020: Cycle #1 Avastin   4/22/2020: Cycle #2 Avastin   5/13/2020: Cycle #3 Avastin   6/3/2020: Cycle #4 Avastin   6/25/2020: Cycle #5 Avastin   7/16/25/2020: Cycle #6 Avastin     Past Medical History:    Past Medical History:   Diagnosis Date     Diabetes (H)     Type II     Endometrial cancer determined by uterine biopsy (H) 02/2018     HTN (hypertension)      Obesity      Osteoarthritis          Past Surgical History:    Past Surgical History:   Procedure Laterality Date     CHOLECYSTECTOMY  1993     CYSTOSCOPY N/A 2/23/2018     Procedure: CYSTOSCOPY;;  Surgeon: Kevin Henderson MD;  Location: [facility] OR     DAVINCI HYSTERECTOMY TOTAL, BILATERAL SALPINGO-OOPHORECTOMY, COMBINED N/A 2/23/2018    Procedure: COMBINED DAVINCI HYSTERECTOMY TOTAL, SALPINGO-OOPHORECTOMY;  DaVinci Assisted Total Laparoscopic Hysterectomy, Bilateral Salpingo-Oophorectomy, Cystoscopy,  Omental biopsy, omentectomy,bilateral  Pelvic And Para Aortic Lymph Node Dissection;  Surgeon: Kevin Henderson MD;  Location: [facility] OR     Partial lumbar discectomy  1998         Health Maintenance Due   Topic Date Due     DEXA  1950     LIPID  1952     MICROALBUMIN  1952     DIABETIC FOOT EXAM  1952     HEPATITIS C SCREENING  1952     ADVANCE CARE PLANNING  1952     EYE EXAM  1950     HEPATITIS B IMMUNIZATION (1 of 3 - Risk 3-dose series) 04/07/1971     ZOSTER IMMUNIZATION (1 of 2) 04/07/2002     MEDICARE ANNUAL WELLNESS VISIT  04/07/2017     PNEUMOCOCCAL IMMUNIZATION 65+ HIGH/HIGHEST RISK (2 of 2 - PPSV23) 02/09/2018     A1C  05/09/2018     PHQ-2  01/01/2020       Current Medications:     Current Outpatient Medications   Medication Sig Dispense Refill     ACETAMINOPHEN PO Take 325 mg by mouth every 6 hours as needed for pain       amLODIPine (NORVASC) 10 MG tablet Take 1 tablet (10 mg) by mouth daily 30 tablet 0     amLODIPine (NORVASC) 5 MG tablet Take 1 tablet (5 mg) by mouth daily 30 tablet 0     Famotidine (PEPCID PO) Take 10 mg by mouth as needed        fluticasone (FLONASE) 50 MCG/ACT spray Spray 1-2 sprays into both nostrils daily 1 Bottle 1     L-Glutamine 500 MG CAPS        loperamide (IMODIUM) 2 MG capsule Take 2 mg by mouth 4 times daily as needed for diarrhea       loratadine (CLARITIN) 10 MG tablet Take 1 tablet (10 mg) by mouth daily 30 tablet 3     magnesium 500 MG TABS        METFORMIN HCL PO Take 500 mg by mouth daily (with breakfast)        omeprazole (PRILOSEC) 40 MG DR capsule        prochlorperazine (COMPAZINE) 10 MG  tablet Take 0.5 tablets (5 mg) by mouth every 6 hours as needed (nausea/vomiting) 30 tablet 2     pyridoxine (VITAMIN B-6) 50 MG TABS Take 1 tablet (50 mg) by mouth 2 times daily 60 tablet 3     triamterene-hydrochlorothiazide (DYAZIDE) 37.5-25 MG per capsule Take 1 capsule by mouth every morning            Allergies:        Allergies   Allergen Reactions     Carboplatin Anaphylaxis     Chest tightness, hoarse voice, difficulty breathing     Ace Inhibitors Cough     Amoxicillin Hives        Social History:     Social History     Tobacco Use     Smoking status: Former Smoker     Packs/day: 0.25     Years: 20.00     Pack years: 5.00     Last attempt to quit: 1991     Years since quittin.2     Smokeless tobacco: Never Used     Tobacco comment: Quit smoking    Substance Use Topics     Alcohol use: Yes     Comment: 4-7/week if out 1-2x's/week       History   Drug Use No         Family History:         Family History   Problem Relation Age of Onset     Colon Cancer Mother      Breast Cancer Sister      Lymphoma Sister          Physical Exam:     There were no vitals taken for this visit.  There is no height or weight on file to calculate BMI.    General Appearance:  healthy and alert, no distress                Psychiatric:      appropriate mood and affect                                    A total of 40 minutes was spent with the patient, 30 minutes of which were spent in counseling the patient and/or treatment planning.        1.  Recurrent serous endometrial carcinoma.   2.  Positive treatment response to carboplatin, paclitaxel, bevacizumab.   3.  Carbo desensitization.   4.  MSI stable tumor.   5.  Low tumor mutation burden.   6.  PD-L1 0% expression.      We did review the CT scan which showed disease progression. We will switch treatment to . The patient agrees with this plan.  She is very appreciative of her care.  All questions were answered.         Kevin Henderson MD, MS  Assistant  Professor  Department of Obstetrics and Gynecology   Division of Gynecologic Oncology   [facility]  Phone: [phone]    CC  Patient Care Team:  Levar Scott as PCP - General (Internal Medicine)  Jeanne Blancas MD as Referring Physician (OB/Gyn)  Alondra Ruiz, RN as Specialty Care Coordinator (Gyn-Onc)  Kevin Henderson MD as MD (Gyn-Onc)

## 2020-08-06 NOTE — NURSING NOTE
"Chief Complaint   Patient presents with     Port Draw     labs drawn from port by rn.  vs taken     Port accessed with 20 gauge 3/4\" gripper needle and labs drawn by rn.  Port flushed with NS and heparin.  Pt tolerated well.  VS taken.  Pt checked in for next appt.    Madeleine Rhodes RN      "

## 2020-08-06 NOTE — PROGRESS NOTES
Infusion Nursing Note:  Lu Smith presents today for C1 D1 Taxol (not new).    Patient seen by provider today: No   present during visit today: Not Applicable.    Note: Patient arrives c/o 5/10, diffuse abdominal pain/cramping on right side. Also having muscle pain in upper thigh near groin. Pt seen by Dr. Henderson end of July, and is aware of this. Pt takes ES Tylenol PRN which helps. No further intervention requested. Patient received Taxol last in March and was then put on Avastin, but unfortunately her cancer progressed. Pt has not reacted to Taxol in past and is aware of side effects. 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    Taxol titration:  25ml/hr x 5 min  100ml/hr x 5 min  308ml/hr x remainder of infusion      Intravenous Access:  Implanted Port.    Treatment Conditions:  Lab Results   Component Value Date    HGB 9.6 08/06/2020     Lab Results   Component Value Date    WBC 7.2 08/06/2020      Lab Results   Component Value Date    ANEU 4.3 08/06/2020     Lab Results   Component Value Date     08/06/2020      Lab Results   Component Value Date     08/06/2020                   Lab Results   Component Value Date    POTASSIUM 3.8 08/06/2020           Lab Results   Component Value Date    MAG 1.5 08/06/2020            Lab Results   Component Value Date    CR 1.03 08/06/2020                   Lab Results   Component Value Date    MARYLOU 9.4 08/06/2020                Lab Results   Component Value Date    BILITOTAL 0.4 08/06/2020           Lab Results   Component Value Date    ALBUMIN 3.0 08/06/2020                    Lab Results   Component Value Date    ALT 18 08/06/2020           Lab Results   Component Value Date    AST 19 08/06/2020       Results reviewed, labs MET treatment parameters, ok to proceed with treatment.  Magnesium slightly low today.    Per PAOLA Barroso,  NP/Madison Antunez RN @0953 8/6/20:  - Make sure she's taking 500mg daily as ordered  - Have her increase her dose to 500mg BID every other day    Patient informed and agreeable with plan.      Post Infusion Assessment:  Patient tolerated infusion without incident.  Blood return noted pre and post infusion.  Site patent and intact, free from redness, edema or discomfort.  No evidence of extravasations.  Access discontinued per protocol.       Discharge Plan:   Patient declined prescription refills.  Discharge instructions reviewed with: Patient.  Patient and/or family verbalized understanding of discharge instructions and all questions answered.  AVS to patient via [employer].  Patient will return 8/13 for next appointment.   Patient discharged in stable condition accompanied by: self.  Departure Mode: Ambulatory.    Madison Antunez RN

## 2020-08-13 NOTE — NURSING NOTE
Chief Complaint   Patient presents with     Port Draw     Labs drawn via port by RN in lab. VS taken. Patient checked in for next appt.     Port accessed with 20 gauge gripper needle by RN, labs collected, line flushed with saline and heparin.  Vitals taken. Pt checked in for appointment.    Aydee Baird RN

## 2020-08-13 NOTE — PROGRESS NOTES
Infusion Nursing Note:  Lu Smith presents today for cycle 1 day 8 Taxol.  Patient seen by provider today: No   present during visit today: Not Applicable.    Note: Pt states the pain in her ride side  is decreasing. She denies needing anything else for it today. No other new concerns/complaints.  Magnesium replaced per protocol. She reports taking oral magnesium at home 500 mg daily with an extra 500 mg every other day, however last week she may have missed a few doses due to her having diarrhea and she not wanting to upset her stomach. Last diarrhea was Sunday, soft stools since.   Intravenous Access:  Implanted Port.    Treatment Conditions:  Lab Results   Component Value Date    HGB 9.2 08/13/2020     Lab Results   Component Value Date    WBC 5.6 08/13/2020      Lab Results   Component Value Date    ANEU 3.0 08/13/2020     Lab Results   Component Value Date     08/13/2020      Lab Results   Component Value Date     08/06/2020                   Lab Results   Component Value Date    POTASSIUM 4.0 08/13/2020           Lab Results   Component Value Date    MAG 1.4 08/13/2020            Lab Results   Component Value Date    CR 1.03 08/06/2020                   Lab Results   Component Value Date    MARYLOU 9.4 08/06/2020                Lab Results   Component Value Date    BILITOTAL 0.4 08/06/2020           Lab Results   Component Value Date    ALBUMIN 3.0 08/06/2020                    Lab Results   Component Value Date    ALT 18 08/06/2020           Lab Results   Component Value Date    AST 19 08/06/2020       Results reviewed, labs MET treatment parameters, ok to proceed with treatment.      Post Infusion Assessment:  Patient tolerated infusion without incident.  Blood return noted pre and post infusion.  Site patent and intact, free from redness, edema or discomfort.  No evidence of extravasations.  Access discontinued per protocol.       Discharge Plan:   Patient declined prescription  refills.  Discharge instructions reviewed with: Patient.  Patient and/or family verbalized understanding of discharge instructions and all questions answered.  AVS to patient via ShotoT.  Patient will return 8/20/20 for next appointment.   Patient discharged in stable condition accompanied by: self.  Departure Mode: Ambulatory.    Amara Zarate RN

## 2020-08-13 NOTE — PATIENT INSTRUCTIONS
Contact Numbers:   [facility]: [phone]    Call triage to speak with triage if you are experiencing chills and/or temperature greater than or equal to 100.5, uncontrolled nausea/vomiting, diarrhea, constipation, dizziness, shortness of breath, chest pain, bleeding, unexplained bruising, or any new/concerning symptoms, questions/concerns.     If you are having any concerning symptoms or wish to speak to a provider before your next infusion visit, please call your care coordinator or triage to notify them so we can adequately serve you.     If you need a refill on a medication or narcotic prescription, please call triage or your care coordinator before your infusion appointment.                 August 2020 Sunday Monday Tuesday Wednesday Thursday Friday Saturday                                 1       2     3     4     5     6    [facility]   7:00 AM   (15 min.)    [facility]   [facility]    [facility]FUS[facility] 120   8:30 AM   (120 min.)    ONCOLOGY INFUSION   [facility] 7     8       9     10     11     12     13    [facility]  10:30 AM   (15 min.)    [facility]   [facility]    [facility] INFUS[facility] 120  11:00 AM   (120 min.)    ONCOLOGY INFUSION   [facility] 14     15       16     17     18     19     20    [facility] D[facility]  10:30 AM   (15 min.)    [facility]   [facility]    [facility] INFUS[facility] 120  11:00 AM   (120 min.)    ONCOLOGY INFUSION   [facility] 21     22       23     24     25     26     27    [facility]   2:15 PM   (15 min.)    [facility]    [facility] INF[facility] 120   3:00 PM   (120 min.)    ONCOLOGY INFUSION   [facility] 28 29       30     31 September 2020 Sunday Monday Tuesday Wednesday Thursday Friday Saturday             1      2     3     4     5       6     7     8     9     10     11     12       13     14     15     16     17     18     19       20     21     22     23     24     25     26       27     28     29     30                                    Lab Results:  Recent Results (from the past 12 hour(s))   CBC with platelets differential    Collection Time: 08/13/20 10:46 AM   Result Value Ref Range    WBC 5.6 4.0 - 11.0 10e9/L    RBC Count 3.30 (L) 3.8 - 5.2 10e12/L    Hemoglobin 9.2 (L) 11.7 - 15.7 g/dL    Hematocrit 29.4 (L) 35.0 - 47.0 %    MCV 89 78 - 100 fl    MCH 27.9 26.5 - 33.0 pg    MCHC 31.3 (L) 31.5 - 36.5 g/dL    RDW 17.4 (H) 10.0 - 15.0 %    Platelet Count 241 150 - 450 10e9/L    Diff Method Automated Method     % Neutrophils 52.4 %    % Lymphocytes 33.6 %    % Monocytes 9.6 %    % Eosinophils 2.8 %    % Basophils 0.7 %    % Immature Granulocytes 0.9 %    Nucleated RBCs 0 0 /100    Absolute Neutrophil 3.0 1.6 - 8.3 10e9/L    Absolute Lymphocytes 1.9 0.8 - 5.3 10e9/L    Absolute Monocytes 0.5 0.0 - 1.3 10e9/L    Absolute Eosinophils 0.2 0.0 - 0.7 10e9/L    Absolute Basophils 0.0 0.0 - 0.2 10e9/L    Abs Immature Granulocytes 0.1 0 - 0.4 10e9/L    Absolute Nucleated RBC 0.0    Magnesium    Collection Time: 08/13/20 10:46 AM   Result Value Ref Range    Magnesium 1.4 (L) 1.6 - 2.3 mg/dL   Potassium    Collection Time: 08/13/20 10:46 AM   Result Value Ref Range    Potassium 4.0 3.4 - 5.3 mmol/L

## 2020-08-20 NOTE — NURSING NOTE
"Chief Complaint   Patient presents with     Port Draw     port accessed and labs drawn by rn in lab.  vital signs taken.     Port accessed by RN in lab with 20g 3/4\" gripper needle, labs drawn, port flushed with saline and heparin, vitals checked, pt checked in for next appointment.    Karishma Humphrey RN      "

## 2020-08-27 NOTE — PROGRESS NOTES
"Infusion Nursing Note:  Lu Smith presents today for C1D29 Taxol.    Patient seen by provider today: No   present during visit today: Not Applicable.    Note: Patient endorses rashes on bilateral upper extremities, upper chest and right upper leg. Accompanied with redness, warmth and itchiness occasionally. Patient described it as \"pimple like rash\". Started last Friday. States that the rashes usually comes day after infusion then subsided, this time it lingers longer and does not go away. Not spreading, no open areas. Patient took her PO Magnesium today. Instruction given to patient as per provider. Verbalized understanding. No new concerns made. Otherwise well.       Intravenous Access:  Implanted Port.    Treatment Conditions:  Lab Results   Component Value Date    HGB 8.2 08/27/2020     Lab Results   Component Value Date    WBC 3.2 08/27/2020      Lab Results   Component Value Date    ANEU 1.0 08/27/2020     Lab Results   Component Value Date     08/27/2020      Lab Results   Component Value Date     08/06/2020                   Lab Results   Component Value Date    POTASSIUM 3.4 08/27/2020           Lab Results   Component Value Date    MAG 1.3 08/27/2020            Lab Results   Component Value Date    CR 1.03 08/06/2020                   Lab Results   Component Value Date    MARYLOU 9.4 08/06/2020                Lab Results   Component Value Date    BILITOTAL 0.4 08/06/2020           Lab Results   Component Value Date    ALBUMIN 3.0 08/06/2020                    Lab Results   Component Value Date    ALT 18 08/06/2020           Lab Results   Component Value Date    AST 19 08/06/2020       Results reviewed, labs MET treatment parameters, ok to proceed with treatment.    TORB: 8/27/20/Angelique Ruiz RN on behalf of Lisa Barroso NP/ Melanie Szymanski RN/ Symptoms noted, advise patient to apply Hydrocortisone  Topical on affected areas and PO Benadryl 25mg as needed,  May get over the " counter. Magnesium 1.3, give IV Magnesium 2gm as per protocol. To proceed with treatment as planned. Continue with PO magnesium at home as prescribed.       Post Infusion Assessment:  Patient tolerated infusion without incident.  Blood return noted pre and post infusion.  Site patent and intact, free from redness, edema or discomfort.  No evidence of extravasations.  Access discontinued per protocol.       Discharge Plan:   Patient declined prescription refills.  Discharge instructions reviewed with: Patient.  Patient and/or family verbalized understanding of discharge instructions and all questions answered.  AVS to patient via QoofT.  Patient will return 9/2/20 for next appointment.   Patient discharged in stable condition accompanied by: self.  Departure Mode: Ambulatory.  Face to Face time: 5.    VICKIE MAR RN

## 2020-08-27 NOTE — NURSING NOTE
Chief Complaint   Patient presents with     Port Draw     labs drawn via port by RN in lab     /71 (BP Location: Left arm, Patient Position: Chair, Cuff Size: Adult Large)   Pulse 85   Temp 98  F (36.7  C) (Oral)   Resp 18   Wt 110.5 kg (243 lb 9.6 oz)   SpO2 97%   BMI 41.79 kg/m      Port accessed by RN in lab. Labs collected and sent. Pt tolerated well. Line flushed with Normal Saline & Heparin.   Pt checked in for next appointment.    Verona Lees, RN

## 2020-09-02 NOTE — PROGRESS NOTES
"Lu Smith is a 70 year old female who is being evaluated via a billable telephone visit.      The patient has been notified of following:     \"This telephone visit will be conducted via a call between you and your physician/provider. We have found that certain health care needs can be provided without the need for a physical exam.  This service lets us provide the care you need with a short phone conversation.  If a prescription is necessary we can send it directly to your pharmacy.  If lab work is needed we can place an order for that and you can then stop by our lab to have the test done at a later time.    Telephone visits are billed at different rates depending on your insurance coverage. During this emergency period, for some insurers they may be billed the same as an in-person visit.  Please reach out to your insurance provider with any questions.    If during the course of the call the physician/provider feels a telephone visit is not appropriate, you will not be charged for this service.\"    Patient has given verbal consent for Telephone visit?  Yes    What phone number would you like to be contacted at? [phone]     How would you like to obtain your AVS? Bernice     Vitals - Patient Reported  Weight (Patient Reported): 110.2 kg (243 lb)  Height (Patient Reported): 162.6 cm (5' 4.02\")  BMI (Based on Pt Reported Ht/Wt): 41.69  Pain Score: No Pain (1)  Pain Loc: (body aches)      I have reviewed and updated the patient's allergies and medication list.    Concerns: No concerns  Refills: No refills needed.        Dayana Valerio CMA      Phone call duration: 25 minutes                Follow Up Notes on Referred Patient    Date: 2020       Dr. Kevin Henderson MD  [address] [place] [id number]       RE: Lu Smith  : 1950  HEIDY: 2020    Lu Smith is a 69 year old woman with a diagnosis of recurrent serous endometrial cancer.    The patient presents today for " followup. She has been doing fairly well on weekly taxol.  She is eating and drinking okay.  Occasional nausea had no vomiting, no fever or chills.  Normal urinary function and one loose bowel movement per day.  No vaginal bleeding or discharge. She does have some upper and lower back pain that resolves with warm bath, no SOB or CP.      Oncology History:  Diagnosis: Stage IIIC2 serous endometrial cancer  Primary GYN oncologist: Dr. Kevin Henderson  Primary radiation oncologist: Dr. Ana Michelle  Surgery: 2/23/18: TLH-BSO, omentectomy, bilateral pelvic and para-aortic lymph node dissection, pelvic washings  Chemotherapy:  3/30/18-9/28/18: Six cycles of carboplatin AUC 6 and paclitaxel 175mg/m2, delivered sandwich style with pelvic radiation between cycles three and four  Radiation: 6/4/18-7/12/18: EBRT delivered to pelvis and para-aortic nodes, 5040 cGy in 28 fractions  7/16/18-7/20/18: HDR brachytherapy delivered to the vaginal cuff, 1200 cGy in two fractions  Complications: Transient neuropathy, mild thrombocytopenia and anemia  Genetic testing: Panel testing negative     9/16/19: Chest CT performed for pulmonary nodule follow up, concerning for recurrent disease  IMPRESSION:   1. Few bilateral sub-4 mm solid pulmonary nodules are unchanged compared to 2/6/2018. No new or enlarging pulmonary nodules.  2. Dilated main pulmonary artery, nonspecific but may be seen with pulmonary artery hypertension.  3. New 1.9 cm left subclavicular node.     9/28/19: PET impression:  In this 69-year-old female with history of stage IIIC2 serous endometrial cancer status post total hysterectomy and bilateral salpingo-oophorectomy, omentectomy, bilateral pelvic and periaortic dissection, and radiation to the pelvis, and brachytherapy to the vaginal cuff.  1. Progression of disease with new intensely FDG avid para-aortic, mediastinal, and subclavicular lymphadenopathy. The subdiaphragmatic periaortic lymphadenopathy spans from  the diaphragmatic crura to the  low abdominal aorta at the level of L4 with greater than 180 degree involvement of the aorta. There is additional lymphadenopathy within the left axilla and the right inguinal region.     9/28/19: Neck CT impression:  1. Progression of metastatic disease with new intensely FDG avid retroclavicular lymph node at the level of the left thoracic inlet.   2. On the fusion PET CT, there is no definite abnormal metabolic activity in the mucosal space, soft tissues, or cervical jamel chains.  3. No evidence of mucosal or soft tissue abnormality on contrast enhanced neck CT.  4. Please refer to the whole body PET CT performed as a separate report, for the findings of the remainder of the body.     10/24/19: Lymph node biopsy:  Lymph node, periaortic, CT guided core biopsy:   -METASTATIC ADENOCARCINOMA   -Tumor morphology is consistent with metastasis/recurrence of endometrial serous carcinoma      11/11/19: Cycle #1 paclitaxel 175mg/m2 + carboplatin AUC 6 + bevacizumab 15mg/kg  12/2/19: Cycle #2 paclitaxel 175mg/m2 + carboplatin AUC 6 + bevacizumab 15mg/kg. Carboplatin reaction.  12/27/19: Cycle #3 Paclitaxel/Avastin/inpatient Carboplatin desensitization.   1/20/2020: CT cap IMPRESSION:  1. Mosaic attenuation throughout the lungs with associated dilated main pulmonary artery likely due to pulmonary arterial hypertension in the appropriate clinical setting.  2. Stable sub-4 mm bilateral pulmonary nodules. No new or enlarging pulmonary nodules.  3. Interval improvement of previously seen enlarged lymph nodes in the lower neck, chest, abdomen and pelvis. No new or enlarging lymph nodes in the present study.  4. Stable soft tissue mesenteric nodules as well as mesenteric root fat stranding.  5. Hepatic steatosis. Additional incidental findings as described above.  1/22/2020: Cycle #4 Paclitaxel/Avastin/inpatient Carboplatin desensitization.   2/13/2020: Cycle #5 Paclitaxel/Avastin/inpatient  Carboplatin desensitization.   3/3/2020: 24 h urine protein 0.15  3/5/2020: Cycle #6 Paclitaxel/Avastin/inpatient Carboplatin desensitization.   3/25/2020: Cycle #1 Avastin   4/22/2020: Cycle #2 Avastin   5/13/2020: Cycle #3 Avastin   6/3/2020: Cycle #4 Avastin   6/25/2020: Cycle #5 Avastin   7/16/25/2020: Cycle #6 Avastin   8/6/2020: C1 weekly taxol      Past Medical History:      Past Medical History:   Diagnosis Date     Diabetes (H)     Type II     Endometrial cancer determined by uterine biopsy (H) 02/2018     HTN (hypertension)      Obesity      Osteoarthritis          Past Surgical History:    Past Surgical History:   Procedure Laterality Date     CHOLECYSTECTOMY  1993     CYSTOSCOPY N/A 2/23/2018    Procedure: CYSTOSCOPY;;  Surgeon: Kevin Henderson MD;  Location: [facility] OR     DAVINCI HYSTERECTOMY TOTAL, BILATERAL SALPINGO-OOPHORECTOMY, COMBINED N/A 2/23/2018    Procedure: COMBINED DAVINCI HYSTERECTOMY TOTAL, SALPINGO-OOPHORECTOMY;  DaVinci Assisted Total Laparoscopic Hysterectomy, Bilateral Salpingo-Oophorectomy, Cystoscopy,  Omental biopsy, omentectomy,bilateral  Pelvic And Para Aortic Lymph Node Dissection;  Surgeon: Kevin Henderson MD;  Location: [facility] OR     Partial lumbar discectomy  1998         Health Maintenance Due   Topic Date Due     DEXA  1950     LIPID  1950     MICROALBUMIN  1950     DIABETIC FOOT EXAM  1950     HEPATITIS C SCREENING  1950     ADVANCE CARE PLANNING  1950     EYE EXAM  1950     HEPATITIS B IMMUNIZATION (1 of 3 - Risk 3-dose series) 04/07/1971     ZOSTER IMMUNIZATION (1 of 2) 04/07/2002     MEDICARE ANNUAL WELLNESS VISIT  04/07/2017     PNEUMOCOCCAL IMMUNIZATION 65+ HIGH/HIGHEST RISK (2 of 2 - PPSV23) 02/09/2018     A1C  05/09/2018     PHQ-2  01/01/2020     INFLUENZA VACCINE (1) 09/01/2020       Current Medications:     Current Outpatient Medications   Medication Sig Dispense Refill     ACETAMINOPHEN PO Take 325 mg by mouth every 6  hours as needed for pain       amLODIPine (NORVASC) 5 MG tablet Take 1 tablet (5 mg) by mouth daily 30 tablet 0     Famotidine (PEPCID PO) Take 10 mg by mouth as needed        fluticasone (FLONASE) 50 MCG/ACT spray Spray 1-2 sprays into both nostrils daily 1 Bottle 1     L-Glutamine 500 MG CAPS        loperamide (IMODIUM) 2 MG capsule Take 2 mg by mouth 4 times daily as needed for diarrhea       loratadine (CLARITIN) 10 MG tablet Take 1 tablet (10 mg) by mouth daily 30 tablet 3     magnesium 500 MG TABS        METFORMIN HCL PO Take 500 mg by mouth daily (with breakfast)        omeprazole (PRILOSEC) 40 MG DR capsule        prochlorperazine (COMPAZINE) 10 MG tablet Take 0.5 tablets (5 mg) by mouth every 6 hours as needed (nausea/vomiting) 30 tablet 2     pyridoxine (VITAMIN B-6) 50 MG TABS Take 1 tablet (50 mg) by mouth 2 times daily 60 tablet 3     triamterene-hydrochlorothiazide (DYAZIDE) 37.5-25 MG per capsule Take 1 capsule by mouth every morning        amLODIPine (NORVASC) 10 MG tablet Take 1 tablet (10 mg) by mouth daily (Patient not taking: Reported on 2020) 30 tablet 0     prochlorperazine (COMPAZINE) 10 MG tablet Take 1 tablet (10 mg) by mouth every 6 hours as needed (nausea/vomiting) (Patient not taking: Reported on 2020) 30 tablet 2         Allergies:        Allergies   Allergen Reactions     Carboplatin Anaphylaxis     Chest tightness, hoarse voice, difficulty breathing     Ace Inhibitors Cough     Amoxicillin Hives        Social History:     Social History     Tobacco Use     Smoking status: Former Smoker     Packs/day: 0.25     Years: 20.00     Pack years: 5.00     Last attempt to quit: 1991     Years since quittin.3     Smokeless tobacco: Never Used     Tobacco comment: Quit smoking    Substance Use Topics     Alcohol use: Yes     Comment: 4-7/week if out 1-2x's/week       History   Drug Use No         Family History:       Family History   Problem Relation Age of Onset     Colon  Cancer Mother      Breast Cancer Sister      Lymphoma Sister          Physical Exam:     There were no vitals taken for this visit.  There is no height or weight on file to calculate BMI.    General Appearance:  healthy and alert, no distress                Psychiatric: appropriate mood and affect                                     A total of 25 minutes was spent with the patient, 20 minutes of which were spent in counseling the patient and/or treatment planning.        1.  Recurrent serous endometrial carcinoma.   2.  Positive treatment response to carboplatin, paclitaxel, bevacizumab.   3.  Carbo desensitization.   4.  MSI stable tumor.   5.  Low tumor mutation burden.   6.  PD-L1 0% expression.      We will continue with weekly taxol and is tolerating it well with minimal neuropathy. The patient agrees with this plan.  She is very appreciative of her care.  All questions were answered.         Yen Farias MD, MS    Department of Obstetrics and Gynecology   Division of Gynecologic Oncology   [facility]  Phone: [phone]    CC  Patient Care Team:  Levar Scott as PCP - General (Internal Medicine)  Jeanne Blancas MD as Referring Physician (OB/Gyn)  Alondra Ruiz RN as Specialty Care Coordinator (Gyn-Onc)  Yen Farias MD as MD (Gyn-Onc)  Yasmine Zazueta APRN CNP as Assigned PCP  YEN FARIAS

## 2020-09-02 NOTE — NURSING NOTE
"Chief Complaint   Patient presents with     Port Draw     labs drawn from port by rn.  vs taken     Port accessed with 20 gauge 3/4\" gripper needle and labs drawn by rn.  Port flushed with NS and heparin.  Pt tolerated well.  VS taken.      Madeleine Rhodes RN      "

## 2020-09-02 NOTE — LETTER
"    9/2/2020         RE: Lu Smith  [address]  [facility] [address]        Dear Colleague,    Thank you for referring your patient, Lu Smith, to the [facility]. Please see a copy of my visit note below.    Lu Smith is a 70 year old female who is being evaluated via a billable telephone visit.      The patient has been notified of following:     \"This telephone visit will be conducted via a call between you and your physician/provider. We have found that certain health care needs can be provided without the need for a physical exam.  This service lets us provide the care you need with a short phone conversation.  If a prescription is necessary we can send it directly to your pharmacy.  If lab work is needed we can place an order for that and you can then stop by our lab to have the test done at a later time.    Telephone visits are billed at different rates depending on your insurance coverage. During this emergency period, for some insurers they may be billed the same as an in-person visit.  Please reach out to your insurance provider with any questions.    If during the course of the call the physician/provider feels a telephone visit is not appropriate, you will not be charged for this service.\"    Patient has given verbal consent for Telephone visit?  Yes    What phone number would you like to be contacted at? [phone]     How would you like to obtain your AVS? MyChart     Vitals - Patient Reported  Weight (Patient Reported): 110.2 kg (243 lb)  Height (Patient Reported): 162.6 cm (5' 4.02\")  BMI (Based on Pt Reported Ht/Wt): 41.69  Pain Score: No Pain (1)  Pain Loc: (body aches)      I have reviewed and updated the patient's allergies and medication list.    Concerns: No concerns  Refills: No refills needed.        Dayana Valerio CMA      Phone call duration: 25 minutes                Follow Up Notes on Referred Patient    Date: 9/2/2020       Dr. Kevin BUNCH " MD [address]  [address] [place] [address]       RE: Lu Smith  : 1950  HEIDY: 2020    Lu Smith is a 69 year old woman with a diagnosis of recurrent serous endometrial cancer.    The patient presents today for followup. She has been doing fairly well on weekly taxol.  She is eating and drinking okay.  Occasional nausea had no vomiting, no fever or chills.  Normal urinary function and one loose bowel movement per day.  No vaginal bleeding or discharge. She does have some upper and lower back pain that resolves with warm bath, no SOB or CP.      Oncology History:  Diagnosis: Stage IIIC2 serous endometrial cancer  Primary GYN oncologist: Dr. Kevin Henderson  Primary radiation oncologist: Dr. Ana Michelle  Surgery: 18: TLH-BSO, omentectomy, bilateral pelvic and para-aortic lymph node dissection, pelvic washings  Chemotherapy:  3/30/18-18: Six cycles of carboplatin AUC 6 and paclitaxel 175mg/m2, delivered sandwich style with pelvic radiation between cycles three and four  Radiation: 18-18: EBRT delivered to pelvis and para-aortic nodes, 5040 cGy in 28 fractions  18-18: HDR brachytherapy delivered to the vaginal cuff, 1200 cGy in two fractions  Complications: Transient neuropathy, mild thrombocytopenia and anemia  Genetic testing: Panel testing negative     19: Chest CT performed for pulmonary nodule follow up, concerning for recurrent disease  IMPRESSION:   1. Few bilateral sub-4 mm solid pulmonary nodules are unchanged compared to 2018. No new or enlarging pulmonary nodules.  2. Dilated main pulmonary artery, nonspecific but may be seen with pulmonary artery hypertension.  3. New 1.9 cm left subclavicular node.     19: PET impression:  In this 69-year-old female with history of stage IIIC2 serous endometrial cancer status post total hysterectomy and bilateral salpingo-oophorectomy, omentectomy, bilateral pelvic and periaortic dissection,  and radiation to the pelvis, and brachytherapy to the vaginal cuff.  1. Progression of disease with new intensely FDG avid para-aortic, mediastinal, and subclavicular lymphadenopathy. The subdiaphragmatic periaortic lymphadenopathy spans from the diaphragmatic crura to the  low abdominal aorta at the level of L4 with greater than 180 degree involvement of the aorta. There is additional lymphadenopathy within the left axilla and the right inguinal region.     9/28/19: Neck CT impression:  1. Progression of metastatic disease with new intensely FDG avid retroclavicular lymph node at the level of the left thoracic inlet.   2. On the fusion PET CT, there is no definite abnormal metabolic activity in the mucosal space, soft tissues, or cervical jamel chains.  3. No evidence of mucosal or soft tissue abnormality on contrast enhanced neck CT.  4. Please refer to the whole body PET CT performed as a separate report, for the findings of the remainder of the body.     10/24/19: Lymph node biopsy:  Lymph node, periaortic, CT guided core biopsy:   -METASTATIC ADENOCARCINOMA   -Tumor morphology is consistent with metastasis/recurrence of endometrial serous carcinoma      11/11/19: Cycle #1 paclitaxel 175mg/m2 + carboplatin AUC 6 + bevacizumab 15mg/kg  12/2/19: Cycle #2 paclitaxel 175mg/m2 + carboplatin AUC 6 + bevacizumab 15mg/kg. Carboplatin reaction.  12/27/19: Cycle #3 Paclitaxel/Avastin/inpatient Carboplatin desensitization.   1/20/2020: CT cap IMPRESSION:  1. Mosaic attenuation throughout the lungs with associated dilated main pulmonary artery likely due to pulmonary arterial hypertension in the appropriate clinical setting.  2. Stable sub-4 mm bilateral pulmonary nodules. No new or enlarging pulmonary nodules.  3. Interval improvement of previously seen enlarged lymph nodes in the lower neck, chest, abdomen and pelvis. No new or enlarging lymph nodes in the present study.  4. Stable soft tissue mesenteric nodules as well  as mesenteric root fat stranding.  5. Hepatic steatosis. Additional incidental findings as described above.  1/22/2020: Cycle #4 Paclitaxel/Avastin/inpatient Carboplatin desensitization.   2/13/2020: Cycle #5 Paclitaxel/Avastin/inpatient Carboplatin desensitization.   3/3/2020: 24 h urine protein 0.15  3/5/2020: Cycle #6 Paclitaxel/Avastin/inpatient Carboplatin desensitization.   3/25/2020: Cycle #1 Avastin   4/22/2020: Cycle #2 Avastin   5/13/2020: Cycle #3 Avastin   6/3/2020: Cycle #4 Avastin   6/25/2020: Cycle #5 Avastin   7/16/25/2020: Cycle #6 Avastin   8/6/2020: C1 weekly taxol      Past Medical History:      Past Medical History:   Diagnosis Date     Diabetes (H)     Type II     Endometrial cancer determined by uterine biopsy (H) 02/2018     HTN (hypertension)      Obesity      Osteoarthritis          Past Surgical History:    Past Surgical History:   Procedure Laterality Date     CHOLECYSTECTOMY  1993     CYSTOSCOPY N/A 2/23/2018    Procedure: CYSTOSCOPY;;  Surgeon: Kevin Henderson MD;  Location: [facility] OR     DAVINCI HYSTERECTOMY TOTAL, BILATERAL SALPINGO-OOPHORECTOMY, COMBINED N/A 2/23/2018    Procedure: COMBINED DAVINCI HYSTERECTOMY TOTAL, SALPINGO-OOPHORECTOMY;  DaVinci Assisted Total Laparoscopic Hysterectomy, Bilateral Salpingo-Oophorectomy, Cystoscopy,  Omental biopsy, omentectomy,bilateral  Pelvic And Para Aortic Lymph Node Dissection;  Surgeon: Kevin Henderson MD;  Location: [facility] OR     Partial lumbar discectomy  1998         Health Maintenance Due   Topic Date Due     DEXA  1950     LIPID  1950     MICROALBUMIN  1950     DIABETIC FOOT EXAM  1950     HEPATITIS C SCREENING  1950     ADVANCE CARE PLANNING  1950     EYE EXAM  1950     HEPATITIS B IMMUNIZATION (1 of 3 - Risk 3-dose series) 04/07/1971     ZOSTER IMMUNIZATION (1 of 2) 04/07/2002     MEDICARE ANNUAL WELLNESS VISIT  04/07/2017     PNEUMOCOCCAL IMMUNIZATION 65+ HIGH/HIGHEST RISK (2 of 2 - PPSV23)  2018     A1C  2018     PHQ-2  2020     INFLUENZA VACCINE (1) 2020       Current Medications:     Current Outpatient Medications   Medication Sig Dispense Refill     ACETAMINOPHEN PO Take 325 mg by mouth every 6 hours as needed for pain       amLODIPine (NORVASC) 5 MG tablet Take 1 tablet (5 mg) by mouth daily 30 tablet 0     Famotidine (PEPCID PO) Take 10 mg by mouth as needed        fluticasone (FLONASE) 50 MCG/ACT spray Spray 1-2 sprays into both nostrils daily 1 Bottle 1     L-Glutamine 500 MG CAPS        loperamide (IMODIUM) 2 MG capsule Take 2 mg by mouth 4 times daily as needed for diarrhea       loratadine (CLARITIN) 10 MG tablet Take 1 tablet (10 mg) by mouth daily 30 tablet 3     magnesium 500 MG TABS        METFORMIN HCL PO Take 500 mg by mouth daily (with breakfast)        omeprazole (PRILOSEC) 40 MG DR capsule        prochlorperazine (COMPAZINE) 10 MG tablet Take 0.5 tablets (5 mg) by mouth every 6 hours as needed (nausea/vomiting) 30 tablet 2     pyridoxine (VITAMIN B-6) 50 MG TABS Take 1 tablet (50 mg) by mouth 2 times daily 60 tablet 3     triamterene-hydrochlorothiazide (DYAZIDE) 37.5-25 MG per capsule Take 1 capsule by mouth every morning        amLODIPine (NORVASC) 10 MG tablet Take 1 tablet (10 mg) by mouth daily (Patient not taking: Reported on 2020) 30 tablet 0     prochlorperazine (COMPAZINE) 10 MG tablet Take 1 tablet (10 mg) by mouth every 6 hours as needed (nausea/vomiting) (Patient not taking: Reported on 2020) 30 tablet 2         Allergies:        Allergies   Allergen Reactions     Carboplatin Anaphylaxis     Chest tightness, hoarse voice, difficulty breathing     Ace Inhibitors Cough     Amoxicillin Hives        Social History:     Social History     Tobacco Use     Smoking status: Former Smoker     Packs/day: 0.25     Years: 20.00     Pack years: 5.00     Last attempt to quit: 1991     Years since quittin.3     Smokeless tobacco: Never Used      Tobacco comment: Quit smoking 1992   Substance Use Topics     Alcohol use: Yes     Comment: 4-7/week if out 1-2x's/week       History   Drug Use No         Family History:       Family History   Problem Relation Age of Onset     Colon Cancer Mother      Breast Cancer Sister      Lymphoma Sister          Physical Exam:     There were no vitals taken for this visit.  There is no height or weight on file to calculate BMI.    General Appearance:  healthy and alert, no distress                Psychiatric: appropriate mood and affect                                     A total of 25 minutes was spent with the patient, 20 minutes of which were spent in counseling the patient and/or treatment planning.        1.  Recurrent serous endometrial carcinoma.   2.  Positive treatment response to carboplatin, paclitaxel, bevacizumab.   3.  Carbo desensitization.   4.  MSI stable tumor.   5.  Low tumor mutation burden.   6.  PD-L1 0% expression.      We will continue with weekly taxol and is tolerating it well with minimal neuropathy. The patient agrees with this plan.  She is very appreciative of her care.  All questions were answered.         Kevin Henderson MD, MS    Department of Obstetrics and Gynecology   Division of Gynecologic Oncology   [facility]  Phone: [phone]    CC  Patient Care Team:  Levar Scott as PCP - General (Internal Medicine)  Jeanne Blancas MD as Referring Physician (OB/Gyn)  Alondra Ruiz, RN as Specialty Care Coordinator (Gyn-Onc)  Yasmine Zazueta APRN CNP as Assigned PCP

## 2020-09-02 NOTE — PATIENT INSTRUCTIONS
[facility] Triage and after hours / weekends / holidays:  [phone]    Please call the triage or after hours line if you experience a temperature greater than or equal to 100.5, shaking chills, have uncontrolled nausea, vomiting and/or diarrhea, dizziness, shortness of breath, chest pain, bleeding, unexplained bruising, or if you have any other new/concerning symptoms, questions or concerns.      If you are having any concerning symptoms or wish to speak to a provider before your next infusion visit, please call your care coordinator or triage to notify them so we can adequately serve you.     If you need a refill on a narcotic prescription or other medication, please call before your infusion appointment.                 September 2020 Sunday Monday Tuesday Wednesday Thursday Friday Saturday             1     2    [facility]   1:45 PM   (15 min.)    [facility]   [facility] Draw    TELEPHONE VISIT RETURN   2:20 PM   (20 min.)   [facility] Henderson [facility]   3:00 PM   (120 min.)    ONCOLOGY INFUSION   [facility] 3     4     5       6     7     8     9     10     11     12       13     14    [facility]  10:00 AM   (15 min.)   [facility]   [facility] Draw    [facility] ON[facility] INFUSION 60  10:30 AM   (60 min.)    ONCOLOGY INFUSION   [facility] 15     16     17     18     19       20     21     22     23    [facility]   8:00 AM   (15 min.)    [facility]    [facility] INFUSION 120   8:30 AM   (120 min.)    ONCOLOGY INFUSION   [facility] 24     25     26       27     28     29     30    [facility]   7:45 AM   (15 min.)    [facility]   [facility] Draw    [facility] ON[facility] INFUSION 120   8:00 AM   (120 min.)    ONCOLOGY INFUSION   [facility]                             October 2020 Sunday Monday Tuesday Wednesday Thursday Friday Saturday                       1     2     3       4     5     6     7    [facility]  11:00 AM   (15 min.)    [facility] D[facility]   [facility]    TELEPHONE VISIT RETURN  11:40 AM   (20 min.)   Kevin Henderson MD   [facility]    [facility] ON[facility] INFUSION 120  12:00 PM   (120 min.)   [facility] ONCOLOGY INFUSION   [facility] 8     9     10       11     12     13     14    [facility] D[facility]   8:00 AM   (15 min.)    [facility] DRAW   [facility]    [facility] INFUSION 120   8:30 AM   (120 min.)    ONCOLOGY INFUSION   [facility] 15     16     17       18     19     20     21    [facility]   8:30 AM   (15 min.)    [facility]   [facility]    [facility] INFUSION 120   9:00 AM   (120 min.)    ONCOLOGY INFUSION   [facility] 22     23     24       25     26     27     28    [facility]   8:00 AM   (15 min.)    [facility]    [facility] INFUSION 120   8:30 AM   (120 min.)    ONCOLOGY INFUSION   [facility] 29     30     31                     Lab Results:  Recent Results (from the past 12 hour(s))   Comprehensive metabolic panel    Collection Time: 09/02/20  2:20 PM   Result Value Ref Range    Sodium 133 133 - 144 mmol/L    Potassium 3.8 3.4 - 5.3 mmol/L    Chloride 99 94 - 109 mmol/L    Carbon Dioxide 26 20 - 32 mmol/L    Anion Gap 8 3 - 14 mmol/L    Glucose 108 (H) 70 - 99 mg/dL    Urea Nitrogen 24 7 - 30 mg/dL    Creatinine 1.05 (H) 0.52 - 1.04 mg/dL    GFR Estimate 54 (L) >60 mL/min/[1.73_m2]    GFR Estimate If Black 63 >60 mL/min/[1.73_m2]    Calcium 9.1 8.5 - 10.1 mg/dL    Bilirubin Total 0.5 0.2 - 1.3 mg/dL    Albumin 3.0 (L) 3.4 - 5.0 g/dL    Protein Total 7.2 6.8 - 8.8 g/dL    Alkaline Phosphatase 62 40 - 150 U/L    ALT 26 0 - 50 U/L    AST 20 0 -  45 U/L   CBC with platelets differential    Collection Time: 09/02/20  2:20 PM   Result Value Ref Range    WBC 3.5 (L) 4.0 - 11.0 10e9/L    RBC Count 3.01 (L) 3.8 - 5.2 10e12/L    Hemoglobin 8.4 (L) 11.7 - 15.7 g/dL    Hematocrit 26.3 (L) 35.0 - 47.0 %    MCV 87 78 - 100 fl    MCH 27.9 26.5 - 33.0 pg    MCHC 31.9 31.5 - 36.5 g/dL    RDW 18.6 (H) 10.0 - 15.0 %    Platelet Count 271 150 - 450 10e9/L    Diff Method Automated Method     % Neutrophils 38.4 %    % Lymphocytes 46.8 %    % Monocytes 8.3 %    % Eosinophils 1.1 %    % Basophils 1.1 %    % Immature Granulocytes 4.3 %    Nucleated RBCs 1 (H) 0 /100    Absolute Neutrophil 1.3 (L) 1.6 - 8.3 10e9/L    Absolute Lymphocytes 1.6 0.8 - 5.3 10e9/L    Absolute Monocytes 0.3 0.0 - 1.3 10e9/L    Absolute Eosinophils 0.0 0.0 - 0.7 10e9/L    Absolute Basophils 0.0 0.0 - 0.2 10e9/L    Abs Immature Granulocytes 0.2 0 - 0.4 10e9/L    Absolute Nucleated RBC 0.1    Magnesium    Collection Time: 09/02/20  2:20 PM   Result Value Ref Range    Magnesium 1.6 1.6 - 2.3 mg/dL

## 2020-09-02 NOTE — PROGRESS NOTES
Infusion Nursing Note:  Lu Smith presents today for Day 1 Cycle 2 Taxol .    Patient seen by provider today: Yes: Dr. Henderson   present during visit today: Not Applicable.    Note: Patient presents to infusion feeling well. Patient denies pain and states no acute complaints or concerns not addressed during Dr. Henderson's virtual visit. Specifically, patient denies s/s of infection such as fever, shortness of breath, cough, chest pain, or changes in taste/smell.    Intravenous Access:  Implanted Port.    Treatment Conditions:  Lab Results   Component Value Date    HGB 8.4 09/02/2020     Lab Results   Component Value Date    WBC 3.5 09/02/2020      Lab Results   Component Value Date    ANEU 1.3 09/02/2020     Lab Results   Component Value Date     09/02/2020      Lab Results   Component Value Date     09/02/2020                   Lab Results   Component Value Date    POTASSIUM 3.8 09/02/2020           Lab Results   Component Value Date    MAG 1.6 09/02/2020            Lab Results   Component Value Date    CR 1.05 09/02/2020                   Lab Results   Component Value Date    MARYLOU 9.1 09/02/2020                Lab Results   Component Value Date    BILITOTAL 0.5 09/02/2020           Lab Results   Component Value Date    ALBUMIN 3.0 09/02/2020                    Lab Results   Component Value Date    ALT 26 09/02/2020           Lab Results   Component Value Date    AST 20 09/02/2020     Results reviewed, labs MET treatment parameters, ok to proceed with treatment.      Post Infusion Assessment:  Patient tolerated infusion without incident.  Blood return noted pre and post infusion.  Site patent and intact, free from redness, edema or discomfort.  No evidence of extravasations.  Access discontinued per protocol.       Discharge Plan:   Patient declined prescription refills.  Discharge instructions reviewed with: Patient.  Patient and/or family verbalized understanding of discharge  instructions and all questions answered.  AVS to patient via [employer].  Patient will return 9/14 for next appointment. Delay in weekly Taxol has been ok'd by Dr. Henderson per patient.   Patient discharged in stable condition accompanied by: self.  Departure Mode: Ambulatory.  Face to Face time: 0 minutes.    Manny Victoria RN

## 2020-09-08 NOTE — NURSING NOTE
Chief Complaint   Patient presents with     Port Draw     Labs drawn via PORT by RN in lab. Lab only.      Dave Morgan RN

## 2020-09-14 PROBLEM — D64.9 ANEMIA: Status: ACTIVE | Noted: 2020-01-01

## 2020-09-14 NOTE — PROGRESS NOTES
Infusion Nursing Note:  Lu Smith presents today for Day 8 Cycle 2 Taxol (canceled), 1 unit of PRBCs. PO potassium replacement. .    Patient seen by provider today: No   present during visit today: Not Applicable.    Note: Patient presents to infusion with the following concerns:  Exacerbation of swelling to RLE and discomfort over the weekend, progressive fatigue, and progressive shortness of breath with activity. RLE appears taught, swollen, and is firm to the touch. Patient states she has a history of bilateral lower extremity edema but not this severe. No redness or increased warmth noted, but do to swelling discomfort noted within ankle area. Patient also states since restarting chemo, progressive shortness of breath and fatigue noted at home as well. Patient denies s/s of infection such as cough, chest pain, shortness of breath at rest, fever, or changes in taste/smell.    TORB. 1320. 9/14/2020. Dr. Henderson. Manny Victoria RN. Cancel Day 8 Cycle 2 taxol. Pt to return for Day 15 Cycle 2 next week. Bilateral ultrasound of lower extremities today. 1 unit of blood today.     At 1411, communication made with [facility] lab and hospital blood bank to process blood as fast as possible to ensure blood transfusion today. At 1533, [facility] blood bank stated they had just started to process patients type and screen and that estimated time of arrival for 1 unit of blood would be 1700.  Outpatient infusion closes at 6:00pm, therefore coordination provided to have patient come back tomorrow 9/15 for blood transfusion completed. Blood bank at [facility] notified to still send blood over to [facility] for transfusion tomorrow. S/s of needing blood transfusion reviewed such as lightheadedness, dizziness, shortness of breath, headache, increased heart rate, fainting, appearing pale, etc which patient voiced understanding to seek medical attention immediately if above symptoms develop. RN Care coordinator Angelique Ruiz  notified of situation to update Dr. Henderson.     Patient voiced understanding of the overall plan above.     Intravenous Access:  Implanted Port.    Treatment Conditions:  Lab Results   Component Value Date    HGB 7.6 09/14/2020     Lab Results   Component Value Date    WBC 5.9 09/14/2020      Lab Results   Component Value Date    ANEU 3.2 09/14/2020     Lab Results   Component Value Date     09/14/2020      Lab Results   Component Value Date     09/08/2020                   Lab Results   Component Value Date    POTASSIUM 3.3 09/14/2020           Lab Results   Component Value Date    MAG 1.7 09/14/2020            Lab Results   Component Value Date    CR 0.91 09/08/2020                   Lab Results   Component Value Date    MARYLOU 9.0 09/08/2020                Lab Results   Component Value Date    BILITOTAL 0.4 09/08/2020           Lab Results   Component Value Date    ALBUMIN 2.5 09/08/2020                    Lab Results   Component Value Date    ALT 21 09/08/2020           Lab Results   Component Value Date    AST 13 09/08/2020       Results reviewed, labs did NOT meet treatment parameters: symptomatic of hemoglobin 7.6.  Blood consent form obtained 9/14/2020    Post Infusion Assessment:  Access discontinued per protocol.       Discharge Plan:   Patient declined prescription refills.  Discharge instructions reviewed with: Patient.  Patient and/or family verbalized understanding of discharge instructions and all questions answered.  Copy of AVS reviewed with patient and/or family.  Patient will return 9/15 at 1000 for next appointment.  Patient discharged in stable condition accompanied by: self.  Departure Mode: Ambulatory.  Face to Face time: 5 minutes.    Manny Victoria RN

## 2020-09-14 NOTE — PATIENT INSTRUCTIONS
[facility] Triage and after hours / weekends / holidays:  [phone]    Please call the triage or after hours line if you experience a temperature greater than or equal to 100.5, shaking chills, have uncontrolled nausea, vomiting and/or diarrhea, dizziness, shortness of breath, chest pain, bleeding, unexplained bruising, or if you have any other new/concerning symptoms, questions or concerns.      If you are having any concerning symptoms or wish to speak to a provider before your next infusion visit, please call your care coordinator or triage to notify them so we can adequately serve you.     If you need a refill on a narcotic prescription or other medication, please call before your infusion appointment.                 September 2020 Sunday Monday Tuesday Wednesday Thursday Friday Saturday             1     2    [facility]   1:45 PM   (15 min.)    [facility]    TELEPHONE VISIT RETURN   2:20 PM   (20 min.)   Kevin Henderson MD   [facility]   3:00 PM   (120 min.)    ONCOLOGY INFUSION   [facility] 3     4     5       6     7     8    [facility]  11:00 AM   (15 min.)    [facility]   [facility] 9     10     11     12       13     14    [facility]  10:00 AM   (15 min.)    [facility]   [facility]    P ONC INFUSION 60  12:30 PM   (60 min.)    ONCOLOGY INFUSION   [facility]    US LWR EXT VENOUS DUPLEX BILAT   5:00 PM   (60 min.)   UCUS3   [facility] 15     16     17     18     19       20     21     22     23    [facility] D[facility]   8:00 AM   (15 min.)    [facility] LAB DRAW   [facility] Lab Draw    [facility] ONC INFUSION 120   8:30 AM   (120 min.)    ONCOLOGY INFUSION   [facility] 24     25     26       27     28     29     30    [facility] DRAW   7:45 AM   (15  min.)    [facility] LAB DRAW   [facility] Lab Draw    [facility] ONC INFUSION 120   8:00 AM   (120 min.)    ONCOLOGY INFUSION   [facility]                            October 2020 Sunday Monday Tuesday Wednesday Thursday Friday Saturday                       1     2     3       4     5     6     7    [facility]  11:00 AM   (15 min.)    [facility]   [facility]    TELEPHONE VISIT RETURN  11:40 AM   (20 min.)   Kevin Henderson MD   [facility]    [facility] ONC INFUSION 120  12:00 PM   (120 min.)    ONCOLOGY INFUSION   [facility] 8     9     10       11     12     13     14    [facility] DRAW   8:00 AM   (15 min.)    [facility]   [facility]    [facility] ONC INFUSION 120   8:30 AM   (120 min.)    ONCOLOGY INFUSION   [facility] 15     16     17       18     19     20     21    [facility]RAW   8:30 AM   (15 min.)    [facility]   [facility]    [facility] ONC INFUSION 120   9:00 AM   (120 min.)    ONCOLOGY INFUSION   [facility] 22     23     24       25     26     27     28    [facility] DRAW   8:00 AM   (15 min.)    [facility] LAB DRAW   [facility] Lab Draw    [facility] ONC INFUSION 120   8:30 AM   (120 min.)    ONCOLOGY INFUSION   [facility] 29     30     31                     Lab Results:  Recent Results (from the past 12 hour(s))   CBC with platelets differential    Collection Time: 09/14/20 10:53 AM   Result Value Ref Range    WBC 5.9 4.0 - 11.0 10e9/L    RBC Count 2.74 (L) 3.8 - 5.2 10e12/L    Hemoglobin 7.6 (L) 11.7 - 15.7 g/dL    Hematocrit 24.3 (L) 35.0 - 47.0 %    MCV 89 78 - 100 fl    MCH 27.7 26.5 - 33.0 pg    MCHC 31.3 (L) 31.5 - 36.5 g/dL    RDW 21.0 (H) 10.0 - 15.0 %    Platelet Count 277 150 - 450 10e9/L    Diff Method Automated Method     % Neutrophils 54.2 %    % Lymphocytes 25.3 %    % Monocytes  17.5 %    % Eosinophils 1.0 %    % Basophils 0.8 %    % Immature Granulocytes 1.2 %    Nucleated RBCs 1 (H) 0 /100    Absolute Neutrophil 3.2 1.6 - 8.3 10e9/L    Absolute Lymphocytes 1.5 0.8 - 5.3 10e9/L    Absolute Monocytes 1.0 0.0 - 1.3 10e9/L    Absolute Eosinophils 0.1 0.0 - 0.7 10e9/L    Absolute Basophils 0.1 0.0 - 0.2 10e9/L    Abs Immature Granulocytes 0.1 0 - 0.4 10e9/L    Absolute Nucleated RBC 0.1    Magnesium    Collection Time: 09/14/20 10:53 AM   Result Value Ref Range    Magnesium 1.7 1.6 - 2.3 mg/dL   Potassium    Collection Time: 09/14/20 10:53 AM   Result Value Ref Range    Potassium 3.3 (L) 3.4 - 5.3 mmol/L   ABO/Rh type and screen    Collection Time: 09/14/20 10:53 AM   Result Value Ref Range    ABO PENDING     Antibody Screen PENDING     Test Valid Only At          [facility]    Specimen Expires 09/17/2020

## 2020-09-15 NOTE — PROGRESS NOTES
Blood Product Transfusion Nursing Note:    Lu Smith presents today to [facility] for a PRBC transfusion. This is her first transfusion.   During today's [facility] appointment orders from Dr. Andreas Henderson were completed.    Progress note:  ID verified by name and .  Assessment completed.  Vitals were stable throughout time in [facility].    Verbal and printed education given to patient/representative regarding transfusion and possible side effects.  Patient/representative verbalized understanding.     present during visit today: Not Applicable.    All pertinent labs reviewed prior to infusion: YES, hgb 7.6 yesterday. Orders to transfuse if <8.0    Date of consent or authorization: 2020    Patient tolerated the procedure well  Began at 120 mL/hr. Increased to 240 mL/hr.     Transfusion given over approximately  1.5 hrs     Discharge Plan:    Discharge instructions were reviewed with patient Yes  Patient/representative verbalized understanding of discharge instructions and all questions answered Yes.        Yahaira Pelayo RN    /75 (BP Location: Left arm)   Pulse 73   Temp 98.2  F (36.8  C) (Oral)   Resp 16   SpO2 97%

## 2020-09-15 NOTE — LETTER
9/15/2020         RE: Lu Smith  [address] [place] [address]        Dear Colleague,    Thank you for referring your patient, Lu Smith, to the [facility] SPECIALTY AND PROCEDURE. Please see a copy of my visit note below.    Blood Product Transfusion Nursing Note:    Lu Smith presents today to [facility] for a PRBC transfusion. This is her first transfusion.   During today's [facility] appointment orders from Dr. Andreas Henderson were completed.    Progress note:  ID verified by name and .  Assessment completed.  Vitals were stable throughout time in [facility].    Verbal and printed education given to patient/representative regarding transfusion and possible side effects.  Patient/representative verbalized understanding.     present during visit today: Not Applicable.    All pertinent labs reviewed prior to infusion: YES, hgb 7.6 yesterday. Orders to transfuse if <8.0    Date of consent or authorization: 2020    Patient tolerated the procedure well  Began at 120 mL/hr. Increased to 240 mL/hr.     Transfusion given over approximately  1.5 hrs     Discharge Plan:    Discharge instructions were reviewed with patient Yes  Patient/representative verbalized understanding of discharge instructions and all questions answered Yes.        Yahaira Pelayo RN    /75 (BP Location: Left arm)   Pulse 73   Temp 98.2  F (36.8  C) (Oral)   Resp 16   SpO2 97%         Again, thank you for allowing me to participate in the care of your patient.        Sincerely,        [facility]

## 2020-09-15 NOTE — PATIENT INSTRUCTIONS
Dear Lu Smith    Thank you for choosing [facility] ([facility]) for your transfusion.  The following information is a summary of our appointment as well as important reminders.      Patient Education     Blood Transfusion  Questions and Answers  What is a transfusion?  During your treatment, we may need to give you blood. This is called a transfusion. Blood is given through a needle in the vein. It takes 1 to 4 hours. It may include:    Red blood cells. These help replace blood you may have lost through bleeding or illness. They will increase your blood s ability to carry oxygen.    Platelets. These help blood to clot. They are used for low platelet counts and some bleeding disorders.    Plasma and cryoprecipitate. These help the blood to clot. They are used to treat some bleeding disorders.    Granulocytes (a type of white blood cell). These help your body fight infection.  If you need a transfusion, your doctor will prescribe one. We will ask you to sign a consent form before your first transfusion. Do not sign this form until all your questions have been answered.  Before your transfusion, we will double check your identity for your safety.  What is the risk of getting a disease from a transfusion?  The chances are low. Donors are carefully screened before giving blood. Also, before any donated blood is used, it must be tested for infectious diseases.   Here are example of your risk for infection:    Hepatitis B: 1 in 200,000    Hepatitis C: 1 in 1.8 million    HIV: 1 in 2.3 million    HTLV-I: 1 in 3 million    Bacterial infection:  ? 1 in 500,000 if receiving red blood cells  ? 1 in 75,000 if receiving platelets    Other infections (such as West Nile virus): less than 1 in 7 million    Other diseases (such as Chagas s disease): very rare    Babesiosis: rare, but risk is higher in summer  What are some of the side effects?  While most transfusions  have no side effects, you could have some of the symptoms listed below. If youthink you may be having a reaction, tell your doctor or nurse right away.  Common reactions include:    Fever or chills    Skin rash, hives, itching or flushing    Wheezing or trouble breathing    Chest or back pain    Facial swelling    New or ongoing cough    Bruising    Red or brown urine, or less urine output    Jaundice (yellow eyes and skin)    Irritation at the infusion site.  Some symptoms may occur up to four weeks after a transfusion. If you have any kind of symptom, call your doctor.  What are the risks of not having a transfusion?    Anemia (low red blood cells). This might cause a fast heartbeat and make you feel weak, tired, and breathless. If severe, it might lead to organ failure and death.    Bleeding. If your blood doesn t clot well, blood loss might lead to anemia, brain damage or death.    Weaker immune system. Your body might be less able to fight infection or heal wounds.  Are there other options besides transfusion?  Medicine (such as erythropoietin or iron pills) can cause the body to make more red blood cells. It may take months to replace all of the red blood cells you have lost.  In some cases, you can donate your own blood before surgery. The blood may be given back to you during your surgery. This is called autologous donation.   For informational purposes only. Not to replace the advice of your health care provider.   Copyright ?  2005 [employer]. All rights reserved. [employer]ane 383754 - REV 04/16.    For informational purposes only. Not to replace the advice of your health care provider.  Copyright   2018 [employer]. All rights reserved.             We look forward in seeing you on your next appointment here at [facility] ([facility]).  Please don t hesitate to call us at [phone] to reschedule any of your appointments or to speak with one of the [facility]  registered nurses.  It was a pleasure taking care of you today.    Sincerely,    [facility]  [facility]  [address] [place]  [address]  Phone:  [phone]

## 2020-09-23 PROBLEM — R18.8 ASCITES: Status: ACTIVE | Noted: 2020-01-01

## 2020-09-23 NOTE — PROGRESS NOTES
Infusion Nursing Note:  Lu Smith presents today for Cycle 2 Day 15 Taxol.    Patient seen by provider today: No    Note: Pt arrived not feeling great. Reports her fatigue and SOB have improved since receiving a blood transfusion last week. She continues to have RLE swelling, but ultrasound last week was negative for a DVT. She has purchased compression stockings which seem to be helping a little. Notes she had a cough and shoulder pain after her blood transfusion last week, but that this has resolved. She continues to have loose stools daily and occasionally takes Imodium for this. Her new major concern is having abdominal discomfort/stomach aches for the pain five days. She feels more bloated than usual and noticed an enlarged lymph node in her left shoulder. Denies any fevers/chills.    Discussed concerns with Dr. Henderson.  TORB Dr. Henderson/Madison Mclean RN 9/23/20 @ 0900: OK to proceed with treatment as symptoms are most likely from the cancer itself, especially since she missed treatment last week. Will send prescription for Oxycodone and set pt up for a paracentesis.    Pt agreeable to the above plan of care.  IB also sent to Dr. Henderson/Angelique Ruiz RNCC to set pt up with provider appointment next week prior to chemo due to all of her ongoing concerns.     Intravenous Access:  Implanted Port.    Treatment Conditions:  Lab Results   Component Value Date    HGB 8.4 09/23/2020     Lab Results   Component Value Date    WBC 9.2 09/23/2020      Lab Results   Component Value Date    ANEU 5.9 09/23/2020     Lab Results   Component Value Date     09/23/2020      Lab Results   Component Value Date     09/08/2020                   Lab Results   Component Value Date    POTASSIUM 3.6 09/23/2020           Lab Results   Component Value Date    MAG 1.6 09/23/2020            Lab Results   Component Value Date    CR 0.91 09/08/2020                   Lab Results   Component Value Date    MARYLOU 9.0  09/08/2020                Lab Results   Component Value Date    BILITOTAL 0.4 09/08/2020           Lab Results   Component Value Date    ALBUMIN 2.5 09/08/2020                    Lab Results   Component Value Date    ALT 21 09/08/2020           Lab Results   Component Value Date    AST 13 09/08/2020     Results reviewed, labs MET treatment parameters, ok to proceed with treatment.    Post Infusion Assessment:  Patient tolerated infusion without incident.  Blood return noted pre and post infusion.  Site patent and intact, free from redness, edema or discomfort.  No evidence of extravasations. Access discontinued per protocol.     Discharge Plan:   Prescription refills given for Oxycodone.  Patient and/or family verbalized understanding of discharge instructions and all questions answered.  Copy of AVS reviewed with patient and/or family.  Patient will return 9/30 for next appointment.  Patient discharged in stable condition accompanied by: self.  Departure Mode: Ambulatory.    Madison Mclean RN

## 2020-09-28 NOTE — LETTER
9/28/2020         RE: Lu Smith  [address] [place] [address]        Dear Colleague,    Thank you for referring your patient, Lu Smith, to the [facility] [place] SPECIALTY AND PROCEDURE. Please see a copy of my visit note below.    Paracentesis Nursing Note  Lu Smith [facility].        Progress Note:  Pt arrived to [facility] today for scheduled paracentesis, however it was determined via ULT that pt did not have ascites fluid quantity sufficient to proceed with procedure today, confirmed by provider. Pt was discharged in stable condition and will follow up with oncology care team.     Total nursing time spent on assessment: 15 mins    Courtney Roper RN on 9/28/2020 at 2:21 PM      /79   Pulse 91   Temp 97.8  F (36.6  C) (Oral)   Wt 109.7 kg (241 lb 12.8 oz)   SpO2 96%   BMI 41.48 kg/m        Again, thank you for allowing me to participate in the care of your patient.        Sincerely,        Specialty Infusion Paracentesis Provider

## 2020-09-28 NOTE — PROGRESS NOTES
Paracentesis Nursing Note  Lu Smith [facility].        Progress Note:  [facility] for [facility]acent[facility], however it was determined via ULT that pt did not have ascites fluid quantity sufficient to proceed with procedure today, confirmed by provider. Pt was discharged in stable condition and will follow up with oncology care team.     Total nursing time spent on assessment: 15 mins    Courtney Roper RN on 9/28/2020 at 2:21 PM      /79   Pulse 91   Temp 97.8  F (36.6  C) (Oral)   Wt 109.7 kg (241 lb 12.8 oz)   SpO2 96%   BMI 41.48 kg/m

## 2020-09-30 NOTE — PATIENT INSTRUCTIONS
[facility] Triage and after hours / weekends / holidays:  [phone]    Please call the triage or after hours line if you experience a temperature greater than or equal to 100.5, shaking chills, have uncontrolled nausea, vomiting and/or diarrhea, dizziness, shortness of breath, chest pain, bleeding, unexplained bruising, or if you have any other new/concerning symptoms, questions or concerns.      If you are having any concerning symptoms or wish to speak to a provider before your next infusion visit, please call your care coordinator or triage to notify them so we can adequately serve you.     If you need a refill on a narcotic prescription or other medication, please call before your infusion appointment.                 September 2020 Sunday Monday Tuesday Wednesday Thursday Friday Saturday             1     2    [facility] LAB DRAW   1:45 PM   (15 min.)    [facility]   [facility]    TELEPHONE VISIT RETURN   2:20 PM   (20 min.)   Kevin Henderson MD   [facility] ONC INFUSION 120   3:00 PM   (120 min.)    ONCOLOGY INFUSION   [facility] 3     4     5       6     7     8    P [facility] LAB DRAW  11:00 AM   (15 min.)   [facility]   [facility] 9     10     11     12       13     14    [facility] D[facility]  10:00 AM   (15 min.)    [facility]RAW   [facility]    [facility] ONC INFUSION 60  12:30 PM   (60 min.)    ONCOLOGY INFUSION   [facility]    US LWR EXT VENOUS DUPLEX BILAT   5:00 PM   (60 min.)   UCUS3   [facility] US 15    UMP SPEC INFUSION 180  10:00 AM   (180 min.)    SPEC INFUSION   [facility] Specialty and Procedure 16     17     18     19       20     21     22     23    [facility] LAB DRAW   8:00 AM   (15 min.)    [facility] LAB DRAW   [facility] Lab Draw    UMP ONC INFUSION 120   8:30 AM   (120 min.)    ONCOLOGY INFUSION    [facility] 24     25     26       27     28    [facility] PARACENTESIS   1:30 PM   (120 min.)   Provider, Holly Spec Inf Para   [facility] Specialty and Procedure    US PARACENTESIS   2:35 PM   (60 min.)   UCUSATC2   [facility] Ultrasound 29     30    [facility] LAB DRAW   7:45 AM   (15 min.)    [facility] LAB DRAW   [facility] Mason[facility] Lab Draw    UMP ONC INFUSION 120   8:00 AM   (120 min.)    ONCOLOGY INFUSION   [facility]                            October 2020 Sunday Monday Tuesday Wednesday Thursday Friday Saturday                       1     2     3       4     5     6     7    [facility] DRAW  11:00 AM   (15 min.)    [facility] LAB DRAW   [facility] Lab Draw    TELEPHONE VISIT RETURN  11:40 AM   (20 min.)   Kevin Henderson MD   [facility]    [facility] ONC INFUSION 120  12:00 PM   (120 min.)    ONCOLOGY INFUSION   [facility] 8     9     10       11     12     13     14    [facility] LAB DRAW   8:00 AM   (15 min.)    [facility] LAB DRAW   [facility] Lab Draw    [facility] ONC INFUSION 120   8:30 AM   (120 min.)    ONCOLOGY INFUSION   [facility] 15     16     17       18     19     20     21    [facility] DRAW   8:30 AM   (15 min.)    [facility] LAB DRAW   [facility] Lab Draw    [facility] ONC INFUSION 120   9:00 AM   (120 min.)    ONCOLOGY INFUSION   [facility] 22     23     24       25     26     27     28    [facility] DRAW   8:00 AM   (15 min.)    [facility] LAB DRAW   [facility]    [facility] ONC INFUSION 120   8:30 AM   (120 min.)    ONCOLOGY INFUSION   [facility] 29     30     31                    Recent Results (from the past 24 hour(s))   CBC with platelets differential    Collection Time: 09/30/20  8:24 AM   Result Value Ref Range    WBC 5.9 4.0 - 11.0 10e9/L    RBC Count 2.91 (L) 3.8 - 5.2  10e12/L    Hemoglobin 8.2 (L) 11.7 - 15.7 g/dL    Hematocrit 26.1 (L) 35.0 - 47.0 %    MCV 90 78 - 100 fl    MCH 28.2 26.5 - 33.0 pg    MCHC 31.4 (L) 31.5 - 36.5 g/dL    RDW 19.4 (H) 10.0 - 15.0 %    Platelet Count 280 150 - 450 10e9/L    Diff Method Automated Method     % Neutrophils 60.3 %    % Lymphocytes 22.1 %    % Monocytes 10.6 %    % Eosinophils 4.5 %    % Basophils 1.2 %    % Immature Granulocytes 1.3 %    Nucleated RBCs 1 (H) 0 /100    Absolute Neutrophil 3.6 1.6 - 8.3 10e9/L    Absolute Lymphocytes 1.3 0.8 - 5.3 10e9/L    Absolute Monocytes 0.6 0.0 - 1.3 10e9/L    Absolute Eosinophils 0.3 0.0 - 0.7 10e9/L    Absolute Basophils 0.1 0.0 - 0.2 10e9/L    Abs Immature Granulocytes 0.1 0 - 0.4 10e9/L    Absolute Nucleated RBC 0.0    Magnesium    Collection Time: 09/30/20  8:24 AM   Result Value Ref Range    Magnesium 1.7 1.6 - 2.3 mg/dL   Potassium    Collection Time: 09/30/20  8:24 AM   Result Value Ref Range    Potassium 3.6 3.4 - 5.3 mmol/L

## 2020-09-30 NOTE — NURSING NOTE
"Chief Complaint   Patient presents with     Port Draw     port accessed and labs drawn by rn in lab.  vital signs taken.     Port accessed by RN in lab with 20g 3/4\" gripper needel, labs drawn, port flushed with saline and heparin, vitals checked, pt checked in for next appointment.    Karishma Humphrey RN      "

## 2020-09-30 NOTE — PROGRESS NOTES
"Infusion Nursing Note:  Lu Smith presents today for Cycle 2 Day 29 Taxol.    Patient seen by provider today: No   present during visit today: Not Applicable.    Note: Pt arrives to infusion today feeling at her baseline. She reports they were unable to complete her paracentesis due to the lack of fluid. She still reports some abdominal pain which is relieved with her oxycodone. She reports this week she feels \"pretty good\" compared to previous weeks.     Intravenous Access:  Implanted Port.    Treatment Conditions:  Lab Results   Component Value Date    HGB 8.2 09/30/2020     Lab Results   Component Value Date    WBC 5.9 09/30/2020      Lab Results   Component Value Date    ANEU 3.6 09/30/2020     Lab Results   Component Value Date     09/30/2020      Lab Results   Component Value Date     09/08/2020                   Lab Results   Component Value Date    POTASSIUM 3.6 09/30/2020           Lab Results   Component Value Date    MAG 1.7 09/30/2020            Lab Results   Component Value Date    CR 0.91 09/08/2020                   Lab Results   Component Value Date    MARYLOU 9.0 09/08/2020                Lab Results   Component Value Date    BILITOTAL 0.4 09/08/2020           Lab Results   Component Value Date    ALBUMIN 2.5 09/08/2020                    Lab Results   Component Value Date    ALT 21 09/08/2020           Lab Results   Component Value Date    AST 13 09/08/2020     Results reviewed, labs MET treatment parameters, ok to proceed with treatment.    Post Infusion Assessment:  Patient tolerated infusion without incident.  Blood return noted pre and post infusion.  Site patent and intact, free from redness, edema or discomfort.  No evidence of extravasations.  Access discontinued per protocol.     Discharge Plan:   Patient declined prescription refills.  AVS to patient via Peerform.  Patient will return 10/7 for next appointment.   Patient discharged in stable condition accompanied " by: self.  Departure Mode: Ambulatory with walker.  Face to Face time: 0.    Sneha Bautista RN

## 2020-10-07 NOTE — NURSING NOTE
"Chief Complaint   Patient presents with     Telephone     Return; Ovarian Cancer     Port Draw     port accessed and labs drawn by rn in lab.  vital signs taken.     Port accessed by RN in lab with 20g 3/4\" gripper needle, labs drawn, port flushed with saline and heparin, vitals checked.    Karishma Humphrey RN      "

## 2020-10-07 NOTE — PROGRESS NOTES
Infusion Nursing Note:  Lu Smith presents today for D1 C3 Taxol.    Patient seen by provider today: Yes: Dr. Henderson    Intravenous Access:  Implanted Port.    Treatment Conditions:  Lab Results   Component Value Date    HGB 8.2 10/07/2020     Lab Results   Component Value Date    WBC 5.0 10/07/2020      Lab Results   Component Value Date    ANEU 2.7 10/07/2020     Lab Results   Component Value Date     10/07/2020      Lab Results   Component Value Date     10/07/2020                   Lab Results   Component Value Date    POTASSIUM 3.6 10/07/2020           Lab Results   Component Value Date    MAG 1.5 10/07/2020            Lab Results   Component Value Date    CR 1.06 10/07/2020                   Lab Results   Component Value Date    MARYLOU 9.4 10/07/2020                Lab Results   Component Value Date    BILITOTAL 0.5 10/07/2020           Lab Results   Component Value Date    ALBUMIN 2.7 10/07/2020                    Lab Results   Component Value Date    ALT 25 10/07/2020           Lab Results   Component Value Date    AST 17 10/07/2020       Results reviewed, labs MET treatment parameters, ok to proceed with treatment.    Note: Evaluated by Dr. Henderson via phone appt prior to infusion.    Today's Mg+ 1.5. Replaced with 2gm Magnesium sulfate IV per protocol.    Post Infusion Assessment:  Patient tolerated infusion without incident.  Blood return noted pre and post infusion.  Site patent and intact, free from redness, edema or discomfort.  No evidence of extravasations.  Access discontinued per protocol.    Discharge Plan:   Prescription refills given for lidocaine patches. Per patient, pharmacy to mail to house.  Discharge instructions reviewed with: Patient.  Patient and/or family verbalized understanding of discharge instructions and all questions answered.  Copy of AVS reviewed with patient and/or family.  Patient will return 10/14-labs/infusion for next appointment.  Patient discharged  in stable condition accompanied by: self.  Departure Mode: Ambulatory with walker.    Alondra Beckford RN

## 2020-10-07 NOTE — PROGRESS NOTES
"Lu Smith is a 70 year old female who is being evaluated via a billable telephone visit.      The patient has been notified of following:     \"This telephone visit will be conducted via a call between you and your physician/provider. We have found that certain health care needs can be provided without the need for a physical exam.  This service lets us provide the care you need with a short phone conversation.  If a prescription is necessary we can send it directly to your pharmacy.  If lab work is needed we can place an order for that and you can then stop by our lab to have the test done at a later time.    Telephone visits are billed at different rates depending on your insurance coverage. During this emergency period, for some insurers they may be billed the same as an in-person visit.  Please reach out to your insurance provider with any questions.    If during the course of the call the physician/provider feels a telephone visit is not appropriate, you will not be charged for this service.\"    Patient has given verbal consent for Telephone visit?  Yes    What phone number would you like to be contacted at? [phone]    How would you like to obtain your AVS? MyChart    I have reviewed and updated the patient's allergies and medication list.    Concerns: Would like to discuss handicap parking due to difficulties walking. Using walker more.   Refills: None needed.     Vitals - Patient Reported  Weight (Patient Reported): 110.7 kg (244 lb)  Height (Patient Reported): 162.6 cm (5' 4.02\")  BMI (Based on Pt Reported Ht/Wt): 41.86  Pain Score: Mild Pain (2)  Pain Loc: (right leg)      Kristy Laguerre CMA    Phone call duration: 25 minutes                Follow Up Notes on Referred Patient    Date: 10/7/2020       Dr. Kevin Henderson MD  [address] [place] [id number]       RE: Lu Smith  : 1950  HEIDY: 10/7/2020    Lu Smith is a 69 year old woman with a diagnosis of recurrent " serous endometrial cancer.    The patient presents today for followup. She has been doing fairly well on weekly taxol.  She is eating and drinking okay.  Occasional nausea had no vomiting, no fever or chills.  Normal urinary function and one loose bowel movement per day.  No vaginal bleeding or discharge. She does have some upper and lower back pain that resolves with warm bath, no SOB or CP.      Oncology History:  Diagnosis: Stage IIIC2 serous endometrial cancer  Primary GYN oncologist: Dr. Kevin Henderson  Primary radiation oncologist: Dr. Ana Michelle  Surgery: 2/23/18: TLH-BSO, omentectomy, bilateral pelvic and para-aortic lymph node dissection, pelvic washings  Chemotherapy:  3/30/18-9/28/18: Six cycles of carboplatin AUC 6 and paclitaxel 175mg/m2, delivered sandwich style with pelvic radiation between cycles three and four  Radiation: 6/4/18-7/12/18: EBRT delivered to pelvis and para-aortic nodes, 5040 cGy in 28 fractions  7/16/18-7/20/18: HDR brachytherapy delivered to the vaginal cuff, 1200 cGy in two fractions  Complications: Transient neuropathy, mild thrombocytopenia and anemia  Genetic testing: Panel testing negative     9/16/19: Chest CT performed for pulmonary nodule follow up, concerning for recurrent disease  IMPRESSION:   1. Few bilateral sub-4 mm solid pulmonary nodules are unchanged compared to 2/6/2018. No new or enlarging pulmonary nodules.  2. Dilated main pulmonary artery, nonspecific but may be seen with pulmonary artery hypertension.  3. New 1.9 cm left subclavicular node.     9/28/19: PET impression:  In this 69-year-old female with history of stage IIIC2 serous endometrial cancer status post total hysterectomy and bilateral salpingo-oophorectomy, omentectomy, bilateral pelvic and periaortic dissection, and radiation to the pelvis, and brachytherapy to the vaginal cuff.  1. Progression of disease with new intensely FDG avid para-aortic, mediastinal, and subclavicular lymphadenopathy.  The subdiaphragmatic periaortic lymphadenopathy spans from the diaphragmatic crura to the  low abdominal aorta at the level of L4 with greater than 180 degree involvement of the aorta. There is additional lymphadenopathy within the left axilla and the right inguinal region.     9/28/19: Neck CT impression:  1. Progression of metastatic disease with new intensely FDG avid retroclavicular lymph node at the level of the left thoracic inlet.   2. On the fusion PET CT, there is no definite abnormal metabolic activity in the mucosal space, soft tissues, or cervical jamel chains.  3. No evidence of mucosal or soft tissue abnormality on contrast enhanced neck CT.  4. Please refer to the whole body PET CT performed as a separate report, for the findings of the remainder of the body.     10/24/19: Lymph node biopsy:  Lymph node, periaortic, CT guided core biopsy:   -METASTATIC ADENOCARCINOMA   -Tumor morphology is consistent with metastasis/recurrence of endometrial serous carcinoma      11/11/19: Cycle #1 paclitaxel 175mg/m2 + carboplatin AUC 6 + bevacizumab 15mg/kg  12/2/19: Cycle #2 paclitaxel 175mg/m2 + carboplatin AUC 6 + bevacizumab 15mg/kg. Carboplatin reaction.  12/27/19: Cycle #3 Paclitaxel/Avastin/inpatient Carboplatin desensitization.   1/20/2020: CT cap IMPRESSION:  1. Mosaic attenuation throughout the lungs with associated dilated main pulmonary artery likely due to pulmonary arterial hypertension in the appropriate clinical setting.  2. Stable sub-4 mm bilateral pulmonary nodules. No new or enlarging pulmonary nodules.  3. Interval improvement of previously seen enlarged lymph nodes in the lower neck, chest, abdomen and pelvis. No new or enlarging lymph nodes in the present study.  4. Stable soft tissue mesenteric nodules as well as mesenteric root fat stranding.  5. Hepatic steatosis. Additional incidental findings as described above.  1/22/2020: Cycle #4 Paclitaxel/Avastin/inpatient Carboplatin  desensitization.   2/13/2020: Cycle #5 Paclitaxel/Avastin/inpatient Carboplatin desensitization.   3/3/2020: 24 h urine protein 0.15  3/5/2020: Cycle #6 Paclitaxel/Avastin/inpatient Carboplatin desensitization.   3/25/2020: Cycle #1 Avastin   4/22/2020: Cycle #2 Avastin   5/13/2020: Cycle #3 Avastin   6/3/2020: Cycle #4 Avastin   6/25/2020: Cycle #5 Avastin   7/16/25/2020: Cycle #6 Avastin   8/6/2020: C1 weekly taxol  9/14/2020: C2 weekly taxol      Past Medical History:    Past Medical History:   Diagnosis Date     Diabetes (H)     Type II     Endometrial cancer determined by uterine biopsy (H) 02/2018     HTN (hypertension)      Obesity      Osteoarthritis          Past Surgical History:    Past Surgical History:   Procedure Laterality Date     CHOLECYSTECTOMY  1993     CYSTOSCOPY N/A 2/23/2018    Procedure: CYSTOSCOPY;;  Surgeon: Kevin Henderson MD;  Location: [facility] OR     DAVINCI HYSTERECTOMY TOTAL, BILATERAL SALPINGO-OOPHORECTOMY, COMBINED N/A 2/23/2018    Procedure: COMBINED DAVINCI HYSTERECTOMY TOTAL, SALPINGO-OOPHORECTOMY;  DaVinci Assisted Total Laparoscopic Hysterectomy, Bilateral Salpingo-Oophorectomy, Cystoscopy,  Omental biopsy, omentectomy,bilateral  Pelvic And Para Aortic Lymph Node Dissection;  Surgeon: Kevin Henderson MD;  Location: [facility] OR     Partial lumbar discectomy  1998         Health Maintenance Due   Topic Date Due     DEXA  1950     LIPID  1950     MICROALBUMIN  1950     DIABETIC FOOT EXAM  1950     HEPATITIS C SCREENING  1950     ADVANCE CARE PLANNING  1950     EYE EXAM  1950     HEPATITIS B IMMUNIZATION (1 of 3 - Risk 3-dose series) 04/07/1971     ZOSTER IMMUNIZATION (1 of 2) 04/07/2002     MEDICARE ANNUAL WELLNESS VISIT  04/07/2017     Pneumococcal Vaccine: Pediatrics (0 to 5 Years) and At-Risk Patients (6 to 64 Years) (2 of 3 - PPSV23) 02/09/2018     A1C  05/09/2018     Pneumococcal Vaccine: 65+ Years (2 of 2 - PPSV23) 12/15/2018     PHQ-2   2020     INFLUENZA VACCINE (1) 2020     FALL RISK ASSESSMENT  10/09/2020       Current Medications:     Current Outpatient Medications   Medication Sig Dispense Refill     ACETAMINOPHEN PO Take 325 mg by mouth every 6 hours as needed for pain       amLODIPine (NORVASC) 5 MG tablet Take 1 tablet (5 mg) by mouth daily 30 tablet 0     Famotidine (PEPCID PO) Take 10 mg by mouth as needed        fluticasone (FLONASE) 50 MCG/ACT spray Spray 1-2 sprays into both nostrils daily (Patient taking differently: Spray 1-2 sprays into both nostrils as needed ) 1 Bottle 1     iohexol (OMNIPAQUE) 140 MG/ML solution for oral use Mix entire bottle (50 ml) of contrast with 600 ml (20 ounces) of water and drink half 2 hrs prior to CT scan and half 1 hr prior to scan 140 mL 0     L-Glutamine 500 MG CAPS        loperamide (IMODIUM) 2 MG capsule Take 2 mg by mouth 4 times daily as needed for diarrhea       loratadine (CLARITIN) 10 MG tablet Take 1 tablet (10 mg) by mouth daily 30 tablet 3     magnesium 500 MG TABS Take 1,000 mg by mouth        METFORMIN HCL PO Take 500 mg by mouth daily (with breakfast)        omeprazole (PRILOSEC) 40 MG DR capsule        prochlorperazine (COMPAZINE) 10 MG tablet Take 0.5 tablets (5 mg) by mouth every 6 hours as needed (nausea/vomiting) 30 tablet 2     pyridoxine (VITAMIN B-6) 50 MG TABS Take 1 tablet (50 mg) by mouth 2 times daily 60 tablet 3     triamterene-hydrochlorothiazide (DYAZIDE) 37.5-25 MG per capsule Take 1 capsule by mouth every morning            Allergies:        Allergies   Allergen Reactions     Carboplatin Anaphylaxis     Chest tightness, hoarse voice, difficulty breathing     Ace Inhibitors Cough     Amoxicillin Hives        Social History:     Social History     Tobacco Use     Smoking status: Former Smoker     Packs/day: 0.25     Years: 20.00     Pack years: 5.00     Quit date: 1991     Years since quittin.4     Smokeless tobacco: Never Used     Tobacco comment:  Quit smoking 1992   Substance Use Topics     Alcohol use: Yes     Comment: 4-7/week if out 1-2x's/week       History   Drug Use No         Family History:       Family History   Problem Relation Age of Onset     Colon Cancer Mother      Breast Cancer Sister      Lymphoma Sister          Physical Exam:     BP (!) 150/72 (BP Location: Right arm, Patient Position: Sitting, Cuff Size: Adult Regular)   Pulse 90   Temp 98.2  F (36.8  C) (Tympanic)   Resp 20   Wt 108.8 kg (239 lb 14.4 oz)   SpO2 98%   BMI 41.16 kg/m    Body mass index is 41.16 kg/m .    General Appearance:  healthy and alert, no distress                Psychiatric: appropriate mood and affect                                     A total of 25 minutes was spent with the patient, 20 minutes of which were spent in counseling the patient and/or treatment planning.        1.  Recurrent serous endometrial carcinoma.   2.  Positive treatment response to carboplatin, paclitaxel, bevacizumab.   3.  Carbo desensitization.   4.  MSI stable tumor.   5.  Low tumor mutation burden.   6.  PD-L1 0% expression.   7.  Swollen LLE, negative doppler     We will continue with weekly taxol and is tolerating it well with minimal neuropathy, 3 cycles followed by CT scan for evaluate for response. The patient agrees with this plan.  She is very appreciative of her care.  All questions were answered.         Yen Farias MD, MS    Department of Obstetrics and Gynecology   Division of Gynecologic Oncology   [facility]  Phone: [phone]    CC  Patient Care Team:  Levar Scott as PCP - General (Internal Medicine)  Jeanne Blancas MD as Referring Physician (OB/Gyn)  Alondra Ruiz RN as Specialty Care Coordinator (Gyn-Onc)  Yen Farias MD as MD (Gyn-Onc)  Yasmine Zazueta APRN CNP as Assigned PCP  YEN FARIAS

## 2020-10-14 NOTE — PATIENT INSTRUCTIONS
Contact Numbers  [facility] Nurse Triage: [phone]    Please call the [facility] Triage line if you experience a temperature greater than or equal to 100.5, shaking chills, have uncontrolled nausea, vomiting and/or diarrhea, dizziness, shortness of breath, chest pain, bleeding, unexplained bruising, or if you have any other new/concerning symptoms, questions or concerns.     If you are having any concerning symptoms or wish to speak to a provider before your next infusion visit, please call your care coordinator or triage to notify them so we can adequately serve you.     If you need a refill on a prescription or other medication, please call triage before your infusion appointment.       October 2020 Sunday Monday Tuesday Wednesday Thursday Friday Saturday                       1     2     3       4     5     6     7    [facility]  11:00 AM   (15 min.)   [facility]   [facility]    TELEPHONE VISIT RETURN  11:40 AM   (20 min.)   Kevin Henderson MD   [facility]    [facility] 120  12:00 PM   (120 min.)   [facility]   [facility] 8     9     10       11     12     13     14    [facility]   8:00 AM   (15 min.)   [facility]    [facility] 120   8:30 AM   (120 min.)   [facility]   [facility] 15     16     17       18     19     20     21    [facility]   8:30 AM   (15 min.)   [facility]   [facility]    [facility] 120   9:00 AM   (120 min.)   [facility]   [facility] 22     23     24       25     26     27     28    [facility]   8:00 AM   (15 min.)   [facility]   [facility]    [facility] INF[facility] 120   8:30 AM   (120 min.)   [facility]    [facility] 29 30 31 November 2020 Sunday Monday Tuesday Wednesday Thursday Friday Saturday   1     2     3     4     5     6     7       8     9     10     11     12     13     14       15     16     17     18     19     20     21       22     23     24     25     26     27     28       29     30                                              Lab Results:  Recent Results (from the past 12 hour(s))   CBC with platelets differential    Collection Time: 10/14/20  8:17 AM   Result Value Ref Range    WBC 4.1 4.0 - 11.0 10e9/L    RBC Count 2.75 (L) 3.8 - 5.2 10e12/L    Hemoglobin 7.8 (L) 11.7 - 15.7 g/dL    Hematocrit 25.1 (L) 35.0 - 47.0 %    MCV 91 78 - 100 fl    MCH 28.4 26.5 - 33.0 pg    MCHC 31.1 (L) 31.5 - 36.5 g/dL    RDW 20.2 (H) 10.0 - 15.0 %    Platelet Count 290 150 - 450 10e9/L    Diff Method Automated Method     % Neutrophils 47.6 %    % Lymphocytes 35.6 %    % Monocytes 12.4 %    % Eosinophils 1.5 %    % Basophils 1.2 %    % Immature Granulocytes 1.7 %    Nucleated RBCs 2 (H) 0 /100    Absolute Neutrophil 2.0 1.6 - 8.3 10e9/L    Absolute Lymphocytes 1.5 0.8 - 5.3 10e9/L    Absolute Monocytes 0.5 0.0 - 1.3 10e9/L    Absolute Eosinophils 0.1 0.0 - 0.7 10e9/L    Absolute Basophils 0.1 0.0 - 0.2 10e9/L    Abs Immature Granulocytes 0.1 0 - 0.4 10e9/L    Absolute Nucleated RBC 0.1    Magnesium    Collection Time: 10/14/20  8:17 AM   Result Value Ref Range    Magnesium 1.5 (L) 1.6 - 2.3 mg/dL   Potassium    Collection Time: 10/14/20  8:17 AM   Result Value Ref Range    Potassium 3.5 3.4 - 5.3 mmol/L   ABO/Rh type and screen    Collection Time: 10/14/20  8:19 AM   Result Value Ref Range    ABO A     RH(D) Pos     Antibody Screen Neg     Test Valid Only At          [facility]    Specimen Expires 10/17/2020

## 2020-10-14 NOTE — PROGRESS NOTES
Infusion Nursing Note:  Lu Smith presents today for D8 C3 Taxol.    Patient seen by provider today: No    Intravenous Access:  Implanted Port.    Treatment Conditions:  Lab Results   Component Value Date    HGB 7.8 10/14/2020     Lab Results   Component Value Date    WBC 4.1 10/14/2020      Lab Results   Component Value Date    ANEU 2.0 10/14/2020     Lab Results   Component Value Date     10/14/2020      Lab Results   Component Value Date     10/07/2020                   Lab Results   Component Value Date    POTASSIUM 3.5 10/14/2020           Lab Results   Component Value Date    MAG 1.5 10/14/2020            Lab Results   Component Value Date    CR 1.06 10/07/2020                   Lab Results   Component Value Date    MARYLOU 9.4 10/07/2020                Lab Results   Component Value Date    BILITOTAL 0.5 10/07/2020           Lab Results   Component Value Date    ALBUMIN 2.7 10/07/2020                    Lab Results   Component Value Date    ALT 25 10/07/2020           Lab Results   Component Value Date    AST 17 10/07/2020       Results reviewed, labs MET treatment parameters, ok to proceed with treatment.    Note: Patient presents to infusion feeling a bit more tired and short of breath with activity. Denied fever, chills, SOB, or chest pain. States RLE remains edematous. Is going to try thigh high compression stocking. It was noted Hgb 7.8. Dr. Henderson messaged about receiving chemo today.    TORB: Dr. Henderson/Alondra Beckford RN, 10/14/20: Ok to proceed with Taxol today with Hgb 7.8. Schedule PRBC transfusion.    Hospital blood bank estimated PRBC arrival around 1330. Patient completed infusion at 1215 and did not want to wait for PRBCs to arrive at [facility]. Scheduled for weekend transfusion. [facility] if she feels worse between now and Saturday (I.e. worsening fatigue, shortness of breath, lightheadedness, racing heart). Patient verbalized  understanding. Spoke with hospital blood bank that patient will need PRBC at the [facility] on 10/17. Confirmed T/S will be valid until 10/17 at midnight.    K+ 3.5, does not meet parameters for replacement. Mg+1.5, meets parameters for 2gm magnesium sulfate replacement per protocol.    Post Infusion Assessment:  Patient tolerated infusion without incident.  Blood return noted pre and post infusion.  Site patent and intact, free from redness, edema or discomfort.  No evidence of extravasations.  Access discontinued per protocol.    Discharge Plan:   Patient declined prescription refills.  Discharge instructions reviewed with: Patient.  Patient and/or family verbalized understanding of discharge instructions and all questions answered.  AVS to patient via [employer].  Patient will return 10/17/20 for next appointment. Writer spoke with patient via phone about PRBC transfusion appt. Patient verbalized understanding.  Patient discharged in stable condition accompanied by: self.  Departure Mode: Ambulatory with walker.    Alondra Beckford RN

## 2020-10-17 NOTE — PROGRESS NOTES
Infusion Nursing Note:  Lu Smith presents today for 1 unit PRBC.    Patient seen by provider today: No   present during visit today: Not Applicable.    Note: Patient reports fatigue and generalized weak accompanied with shortness of breath on exertion. Instructed patient if symptom persists/worsen to call triage. Verbalized understanding. No new complaints made. Otherwise well.     Port-a-cath insitu with no blood return but able to flush good. IV Alteplase dwell for 60 minutes with good blood return.     Intravenous Access:  Peripheral IV placed.  Implanted Port.    Treatment Conditions:  Lab Results   Component Value Date    HGB 7.8 10/14/2020     Lab Results   Component Value Date    WBC 4.1 10/14/2020      Lab Results   Component Value Date    ANEU 2.0 10/14/2020     Lab Results   Component Value Date     10/14/2020      Results reviewed, labs MET treatment parameters, ok to proceed with treatment.  Blood transfusion consent signed 9/14/20.      Post Infusion Assessment:  Patient tolerated infusion without incident.  Blood return noted pre and post infusion.  Site patent and intact, free from redness, edema or discomfort.  No evidence of extravasations.  Access discontinued per protocol.       Discharge Plan:   Patient declined prescription refills.  Discharge instructions reviewed with: Patient.  Patient and/or family verbalized understanding of discharge instructions and all questions answered.  AVS to patient via [employer].  Patient will return 10/21/20 for next appointment.   Patient discharged in stable condition accompanied by: self.  Departure Mode: Ambulatory.    VICKIE MAR RN

## 2020-10-17 NOTE — PATIENT INSTRUCTIONS
Contact Numbers  [facility] [place]: [phone]    After Hours:  [phone]  Triage: [phone]    Please call the [facility] Triage line if you experience a temperature greater than or equal to 100.5, shaking chills, have uncontrolled nausea, vomiting and/or diarrhea, dizziness, shortness of breath, chest pain, bleeding, unexplained bruising, or if you have any other new/concerning symptoms, questions or concerns.     If it is after hours, weekends, or holidays, please call the main hospital  at  [phone] and ask to speak to the Oncology doctor on call.     If you are having any concerning symptoms or wish to speak to a provider before your next infusion visit, please call your care coordinator or triage to notify them so we can adequately serve you.     If you need a refill on a narcotic prescription or other medication, please call triage before your infusion appointment.         October 2020 Sunday Monday Tuesday Wednesday Thursday Friday Saturday                       1     2     3       4     5     6     7    [facility]  11:00 AM   (15 min.)    [facility] DRAW   [facility]    TELEPHONE VISIT RETURN  11:40 AM   (20 min.)   Kevin Henderson MD   [facility] ONC INFUSION 120  12:00 PM   (120 min.)    ONCOLOGY INFUSION   [facility] 8     9     10       11     12     13     14    [facility]   8:00 AM   (15 min.)    [facility] DRAW   [facility] ONC INFUSION 120   8:30 AM   (120 min.)   [facility] ONCOLOG[facility] INFUSION   [facility] 15     16     17    [facility] ONC INFUSION 360   7:00 AM   (360 min.)   [facility] ONCOL[facility] INFUSION   [facility]   18     19     20     21    [facility]   8:30 AM   (15 min.)    [facility]    9:00 AM   (120 min.)    ONCOLOGY INFUSION   [facility] 22     23     24       25     26     27     28    [facility] DRAW   8:00 AM   (15 min.)    [facility] DRAW   [facility] ONC INFUSION 120   8:30 AM   (120 min.)    ONCOLOGY INFUSION   [facility] 29     30     31 November 2020 Sunday Monday Tuesday Wednesday Thursday Friday Saturday   1     2     3     4     5     6     7       8     9     10     11     12     13     14       15     16     17     18     19     20     21       22     23     24     25     26     27     28       29     30                                              Lab Results:  No results found for this or any previous visit (from the past 12 hour(s)).    Post Blood Products Discharge Instructions    You have just received a blood product.  During the next 48 hours, please be aware of the following symptoms of a blood product reaction.  If you should experience any of these symptoms call your physician.    -Temperature 100.5 or greater  -Shaking or chills  -Shortness of breath or wheezing  -Headache  -Persistent non-productive cough  -Hives  -Itching  -Extreme weakness  -Facial swelling or flushing  -Red urine    If you experience any of these symptoms, please call triage at  [phone] (Monday through  Friday 8:00 AM-4:30 PM):     If after hours, weekends or holidays, please call:  [phone] and ask for the doctor on call for Oncology/Hematology or Women's Health service

## 2020-10-21 NOTE — PATIENT INSTRUCTIONS
Contact Numbers  [facility] Nurse Triage: [phone]    Please call the [facility] Triage line if you experience a temperature greater than or equal to 100.5, shaking chills, have uncontrolled nausea, vomiting and/or diarrhea, dizziness, shortness of breath, chest pain, bleeding, unexplained bruising, or if you have any other new/concerning symptoms, questions or concerns.     If you are having any concerning symptoms or wish to speak to a provider before your next infusion visit, please call your care coordinator or triage to notify them so we can adequately serve you.     If you need a refill on a prescription or other medication, please call triage before your infusion appointment.       October 2020 Sunday Monday Tuesday Wednesday Thursday Friday Saturday                       1     2     3       4     5     6     7    [facility]  11:00 AM   (15 min.)   [facility]   [facility]    TELEPHONE VISIT RETURN  11:40 AM   (20 min.)   Kevin Henderson MD   [facility]    [facility] ONC INFUSION 120  12:00 PM   (120 min.)   [facility] ONCOLOGY INFUSION   [facility] 8     9     10       11     12     13     14    [facility]   8:00 AM   (15 min.)   [facility]   [facility]   8:30 AM   (120 min.)   [facility] ONCOLOGY INFUSION   [facility] 15     16     17    [facility] ONC INFUSION 360   7:00 AM   (360 min.)   [facility] ONCOLOGY INFUSION   [facility]   18     19     20     21    [facility]   8:30 AM   (15 min.)   [facility]   [facility]    [facility] ONC INFUSION 120   9:00 AM   (120 min.)   [facility] ONCOLOGY INFUSION   [facility] 22     23     24       25     26     27     28    [facility]   8:00 AM   (15 min.)   [facility] DRAW     [facility]    [facility] ONC INFUSION 120   8:30 AM   (120 min.)   [facility] ONCOLOGY INFUSION   [facility] 29 30 31 November 2020 Sunday Monday Tuesday Wednesday Thursday Friday Saturday   1     2     3     4     5     6     7       8     9     10     11     12     13     14       15     16     17     18     19     20     21       22     23     24     25     26     27     28       29     30                                              Lab Results:  Recent Results (from the past 12 hour(s))   CBC with platelets differential    Collection Time: 10/21/20  9:06 AM   Result Value Ref Range    WBC 3.4 (L) 4.0 - 11.0 10e9/L    RBC Count 3.20 (L) 3.8 - 5.2 10e12/L    Hemoglobin 9.3 (L) 11.7 - 15.7 g/dL    Hematocrit 28.9 (L) 35.0 - 47.0 %    MCV 90 78 - 100 fl    MCH 29.1 26.5 - 33.0 pg    MCHC 32.2 31.5 - 36.5 g/dL    RDW 19.1 (H) 10.0 - 15.0 %    Platelet Count 272 150 - 450 10e9/L    Diff Method Automated Method     % Neutrophils 45.0 %    % Lymphocytes 35.7 %    % Monocytes 14.2 %    % Eosinophils 1.2 %    % Basophils 1.8 %    % Immature Granulocytes 2.1 %    Nucleated RBCs 1 (H) 0 /100    Absolute Neutrophil 1.5 (L) 1.6 - 8.3 10e9/L    Absolute Lymphocytes 1.2 0.8 - 5.3 10e9/L    Absolute Monocytes 0.5 0.0 - 1.3 10e9/L    Absolute Eosinophils 0.0 0.0 - 0.7 10e9/L    Absolute Basophils 0.1 0.0 - 0.2 10e9/L    Abs Immature Granulocytes 0.1 0 - 0.4 10e9/L    Absolute Nucleated RBC 0.0    Magnesium    Collection Time: 10/21/20  9:06 AM   Result Value Ref Range    Magnesium 1.6 1.6 - 2.3 mg/dL   Potassium    Collection Time: 10/21/20  9:06 AM   Result Value Ref Range    Potassium 3.8 3.4 - 5.3 mmol/L

## 2020-10-21 NOTE — PROGRESS NOTES
Infusion Nursing Note:  Lu Smith presents today for D15 C3 Taxol.    Patient seen by provider today: No    Intravenous Access:  Implanted Port.    Treatment Conditions:  Lab Results   Component Value Date    HGB 9.3 10/21/2020     Lab Results   Component Value Date    WBC 3.4 10/21/2020      Lab Results   Component Value Date    ANEU 1.5 10/21/2020     Lab Results   Component Value Date     10/21/2020      Lab Results   Component Value Date     10/07/2020                   Lab Results   Component Value Date    POTASSIUM 3.8 10/21/2020           Lab Results   Component Value Date    MAG 1.6 10/21/2020            Lab Results   Component Value Date    CR 1.06 10/07/2020                   Lab Results   Component Value Date    MARYLOU 9.4 10/07/2020                Lab Results   Component Value Date    BILITOTAL 0.5 10/07/2020           Lab Results   Component Value Date    ALBUMIN 2.7 10/07/2020                    Lab Results   Component Value Date    ALT 25 10/07/2020           Lab Results   Component Value Date    AST 17 10/07/2020       Results reviewed, labs MET treatment parameters, ok to proceed with treatment.    Note: Patient present to infusion still feeling tired, but a little bit better since transfusion on 10/17/20. Denied fever, chills, SOB, or chest pain. States RLE remains edematous. Her right ankle is bothering her more. On exam, skin appeared tight and red on the inner ankle. Skin temp WNL. No open skin. Denied radiation of pain or calf tenderness. States she didn't wear compression stocking yesterday or today. She took oxycodone this morning and has been using biofreeze, which helped with the pain. Continues to experience intermittent left side abdominal bloating and pain. Oxycodone this morning also helped with this pain.    Patient educated to call the [facility] triage line if ankle pain is worse or radiates up the leg/calf or if she notices more swelling, redness, or other signs or  symptoms of concern. Patient verbalized understanding.    K+ and Mg+ does not require replacement today.    Post Infusion Assessment:  Patient tolerated infusion without incident.  Blood return noted pre and post infusion.  Site patent and intact, free from redness, edema or discomfort.  No evidence of extravasations.  Access discontinued per protocol.    Discharge Plan:   Patient declined prescription refills.  Discharge instructions reviewed with: Patient.  Patient and/or family verbalized understanding of discharge instructions and all questions answered.  AVS to patient via [employer].  Patient will return 10/28/20 for next appointment.   Patient discharged in stable condition accompanied by: self.  Departure Mode: Ambulatory with walker. Sister to provide transportation home.    Alondra Beckford RN

## 2020-10-28 NOTE — NURSING NOTE
Chief Complaint   Patient presents with     Port Draw     labs drawn from port by RN in lab. Vitals taken.     BP 99/64 (BP Location: Left arm, Patient Position: Sitting, Cuff Size: Adult Large)   Pulse 91   Temp 97.3  F (36.3  C) (Oral)   Resp 20   Wt 108.7 kg (239 lb 11.2 oz)   SpO2 96%   BMI 41.12 kg/m      Port accessed by RN. Labs drawn and sent. Line flushed with NS and Heparin. Pt tolerated well. Checked in to next appointment.    Patricia Don RN on 10/28/2020 at 8:31 AM

## 2020-10-28 NOTE — PROGRESS NOTES
Infusion Nursing Note:  Lu Smith presents today for C3 D29 Taxol - DEFFERED.    Patient seen by provider today: No   present during visit today: Not Applicable.    Note: Patient arrives feeling very fatigued and has had a cough for the last month, but has worsened in the last week. Notified Dr. Henderson. Pt does report improvement in RLE swelling and states redness has resolved. Denies right ankle pain. Wearing stockings and elevated legs on top of taking diuretic. PELAEZ continues but has not worsened. Denies fever, chills, or taste changes (already has lack of appetite). States she has intermittent stomachaches that sometimes keep her up at night. She takes Oxycodone PRN which helps. Pt reports mild nausea this morning. Ginger ale given which was effective.    Per TORB Dr. Henderson/Madison Antunez RN @0917 10/28/20:  - Hold Taxol  - Get patient COVID tested and have her return next week to complete treatment as long as results are negative    Pt verbalized understanding of plan. COVID patient education reviewed and given to patient. [place] sent to scheduling/PORFIRIO Kelsey to get pt rescheduled and also schedule follow-up CT. Pt aware of plan.    Intravenous Access:  Implanted Port.    Treatment Conditions:  Lab Results   Component Value Date    HGB 8.9 10/28/2020     Lab Results   Component Value Date    WBC 3.7 10/28/2020      Lab Results   Component Value Date    ANEU 2.0 10/28/2020     Lab Results   Component Value Date     10/28/2020      Lab Results   Component Value Date     10/07/2020                   Lab Results   Component Value Date    POTASSIUM 3.2 10/28/2020           Lab Results   Component Value Date    MAG 1.6 10/28/2020            Lab Results   Component Value Date    CR 1.06 10/07/2020                   Lab Results   Component Value Date    MARYLOU 9.4 10/07/2020                Lab Results   Component Value Date    BILITOTAL 0.5 10/07/2020           Lab Results    Component Value Date    ALBUMIN 2.7 10/07/2020                    Lab Results   Component Value Date    ALT 25 10/07/2020           Lab Results   Component Value Date    AST 17 10/07/2020       Results reviewed, labs MET treatment parameters, ok to proceed with treatment. See TORB above. Pt did NOT get treated today.  Potassium replaced orally per protocol.      Post Infusion Assessment:  Access discontinued per protocol.       Discharge Plan:   Patient declined prescription refills.  Discharge instructions reviewed with: Patient.  Patient and/or family verbalized understanding of discharge instructions and all questions answered.  AVS to patient via [employer].  Patient will return 11/4 for next appointment.   Patient discharged in stable condition accompanied by: self.  Departure Mode: Ambulatory.  Face to Face time: 5.    Madison Antunez RN

## 2020-10-28 NOTE — LETTER
10/28/2020         RE: Lu Smith  [address]  [place] [address]        Dear Colleague,    Thank you for referring your patient, Lu Smith, to the [facility]. Please see a copy of my visit note below.    Infusion Nursing Note:  Lu Smith presents today for C3 D29 Taxol - DEFFERED.    Patient seen by provider today: No   present during visit today: Not Applicable.    Note: Patient arrives feeling very fatigued and has had a cough for the last month, but has worsened in the last week. Notified Dr. Henderson. Pt does report improvement in RLE swelling and states redness has resolved. Denies right ankle pain. Wearing stockings and elevated legs on top of taking diuretic. PELAEZ continues but has not worsened. Denies fever, chills, or taste changes (already has lack of appetite). States she has intermittent stomachaches that sometimes keep her up at night. She takes Oxycodone PRN which helps. Pt reports mild nausea this morning. Ginger ale given which was effective.    Per TORB Dr. Henderson/Madison Antunez RN @0917 10/28/20:  - Hold Taxol  - Get patient COVID tested and have her return next week to complete treatment as long as results are negative    Pt verbalized understanding of plan. COVID patient education reviewed and given to patient. [place] sent to scheduling/Angelique Ruiz RNCC to get pt rescheduled and also schedule follow-up CT. Pt aware of plan.    Intravenous Access:  Implanted Port.    Treatment Conditions:  Lab Results   Component Value Date    HGB 8.9 10/28/2020     Lab Results   Component Value Date    WBC 3.7 10/28/2020      Lab Results   Component Value Date    ANEU 2.0 10/28/2020     Lab Results   Component Value Date     10/28/2020      Lab Results   Component Value Date     10/07/2020                   Lab Results   Component Value Date    POTASSIUM 3.2 10/28/2020           Lab Results   Component Value Date    MAG 1.6  10/28/2020            Lab Results   Component Value Date    CR 1.06 10/07/2020                   Lab Results   Component Value Date    MARYLOU 9.4 10/07/2020                Lab Results   Component Value Date    BILITOTAL 0.5 10/07/2020           Lab Results   Component Value Date    ALBUMIN 2.7 10/07/2020                    Lab Results   Component Value Date    ALT 25 10/07/2020           Lab Results   Component Value Date    AST 17 10/07/2020       Results reviewed, labs MET treatment parameters, ok to proceed with treatment. See TORB above. Pt did NOT get treated today.  Potassium replaced orally per protocol.      Post Infusion Assessment:  Access discontinued per protocol.       Discharge Plan:   Patient declined prescription refills.  Discharge instructions reviewed with: Patient.  Patient and/or family verbalized understanding of discharge instructions and all questions answered.  AVS to patient via [employer].  Patient will return 11/4 for next appointment.   Patient discharged in stable condition accompanied by: self.  Departure Mode: Ambulatory.  Face to Face time: 5.    Madison Antunez RN                            Again, thank you for allowing me to participate in the care of your patient.        Sincerely,        [facility]

## 2020-11-02 NOTE — PROGRESS NOTES
Patient reports ongoing abdominal pain that she takes Oxycodone 5 mg orally for nightly due to unable to sleep. She reports that the pain will prevent her from sleeping. She is taking Extra Strength Tylenol around the clock and will use the Oxy for breakthrough pain at night. She will continue to use her heating pad for comfortShe will add daily Claritin for her bone pain. MNPMP reviewed prior to rx being sent. Patient aware to let our office know if pain is not controlled and a referral to palliative care will be sent if needed.     Jill Fu, MSN, [facility]  Women's Health Nurse Practitioner  Department of Ob/Gyn and Women's Health  Division of Gynecologic Oncology  [facility]  Pager: [phone]

## 2020-11-04 NOTE — NURSING NOTE
"Chief Complaint   Patient presents with     Port Draw     Labs drawn via port by RN. VS taken     Port accessed with 20g 3/4\" gripper needle and labs drawn by rn.  Port flushed with NS and heparin.  Pt tolerated well.  VS taken.  Pt checked in for next appt.    Ludwig Rausch RN  "

## 2020-11-04 NOTE — PATIENT INSTRUCTIONS
For your abdominal pain, start taking 650 mg Tylenol around the clock every 6 hours, ibuprofen 400 mg once per day, and Oxycodone 5 mg every 6 hours prn. Continue heating pad and rest as needed. Go to local ER if pain is uncontrolled at home. Please reach out to our office or PEPE Merino if you need a refill on your pain medication.       We will get your CT scan this week and an urgent palliative care referral was placed.     Feel better!    Jill Fu, MSN, [facility]  Women's Health Nurse Practitioner  Department of Ob/Gyn and Women's Health  Division of Gynecologic Oncology  [facility]      Contact Numbers  [facility] Nurse Triage: [phone]    Please call the [facility] Triage line if you experience a temperature greater than or equal to 100.5, shaking chills, have uncontrolled nausea, vomiting and/or diarrhea, dizziness, shortness of breath, chest pain, bleeding, unexplained bruising, or if you have any other new/concerning symptoms, questions or concerns.     If you are having any concerning symptoms or wish to speak to a provider before your next infusion visit, please call your care coordinator or triage to notify them so we can adequately serve you.     If you need a refill on a prescription or other medication, please call triage before your infusion appointment.       November 2020 Sunday Monday Tuesday Wednesday Thursday Friday Saturday   1     2     3     4    [facility]  10:45 AM   (15 min.)    [facility] DRAW   [facility] ONC INFUSION 120  12:30 PM   (120 min.)    ONCOLOGY INFUSION   [facility] 5     6     7       8     9     10     11    [facility]  12:45 PM   (15 min.)    [facility] DRAW   [facility]    TELEPHONE VISIT RETURN   1:30 PM   (60 min.)   Jill Fu APRN CNP   [facility] ONC INFUSION  120   3:00 PM   (120 min.)   [facility] ONCOLOGY INFUSION   [facility] 12     13     14       15     16     17     18    [facility] DRAW   7:45 AM   (15 min.)   [facility]   [facility] 120   8:30 AM   (120 min.)    ONCOLOGY INFUSION   [facility] 19     20    CANCER REHAB EVAL   8:45 AM   (60 min.)   Chrissy Villagran, PT   [facility] 21       22     23     24     25    [facility] DRAW  12:45 PM   (15 min.)   [facility] DRAW   [facility] 120   1:30 PM   (120 min.)    ONCOLOGY INFUSION   [facility] 26     27     28 29 30 December 2020 Sunday Monday Tuesday Wednesday Thursday Friday Saturday             1     2    [facility] DRAW   9:15 AM   (15 min.)   [facility] DRAW   [facility] ONC INFUSION 120  10:00 AM   (120 min.)    ONCOLOGY INFUSION   [facility] 3     4    CT CHEST/ABDOMEN/PELVIS W   9:10 AM   (20 min.)   [facility]SC[facility]1   [facility] 5       6     7     8     9    [facility] DRAW   9:30 AM   (15 min.)   [facility] DRAW   [facility]    TELEPHONE VISIT RETURN  10:40 AM   (20 min.)   Kevin Henderson MD   [facility] 120  11:00 AM   (120 min.)   [facility] ONCOLOGY INFUSION   [facility] 10     11     12       13     14     15     16     17     18     19       20     21     22     23     24     25     26       27     28     29     30     31                               Lab Results:  Recent Results (from the past 12 hour(s))   CBC with platelets differential    Collection Time: 11/04/20 11:31 AM   Result  Value Ref Range    WBC 6.4 4.0 - 11.0 10e9/L    RBC Count 3.11 (L) 3.8 - 5.2 10e12/L    Hemoglobin 8.8 (L) 11.7 - 15.7 g/dL    Hematocrit 28.3 (L) 35.0 - 47.0 %    MCV 91 78 - 100 fl    MCH 28.3 26.5 - 33.0 pg    MCHC 31.1 (L) 31.5 - 36.5 g/dL    RDW 19.9 (H) 10.0 - 15.0 %    Platelet Count 301 150 - 450 10e9/L    Diff Method Automated Method     % Neutrophils 56.8 %    % Lymphocytes 21.0 %    % Monocytes 18.9 %    % Eosinophils 1.1 %    % Basophils 1.1 %    % Immature Granulocytes 1.1 %    Nucleated RBCs 1 (H) 0 /100    Absolute Neutrophil 3.6 1.6 - 8.3 10e9/L    Absolute Lymphocytes 1.3 0.8 - 5.3 10e9/L    Absolute Monocytes 1.2 0.0 - 1.3 10e9/L    Absolute Eosinophils 0.1 0.0 - 0.7 10e9/L    Absolute Basophils 0.1 0.0 - 0.2 10e9/L    Abs Immature Granulocytes 0.1 0 - 0.4 10e9/L    Absolute Nucleated RBC 0.0    Magnesium    Collection Time: 11/04/20 11:31 AM   Result Value Ref Range    Magnesium 1.6 1.6 - 2.3 mg/dL   Potassium    Collection Time: 11/04/20 11:31 AM   Result Value Ref Range    Potassium 3.5 3.4 - 5.3 mmol/L

## 2020-11-04 NOTE — PROGRESS NOTES
Infusion Nursing Note:  Lu Smith presents today for D29 C3 Taxol.    Patient seen by provider today: No    Intravenous Access:  Implanted Port.    Treatment Conditions:  Lab Results   Component Value Date    HGB 8.8 11/04/2020     Lab Results   Component Value Date    WBC 6.4 11/04/2020      Lab Results   Component Value Date    ANEU 3.6 11/04/2020     Lab Results   Component Value Date     11/04/2020      Lab Results   Component Value Date     10/07/2020                   Lab Results   Component Value Date    POTASSIUM 3.5 11/04/2020           Lab Results   Component Value Date    MAG 1.6 11/04/2020            Lab Results   Component Value Date    CR 1.06 10/07/2020                   Lab Results   Component Value Date    MARYLOU 9.4 10/07/2020                Lab Results   Component Value Date    BILITOTAL 0.5 10/07/2020           Lab Results   Component Value Date    ALBUMIN 2.7 10/07/2020                    Lab Results   Component Value Date    ALT 25 10/07/2020           Lab Results   Component Value Date    AST 17 10/07/2020       Results reviewed, labs MET treatment parameters, ok to proceed with treatment.    Note: Patient is tearful on arrival. States abdominal pain has been pretty bad the last 10 days. Reports taking tylenol 325mg 1-2 times/day, oxycodone 5mg 1-2 times/day, and re-started claritin. States pain is waking her up or she can't fall asleep due to the pain. Reports pain is mid abdomen, fullness feeling. Having normal BM and able to eat without n/v. Based on ongoing issues; Jill Fu NP notified. provider came to see patient in infusion. See her note for details and plan (11/4/20).     VORB: Jill Fu NP/Alondra Beckford RN, 11/4/2020: OK to proceed with today's treatment.    Post Infusion Assessment:  Patient tolerated infusion without incident.  Blood return noted pre and post infusion.  Site patent and intact, free from redness, edema or discomfort.  No evidence of  extravasations.  Access discontinued per protocol.    Discharge Plan:   Patient declined prescription refills.  Discharge instructions reviewed with: Patient.  Patient and/or family verbalized understanding of discharge instructions and all questions answered.  Copy of AVS reviewed with patient and/or family.  Patient will return 11/11/20 for next appointment.  Patient discharged in stable condition accompanied by: self.  Departure Mode: Ambulatory with walker.    Alondra Beckford RN

## 2020-11-04 NOTE — PROGRESS NOTES
"Paged by infusion RN in regards to Lu's increasing abdominal pain that is not controlled with prescribed Oxycodone 5 mg po q6h. I saw Lu in infusion and she reported increased abdominal swelling and feelings of \"fullness\". This has been going on since September but has worsened over the last 10 days. She was tearful throughout our meeting and the exam. She is using a heating pad and taking Oxycodone \"every so often\" when the pain occurs. Pain is more constant than intermittent per patient. She states that in September, one Oxycodone 5 mg tablet would manage her pain all day but now she is not finding relief with the medication. Abdomen felt full on palpation with increased tenderness at midline to middle left abdomen. She reports regular bowel movements with diarrhea once per week (ongoing for the past couple months). She will use Imodium as needed. She reports early satiety with eating.     Encouraged Lu to start taking 650 mg Tylenol around the clock every 6 hours, ibuprofen 400 mg once per day, and Oxycodone 5 mg every 6 hours prn. Continue heating pad and rest as needed. Encouraged her to go to local ER if pain is uncontrolled at home.     CT CAP orders in. Urgent Palliative care referral orders placed.   Will move up patient's CT CAP to this week and get patient in with palliative care ASAP. Both requests sent to scheduling. PEPE Merino is aware of plan.       Jill Fu, MSN, [facility]  Women's Health Nurse Practitioner  Department of Ob/Gyn and Women's Health  Division of Gynecologic Oncology  [facility]  Pager: [phone]    "

## 2020-11-06 NOTE — TELEPHONE ENCOUNTER
CT scan reviewed with Dr. Hernandez 11/6/2020 at 1515. Plan to have pt see an MD in clinic next week due to recurrence of disease and symptoms. Dr. Hernandez will reach out to PEPE Blankenship to get this patient to be seen Monday in clinic with Dr. Redding. Dr. Redding to be notified.     I called the patient at 1533 and left a voicemail explaining the need for follow up on Monday with an MD. Explained that Dr. Henderson is out next week but one of the other MDs will see her in person. Advised her to call the clinic triage (number provided) if she has fevers, SOB, chest pain (given concern of possible Pneumonia vs mets on CT). Advised her to call the office Monday morning or look in her Mychart for her updated appointments for next week.       Jill Fu, MSN, [facility]  Women's Health Nurse Practitioner  Department of Ob/Gyn and Women's Health  Division of Gynecologic Oncology  [facility]  Pager: [phone]

## 2020-11-06 NOTE — TELEPHONE ENCOUNTER
DATE:  11/6/2020   TIME OF RECEIPT FROM LAB:  2359  LAB TEST:  CT Chest, Abdomen, Pelvis  LAB VALUE:  Possible pneumonia  versus metastatic disease  TIME LAB VALUE REPORTED TO PROVIDER:   Paged Dr. Henderson at [phone]

## 2020-11-12 PROBLEM — R06.09 DOE (DYSPNEA ON EXERTION): Status: ACTIVE | Noted: 2020-01-01

## 2020-11-12 PROBLEM — R06.02 SOB (SHORTNESS OF BREATH): Status: ACTIVE | Noted: 2020-01-01

## 2020-11-12 PROBLEM — J18.9 BILATERAL UPPER LOBE COMMUNITY ACQUIRED PNEUMONIA: Status: ACTIVE | Noted: 2020-01-01

## 2020-11-12 NOTE — PROGRESS NOTES
GYNECOLOGY ONCOLOGY CLINIC NOTE       Dr. 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      Patient presents with her sister today. She reports worsening fatigue, shortness of breath and abdominal pain over the last two weeks. Her sister reports she has maybe two good days, but otherwise has been fatigued and needing to rest most of the day. Patient reports she has cough, non-productive and worsening shortness of breath. She denies fever but has been taking Tylenol scheduled for her pain. + nausea, no vomiting, has a normal stool today. She reports having a negative COVID test 2 weeks ago.     Oncology History:  Diagnosis: Stage IIIC2 serous endometrial cancer  Primary GYN oncologist: Dr. Kevin Henderson  Primary radiation oncologist: Dr. Ana Michelle  Surgery: 18: TLH-BSO, omentectomy, bilateral pelvic and para-aortic lymph node dissection, pelvic washings  Chemotherapy:  3/30/18-18: Six cycles of carboplatin AUC 6 and paclitaxel 175mg/m2, delivered sandwich style with pelvic radiation between cycles three and four  Radiation: 18-18: EBRT delivered to pelvis and para-aortic nodes, 5040 cGy in 28 fractions  18-18: HDR brachytherapy delivered to the vaginal cuff, 1200 cGy in two fractions  Complications: Transient neuropathy, mild thrombocytopenia and anemia  Genetic testing: Panel testing negative     19: Chest CT performed for pulmonary nodule follow up, concerning for recurrent disease  IMPRESSION:   1. Few bilateral sub-4 mm solid pulmonary nodules are unchanged compared to 2018. No new or enlarging pulmonary nodules.  2. Dilated main pulmonary artery, nonspecific but may be seen with pulmonary artery hypertension.  3. New 1.9 cm left subclavicular  node.     9/28/19: PET impression:  In this 69-year-old female with history of stage IIIC2 serous endometrial cancer status post total hysterectomy and bilateral salpingo-oophorectomy, omentectomy, bilateral pelvic and periaortic dissection, and radiation to the pelvis, and brachytherapy to the vaginal cuff.  1. Progression of disease with new intensely FDG avid para-aortic, mediastinal, and subclavicular lymphadenopathy. The subdiaphragmatic periaortic lymphadenopathy spans from the diaphragmatic crura to the  low abdominal aorta at the level of L4 with greater than 180 degree involvement of the aorta. There is additional lymphadenopathy within the left axilla and the right inguinal region.     9/28/19: Neck CT impression:  1. Progression of metastatic disease with new intensely FDG avid retroclavicular lymph node at the level of the left thoracic inlet.   2. On the fusion PET CT, there is no definite abnormal metabolic activity in the mucosal space, soft tissues, or cervical jamel chains.  3. No evidence of mucosal or soft tissue abnormality on contrast enhanced neck CT.  4. Please refer to the whole body PET CT performed as a separate report, for the findings of the remainder of the body.     10/24/19: Lymph node biopsy:  Lymph node, periaortic, CT guided core biopsy:   -METASTATIC ADENOCARCINOMA   -Tumor morphology is consistent with metastasis/recurrence of endometrial serous carcinoma      11/11/19: Cycle #1 paclitaxel 175mg/m2 + carboplatin AUC 6 + bevacizumab 15mg/kg  12/2/19: Cycle #2 paclitaxel 175mg/m2 + carboplatin AUC 6 + bevacizumab 15mg/kg. Carboplatin reaction.  12/27/19: Cycle #3 Paclitaxel/Avastin/inpatient Carboplatin desensitization.   1/20/2020: CT cap IMPRESSION:  1. Mosaic attenuation throughout the lungs with associated dilated main pulmonary artery likely due to pulmonary arterial hypertension in the appropriate clinical setting.  2. Stable sub-4 mm bilateral pulmonary nodules. No new or  enlarging pulmonary nodules.  3. Interval improvement of previously seen enlarged lymph nodes in the lower neck, chest, abdomen and pelvis. No new or enlarging lymph nodes in the present study.  4. Stable soft tissue mesenteric nodules as well as mesenteric root fat stranding.  5. Hepatic steatosis. Additional incidental findings as described above.  1/22/2020: Cycle #4 Paclitaxel/Avastin/inpatient Carboplatin desensitization.   2/13/2020: Cycle #5 Paclitaxel/Avastin/inpatient Carboplatin desensitization.   3/3/2020: 24 h urine protein 0.15  3/5/2020: Cycle #6 Paclitaxel/Avastin/inpatient Carboplatin desensitization.   3/25/2020: Cycle #1 Avastin   4/22/2020: Cycle #2 Avastin   5/13/2020: Cycle #3 Avastin   6/3/2020: Cycle #4 Avastin   6/25/2020: Cycle #5 Avastin   7/16/25/2020: Cycle #6 Avastin   7/27/20 CT scan ( worsening disease)  8/6/2020: C1 Day 1weekly taxol  9/14/2020: C2 Day 1 weekly taxol  10/7/2020: C3 Day 1 wkly taxol  11/5/20 CT scan Chest/abdomen  Increasing mediastinal adenopathy, increased peritoneal nodules  Lungs: Possible metastases/infection or both. (Increased  groundglass and tree-in-bud opacities of the left upper and lower  lobes and new groundglass and tree-in-bud opacities in the right  middle and lower lobes. Moderate bilateral pleural effusions.)    Past Medical History:    Past Medical History:   Diagnosis Date     Diabetes (H)     Type II     Endometrial cancer determined by uterine biopsy (H) 02/2018     HTN (hypertension)      Obesity      Osteoarthritis          Past Surgical History:    Past Surgical History:   Procedure Laterality Date     CHOLECYSTECTOMY  1993     CYSTOSCOPY N/A 2/23/2018    Procedure: CYSTOSCOPY;;  Surgeon: Kevin Henderson MD;  Location: [facility] OR     DAVINCI HYSTERECTOMY TOTAL, BILATERAL SALPINGO-OOPHORECTOMY, COMBINED N/A 2/23/2018    Procedure: COMBINED DAVINCI HYSTERECTOMY TOTAL, SALPINGO-OOPHORECTOMY;  DaVinci Assisted Total Laparoscopic Hysterectomy,  Bilateral Salpingo-Oophorectomy, Cystoscopy,  Omental biopsy, omentectomy,bilateral  Pelvic And Para Aortic Lymph Node Dissection;  Surgeon: Kevin Henderson MD;  Location: [facility] OR     Partial lumbar discectomy  1998         Health Maintenance Due   Topic Date Due     DEXA  1950     LIPID  1950     MICROALBUMIN  1950     DIABETIC FOOT EXAM  1950     ADVANCE CARE PLANNING  1950     EYE EXAM  1950     HEPATITIS C SCREENING  04/07/1970     ZOSTER IMMUNIZATION (1 of 2) 04/07/2002     MEDICARE ANNUAL WELLNESS VISIT  04/07/2017     Pneumococcal Vaccine: Pediatrics (0 to 5 Years) and At-Risk Patients (6 to 64 Years) (2 of 3 - PPSV23) 02/09/2018     A1C  05/09/2018     Pneumococcal Vaccine: 65+ Years (2 of 2 - PPSV23) 12/15/2018     PHQ-2  01/01/2020     INFLUENZA VACCINE (1) 09/01/2020     FALL RISK ASSESSMENT  10/09/2020       Current Medications:     Current Outpatient Medications   Medication Sig Dispense Refill     ACETAMINOPHEN PO Take 325 mg by mouth every 6 hours as needed for pain       amLODIPine (NORVASC) 5 MG tablet Take 1 tablet (5 mg) by mouth daily 30 tablet 0     Famotidine (PEPCID PO) Take 10 mg by mouth as needed        fluticasone (FLONASE) 50 MCG/ACT spray Spray 1-2 sprays into both nostrils daily 1 Bottle 1     L-Glutamine 500 MG CAPS        loperamide (IMODIUM) 2 MG capsule Take 2 mg by mouth 4 times daily as needed for diarrhea       loratadine (CLARITIN REDITABS) 5 MG dispersible tablet Take 5 mg by mouth daily       magnesium 500 MG TABS Take 1,000 mg by mouth        METFORMIN HCL PO Take 500 mg by mouth daily (with breakfast)        omeprazole (PRILOSEC) 40 MG DR capsule        oxyCODONE (ROXICODONE) 5 MG tablet Take 1 tablet (5 mg) by mouth every 6 hours as needed for severe pain Continue to take Tylenol 650 mg every 6 hours. Add daily Claritin for the bone pain. Use Oxycodone as needed for severe or breakthrough pain. 20 tablet 0     prochlorperazine  (COMPAZINE) 10 MG tablet Take 0.5 tablets (5 mg) by mouth every 6 hours as needed (nausea/vomiting) 30 tablet 2     pyridoxine (VITAMIN B-6) 50 MG TABS Take 1 tablet (50 mg) by mouth 2 times daily 60 tablet 3     triamterene-hydrochlorothiazide (DYAZIDE) 37.5-25 MG per capsule Take 1 capsule by mouth every morning        iohexol (OMNIPAQUE) 140 MG/ML solution for oral use Mix entire bottle (50 ml) of contrast with 600 ml (20 ounces) of water and drink half 2 hrs prior to CT scan and half 1 hr prior to scan (Patient not taking: Reported on 10/14/2020) 140 mL 0         Allergies:        Allergies   Allergen Reactions     Carboplatin Anaphylaxis     Chest tightness, hoarse voice, difficulty breathing     Ace Inhibitors Cough     Amoxicillin Hives        Social History:     Social History     Tobacco Use     Smoking status: Former Smoker     Packs/day: 0.25     Years: 20.00     Pack years: 5.00     Quit date: 1991     Years since quittin.5     Smokeless tobacco: Never Used     Tobacco comment: Quit smoking    Substance Use Topics     Alcohol use: Yes     Comment: 4-7/week if out 1-2x's/week       History   Drug Use No         Family History:       Family History   Problem Relation Age of Onset     Colon Cancer Mother      Breast Cancer Sister      Lymphoma Sister          Physical Exam:     BP 90/54 (BP Location: Left arm, Patient Position: Sitting, Cuff Size: Adult Regular)   Pulse 83   Temp 97.7  F (36.5  C) (Oral)   Resp 20   Wt 110.4 kg (243 lb 4.8 oz)   SpO2 (!) 89%   BMI 41.74 kg/m    Body mass index is 41.74 kg/m .    General Appearance:  fatigued, siting in wheelchair with O2 in palce healthy and alert, no distress                Psychiatric: appropriate mood and affect                               Ext: right LE edema > left ( chronic per patient)    Remaining clinic exam deferred in this patient at risk of COVID         Assessment: 70 yrs old with recurrent serous endometrial cancer with  pulmonary and peritoneal metastasis.    I have reviewed most recent imaging which shows worsening disease on weekly Taxol, completed 3 cycles    Worsening shortness of breath with decreased oxygen saturation with evidence of infiltrative disease on Chest CT, possible community acquired pneumonia versus COVID associated pneumonia.     Plan:    1. Patient referred to ED for COVID testing and admission to Gyn Onc service if negative.     2. Plan change of chemotherapy treatment post management of acute event. Patient did have platinum response and may be eligible for additional platinum although required carbo desensitization. Alternative regiment to consider include Doxil. I discussed available clinical trials, non currently open but will continue to consider options.    3. Ref to palliative care for pain and goal of care management    4. Right LE lymphedema, US in 9/20 negative.      Mary Redding M.D., MPH,  F.A.C.O.G.  Professor  Department of Ob/Gyn and Women's Health  Division of Gynecologic Oncology  [facility] [place]  [phone]      Time spent with patient 45 minutes and more than 50 % of the time was spent reviewing data, counseling the patient, discussing treatment options and coordination of care.        CC  Patient Care Team:  Levar Scott as PCP - General (Internal Medicine)  Jeanne Blancas MD as Referring Physician (OB/Gyn)  Alondra Ruiz, RN as Specialty Care Coordinator (Gynecologic Oncology)  Yen Farias MD as MD (Gynecologic Oncology)  Yasmine Zazueta APRN CNP as Assigned PCP  Lisa Barroso APRN CNP as Assigned OBGYN Provider  Yen Farias MD as Assigned Cancer Care Provider  YEN FARIAS

## 2020-11-12 NOTE — LETTER
2020         RE: Lu Smith  [address] [facility] [address]        Dear Colleague,    Thank you for referring your patient, Lu Smith, to the [facility]. Please see a copy of my visit note below.    GYNECOLOGY ONCOLOGY CLINIC NOTE       Dr. 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      Patient presents with her sister today. She reports worsening fatigue, shortness of breath and abdominal pain over the last two weeks. Her sister reports she has maybe two good days, but otherwise has been fatigued and needing to rest most of the day. Patient reports she has cough, non-productive and worsening shortness of breath. She denies fever but has been taking Tylenol scheduled for her pain. + nausea, no vomiting, has a normal stool today. She reports having a negative COVID test 2 weeks ago.     Oncology History:  Diagnosis: Stage IIIC2 serous endometrial cancer  Primary GYN oncologist: Dr. Kevin Henderson  Primary radiation oncologist: Dr. Ana Michelle  Surgery: 18: TLH-BSO, omentectomy, bilateral pelvic and para-aortic lymph node dissection, pelvic washings  Chemotherapy:  3/30/18-18: Six cycles of carboplatin AUC 6 and paclitaxel 175mg/m2, delivered sandwich style with pelvic radiation between cycles three and four  Radiation: 18-18: EBRT delivered to pelvis and para-aortic nodes, 5040 cGy in 28 fractions  18-18: HDR brachytherapy delivered to the vaginal cuff, 1200 cGy in two fractions  Complications: Transient neuropathy, mild thrombocytopenia and anemia  Genetic testing: Panel testing negative     19: Chest CT performed for pulmonary nodule follow up, concerning for recurrent disease  IMPRESSION:   1. Few  bilateral sub-4 mm solid pulmonary nodules are unchanged compared to 2/6/2018. No new or enlarging pulmonary nodules.  2. Dilated main pulmonary artery, nonspecific but may be seen with pulmonary artery hypertension.  3. New 1.9 cm left subclavicular node.     9/28/19: PET impression:  In this 69-year-old female with history of stage IIIC2 serous endometrial cancer status post total hysterectomy and bilateral salpingo-oophorectomy, omentectomy, bilateral pelvic and periaortic dissection, and radiation to the pelvis, and brachytherapy to the vaginal cuff.  1. Progression of disease with new intensely FDG avid para-aortic, mediastinal, and subclavicular lymphadenopathy. The subdiaphragmatic periaortic lymphadenopathy spans from the diaphragmatic crura to the  low abdominal aorta at the level of L4 with greater than 180 degree involvement of the aorta. There is additional lymphadenopathy within the left axilla and the right inguinal region.     9/28/19: Neck CT impression:  1. Progression of metastatic disease with new intensely FDG avid retroclavicular lymph node at the level of the left thoracic inlet.   2. On the fusion PET CT, there is no definite abnormal metabolic activity in the mucosal space, soft tissues, or cervical jamel chains.  3. No evidence of mucosal or soft tissue abnormality on contrast enhanced neck CT.  4. Please refer to the whole body PET CT performed as a separate report, for the findings of the remainder of the body.     10/24/19: Lymph node biopsy:  Lymph node, periaortic, CT guided core biopsy:   -METASTATIC ADENOCARCINOMA   -Tumor morphology is consistent with metastasis/recurrence of endometrial serous carcinoma      11/11/19: Cycle #1 paclitaxel 175mg/m2 + carboplatin AUC 6 + bevacizumab 15mg/kg  12/2/19: Cycle #2 paclitaxel 175mg/m2 + carboplatin AUC 6 + bevacizumab 15mg/kg. Carboplatin reaction.  12/27/19: Cycle #3 Paclitaxel/Avastin/inpatient Carboplatin desensitization.   1/20/2020: CT  cap IMPRESSION:  1. Mosaic attenuation throughout the lungs with associated dilated main pulmonary artery likely due to pulmonary arterial hypertension in the appropriate clinical setting.  2. Stable sub-4 mm bilateral pulmonary nodules. No new or enlarging pulmonary nodules.  3. Interval improvement of previously seen enlarged lymph nodes in the lower neck, chest, abdomen and pelvis. No new or enlarging lymph nodes in the present study.  4. Stable soft tissue mesenteric nodules as well as mesenteric root fat stranding.  5. Hepatic steatosis. Additional incidental findings as described above.  1/22/2020: Cycle #4 Paclitaxel/Avastin/inpatient Carboplatin desensitization.   2/13/2020: Cycle #5 Paclitaxel/Avastin/inpatient Carboplatin desensitization.   3/3/2020: 24 h urine protein 0.15  3/5/2020: Cycle #6 Paclitaxel/Avastin/inpatient Carboplatin desensitization.   3/25/2020: Cycle #1 Avastin   4/22/2020: Cycle #2 Avastin   5/13/2020: Cycle #3 Avastin   6/3/2020: Cycle #4 Avastin   6/25/2020: Cycle #5 Avastin   7/16/25/2020: Cycle #6 Avastin   7/27/20 CT scan ( worsening disease)  8/6/2020: C1 Day 1weekly taxol  9/14/2020: C2 Day 1 weekly taxol  10/7/2020: C3 Day 1 wkly taxol  11/5/20 CT scan Chest/abdomen  Increasing mediastinal adenopathy, increased peritoneal nodules  Lungs: Possible metastases/infection or both. (Increased  groundglass and tree-in-bud opacities of the left upper and lower  lobes and new groundglass and tree-in-bud opacities in the right  middle and lower lobes. Moderate bilateral pleural effusions.)    Past Medical History:    Past Medical History:   Diagnosis Date     Diabetes (H)     Type II     Endometrial cancer determined by uterine biopsy (H) 02/2018     HTN (hypertension)      Obesity      Osteoarthritis          Past Surgical History:    Past Surgical History:   Procedure Laterality Date     CHOLECYSTECTOMY  1993     CYSTOSCOPY N/A 2/23/2018    Procedure: CYSTOSCOPY;;  Surgeon: Beatriz  Kevin BUNCH MD;  Location: [facility] OR     DAVINCI HYSTERECTOMY TOTAL, BILATERAL SALPINGO-OOPHORECTOMY, COMBINED N/A 2/23/2018    Procedure: COMBINED DAVINCI HYSTERECTOMY TOTAL, SALPINGO-OOPHORECTOMY;  DaVinci Assisted Total Laparoscopic Hysterectomy, Bilateral Salpingo-Oophorectomy, Cystoscopy,  Omental biopsy, omentectomy,bilateral  Pelvic And Para Aortic Lymph Node Dissection;  Surgeon: Kevin Henderson MD;  Location: [facility] OR     Partial lumbar discectomy  1998         Health Maintenance Due   Topic Date Due     DEXA  1950     LIPID  1950     MICROALBUMIN  1952     DIABETIC FOOT EXAM  1952     ADVANCE CARE PLANNING  1950     EYE EXAM  1950     HEPATITIS C SCREENING  04/07/1970     ZOSTER IMMUNIZATION (1 of 2) 04/07/2002     MEDICARE ANNUAL WELLNESS VISIT  04/07/2017     Pneumococcal Vaccine: Pediatrics (0 to 5 Years) and At-Risk Patients (6 to 64 Years) (2 of 3 - PPSV23) 02/09/2018     A1C  05/09/2018     Pneumococcal Vaccine: 65+ Years (2 of 2 - PPSV23) 12/15/2018     PHQ-2  01/01/2020     INFLUENZA VACCINE (1) 09/01/2020     FALL RISK ASSESSMENT  10/09/2020       Current Medications:     Current Outpatient Medications   Medication Sig Dispense Refill     ACETAMINOPHEN PO Take 325 mg by mouth every 6 hours as needed for pain       amLODIPine (NORVASC) 5 MG tablet Take 1 tablet (5 mg) by mouth daily 30 tablet 0     Famotidine (PEPCID PO) Take 10 mg by mouth as needed        fluticasone (FLONASE) 50 MCG/ACT spray Spray 1-2 sprays into both nostrils daily 1 Bottle 1     L-Glutamine 500 MG CAPS        loperamide (IMODIUM) 2 MG capsule Take 2 mg by mouth 4 times daily as needed for diarrhea       loratadine (CLARITIN REDITABS) 5 MG dispersible tablet Take 5 mg by mouth daily       magnesium 500 MG TABS Take 1,000 mg by mouth        METFORMIN HCL PO Take 500 mg by mouth daily (with breakfast)        omeprazole (PRILOSEC) 40 MG DR capsule        oxyCODONE (ROXICODONE) 5 MG tablet Take 1  tablet (5 mg) by mouth every 6 hours as needed for severe pain Continue to take Tylenol 650 mg every 6 hours. Add daily Claritin for the bone pain. Use Oxycodone as needed for severe or breakthrough pain. 20 tablet 0     prochlorperazine (COMPAZINE) 10 MG tablet Take 0.5 tablets (5 mg) by mouth every 6 hours as needed (nausea/vomiting) 30 tablet 2     pyridoxine (VITAMIN B-6) 50 MG TABS Take 1 tablet (50 mg) by mouth 2 times daily 60 tablet 3     triamterene-hydrochlorothiazide (DYAZIDE) 37.5-25 MG per capsule Take 1 capsule by mouth every morning        iohexol (OMNIPAQUE) 140 MG/ML solution for oral use Mix entire bottle (50 ml) of contrast with 600 ml (20 ounces) of water and drink half 2 hrs prior to CT scan and half 1 hr prior to scan (Patient not taking: Reported on 10/14/2020) 140 mL 0         Allergies:        Allergies   Allergen Reactions     Carboplatin Anaphylaxis     Chest tightness, hoarse voice, difficulty breathing     Ace Inhibitors Cough     Amoxicillin Hives        Social History:     Social History     Tobacco Use     Smoking status: Former Smoker     Packs/day: 0.25     Years: 20.00     Pack years: 5.00     Quit date: 1991     Years since quittin.5     Smokeless tobacco: Never Used     Tobacco comment: Quit smoking    Substance Use Topics     Alcohol use: Yes     Comment: 4-7/week if out 1-2x's/week       History   Drug Use No         Family History:       Family History   Problem Relation Age of Onset     Colon Cancer Mother      Breast Cancer Sister      Lymphoma Sister          Physical Exam:     BP 90/54 (BP Location: Left arm, Patient Position: Sitting, Cuff Size: Adult Regular)   Pulse 83   Temp 97.7  F (36.5  C) (Oral)   Resp 20   Wt 110.4 kg (243 lb 4.8 oz)   SpO2 (!) 89%   BMI 41.74 kg/m    Body mass index is 41.74 kg/m .    General Appearance:  fatigued, siting in wheelchair with O2 in palce healthy and alert, no distress                Psychiatric: appropriate mood  and affect                               Ext: right LE edema > left ( chronic per patient)    Remaining clinic exam deferred in this patient at risk of COVID         Assessment: 70 yrs old with recurrent serous endometrial cancer with pulmonary and peritoneal metastasis.    I have reviewed most recent imaging which shows worsening disease on weekly Taxol, completed 3 cycles    Worsening shortness of breath with decreased oxygen saturation with evidence of infiltrative disease on Chest CT, possible community acquired pneumonia versus COVID associated pneumonia.     Plan:    1. Patient referred to ED for COVID testing and admission to Gyn Onc service if negative.     2. Plan change of chemotherapy treatment post management of acute event. Patient did have platinum response and may be eligible for additional platinum although required carbo desensitization. Alternative regiment to consider include Doxil. I discussed available clinical trials, non currently open but will continue to consider options.    3. Ref to palliative care for pain and goal of care management    4. Right LE lymphedema, US in 9/20 negative.      Mary Redding M.D., MPH,  F.A.C.O.G.  Professor  Department of Ob/Gyn and Women's Health  Division of Gynecologic Oncology  [facility] [place]  [phone]      Time spent with patient 45 minutes and more than 50 % of the time was spent reviewing data, counseling the patient, discussing treatment options and coordination of care.        CC  Patient Care Team:  Levar Scott as PCP - General (Internal Medicine)  Jeanne Blancas MD as Referring Physician (OB/Gyn)  Alondra Ruiz, RN as Specialty Care Coordinator (Gynecologic Oncology)  Yen Farias MD as MD (Gynecologic Oncology)  Yasmine Zazueta APRN CNP as Assigned PCP  Lisa Barroso APRN CNP as Assigned OBGYN Provider  Yen Farias MD as Assigned Cancer Care Provider  YEN FARIAS  J        Again, thank you for allowing me to participate in the care of your patient.        Sincerely,        Mary Redding MD

## 2020-11-12 NOTE — LETTER
2020      RE: Lu Smith  [address]  [facility] [address]       GYNECOLOGY ONCOLOGY CLINIC NOTE       Dr. 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      Patient presents with her sister today. She reports worsening fatigue, shortness of breath and abdominal pain over the last two weeks. Her sister reports she has maybe two good days, but otherwise has been fatigued and needing to rest most of the day. Patient reports she has cough, non-productive and worsening shortness of breath. She denies fever but has been taking Tylenol scheduled for her pain. + nausea, no vomiting, has a normal stool today. She reports having a negative COVID test 2 weeks ago.     Oncology History:  Diagnosis: Stage IIIC2 serous endometrial cancer  Primary GYN oncologist: Dr. Kevin Henderson  Primary radiation oncologist: Dr. Ana Michelle  Surgery: 18: TLH-BSO, omentectomy, bilateral pelvic and para-aortic lymph node dissection, pelvic washings  Chemotherapy:  3/30/18-18: Six cycles of carboplatin AUC 6 and paclitaxel 175mg/m2, delivered sandwich style with pelvic radiation between cycles three and four  Radiation: 18-18: EBRT delivered to pelvis and para-aortic nodes, 5040 cGy in 28 fractions  18-18: HDR brachytherapy delivered to the vaginal cuff, 1200 cGy in two fractions  Complications: Transient neuropathy, mild thrombocytopenia and anemia  Genetic testing: Panel testing negative     19: Chest CT performed for pulmonary nodule follow up, concerning for recurrent disease  IMPRESSION:   1. Few bilateral sub-4 mm solid pulmonary nodules are unchanged compared to 2018. No new or enlarging pulmonary nodules.  2. Dilated main pulmonary artery, nonspecific but may be seen  with pulmonary artery hypertension.  3. New 1.9 cm left subclavicular node.     9/28/19: PET impression:  In this 69-year-old female with history of stage IIIC2 serous endometrial cancer status post total hysterectomy and bilateral salpingo-oophorectomy, omentectomy, bilateral pelvic and periaortic dissection, and radiation to the pelvis, and brachytherapy to the vaginal cuff.  1. Progression of disease with new intensely FDG avid para-aortic, mediastinal, and subclavicular lymphadenopathy. The subdiaphragmatic periaortic lymphadenopathy spans from the diaphragmatic crura to the  low abdominal aorta at the level of L4 with greater than 180 degree involvement of the aorta. There is additional lymphadenopathy within the left axilla and the right inguinal region.     9/28/19: Neck CT impression:  1. Progression of metastatic disease with new intensely FDG avid retroclavicular lymph node at the level of the left thoracic inlet.   2. On the fusion PET CT, there is no definite abnormal metabolic activity in the mucosal space, soft tissues, or cervical jamel chains.  3. No evidence of mucosal or soft tissue abnormality on contrast enhanced neck CT.  4. Please refer to the whole body PET CT performed as a separate report, for the findings of the remainder of the body.     10/24/19: Lymph node biopsy:  Lymph node, periaortic, CT guided core biopsy:   -METASTATIC ADENOCARCINOMA   -Tumor morphology is consistent with metastasis/recurrence of endometrial serous carcinoma      11/11/19: Cycle #1 paclitaxel 175mg/m2 + carboplatin AUC 6 + bevacizumab 15mg/kg  12/2/19: Cycle #2 paclitaxel 175mg/m2 + carboplatin AUC 6 + bevacizumab 15mg/kg. Carboplatin reaction.  12/27/19: Cycle #3 Paclitaxel/Avastin/inpatient Carboplatin desensitization.   1/20/2020: CT cap IMPRESSION:  1. Mosaic attenuation throughout the lungs with associated dilated main pulmonary artery likely due to pulmonary arterial hypertension in the appropriate clinical  setting.  2. Stable sub-4 mm bilateral pulmonary nodules. No new or enlarging pulmonary nodules.  3. Interval improvement of previously seen enlarged lymph nodes in the lower neck, chest, abdomen and pelvis. No new or enlarging lymph nodes in the present study.  4. Stable soft tissue mesenteric nodules as well as mesenteric root fat stranding.  5. Hepatic steatosis. Additional incidental findings as described above.  1/22/2020: Cycle #4 Paclitaxel/Avastin/inpatient Carboplatin desensitization.   2/13/2020: Cycle #5 Paclitaxel/Avastin/inpatient Carboplatin desensitization.   3/3/2020: 24 h urine protein 0.15  3/5/2020: Cycle #6 Paclitaxel/Avastin/inpatient Carboplatin desensitization.   3/25/2020: Cycle #1 Avastin   4/22/2020: Cycle #2 Avastin   5/13/2020: Cycle #3 Avastin   6/3/2020: Cycle #4 Avastin   6/25/2020: Cycle #5 Avastin   7/16/25/2020: Cycle #6 Avastin   7/27/20 CT scan ( worsening disease)  8/6/2020: C1 Day 1weekly taxol  9/14/2020: C2 Day 1 weekly taxol  10/7/2020: C3 Day 1 wkly taxol  11/5/20 CT scan Chest/abdomen  Increasing mediastinal adenopathy, increased peritoneal nodules  Lungs: Possible metastases/infection or both. (Increased  groundglass and tree-in-bud opacities of the left upper and lower  lobes and new groundglass and tree-in-bud opacities in the right  middle and lower lobes. Moderate bilateral pleural effusions.)    Past Medical History:    Past Medical History:   Diagnosis Date     Diabetes (H)     Type II     Endometrial cancer determined by uterine biopsy (H) 02/2018     HTN (hypertension)      Obesity      Osteoarthritis          Past Surgical History:    Past Surgical History:   Procedure Laterality Date     CHOLECYSTECTOMY  1993     CYSTOSCOPY N/A 2/23/2018    Procedure: CYSTOSCOPY;;  Surgeon: Kevin Henderson MD;  Location: [facility] OR     DAVINCI HYSTERECTOMY TOTAL, BILATERAL SALPINGO-OOPHORECTOMY, COMBINED N/A 2/23/2018    Procedure: COMBINED DAVINCI HYSTERECTOMY TOTAL,  SALPINGO-OOPHORECTOMY;  DaVinci Assisted Total Laparoscopic Hysterectomy, Bilateral Salpingo-Oophorectomy, Cystoscopy,  Omental biopsy, omentectomy,bilateral  Pelvic And Para Aortic Lymph Node Dissection;  Surgeon: Kevin Henderson MD;  Location: [facility] OR     Partial lumbar discectomy  1998         Health Maintenance Due   Topic Date Due     DEXA  1950     LIPID  1950     MICROALBUMIN  1952     DIABETIC FOOT EXAM  1952     ADVANCE CARE PLANNING  1950     EYE EXAM  1950     HEPATITIS C SCREENING  04/07/1970     ZOSTER IMMUNIZATION (1 of 2) 04/07/2002     MEDICARE ANNUAL WELLNESS VISIT  04/07/2017     Pneumococcal Vaccine: Pediatrics (0 to 5 Years) and At-Risk Patients (6 to 64 Years) (2 of 3 - PPSV23) 02/09/2018     A1C  05/09/2018     Pneumococcal Vaccine: 65+ Years (2 of 2 - PPSV23) 12/15/2018     PHQ-2  01/01/2020     INFLUENZA VACCINE (1) 09/01/2020     FALL RISK ASSESSMENT  10/09/2020       Current Medications:     Current Outpatient Medications   Medication Sig Dispense Refill     ACETAMINOPHEN PO Take 325 mg by mouth every 6 hours as needed for pain       amLODIPine (NORVASC) 5 MG tablet Take 1 tablet (5 mg) by mouth daily 30 tablet 0     Famotidine (PEPCID PO) Take 10 mg by mouth as needed        fluticasone (FLONASE) 50 MCG/ACT spray Spray 1-2 sprays into both nostrils daily 1 Bottle 1     L-Glutamine 500 MG CAPS        loperamide (IMODIUM) 2 MG capsule Take 2 mg by mouth 4 times daily as needed for diarrhea       loratadine (CLARITIN REDITABS) 5 MG dispersible tablet Take 5 mg by mouth daily       magnesium 500 MG TABS Take 1,000 mg by mouth        METFORMIN HCL PO Take 500 mg by mouth daily (with breakfast)        omeprazole (PRILOSEC) 40 MG DR capsule        oxyCODONE (ROXICODONE) 5 MG tablet Take 1 tablet (5 mg) by mouth every 6 hours as needed for severe pain Continue to take Tylenol 650 mg every 6 hours. Add daily Claritin for the bone pain. Use Oxycodone as needed  for severe or breakthrough pain. 20 tablet 0     prochlorperazine (COMPAZINE) 10 MG tablet Take 0.5 tablets (5 mg) by mouth every 6 hours as needed (nausea/vomiting) 30 tablet 2     pyridoxine (VITAMIN B-6) 50 MG TABS Take 1 tablet (50 mg) by mouth 2 times daily 60 tablet 3     triamterene-hydrochlorothiazide (DYAZIDE) 37.5-25 MG per capsule Take 1 capsule by mouth every morning        iohexol (OMNIPAQUE) 140 MG/ML solution for oral use Mix entire bottle (50 ml) of contrast with 600 ml (20 ounces) of water and drink half 2 hrs prior to CT scan and half 1 hr prior to scan (Patient not taking: Reported on 10/14/2020) 140 mL 0         Allergies:        Allergies   Allergen Reactions     Carboplatin Anaphylaxis     Chest tightness, hoarse voice, difficulty breathing     Ace Inhibitors Cough     Amoxicillin Hives        Social History:     Social History     Tobacco Use     Smoking status: Former Smoker     Packs/day: 0.25     Years: 20.00     Pack years: 5.00     Quit date: 1991     Years since quittin.5     Smokeless tobacco: Never Used     Tobacco comment: Quit smoking    Substance Use Topics     Alcohol use: Yes     Comment: 4-7/week if out 1-2x's/week       History   Drug Use No         Family History:       Family History   Problem Relation Age of Onset     Colon Cancer Mother      Breast Cancer Sister      Lymphoma Sister          Physical Exam:     BP 90/54 (BP Location: Left arm, Patient Position: Sitting, Cuff Size: Adult Regular)   Pulse 83   Temp 97.7  F (36.5  C) (Oral)   Resp 20   Wt 110.4 kg (243 lb 4.8 oz)   SpO2 (!) 89%   BMI 41.74 kg/m    Body mass index is 41.74 kg/m .    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    I have reviewed most recent imaging which shows worsening disease on weekly Taxol, completed 3 cycles    Worsening shortness of breath with decreased oxygen saturation with evidence of infiltrative disease on Chest CT, possible community acquired pneumonia versus COVID associated pneumonia.     Plan:    1. Patient referred to ED for COVID testing and admission to Gyn Onc service if negative.     2. Plan change of chemotherapy treatment post management of acute event. Patient did have platinum response and may be eligible for additional platinum although required carbo desensitization. Alternative regiment to consider include Doxil. I discussed available clinical trials, non currently open but will continue to consider options.    3. Ref to palliative care for pain and goal of care management    4. Right LE lymphedema, US in 9/20 negative.      Mary Redding M.D., MPH,  F.A.C.O.G.  Professor  Department of Ob/Gyn and Women's Health  Division of Gynecologic Oncology  [facility]/[employer] [place]  [phone]      Time spent with patient 45 minutes and more than 50 % of the time was spent reviewing data, counseling the patient, discussing treatment options and coordination of care.      CC  Patient Care Team:  Levar Scott as PCP - General (Internal Medicine)  Jeanne Blancas MD as Referring Physician (OB/Gyn)  Alondra Ruiz, RN as Specialty Care Coordinator (Gynecologic Oncology)  Yasmine Zazueta APRN CNP as Assigned PCP  Lisa Barroso APRN CNP as Assigned OBGYN Provider

## 2020-11-12 NOTE — ED TRIAGE NOTES
Shortness of breath for past 6 weeks. At appt for chemo today, sent to ED for SOB concern. Speaking full sentences, 95% on room air. Pt describes shortness of breath with activity at home, worse past few days. Afebrile. GCS 15.

## 2020-11-12 NOTE — PHARMACY-ADMISSION MEDICATION HISTORY
Admission Medication History Completed by Pharmacy    See [facility] Admission Navigator for allergy information, preferred outpatient pharmacy, prior to admission medications and immunization status.     Medication History Sources:     Patient, Sure Scripts     Changes made to PTA medication list (reason):    Added: Ibuprofen    Deleted: Omnipaque, prochlorperazine (pt no longer uses)    Changed:   o APAP 325 mg q6h prn -> 500 mg q6h prn  o Flonase daily -> daily prn  o Added directions to L-glutamine  o Magnesium 1000 mg -> 500 mg daily  o Added directions to omeprazole  o Vitmain B6 1 tab BID -> 1 tab daily    Additional Information:    Patient was a fairly good historian and was able to confirm doses and directions for most of her medications.    Prior to Admission medications    Medication Sig Last Dose Taking? Auth Provider   acetaminophen (TYLENOL) 500 MG tablet Take 500 mg by mouth every 6 hours as needed for pain  11/12/2020 at AM Yes Reported, Patient   amLODIPine (NORVASC) 5 MG tablet Take 1 tablet (5 mg) by mouth daily 11/11/2020 at Unknown time Yes Kevin Henderson MD   Famotidine (PEPCID PO) Take 10 mg by mouth as needed  Past Month at Unknown time Yes Reported, Patient   fluticasone (FLONASE) 50 MCG/ACT spray Spray 1-2 sprays into both nostrils daily as needed Past Month at Unknown time Yes Yasmine Zazueta APRN CNP   ibuprofen (ADVIL/MOTRIN) 200 MG tablet Take 400 mg by mouth daily 11/12/2020 at AM Yes Unknown, Entered By History   L-Glutamine 500 MG CAPS Take 1 capsule by mouth daily  11/11/2020 at Unknown time Yes Reported, Patient   loperamide (IMODIUM) 2 MG capsule Take 2 mg by mouth 4 times daily as needed for diarrhea Past Week at Unknown time Yes Reported, Patient   loratadine (CLARITIN REDITABS) 5 MG dispersible tablet Take 5 mg by mouth daily 11/12/2020 at AM Yes Reported, Patient   magnesium 500 MG TABS Take 500 mg by mouth daily  11/11/2020 at Unknown time Yes Reported, Patient    METFORMIN HCL PO Take 500 mg by mouth daily (with breakfast)  11/11/2020 at Unknown time Yes Reported, Patient   omeprazole (PRILOSEC) 40 MG DR capsule Take 40 mg by mouth daily as needed  Past Week at Unknown time Yes Reported, Patient   oxyCODONE (ROXICODONE) 5 MG tablet Take 1 tablet (5 mg) by mouth every 6 hours as needed for severe pain Continue to take Tylenol 650 mg every 6 hours. Add daily Claritin for the bone pain. Use Oxycodone as needed for severe or breakthrough pain. 11/11/2020 at Unknown time Yes Jill Fu APRN CNP   pyridoxine (VITAMIN B-6) 50 MG TABS Take 1 tablet (50 mg) by mouth 2 times daily  Patient taking differently: Take 50 mg by mouth daily  11/11/2020 at Unknown time Yes Yasmine Zazueta APRN CNP   triamterene-hydrochlorothiazide (DYAZIDE) 37.5-25 MG per capsule Take 1 capsule by mouth every morning  11/12/2020 at AM Yes Reported, Patient       Date completed: 11/12/20    Medication history completed by: Emma Mcduffie, PharmD Resident

## 2020-11-12 NOTE — NURSING NOTE
"Oncology Rooming Note    November 12, 2020 9:55 AM   Lu Smith is a 70 year old female who presents for:    Chief Complaint   Patient presents with     Port Draw     Labs drawn via port by Rn in lab. VS taken. Pt checked in for next appt     Oncology Clinic Visit     Endometrial cancer      Initial Vitals: BP 90/54 (BP Location: Left arm, Patient Position: Sitting, Cuff Size: Adult Regular)   Pulse 83   Temp 97.7  F (36.5  C) (Oral)   Resp 20   Wt 110.4 kg (243 lb 4.8 oz)   SpO2 (!) 89%   BMI 41.74 kg/m   Estimated body mass index is 41.74 kg/m  as calculated from the following:    Height as of 7/16/20: 1.626 m (5' 4.02\").    Weight as of this encounter: 110.4 kg (243 lb 4.8 oz). Body surface area is 2.23 meters squared.  Mild Pain (2) Comment: Data Unavailable   No LMP recorded. Patient has had a hysterectomy.  Allergies reviewed: Yes  Medications reviewed: Yes    Medications: MEDICATION REFILLS NEEDED TODAY. Provider was NOT notified. Patient needs refill on Claritin.   Pharmacy name entered into [employer]: [facility] [address] [place] [address] - [address] [facility]    Clinical concerns: Patient has been having SOB for six weeks now and seems like its getting worse. O2 levels were checked again and oxygen level was 98% with oxygen tank. Patient is also having abdominal pain since September. Pain in abdomen is everyday and goes away in an hour after taking pain pill.       Roya Skaggs MA            "

## 2020-11-12 NOTE — H&P
[facility] History and Physical    Lu Smith MRN# [id number]   Age: 70 year old YOB: 1950     Date of Admission:  11/12/2020    Primary care provider: Levar Scott             Chief Complaint:   SOB         History of Present Illness:   This patient is a 70 year old female with recurrent stage IIIC2 serous endometrial cancer, DM2, HTN, and obesity who presented from clinic to the ED with SOB and dyspnea on exertion for the past 6 weeks. Patient is currently on cycle #3 of weekly taxol. She presented the ED today reporting for the past 3 weeks she has had increasing dyspnea on exertion and worsening shortness of breath to the point where she cannot even walk to the bathroom.  Chest CT from 11/5/20 showed mild bilateral hilar infiltrates.  Patient presented to the infusion clinic for chemo today and was found to be satting in the 80s and was sent to the ED. Patient also endorses chronic bilateral leg swelling (not worsening).     She reports worsening fatigue, shortness of breath and abdominal pain over the last two weeks, but really started to notice it about 6 weeks ago. Her sister reports she has maybe two good days, but otherwise has been fatigued and needing to rest most of the day. She has to stop to rest after getting dressed or walking short distances, like to her bathroom. Her SOB does not worsen when laying flat and no associated chest pain or pressure with exertion. Patient reports she has cough, non-productive and occasional wheezing. She denies fever but has been taking Tylenol scheduled for her pain. + nausea, no vomiting, has a normal stool today. She reports having a negative COVID test 2 weeks ago. She also reports heartburn occasionally, but no chest pain or pain with taking deep breaths. No hemoptysis. She has occasional left sided abdominal pain, but none currently. She denies dysuria. No calf pain, but chronic edema.         Cancer Treatment History:    Diagnosis: Stage IIIC2 serous endometrial cancer  Primary GYN oncologist: Dr. Kevin Henderson  Primary radiation oncologist: Dr. Ana Michelle  Surgery: 2/23/18: TLH-BSO, omentectomy, bilateral pelvic and para-aortic lymph node dissection, pelvic washings  Chemotherapy:  3/30/18-9/28/18: Six cycles of carboplatin AUC 6 and paclitaxel 175mg/m2, delivered sandwich style with pelvic radiation between cycles three and four  Radiation: 6/4/18-7/12/18: EBRT delivered to pelvis and para-aortic nodes, 5040 cGy in 28 fractions  7/16/18-7/20/18: HDR brachytherapy delivered to the vaginal cuff, 1200 cGy   Genetic testing: Panel testing negative     9/16/19: Chest CT performed for pulmonary nodule follow up, concerning for recurrent disease  IMPRESSION:   1. Few bilateral sub-4 mm solid pulmonary nodules are unchanged compared to 2/6/2018. No new or enlarging pulmonary nodules.  2. Dilated main pulmonary artery, nonspecific but may be seen with pulmonary artery hypertension.  3. New 1.9 cm left subclavicular node.     9/28/19: PET impression:  In this 69-year-old female with history of stage IIIC2 serous endometrial cancer status post total hysterectomy and bilateral salpingo-oophorectomy, omentectomy, bilateral pelvic and periaortic dissection, and radiation to the pelvis, and brachytherapy to the vaginal cuff.  1. Progression of disease with new intensely FDG avid para-aortic, mediastinal, and subclavicular lymphadenopathy. The subdiaphragmatic periaortic lymphadenopathy spans from the diaphragmatic crura to the  low abdominal aorta at the level of L4 with greater than 180 degree involvement of the aorta. There is additional lymphadenopathy within the left axilla and the right inguinal region.     9/28/19: Neck CT impression:  1. Progression of metastatic disease with new intensely FDG avid retroclavicular lymph node at the level of the left thoracic inlet.   2. On the fusion PET CT, there is no definite abnormal  metabolic activity in the mucosal space, soft tissues, or cervical jamel chains.  3. No evidence of mucosal or soft tissue abnormality on contrast enhanced neck CT.  4. Please refer to the whole body PET CT performed as a separate report, for the findings of the remainder of the body.     10/24/19: Lymph node biopsy:  Lymph node, periaortic, CT guided core biopsy:   -METASTATIC ADENOCARCINOMA   -Tumor morphology is consistent with metastasis/recurrence of endometrial serous carcinoma      11/11/19: Cycle #1 paclitaxel 175mg/m2 + carboplatin AUC 6 + bevacizumab 15mg/kg  12/2/19: Cycle #2 paclitaxel 175mg/m2 + carboplatin AUC 6 + bevacizumab 15mg/kg. Carboplatin reaction.  12/27/19: Cycle #3 Paclitaxel/Avastin/inpatient Carboplatin desensitization.   1/20/2020: CT cap IMPRESSION:  1. Mosaic attenuation throughout the lungs with associated dilated main pulmonary artery likely due to pulmonary arterial hypertension in the appropriate clinical setting.  2. Stable sub-4 mm bilateral pulmonary nodules. No new or enlarging pulmonary nodules.  3. Interval improvement of previously seen enlarged lymph nodes in the lower neck, chest, abdomen and pelvis. No new or enlarging lymph nodes in the present study.  4. Stable soft tissue mesenteric nodules as well as mesenteric root fat stranding.  5. Hepatic steatosis. Additional incidental findings as described above.  1/22/2020: Cycle #4 Paclitaxel/Avastin/inpatient Carboplatin desensitization.   2/13/2020: Cycle #5 Paclitaxel/Avastin/inpatient Carboplatin desensitization.   3/3/2020: 24 h urine protein 0.15  3/5/2020: Cycle #6 Paclitaxel/Avastin/inpatient Carboplatin desensitization.   3/25/2020: Cycle #1 Avastin   4/22/2020: Cycle #2 Avastin   5/13/2020: Cycle #3 Avastin   6/3/2020: Cycle #4 Avastin   6/25/2020: Cycle #5 Avastin   7/16/25/2020: Cycle #6 Avastin   7/27/20 CT scan ( worsening disease)  8/6/2020: C1 Day 1weekly taxol  9/14/2020: C2 Day 1 weekly taxol  10/7/2020: C3  Day 1 wkly taxol  20 CT scan Chest/abdomen  Increasing mediastinal adenopathy, increased peritoneal nodules Lungs: Possible metastases/infection or both. (Increased groundglass and tree-in-bud opacities of the left upper and lower lobes and new groundglass and tree-in-bud opacities in the right middle and lower lobes. Moderate bilateral pleural effusions.)         Past Medical History:     Past Medical History:   Diagnosis Date     Diabetes (H)     Type II     Endometrial cancer determined by uterine biopsy (H) 2018     HTN (hypertension)      Obesity      Osteoarthritis             Past Surgical History:      Past Surgical History:   Procedure Laterality Date     CHOLECYSTECTOMY       CYSTOSCOPY N/A 2018    Procedure: CYSTOSCOPY;;  Surgeon: Kevin Henderson MD;  Location: [facility] OR     DAVINCI HYSTERECTOMY TOTAL, BILATERAL SALPINGO-OOPHORECTOMY, COMBINED N/A 2018    Procedure: COMBINED DAVINCI HYSTERECTOMY TOTAL, SALPINGO-OOPHORECTOMY;  DaVinci Assisted Total Laparoscopic Hysterectomy, Bilateral Salpingo-Oophorectomy, Cystoscopy,  Omental biopsy, omentectomy,bilateral  Pelvic And Para Aortic Lymph Node Dissection;  Surgeon: Kevin Henderson MD;  Location: [facility] OR     Partial lumbar discectomy              Social History:     Social History     Tobacco Use     Smoking status: Former Smoker     Packs/day: 0.25     Years: 20.00     Pack years: 5.00     Quit date: 1991     Years since quittin.5     Smokeless tobacco: Never Used     Tobacco comment: Quit smoking    Substance Use Topics     Alcohol use: Yes     Comment: 4-7/week if out 1-2x's/week            Family History:     Family History   Problem Relation Age of Onset     Colon Cancer Mother      Breast Cancer Sister      Lymphoma Sister             Immunizations:     Immunization History   Administered Date(s) Administered     DTaP, Unspecified 2011     Influenza (High Dose) 3 valent vaccine 2018, 10/30/2019      Pneumo Conj 13-V (2010&after) 12/15/2017            Allergies:     Allergies   Allergen Reactions     Carboplatin Anaphylaxis     Chest tightness, hoarse voice, difficulty breathing     Ace Inhibitors Cough     Amoxicillin Hives            Medications:     Current Facility-Administered Medications   Medication     acetaminophen (TYLENOL) tablet 650 mg     albuterol (PROAIR HFA/PROVENTIL HFA/VENTOLIN HFA) 108 (90 Base) MCG/ACT inhaler 2 puff     albuterol (PROAIR HFA/PROVENTIL HFA/VENTOLIN HFA) 108 (90 Base) MCG/ACT inhaler 2 puff     calcium carbonate (TUMS) chewable tablet 1,000 mg     glucose gel 15-30 g    Or     dextrose 50 % injection 25-50 mL    Or     glucagon injection 1 mg     diphenhydrAMINE (BENADRYL) injection 50 mg     enoxaparin ANTICOAGULANT (LOVENOX) injection 40 mg     famotidine (PEPCID) tablet 10 mg     heparin 100 UNIT/ML injection 5 mL     heparin lock flush 10 UNIT/ML injection 5-10 mL     heparin lock flush 10 UNIT/ML injection 5-10 mL     insulin aspart (NovoLOG) injection (RAPID ACTING)     insulin aspart (NovoLOG) injection (RAPID ACTING)     lidocaine (LMX4) cream     lidocaine 1 % 0.1-1 mL     melatonin tablet 1 mg     naloxone (NARCAN) injection 0.1-0.4 mg     omeprazole (priLOSEC) CR capsule 40 mg     ondansetron (ZOFRAN-ODT) ODT tab 4 mg    Or     ondansetron (ZOFRAN) injection 4 mg     oxyCODONE (ROXICODONE) tablet 5 mg     piperacillin-tazobactam (ZOSYN) 4.5 g vial to attach to  mL bag     polyethylene glycol (MIRALAX) Packet 17 g     senna-docusate (SENOKOT-S/PERICOLACE) 8.6-50 MG per tablet 1 tablet    Or     senna-docusate (SENOKOT-S/PERICOLACE) 8.6-50 MG per tablet 2 tablet     sodium chloride (PF) 0.9% PF flush 10-20 mL     sodium chloride (PF) 0.9% PF flush 10-20 mL     sodium chloride (PF) 0.9% PF flush 3 mL     sodium chloride (PF) 0.9% PF flush 3 mL            Review of Systems:     CONSTITUTIONAL:  positive for  fatigue and anorexia, negative for fever or  chills  RESPIRATORY:  positive for  dry cough, dyspnea and wheezing and negative for  cough with sputum, hemoptysis, chest pain and pleuritic pain  CARDIOVASCULAR:  negative for palpitations, chest pain  GASTROINTESTINAL:  negative for change in bowel habits and abdominal distention, positive for reflux, nausea  GENITOURINARY:  negative for frequency, dysuria and urinary incontinence  HEMATOLOGIC/LYMPHATIC:  negative for easy bruising and bleeding, positive for swelling  ENDOCRINE:  negative for weight changes and change in bowel habits         Physical Exam:     Vitals:    11/12/20 1245 11/12/20 1300 11/12/20 1330 11/12/20 1345   BP: 131/89 125/67 134/66 130/68   Pulse: 71 71 77 75   Resp: 16      Temp:       SpO2: 95% 93% 95% 94%   O2 RA    General: semi-reclined speaking in short sentences  CV: RRR, no murmurs.   Resp: breathing mildly uncomfortably on room air, faint wheezes and crackles throughout. Diminished sounds at the bases.   Abdomen: Soft, non-tender to palpation, bowel sounds present, no rebound or guarding  : deferred  Extremities: significant 3+ lymphedema b/l with taught RLE and mild tenderness throughout.          Data:     Results for orders placed or performed during the hospital encounter of 11/12/20 (from the past 24 hour(s))   EKG 12 lead   Result Value Ref Range    Interpretation ECG Click View Image link to view waveform and result    INR   Result Value Ref Range    INR 1.02 0.86 - 1.14   Partial thromboplastin time   Result Value Ref Range    PTT 26 22 - 37 sec   Troponin I   Result Value Ref Range    Troponin I ES <0.015 0.000 - 0.045 ug/L   Nt probnp inpatient   Result Value Ref Range    N-Terminal Pro BNP Inpatient 308 0 - 900 pg/mL   XR Chest Port 1 View    Narrative    EXAM: XR CHEST PORT 1 VW  11/12/2020 12:28 PM     HISTORY:  Shortness of Breath       COMPARISON:  CT 11/5/2020    FINDINGS: Single view of the chest. Midline trachea. Cardiomediastinal  silhouette is partially  obscured. Bibasilar streaky opacities, right  greater than left. Port-A-Cath tip at the low SVC. Bilateral  interstitial opacities. Moderate bilateral pleural effusions. Bulky  hilar or mediastinal lymphadenopathy better visualized on comparison  CT      Impression    IMPRESSION:   1. Bibasilar streaky opacities, right greater than left. Findings may  reflect atelectasis or consolidation  2.. Bilateral interstitial opacities, edema versus infection versus  inflammation.  3. Moderate bilateral pleural effusions.  4. Bulky hilar and mediastinal lymphadenopathy better evaluated on  comparison CT.    I have personally reviewed the examination and initial interpretation  and I agree with the findings.    JUAN DANIEL RIVERA MD   Symptomatic COVID-19 Virus (Coronavirus) by PCR    Specimen: Nasopharyngeal   Result Value Ref Range    COVID-19 Virus PCR to [facility] - Source Nasopharyngeal     COVID-19 Virus PCR to [facility] - Result       Test received-See reflex to IDDL test SARS CoV2 (COVID-19) Virus RT-PCR   Blood Culture ONE site    Specimen: Portacath; Blood   Result Value Ref Range    Specimen Description Blood     Culture Micro No growth after 1 hour    CT Chest Pulmonary Embolism w Contrast    Narrative    CT CHEST PULMONARY EMBOLISM W CONTRAST, 11/12/2020 2:01 PM    History: increasing sob miranda with underlying cancer  eval for PE    Comparison: CT from 11/5/2020.    Technique: Helical acquisition of CT images of the chest from the lung  apices to the kidneys were acquired after the administration of  intravenous contrast according to the CT pulmonary angiogram protocol.  Axial images were reconstructed in 1 and 3 mm slice thickness. Coronal  reconstructions performed. Three-dimensional (3D) post-processed  angiographic images were reconstructed, archived to PACS and used in  the interpretation of this study.    Contrast dose: iopamidol (ISOVUE-370) solution 73 mL    Findings:   The contrast bolus is good.  There is no  definite pulmonary embolism.  Dilated main pulmonary artery    Thorax:   Support devices: Right-sided Port-A-Cath.    Chest wall is unremarkable.    Extensive mediastinal and hilar lymphadenopathy. Conventional aortic  arch branching pattern. The heart is normal in size without  significant pericardial effusion. There are no significant coronary  calcifications.     Lungs:  The trachea and central airways are clear. Moderate right and small  left pleural effusions and associated atelectasis, similar to  11/5/2020. Improving tree-in-bud opacities within the dependent  portions of the lungs, particularly the right middle lobe. Evolving  bandlike fibrosis in the posterior basilar segment of the left lower  lobe. Additional multifocal peribronchovascular  thickening/consolidative changes and bandlike volume loss in the  medial middle lobe anterior segment of the left upper lobe, and within  the right lower lobe, similar to 11/5/2020. Scattered predominantly  perifissural pulmonary nodules again seen, for example the 8 mm  perifissural nodule seen along the major fissure (series 9, image  126).    Upper abdomen: Limited evaluation of the upper abdomen. Ascites partly  imaged.    Bones: No acute or aggressive osseus abnormality.      Impression    Impression:    1. No definite pulmonary embolism identified.  2. Improving tree-in-bud micronodules and consolidative changes.  Evolving multifocal peribronchovascular bandlike fibrosis, likely  sequelae of infection versus infarct.  3. Right greater than left pleural effusions, similar to 11/5/2020.  4. Extensive mediastinal lymphadenopathy.  5. Sequelae of pulmonary hypertension with dilated main pulmonary.      In the event of a positive result for acute pulmonary embolism  resulting in right heart strain, please activate the [facility]  Multidisciplinary group for consultation by paging [phone]).     P[facility] -- Pulmonary Embolism Response Team (Multidisciplinary  team  including cardiology, interventional radiology, critical care,  hematology)    I have personally reviewed the examination and initial interpretation  and I agree with the findings.    JUAN DANIEL RIVERA MD   Glucose by meter   Result Value Ref Range    Glucose 85 70 - 99 mg/dL   Potassium   Result Value Ref Range    Potassium 3.6 3.4 - 5.3 mmol/L   Glucose by meter   Result Value Ref Range    Glucose 82 70 - 99 mg/dL             Assessment and Plan:     Assessment: 70 year old female with recurrent stage IIIC2 serous endometrial cancer, DM2, HTN, and obesity who is admitted for worsening SOB.     Plan:  Recurrent serous endometrial cancer: 2/23/18: TLH-BSO, omentectomy, bilateral pelvic and para-aortic lymph node dissection, pelvic washings. 3/30/18-9/28/18: Six cycles of carboplatin AUC 6 and paclitaxel 175mg/m2, delivered sandwich style with pelvic radiation between cycles three and four. 6/4/18-7/12/18: EBRT delivered to pelvis and para-aortic nodes, 5040 cGy in 28 fractions. 7/16/18-7/20/18: HDR brachytherapy delivered to the vaginal cuff, 1200 cGy in two fractions. 10/24/19: Periaortic lymph node biopsy: Tumor morphology is consistent with metastasis/recurrence of endometrial serous carcinoma. 11/11/19 - 3/5/20: 6 cycles carboplatin desensitization/paclitaxel/avastin. 3/25/20 - 7/16/20 Maintenance avastin. 8/6/20 - present (10/7/20): 3 cycles weekly taxol. 11/5/20 CT scan Chest/abdomen/pelvis: Increasing mediastinal adenopathy, increased peritoneal nodules. Lungs: Possible metastases/infection or both. (Increased groundglass and tree-in-bud opacities of the left upper and lower lobes and new groundglass and tree-in-bud opacities in the right middle and lower lobes. Moderate bilateral pleural effusions.)  - PTA tylenol and oxycodone ordered    Dyspnea on exertion:  Acute hypoxic respiratory failure: Currently on room air, but hypoxic to 80s in clinic. Ddx includes pneumonia (viral vs bacterial, HCAP), PE,  worsening disease with symptomatic pleural effusions, or heart failure. Workup consisted of CT PE that was negative, however, re-demonstrated micro-nodules and consolidative changes as well as b/l pleural effusions, and mediastinal LAD. These findings are similar to CT 11/5. There was notably peribronchovascular fibrosis suggestive of infection vs infarct as well as sequelae of pulmonary hypertension. BNP was 308, EKG neg, and troponin neg. S/p azithromycin and ceftriaxone in the ED for presumed CAP. No leukocytosis and afebrile.   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  - Avoid nephrotoxic agents (no ibuprofen)  - Continue to trend Cr    GERD:   - PTA omeprazole, famotidine, TUMS PRN    Type 2 DM:   - PTA metformin held  - Blood glucose checks AC & HS    HTN:   - PTA amlodipine, triamterene-hydrochlorothiazide held, resume as needed    PPX: lovenox ppx, will hold pending potential IR procedure  Dispo:  Pending clinical improvement    Discussed with Dr. Taveras Gyn Oncology Fellow    Sandor Deutsch MD, MPH  Gynecologic Oncology, PGY-3  November 12, 2020 , 9:55 PM    Pager [phone]      Patient seen and examined, admit with shortness of breath, exclude PE or a COVID infection. Patient currently requiring O2. Assessment and plan as noted above under my direction.    Mary Redding MD

## 2020-11-12 NOTE — ED PROVIDER NOTES
[place] EMERGENCY DEPARTMENT ([facility])  November 12, 2020  History     Chief Complaint   Patient presents with     Shortness of Breath     Shortness of breath for past 6 weeks. At appt for chemo today, sent to ED for SOB concern. Speaking full sentences, 95% on room air. Pt describes shortness of breath with activity at home, worse past few days. Afebrile. GCS 15.     HPI  Lu Smith is a 70 year old female with a past medical history significant for DM2, endometrial cancer, HTN, and obesity who presents here to the Emergency Department due to shortness of breath.  Patient has been receiving weekly infusions through her Port-A-Cath in the past 3 months.  She presents the ED today reporting that for the past 3 weeks she has had increasing dyspnea on exertion and worsening shortness of breath to the point where she cannot even walk to the bathroom.  Chest CT was done which showed mild bilateral hilar infiltrates.  Patient was supposed to go to the infusion center today, but was satting in the 80s, she is not on oxygen here and is now satting at 96.  Patient also endorses chronic bilateral leg swelling.  Patient denies hemoptysis, chest pain, use of anticoagulants, or any history of blood clots.  Patient noted she had a CT scan done last Thursday which showed concerns for possible infection also.  She denies any current fevers no chest pain is noted.    Per review of patient's chart, patient was seen in Oncology earlier today for a lab draw prior to her Infusion appointment. At her infusion appointment she reported shortness of breath concern and was sent here to the ED.    PAST MEDICAL HISTORY:   Past Medical History:   Diagnosis Date     Diabetes (H)     Type II     Endometrial cancer determined by uterine biopsy (H) 02/2018     HTN (hypertension)      Obesity      Osteoarthritis        PAST SURGICAL HISTORY:   Past Surgical History:   Procedure Laterality Date     CHOLECYSTECTOMY  1993      CYSTOSCOPY N/A 2018    Procedure: CYSTOSCOPY;;  Surgeon: Kevin Henderson MD;  Location: [facility] OR     DAVINCI HYSTERECTOMY TOTAL, BILATERAL SALPINGO-OOPHORECTOMY, COMBINED N/A 2018    Procedure: COMBINED DAVINCI HYSTERECTOMY TOTAL, SALPINGO-OOPHORECTOMY;  DaVinci Assisted Total Laparoscopic Hysterectomy, Bilateral Salpingo-Oophorectomy, Cystoscopy,  Omental biopsy, omentectomy,bilateral  Pelvic And Para Aortic Lymph Node Dissection;  Surgeon: Kevin Henderson MD;  Location: [facility] OR     Partial lumbar discectomy         Past medical history, past surgical history, medications, and allergies were reviewed with the patient. Additional pertinent items: None    FAMILY HISTORY:   Family History   Problem Relation Age of Onset     Colon Cancer Mother      Breast Cancer Sister      Lymphoma Sister        SOCIAL HISTORY:   Social History     Tobacco Use     Smoking status: Former Smoker     Packs/day: 0.25     Years: 20.00     Pack years: 5.00     Quit date: 1991     Years since quittin.5     Smokeless tobacco: Never Used     Tobacco comment: Quit smoking    Substance Use Topics     Alcohol use: Yes     Comment: 4-7/week if out 1-2x's/week     Social history was reviewed with the patient. Additional pertinent items: None      Current Discharge Medication List      CONTINUE these medications which have NOT CHANGED    Details   acetaminophen (TYLENOL) 500 MG tablet Take 500 mg by mouth every 6 hours as needed for pain       amLODIPine (NORVASC) 5 MG tablet Take 1 tablet (5 mg) by mouth daily  Qty: 30 tablet, Refills: 0    Associated Diagnoses: Hypertension, unspecified type      Famotidine (PEPCID PO) Take 10 mg by mouth as needed       fluticasone (FLONASE) 50 MCG/ACT spray Spray 1-2 sprays into both nostrils daily  Qty: 1 Bottle, Refills: 1    Associated Diagnoses: Seasonal allergic rhinitis, unspecified trigger      ibuprofen (ADVIL/MOTRIN) 200 MG tablet Take 400 mg by mouth daily       L-Glutamine 500 MG CAPS Take 1 capsule by mouth daily       loperamide (IMODIUM) 2 MG capsule Take 2 mg by mouth 4 times daily as needed for diarrhea      loratadine (CLARITIN REDITABS) 5 MG dispersible tablet Take 5 mg by mouth daily      magnesium 500 MG TABS Take 500 mg by mouth daily       METFORMIN HCL PO Take 500 mg by mouth daily (with breakfast)       omeprazole (PRILOSEC) 40 MG DR capsule Take 40 mg by mouth daily as needed       oxyCODONE (ROXICODONE) 5 MG tablet Take 1 tablet (5 mg) by mouth every 6 hours as needed for severe pain Continue to take Tylenol 650 mg every 6 hours. Add daily Claritin for the bone pain. Use Oxycodone as needed for severe or breakthrough pain.  Qty: 20 tablet, Refills: 0    Associated Diagnoses: Encounter for long-term (current) use of medications; Endometrial cancer (H); Abdominal pain, generalized      pyridoxine (VITAMIN B-6) 50 MG TABS Take 1 tablet (50 mg) by mouth 2 times daily  Qty: 60 tablet, Refills: 3    Associated Diagnoses: Chemotherapy-induced peripheral neuropathy (H)      triamterene-hydrochlorothiazide (DYAZIDE) 37.5-25 MG per capsule Take 1 capsule by mouth every morning                 Allergies   Allergen Reactions     Carboplatin Anaphylaxis     Chest tightness, hoarse voice, difficulty breathing     Ace Inhibitors Cough     Amoxicillin Hives        Review of Systems   Constitutional: Positive for activity change and fatigue. Negative for appetite change, chills and fever.   HENT: Negative for congestion, nosebleeds, sore throat and trouble swallowing.    Eyes: Negative for redness and visual disturbance.   Respiratory: Positive for cough and shortness of breath.         Patient notes a chronic cough no significant change   Cardiovascular: Positive for leg swelling. Negative for chest pain and palpitations.        Patient with chronic leg swelling states she is had DVT studies that have been negative   Gastrointestinal: Positive for abdominal pain. Negative  "for nausea and vomiting.        Chronic abdominal pain unchanged   Genitourinary: Negative for difficulty urinating and dysuria.   Musculoskeletal: Negative for arthralgias, back pain and neck stiffness.        (Positive for bilateral leg swelling)   Skin: Negative for color change, rash and wound.   Allergic/Immunologic: Positive for immunocompromised state.   Neurological: Positive for weakness. Negative for syncope and headaches.   Psychiatric/Behavioral: Negative for confusion, decreased concentration and dysphoric mood.   All other systems reviewed and are negative.    A complete review of systems was performed with pertinent positives and negatives noted in the HPI, and all other systems negative.    Physical Exam   BP: 122/67  Pulse: 84  Temp: 97.7  F (36.5  C)  Resp: 16  Height: 162.6 cm (5' 4\")  Weight: 110.2 kg (243 lb)  SpO2: 95 %      Physical Exam  Vitals signs and nursing note reviewed.   Constitutional:       General: She is in acute distress.      Appearance: She is well-developed. She is ill-appearing. She is not toxic-appearing or diaphoretic.      Comments: Patient speaking full sentences oxygen saturation 96% on room air.  Mildly dyspneic with speech does not appear toxic   HENT:      Head: Normocephalic and atraumatic.      Nose: Nose normal.      Mouth/Throat:      Mouth: Mucous membranes are moist.   Eyes:      General: No scleral icterus.     Extraocular Movements: Extraocular movements intact.      Conjunctiva/sclera: Conjunctivae normal.      Pupils: Pupils are equal, round, and reactive to light.   Neck:      Musculoskeletal: Normal range of motion and neck supple.   Cardiovascular:      Rate and Rhythm: Normal rate and regular rhythm.   Pulmonary:      Effort: Respiratory distress present.      Breath sounds: No stridor. No wheezing.   Abdominal:      Palpations: Abdomen is soft.      Tenderness: There is no guarding.   Musculoskeletal:         General: Swelling and tenderness present.    "   Right lower leg: Edema present.      Left lower leg: Edema present.   Skin:     General: Skin is warm and dry.      Capillary Refill: Capillary refill takes less than 2 seconds.      Coloration: Skin is not pale.      Findings: Erythema present. No rash.      Comments: Some chronic stasis erythema appearance of lower extremities bilateral   Neurological:      General: No focal deficit present.      Mental Status: She is alert and oriented to person, place, and time. Mental status is at baseline.   Psychiatric:      Comments: Appropriate         ED Course           Patient valuate here in the ER.  Patient had labs done in the clinic we did add some other labs also these were reviewed.  Patient's potassium was 3.1 patient given 40 mEq of potassium orally.  EKG shows nonspecific ST changes.  But BNP and troponin otherwise negative.  Chest x-ray showing some concern for haziness bilateral but no pneumothorax seen.  Discussed findings with GYN oncology recommended CT scan to rule out PE which was done findings noted below some improvement of tree-in-bud and infiltrative processes bilateral no PE was seen patient with adenopathy etc. some concern for increasing mediastinal changes were noted.    As noted patient given then ceftriaxone and Zithromax treated for community-acquired pneumonia.  Patient to be admitted GYN oncology service.  Covid testing was sent.  Blood culture also sent.  Patient stable.  Patient informed.    Procedures             EKG Interpretation:      Interpreted by Sanchez Benitez MD  Time reviewed: 1141  Symptoms at time of EKG: sob miranda   Rhythm: normal sinus   Rate: normal  Axis: normal  Ectopy: none  Conduction: normal  ST Segments/ T Waves: Nonspecific st changes ST-T wave changes  Q Waves: none  Comparison to prior: No old EKG available    Clinical Impression: normal EKG                        Results for orders placed or performed during the hospital encounter of 11/12/20 (from the past 24  hour(s))   EKG 12 lead   Result Value Ref Range    Interpretation ECG Click View Image link to view waveform and result    INR   Result Value Ref Range    INR 1.02 0.86 - 1.14   Partial thromboplastin time   Result Value Ref Range    PTT 26 22 - 37 sec   Troponin I   Result Value Ref Range    Troponin I ES <0.015 0.000 - 0.045 ug/L   Nt probnp inpatient   Result Value Ref Range    N-Terminal Pro BNP Inpatient 308 0 - 900 pg/mL   XR Chest Port 1 View    Narrative    EXAM: XR CHEST PORT 1 VW  11/12/2020 12:28 PM     HISTORY:  Shortness of Breath       COMPARISON:  CT 11/5/2020    FINDINGS: Single view of the chest. Midline trachea. Cardiomediastinal  silhouette is partially obscured. Bibasilar streaky opacities, right  greater than left. Port-A-Cath tip at the low SVC. Bilateral  interstitial opacities. Moderate bilateral pleural effusions. Bulky  hilar or mediastinal lymphadenopathy better visualized on comparison  CT      Impression    IMPRESSION:   1. Bibasilar streaky opacities, right greater than left. Findings may  reflect atelectasis or consolidation  2.. Bilateral interstitial opacities, edema versus infection versus  inflammation.  3. Moderate bilateral pleural effusions.  4. Bulky hilar and mediastinal lymphadenopathy better evaluated on  comparison CT.    I have personally reviewed the examination and initial interpretation  and I agree with the findings.    JUAN DANIEL RIVERA MD   Symptomatic COVID-19 Virus (Coronavirus) by PCR    Specimen: Nasopharyngeal   Result Value Ref Range    COVID-19 Virus PCR to [facility] - Source Nasopharyngeal     COVID-19 Virus PCR to [facility] - Result       Test received-See reflex to IDDL test SARS CoV2 (COVID-19) Virus RT-PCR   Blood Culture ONE site    Specimen: Portacath; Blood   Result Value Ref Range    Specimen Description Blood     Culture Micro No growth after 1 hour    CT Chest Pulmonary Embolism w Contrast    Narrative    CT CHEST PULMONARY EMBOLISM W CONTRAST, 11/12/2020  2:01 PM    History: increasing sob miranda with underlying cancer  eval for PE    Comparison: CT from 11/5/2020.    Technique: Helical acquisition of CT images of the chest from the lung  apices to the kidneys were acquired after the administration of  intravenous contrast according to the CT pulmonary angiogram protocol.  Axial images were reconstructed in 1 and 3 mm slice thickness. Coronal  reconstructions performed. Three-dimensional (3D) post-processed  angiographic images were reconstructed, archived to PACS and used in  the interpretation of this study.    Contrast dose: iopamidol (ISOVUE-370) solution 73 mL    Findings:   The contrast bolus is good.  There is no definite pulmonary embolism.  Dilated main pulmonary artery    Thorax:   Support devices: Right-sided Port-A-Cath.    Chest wall is unremarkable.    Extensive mediastinal and hilar lymphadenopathy. Conventional aortic  arch branching pattern. The heart is normal in size without  significant pericardial effusion. There are no significant coronary  calcifications.     Lungs:  The trachea and central airways are clear. Moderate right and small  left pleural effusions and associated atelectasis, similar to  11/5/2020. Improving tree-in-bud opacities within the dependent  portions of the lungs, particularly the right middle lobe. Evolving  bandlike fibrosis in the posterior basilar segment of the left lower  lobe. Additional multifocal peribronchovascular  thickening/consolidative changes and bandlike volume loss in the  medial middle lobe anterior segment of the left upper lobe, and within  the right lower lobe, similar to 11/5/2020. Scattered predominantly  perifissural pulmonary nodules again seen, for example the 8 mm  perifissural nodule seen along the major fissure (series 9, image  126).    Upper abdomen: Limited evaluation of the upper abdomen. Ascites partly  imaged.    Bones: No acute or aggressive osseus abnormality.      Impression     Impression:    1. No definite pulmonary embolism identified.  2. Improving tree-in-bud micronodules and consolidative changes.  Evolving multifocal peribronchovascular bandlike fibrosis, likely  sequelae of infection versus infarct.  3. Right greater than left pleural effusions, similar to 11/5/2020.  4. Extensive mediastinal lymphadenopathy.  5. Sequelae of pulmonary hypertension with dilated main pulmonary.      In the event of a positive result for acute pulmonary embolism  resulting in right heart strain, please activate the PERT  Multidisciplinary group for consultation by paging [phone]  [phone]).     PERT -- Pulmonary Embolism Response Team (Multidisciplinary team  including cardiology, interventional radiology, critical care,  hematology)    I have personally reviewed the examination and initial interpretation  and I agree with the findings.    JUAN DANIEL RIVERA MD   Glucose by meter   Result Value Ref Range    Glucose 85 70 - 99 mg/dL   Potassium   Result Value Ref Range    Potassium 3.6 3.4 - 5.3 mmol/L     Medications   famotidine (PEPCID) tablet 10 mg (has no administration in time range)   lidocaine 1 % 0.1-1 mL (has no administration in time range)   lidocaine (LMX4) cream (has no administration in time range)   sodium chloride (PF) 0.9% PF flush 3 mL (has no administration in time range)   sodium chloride (PF) 0.9% PF flush 3 mL (has no administration in time range)   melatonin tablet 1 mg (has no administration in time range)   senna-docusate (SENOKOT-S/PERICOLACE) 8.6-50 MG per tablet 1 tablet (has no administration in time range)     Or   senna-docusate (SENOKOT-S/PERICOLACE) 8.6-50 MG per tablet 2 tablet (has no administration in time range)   polyethylene glycol (MIRALAX) Packet 17 g (has no administration in time range)   acetaminophen (TYLENOL) tablet 650 mg (has no administration in time range)   oxyCODONE (ROXICODONE) tablet 5 mg (has no administration in time range)   ondansetron  (ZOFRAN-ODT) ODT tab 4 mg (has no administration in time range)     Or   ondansetron (ZOFRAN) injection 4 mg (has no administration in time range)   glucose gel 15-30 g (has no administration in time range)     Or   dextrose 50 % injection 25-50 mL (has no administration in time range)     Or   glucagon injection 1 mg (has no administration in time range)   insulin aspart (NovoLOG) injection (RAPID ACTING) (1 Units Subcutaneous Not Given 11/12/20 1955)   insulin aspart (NovoLOG) injection (RAPID ACTING) (has no administration in time range)   naloxone (NARCAN) injection 0.1-0.4 mg (has no administration in time range)   omeprazole (priLOSEC) CR capsule 40 mg (has no administration in time range)   piperacillin-tazobactam (ZOSYN) 4.5 g vial to attach to  mL bag ( Intravenous Canceled Entry 11/12/20 2045)   diphenhydrAMINE (BENADRYL) injection 50 mg (has no administration in time range)   enoxaparin ANTICOAGULANT (LOVENOX) injection 40 mg (has no administration in time range)   potassium chloride (KLOR-CON) Packet 40 mEq (40 mEq Oral Given 11/12/20 1238)   iopamidol (ISOVUE-370) solution 73 mL (73 mLs Intravenous Given 11/12/20 1316)   sodium chloride (PF) 0.9% PF flush 101 mL (101 mLs Intracatheter Given 11/12/20 1316)   cefTRIAXone (ROCEPHIN) 1 g vial to attach to  mL bag for ADULTS or NS 50 mL bag for PEDS (0 g Intravenous Stopped 11/12/20 1600)   azithromycin (ZITHROMAX) 500 mg in sodium chloride 0.9 % 250 mL intermittent infusion (0 mg Intravenous Stopped 11/12/20 1712)   oxyCODONE (ROXICODONE) tablet 5 mg (5 mg Oral Given 11/12/20 1557)            Results for orders placed or performed during the hospital encounter of 11/12/20   XR Chest Port 1 View     Status: None    Narrative    EXAM: XR CHEST PORT 1 VW  11/12/2020 12:28 PM     HISTORY:  Shortness of Breath       COMPARISON:  CT 11/5/2020    FINDINGS: Single view of the chest. Midline trachea. Cardiomediastinal  silhouette is partially obscured.  Bibasilar streaky opacities, right  greater than left. Port-A-Cath tip at the low SVC. Bilateral  interstitial opacities. Moderate bilateral pleural effusions. Bulky  hilar or mediastinal lymphadenopathy better visualized on comparison  CT      Impression    IMPRESSION:   1. Bibasilar streaky opacities, right greater than left. Findings may  reflect atelectasis or consolidation  2.. Bilateral interstitial opacities, edema versus infection versus  inflammation.  3. Moderate bilateral pleural effusions.  4. Bulky hilar and mediastinal lymphadenopathy better evaluated on  comparison CT.    I have personally reviewed the examination and initial interpretation  and I agree with the findings.    JUAN DANIEL RIVERA MD   CT Chest Pulmonary Embolism w Contrast     Status: None    Narrative    CT CHEST PULMONARY EMBOLISM W CONTRAST, 11/12/2020 2:01 PM    History: increasing sob miranda with underlying cancer  eval for PE    Comparison: CT from 11/5/2020.    Technique: Helical acquisition of CT images of the chest from the lung  apices to the kidneys were acquired after the administration of  intravenous contrast according to the CT pulmonary angiogram protocol.  Axial images were reconstructed in 1 and 3 mm slice thickness. Coronal  reconstructions performed. Three-dimensional (3D) post-processed  angiographic images were reconstructed, archived to PACS and used in  the interpretation of this study.    Contrast dose: iopamidol (ISOVUE-370) solution 73 mL    Findings:   The contrast bolus is good.  There is no definite pulmonary embolism.  Dilated main pulmonary artery    Thorax:   Support devices: Right-sided Port-A-Cath.    Chest wall is unremarkable.    Extensive mediastinal and hilar lymphadenopathy. Conventional aortic  arch branching pattern. The heart is normal in size without  significant pericardial effusion. There are no significant coronary  calcifications.     Lungs:  The trachea and central airways are clear. Moderate  right and small  left pleural effusions and associated atelectasis, similar to  11/5/2020. Improving tree-in-bud opacities within the dependent  portions of the lungs, particularly the right middle lobe. Evolving  bandlike fibrosis in the posterior basilar segment of the left lower  lobe. Additional multifocal peribronchovascular  thickening/consolidative changes and bandlike volume loss in the  medial middle lobe anterior segment of the left upper lobe, and within  the right lower lobe, similar to 11/5/2020. Scattered predominantly  perifissural pulmonary nodules again seen, for example the 8 mm  perifissural nodule seen along the major fissure (series 9, image  126).    Upper abdomen: Limited evaluation of the upper abdomen. Ascites partly  imaged.    Bones: No acute or aggressive osseus abnormality.      Impression    Impression:    1. No definite pulmonary embolism identified.  2. Improving tree-in-bud micronodules and consolidative changes.  Evolving multifocal peribronchovascular bandlike fibrosis, likely  sequelae of infection versus infarct.  3. Right greater than left pleural effusions, similar to 11/5/2020.  4. Extensive mediastinal lymphadenopathy.  5. Sequelae of pulmonary hypertension with dilated main pulmonary.      In the event of a positive result for acute pulmonary embolism  resulting in right heart strain, please activate the PERT  Multidisciplinary group for consultation by paging [phone]     PERT -- Pulmonary Embolism Response Team (Multidisciplinary team  including cardiology, interventional radiology, critical care,  hematology)    I have personally reviewed the examination and initial interpretation  and I agree with the findings.    JUAN DANIEL RIVERA MD   INR     Status: None   Result Value Ref Range    INR 1.02 0.86 - 1.14   Partial thromboplastin time     Status: None   Result Value Ref Range    PTT 26 22 - 37 sec   Troponin I     Status: None   Result Value Ref Range     Troponin I ES <0.015 0.000 - 0.045 ug/L   Nt probnp inpatient     Status: None   Result Value Ref Range    N-Terminal Pro BNP Inpatient 308 0 - 900 pg/mL   Symptomatic COVID-19 Virus (Coronavirus) by PCR     Status: None    Specimen: Nasopharyngeal   Result Value Ref Range    COVID-19 Virus PCR to [facility] - Source Nasopharyngeal     COVID-19 Virus PCR to [facility] - Result       Test received-See reflex to IDDL test SARS CoV2 (COVID-19) Virus RT-PCR   Potassium     Status: None   Result Value Ref Range    Potassium 3.6 3.4 - 5.3 mmol/L   Glucose by meter     Status: None   Result Value Ref Range    Glucose 85 70 - 99 mg/dL   EKG 12 lead     Status: None   Result Value Ref Range    Interpretation ECG Click View Image link to view waveform and result    Blood Culture ONE site     Status: None (Preliminary result)    Specimen: Portacath; Blood   Result Value Ref Range    Specimen Description Blood     Culture Micro No growth after 1 hour    Results for orders placed or performed in visit on 11/12/20   CBC with platelets differential     Status: Abnormal   Result Value Ref Range    WBC 6.7 4.0 - 11.0 10e9/L    RBC Count 2.98 (L) 3.8 - 5.2 10e12/L    Hemoglobin 8.6 (L) 11.7 - 15.7 g/dL    Hematocrit 27.3 (L) 35.0 - 47.0 %    MCV 92 78 - 100 fl    MCH 28.9 26.5 - 33.0 pg    MCHC 31.5 31.5 - 36.5 g/dL    RDW 19.9 (H) 10.0 - 15.0 %    Platelet Count 323 150 - 450 10e9/L    Diff Method Automated Method     % Neutrophils 67.0 %    % Lymphocytes 15.5 %    % Monocytes 11.6 %    % Eosinophils 1.6 %    % Basophils 1.0 %    % Immature Granulocytes 3.3 %    Nucleated RBCs 3 (H) 0 /100    Absolute Neutrophil 4.5 1.6 - 8.3 10e9/L    Absolute Lymphocytes 1.0 0.8 - 5.3 10e9/L    Absolute Monocytes 0.8 0.0 - 1.3 10e9/L    Absolute Eosinophils 0.1 0.0 - 0.7 10e9/L    Absolute Basophils 0.1 0.0 - 0.2 10e9/L    Abs Immature Granulocytes 0.2 0 - 0.4 10e9/L    Absolute Nucleated RBC 0.2    Comprehensive metabolic panel     Status: Abnormal    Result Value Ref Range    Sodium 135 133 - 144 mmol/L    Potassium 3.1 (L) 3.4 - 5.3 mmol/L    Chloride 100 94 - 109 mmol/L    Carbon Dioxide 29 20 - 32 mmol/L    Anion Gap 7 3 - 14 mmol/L    Glucose 108 (H) 70 - 99 mg/dL    Urea Nitrogen 25 7 - 30 mg/dL    Creatinine 1.11 (H) 0.52 - 1.04 mg/dL    GFR Estimate 51 (L) >60 mL/min/[1.73_m2]    GFR Estimate If Black 59 (L) >60 mL/min/[1.73_m2]    Calcium 8.9 8.5 - 10.1 mg/dL    Bilirubin Total 0.5 0.2 - 1.3 mg/dL    Albumin 2.5 (L) 3.4 - 5.0 g/dL    Protein Total 5.9 (L) 6.8 - 8.8 g/dL    Alkaline Phosphatase 54 40 - 150 U/L    ALT 24 0 - 50 U/L    AST 28 0 - 45 U/L   Magnesium     Status: None   Result Value Ref Range    Magnesium 1.8 1.6 - 2.3 mg/dL        Status: Abnormal   Result Value Ref Range     324 (H) 0 - 30 U/mL         Assessments & Plan (with Medical Decision Making)  62-year-old female with metastatic uterine cancer undergoing weekly infusions for the last 3 months presented the ER with last febrile weeks of increasing dyspnea on exertion.  No chest pain no fevers or chills patient has CT scan done last Thursday and there is concerned about possible pneumonia.  Patient now noted worsening symptoms sent from clinic as her sats initially were in the 80s but here in the ER they have been 93 to 96% vital signs stable otherwise.  Patient valuated EKG shows nonspecific changes but troponin and BNP were negative.  Chest x-ray shows some haziness.  Potassium 3.1 patient given oral potassium for correction.  Creatinine noted to be 1.11.  CT scan with contrast was done no PE seen some bilateral infiltrative process that there is slight improvement compared to previous were noted mediastinal lymphadenopathy noted also.  Discussed with GYN oncology will be admitted treated with ceftriaxone and Zithromax for community-acquired pneumonia in a metastatic uterine cancer patient.         I have reviewed the nursing notes.    I have reviewed the findings,  diagnosis, plan and need for follow up with the patient.    Current Discharge Medication List          Final diagnoses:   PELAEZ (dyspnea on exertion)   SOB (shortness of breath)   Bilateral upper lobe community acquired pneumonia   Endometrial cancer (H)   IRiley, am serving as a trained medical scribe to document services personally performed by Mary Redding MD, based on the provider's statements to me.     IMary MD, was physically present and have reviewed and verified the accuracy of this note documented by Riley Carrizales.      11/12/2020   [facility] EMERGENCY DEPARTMENT      This note was created at least in part by the use of dragon voice dictation system. Inadvertent typographical errors may still exist.  Sanchez Benitez MD.    Patient evaluated in the emergency department during the COVID-19 pandemic period. Careful attention to patients safety was addressed throughout the evaluation. Evaluation and treatment management was initiated with disposition made efficiently and appropriate as possible to minimize any risk of potential exposure to patient during this evaluation.         Sanchez Benitez MD  11/12/20 2059

## 2020-11-12 NOTE — NURSING NOTE
Chief Complaint   Patient presents with     Port Draw     Labs drawn via port by Rn in lab. VS taken. Pt checked in for next appt     Port accessed with 20g gripper needle by RN, labs collected, line flushed with saline and heparin.  Vitals taken. Pt checked in for appointment(s). Pt short of breath. Oxygen applied at 2L.     Leah WALLER RN PHN BSN  BMT/Oncology Lab

## 2020-11-13 NOTE — PROGRESS NOTES
Admitted/transferred from:   2 RN full   skin assessment completed by Madison Gutiérrez, PEPE and Ana Foley, RN.  Skin assessment finding: skin intact, no problems, small scab on R arm, healing.   Interventions/actions: skin interventions Will continue to monitor.

## 2020-11-13 NOTE — CONSULTS
Procedure Note    Attending: Nicolas Farias MD  Procedure: Right Thoracentesis  Indication: PELAEZ  Risk Assessment: Low  Pre-procedure diagnosis: Malignant pleural effusion  Post-procedure diagnosis: Malignant pleural effusion    The risks and benefits of the procedure were explained to Mrs Smith who expressed understanding and opted to proceed.  Consent was obtained and placed in the chart and the patient was placed on pulse oximetry.  A time out was performed.    An ultrasound probe was used to identify an area of pleural fluid in the right and left hemithorax. Comparing both side, the RIGHT side was chosen as it appeared somewhat larger than the left.  This area was prepped and draped in the usual sterile fashion.  8 ml of 1% lidocaine was instilled and fluid located using ultrasound guidance.  Live ultrasound guidance was used as landmarks were challenging. A small incision was made with an 11 blade.  The thoracentesis catheter and needle were inserted above the rib until fluid obtained then the needle removed.    Using a one-way valve, 900-1000 ml of clear yellow colored fluid was removed. A specimen wasnot sent for analysis after discussion with the primary tea,.    The catheter was removed with the patient exhaling and an occlusive dressing placed.       Ultrasound revealed normal lung sliding after the procedure and a chest radiograph is pending.    The tolerated the procedure well.  Please contact the Consult and Procedure Service if any concerns or complications arise.       Nicolas Farias MD    DOS:  11/13/2020

## 2020-11-13 NOTE — PLAN OF CARE
"/68 (BP Location: Right arm)   Pulse 74   Temp 97.3  F (36.3  C) (Oral)   Resp 20   Ht 1.626 m (5' 4\")   Wt 110.2 kg (243 lb)   SpO2 93%   BMI 41.71 kg/m      Time: 3052-5383    Reason for admission: SOB  Activity: SBA + Cane.   Pain: Complains of abd pain- PRN tylenol given.  Neuro: A&O x4.   Cardiac: WDL  Respiratory: Pt on RA. PELAEZ. Denies SOB when laying in bed. Pt frequently talking in short sentences appears SOB when speaking.   GI/: No BM overnight. Last BM . Voiding spontaneously.   Diet: Mod carb diet, tolerating.   Lines: Port- TKO. Infused antibiotics overnight.   Skin: Right lower extermity- red/swollen. Ultrasound ordered   Labs/Imaging: Covid pending. B.     New changes this shift: Pt complaining of abd pain- Prn tylenol given. Tpump ordered. BG monitored overnight. Pt remained on RA overnight. Cross cover paged about possible lower extremity ultrasound- DVT rule out. Plan for US in morning.     Plan: Possible echo. Possible thoracentesis.     Continue to monitor and follow POC     "

## 2020-11-13 NOTE — PROGRESS NOTES
CLINICAL NUTRITION SERVICES - ASSESSMENT NOTE     Nutrition Prescription    RECOMMENDATIONS FOR MDs/PROVIDERS TO ORDER:  Encourage oral intake, minimize NPO time to promote PO     Malnutrition Status:    Non-severe malnutrition in the context of acute on chronic illness     Recommendations already ordered by Registered Dietitian (RD):  Vanilla Boost Glucose Control (1 per day): 2:00   Gelatin (Cherry): HS      Future/Additional Recommendations:  If enteral nutrition is necessary, recommend the following:   --Nutren 1.5 @ 50 mL/hr + 2 packet prosource to provide 1880 kcals (~27 kcal/kg/day), 104 g PRO (1.5 g/kg/day), 912 mL H2O, 211 g CHO and no fiber daily.  --15 mL certavite daily   --60 mL water flush q 4 hours for tube patency      REASON FOR ASSESSMENT  Lu Smith is a/an 70 year old female assessed by the dietitian for Admission Nutrition Risk Screen for reduced oral intake over the last month    CLINICAL HISTORY   Recurrent stage IIIC2 serous endometrial cancer, DM2, HTN, and obesity who presented from clinic to the ED with SOB and dyspnea on exertion for the past 6 weeks. Patient is currently on cycle #3 of weekly taxol.    NUTRITION HISTORY  Information reported by patient over the phone. She reported appetite has not been good because her taste is gone. She has noticed significant taste changes over the past year but tries to keep up with nutrition.  Provided examples of food she likes including soups, vegetables (carrots, cucumbers, carrots) fruits (mandarine oranges, grapes), fish. Other meats and eggs have been tasting off and so has carbohydrate foods such as pasta . She tries to add in a protein daily and will consume protein shakes/bars such as  Boost (vanilla) or rock if she does not feel like eating anything.   Daily food recall:   B- slice of toast (ocssionally), belvita breakfast cookie  L- occasionally soup  D- not stated  No chewing/swallowing difficulty. She tends to have occaional  "constipation, sometimes just for a day. Has not notice any weight changes.      CURRENT NUTRITION ORDERS  Diet: Moderate Consistent Carbohydrate  Intake/Tolerance: 100%, 1 meal documented     LABS  Labs reviewed    MEDICATIONS  [facility]    ANTHROPOMETRICS  Height: 162.6 cm (5' 4\")  Most Recent Weight: 110.2 kg (243 lb)    IBW: 54.5 kg  BMI: Obesity Grade III BMI >40  Weight History: Reports getting   Wt Readings from Last 15 Encounters:   11/12/20 110.2 kg (243 lb)   11/12/20 110.4 kg (243 lb 4.8 oz)   11/04/20 109.1 kg (240 lb 8 oz)   10/28/20 108.7 kg (239 lb 11.2 oz)   10/21/20 108.4 kg (239 lb)   10/14/20 108.2 kg (238 lb 9.6 oz)   10/07/20 108.8 kg (239 lb 14.4 oz)   09/30/20 110.8 kg (244 lb 3.2 oz)   09/28/20 109.7 kg (241 lb 12.8 oz)   09/23/20 111.2 kg (245 lb 3.2 oz)   09/14/20 111.6 kg (246 lb)   09/02/20 109.4 kg (241 lb 1.6 oz)   08/27/20 110.5 kg (243 lb 9.6 oz)   08/20/20 109.2 kg (240 lb 12.8 oz)   08/13/20 110.6 kg (243 lb 12.8 oz)     Dosing Weight: 68 kg (adjusted)     ASSESSED NUTRITION NEEDS  RMR (MJS) 1617   Estimated Energy Needs: 9948-2102 kcals/day (25 - 30 kcals/kg)  Justification: Maintenance  Estimated Protein Needs:  grams protein/day (1.2 - 1.5 grams of pro/kg)  Justification: Increased needs  Estimated Fluid Needs: 7694-2337 mL/day (1 mL/kcal)   Justification: Maintenance    PHYSICAL FINDINGS  Reviewed per physician/nursing notes     MALNUTRITION  % Intake: < 75% for >/= 3 month (non-severe)  % Weight Loss: None noted  Subcutaneous Fat Loss: Unable to assess  Muscle Loss: Unable to assess  Fluid Accumulation/Edema: Mild  Malnutrition Diagnosis: Non-severe malnutrition in the context of acute on chronic illness     NUTRITION DIAGNOSIS  Inadequate oral intake related to taste changes as evidenced by patient report of reduced intake 2/2 taste changes which have occurred over the past year     INTERVENTIONS  Implementation  Nutrition Education: RD role in care, supplements " available    Medical food supplement therapy     Goals  Patient to consume % of nutritionally adequate meal trays TID, or the equivalent with supplements/snacks.  Enteral Nutrition: Recommendation      Monitoring/Evaluation  Progress toward goals will be monitored and evaluated per protocol.    Martha Barnard RD, MARTA  6B pager: [phone]

## 2020-11-13 NOTE — PLAN OF CARE
"/68   Pulse 82   Temp 97  F (36.1  C) (Axillary)   Resp 20   Ht 1.626 m (5' 4\")   Wt 110.2 kg (243 lb)   SpO2 99%   BMI 41.71 kg/m      Neuro: A&Ox4. Transfers from  at 1pm   Cardiac: AVSS.   Respiratory: PELAEZ. Placed on 2L NC for comfort  GI/: Adequate urine ouput. No BM  Diet/appetite: Tolerating diet. Eating 75%. No c/o nausea  Activity: Up with SBA   Pain: Abdomen pain managed with tylenol   Skin: No new deficits noted  LDA's: Right chest port tko for abx    Had Echo and Right Thoracentesis done at bedside. MD removed 900-1000 ml of clear yellow colored fluid.    Plan: Continue with POC. Notify primary team with changes.      "

## 2020-11-13 NOTE — DISCHARGE SUMMARY
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, chronic disease  - CARMEN  - GERD  - Type 2 DM  - Hypertension  - Obesity  - Hypokalemia, resolved    Patient Active Problem List   Diagnosis     Endometrial cancer (H)     Encounter for long-term (current) use of medications     Encounter for antineoplastic chemotherapy     Thyroid nodule     Pulmonary nodule     Obesity     Hypertension     Hyperlipidemia with target low density lipoprotein (LDL) cholesterol less than 100 mg/dL     Diabetes mellitus, type II (H)     Pulmonary nodules     Chemotherapy management, encounter for     Ovarian cancer (H)     Proteinuria, unspecified type     Drug declined by patient due to patient beliefs     Family history of malignant neoplasm of gastrointestinal tract     Gastroesophageal reflux disease     Hypercalcemia     Hypomagnesemia     Metastatic malignant neoplasm of unknown primary site (H)     Osteoarthritis of knee     Pancytopenia due to chemotherapy (H)     Anemia     Ascites     SOB (shortness of breath)     PELAEZ (dyspnea on exertion)     Bilateral upper lobe community acquired pneumonia     Procedures: Thoracentesis     Prior to Admission Medications:  Medications Prior to Admission   Medication Sig Dispense Refill Last Dose     acetaminophen (TYLENOL) 500 MG tablet Take 500 mg by mouth every 6 hours as needed for pain    11/12/2020 at AM     Famotidine (PEPCID PO) Take 10 mg by mouth  as needed    Past Month at Unknown time     fluticasone (FLONASE) 50 MCG/ACT spray Spray 1-2 sprays into both nostrils daily (Patient taking differently: Spray 1-2 sprays into both nostrils daily as needed ) 1 Bottle 1 Past Month at Unknown time     ibuprofen (ADVIL/MOTRIN) 200 MG tablet Take 400 mg by mouth daily   11/12/2020 at AM     L-Glutamine 500 MG CAPS Take 1 capsule by mouth daily    11/11/2020 at Unknown time     loperamide (IMODIUM) 2 MG capsule Take 2 mg by mouth 4 times daily as needed for diarrhea   Past Week at Unknown time     loratadine (CLARITIN REDITABS) 5 MG dispersible tablet Take 5 mg by mouth daily   11/12/2020 at AM     magnesium 500 MG TABS Take 500 mg by mouth daily    11/11/2020 at Unknown time     METFORMIN HCL PO Take 500 mg by mouth daily (with breakfast)    11/11/2020 at Unknown time     omeprazole (PRILOSEC) 40 MG DR capsule Take 40 mg by mouth daily as needed    Past Week at Unknown time     pyridoxine (VITAMIN B-6) 50 MG TABS Take 1 tablet (50 mg) by mouth 2 times daily (Patient taking differently: Take 50 mg by mouth daily ) 60 tablet 3 11/11/2020 at Unknown time     [DISCONTINUED] amLODIPine (NORVASC) 5 MG tablet Take 1 tablet (5 mg) by mouth daily 30 tablet 0 11/11/2020 at Unknown time     [DISCONTINUED] oxyCODONE (ROXICODONE) 5 MG tablet Take 1 tablet (5 mg) by mouth every 6 hours as needed for severe pain Continue to take Tylenol 650 mg every 6 hours. Add daily Claritin for the bone pain. Use Oxycodone as needed for severe or breakthrough pain. 20 tablet 0 11/11/2020 at Unknown time     [DISCONTINUED] triamterene-hydrochlorothiazide (DYAZIDE) 37.5-25 MG per capsule Take 1 capsule by mouth every morning    11/12/2020 at AM     Discharge Medications:     Review of your medicines      START taking      Dose / Directions   levofloxacin 750 MG tablet  Commonly known as: LEVAQUIN  Indication: Pneumonia Acquired From Being Treated in a Hospital  Used for: Endometrial cancer (H)       Dose: 750 mg  Take 1 tablet (750 mg) by mouth every 24 hours  Quantity: 7 tablet  Refills: 0     senna-docusate 8.6-50 MG tablet  Commonly known as: SENOKOT-S/PERICOLACE  Used for: Endometrial cancer (H)      Dose: 1 tablet  Take 1 tablet by mouth 2 times daily as needed for constipation  Quantity: 20 tablet  Refills: 0        CONTINUE these medicines which may have CHANGED, or have new prescriptions. If we are uncertain of the size of tablets/capsules you have at home, strength may be listed as something that might have changed.      Dose / Directions   fluticasone 50 MCG/ACT nasal spray  Commonly known as: FLONASE  This may have changed:     when to take this    reasons to take this  Used for: Seasonal allergic rhinitis, unspecified trigger      Dose: 1-2 spray  Spray 1-2 sprays into both nostrils daily  Quantity: 1 Bottle  Refills: 1     oxyCODONE 5 MG tablet  Commonly known as: ROXICODONE  This may have changed: when to take this  Used for: Encounter for long-term (current) use of medications, Endometrial cancer (H), Abdominal pain, generalized      Dose: 5 mg  Take 1 tablet (5 mg) by mouth 2 times daily as needed for severe pain Continue to take Tylenol 650 mg every 6 hours. Add daily Claritin for the bone pain. Use Oxycodone as needed for severe or breakthrough pain.  Quantity: 10 tablet  Refills: 0     vitamin B6 50 MG Tabs  Commonly known as: pyridOXINE  This may have changed: when to take this  Used for: Chemotherapy-induced peripheral neuropathy (H)      Dose: 50 mg  Take 1 tablet (50 mg) by mouth 2 times daily  Quantity: 60 tablet  Refills: 3        CONTINUE these medicines which have NOT CHANGED      Dose / Directions   acetaminophen 500 MG tablet  Commonly known as: TYLENOL      Dose: 500 mg  Take 500 mg by mouth every 6 hours as needed for pain  Refills: 0     ibuprofen 200 MG tablet  Commonly known as: ADVIL/MOTRIN      Dose: 400 mg  Take 400 mg by mouth daily  Refills: 0     L-Glutamine 500 MG Caps       Dose: 1 capsule  Take 1 capsule by mouth daily  Refills: 0     loperamide 2 MG capsule  Commonly known as: IMODIUM      Dose: 2 mg  Take 2 mg by mouth 4 times daily as needed for diarrhea  Refills: 0     loratadine 5 MG dispersible tablet  Commonly known as: CLARITIN REDITABS      Dose: 5 mg  Take 5 mg by mouth daily  Refills: 0     magnesium 500 MG Tabs      Dose: 500 mg  Take 500 mg by mouth daily  Refills: 0     METFORMIN HCL PO      Dose: 500 mg  Take 500 mg by mouth daily (with breakfast)  Refills: 0     omeprazole 40 MG DR capsule  Commonly known as: priLOSEC      Dose: 40 mg  Take 40 mg by mouth daily as needed  Refills: 0     PEPCID PO      Dose: 10 mg  Take 10 mg by mouth as needed  Refills: 0        STOP taking    amLODIPine 5 MG tablet  Commonly known as: NORVASC        triamterene-HCTZ 37.5-25 MG capsule  Commonly known as: DYAZIDE              Where to get your medicines      These medications were sent to [facility] [address] [address]    Phone: [phone]     levofloxacin 750 MG tablet    senna-docusate 8.6-50 MG tablet     Some of these will need a paper prescription and others can be bought over the counter. Ask your nurse if you have questions.    Bring a paper prescription for each of these medications    oxyCODONE 5 MG tablet       Consultations:  - Procedural Medicine, Palliative care    Brief History of Illness:  This patient is a 70 year old female with recurrent stage IIIC2 serous endometrial cancer, DM2, HTN, and obesity who presented from clinic to the ED with SOB and dyspnea on exertion for the past 6 weeks. Patient is currently on cycle #3 of weekly taxol. She presented the ED today reporting for the past 3 weeks she has had increasing dyspnea on exertion and worsening shortness of breath to the point where she cannot even walk to the bathroom.  Chest CT from 11/5/20 showed mild bilateral hilar  infiltrates.  Patient presented to the infusion clinic for chemo today and was found to be satting in the 80s and was sent to the ED. Patient also endorses chronic bilateral leg swelling (not worsening).     Hospital Course:  Dz: 2/23/18: TLH-BSO, omentectomy, bilateral pelvic and para-aortic lymph node dissection, pelvic washings. 3/30/18-9/28/18: Six cycles of carboplatin AUC 6 and paclitaxel 175mg/m2, delivered sandwich style with pelvic radiation between cycles three and four. 6/4/18-7/12/18: EBRT delivered to pelvis and para-aortic nodes, 5040 cGy in 28 fractions. 7/16/18-7/20/18: HDR brachytherapy delivered to the vaginal cuff, 1200 cGy in two fractions. 10/24/19: Periaortic lymph node biopsy: Tumor morphology is consistent with metastasis/recurrence of endometrial serous carcinoma. 11/11/19 - 3/5/20: 6 cycles carboplatin desensitization/paclitaxel/avastin. 3/25/20 - 7/16/20 Maintenance avastin. 8/6/20 - present (10/7/20): 3 cycles weekly taxol. 11/5/20 CT scan Chest/abdomen/pelvis: Increasing mediastinal adenopathy, increased peritoneal nodules. Lungs: Possible metastases/infection or both. (Increased groundglass and tree-in-bud opacities of the left upper and lower lobes and new groundglass and tree-in-bud opacities in the right middle and lower lobes. Moderate bilateral pleural effusions.)  - Discussed code status - she is a DNR/DNI   -She will follow-up in clinic for a care plan.  - Palliative care consult  FEN:   - She tolerated small amounts of a regular diet without nausea and vomiting and able to maintain her hydration without IVF supplementation. Hypokalemia, replaced with ERP.  Pain:   - Her pain was inadequately controlled on home pain medication regimen (ibuprofen was held secondary to CARMEN). Tylenol and oxycodone were scheduled per palliative care recommendations. Her pain was well controlled on this and she was discharged home with these medications.  CV:   - She has a history of hypertension and  her home medications were initially held. Her blood pressures were well controlled without her home medications. She was discharged home without these and will follow up with her PCP. She will continue to take her blood pressures at home. On admission, her BNP, troponin, and EKG were normal. Her vital signs were stable while in house and she had no acute CV issues.  PULM:   - She presented with worsening SOB and dyspnea on exertion and hypoxia in the clinic (89% on room air). Ddx includes pneumonia (viral vs bacterial, HCAP), PE, worsening disease with symptomatic pleural effusions, or heart failure. Workup consisted of CT PE that was negative, however, re-demonstrated micro-nodules and consolidative changes as well as b/l pleural effusions, and mediastinal LAD. These findings are similar to CT 11/5. There was notably peribronchovascular fibrosis suggestive of infection vs infarct as well as sequelae of pulmonary hypertension. S/p azithromycin and ceftriaxone in the ED for presumed CAP. She was started on IV zosyn continued for 1 days and she was transitioned to Levaquin for a 10 day course. No leukocytosis and afebrile. COVID was negative. Procedural medicine team was consulted and completed thoracentesis for 900ml. Fluid sent for culture remained negative at time of discharge. By discharge, her O2 sats were greater than 94% on RA.  She was encouraged to use her bedside IS while in house.   HEME:   - Her Hgb was 8.6 and stable at the time of discharge.  She had no other acute heme issues while in house.  GI:   - At the time of discharge, she was tolerating a regular diet without nausea and vomiting.  She will be discharged with a bowel regimen to prevent constipation.  She had no acute GI issues while in house.  :    -  She was voiding without difficulty. Her creatinine was elevated on admission to 1.11 and was 1.06 on repeat draw, presumed due to chemotherapy.  ID:   - The patient was AF during her  hospitalization and no leukocytosis. See Pulm. Patient underwent a paracentesis and fluid culture as described above.   ENDO:   - She has type 2 DM and takes metformin prior to admission. This was held on admission and was resumed on discharge. Glucose was monitored and she received sliding scale insulin.   PSYCH/NEURO:   - no issues  PPX:    -  She was given SCDs, IS, PPI and lovenox during her hospital course.      Discharge Instructions and Follow up:  Ms. Lu Smith was discharged from the hospital with follow up for further treatment planning and management.    Discharge Diet: Regular  Discharge Activity: Activity as tolerated  Discharge Follow up: 11/18 with Dr. Henderson    Discharge Disposition:  Discharged to home    Discharge Staff: Dr. Taveras and Dr. Redding.    Bharati Escobar MD  Obstetrics and Gynecology, PGY-2  11/15/20. 8:53 AM.      Physician Attestation   I agree with the resident's findings and plan of care as documented in the note.       I personally reviewed vital signs, medications, labs and imaging.        Radha Taveras MD  Gyn Onc Fellow  [facility]     The patient was seen and examined with the resident, the labs and vital signs were reviewed.The discharge care plan outlined above was provided under my directives and direct supervision. Patient ready for discharge.     Total time of 40 minutes in discharge planning and coordination of follow-up    Mary Redding MD, MPH  Gynecologic Oncology

## 2020-11-13 NOTE — PROGRESS NOTES
Gynecology Oncology Progress Note  November 13, 2020    Ms. Lu Smith is a 70 year old HD# 2 admitted with worsening SOB and dyspnea on exertion    Dz: Recurrent stage IIIC2 serous endometrial cancer    24 hour events:   - Admitted  - CT negative PE    Subjective: Patient reports she did okay overnight. Having increased left abdominal pain after tylenol and heat pack. Ambulating without dizziness to the bathroom, but does get more SOB and it takes a few minutes for her to catch her breath. When at rest, she talks in short sentences. Tried albuterol inhaler overnight, helped minimally. Tolerating PO without nausea or vomiting. Voiding. Denies fevers, chills. She has chronic lymphedema and is not wearing her compression stockings, feels like swelling is slightly worse than usual. She reports recent travel to [place] to see friends. Also would like to be DNR/DNI.    Objective:   Patient Vitals for the past 24 hrs:   BP Temp Temp src Pulse Resp SpO2 Height Weight   11/13/20 0611 139/68 97.3  F (36.3  C) Oral 74 20 93 % -- --   11/13/20 0229 127/78 97.1  F (36.2  C) Oral 72 22 96 % -- --   11/12/20 2137 129/68 96.6  F (35.9  C) Oral 73 24 95 % -- --   11/12/20 1740 124/64 95.7  F (35.4  C) Oral 82 22 92 % -- --   11/12/20 1645 101/58 -- -- 76 -- -- -- --   11/12/20 1630 106/57 -- -- 76 -- 93 % -- --   11/12/20 1615 125/69 -- -- 78 -- 96 % -- --   11/12/20 1600 124/73 -- -- 76 -- 95 % -- --   11/12/20 1545 119/60 -- -- 74 -- 95 % -- --   11/12/20 1530 125/71 -- -- 77 -- 94 % -- --   11/12/20 1515 119/62 -- -- 74 -- 94 % -- --   11/12/20 1500 110/62 -- -- 73 -- 94 % -- --   11/12/20 1445 128/68 -- -- 76 -- 94 % -- --   11/12/20 1430 119/68 -- -- 74 -- 94 % -- --   11/12/20 1415 122/72 -- -- 73 -- 93 % -- --   11/12/20 1400 122/68 -- -- 75 -- 94 % -- --   11/12/20 1345 130/68 -- -- 75 -- 94 % -- --   11/12/20 1330 134/66 -- -- 77 -- 95 % -- --   11/12/20 1300 125/67 -- -- 71 -- 93 % -- --   11/12/20 1245 131/89 --  "-- 71 -- 95 % -- --   20 1123 122/67 97.7  F (36.5  C) Oral 84 16 95 % 1.626 m (5' 4\") 110.2 kg (243 lb)       General: semi-reclined speaking in short sentences  CV: RRR, no murmurs.   Resp: breathing mildly uncomfortably on room air, faint wheezes and crackles throughout. Diminished sounds at the bases.   Abdomen: Soft, non-tender to palpation, bowel sounds present, no rebound or guarding  : deferred  Extremities: significant 3+ lymphedema b/l (R>L) with taught RLE and mild tenderness throughout.   Lines/Drains: port    I/Os  (Yesterday // Since Midnight)  PO: 720 mL // NR  Urine x2 // NR    New labs/imaging-  Cr 1.11 > pending   K 3.1 > ERP> pending  COVID19 pending  Procalcitonin pending  Blood cultures  NGTD    WBC 6.3  Hgb 7.9    B-108    Assessment:  70 year old female with recurrent stage IIIC2 serous endometrial cancer, DM2, HTN, and obesity who is admitted for worsening SOB.    Active Problem list:  - Recurrent serous endometrial cancer  - Dyspnea on exertion  - Acute hypoxic respiratory failure  - Anemia secondary to chemotherapy, chronic disease  - CARMEN  - GERD  - Type 2 DM  - Hypertension  - Obesity  - Hypokalemia    Plan:    Recurrent serous endometrial cancer: 18: TLH-BSO, omentectomy, bilateral pelvic and para-aortic lymph node dissection, pelvic washings. 3/30/18-18: Six cycles of carboplatin AUC 6 and paclitaxel 175mg/m2, delivered sandwich style with pelvic radiation between cycles three and four. 18-18: EBRT delivered to pelvis and para-aortic nodes, 5040 cGy in 28 fractions. 18-18: HDR brachytherapy delivered to the vaginal cuff, 1200 cGy in two fractions. 10/24/19: Periaortic lymph node biopsy: Tumor morphology is consistent with metastasis/recurrence of endometrial serous carcinoma. 19 - 3/5/20: 6 cycles carboplatin desensitization/paclitaxel/avastin. 3/25/20 - 20 Maintenance avastin. 20 - present (10/7/20): 3 cycles weekly taxol. " 11/5/20 CT scan Chest/abdomen/pelvis: Increasing mediastinal adenopathy, increased peritoneal nodules. Lungs: Possible metastases/infection or both. (Increased groundglass and tree-in-bud opacities of the left upper and lower lobes and new groundglass and tree-in-bud opacities in the right middle and lower lobes. Moderate bilateral pleural effusions.)  - PTA tylenol and oxycodone ordered     Dyspnea on exertion:  Acute hypoxic respiratory failure: Currently on room air, but hypoxic to 80s in clinic. Ddx includes pneumonia (viral vs bacterial, HCAP), PE, worsening disease with symptomatic pleural effusions, or heart failure. Workup consisted of CT PE that was negative, however, re-demonstrated micro-nodules and consolidative changes as well as b/l pleural effusions, and mediastinal LAD. These findings are similar to CT 11/5. There was notably peribronchovascular fibrosis suggestive of infection vs infarct as well as sequelae of pulmonary hypertension. BNP was 308, EKG neg, and troponin neg. S/p azithromycin and ceftriaxone in the ED for presumed CAP. No leukocytosis and afebrile.   - COVID test pending  - D#2 IV zosyn for potential HCAP  - Continuous pulse ox, O2 PRN  - Albuterol inhaler PRN wheezing  - Consult medicine procedure team consult for potential therapeutic thoracentesis  - Avoid fluid overload, no IVF indicated currently, with regular diet  - Echo for evaluation of cardiac function given dilated pulmonary artery on CT    Lymphedema: Patient reports chronic swelling of b/l LE, but subjectively worse. Not wearing compression stockings that she usually wears on the right. Recent airplane travel.  - LE doppler US     Anemia secondary to chemotherapy, chronic disease: Hemoglobin stable  - continue to monitor     CARMEN: Creatinine 1.11 (b/l 0.9-1.0) prior to presentation to ED, suspect secondary to chemotherapy and now s/p IV contrast for CT-PE.  - Avoid nephrotoxic agents (no ibuprofen)  - Continue to trend  Cr     GERD:   - PTA omeprazole, famotidine, TUMS PRN     Type 2 DM:  Blood glucose appropriate  - PTA metformin held  - Blood glucose checks AC & HS     HTN: Blood pressure normal range  - PTA amlodipine, triamterene-hydrochlorothiazide held, resume as needed     PPX: lovenox ppx, will hold pending potential IR procedure  Dispo:  Pending clinical improvement     Discussed with Dr. Taveras Gyn Oncology Fellow    Sandor Deutsch MD, MPH  Gynecologic Oncology, PGY-3  November 13, 2020    Pager [phone]    The patient was seen and examined with the resident, the labs and vital signs were reviewed. The medical care plan outlined above was provided under my directives and direct supervision.     Mary Redding MD, MPH  Gynecologic Oncology

## 2020-11-13 NOTE — CONSULTS
[facility]  Palliative Care Consultation Note    Patient: Lu Smith  Date of Admission:  11/12/2020    Requesting Clinician / Team: Thais Fatima CNP / Gyn-Onc  Reason for consult: Pain management, Goals of care, Patient and family support    Recommendations:      Start scheduled oxycodone 5 mg Q8 hr      Start scheduled acetaminophen 1000mg tid      Start senna 1-2 bid and miralax daily now that we are scheduling opoids      Continue oxycodone 5 mg Q4 hr PRN for pain OR SOB      Palliative SW and  will follow for coping support as needed during hospitalization      Patient to follow with outpatient palliative care clinic once discharged      Should complete POLST form before discharge given her change to DNR/DNI      These recommendations have been discussed with patient and sister, Lorrie.      Thank you for the opportunity to participate in the care of this patient and family. Our team: will continue to follow.         Madison Hernandez  MS 4    This patient has been seen and evaluated independently by me. I discussed with the medical student. This note was initiated by the medical student and completed by myself to reflect my full findings, assessment, and plan.      Total time spent was 60 minutes,  >50% of time was spent counseling and/or coordination of care regarding goals of care, symptoms, support for patient with progressive endomentrail cancer, dsypnea likely related to lung mets and pleural effusions.    Bette Torres  Palliative Care   Pager [phone]        Assessments:  Lu Smith is a 70-year-old female with recurrent stage IIIC2 serous endometrial cancer (on cycle #3 of taxol), DM2, HTN, and obesity who was admitted for SOB and dyspnea on exertion that has worsened over the last 6 weeks and is interfering with her quality of life and ability to complete ADLs.     Today patient states that pain and difficulty breathing have worsened over  the last 6 weeks to the point that at times she does not want to keep going. At home she had been managing her pain primarily with ibuprofen and extra strength tylenol. She does take one oxycodone a day but tries to limit her use. She states that with oxycodone her pain is controlled for up to 8 hours. She reports abdominal pain, SOB, nausea and loose stools. She denies constipation, vomiting, dysuria, and sleep problems.    Today, the patient was seen for:  Shortness of breath  Dyspnea on exertion  Acute hypoxemic respiratory failure  Pleural effusion  Pain  Recurrent stage IIIC2 serous endometrial cancer  Coping  Family support    Prognosis, Goals, & Planning:      Functional Status just prior to hospitalization: 1 (Restricted in physically strenuous activity but ambulatory and able to carry out work of a light or sedentary nature)      Prognosis, Goals, and/or Advance Care Planning were addressed today: Yes        Summary/Comments: minimal discussion today due to time constraints. We did discuss how her cancer is progressing and now in stage that it is causing more symptoms and we talked about how our pallaitive team can help going forward anot just with symptoms but also with decision making and support. She has optino for another treatment regimen and will discuss at her outpatient follow-up      Patient's decision making preferences: shared with support from loved ones          Patient has decision-making capacity today for complex decisions: Yes            I have concerns about the patient/family's health literacy today: No           Patient has a completed Health Care Directive: Yes, and on file.      Code status: No CPR / No Intubation    Coping, Meaning, & Spirituality:   Mood, coping, and/or meaning in the context of serious illness were addressed today: Yes  Summary/Comments: worried about the pain, not sure how long she wants to live if she is going to be having pain like this going forward    Social:  "    Living situation: independent    Key family / caregivers: sister (Lorrie) visiting from [facility]  She lives alone in [place], [place]. She has two adult sons and three grandchildren.  [employer]      History of Present Illness:  History gathered today from: patient, medical chart    Ms. Smith is a 70-year old female with recurrent stage IIIC2 serous endometrial cancer (on cycle 3 of taxol), DM2, HTN, and obesity who presented to the ED from clinic with worsening fatigue, SOB, and dyspnea on exertion and abdominal pain x 6 weeks. This is interfering with her quality of life and she can only remember two \"good days\" in the last several weeks. Etiology of symptoms unclear.  Chest CT from 11/5/20 with mild bilateral hilar infiltrates.  Negative CT PE 11/12/20. Negative bilateral LE venous duplex US 11/13/20. COVID negative. She does have a malignant pleural effusion and expresses improvement in SOB and dyspnea after thoracentesis today.    Reports improved respiratory symptoms after the thoracentesis  Abdominal pain is persistent left lower and mid quadrant, tender with palpation, improves with 5mg oxycodone. No bad side effects with oxycodoen but she is hesitant to use. She did youse it 3x in a day last week and was helpful without sedation or side effects and seh says it seems to last her up to 8 hours    Key Palliative Symptom Data:  # Pain severity the last 12 hours: moderate  # Dyspnea severity the last 12 hours: moderate  # Nausea severity the last 12 hours: moderate  # Anxiety severity the last 12 hours: low    Patient is on opioids: assessed and bowels ok/no needed changes to plan of care today.    ROS:  Comprehensive ROS is reviewed and is negative except as here & per HPI: SOB, nausea, loose stools     Past Medical History:  Past Medical History:   Diagnosis Date     Diabetes (H)     Type II     Endometrial cancer determined by uterine biopsy (H) 02/2018     HTN (hypertension)      Obesity      " Osteoarthritis         Past Surgical History:  Past Surgical History:   Procedure Laterality Date     CHOLECYSTECTOMY  1993     CYSTOSCOPY N/A 2/23/2018    Procedure: CYSTOSCOPY;;  Surgeon: Kevin Henderson MD;  Location: [facility] OR     DAVINCI HYSTERECTOMY TOTAL, BILATERAL SALPINGO-OOPHORECTOMY, COMBINED N/A 2/23/2018    Procedure: COMBINED DAVINCI HYSTERECTOMY TOTAL, SALPINGO-OOPHORECTOMY;  DaVinci Assisted Total Laparoscopic Hysterectomy, Bilateral Salpingo-Oophorectomy, Cystoscopy,  Omental biopsy, omentectomy,bilateral  Pelvic And Para Aortic Lymph Node Dissection;  Surgeon: Kevin Henderson MD;  Location: [facility] OR     Partial lumbar discectomy  1998         Family History:  Family History   Problem Relation Age of Onset     Colon Cancer Mother      Breast Cancer Sister      Lymphoma Sister         Allergies:  Allergies   Allergen Reactions     Carboplatin Anaphylaxis     Chest tightness, hoarse voice, difficulty breathing     Ace Inhibitors Cough     Amoxicillin Hives        Medications:  I have reviewed this patient's medication profile and medications from this hospitalization.   Noted:  Oxycodone 5 mg Q8 hr    PRN  Acetaminophen 650 mg Q4 hr  Diphenhydramine 50 mg IV PRN for anxiety  Pepcid 10 mg BID PRN  Ondansetron 4 mg Q6 hr   Miralax   Senna-docusate    Physical Exam:  Vital Signs: Temp: 97.1  F (36.2  C) Temp src: Oral BP: (!) 148/78 Pulse: 77   Resp: 18 SpO2: 97 % O2 Device: Nasal cannula Oxygen Delivery: 2 LPM  Weight: 243 lbs 0 oz   Gen: sitting/lying in bed. Appears comfortable   HEENT: NCAT. Conjunctiva clear. Sclera anicteric .  CV: RRR , Peripheral perfusion intact.   Resp: unlabored work of breathing,   Abd: soft, mild Left lower and mid abdomen pain with light palpation  Msk: no gross deformity, no sarcopenia  Skin:  no jaundice  Ext: warm, well perfused.   Neuro: face symmetric. EOM, vision, hearing grossly intact. Speech fluent. Moves all extremities   Mental status/Psych: alert. Oriented.  Asks/answers questions appropriately. Affect is calm, tearful at times      Data reviewed:  Recent imaging reviewed, my comments on pertinents:     11/13/20 BLE Duplex Venous US  IMPRESSION:   1. No deep venous thrombosis demonstrated in either leg.    11/12/2020 CT PE  IMPRESSION:  1. No definite pulmonary embolism identified.  2. Improving tree-in-bud micronodules and consolidative changes.  Evolving multifocal peribronchovascular bandlike fibrosis, likely  sequelae of infection versus infarct.  3. Right greater than left pleural effusions, similar to 11/5/2020.  4. Extensive mediastinal lymphadenopathy.  5. Sequelae of pulmonary hypertension with dilated main pulmonary.    Recent lab data reviewed, my comments on pertinents:     Recent Labs   Lab 11/13/20  0701 11/12/20  1908 11/12/20  0932     --  135   POTASSIUM 3.7 3.6 3.1*   CHLORIDE 104  --  100   CO2 28  --  29   ANIONGAP 8  --  7   *  --  108*   BUN 25  --  25   CR 1.08*  --  1.11*   GFRESTIMATED 52*  --  51*   GFRESTBLACK 61  --  59*   MARYLOU 8.8  --  8.9   MAG 1.6  --  1.8   PHOS 4.0  --   --    PROTTOTAL  --   --  5.9*   ALBUMIN  --   --  2.5*   BILITOTAL  --   --  0.5   ALKPHOS  --   --  54   AST  --   --  28   ALT  --   --  24     CBC  Recent Labs   Lab 11/13/20  0701 11/12/20  0932   WBC 6.3 6.7   RBC 2.72* 2.98*   HGB 7.9* 8.6*   HCT 25.0* 27.3*   MCV 92 92   MCH 29.0 28.9   MCHC 31.6 31.5   RDW 20.6* 19.9*    323     INR  Recent Labs   Lab 11/12/20  1150   INR 1.02     Arterial Blood Gas  Recent Labs   Lab 11/13/20  0701   O2PER 21

## 2020-11-13 NOTE — PLAN OF CARE
"Time:     Reason for admission: Endometrial cancer (H) [C54.1]  SOB (shortness of breath) [R06.02]  PELAEZ (dyspnea on exertion) [R06.00]  Bilateral upper lobe community acquired pneumonia [J18.9]    Vitals: /68 (BP Location: Right arm)   Pulse 73   Temp 96.6  F (35.9  C) (Oral)   Resp 24   Ht 1.626 m (5' 4\")   Wt 110.2 kg (243 lb)   SpO2 95%   BMI 41.71 kg/m      Activity: SBA w/ cane   Diet: CHO, good intake   Pain: C/o abdominal pain, PRN Tylenol 650 mg administered  Respiratory: SOB, dyspnea with exertion, LS clear, albuterol administered/at bedside, RA sats 91-95% fluctuating, tachynpeic  Cardiovascular: BLE edema   GI: LBM this AM per pt, abdominal discomfort   : WNL  Skin: Redness on R leg, dry/cracked skin on rt heel - lotion applied  Neurologic: A&O, chronic tingling in fingers   Labs: Glucose monitored - 82, orange juice provided, troponin negative, chest CT - bilateral pleural effusion, opacities, negative for PE  Lines: R Port TKO     New changes this shift: Home meds sent to security, potassium replaced in ED, recheck - 3.6, lovenox administered, Zosyn started     Plan: Consult tmr for possible thoracentesis, possible echo,     "

## 2020-11-13 NOTE — PLAN OF CARE
Time: 0700 - 1300    Reason for admit: SOB, pneumonia.   Vitals: VSS on RA ex HTN. Afebrile.   Activity: SBA with cane.   Neuro: A&Ox4, able to make needs known.   Mood/Behavior: Calm, accepting, pleasant.   Lines/Drains: R chest port WDL, infusing TKO.   Cardiac: HTN.  Respiratory: SOB, PELAEZ. Lung sounds diminished. Dry cough.   Diet: Mod CHO diet, tolerating well.   GI/: Voiding WDL. BMx1 this shift. Pt denied nausea this AM.   Skin: BLE redness and edema. Compression stocking applied to R leg per pt's home routine.   Pain: Pt reported abdominal pain; PRN Tylenol given x1 and heat applied with partial relief.     New this shift: K 3.7 this AM. COVID swab negative. Echo completed, results pending. Pt transferred to  at 1300. Report given to  RN, belongings, meds, and chart sent with pt.     Plan: Plan for LE US and therapeutic thoracentesis this afternoon. Continue to monitor SOB. Follow POC.

## 2020-11-14 NOTE — PLAN OF CARE
A&Ox4.VSS on 2L of O2 via nasal cannula for comfort. Pain control with scheduled oxycodone and tylenol with some relief. Thoracentesis site with gauze clean dry and intact. On consistent carb diet, denies nausea. On blood sugar check before meal and at bedtime. No sliding scale needled before bedtime. Lower extremeties  elevated on pillows. Up to the bathroom with SBA, and a cane. Port TKO. Dyspnea on exertion. Continue with plan of care and maintain pain control.

## 2020-11-14 NOTE — PROGRESS NOTES
A&Ox4. O2 91% on room air after ambulating. Pain controlled with scheduled oxycodone and tylenol. Thoracentesis site CDI. On consistent card diet, no n/v. BS checks before meals and bedtime. Lower extremity edema, elevated on pillows. Up to bathroom SBA with cane. Port TKO. Dyspnea and cough on exertion.

## 2020-11-14 NOTE — PLAN OF CARE
VSS on RA. Pt still visibly SOB and reporting dyspnea on exertion, stayed above 91% on RA after ambulating to bathroom. Denies nausea. Pain in upper abdomen controlled with scheduled Oxycodone, and PRN Oxycodone x1. Tylenol. Potassium 3.2 this AM, supplemented and recheck 3.3, IV KCl currently infusing, recheck to be put in after second bag. Thoracentesis site CDI. Infrequent cough, nonproductive. Voiding adequately. Up with SBA and cane to bathroom. Carb consistent diet, appetite fair. Blood sugar checks maintained, no sliding scale insulin given. Sister at bedside providing emotional support. Plan for possible discharge tomorrow. Port heparin locked. Continue to monitor oxygen saturation and pain.

## 2020-11-14 NOTE — PROGRESS NOTES
"[facility] ([place]) [address]  Palliative Care Initial Spiritual Assessment    Patient: Lu Smith  Date of Admission:  2020  Reason for consult: emotional distress and patient/family coping      Summary and Recommendations:  Patient Lu Smith is considering what she values in terms of plan of care and believes she needs more information regarding prognosis and treatment options before making decisions. She is distressed by the idea of enduring more pain.  Lu is concerned about how her family, especially sons, will cope with her condition; at the same time, she has robust support from family and friends.  Lu is [employer] and benefits from spiritual support, prayer, and the opportunity to verbally reflect on her experience.  I will follow for spiritual support while Palliative Care is consulted.  These recommendations have been discussed with patient and with team via this note.    Kadi Baltazar  Palliative   Pager [phone]  [facility] Inpatient Team Consult pager [phone] (M-F 8-4:30)  After-hours Answering Service [phone]      Assessments:   Visit with patient Lu Smith at bedside. Lu was tearful at times during our discussion, and she requested prayer, which she said \"always makes me feel better\".    Distress:  Distress in the context of serious illness was assessed today:    Existential/spiritual/emotional distress: Yes; multi-factorial.    Lu is \"scared\" by the idea of suffering while she is dying. She wants to die comfortably and to have family close by \"not have someone find me after I've been lying there for seven days\". Despite her fears, she is at peace with the idea of death itself. Lu believes (Congregation) that \"Celestine is always with me\" and that angels or family members who have  before her will be present to help her when she dies. She told the stories of her daughter's and uncle's deaths and the peace she saw in " "them.     Lu worries about how her sons will cope with her condition, especially son whose wife has \"stage four breast cancer\". She is grieving as she anticipates missing time with sons and grandchildren, even as finds comfort in the idea of joining her daughter in heaven.    Lu wants to have help and companionship in her home, which she believes family and friends will happily provide. At the same time, she does not want to over-tax her support system (siblings, sons, daughters-in-law, friends)    Lu is \"torn\" between trying to be \"hopeful\" for her sons and at times feeling \"tired\" herself; she wants to make decisions about plan of care taking into account her own comfort, but she also worries that her children may not understand if she chooses to forego cancer-directed therapies that may be offered.      Gnosticism distress:  No.      Social/economic/relational distress:  Yes; mild. Lu has some paperwork she wishes to do to prepare for her death. She said she has already made cremation arrangements.    Coping, Meaning, & Spirituality:   Coping, meaning, and/or spirituality in the context of serious illness were assessed today:    Spiritual background and preferences: Faith - currently attends a Zoroastrianism Muslim      Beliefs, rituals, and practices: prayer, reading devotionals - Lu's family brought her \"Guideposts\", which she finds comforting. Reading devotional materials helps her feel less anxious. Cindys beliefs in Celestine, angels, and heaven are deep sources of meaning for her.      Meaning-making: through relationships with family and friends, Faith beliefs, and memories of companionship, helping others, and the deaths of loved ones.      Strengths and resources: family, friends, deanna, personal resilience    Prognosis, Goals, & Planning:     Prognosis, Goals, and/or Advance Care Planning were assessed today: Yes - Lu wants to talk with oncologist about what cancer-directed " therapies remain for her, and what they believe her prognosis might be. She is worried about physical suffering.      Preferred language: English      Patient's decision making preferences: shared with support from loved ones      I have concerns about the patient/family's health literacy today: No      Patient has a completed Health Care Directive: Yes, and on file.      Code status per chart review: No CPR / No Intubation    Interventions:  I offered spiritual and emotional support through reflective listening, anticipatory grief support, validation of emotions, spiritual re-framing, values-based exploration of goals of care, and prayer.

## 2020-11-14 NOTE — PROGRESS NOTES
Gynecology Oncology Progress Note  2020    Ms. Lu Smith is a 70 year old HD#3 admitted with worsening SOB and dyspnea on exertion    Dz: Recurrent stage IIIC2 serous endometrial cancer    24 hour events:   - LE US and cardiac echo wnl  - s/p right thoracentesis   - on 2.5 L O2 overnight  - transitioned to levaquin yesterday    Subjective: Patient reports she well overnight. She was able to sleep and SOB has significantly improved. Ambulating with some dizziness to the bathroom. Tolerating PO without nausea or vomiting. Voiding. Denies fevers, chills, chest pain. Had questions regarding her cancer treatment and when chemo will resume.    Objective:   Patient Vitals for the past 24 hrs:   BP Temp Temp src Pulse Resp SpO2   20 0329 108/61 96.9  F (36.1  C) Oral 79 18 98 %   20 2227 136/70 97  F (36.1  C) Oral 89 18 99 %   20 2026 126/55 97  F (36.1  C) Oral 89 18 99 %   20 1705 -- -- -- -- -- 97 %   20 1624 (!) 148/78 97.1  F (36.2  C) Oral 77 18 98 %   20 1523 124/68 98.1  F (36.7  C) Oral 78 20 98 %   20 1337 126/68 97  F (36.1  C) Axillary 82 20 99 %   20 1029 (!) 161/80 96.4  F (35.8  C) Oral 76 20 97 %       General: resting comfortably  CV: RRR, no murmurs.   Resp: breathing comfortably on 2.5 L O2, clear to ausculation bilaterally.   Abdomen: Soft, non-tender to palpation, no rebound or guarding  Lines/Drains: port    I/O last 3 completed shifts:  In: 860 [P.O.:860]  Out: 1100 [Urine:1100]    New labs/imaging-  WBC 6.3>5.8  Hgb 7.9>7.3  B-108  Cr 1.11 > 1.08>1.06  K 3.1 > ERP> 3.7>3.2  COVID19 negative  Procalcitonin 0.13  Blood cultures  NGTD  Gram stain of pleural fluid: Many WBC'S seen predominantly mononuclear cells   Fluid culture pending    Cardiac echo :  Interpretation Summary  Global and regional left ventricular function is normal with an EF of 60-65%.  Grade II or moderate diastolic dysfunction.  Global right  ventricular function is normal. The right ventricle is normal  size. The RV fractional area change is 48%. The TAPSE is 2.2 cm, the S' is  14.3 cm/s.  The estimated pulmonary artery systolic pressure is 38 mmHg.  There is mild aortic stenosis.  This study was compared with the study from 02/19/2018. No change in the RV  function. The aortic sclerosis has progresed to mild aortic stenosis. The PASP  is better appreciated on today's examination.    LE US  IMPRESSION:   1. No deep venous thrombosis demonstrated in either leg.  2. Patient's known lymphadenopathy, concerning for metastases, was  better demonstrated in the previous CT.    Assessment:  70 year old female with recurrent stage IIIC2 serous endometrial cancer, DM2, HTN, and obesity who is admitted for worsening SOB.    Active Problem list:  - Recurrent serous endometrial cancer  - Dyspnea on exertion  - Acute hypoxic respiratory failure  - Anemia secondary to chemotherapy, chronic disease  - CARMEN  - GERD  - Type 2 DM  - Hypertension  - Obesity  - Hypokalemia    Plan:    Recurrent serous endometrial cancer: 2/23/18: TLH-BSO, omentectomy, bilateral pelvic and para-aortic lymph node dissection, pelvic washings. 3/30/18-9/28/18: Six cycles of carboplatin AUC 6 and paclitaxel 175mg/m2, delivered sandwich style with pelvic radiation between cycles three and four. 6/4/18-7/12/18: EBRT delivered to pelvis and para-aortic nodes, 5040 cGy in 28 fractions. 7/16/18-7/20/18: HDR brachytherapy delivered to the vaginal cuff, 1200 cGy in two fractions. 10/24/19: Periaortic lymph node biopsy: Tumor morphology is consistent with metastasis/recurrence of endometrial serous carcinoma. 11/11/19 - 3/5/20: 6 cycles carboplatin desensitization/paclitaxel/avastin. 3/25/20 - 7/16/20 Maintenance avastin. 8/6/20 - present (10/7/20): 3 cycles weekly taxol. 11/5/20 CT scan Chest/abdomen/pelvis: Increasing mediastinal adenopathy, increased peritoneal nodules. Lungs: Possible  metastases/infection or both. (Increased groundglass and tree-in-bud opacities of the left upper and lower lobes and new groundglass and tree-in-bud opacities in the right middle and lower lobes. Moderate bilateral pleural effusions.)  - PTA tylenol and oxycodone ordered     Dyspnea on exertion:  Acute hypoxic respiratory failure: Was hypoxic to 80s in clinic. Ddx includes pneumonia (viral vs bacterial, HCAP), PE, worsening disease with symptomatic pleural effusions, or heart failure. Workup consisted of CT PE that was negative, however, re-demonstrated micro-nodules and consolidative changes as well as b/l pleural effusions, and mediastinal LAD. These findings are similar to CT 11/5. There was notably peribronchovascular fibrosis suggestive of infection vs infarct as well as sequelae of pulmonary hypertension. BNP was 308, COVID neg, EKG neg, and troponin neg. S/p azithromycin and ceftriaxone in the ED and 1 day of zosyn for presumed CAP. S/p right thoracentesis 11/13 with 900 ml out. No leukocytosis and afebrile. On 2.5 L O2 overnight.   - D#2 levaquin. for potential HCAP  - Continuous pulse ox, O2 PRN  - Albuterol inhaler PRN wheezing  - Avoid fluid overload, no IVF indicated currently, with regular diet    Lymphedema: Patient reports chronic swelling of b/l LE, but subjectively worse. Not wearing compression stockings that she usually wears on the right. Recent airplane travel. LE doppler negative for DVT.     Anemia secondary to chemotherapy, chronic disease: Hemoglobin stable  - continue to monitor     CARMEN: Creatinine 1.11>1.08 (b/l 0.9-1.0) prior to presentation to ED, suspect secondary to chemotherapy and now s/p IV contrast for CT-PE.  - Avoid nephrotoxic agents (no ibuprofen)  - Continue to trend Cr. If worsening cr, switch to every other day dosing for antibiotics     GERD:   - PTA omeprazole, famotidine, TUMS PRN     Type 2 DM:  Blood glucose appropriate  - PTA metformin held  - Blood glucose checks AC &  HS. MSSI ordered     HTN: Blood pressure normal range  - PTA amlodipine, triamterene-hydrochlorothiazide held, resume as needed     PPX: lovenox ppx  Dispo:  Pending clinical improvement     Discussed with Dr. Darcy Armstrong MD  OB/GYN PGY-2  11/14/20 7:13 AM

## 2020-11-14 NOTE — PLAN OF CARE
VSS on 2L nasal cannula. A&Ox4. Denies nausea. Pain in L upper abdomen, PRN Oxycodone and PRN Tylenol given with relief. Palliative put in scheduled Oxycodone, to be started later this afternoon. C/o SOB and dyspnea on exertion although pt reports this has gotten better since Thoracentesis performed earlier today. US performed on BLE to r/o DVT. BLE edeamtous, elevated on pillows. Voiding adequately, passing gas, no BM this shift. Pt switched to PO Abx this shift. Carb consistent diet, appetite good. Blood sugar checks maintained, no sliding scale insulin needed. Port to TKO. Continue with plan of care.

## 2020-11-15 NOTE — PROGRESS NOTES
Gynecology Oncology Progress Note  November 15, 2020    Ms. Lu Smith is a 70 year old HD#4 admitted with worsening SOB and dyspnea on exertion    Dz: Recurrent stage IIIC2 serous endometrial cancer    24 hour events:   - Improved SOB   - Weaned off of oxygen       Subjective: Patient reports doing well overnight. She was able to sleep and SOB has significantly improved. Ambulating with assistance to the bathroom and dizziness has resolved. Tolerating PO without nausea or vomiting. Voiding. Denies fevers, chills, chest pain. Her sister will be staying with her at home until next week and is eager to go home.     Objective:   Patient Vitals for the past 24 hrs:   BP Temp Temp src Pulse Resp SpO2   11/15/20 0429 133/74 96.9  F (36.1  C) Oral 77 18 94 %   20 2340 125/58 96.6  F (35.9  C) Oral 84 20 96 %   20 1916 119/64 96.4  F (35.8  C) Oral 82 18 93 %   20 1432 -- -- -- -- -- 93 %   20 1222 -- -- -- -- -- 91 %   20 1211 128/79 96.4  F (35.8  C) Oral 77 18 96 %   20 1100 -- -- -- -- -- 92 %   20 0821 130/75 96.5  F (35.8  C) Oral 73 21 96 %   20 0800 -- -- -- -- -- 98 %       General: resting comfortably  CV: RRR, no murmurs.   Resp: breathing comfortably on room air, clear to ausculation bilaterally.   Abdomen: Soft, non-tender to palpation, no rebound or guarding  Lines/Drains: port    I/O last 3 completed shifts:  In: 520 [P.O.:500; I.V.:20]  Out: 250 [Urine:250]    New labs/imaging-  WBC 6.3>5.8  Hgb 7.9>7.3  B-108  Cr 1.11 > 1.08>1.06  K 3.1 > ERP> 3.7>3.2  Blood cultures  NGTD  Gram stain of pleural fluid: Many WBC'S seen predominantly mononuclear cells   Fluid culture pending    Assessment:  70 year old female with recurrent stage IIIC2 serous endometrial cancer, DM2, HTN, and obesity who is admitted for worsening SOB.    Active Problem list:  - Recurrent serous endometrial cancer  - Dyspnea on exertion  - Acute hypoxic respiratory failure  -  Anemia secondary to chemotherapy, chronic disease  - CARMEN  - GERD  - Type 2 DM  - Hypertension  - Obesity  - Hypokalemia    Plan:    Recurrent serous endometrial cancer: 2/23/18: TLH-BSO, omentectomy, bilateral pelvic and para-aortic lymph node dissection, pelvic washings. 3/30/18-9/28/18: Six cycles of carboplatin AUC 6 and paclitaxel 175mg/m2, delivered sandwich style with pelvic radiation between cycles three and four. 6/4/18-7/12/18: EBRT delivered to pelvis and para-aortic nodes, 5040 cGy in 28 fractions. 7/16/18-7/20/18: HDR brachytherapy delivered to the vaginal cuff, 1200 cGy in two fractions. 10/24/19: Periaortic lymph node biopsy: Tumor morphology is consistent with metastasis/recurrence of endometrial serous carcinoma. 11/11/19 - 3/5/20: 6 cycles carboplatin desensitization/paclitaxel/avastin. 3/25/20 - 7/16/20 Maintenance avastin. 8/6/20 - present (10/7/20): 3 cycles weekly taxol. 11/5/20 CT scan Chest/abdomen/pelvis: Increasing mediastinal adenopathy, increased peritoneal nodules. Lungs: Possible metastases/infection or both. (Increased groundglass and tree-in-bud opacities of the left upper and lower lobes and new groundglass and tree-in-bud opacities in the right middle and lower lobes. Moderate bilateral pleural effusions.)  - PTA tylenol and oxycodone ordered     Dyspnea on exertion:  Acute hypoxic respiratory failure: Was hypoxic to 80s in clinic. Ddx includes pneumonia (viral vs bacterial, HCAP), PE, worsening disease with symptomatic pleural effusions, or heart failure. Workup consisted of CT PE that was negative, however, re-demonstrated micro-nodules and consolidative changes as well as b/l pleural effusions, and mediastinal LAD. These findings are similar to CT 11/5. There was notably peribronchovascular fibrosis suggestive of infection vs infarct as well as sequelae of pulmonary hypertension. BNP was 308, COVID neg, EKG neg, and troponin neg. S/p azithromycin and ceftriaxone in the ED and 1  day of zosyn for presumed CAP. S/p right thoracentesis 11/13 with 900 ml out. No leukocytosis and afebrile. No longer requiring oxygen over last 24 hours and overall symptomatically improved today.   - D#3/10 levaquin for potential HCAP  - Albuterol inhaler PRN wheezing  - Avoid fluid overload, no IVF indicated currently, with regular diet    Lymphedema: Patient reports chronic swelling of b/l LE, but subjectively worse. Not wearing compression stockings that she usually wears on the right. Recent airplane travel. LE doppler negative for DVT.     Anemia secondary to chemotherapy, chronic disease: Hemoglobin stable  - continue to monitor     CARMEN: Creatinine 1.11>1.08 >1.06 (b/l 0.9-1.0) prior to presentation to ED, suspect secondary to chemotherapy and now s/p IV contrast for CT-PE.  - Avoid nephrotoxic agents (no ibuprofen)     GERD:   - PTA omeprazole, famotidine, TUMS PRN     Type 2 DM:  Blood glucose appropriate  - PTA metformin held  - Blood glucose checks AC & HS. MSSI ordered     HTN: Blood pressure normal range  - PTA amlodipine, triamterene-hydrochlorothiazide held, resume as needed     PPX: lovenox ppx  Dispo: Home today.      Discussed with Dr. Darcy Alanis MD  Obstetrics & Gynecology, PGY-2  11/15/20 7:59 AM         Physician Attestation   I agree with the resident's findings and plan of care as documented in the note.       I personally reviewed vital signs, medications, labs and imaging.       Radha Taveras MD  Gyn Onc Fellow  [facility]

## 2020-11-15 NOTE — PLAN OF CARE
Time: 9621-6299    Reason for Admission: Shortness of breath    Activity: SBA with cane.     Neuro: A&O x4. Pleasant and calm.     GI/: Voiding spontaneously. No BM this shift.     Diet: Moderate consistent carb, fair appetite.     Incisions/Drains: Thoracentesis site CDI.    IV Access: R Port heparin locked.     Labs: B and 96. K recheck was 3.7. Recheck scheduled for AM.    Vitals: VSS on RA    Pain: Pt reporting abdominal discomfort. Heating pad in place. Managed with scheduled oxycodone and tyleneol.    New changes this shift: None    Plan: Possible discharge tomorrow. Continue with plan of care.

## 2020-11-15 NOTE — PLAN OF CARE
"/74 (BP Location: Left arm)   Pulse 77   Temp 96.9  F (36.1  C) (Oral)   Resp 18   Ht 1.626 m (5' 4\")   Wt 110.2 kg (243 lb)   SpO2 94%   BMI 41.71 kg/m    Neuro: A&Ox4.   Cardiac: Afebrile, VSS.   Respiratory: RA. PELAEZ noted. Pt endorsed improved symptomology   GI/: Voiding spontaneously. No BM this shift.   Diet/appetite: Tolerating mod cho diet.  mid shift. Denies nausea   Activity: Up with stand by assist/ cane   Pain: .pain maintained at tolerable level with scheduled oxycodone and heat pad  Skin: No new deficits noted.  Lines: port hep locked    Rested btwn cares. Pt hopeful can discharge today. Able to make needs known. Continue POC.        "

## 2020-11-15 NOTE — PLAN OF CARE
Discharge  D: Orders for discharge and outpatient medications written.   I: Home medications and return to clinic schedule reviewed with patient. Discharge instructions and parameters for calling Health Care Provider reviewed with teach back. Patient left at 11:20 AM accompanied by transport.   A: Patient verbalized understanding and was ready for discharge.   P: Patient instructed to  medications in Pharmacy. Follow up as scheduled.

## 2020-11-17 NOTE — PROGRESS NOTES
"Lu Smith is a 70 year old female who is being evaluated via a billable video visit.      The patient has been notified of following:     \"This video visit will be conducted via a call between you and your physician/provider. We have found that certain health care needs can be provided without the need for an in-person physical exam.  This service lets us provide the care you need with a video conversation.  If a prescription is necessary we can send it directly to your pharmacy.  If lab work is needed we can place an order for that and you can then stop by our lab to have the test done at a later time.    Video visits are billed at different rates depending on your insurance coverage.  Please reach out to your insurance provider with any questions.    If during the course of the call the physician/provider feels a video visit is not appropriate, you will not be charged for this service.\"    Patient has given verbal consent for Video visit? Yes  How would you like to obtain your AVS? MyChart  If you are dropped from the video visit, the video invite should be resent to: Text to cell phone: [phone]  Will anyone else be joining your video visit? No      Beth HUBER    Video-Visit Details    Type of service:  Video Visit    Video Start Time: 12:54  Video End Time: 13:58    Originating Location (pt. Location): Home    Distant Location (provider location):  [facility]     Platform used for Video Visit: Mike Nash MD        Palliative Care Outpatient Clinic Consultation Note    Patient:  Lu Smith    Chief Complaint:   The patient's primary care provider is:  Levar Scott.     History of Present Illness:  Lu Smith 70 year old female with DMII, HTN, recurrent serous endometrial cancer s/p 2/2018 TLH-BSO, omentectomy, bilateral pelvic washing, and lymph node dissection, 3-7/2018 multiple rounds of chemoradiation to the pelvis and " "brachytherapy. 9/2019 progression to multiple lymph nodes (periaortic, mediastinal, retroclavicular etc), over th past years multiple rounds of systemic chemotherapy and immunotherapy. 11/5/2020 increased mediastinal and peritoneal adenopathy and ?lung mets, b/l pl effusions.     On 11/12 she saw Dr. Redding, she had been on weekly taxol (finished 3 cycles), and her imaging showed worsening disease. She had SOB, hypoxia and infiltrative disease on CT scan so she was sent to the ED. Dr. Redding's note comments that: \"Plan change of chemotherapy treatment post management of acute event. Patient did have platinum response and may be eligible for additional platinum although required carbo desensitization. Alternative regiment to consider include Doxil. I discussed available clinical trials, non currently open but will continue to consider options.\" Also RLE edema with a negative DVT US on 9/20. Referral was made for pain and goals of care.     She was admitted 11/12-11/5 for dyspnea, CARMEN she was treated for a presumed CAP with 10 days of levofloxacin (COVID negative) and thoracentesis (900 ml). She was seen by Dr. Edmonds of palliative care inpatient, code status was DNR/DNI and recommended completing a POLST form. She reported issues with SOB, dyspnea, abdominal pain and loose stools. She was just taking ibuprofen and tylenol at home, she was recommended scheduled oxycodone 5 mg Q8h, acetaminophen 1000 mg TID, senna BID, miralax daily, and PRN oxycodone 5 mg q4h PRN for pain or SOB.     Palliative Opioid Risk Assessment & Monitoring Plan    Opioid Risk Tool (ORT):    Family History of Substance Abuse:        Alcohol = 3 pts (yes, male) Father and sister       Illegal Drugs = 0 pt (no)       Prescription Drugs = 0 pt (no)      Personal History of Substance Abuse:       Alcohol = 0 pt (no)       Illegal Drugs = 0 pt (no)       Prescription Drugs = 0 pt (no)        Age: 0 pt (age < 16; age > 45)      Psychological Disease: " 0 pt (none)      ORT Score = 3        0-3: Low risk for opioid abuse       4-7: Moderate risk for opioid abuse       >/= 8: High risk for opioid abuse    Distressing Symptom/s:   Today, she reports that she continues to have significant violeta-umbillical radiating to the L side aching constant pain, worse with movement and eating. She has been taking the oxycodone 5 mg TID with acetaminophen 1,000 mg scheduled. She reports that this helps for about 5 hours when it wears off. She has also taken 1 additional 5 mg oxycodone PRN (total 4 pills) and 400 mg of Ibuprofen several times a day. The pain has been waking her from sleep and making it difficult for her to focus on her family visits. She denies any mental clouding, sedation, hyperalgesia, myoclonus or constipation.     She reports some intermittent nausea and retching, no emesis. She has 10 mg prochlorperazine (takes a half pill) which she thinks helps (taking ~once a week). After taking Miralax in the hospital she has developed 3-4 soft BMs a day which prompted her to take a imodium last night. She reports a decrease in her appetite overall, she does feel hungry sometimes but avoids eating because she worries it will bring on more pain.     She reports fatigue, and issues with lightheadness and extremity numbness and weakness. Her hemoglobin is 7.3, plan for her to be transfused in 2 days. She said that she has needed help from his sister, Lorrie to cook and get out of the bathtub. She is able to shower independently with a shower chair.     Patient's Disease Understanding: She has a great grasp of her options. She spoke with her oncologist this morning, per her understanding hormonal therapy and chemo are not good options. She could also pursue comfort focus (2 months prognosis) or immunotherapy (6 months prognosis). She is planning to speak with her sons this afternoon, but is considering a time trial of immunotherapy. She wants to make it to her son's wedding next  September and to spend more time with her grandkids. She reports that she spoke with the chapalin when she was in the hospital. She is not afraid to die, but she is afraid of ongoing suffering from abdominal pain. She believes that she will go to a better place after she dies and will be reunited with her mother.     Coping:  She is tearful throughout our visit but reports that she feels very supported by her sisters, brothers and children. She is hoping to spend time in a warmer climate this .     Social History  Living Situation: has her own home, her sister Lorrie who is a former nurse from [employer] is living with her, her son may move in with her after Lorrie leaves  Children: two sons, one of whom is  to a woman who has been struggling with breast cancer for 8 years  Occupation: retired   Substance Use/History of misuse: none  Spiritual Background: Jaden  Spiritual Concerns/Needs: was asking about installing railings in her bathtub, she finds that hot water helps her stomach pain but she cannot get out of the tube alone.   Social History     Tobacco Use     Smoking status: Former Smoker     Packs/day: 0.25     Years: 20.00     Pack years: 5.00     Quit date: 1991     Years since quittin.5     Smokeless tobacco: Never Used     Tobacco comment: Quit smoking    Substance Use Topics     Alcohol use: Yes     Comment: 4-7/week if out 1-2x's/week     Drug use: No       Family History  Family History   Problem Relation Age of Onset     Colon Cancer Mother      Breast Cancer Sister      Lymphoma Sister      Advance Care Planning:  Advance Directive: Honoring choices directive from 2018: wanted CPR and all interventions. Being with family was important, wanted to be able to communicate, found comfort in prayer and reading the bible  Health Care Agent Contact Information: Graeme Hernandez son [phone] (Alternate son Maciel)      Allergies   Allergen Reactions     Carboplatin  Anaphylaxis     Chest tightness, hoarse voice, difficulty breathing     Ace Inhibitors Cough     Amoxicillin Hives     Current Outpatient Medications   Medication Sig Dispense Refill     acetaminophen (TYLENOL) 500 MG tablet Take 500 mg by mouth every 6 hours as needed for pain        Famotidine (PEPCID PO) Take 10 mg by mouth as needed        fluticasone (FLONASE) 50 MCG/ACT spray Spray 1-2 sprays into both nostrils daily (Patient taking differently: Spray 1-2 sprays into both nostrils daily as needed ) 1 Bottle 1     ibuprofen (ADVIL/MOTRIN) 200 MG tablet Take 400 mg by mouth daily       L-Glutamine 500 MG CAPS Take 1 capsule by mouth daily        levofloxacin (LEVAQUIN) 750 MG tablet Take 1 tablet (750 mg) by mouth every 24 hours 7 tablet 0     loperamide (IMODIUM) 2 MG capsule Take 2 mg by mouth 4 times daily as needed for diarrhea       loratadine (CLARITIN REDITABS) 5 MG dispersible tablet Take 5 mg by mouth daily       magnesium 500 MG TABS Take 500 mg by mouth daily        METFORMIN HCL PO Take 500 mg by mouth daily (with breakfast)        omeprazole (PRILOSEC) 40 MG DR capsule Take 40 mg by mouth daily as needed        oxyCODONE (ROXICODONE) 5 MG tablet Take 1 tablet (5 mg) by mouth 2 times daily as needed for severe pain Continue to take Tylenol 650 mg every 6 hours. Add daily Claritin for the bone pain. Use Oxycodone as needed for severe or breakthrough pain. 10 tablet 0     pyridoxine (VITAMIN B-6) 50 MG TABS Take 1 tablet (50 mg) by mouth 2 times daily (Patient taking differently: Take 50 mg by mouth daily ) 60 tablet 3     senna-docusate (SENOKOT-S/PERICOLACE) 8.6-50 MG tablet Take 1 tablet by mouth 2 times daily as needed for constipation 20 tablet 0     Past Medical History:   Diagnosis Date     Diabetes (H)     Type II     Endometrial cancer determined by uterine biopsy (H) 02/2018     HTN (hypertension)      Obesity      Osteoarthritis      Past Surgical History:   Procedure Laterality Date      CHOLECYSTECTOMY       CYSTOSCOPY N/A 2018    Procedure: CYSTOSCOPY;;  Surgeon: Kevin Henderson MD;  Location: [facility] OR     DAVINCI HYSTERECTOMY TOTAL, BILATERAL SALPINGO-OOPHORECTOMY, COMBINED N/A 2018    Procedure: COMBINED DAVINCI HYSTERECTOMY TOTAL, SALPINGO-OOPHORECTOMY;  DaVinci Assisted Total Laparoscopic Hysterectomy, Bilateral Salpingo-Oophorectomy, Cystoscopy,  Omental biopsy, omentectomy,bilateral  Pelvic And Para Aortic Lymph Node Dissection;  Surgeon: Kevin Henderson MD;  Location: [facility] OR     Partial lumbar discectomy         REVIEW OF SYSTEMS:   ROS: 10 point ROS neg other than the symptoms noted above in the HPI and here:  Palliative Symptom Review (0=no symptom/no concern, 1=mild, 2=moderate, 3=severe):      Pain: 2      Fatigue: 1      Nausea: 0      Constipation: 0      Diarrhea: 1      Depressive Symptoms: 0      Anxiety: 0      Drowsiness: 1      Poor Appetite: 1      Shortness of Breath: 0      Insomnia: 0      Other: 0      Overall (0 good/no concerns, 3 very poor):  2    Physical Exam:  Gen alert, comfortable appearing, NAD.  Head NCAT.  Eyes anicteric without injection  Face symmetric, eyes conjugate  Mouth pink, moist appearing  Lungs unlabored, no cough, speaking full sentences  Skin no rashes or lesions evident on face/neck  Neuro Face symmetric, eyes conjugate; speech fluent.  Neuropsych exam normal including affect, sensorium, gross memory, thought processes, and fund of knowledge.     Data Reviewed:  LABS: 11/15 K 3.6 : Cr 1.06 GFR 54 hbg 7.3 plt 265  IMAGIN20 BLE Duplex Venous US  IMPRESSION:   1. No deep venous thrombosis demonstrated in either leg.     2020 CT PE  IMPRESSION:  1. No definite pulmonary embolism identified.  2. Improving tree-in-bud micronodules and consolidative changes.  Evolving multifocal peribronchovascular bandlike fibrosis, likely  sequelae of infection versus infarct.  3. Right greater than left pleural effusions,  similar to 11/5/2020.  4. Extensive mediastinal lymphadenopathy.  5. Sequelae of pulmonary hypertension with dilated main pulmonary.    Impressions:  Palliative Performance Score:  70%  Decision Making Capacity:  intact    Impressions & Recommendations:  70 F h/o recurrent serous endometrial cancer with pulmonary and peritoneal metastasis with recent admission for SBO (presumed PNA, pl eff s/p thoracentesis) and ?CARMEN.    #Pain  -she continues to have abdominal pain, and issues with sleep given the pain  -will switch from scheduled oxycodone 5 mg TID to oxycontin 10 mg BID with acetaminophen 1,000 mg (<3g/day)  -starting next week can take Ibuprofen 400 mg TID PRN  -can continue to use oxycodone 5 mg q4h PRN    #Coping  -next visit will bring up Palliative SW and check in on mood    #ACP  -will ask Celia to send honoring choices HCD per the patient's request, she wants to make some changes from her 2018 HCD  -wants to discuss POLST next visit    Plan for follow up in one month    Patient seen and discussed with Dr. Torrie Rob MD  Palliative Care Fellow [phone]    My clinic is in the [facility]: [phone] (scheduling); [phone] (triage). Palliative After-Hours Answering Service: [phone].     Attending attestation:   Patient seen and evaluated with Dr. Rob and I agree with findings and recs in this note.  After visit, PA for oxycontin was rejected - will use MS Contin 15 mg BID, continue oxycodone 5 mg PRN.   Thank you for involving us in the patient's care.   Caitlin Brownlee MD / Palliative Medicine / Pager [phone] / After-Hours Answering Service [phone] / Main Palliative Clinic - [facility] [phone] / [facility] Inpatient Team Consult Pager [phone] (answered 8am-430pm M-F)

## 2020-11-18 NOTE — PROGRESS NOTES
Follow Up Notes on Referred Patient    Date: 2020       Dr. Salvador Eugene MD  No address on file       RE: Lu Smith  : 1950  HEIDY: 2020    Lu Smith is a 70 year old woman with a diagnosis of recurrent serous endometrial cancer.    The patient presents today for followup. She has been in the hospital for thoracentesis feels weaker overall, less appetite. She is eating and drinking less.  Occasional nausea had no vomiting, no fever or chills.  Normal urinary function and one loose bowel movement per day.  No vaginal bleeding or discharge. She does have some upper and lower back pain that resolves with warm bath, no SOB or CP.      Oncology History:  Diagnosis: Stage IIIC2 serous endometrial cancer  Primary GYN oncologist: Dr. Kevin Henderson  Primary radiation oncologist: Dr. Ana Michelle  Surgery: 18: TLH-BSO, omentectomy, bilateral pelvic and para-aortic lymph node dissection, pelvic washings  Chemotherapy:  3/30/18-18: Six cycles of carboplatin AUC 6 and paclitaxel 175mg/m2, delivered sandwich style with pelvic radiation between cycles three and four  Radiation: 18-18: EBRT delivered to pelvis and para-aortic nodes, 5040 cGy in 28 fractions  18-18: HDR brachytherapy delivered to the vaginal cuff, 1200 cGy in two fractions  Complications: Transient neuropathy, mild thrombocytopenia and anemia  Genetic testing: Panel testing negative     19: Chest CT performed for pulmonary nodule follow up, concerning for recurrent disease  IMPRESSION:   1. Few bilateral sub-4 mm solid pulmonary nodules are unchanged compared to 2018. No new or enlarging pulmonary nodules.  2. Dilated main pulmonary artery, nonspecific but may be seen with pulmonary artery hypertension.  3. New 1.9 cm left subclavicular node.     19: PET impression:  In this 69-year-old female with history of stage IIIC2 serous endometrial cancer status post total  hysterectomy and bilateral salpingo-oophorectomy, omentectomy, bilateral pelvic and periaortic dissection, and radiation to the pelvis, and brachytherapy to the vaginal cuff.  1. Progression of disease with new intensely FDG avid para-aortic, mediastinal, and subclavicular lymphadenopathy. The subdiaphragmatic periaortic lymphadenopathy spans from the diaphragmatic crura to the  low abdominal aorta at the level of L4 with greater than 180 degree involvement of the aorta. There is additional lymphadenopathy within the left axilla and the right inguinal region.     9/28/19: Neck CT impression:  1. Progression of metastatic disease with new intensely FDG avid retroclavicular lymph node at the level of the left thoracic inlet.   2. On the fusion PET CT, there is no definite abnormal metabolic activity in the mucosal space, soft tissues, or cervical jamel chains.  3. No evidence of mucosal or soft tissue abnormality on contrast enhanced neck CT.  4. Please refer to the whole body PET CT performed as a separate report, for the findings of the remainder of the body.     10/24/19: Lymph node biopsy:  Lymph node, periaortic, CT guided core biopsy:   -METASTATIC ADENOCARCINOMA   -Tumor morphology is consistent with metastasis/recurrence of endometrial serous carcinoma      11/11/19: Cycle #1 paclitaxel 175mg/m2 + carboplatin AUC 6 + bevacizumab 15mg/kg  12/2/19: Cycle #2 paclitaxel 175mg/m2 + carboplatin AUC 6 + bevacizumab 15mg/kg. Carboplatin reaction.  12/27/19: Cycle #3 Paclitaxel/Avastin/inpatient Carboplatin desensitization.   1/20/2020: CT cap IMPRESSION:  1. Mosaic attenuation throughout the lungs with associated dilated main pulmonary artery likely due to pulmonary arterial hypertension in the appropriate clinical setting.  2. Stable sub-4 mm bilateral pulmonary nodules. No new or enlarging pulmonary nodules.  3. Interval improvement of previously seen enlarged lymph nodes in the lower neck, chest, abdomen and  pelvis. No new or enlarging lymph nodes in the present study.  4. Stable soft tissue mesenteric nodules as well as mesenteric root fat stranding.  5. Hepatic steatosis. Additional incidental findings as described above.  1/22/2020: Cycle #4 Paclitaxel/Avastin/inpatient Carboplatin desensitization.   2/13/2020: Cycle #5 Paclitaxel/Avastin/inpatient Carboplatin desensitization.   3/3/2020: 24 h urine protein 0.15  3/5/2020: Cycle #6 Paclitaxel/Avastin/inpatient Carboplatin desensitization.   3/25/2020: Cycle #1 Avastin   4/22/2020: Cycle #2 Avastin   5/13/2020: Cycle #3 Avastin   6/3/2020: Cycle #4 Avastin   6/25/2020: Cycle #5 Avastin   7/16/25/2020: Cycle #6 Avastin   8/6/2020: C1 weekly taxol  9/14/2020: C2 weekly taxol  10/7/2020: C3 Day 1 wkly taxol  11/5/20 CT scan Chest/abdomen  Increasing mediastinal adenopathy, increased peritoneal nodules Lungs: Possible metastases/infection or both. (Increased groundglass and tree-in-bud opacities of the left upper and lower lobes and new groundglass and tree-in-bud opacities in the right middle and lower lobes. Moderate bilateral pleural effusions.)    Past Medical History:    Past Medical History:   Diagnosis Date     Diabetes (H)     Type II     Endometrial cancer determined by uterine biopsy (H) 02/2018     HTN (hypertension)      Obesity      Osteoarthritis          Past Surgical History:    Past Surgical History:   Procedure Laterality Date     CHOLECYSTECTOMY  1993     CYSTOSCOPY N/A 2/23/2018    Procedure: CYSTOSCOPY;;  Surgeon: Kevin Henderson MD;  Location:  OR     DAVINCI HYSTERECTOMY TOTAL, BILATERAL SALPINGO-OOPHORECTOMY, COMBINED N/A 2/23/2018    Procedure: COMBINED DAVINCI HYSTERECTOMY TOTAL, SALPINGO-OOPHORECTOMY;  DaVinci Assisted Total Laparoscopic Hysterectomy, Bilateral Salpingo-Oophorectomy, Cystoscopy,  Omental biopsy, omentectomy,bilateral  Pelvic And Para Aortic Lymph Node Dissection;  Surgeon: Kevin Henderson MD;  Location:  OR      Partial lumbar discectomy  1998         Health Maintenance Due   Topic Date Due     DEXA  1950     LIPID  1950     MICROALBUMIN  1950     DIABETIC FOOT EXAM  1950     ADVANCE CARE PLANNING  1950     EYE EXAM  1950     HEPATITIS C SCREENING  04/07/1970     ZOSTER IMMUNIZATION (1 of 2) 04/07/2002     MEDICARE ANNUAL WELLNESS VISIT  04/07/2017     Pneumococcal Vaccine: Pediatrics (0 to 5 Years) and At-Risk Patients (6 to 64 Years) (2 of 3 - PPSV23) 02/09/2018     A1C  05/09/2018     Pneumococcal Vaccine: 65+ Years (2 of 2 - PPSV23) 12/15/2018     PHQ-2  01/01/2020     INFLUENZA VACCINE (1) 09/01/2020     FALL RISK ASSESSMENT  10/09/2020       Current Medications:     Current Outpatient Medications   Medication Sig Dispense Refill     acetaminophen (TYLENOL) 500 MG tablet Take 500 mg by mouth every 6 hours as needed for pain        Famotidine (PEPCID PO) Take 10 mg by mouth as needed        fluticasone (FLONASE) 50 MCG/ACT spray Spray 1-2 sprays into both nostrils daily (Patient taking differently: Spray 1-2 sprays into both nostrils daily as needed ) 1 Bottle 1     ibuprofen (ADVIL/MOTRIN) 200 MG tablet Take 400 mg by mouth daily       L-Glutamine 500 MG CAPS Take 1 capsule by mouth daily        levofloxacin (LEVAQUIN) 750 MG tablet Take 1 tablet (750 mg) by mouth every 24 hours 7 tablet 0     loperamide (IMODIUM) 2 MG capsule Take 2 mg by mouth 4 times daily as needed for diarrhea       loratadine (CLARITIN REDITABS) 5 MG dispersible tablet Take 5 mg by mouth daily       magnesium 500 MG TABS Take 500 mg by mouth daily        METFORMIN HCL PO Take 500 mg by mouth daily (with breakfast)        omeprazole (PRILOSEC) 40 MG DR capsule Take 40 mg by mouth daily as needed        oxyCODONE (ROXICODONE) 5 MG tablet Take 1 tablet (5 mg) by mouth 2 times daily as needed for severe pain Continue to take Tylenol 650 mg every 6 hours. Add daily Claritin for the bone pain. Use Oxycodone as needed  for severe or breakthrough pain. 10 tablet 0     pyridoxine (VITAMIN B-6) 50 MG TABS Take 1 tablet (50 mg) by mouth 2 times daily (Patient taking differently: Take 50 mg by mouth daily ) 60 tablet 3     senna-docusate (SENOKOT-S/PERICOLACE) 8.6-50 MG tablet Take 1 tablet by mouth 2 times daily as needed for constipation 20 tablet 0         Allergies:        Allergies   Allergen Reactions     Carboplatin Anaphylaxis     Chest tightness, hoarse voice, difficulty breathing     Ace Inhibitors Cough     Amoxicillin Hives        Social History:     Social History     Tobacco Use     Smoking status: Former Smoker     Packs/day: 0.25     Years: 20.00     Pack years: 5.00     Quit date: 1991     Years since quittin.5     Smokeless tobacco: Never Used     Tobacco comment: Quit smoking    Substance Use Topics     Alcohol use: Yes     Comment: 4-7/week if out 1-2x's/week       History   Drug Use No         Family History:       Family History   Problem Relation Age of Onset     Colon Cancer Mother      Breast Cancer Sister      Lymphoma Sister          Physical Exam:     There were no vitals taken for this visit.  There is no height or weight on file to calculate BMI.    General Appearance:  healthy and alert, no distress                Psychiatric: appropriate mood and affect                                     A total of 40 minutes was spent with the patient, 30 minutes of which were spent in counseling the patient and/or treatment planning.        1.  Recurrent serous endometrial carcinoma.   2.  Progression on weekly taxol.   3.  Carbo desensitization.   4.  MSI stable tumor.   5.  Low tumor mutation burden.   6.  PD-L1 0% expression.   7.  Anemia     We will discontinue with weekly taxol given progression on CT scan. We did discuss options including, different chemotherapy, pembto with lavatinib, AI versus hospice with best supportive care. We will plan on 2 units of RBCs this Friday, she will follow up with  "palliative care today and then she will discuss with her family how she would like to proceed. We will have a f/u appointment next week. The patient agrees with this plan.  She is very appreciative of her care.  All questions were answered.         Kevin Henderson MD, MS    Department of Obstetrics and Gynecology   Division of Gynecologic Oncology   [facility]  Phone: [phone]      CC  Patient Care Team:  Levar Scott as PCP - General (Internal Medicine)  Jeanne Blancas MD as Referring Physician (OB/Gyn)  Alondra Ruiz RN as Specialty Care Coordinator (Gynecologic Oncology)  Kevin Henderson MD as MD (Gynecologic Oncology)  Yasmine Zazueta APRN CNP as Assigned PCP  Lisa Barroso APRN CNP as Assigned OBGYN Provider  Kevin Henderson MD as Assigned Cancer Care Provider  Dylan JARAMILLO is a 70 year old female who is being evaluated via a billable telephone visit.      The patient has been notified of following:     \"This telephone visit will be conducted via a call between you and your physician/provider. We have found that certain health care needs can be provided without the need for a physical exam.  This service lets us provide the care you need with a short phone conversation.  If a prescription is necessary we can send it directly to your pharmacy.  If lab work is needed we can place an order for that and you can then stop by our lab to have the test done at a later time.    Telephone visits are billed at different rates depending on your insurance coverage. During this emergency period, for some insurers they may be billed the same as an in-person visit.  Please reach out to your insurance provider with any questions.    If during the course of the call the physician/provider feels a telephone visit is not appropriate, you will not be charged for this service.\"    Patient has given verbal consent for Telephone visit?  " Yes    What phone number would you like to be contacted at? [phone]    How would you like to obtain your AVS? Bernice      I have reviewed and updated the patient's allergies and medication list. Patient was asked if they had any patient reported vital signs to present, if yes, please see documented vitals.  Patient was also asked for their current weight and height, if presented, documented in vitals.      Concerns: Pt states the pain in her abdomen is constant.  Is taking oxy q 7-8 hours with tylenol.     Refills: Refill of oxycodone      Coby Anne CMA ([facility])

## 2020-11-18 NOTE — PATIENT INSTRUCTIONS
Thank you for seeing the Palliative Care Clinic today.      1. Start oxycontin 10 mg every 12 hours.  Stop taking the scheduled oxycodone three times/day.  You can still take the tylenol and oxycodone 5 mg up to every 4 hours as needed.     Return in clinic in 4 weeks for a follow-up.      You can reach the Palliative Care Team during business hours at the following numbers:   -For the [facility], call [phone]  -To reach the palliative RN for questions or refills, call [phone]    To reach the Palliative Care Provider on-call After-hours or on holidays and weekends, call: [phone].  Please note that we are not able to provide pain medication refills on evenings or weekends.

## 2020-11-18 NOTE — TELEPHONE ENCOUNTER
Central Prior Authorization Team   Phone: [phone]      PA Initiation    Medication: Oxycodone Hcl ER-PA initiated  Insurance Company: [employer] - Phone [phone] Fax [phone]  Pharmacy Filling the Rx: [facility] [address] [place] [address] [address] [facility]  Filling Pharmacy Phone: [phone]  Filling Pharmacy Fax:    Start Date: 11/18/2020

## 2020-11-18 NOTE — LETTER
"11/18/2020       RE: Lu Smith  [address]  [facility] [address]     Dear Colleague,    Thank you for referring your patient, Lu Smith, to the [facility] at [facility]. Please see a copy of my visit note below.    Lu Smith is a 70 year old female who is being evaluated via a billable video visit.      The patient has been notified of following:     \"This video visit will be conducted via a call between you and your physician/provider. We have found that certain health care needs can be provided without the need for an in-person physical exam.  This service lets us provide the care you need with a video conversation.  If a prescription is necessary we can send it directly to your pharmacy.  If lab work is needed we can place an order for that and you can then stop by our lab to have the test done at a later time.    Video visits are billed at different rates depending on your insurance coverage.  Please reach out to your insurance provider with any questions.    If during the course of the call the physician/provider feels a video visit is not appropriate, you will not be charged for this service.\"    Patient has given verbal consent for Video visit? Yes  How would you like to obtain your AVS? MyChart  If you are dropped from the video visit, the video invite should be resent to: Text to cell phone: [phone]  Will anyone else be joining your video visit? No    Beth HUBER      Video-Visit Details    Type of service:  Video Visit    Video Start Time: 12:54  Video End Time: 13:58    Originating Location (pt. Location): Home    Distant Location (provider location):  [facility]     Platform used for Video Visit: Mike Nash MD        Palliative Care Outpatient Clinic Consultation Note    Patient:  Lu Smith    Chief Complaint:   The patient's primary care " "provider is:  Levar Scott.     History of Present Illness:  Lu Smith 70 year old female with DMII, HTN, recurrent serous endometrial cancer s/p 2/2018 TLH-BSO, omentectomy, bilateral pelvic washing, and lymph node dissection, 3-7/2018 multiple rounds of chemoradiation to the pelvis and brachytherapy. 9/2019 progression to multiple lymph nodes (periaortic, mediastinal, retroclavicular etc), over th past years multiple rounds of systemic chemotherapy and immunotherapy. 11/5/2020 increased mediastinal and peritoneal adenopathy and ?lung mets, b/l pl effusions.     On 11/12 she saw Dr. Redding, she had been on weekly taxol (finished 3 cycles), and her imaging showed worsening disease. She had SOB, hypoxia and infiltrative disease on CT scan so she was sent to the ED. Dr. Redding's note comments that: \"Plan change of chemotherapy treatment post management of acute event. Patient did have platinum response and may be eligible for additional platinum although required carbo desensitization. Alternative regiment to consider include Doxil. I discussed available clinical trials, non currently open but will continue to consider options.\" Also RLE edema with a negative DVT US on 9/20. Referral was made for pain and goals of care.     She was admitted 11/12-11/5 for dyspnea, CARMEN she was treated for a presumed CAP with 10 days of levofloxacin (COVID negative) and thoracentesis (900 ml). She was seen by Dr. Edmonds of palliative care inpatient, code status was DNR/DNI and recommended completing a POLST form. She reported issues with SOB, dyspnea, abdominal pain and loose stools. She was just taking ibuprofen and tylenol at home, she was recommended scheduled oxycodone 5 mg Q8h, acetaminophen 1000 mg TID, senna BID, miralax daily, and PRN oxycodone 5 mg q4h PRN for pain or SOB.     Palliative Opioid Risk Assessment & Monitoring Plan    Opioid Risk Tool (ORT):    Family History of Substance Abuse:        Alcohol = 3 pts " (yes, male) Father and sister       Illegal Drugs = 0 pt (no)       Prescription Drugs = 0 pt (no)      Personal History of Substance Abuse:       Alcohol = 0 pt (no)       Illegal Drugs = 0 pt (no)       Prescription Drugs = 0 pt (no)        Age: 0 pt (age < 16; age > 45)      Psychological Disease: 0 pt (none)      ORT Score = 3        0-3: Low risk for opioid abuse       4-7: Moderate risk for opioid abuse       >/= 8: High risk for opioid abuse    Distressing Symptom/s:   Today, she reports that she continues to have significant violeta-umbillical radiating to the L side aching constant pain, worse with movement and eating. She has been taking the oxycodone 5 mg TID with acetaminophen 1,000 mg scheduled. She reports that this helps for about 5 hours when it wears off. She has also taken 1 additional 5 mg oxycodone PRN (total 4 pills) and 400 mg of Ibuprofen several times a day. The pain has been waking her from sleep and making it difficult for her to focus on her family visits. She denies any mental clouding, sedation, hyperalgesia, myoclonus or constipation.     She reports some intermittent nausea and retching, no emesis. She has 10 mg prochlorperazine (takes a half pill) which she thinks helps (taking ~once a week). After taking Miralax in the hospital she has developed 3-4 soft BMs a day which prompted her to take a imodium last night. She reports a decrease in her appetite overall, she does feel hungry sometimes but avoids eating because she worries it will bring on more pain.     She reports fatigue, and issues with lightheadness and extremity numbness and weakness. Her hemoglobin is 7.3, plan for her to be transfused in 2 days. She said that she has needed help from his sister, Lorrie to cook and get out of the bathtub. She is able to shower independently with a shower chair.     Patient's Disease Understanding: She has a great grasp of her options. She spoke with her oncologist this morning, per her  understanding hormonal therapy and chemo are not good options. She could also pursue comfort focus (2 months prognosis) or immunotherapy (6 months prognosis). She is planning to speak with her sons this afternoon, but is considering a time trial of immunotherapy. She wants to make it to her son's wedding next September and to spend more time with her grandkids. She reports that she spoke with the chapalin when she was in the hospital. She is not afraid to die, but she is afraid of ongoing suffering from abdominal pain. She believes that she will go to a better place after she dies and will be reunited with her mother.     Coping:  She is tearful throughout our visit but reports that she feels very supported by her sisters, brothers and children. She is hoping to spend time in a warmer climate this .     Social History  Living Situation: has her own home, her sister Lorrie who is a former nurse from [place] is living with her, her son may move in with her after Lorrie leaves  Children: two sons, one of whom is  to a woman who has been struggling with breast cancer for 8 years  Occupation: retired   Substance Use/History of misuse: none  Spiritual Background: Jaden  Spiritual Concerns/Needs: was asking about installing railings in her bathtub, she finds that hot water helps her stomach pain but she cannot get out of the tube alone.   Social History     Tobacco Use     Smoking status: Former Smoker     Packs/day: 0.25     Years: 20.00     Pack years: 5.00     Quit date: 1991     Years since quittin.5     Smokeless tobacco: Never Used     Tobacco comment: Quit smoking    Substance Use Topics     Alcohol use: Yes     Comment: 4-7/week if out 1-2x's/week     Drug use: No       Family History  Family History   Problem Relation Age of Onset     Colon Cancer Mother      Breast Cancer Sister      Lymphoma Sister      Advance Care Planning:  Advance Directive: Honoring choices directive from  2/23/2018: wanted CPR and all interventions. Being with family was important, wanted to be able to communicate, found comfort in prayer and reading the bible  Health Care Agent Contact Information: Graeme Hernandez son [phone] (Alternate son Maciel)      Allergies   Allergen Reactions     Carboplatin Anaphylaxis     Chest tightness, hoarse voice, difficulty breathing     Ace Inhibitors Cough     Amoxicillin Hives     Current Outpatient Medications   Medication Sig Dispense Refill     acetaminophen (TYLENOL) 500 MG tablet Take 500 mg by mouth every 6 hours as needed for pain        Famotidine (PEPCID PO) Take 10 mg by mouth as needed        fluticasone (FLONASE) 50 MCG/ACT spray Spray 1-2 sprays into both nostrils daily (Patient taking differently: Spray 1-2 sprays into both nostrils daily as needed ) 1 Bottle 1     ibuprofen (ADVIL/MOTRIN) 200 MG tablet Take 400 mg by mouth daily       L-Glutamine 500 MG CAPS Take 1 capsule by mouth daily        levofloxacin (LEVAQUIN) 750 MG tablet Take 1 tablet (750 mg) by mouth every 24 hours 7 tablet 0     loperamide (IMODIUM) 2 MG capsule Take 2 mg by mouth 4 times daily as needed for diarrhea       loratadine (CLARITIN REDITABS) 5 MG dispersible tablet Take 5 mg by mouth daily       magnesium 500 MG TABS Take 500 mg by mouth daily        METFORMIN HCL PO Take 500 mg by mouth daily (with breakfast)        omeprazole (PRILOSEC) 40 MG DR capsule Take 40 mg by mouth daily as needed        oxyCODONE (ROXICODONE) 5 MG tablet Take 1 tablet (5 mg) by mouth 2 times daily as needed for severe pain Continue to take Tylenol 650 mg every 6 hours. Add daily Claritin for the bone pain. Use Oxycodone as needed for severe or breakthrough pain. 10 tablet 0     pyridoxine (VITAMIN B-6) 50 MG TABS Take 1 tablet (50 mg) by mouth 2 times daily (Patient taking differently: Take 50 mg by mouth daily ) 60 tablet 3     senna-docusate (SENOKOT-S/PERICOLACE) 8.6-50 MG tablet Take 1 tablet by mouth 2  times daily as needed for constipation 20 tablet 0     Past Medical History:   Diagnosis Date     Diabetes (H)     Type II     Endometrial cancer determined by uterine biopsy (H) 2018     HTN (hypertension)      Obesity      Osteoarthritis      Past Surgical History:   Procedure Laterality Date     CHOLECYSTECTOMY       CYSTOSCOPY N/A 2018    Procedure: CYSTOSCOPY;;  Surgeon: Kevin Henderson MD;  Location: [facility] OR     DAVINCI HYSTERECTOMY TOTAL, BILATERAL SALPINGO-OOPHORECTOMY, COMBINED N/A 2018    Procedure: COMBINED DAVINCI HYSTERECTOMY TOTAL, SALPINGO-OOPHORECTOMY;  DaVinci Assisted Total Laparoscopic Hysterectomy, Bilateral Salpingo-Oophorectomy, Cystoscopy,  Omental biopsy, omentectomy,bilateral  Pelvic And Para Aortic Lymph Node Dissection;  Surgeon: Kevin Henderson MD;  Location: [facility] OR     Partial lumbar discectomy         REVIEW OF SYSTEMS:   ROS: 10 point ROS neg other than the symptoms noted above in the HPI and here:  Palliative Symptom Review (0=no symptom/no concern, 1=mild, 2=moderate, 3=severe):      Pain: 2      Fatigue: 1      Nausea: 0      Constipation: 0      Diarrhea: 1      Depressive Symptoms: 0      Anxiety: 0      Drowsiness: 1      Poor Appetite: 1      Shortness of Breath: 0      Insomnia: 0      Other: 0      Overall (0 good/no concerns, 3 very poor):  2    Physical Exam:  Gen alert, comfortable appearing, NAD.  Head NCAT.  Eyes anicteric without injection  Face symmetric, eyes conjugate  Mouth pink, moist appearing  Lungs unlabored, no cough, speaking full sentences  Skin no rashes or lesions evident on face/neck  Neuro Face symmetric, eyes conjugate; speech fluent.  Neuropsych exam normal including affect, sensorium, gross memory, thought processes, and fund of knowledge.     Data Reviewed:  LABS: 11/15 K 3.6 : Cr 1.06 GFR 54 hbg 7.3 plt 265  IMAGIN20 BLE Duplex Venous US  IMPRESSION:   1. No deep venous thrombosis demonstrated in either  leg.     11/12/2020 CT PE  IMPRESSION:  1. No definite pulmonary embolism identified.  2. Improving tree-in-bud micronodules and consolidative changes.  Evolving multifocal peribronchovascular bandlike fibrosis, likely  sequelae of infection versus infarct.  3. Right greater than left pleural effusions, similar to 11/5/2020.  4. Extensive mediastinal lymphadenopathy.  5. Sequelae of pulmonary hypertension with dilated main pulmonary.    Impressions:  Palliative Performance Score:  70%  Decision Making Capacity:  intact    Impressions & Recommendations:  70 F h/o recurrent serous endometrial cancer with pulmonary and peritoneal metastasis with recent admission for SBO (presumed PNA, pl eff s/p thoracentesis) and ?CARMEN.    #Pain  -she continues to have abdominal pain, and issues with sleep given the pain  -will switch from scheduled oxycodone 5 mg TID to oxycontin 10 mg BID with acetaminophen 1,000 mg (<3g/day)  -starting next week can take Ibuprofen 400 mg TID PRN  -can continue to use oxycodone 5 mg q4h PRN    #Coping  -next visit will bring up Palliative SW and check in on mood    #ACP  -will ask Celia to send honoring choices HCD per the patient's request, she wants to make some changes from her 2018 HCD  -wants to discuss POLST next visit    Plan for follow up in one month    Patient seen and discussed with Dr. Torrie Rob MD  Palliative Care Fellow [phone]    [place] [facility] [phone] (scheduling); [phone] (triage). Palliative After-Hours Answering Service: [phone].     Attending attestation:   Patient seen and evaluated with Dr. Rob and I agree with findings and recs in this note.  After visit, PA for oxycontin was rejected - will use MS Contin 15 mg BID, continue oxycodone 5 mg PRN.   Thank you for involving us in the patient's care.   Caitlin Brownlee MD / Palliative Medicine / Pager [phone] / After-Hours Answering Service [phone] / Main Palliative Clinic - [facility]  [facility] [phone] / [facility] Inpatient Team Consult Pager [phone] (answered 8am-430pm M-F)

## 2020-11-19 NOTE — TELEPHONE ENCOUNTER
PRIOR AUTHORIZATION DENIED    Medication: Oxycodone Hcl ER-PA denied    Denial Date: 11/19/2020    Denial Rational: must try/fail Xtampza            Appeal Information:

## 2020-11-20 NOTE — TELEPHONE ENCOUNTER
Patient calling in very fatigue, having hard time getting herself dressed, and difficulty with some SOB.  Per Dr. Henderson we should get her 1 unit of blood to see if this helps with these symptoms.  Patient should be transfused for symptoms even though her Hgb is not less than 8.

## 2020-11-21 NOTE — PROGRESS NOTES
Infusion Nursing Note:  Lu Smith presents today for 1 unit PRBC.    Patient seen by provider today: No   present during visit today: Not Applicable.    Note: Here to today for 1 unit PRBC for Hgb of 8.8 drawn yesterday.  She is symptomatic (SOB, weakness, increased fatigue) so Dr. Henderson requested 1 unit of PRBC's despite being above her transfusion parameter.  She had a virtual visit on 11/18 with Dr. Henderson, and reports no worsening of her symptoms, but no improvement either.  See doc flow sheet for assessment.  Reports abdominal pain, rates at a 3 today.  This is not new.  Warm pack applied per Lu's request.      Intravenous Access:  Implanted Port.    Treatment Conditions:  Lab Results   Component Value Date    HGB 8.8 11/20/2020     Lab Results   Component Value Date    WBC 8.5 11/20/2020      Lab Results   Component Value Date    ANEU 6.4 11/20/2020     Lab Results   Component Value Date     11/20/2020      Blood transfusion consent signed 9/14/2020.      Post Infusion Assessment:  Patient tolerated infusion without incident.  Blood return noted pre and post infusion.  Site patent and intact, free from redness, edema or discomfort.  No evidence of extravasations.  Access discontinued per protocol.       Discharge Plan:   Patient declined prescription refills.  AVS to patient via [employer].  Patient will return 11/25 labs/MD/chemo for next appointment.   Patient discharged in stable condition accompanied by: self.  Departure Mode: Wheelchair.  Face to Face time: 0.    Lulú Tomlinson RN

## 2020-11-21 NOTE — PATIENT INSTRUCTIONS
Post Blood Products Discharge Instructions    You have just received a blood product.  During the next 48 hours, please be aware of the following symptoms of a blood product reaction.  If you should experience any of these symptoms call your physician.    -Temperature 100.0 or greater  -Shaking or chills  -Shortness of breath or wheezing  -Headache  -Persistent non-productive cough  -Hives  -Itching  -Extreme weakness  -Facial swelling or flushing  -[facility] Main Line: [phone]    Call triage nurse with chills and/or temperature greater than or equal to 100.4, uncontrolled nausea/vomiting, diarrhea, constipation, dizziness, shortness of breath, chest pain, bleeding, unexplained bruising, or any new/concerning symptoms, questions/concerns.   If you are having any concerning symptoms or wish to speak to a provider before your next infusion visit, please call your care coordinator or triage to notify them so we can adequately serve you.   Triage Nurse Line: [phone]    If after hours, weekends, or holidays [phone]

## 2020-11-25 NOTE — TELEPHONE ENCOUNTER
Prior Authorization Approval    Authorization Effective Date: 11/25/2020  Authorization Expiration Date: 5/25/2021  Medication: Lenvima - APPROVED  Approved Dose/Quantity:   Reference #:     Insurance Company: [employer] - Phone [phone] Fax [phone]  Expected CoPay:       CoPay Card Available:      Foundation Assistance Needed:    Which Pharmacy is filling the prescription (Not needed for infusion/clinic administered):    Pharmacy Notified:    Patient Notified:          Archana Schulte CPhT  [facility]  Oncology Pharmacy Liaison  [email or web address]  Phone: [phone]  Fax: [phone]

## 2020-11-25 NOTE — PROGRESS NOTES
"Lu Smith is a 70 year old female who is being evaluated via a billable telephone visit.      The patient has been notified of following:     \"This telephone visit will be conducted via a call between you and your physician/provider. We have found that certain health care needs can be provided without the need for a physical exam.  This service lets us provide the care you need with a short phone conversation.  If a prescription is necessary we can send it directly to your pharmacy.  If lab work is needed we can place an order for that and you can then stop by our lab to have the test done at a later time.    Telephone visits are billed at different rates depending on your insurance coverage. During this emergency period, for some insurers they may be billed the same as an in-person visit.  Please reach out to your insurance provider with any questions.    If during the course of the call the physician/provider feels a telephone visit is not appropriate, you will not be charged for this service.\"    Patient has given verbal consent for Telephone visit?  Yes    What phone number would you like to be contacted at? [phone]    How would you like to obtain your AVS? Bernice    Vitals - Patient Reported  Weight (Patient Reported): 110.2 kg (243 lb)  Pain Score: Moderate Pain (4)  Pain Loc: (Patient reports discomfort in stomach area)        I have reviewed and updated patient's allergy and medication list.    Concerns: Stomach discomfort, SOB  Refills: Albuterol inhaler      Shannan Mejia CMA    Phone call duration: 40 minutes                Follow Up Notes on Referred Patient    Date: 2020       Dr. Salvador Eugene MD  No address on file       RE: Lu Smith  : 1950  HEIDY: 2020    Lu Smith is a 70 year old woman with a diagnosis of recurrent serous endometrial cancer.    The patient presents today for followup. She has been in the hospital for thoracentesis feels weaker " overall, less appetite. She is eating and drinking less.  Occasional nausea had no vomiting, no fever or chills.  Normal urinary function and one loose bowel movement per day.  No vaginal bleeding or discharge. She does have some upper and lower back pain that resolves with warm bath, no SOB or CP.      Oncology History:  Diagnosis: Stage IIIC2 serous endometrial cancer  Primary GYN oncologist: Dr. Kevin Henderson  Primary radiation oncologist: Dr. Ana Michelle  Surgery: 2/23/18: TLH-BSO, omentectomy, bilateral pelvic and para-aortic lymph node dissection, pelvic washings  Chemotherapy:  3/30/18-9/28/18: Six cycles of carboplatin AUC 6 and paclitaxel 175mg/m2, delivered sandwich style with pelvic radiation between cycles three and four  Radiation: 6/4/18-7/12/18: EBRT delivered to pelvis and para-aortic nodes, 5040 cGy in 28 fractions  7/16/18-7/20/18: HDR brachytherapy delivered to the vaginal cuff, 1200 cGy in two fractions  Complications: Transient neuropathy, mild thrombocytopenia and anemia  Genetic testing: Panel testing negative     9/16/19: Chest CT performed for pulmonary nodule follow up, concerning for recurrent disease  IMPRESSION:   1. Few bilateral sub-4 mm solid pulmonary nodules are unchanged compared to 2/6/2018. No new or enlarging pulmonary nodules.  2. Dilated main pulmonary artery, nonspecific but may be seen with pulmonary artery hypertension.  3. New 1.9 cm left subclavicular node.     9/28/19: PET impression:  In this 69-year-old female with history of stage IIIC2 serous endometrial cancer status post total hysterectomy and bilateral salpingo-oophorectomy, omentectomy, bilateral pelvic and periaortic dissection, and radiation to the pelvis, and brachytherapy to the vaginal cuff.  1. Progression of disease with new intensely FDG avid para-aortic, mediastinal, and subclavicular lymphadenopathy. The subdiaphragmatic periaortic lymphadenopathy spans from the diaphragmatic crura to the  low  abdominal aorta at the level of L4 with greater than 180 degree involvement of the aorta. There is additional lymphadenopathy within the left axilla and the right inguinal region.     9/28/19: Neck CT impression:  1. Progression of metastatic disease with new intensely FDG avid retroclavicular lymph node at the level of the left thoracic inlet.   2. On the fusion PET CT, there is no definite abnormal metabolic activity in the mucosal space, soft tissues, or cervical jamel chains.  3. No evidence of mucosal or soft tissue abnormality on contrast enhanced neck CT.  4. Please refer to the whole body PET CT performed as a separate report, for the findings of the remainder of the body.     10/24/19: Lymph node biopsy:  Lymph node, periaortic, CT guided core biopsy:   -METASTATIC ADENOCARCINOMA   -Tumor morphology is consistent with metastasis/recurrence of endometrial serous carcinoma      11/11/19: Cycle #1 paclitaxel 175mg/m2 + carboplatin AUC 6 + bevacizumab 15mg/kg  12/2/19: Cycle #2 paclitaxel 175mg/m2 + carboplatin AUC 6 + bevacizumab 15mg/kg. Carboplatin reaction.  12/27/19: Cycle #3 Paclitaxel/Avastin/inpatient Carboplatin desensitization.   1/20/2020: CT cap IMPRESSION:  1. Mosaic attenuation throughout the lungs with associated dilated main pulmonary artery likely due to pulmonary arterial hypertension in the appropriate clinical setting.  2. Stable sub-4 mm bilateral pulmonary nodules. No new or enlarging pulmonary nodules.  3. Interval improvement of previously seen enlarged lymph nodes in the lower neck, chest, abdomen and pelvis. No new or enlarging lymph nodes in the present study.  4. Stable soft tissue mesenteric nodules as well as mesenteric root fat stranding.  5. Hepatic steatosis. Additional incidental findings as described above.  1/22/2020: Cycle #4 Paclitaxel/Avastin/inpatient Carboplatin desensitization.   2/13/2020: Cycle #5 Paclitaxel/Avastin/inpatient Carboplatin desensitization.   3/3/2020:  24 h urine protein 0.15  3/5/2020: Cycle #6 Paclitaxel/Avastin/inpatient Carboplatin desensitization.   3/25/2020: Cycle #1 Avastin   4/22/2020: Cycle #2 Avastin   5/13/2020: Cycle #3 Avastin   6/3/2020: Cycle #4 Avastin   6/25/2020: Cycle #5 Avastin   7/16/25/2020: Cycle #6 Avastin   8/6/2020: C1 weekly taxol  9/14/2020: C2 weekly taxol  10/7/2020: C3 Day 1 wkly taxol  11/5/20 CT scan Chest/abdomen  Increasing mediastinal adenopathy, increased peritoneal nodules Lungs: Possible metastases/infection or both. (Increased groundglass and tree-in-bud opacities of the left upper and lower lobes and new groundglass and tree-in-bud opacities in the right middle and lower lobes. Moderate bilateral pleural effusions.)    Past Medical History:    Past Medical History:   Diagnosis Date     Diabetes (H)     Type II     Endometrial cancer determined by uterine biopsy (H) 02/2018     HTN (hypertension)      Obesity      Osteoarthritis          Past Surgical History:    Past Surgical History:   Procedure Laterality Date     CHOLECYSTECTOMY  1993     CYSTOSCOPY N/A 2/23/2018    Procedure: CYSTOSCOPY;;  Surgeon: Kevin Henderson MD;  Location: [facility] OR     DAVINCI HYSTERECTOMY TOTAL, BILATERAL SALPINGO-OOPHORECTOMY, COMBINED N/A 2/23/2018    Procedure: COMBINED DAVINCI HYSTERECTOMY TOTAL, SALPINGO-OOPHORECTOMY;  DaVinci Assisted Total Laparoscopic Hysterectomy, Bilateral Salpingo-Oophorectomy, Cystoscopy,  Omental biopsy, omentectomy,bilateral  Pelvic And Para Aortic Lymph Node Dissection;  Surgeon: Kevin Henderson MD;  Location: [facility] OR     Partial lumbar discectomy  1998         Health Maintenance Due   Topic Date Due     LIPID  1950     MICROALBUMIN  1950     DIABETIC FOOT EXAM  1950     ADVANCE CARE PLANNING  1950     EYE EXAM  1950     HEPATITIS C SCREENING  04/07/1970     ZOSTER IMMUNIZATION (1 of 2) 04/07/2002     MEDICARE ANNUAL WELLNESS VISIT  04/07/2017     Pneumococcal Vaccine: Pediatrics  (0 to 5 Years) and At-Risk Patients (6 to 64 Years) (2 of 3 - PPSV23) 02/09/2018     A1C  05/09/2018     Pneumococcal Vaccine: 65+ Years (2 of 2 - PPSV23) 12/15/2018     PHQ-2  01/01/2020     INFLUENZA VACCINE (1) 09/01/2020     FALL RISK ASSESSMENT  10/09/2020       Current Medications:     Current Outpatient Medications   Medication Sig Dispense Refill     acetaminophen (TYLENOL) 500 MG tablet Take 500 mg by mouth every 6 hours as needed for pain        albuterol (PROAIR HFA/PROVENTIL HFA/VENTOLIN HFA) 108 (90 Base) MCG/ACT inhaler Inhale 2 puffs into the lungs every 6 hours       Famotidine (PEPCID PO) Take 10 mg by mouth as needed        fluticasone (FLONASE) 50 MCG/ACT spray Spray 1-2 sprays into both nostrils daily (Patient taking differently: Spray 1-2 sprays into both nostrils daily as needed ) 1 Bottle 1     ibuprofen (ADVIL/MOTRIN) 200 MG tablet Take 400 mg by mouth daily       L-Glutamine 500 MG CAPS Take 1 capsule by mouth daily        levofloxacin (LEVAQUIN) 750 MG tablet Take 1 tablet (750 mg) by mouth every 24 hours 7 tablet 0     loperamide (IMODIUM) 2 MG capsule Take 2 mg by mouth 4 times daily as needed for diarrhea       loratadine (CLARITIN REDITABS) 5 MG dispersible tablet Take 5 mg by mouth daily       magnesium 500 MG TABS Take 500 mg by mouth daily        METFORMIN HCL PO Take 500 mg by mouth daily (with breakfast)        morphine (MS CONTIN) 15 MG CR tablet Take 1 tablet (15 mg) by mouth every 12 hours 60 tablet 0     omeprazole (PRILOSEC) 40 MG DR capsule Take 40 mg by mouth daily as needed        oxyCODONE (ROXICODONE) 5 MG tablet Take 1 tablet (5 mg) by mouth every 4 hours as needed for pain 60 tablet 0     pyridoxine (VITAMIN B-6) 50 MG TABS Take 1 tablet (50 mg) by mouth 2 times daily (Patient taking differently: Take 50 mg by mouth daily ) 60 tablet 3     senna-docusate (SENOKOT-S/PERICOLACE) 8.6-50 MG tablet Take 1 tablet by mouth 2 times daily as needed for constipation 20 tablet 0          Allergies:        Allergies   Allergen Reactions     Carboplatin Anaphylaxis     Chest tightness, hoarse voice, difficulty breathing     Ace Inhibitors Cough     Amoxicillin Hives        Social History:     Social History     Tobacco Use     Smoking status: Former Smoker     Packs/day: 0.25     Years: 20.00     Pack years: 5.00     Quit date: 1991     Years since quittin.6     Smokeless tobacco: Never Used     Tobacco comment: Quit smoking    Substance Use Topics     Alcohol use: Yes     Comment: 4-7/week if out 1-2x's/week       History   Drug Use No         Family History:         Family History   Problem Relation Age of Onset     Colon Cancer Mother      Breast Cancer Sister      Lymphoma Sister          Physical Exam:     There were no vitals taken for this visit.  There is no height or weight on file to calculate BMI.    General Appearance:  healthy and alert, no distress                Psychiatric: appropriate mood and affect                                     A total of 40 minutes was spent with the patient, 30 minutes of which were spent in counseling the patient and/or treatment planning.        1.  Recurrent serous endometrial carcinoma.   2.  Progression on weekly taxol.   3.  Carbo desensitization.   4.  MSI stable tumor.   5.  Low tumor mutation burden.   6.  PD-L1 0% expression.   7.  Anemia     We will discontinue with weekly taxol given progression on CT scan. We did discuss options including, different chemotherapy, pembto with lavatinib, AI versus hospice with best supportive care. We will plan to get sterted with letrozole 2.5mg daily and will plan for scheduling pembro 300mg q 3 weeks with lavatinib 10mg/day and stop letrozole and start this once approved.  The patient agrees with this plan.  She is very appreciative of her care.  All questions were answered.         Kevin Henderson MD, MS    Department of Obstetrics and Gynecology   Division of  Gynecologic Oncology   [facility]  Phone: [phone]      CC  Patient Care Team:  Levar Scott as PCP - General (Internal Medicine)  Jeanne Blancas MD as Referring Physician (OB/Gyn)  Alondra Ruiz, RN as Specialty Care Coordinator (Gynecologic Oncology)  Kevin Henderson MD as MD (Gynecologic Oncology)  Yasmine Zazueta APRN CNP as Assigned PCP  Lisa Barroso APRN CNP as Assigned OBGYN Provider  Kevin Henderson MD as Assigned Cancer Care Provider  SELF, REFERRED

## 2020-11-25 NOTE — TELEPHONE ENCOUNTER
PA Initiation    Medication: Lenvima - Submitted  Insurance Company: [employer] - Phone [phone] Fax [phone]  Pharmacy Filling the Rx:    Filling Pharmacy Phone:    Filling Pharmacy Fax:    Start Date: 11/25/2020        Archana Schulte CPhT  [facility]  Oncology Pharmacy Liaison  [email or web address]  Phone: [phone]  Fax: [phone]

## 2020-12-01 NOTE — TELEPHONE ENCOUNTER
MD recommends trying fentanyl 12mcg/hr patches and discontinuing morphine. MD also okay with lymphedema PT order.     Attempted to call patient to discuss, but was unable to reach her. Left VM advising I was calling to discuss this with her and asking for call back. Left my direct phone #. If I do not hear back today, will try again tomorrow.

## 2020-12-01 NOTE — TELEPHONE ENCOUNTER
----- Message from Celia Baker LPN sent at 12/1/2020  8:43 AM CST -----  Regarding: FW: medication side effects / changes  Ah  here is one you can follow / call  ----- Message -----  From: Earnest Roberson MD  Sent: 12/1/2020   8:37 AM CST  To: Amanda Rob MD, #  Subject: RE: medication side effects / changes            Forwarding onto Caitlin HEIN, I've not met this patient.   ----- Message -----  From: Debra Soriano  Sent: 11/23/2020   2:05 PM CST  To: Earnest Roberson MD, #  Subject: medication side effects / changes                Hello,    I spoke with this pt today to schedule her follow up in December. She wanted to relay a message to her provider regarding her medication. She stated that she has been experiencing some nausea and vomiting and wanted to speak to someone regarding that.    She also stated that she has some swelling in her leg and was wondering if there is anything physical therapy that we help with this.       Debra Soriano  Clinic Coordinator  2nd Floor [facility]  Palliative Care/Gyn Oncology  [phone]

## 2020-12-01 NOTE — TELEPHONE ENCOUNTER
Called patient to check in follow below message received.     She explains that she ended up with MS Contin and everytime she takes it she vomits. Sometimes she also vomits when she hasn't taken it, but is very clear that she vomits usually within 30-90 mins of taking MS Contin every time. It's a little better in the evening when she has some food in her, but still she finds she vomits. Does get some pain relief from the MS Contin despite the nausea. States pain doesn't seem as bad as before and is less consistent. Of course does not want to continue vomiting and is worried about her appetite as she report she is not eating as much or as well as she should be.     She explains yesterday she missed her usual time to take the morphine but still vomited close to when she did take her dose (She took it an hour early due to clinic apt).     Doesn't find compazine that helpful - maybe helps some, but not a lot.     Takes PRN oxycodone twice daily and once overnight consistently, sometimes has to take up to 4 times during the day.     Does have constipation with MS Contin as well which she tells me is better when she takes dulcolax. Encouraged her to continue taking dulcolax and educated her about opiate induced constipation. Asked her to call if she goes 1-2 days without a BM.   Took short acting     She also complains of consistently lef swelling she says has been there since September. Worse on right side, but a little on the left. Says top of leg, thigh area painful sometimes and can get red. Notes she has had 2 receive US to check for clots and hasn't had any. Reports she has lymphedema. Tries to keep legs elevated as much as possible, has a hospital bed that helps with this. Also wears compression sock during the day.     Swelling is bothersome, wonders if she can have a lymphedema referral to see if that will help.    Advised I would review all this info with the MD and follow up with her on the recommendations. She  verbalized understanding and thanked me for the call back to discuss. Also introduced myself and my role and provided my direct phone # for any arising concerns/questions.     23 mins spent on the phone with patient as above.

## 2020-12-02 NOTE — TELEPHONE ENCOUNTER
Spoke with patient and her sister Lorrie (former RN). We reviewed MD recommendation for change to fentanyl patch. Reviewed patch administration instructions including changing every 72 hours and stopping MS Contin when she receives the patches. Both verbalized understanding. Both are very open to trying fentanyl as an options as MS Contin is causing so much nausea.     They asked if anti emetic (compazine) should be changed - but as we discussed they are hopeful the change in long acting pain med will hopefully make nausea better so are okay with waiting to reevaluate that when we check in.     Grateful for lymphedema referral.     Advised of potential PA issue and confirmed pharmacy. Will route to MD for script approval. Will plan to check in with patient Friday or Monday (pending when script is received). Asked her to call sooner with any arising questions/concerns/issues.     13 mins spent on phone with patient.

## 2020-12-02 NOTE — TELEPHONE ENCOUNTER
Attempted to call patient again to follow up on med recommendations, but still unable to reach her. Left additional VM with my direct phone # for call back.    Lymphedema therapy referral placed.

## 2020-12-03 NOTE — DISCHARGE INSTRUCTIONS
Discharge Instructions for Paracentesis or Thoracentesis     Paracentesis is a procedure to remove extra fluid from your belly (abdomen). Thoracentesis is a procedure to remove extra fluid from your chest.  This fluid buildup is called ascites. The procedure may have been done to take a sample of the fluid. Or, it may have been done to drain the extra fluid from your abdomen or chest to help make you more comfortable.     Home care    If you have pain after the procedure, your healthcare provider can prescribe or recommend pain medicines. Take these exactly as directed. If you stopped taking other medicines before the procedure, ask your provider when you can start them again.    Avoid strenuous activity for 48 hours.    You will have a small bandage over the puncture site. Adhesive tapes, adhesive strips, or surgical glue may also be used to close the incision. They also help stop bleeding and promote healing. You may take the bandage off in 24-48 hours. Once there is a scab over the site, no bandages are required.    Check the puncture site for the signs of infection listed below.     Follow-up care  Make a follow-up appointment with your healthcare provider as directed. During your follow-up visit, your provider will check your healing. Let your provider know how you are feeling. You can also discuss the cause of your fluid, and if you need any further treatment.    When to seek medical advice:  Call your healthcare provider if you have any of the following after the procedure:    A fever of 100.4 F (38.0 C) or higher    Trouble breathing    Abdominal pain that is worse than expected    Belly Abdominal pain not caused by having the skin punctured that is worse than expected    Persistent bleeding from the puncture site    More than a small amount of fluid leaking from the puncture site    Swollen belly    Signs of infection at the puncture site. These include increased pain, redness, or swelling, warmth, or  bad-smelling drainage.    Feeling dizzy or lightheaded, or fainting     Call our Interventional Radiology (IR) service if:  If you start bleeding from the procedure site.  If you do start to bleed from the site, lie down and hold some pressure on the site.  Our radiology provider can help you decide if you need to return to the hospital.  If you have new or worsening pain related to the procedure.  If you have pronounced swelling at the procedure site.  If you develop persistent nausea or vomiting.  If you develop hives or a rash or any unexplained itching.  If you have a fever of greater than 100.4  F and chills in the first 5 days after procedure.  Any other concerns related to your procedure.      [facility]  Interventional Radiology (IR)  [address]  [facility] [place] [address]    Contact Number:  [phone]  (IR control desk)  Monday - Friday 8:00 am - 4:30 pm    After hours for urgent concerns:  [phone]  After 4:30 pm Monday - Friday, Weekends and Holidays.   Ask for Interventional Radiology on-call.  Someone is available 24 hours a day.  [facility] toll free number:  [phone]

## 2020-12-03 NOTE — TELEPHONE ENCOUNTER
Free Drug Application Initiated  Medication - Lenvima  Sponsor - [facility]  Phone #[phone]  Fax #[phone]  [facility] Check: TKBELINDA 12/3/2020 @2:50pm        Archana Schulte CPhT  [facility]  Oncology Pharmacy Liaison  [email or web address]  Phone: [phone]  Fax: [phone]

## 2020-12-03 NOTE — OR NURSING
Pt here for thoracentesis and paracentesis.  Pt found to have no fluid for a paracentesis.  Bilateral thoracentesis done.      Left chest 900mL removed-serous  Right chest 1000mL removed-serous      Geovnana Jorge A BS, RN, BSN, PHN

## 2020-12-04 NOTE — PROGRESS NOTES
Called patient to check in following change from MS Contin to fentanyl. She reports the fentanyl is better the MS Contin and she is not having the same nausea as before. She thinks it helps her pain some, but is still taking PRN pain medication 2-4 times daily (same as when on MS Contin). Denies side effects from fentanyl.     Still feels compazine isnt the most helpful for nausea, doesn't think it works well. Wonders if there is an alternative?    Also asks about potential PRN O2 for home, states she has been working with Dr. Henderson's team on this. Encouraged her to call Dr. Henderson's team to talk further about that.     Advised I would update the MD on fentanyl/pain/antiemtic and follow up with any other recommendations.     She was okay with that plan and thanked me for the call.

## 2020-12-07 NOTE — PROGRESS NOTES
MD okay with script for ondansetron for patient's continued nausea.     Called patient to discuss -- she and her sister tell me that she is doing terribly.     She states she is nauseated - was grateful to hear about ondansetron script (this was verbally signed and sent to her preferred pharmacy, she is aware).    She was very tearful during our conversation and her sister was in the background of the call and states she is very frustrated. Feels like they have been getting the run around with what to do. Patient states she is scared that because she is dying her oncology team doesn't care about her and isn't helping her anymore. Offered reassurance that they along with us (palliative) are still here to help.     She asks again about PRN oxygen - has not called ONC as we discussed last week. States ONC told her to call palliative about this? Asks about extra help at home for herself/for her sister who is her main care giver. In addition to pain and nausea patient also reports a new pressure sore developing on coccyx.     We discussed home care - however she also asks about hospice care and if it's time to enroll in that as she tells me multiple times she does not want to die so uncomfortable and in so much pain. She was open to a hospice info meeting.     Knows she has apt with Dr. Henderson this week - states that she feels they tell her to call palliative and we tell her to call oncology so she gets lost in the shuffle.     Offered her (and her sister support). Advised I would loop in Dr. Bronwlee, Dr. Henderson, Angelique [facility] and let them all know this information so we can figure out the best way to move forward.     She has my direct phone # and I advised her of the after hours palliative care contact phone # as well.

## 2020-12-07 NOTE — PROGRESS NOTES
[facility]        OUTPATIENT OCCUPATIONAL THERAPY EDEMA EVALUATION  PLAN OF TREATMENT FOR OUTPATIENT REHABILITATION  (COMPLETE FOR INITIAL CLAIMS ONLY)  Patient's Last Name, First Name, Lu Liu                           Provider s Name:   [facility] Medical Record No.  [id number]     Start of Care Date:  12/04/20   Onset Date:  12/02/20(since summer 2020 per pt)   Type:  OT   Medical Diagnosis:  lymphedema    Therapy Diagnosis:  lymphedema Visits from SOC:  1                                     __________________________________________________________________________________   Plan of Treatment/Functional Goals:    Manual lymph drainage, Gradient compression bandaging, Fit for compression garment, Exercises, Precautions to prevent infection / exacerbation, Education, Skin care / precautions, Home management program development        GOALS  1. Goal description: Pt will report understanding s/s lymphedema, s/s skin infections, and treatment options for safety and knowledge base needed for I home management       Target date: 12/04/20  2. Goal description: Tolerate 23/24 GCB wearing for max reductions needed to reduce BLE lymphedema for improved walking, transfers, and reduce risk skin infections       Target date: 02/05/21  3. Goal description: Demo I with GCB donning for I building with home management RLE/BLE lymphedema needed to reduce risk skin infections and promote more I with transfers and functional mobility       Target date: 02/26/21  4. Goal description: Demo I with ex for lymph system and muscle pumping system for max reductions needed to reduce BLE lymphedema for improved walking, transfers, and reduce risk skin infections       Target date: 02/26/21  5. Goal description: Demo I donning, doffing, and caring for compression garments for I  with home management RLE/BLE lymphedema needed to reduce risk skin infections and promote increased I with transfers and mobility tasks       Target date: 02/26/21  6. Goal description: Pt will report a reduction in daily pain for improved sleep, walking, and transfer engagement       Target date: 02/26/21     7.             8.              Treatment Frequency: 3x/week   Treatment duration: 3x/wk x 4 weeks, then 0x/wk x 3 weeks, then 1x/wk x 1 week    Erickson Shaw                                    I CERTIFY THE NEED FOR THESE SERVICES FURNISHED UNDER        THIS PLAN OF TREATMENT AND WHILE UNDER MY CARE     (Physician co-signature of this document indicates review and certification of the therapy plan).                   Certification date from: 12/04/20       Certification date to: 02/26/21           Referring physician: Caitlin Brownlee   Initial Assessment  See Epic Evaluation- Start of care: 12/04/20

## 2020-12-08 NOTE — PROGRESS NOTES
Left message for Lu wanting to touch base about how she is doing, where we are at on getting her home oxygen, and where we are with treatment options.  I have been in touch with palliative care team and the increasing needs that Lu is experiencing and want to follow up with her on the different options available.  Will call her back this afternoon.

## 2020-12-09 NOTE — PROGRESS NOTES
"Lu Smith is a 70 year old female who is being evaluated via a billable telephone visit.      The patient has been notified of following:     \"This telephone visit will be conducted via a call between you and your physician/provider. We have found that certain health care needs can be provided without the need for a physical exam.  This service lets us provide the care you need with a short phone conversation.  If a prescription is necessary we can send it directly to your pharmacy.  If lab work is needed we can place an order for that and you can then stop by our lab to have the test done at a later time.    Telephone visits are billed at different rates depending on your insurance coverage. During this emergency period, for some insurers they may be billed the same as an in-person visit.  Please reach out to your insurance provider with any questions.    If during the course of the call the physician/provider feels a telephone visit is not appropriate, you will not be charged for this service.\"    Patient has given verbal consent for Telephone visit?  Yes    What phone number would you like to be contacted at? [phone]    How would you like to obtain your AVS? Mail a copy      I have reviewed and updated patient's allergy and medication list.    Concerns: PATIENT UNABLE TO PROVIDE URINE SAMPLE, WOULD YOU LIKE SISTER TO BRING A SAMPLE BACK TO LAB?  INSURANCE ISSUES WITH ZOFRAN  Refills: NONE      Shannan Mejia [facility]    Phone call duration: 25 minutes    Lu Smith is a 70 year old woman with a diagnosis of recurrent serous endometrial cancer.    The patient presents today for followup. She has been in the hospital for thoracentesis feels weaker overall, less appetite. She is eating and drinking less.  Occasional nausea had no vomiting, no fever or chills. More difficulties to urinate, has not done UA yet, has several BMs a day.  No vaginal bleeding or discharge. She does have some upper and lower " back pain that resolves with warm bath, continues to have  SOB, just continued      Oncology History:  Diagnosis: Stage IIIC2 serous endometrial cancer  Primary GYN oncologist: Dr. Kevin Henderson  Primary radiation oncologist: Dr. Ana Michelle  Surgery: 2/23/18: TLH-BSO, omentectomy, bilateral pelvic and para-aortic lymph node dissection, pelvic washings  Chemotherapy:  3/30/18-9/28/18: Six cycles of carboplatin AUC 6 and paclitaxel 175mg/m2, delivered sandwich style with pelvic radiation between cycles three and four  Radiation: 6/4/18-7/12/18: EBRT delivered to pelvis and para-aortic nodes, 5040 cGy in 28 fractions  7/16/18-7/20/18: HDR brachytherapy delivered to the vaginal cuff, 1200 cGy in two fractions  Complications: Transient neuropathy, mild thrombocytopenia and anemia  Genetic testing: Panel testing negative     9/16/19: Chest CT performed for pulmonary nodule follow up, concerning for recurrent disease  IMPRESSION:   1. Few bilateral sub-4 mm solid pulmonary nodules are unchanged compared to 2/6/2018. No new or enlarging pulmonary nodules.  2. Dilated main pulmonary artery, nonspecific but may be seen with pulmonary artery hypertension.  3. New 1.9 cm left subclavicular node.     9/28/19: PET impression:  In this 69-year-old female with history of stage IIIC2 serous endometrial cancer status post total hysterectomy and bilateral salpingo-oophorectomy, omentectomy, bilateral pelvic and periaortic dissection, and radiation to the pelvis, and brachytherapy to the vaginal cuff.  1. Progression of disease with new intensely FDG avid para-aortic, mediastinal, and subclavicular lymphadenopathy. The subdiaphragmatic periaortic lymphadenopathy spans from the diaphragmatic crura to the  low abdominal aorta at the level of L4 with greater than 180 degree involvement of the aorta. There is additional lymphadenopathy within the left axilla and the right inguinal region.     9/28/19: Neck CT impression:  1.  Progression of metastatic disease with new intensely FDG avid retroclavicular lymph node at the level of the left thoracic inlet.   2. On the fusion PET CT, there is no definite abnormal metabolic activity in the mucosal space, soft tissues, or cervical jamel chains.  3. No evidence of mucosal or soft tissue abnormality on contrast enhanced neck CT.  4. Please refer to the whole body PET CT performed as a separate report, for the findings of the remainder of the body.     10/24/19: Lymph node biopsy:  Lymph node, periaortic, CT guided core biopsy:   -METASTATIC ADENOCARCINOMA   -Tumor morphology is consistent with metastasis/recurrence of endometrial serous carcinoma      11/11/19: Cycle #1 paclitaxel 175mg/m2 + carboplatin AUC 6 + bevacizumab 15mg/kg  12/2/19: Cycle #2 paclitaxel 175mg/m2 + carboplatin AUC 6 + bevacizumab 15mg/kg. Carboplatin reaction.  12/27/19: Cycle #3 Paclitaxel/Avastin/inpatient Carboplatin desensitization.   1/20/2020: CT cap IMPRESSION:  1. Mosaic attenuation throughout the lungs with associated dilated main pulmonary artery likely due to pulmonary arterial hypertension in the appropriate clinical setting.  2. Stable sub-4 mm bilateral pulmonary nodules. No new or enlarging pulmonary nodules.  3. Interval improvement of previously seen enlarged lymph nodes in the lower neck, chest, abdomen and pelvis. No new or enlarging lymph nodes in the present study.  4. Stable soft tissue mesenteric nodules as well as mesenteric root fat stranding.  5. Hepatic steatosis. Additional incidental findings as described above.  1/22/2020: Cycle #4 Paclitaxel/Avastin/inpatient Carboplatin desensitization.   2/13/2020: Cycle #5 Paclitaxel/Avastin/inpatient Carboplatin desensitization.   3/3/2020: 24 h urine protein 0.15  3/5/2020: Cycle #6 Paclitaxel/Avastin/inpatient Carboplatin desensitization.   3/25/2020: Cycle #1 Avastin   4/22/2020: Cycle #2 Avastin   5/13/2020: Cycle #3 Avastin   6/3/2020: Cycle  #4 Avastin   6/25/2020: Cycle #5 Avastin   7/16/25/2020: Cycle #6 Avastin   8/6/2020: C1 weekly taxol  9/14/2020: C2 weekly taxol  10/7/2020: C3 Day 1 wkly taxol  11/5/20 CT scan Chest/abdomen  Increasing mediastinal adenopathy, increased peritoneal nodules Lungs: Possible metastases/infection or both. (Increased groundglass and tree-in-bud opacities of the left upper and lower lobes and new groundglass and tree-in-bud opacities in the right middle and lower lobes. Moderate bilateral pleural effusions.)  11/26/2020: Letrozole 2.5mg day.      Past Medical History:    Past Medical History:   Diagnosis Date     Diabetes (H)     Type II     Endometrial cancer determined by uterine biopsy (H) 02/2018     HTN (hypertension)      Obesity      Osteoarthritis          Past Surgical History:    Past Surgical History:   Procedure Laterality Date     CHOLECYSTECTOMY  1993     CYSTOSCOPY N/A 2/23/2018    Procedure: CYSTOSCOPY;;  Surgeon: Kevin Henderson MD;  Location: [facility] OR     DAVINCI HYSTERECTOMY TOTAL, BILATERAL SALPINGO-OOPHORECTOMY, COMBINED N/A 2/23/2018    Procedure: COMBINED DAVINCI HYSTERECTOMY TOTAL, SALPINGO-OOPHORECTOMY;  DaVinci Assisted Total Laparoscopic Hysterectomy, Bilateral Salpingo-Oophorectomy, Cystoscopy,  Omental biopsy, omentectomy,bilateral  Pelvic And Para Aortic Lymph Node Dissection;  Surgeon: Kevni Henderson MD;  Location: [facility] OR     IR THORACENTESIS  12/3/2020     Partial lumbar discectomy  1998     THORACENTESIS Bilateral 12/3/2020    Procedure: THORACENTESIS;  Surgeon: Talia Weaver MD;  Location: [facility] OR         Health Maintenance Due   Topic Date Due     DEXA  1950     LIPID  1950     MICROALBUMIN  1950     DIABETIC FOOT EXAM  1950     ADVANCE CARE PLANNING  1950     EYE EXAM  1950     HEPATITIS C SCREENING  04/07/1970     ZOSTER IMMUNIZATION (1 of 2) 04/07/2002     MEDICARE ANNUAL WELLNESS VISIT  04/07/2017     Pneumococcal Vaccine:  Pediatrics (0 to 5 Years) and At-Risk Patients (6 to 64 Years) (2 of 3 - PPSV23) 02/09/2018     A1C  05/09/2018     Pneumococcal Vaccine: 65+ Years (2 of 2 - PPSV23) 12/15/2018     PHQ-2  01/01/2020     INFLUENZA VACCINE (1) 09/01/2020     FALL RISK ASSESSMENT  10/09/2020       Current Medications:     Current Outpatient Medications   Medication Sig Dispense Refill     acetaminophen (TYLENOL) 500 MG tablet Take 500 mg by mouth every 6 hours as needed for pain        albuterol (PROAIR HFA/PROVENTIL HFA/VENTOLIN HFA) 108 (90 Base) MCG/ACT inhaler Inhale 2 puffs into the lungs every 6 hours 1 Inhaler 3     Famotidine (PEPCID PO) Take 10 mg by mouth as needed        fentaNYL (DURAGESIC) 12 mcg/hr 72 hr patch Place 1 patch onto the skin every 72 hours remove old patch. 10 patch 0     L-Glutamine 500 MG CAPS Take 1 capsule by mouth daily        letrozole (FEMARA) 2.5 MG tablet Take 1 tablet (2.5 mg) by mouth daily 30 tablet 11     loperamide (IMODIUM) 2 MG capsule Take 2 mg by mouth 4 times daily as needed for diarrhea       loratadine (CLARITIN REDITABS) 5 MG dispersible tablet Take 5 mg by mouth daily       magnesium 500 MG TABS Take 500 mg by mouth daily        METFORMIN HCL PO Take 500 mg by mouth daily (with breakfast)        morphine (MS CONTIN) 15 MG CR tablet Take 1 tablet (15 mg) by mouth every 12 hours 60 tablet 0     omeprazole (PRILOSEC) 40 MG DR capsule Take 40 mg by mouth daily as needed        ondansetron (ZOFRAN) 8 MG tablet Take 1 tab (8 mg) by mouth prior to each lenvatinib dose, then every 8 hours as needed for nausea and vomiting. 30 tablet 2     ondansetron (ZOFRAN-ODT) 4 MG ODT tab Take 1 tablet (4 mg) by mouth every 6 hours as needed for nausea 120 tablet 0     oxyCODONE (ROXICODONE) 5 MG tablet Take 1 tablet (5 mg) by mouth every 4 hours as needed for pain 60 tablet 0     prochlorperazine (COMPAZINE) 10 MG tablet Take 10 mg by mouth every 6 hours as needed for nausea or vomiting       pyridoxine  (VITAMIN B-6) 50 MG TABS Take 1 tablet (50 mg) by mouth 2 times daily (Patient taking differently: Take 50 mg by mouth daily ) 60 tablet 3     senna-docusate (SENOKOT-S/PERICOLACE) 8.6-50 MG tablet Take 1 tablet by mouth 2 times daily as needed for constipation 20 tablet 0     triamterene-HCTZ (DYAZIDE) 37.5-25 MG capsule Take by mouth every morning       fluticasone (FLONASE) 50 MCG/ACT spray Spray 1-2 sprays into both nostrils daily (Patient not taking: Reported on 2020) 1 Bottle 1     ibuprofen (ADVIL/MOTRIN) 200 MG tablet Take 400 mg by mouth daily       levofloxacin (LEVAQUIN) 750 MG tablet Take 1 tablet (750 mg) by mouth every 24 hours 7 tablet 0     prochlorperazine (COMPAZINE) 10 MG tablet Take 1 tablet (10 mg) by mouth every 6 hours as needed (Nausea/Vomiting) 30 tablet 2         Allergies:        Allergies   Allergen Reactions     Carboplatin Anaphylaxis     Chest tightness, hoarse voice, difficulty breathing     Ace Inhibitors Cough     Amoxicillin Hives        Social History:     Social History     Tobacco Use     Smoking status: Former Smoker     Packs/day: 0.25     Years: 20.00     Pack years: 5.00     Quit date: 1991     Years since quittin.6     Smokeless tobacco: Never Used     Tobacco comment: Quit smoking    Substance Use Topics     Alcohol use: Yes     Comment: 4-7/week if out 1-2x's/week       History   Drug Use No         Family History:       Family History   Problem Relation Age of Onset     Colon Cancer Mother      Breast Cancer Sister      Lymphoma Sister          Physical Exam:     /71 (BP Location: Right arm, Patient Position: Sitting, Cuff Size: Adult Regular)   Pulse 86   Temp 97.3  F (36.3  C) (Oral)   Resp 16   SpO2 98%   There is no height or weight on file to calculate BMI.    General Appearance:  healthy and alert, no distress                Psychiatric: appropriate mood and affect                                     A total of 25 minutes was spent with  the patient, 20 minutes of which were spent in counseling the patient and/or treatment planning.        1.  Recurrent serous endometrial carcinoma.   2.  Progression on weekly taxol.   3.  Carbo desensitization.   4.  MSI stable tumor.   5.  Low tumor mutation burden.   6.  PD-L1 0% expression.   7.  Anemia     We did have another long discussion about the options of pembro with lavatinib, continue with the AI versus hospice with best supportive care. She would like to proceed with hospice, but will let us know later today or tomorrow.  The patient agrees with this plan.  She is very appreciative of her care.  All questions were answered.         Yen Farias MD, MS    Department of Obstetrics and Gynecology   Division of Gynecologic Oncology   [facility]  Phone: [phone]      CC  Patient Care Team:  Levar Scott as PCP - General (Internal Medicine)  Jeanne Blancas MD as Referring Physician (OB/Gyn)  Alondra Ruiz RN as Specialty Care Coordinator (Gynecologic Oncology)  Yen Farias MD as MD (Gynecologic Oncology)  Yasmine Zazueta APRN CNP as Assigned PCP  Lsia Barroso APRN CNP as Assigned OBGYN Provider  Yen Farias MD as Assigned Cancer Care Provider  Amanda Rob MD as Assigned Surgical Provider  YEN FARIAS

## 2020-12-09 NOTE — LETTER
"    12/9/2020         RE: Lu Smith  [address]  [facility] [address]        Dear Colleague,    Thank you for referring your patient, Lu Smith, to the [facility]. Please see a copy of my visit note below.    Lu Smith is a 70 year old female who is being evaluated via a billable telephone visit.      The patient has been notified of following:     \"This telephone visit will be conducted via a call between you and your physician/provider. We have found that certain health care needs can be provided without the need for a physical exam.  This service lets us provide the care you need with a short phone conversation.  If a prescription is necessary we can send it directly to your pharmacy.  If lab work is needed we can place an order for that and you can then stop by our lab to have the test done at a later time.    Telephone visits are billed at different rates depending on your insurance coverage. During this emergency period, for some insurers they may be billed the same as an in-person visit.  Please reach out to your insurance provider with any questions.    If during the course of the call the physician/provider feels a telephone visit is not appropriate, you will not be charged for this service.\"    Patient has given verbal consent for Telephone visit?  Yes    What phone number would you like to be contacted at? [phone]    How would you like to obtain your AVS? Mail a copy      I have reviewed and updated patient's allergy and medication list.    Concerns: PATIENT UNABLE TO PROVIDE URINE SAMPLE, WOULD YOU LIKE SISTER TO BRING A SAMPLE BACK TO LAB?  INSURANCE ISSUES WITH ZOFRAN  Refills: NONE      [facility]annan [facility]    Phone call duration: 25 minutes    Lu Smith is a 70 year old woman with a diagnosis of recurrent serous endometrial cancer.    The patient presents today for followup. She has been in the hospital for thoracentesis feels weaker " overall, less appetite. She is eating and drinking less.  Occasional nausea had no vomiting, no fever or chills. More difficulties to urinate, has not done UA yet, has several BMs a day.  No vaginal bleeding or discharge. She does have some upper and lower back pain that resolves with warm bath, continues to have  SOB, just continued      Oncology History:  Diagnosis: Stage IIIC2 serous endometrial cancer  Primary GYN oncologist: Dr. Kevin Henderson  Primary radiation oncologist: Dr. Ana Michelle  Surgery: 2/23/18: TLH-BSO, omentectomy, bilateral pelvic and para-aortic lymph node dissection, pelvic washings  Chemotherapy:  3/30/18-9/28/18: Six cycles of carboplatin AUC 6 and paclitaxel 175mg/m2, delivered sandwich style with pelvic radiation between cycles three and four  Radiation: 6/4/18-7/12/18: EBRT delivered to pelvis and para-aortic nodes, 5040 cGy in 28 fractions  7/16/18-7/20/18: HDR brachytherapy delivered to the vaginal cuff, 1200 cGy in two fractions  Complications: Transient neuropathy, mild thrombocytopenia and anemia  Genetic testing: Panel testing negative     9/16/19: Chest CT performed for pulmonary nodule follow up, concerning for recurrent disease  IMPRESSION:   1. Few bilateral sub-4 mm solid pulmonary nodules are unchanged compared to 2/6/2018. No new or enlarging pulmonary nodules.  2. Dilated main pulmonary artery, nonspecific but may be seen with pulmonary artery hypertension.  3. New 1.9 cm left subclavicular node.     9/28/19: PET impression:  In this 69-year-old female with history of stage IIIC2 serous endometrial cancer status post total hysterectomy and bilateral salpingo-oophorectomy, omentectomy, bilateral pelvic and periaortic dissection, and radiation to the pelvis, and brachytherapy to the vaginal cuff.  1. Progression of disease with new intensely FDG avid para-aortic, mediastinal, and subclavicular lymphadenopathy. The subdiaphragmatic periaortic lymphadenopathy spans from  the diaphragmatic crura to the  low abdominal aorta at the level of L4 with greater than 180 degree involvement of the aorta. There is additional lymphadenopathy within the left axilla and the right inguinal region.     9/28/19: Neck CT impression:  1. Progression of metastatic disease with new intensely FDG avid retroclavicular lymph node at the level of the left thoracic inlet.   2. On the fusion PET CT, there is no definite abnormal metabolic activity in the mucosal space, soft tissues, or cervical jamel chains.  3. No evidence of mucosal or soft tissue abnormality on contrast enhanced neck CT.  4. Please refer to the whole body PET CT performed as a separate report, for the findings of the remainder of the body.     10/24/19: Lymph node biopsy:  Lymph node, periaortic, CT guided core biopsy:   -METASTATIC ADENOCARCINOMA   -Tumor morphology is consistent with metastasis/recurrence of endometrial serous carcinoma      11/11/19: Cycle #1 paclitaxel 175mg/m2 + carboplatin AUC 6 + bevacizumab 15mg/kg  12/2/19: Cycle #2 paclitaxel 175mg/m2 + carboplatin AUC 6 + bevacizumab 15mg/kg. Carboplatin reaction.  12/27/19: Cycle #3 Paclitaxel/Avastin/inpatient Carboplatin desensitization.   1/20/2020: CT cap IMPRESSION:  1. Mosaic attenuation throughout the lungs with associated dilated main pulmonary artery likely due to pulmonary arterial hypertension in the appropriate clinical setting.  2. Stable sub-4 mm bilateral pulmonary nodules. No new or enlarging pulmonary nodules.  3. Interval improvement of previously seen enlarged lymph nodes in the lower neck, chest, abdomen and pelvis. No new or enlarging lymph nodes in the present study.  4. Stable soft tissue mesenteric nodules as well as mesenteric root fat stranding.  5. Hepatic steatosis. Additional incidental findings as described above.  1/22/2020: Cycle #4 Paclitaxel/Avastin/inpatient Carboplatin desensitization.   2/13/2020: Cycle #5 Paclitaxel/Avastin/inpatient  Carboplatin desensitization.   3/3/2020: 24 h urine protein 0.15  3/5/2020: Cycle #6 Paclitaxel/Avastin/inpatient Carboplatin desensitization.   3/25/2020: Cycle #1 Avastin   4/22/2020: Cycle #2 Avastin   5/13/2020: Cycle #3 Avastin   6/3/2020: Cycle #4 Avastin   6/25/2020: Cycle #5 Avastin   7/16/25/2020: Cycle #6 Avastin   8/6/2020: C1 weekly taxol  9/14/2020: C2 weekly taxol  10/7/2020: C3 Day 1 wkly taxol  11/5/20 CT scan Chest/abdomen  Increasing mediastinal adenopathy, increased peritoneal nodules Lungs: Possible metastases/infection or both. (Increased groundglass and tree-in-bud opacities of the left upper and lower lobes and new groundglass and tree-in-bud opacities in the right middle and lower lobes. Moderate bilateral pleural effusions.)  11/26/2020: Letrozole 2.5mg day.      Past Medical History:    Past Medical History:   Diagnosis Date     Diabetes (H)     Type II     Endometrial cancer determined by uterine biopsy (H) 02/2018     HTN (hypertension)      Obesity      Osteoarthritis          Past Surgical History:    Past Surgical History:   Procedure Laterality Date     CHOLECYSTECTOMY  1993     CYSTOSCOPY N/A 2/23/2018    Procedure: CYSTOSCOPY;;  Surgeon: Kevin Henderson MD;  Location: [facility] OR     DAVINCI HYSTERECTOMY TOTAL, BILATERAL SALPINGO-OOPHORECTOMY, COMBINED N/A 2/23/2018    Procedure: COMBINED DAVINCI HYSTERECTOMY TOTAL, SALPINGO-OOPHORECTOMY;  DaVinci Assisted Total Laparoscopic Hysterectomy, Bilateral Salpingo-Oophorectomy, Cystoscopy,  Omental biopsy, omentectomy,bilateral  Pelvic And Para Aortic Lymph Node Dissection;  Surgeon: Kevin Henderson MD;  Location: [facility] OR     IR THORACENTESIS  12/3/2020     Partial lumbar discectomy  1998     THORACENTESIS Bilateral 12/3/2020    Procedure: THORACENTESIS;  Surgeon: Talia Weaver MD;  Location: [facility]         Health Maintenance Due   Topic Date Due     DEXA  1950     LIPID  1950     MICROALBUMIN  1950      DIABETIC FOOT EXAM  1950     ADVANCE CARE PLANNING  1950     EYE EXAM  1950     HEPATITIS C SCREENING  04/07/1970     ZOSTER IMMUNIZATION (1 of 2) 04/07/2002     MEDICARE ANNUAL WELLNESS VISIT  04/07/2017     Pneumococcal Vaccine: Pediatrics (0 to 5 Years) and At-Risk Patients (6 to 64 Years) (2 of 3 - PPSV23) 02/09/2018     A1C  05/09/2018     Pneumococcal Vaccine: 65+ Years (2 of 2 - PPSV23) 12/15/2018     PHQ-2  01/01/2020     INFLUENZA VACCINE (1) 09/01/2020     FALL RISK ASSESSMENT  10/09/2020       Current Medications:     Current Outpatient Medications   Medication Sig Dispense Refill     acetaminophen (TYLENOL) 500 MG tablet Take 500 mg by mouth every 6 hours as needed for pain        albuterol (PROAIR HFA/PROVENTIL HFA/VENTOLIN HFA) 108 (90 Base) MCG/ACT inhaler Inhale 2 puffs into the lungs every 6 hours 1 Inhaler 3     Famotidine (PEPCID PO) Take 10 mg by mouth as needed        fentaNYL (DURAGESIC) 12 mcg/hr 72 hr patch Place 1 patch onto the skin every 72 hours remove old patch. 10 patch 0     L-Glutamine 500 MG CAPS Take 1 capsule by mouth daily        letrozole (FEMARA) 2.5 MG tablet Take 1 tablet (2.5 mg) by mouth daily 30 tablet 11     loperamide (IMODIUM) 2 MG capsule Take 2 mg by mouth 4 times daily as needed for diarrhea       loratadine (CLARITIN REDITABS) 5 MG dispersible tablet Take 5 mg by mouth daily       magnesium 500 MG TABS Take 500 mg by mouth daily        METFORMIN HCL PO Take 500 mg by mouth daily (with breakfast)        morphine (MS CONTIN) 15 MG CR tablet Take 1 tablet (15 mg) by mouth every 12 hours 60 tablet 0     omeprazole (PRILOSEC) 40 MG DR capsule Take 40 mg by mouth daily as needed        ondansetron (ZOFRAN) 8 MG tablet Take 1 tab (8 mg) by mouth prior to each lenvatinib dose, then every 8 hours as needed for nausea and vomiting. 30 tablet 2     ondansetron (ZOFRAN-ODT) 4 MG ODT tab Take 1 tablet (4 mg) by mouth every 6 hours as needed for nausea 120 tablet  0     oxyCODONE (ROXICODONE) 5 MG tablet Take 1 tablet (5 mg) by mouth every 4 hours as needed for pain 60 tablet 0     prochlorperazine (COMPAZINE) 10 MG tablet Take 10 mg by mouth every 6 hours as needed for nausea or vomiting       pyridoxine (VITAMIN B-6) 50 MG TABS Take 1 tablet (50 mg) by mouth 2 times daily (Patient taking differently: Take 50 mg by mouth daily ) 60 tablet 3     senna-docusate (SENOKOT-S/PERICOLACE) 8.6-50 MG tablet Take 1 tablet by mouth 2 times daily as needed for constipation 20 tablet 0     triamterene-HCTZ (DYAZIDE) 37.5-25 MG capsule Take by mouth every morning       fluticasone (FLONASE) 50 MCG/ACT spray Spray 1-2 sprays into both nostrils daily (Patient not taking: Reported on 2020) 1 Bottle 1     ibuprofen (ADVIL/MOTRIN) 200 MG tablet Take 400 mg by mouth daily       levofloxacin (LEVAQUIN) 750 MG tablet Take 1 tablet (750 mg) by mouth every 24 hours 7 tablet 0     prochlorperazine (COMPAZINE) 10 MG tablet Take 1 tablet (10 mg) by mouth every 6 hours as needed (Nausea/Vomiting) 30 tablet 2         Allergies:        Allergies   Allergen Reactions     Carboplatin Anaphylaxis     Chest tightness, hoarse voice, difficulty breathing     Ace Inhibitors Cough     Amoxicillin Hives        Social History:     Social History     Tobacco Use     Smoking status: Former Smoker     Packs/day: 0.25     Years: 20.00     Pack years: 5.00     Quit date: 1991     Years since quittin.6     Smokeless tobacco: Never Used     Tobacco comment: Quit smoking    Substance Use Topics     Alcohol use: Yes     Comment: 4-7/week if out 1-2x's/week       History   Drug Use No         Family History:       Family History   Problem Relation Age of Onset     Colon Cancer Mother      Breast Cancer Sister      Lymphoma Sister          Physical Exam:     /71 (BP Location: Right arm, Patient Position: Sitting, Cuff Size: Adult Regular)   Pulse 86   Temp 97.3  F (36.3  C) (Oral)   Resp 16    SpO2 98%   There is no height or weight on file to calculate BMI.    General Appearance:  healthy and alert, no distress                Psychiatric: appropriate mood and affect                                     A total of 25 minutes was spent with the patient, 20 minutes of which were spent in counseling the patient and/or treatment planning.        1.  Recurrent serous endometrial carcinoma.   2.  Progression on weekly taxol.   3.  Carbo desensitization.   4.  MSI stable tumor.   5.  Low tumor mutation burden.   6.  PD-L1 0% expression.   7.  Anemia     We did have another long discussion about the options of pembro with lavatinib, continue with the AI versus hospice with best supportive care. She would like to proceed with hospice, but will let us know later today or tomorrow.  The patient agrees with this plan.  She is very appreciative of her care.  All questions were answered.         Kevin Henderson MD, MS    Department of Obstetrics and Gynecology   Division of Gynecologic Oncology   [facility]  Phone: [phone]      CC  Patient Care Team:  Levar Scott as PCP - General (Internal Medicine)  Jeanne Blancas MD as Referring Physician (OB/Gyn)  Alondra Ruiz RN as Specialty Care Coordinator (Gynecologic Oncology)  Yasmine Zazueta APRN CNP as Assigned PCP  Lisa Barroso APRN CNP as Assigned OBGYN Provider  Amanda Rob MD as Assigned Surgical Provider

## 2020-12-09 NOTE — LETTER
2020         RE: Lu Smith  [address] [place] [address]        Dear Colleague,    Thank you for referring your patient, Lu Smith, to the [facility]. Please see a copy of my visit note below.    Patient present to [facility] for a walking test. Patient arrived in wheelchair and needed assistance standing and walking with walker.    O2 saturation:  Sittin% x 2  Standin%  Walkin%    PEPE Starr assisted with walking test.    -Caitlin OG CMA    Pt was only able to walk 3-4 steps with a walker when her Oxygen saturation dropped to 79% on RA.  Pt was then positioned back into a wheelchair and was monitored.  PT was able to increase oxygen saturation to 94% when able to rest in the wheelchair.      Again, thank you for allowing me to participate in the care of your patient.        Sincerely,        [facility]

## 2020-12-09 NOTE — NURSING NOTE
Chief Complaint   Patient presents with     Port Draw     Labs drawn via PORT by RN in lab. VS taken.      Dave Morgan RN

## 2020-12-10 NOTE — PROGRESS NOTES
Working with patient's ONC RNCC to coordinate her care (hospice versus home care, etc) -- patient told ONC RNCC she is in need of oxycodone.     Last refill: 11/19/2020  Last office visit: 11/18/2020  Scheduled for follow up 12/30/2020    PORFIRIO STREETER Reviewed.

## 2020-12-10 NOTE — NURSING NOTE
"Oncology Rooming Note    December 10, 2020 3:08 PM   Lu Smith is a 70 year old female who presents for:    Chief Complaint   Patient presents with     Oncology Clinic Visit     endometrial cancer     Initial Vitals: /85   Pulse 91   Temp 98  F (36.7  C) (Oral)   Ht 1.638 m (5' 4.5\")   Wt 108.4 kg (239 lb)   SpO2 91%   BMI 40.39 kg/m   Estimated body mass index is 40.39 kg/m  as calculated from the following:    Height as of this encounter: 1.638 m (5' 4.5\").    Weight as of this encounter: 108.4 kg (239 lb). Body surface area is 2.22 meters squared.  Mild Pain (2) Comment: Data Unavailable   No LMP recorded. Patient has had a hysterectomy.  Allergies reviewed: Yes  Medications reviewed: Yes    Medications: Medication refills not needed today.  Pharmacy name entered into [employer]: [facility] [address] [place] [address] [address] [facility]    Clinical concerns: No new concerns       Irish Mcgee CMA            "

## 2020-12-10 NOTE — PROGRESS NOTES
History and Physical    Date: 12/10/2020    RE: Lu Smith  : 1950  HEIDY: 12/10/2020    CC: Stage IIIC2 serous endometrial cancer    HPI:  Lu Smith is a 70 year old woman with a history of stage IIIC2 serous endometrial cancer.  She has recently elected to forgo further treatments.  She is here today for follow up.     Oncology History:  Diagnosis: Stage IIIC2 serous endometrial cancer  Primary GYN oncologist: Dr. Kevin Henderson  Primary radiation oncologist: Dr. Ana Michelle  Surgery: 18: TLH-BSO, omentectomy, bilateral pelvic and para-aortic lymph node dissection, pelvic washings  Chemotherapy:  3/30/18-18: Six cycles of carboplatin AUC 6 and paclitaxel 175mg/m2, delivered sandwich style with pelvic radiation between cycles three and four  Radiation: 18-18: EBRT delivered to pelvis and para-aortic nodes, 5040 cGy in 28 fractions  18-18: HDR brachytherapy delivered to the vaginal cuff, 1200 cGy in two fractions  Complications: Transient neuropathy, mild thrombocytopenia and anemia  Genetic testing: Panel testing negative     19: Chest CT performed for pulmonary nodule follow up, concerning for recurrent disease  IMPRESSION:   1. Few bilateral sub-4 mm solid pulmonary nodules are unchanged compared to 2018. No new or enlarging pulmonary nodules.  2. Dilated main pulmonary artery, nonspecific but may be seen with pulmonary artery hypertension.  3. New 1.9 cm left subclavicular node.     19: PET impression:  In this 69-year-old female with history of stage IIIC2 serous endometrial cancer status post total hysterectomy and bilateral salpingo-oophorectomy, omentectomy, bilateral pelvic and periaortic dissection, and radiation to the pelvis, and brachytherapy to the vaginal cuff.  1. Progression of disease with new intensely FDG avid para-aortic, mediastinal, and subclavicular lymphadenopathy. The subdiaphragmatic periaortic lymphadenopathy spans from  the diaphragmatic crura to the  low abdominal aorta at the level of L4 with greater than 180 degree involvement of the aorta. There is additional lymphadenopathy within the left axilla and the right inguinal region.     9/28/19: Neck CT impression:  1. Progression of metastatic disease with new intensely FDG avid retroclavicular lymph node at the level of the left thoracic inlet.   2. On the fusion PET CT, there is no definite abnormal metabolic activity in the mucosal space, soft tissues, or cervical jamel chains.  3. No evidence of mucosal or soft tissue abnormality on contrast enhanced neck CT.  4. Please refer to the whole body PET CT performed as a separate report, for the findings of the remainder of the body.     10/24/19: Lymph node biopsy:  Lymph node, periaortic, CT guided core biopsy:   -METASTATIC ADENOCARCINOMA   -Tumor morphology is consistent with metastasis/recurrence of endometrial serous carcinoma      11/11/19: Cycle #1 paclitaxel 175mg/m2 + carboplatin AUC 6 + bevacizumab 15mg/kg  12/2/19: Cycle #2 paclitaxel 175mg/m2 + carboplatin AUC 6 + bevacizumab 15mg/kg. Carboplatin reaction.  12/27/19: Cycle #3 Paclitaxel/Avastin/inpatient Carboplatin desensitization.   1/20/2020: CT cap IMPRESSION:  1. Mosaic attenuation throughout the lungs with associated dilated main pulmonary artery likely due to pulmonary arterial hypertension in the appropriate clinical setting.  2. Stable sub-4 mm bilateral pulmonary nodules. No new or enlarging pulmonary nodules.  3. Interval improvement of previously seen enlarged lymph nodes in the lower neck, chest, abdomen and pelvis. No new or enlarging lymph nodes in the present study.  4. Stable soft tissue mesenteric nodules as well as mesenteric root fat stranding.  5. Hepatic steatosis. Additional incidental findings as described above.  1/22/2020: Cycle #4 Paclitaxel/Avastin/inpatient Carboplatin desensitization.   2/13/2020: Cycle #5 Paclitaxel/Avastin/inpatient  "Carboplatin desensitization.   3/3/2020: 24 h urine protein 0.15  3/5/2020: Cycle #6 Paclitaxel/Avastin/inpatient Carboplatin desensitization.   3/25/2020: Cycle #1 Avastin   4/22/2020: Cycle #2 Avastin   5/13/2020: Cycle #3 Avastin   6/3/2020: Cycle #4 Avastin   6/25/2020: Cycle #5 Avastin   7/16/25/2020: Cycle #6 Avastin   8/6/2020: C1 weekly taxol  9/14/2020: C2 weekly taxol  10/7/2020: C3 Day 1 wkly taxol  11/5/20 CT scan Chest/abdomen  Increasing mediastinal adenopathy, increased peritoneal nodules Lungs: Possible metastases/infection or both. (Increased groundglass and tree-in-bud opacities of the left upper and lower lobes and new groundglass and tree-in-bud opacities in the right middle and lower lobes. Moderate bilateral pleural effusions.)  11/26/2020: Letrozole 2.5mg day.          Today she comes to clinic feeling short of breath.  She reports that this makes it difficult for her to get out of bed to use the commode or get out of the house for her various tests, procedures, and appointments.  Care coordination has been working on getting her home oxygen which she has received yet.   She plans to transition to hospice but will be having pleurex catheters placed next week prior to signing on for these services.  She reports that she has needed several thoracentesis to drain ascites most recently 12/3 with 1000 ml drained from the right and 900 ml drained from the left.  These have been successful with helping manage her SOB and dyspnea.  She is eating small frequent meals with some difficulty.  She is trying to take in some calories every few hours to help keep her energy up.  She has also been having right leg edema and was able to have a lymphedema wrap placed today.  She reports that the swelling is starting to extend up to her lower back now.  She has been on a \"water-pill\" for many years but her sister, who is a retired nurse, was concerned it was making her dehydrated so she stopped taking it for a " few days.  She has since restarted it and feels it is helping with her leg swelling.  She has some dizzyness when she stands for an extended period of time.  She has been having some constipation which she takes senna for as needed.  She had gone several days without a BM and she needed to take the senna for multiple days to get relief.  She reports abdominal pain that is managed with narcotics prescribed by palliative.  She had her COVID swab done today for her procedure next week and was notified this was a day too early.  This is very upsetting to her.          Review of Systems     Constitutional:  Positive for fatigue, decreased appetite, recent stressors, increased energy and confused. Negative for fever, chills, night sweats, height loss, post-operative complications, incisional pain, hallucinations and hyperactivity.   HENT:  Positive for hearing loss, gum tenderness, taste disturbance, smell disturbance, dry mouth and neck mass. Negative for ear pain, nosebleeds, trouble swallowing, hoarse voice, mouth sores, sore throat, ear discharge, tooth pain, hearing aid, bleeding gums, sinus pain and sinus congestion.    Eyes:  Positive for floaters. Negative for double vision, pain, redness, eye pain, decreased vision, eye watering, eye bulging, eye dryness, flashing lights, spots, strabismus, tunnel vision, jaundice and eye irritation.   Respiratory:   Positive for cough, sputum production, shortness of breath, wheezing, sleep disturbances due to breathing, snores loudly, dyspnea on exertion, cough disturbing sleep and postural dyspnea. Negative for hemoptysis.    Cardiovascular:  Positive for dyspnea on exertion, palpitations, orthopnea, sleep disturbances due to breathing, light-headedness, exercise intolerance and edema. Negative for chest pain, fingers/toes turn blue, hypertension, hypotension, syncope, history of heart murmur, pacemaker, few scattered varicosities and leg pain.   Gastrointestinal:  Positive  for nausea, vomiting, abdominal pain, diarrhea, constipation and bloating. Negative for heartburn, blood in stool, melena, rectal pain, bowel incontinence, jaundice, coffee ground emesis and change in stool.   Genitourinary:  Positive for urgency, difficulty urinating and voiding less frequently. Negative for bladder incontinence, dysuria, hematuria, flank pain, vaginal discharge, genital sores, dyspareunia, decreased libido, arousal difficulty, abnormal vaginal bleeding, excessive menstruation, menstrual changes, hot flashes, vaginal dryness and postmenopausal bleeding.   Musculoskeletal:  Positive for myalgias, back pain, neck pain and muscle weakness. Negative for joint swelling, arthralgias, stiffness, muscle cramps, bone pain and fracture.   Skin:  Positive for nail changes and flaky skin. Negative for itching, poor wound healing, rash, hair changes, skin changes, acne, warts, poor wound healing, scarring, sensitivity to sunlight and skin thickening.   Neurological:  Positive for dizziness, weakness, light-headedness, numbness and difficulty walking. Negative for tingling, tremors, speech change, seizures, loss of consciousness, headaches, disturbances in coordination, memory loss and paralysis.   Endo/Heme:  Positive for bruises/bleeds easily. Negative for anemia and swollen glands.   Psychiatric/Behavioral:  Positive for depression. Negative for hallucinations, memory loss, decreased concentration, mood swings and panic attacks.    Breast:  Negative for breast discharge, breast mass, breast pain and nipple retraction.   Endocrine:  Positive for altered temperature regulation.Negative for polyphagia, polydipsia, unwanted hair growth and change in facial hair.        Past Medical History:    Past Medical History:   Diagnosis Date     Diabetes (H)     Type II     Endometrial cancer determined by uterine biopsy (H) 02/2018     HTN (hypertension)      Obesity      Osteoarthritis          Past Surgical  History:    Past Surgical History:   Procedure Laterality Date     CHOLECYSTECTOMY  1993     CYSTOSCOPY N/A 2/23/2018    Procedure: CYSTOSCOPY;;  Surgeon: Kevin Henderson MD;  Location: [facility] OR     DAVINCI HYSTERECTOMY TOTAL, BILATERAL SALPINGO-OOPHORECTOMY, COMBINED N/A 2/23/2018    Procedure: COMBINED DAVINCI HYSTERECTOMY TOTAL, SALPINGO-OOPHORECTOMY;  DaVinci Assisted Total Laparoscopic Hysterectomy, Bilateral Salpingo-Oophorectomy, Cystoscopy,  Omental biopsy, omentectomy,bilateral  Pelvic And Para Aortic Lymph Node Dissection;  Surgeon: Kevin Henderson MD;  Location: [facility] OR     IR THORACENTESIS  12/3/2020     Partial lumbar discectomy  1998     THORACENTESIS Bilateral 12/3/2020    Procedure: THORACENTESIS;  Surgeon: Talia Weaver MD;  Location: [facility] [place]         Health Maintenance Due   Topic Date Due     DEXA  1950     LIPID  1950     MICROALBUMIN  1950     DIABETIC FOOT EXAM  1950     ADVANCE CARE PLANNING  1950     EYE EXAM  1950     HEPATITIS C SCREENING  04/07/1970     ZOSTER IMMUNIZATION (1 of 2) 04/07/2002     MEDICARE ANNUAL WELLNESS VISIT  04/07/2017     Pneumococcal Vaccine: Pediatrics (0 to 5 Years) and At-Risk Patients (6 to 64 Years) (2 of 3 - PPSV23) 02/09/2018     A1C  05/09/2018     Pneumococcal Vaccine: 65+ Years (2 of 2 - PPSV23) 12/15/2018     PHQ-2  01/01/2020     INFLUENZA VACCINE (1) 09/01/2020     FALL RISK ASSESSMENT  10/09/2020       Current Medications:     Current Outpatient Medications   Medication Sig Dispense Refill     acetaminophen (TYLENOL) 500 MG tablet Take 500 mg by mouth every 6 hours as needed for pain        albuterol (PROAIR HFA/PROVENTIL HFA/VENTOLIN HFA) 108 (90 Base) MCG/ACT inhaler Inhale 2 puffs into the lungs every 6 hours 1 Inhaler 3     Famotidine (PEPCID PO) Take 10 mg by mouth as needed        fentaNYL (DURAGESIC) 12 mcg/hr 72 hr patch Place 1 patch onto the skin every 72 hours remove old patch. 10 patch 0      L-Glutamine 500 MG CAPS Take 1 capsule by mouth daily        letrozole (FEMARA) 2.5 MG tablet Take 1 tablet (2.5 mg) by mouth daily 30 tablet 11     loperamide (IMODIUM) 2 MG capsule Take 2 mg by mouth 4 times daily as needed for diarrhea       loratadine (CLARITIN REDITABS) 5 MG dispersible tablet Take 5 mg by mouth daily       magnesium 500 MG TABS Take 500 mg by mouth daily        METFORMIN HCL PO Take 500 mg by mouth daily (with breakfast)        morphine (MS CONTIN) 15 MG CR tablet Take 1 tablet (15 mg) by mouth every 12 hours 60 tablet 0     omeprazole (PRILOSEC) 40 MG DR capsule Take 40 mg by mouth daily as needed        ondansetron (ZOFRAN) 8 MG tablet Take 1 tab (8 mg) by mouth prior to each lenvatinib dose, then every 8 hours as needed for nausea and vomiting. 30 tablet 2     ondansetron (ZOFRAN-ODT) 4 MG ODT tab Take 1 tablet (4 mg) by mouth every 6 hours as needed for nausea 120 tablet 0     oxyCODONE (ROXICODONE) 5 MG tablet Take 1 tablet (5 mg) by mouth every 4 hours as needed for pain 60 tablet 0     prochlorperazine (COMPAZINE) 10 MG tablet Take 10 mg by mouth every 6 hours as needed for nausea or vomiting       pyridoxine (VITAMIN B-6) 50 MG TABS Take 1 tablet (50 mg) by mouth 2 times daily (Patient taking differently: Take 50 mg by mouth daily ) 60 tablet 3     senna-docusate (SENOKOT-S/PERICOLACE) 8.6-50 MG tablet Take 1 tablet by mouth 2 times daily as needed for constipation 20 tablet 0     triamterene-HCTZ (DYAZIDE) 37.5-25 MG capsule Take by mouth every morning       fluticasone (FLONASE) 50 MCG/ACT spray Spray 1-2 sprays into both nostrils daily (Patient not taking: Reported on 12/9/2020) 1 Bottle 1     ibuprofen (ADVIL/MOTRIN) 200 MG tablet Take 400 mg by mouth daily       levofloxacin (LEVAQUIN) 750 MG tablet Take 1 tablet (750 mg) by mouth every 24 hours 7 tablet 0     prochlorperazine (COMPAZINE) 10 MG tablet Take 1 tablet (10 mg) by mouth every 6 hours as needed (Nausea/Vomiting) 30  "tablet 2         Allergies:        Allergies   Allergen Reactions     Carboplatin Anaphylaxis     Chest tightness, hoarse voice, difficulty breathing     Ace Inhibitors Cough     Amoxicillin Hives        Social History:     Social History     Tobacco Use     Smoking status: Former Smoker     Packs/day: 0.25     Years: 20.00     Pack years: 5.00     Quit date: 1991     Years since quittin.6     Smokeless tobacco: Never Used     Tobacco comment: Quit smoking    Substance Use Topics     Alcohol use: Yes     Comment: 4-7/week if out 1-2x's/week       History   Drug Use No         Family History:     The patient's family history is notable for:    Family History   Problem Relation Age of Onset     Colon Cancer Mother      Breast Cancer Sister      Lymphoma Sister          Physical Exam:     /85   Pulse 91   Temp 98  F (36.7  C) (Oral)   Ht 1.638 m (5' 4.5\")   Wt 108.4 kg (239 lb)   SpO2 91%   BMI 40.39 kg/m    Body mass index is 40.39 kg/m .    Patient has been assessed for Home Oxygen needs. Oxygen readings:    *Pulse oximetry (SpO2) = 91% on room air at rest while awake.    *SpO2 improved to 95% on 2liters/minute at rest.    *SpO2 = 79% on room air during activity/with exercise.    *SpO2 improved to 91% on 2liters/minute during activity/with exercise.      General Appearance: alert, visibly SOB     HEENT: no palpable nodules or masses        Cardiovascular: regular rate and rhythm, no gallops, rubs or murmurs     Respiratory: lungs clear, no rales, rhonchi or wheezes    Musculoskeletal: extremities non tender, RLE in with lymphedema wrap present    Skin: no lesions or rashes     Neurological: no gross defects     Psychiatric: appropriate mood and affect, weeping throughout exam                         Hematological: normal cervical and supraclavicular lymph nodes     Gastrointestinal:       abdomen soft, non-tender, non-distended    Genitourinary: Deferred      Assessment:    Lu Smith" is a 70 year old woman with a history of stage IIIC2 serous endometrial cancer.  She has recently elected to forgo further treatments.  She is here today for follow up.     15 minutes were spent with this patient, over 50% of that time was spent in symptom management, treatment planning and in counseling and coordination of care.       Plan:     1.) Patient is cleared for procedure on 12/15/2020.  She is expected to tolerate the sedation needed for the procedure.    2.) Patient was placed on 2 LMP of oxygen during visit to help control her SOB.  Discussed her home oxygen status with Rae and Angelique RNCC who will work to get patient home oxygen tonight or tomorrow.  Patient left on O2 in the clinic until she felt comfortable plus an additional 10 min, then brought by wheelchair to her sister's car on O2 by RN.  Continue with plan for home O2, pleurex catheter placement next week, and transition to hospice after this has been completed.  Information on timing of appointment and NPO status reviewed with patient and instructions provided to patient during visit today.  Would not recommend having patient's COVID swab repeated as it is very difficult for her to tolerate leaving her home for this to be done.  Her family is isolating and the patient herself is only leaving her home for medical care.  Recommended taking something daily to prevent constipation whether that be senna or Miralax which may cause less abdominal cramping.  Continue eating small frequent meals.  Would recommend continuing her diuretic for the time being if she notices it helps with her edema and her body has likely developed a tolerance for it as she has been on it for so long.  Reviewed signs and symptoms for when she should contact the clinic or seek additional care.  Patient to contact the clinic with any questions or concerns in the interim.      3.)        Genetic risk factors were assessed and she is negative. PDLI 0%       4.)        Labs  and/or tests ordered include:  None.    5.) I certify that this patient, Lu Smith has been under my care (or a nurse practitioner or physican's assistant working with me). This is the face-to-face encounter for oxygen medical necessity.      Lu Smith is now in a chronic stable state and continues to require supplemental oxygen. Patient has continued oxygen desaturation due to malignant ascites.    Alternative treatment(s) tried or considered and deemed clinically infective for treatment of malignant ascites include thoracentesis and endometrial cancer treatment.  If portability is ordered, is the patient mobile within the home? yes    **Patients who qualify for home O2 coverage under the CMS guidelines require ABG tests or O2 sat readings obtained closest to, but no earlier than 2 days prior to the discharge, as evidence of the need for home oxygen therapy. Testing must be performed while patient is in the chronic stable state. See notes for O2 sats.**        Melisa Carrasquillo, STEPHANY, APRN, FNP-C  Nurse Practitioner  Division of Gynecologic Oncology  Pager: [phone]     CC  Patient Care Team:  Levar Scott as PCP - General (Internal Medicine)  Jeanne Blancas MD as Referring Physician (OB/Gyn)  Alondra Ruiz, RN as Specialty Care Coordinator (Gynecologic Oncology)  Kevin Henderson MD as MD (Gynecologic Oncology)  Yasmine Zazueta APRN CNP as Assigned PCP  Lisa Barroso APRN CNP as Assigned OBGYN Provider  Kevin Henderson MD as Assigned Cancer Care Provider  Amanda Rob MD as Assigned Surgical Provider

## 2020-12-14 NOTE — PROGRESS NOTES
"Received message from [facility] patient asking for more help with pain.     Specifically patient reported to RNCC  She is stating she is taking oxycodone every 4 hours but in the morning when she wakes up her pain, especially in her right leg is extreme. Trying to get dressed/showered make her pain much worse. And was wondering if she should take 2 oxy in the morning or something else to help at that time.     MD recommended: \"looks like she is prescribed oxycodone 5 mg q4h PRN - if she is only taking one, we should make 5-10 q4h PRN.       Is she also having uncontrolled pain needing to take the oxycodone every 4 hours around the clock?  If she's taking at least 20 mg oxycodone/day in addition to her patch, would have her increase to 25 mcg fentanyl (could do 2 patches until hospice set up).\"    Called her back. She confirmed everything RNCC explained before. Leg pain is worst pain, but does still have some abdominal pain also. Uses a heating pack on her stomach all day to keep the pain bearable.    She reports that her sisters (who have been helping with meds) are waking her up every 4 hours to take oxycodone and she is taking it consistently.     At a few points screams at sister (Lorrie) for talking in the background - she expresses a lot of frustration with her sister.     Reviewed at length with patient and her other sister, MD Carol's pain med recommendations. Both Lu and Carol repeated my instructions back to me. They will try 1-2 tabs (5-10mg) Q4H PRN. Also will try 2 fentanyl patches at a time. Just put on new patch this morning, instructed her to place another patch on as well and then to switch both in 72 hours.     She is hopeful this will help pain and she wont need to be woken up as sleep helps too. I encouraged them to try not to wake her up, only give pain meds overnight if she wakes up needing them, not in advance.     We talked about constipation recommendations also as this has been an issue. " Taking 2 senna daily, not quite having normal BMs. They will add in miralax daily and increase senna if needed.     They also note that she has been taking OTC cold medicine to help with breathing (wal-tussin dm non drowsy). She does think she has some congestion with her breathing and thinks this helps along with vapor rub and her inhaler.     We discussed hospice a bit as well - how they can make quicker med adjustments, get equipment (hospital bed), etc. She is meeting with them on Wednesday.     Will update MD. Meds changed historically in Epic.

## 2020-12-15 NOTE — PROGRESS NOTES
PATIENT/VISITOR WELLNESS SCREENING    Step 1 Patient Screening    1. In the last month, have you been in contact with someone who was confirmed or suspected to have Coronavirus/COVID-19? No  12/10 Negative Covid Screen     2. Do you have the following symptoms?  Fever/Chills? No   Cough? No   Shortness of breath? No   New loss of taste or smell? No  Sore throat? No  Muscle or body aches? No  Headaches? No  Fatigue? No  Vomiting or diarrhea? No      Step 3 Refer to logic grid below for actions    NO SYMPTOM(S)    ACTIONS:  1. Standard rooming process  2. Provider to assess per normal protocol  3. Implement precautions as needed and per guidelines     POSITIVE SYMPTOM(S)  If positive for ANY of the following symptoms: fever, cough, shortness of breath, rash    ACTION:  1. Continue to have the patient wear a mask   2. Room patient as soon as possible  3. Don appropriate PPE when entering room  4. Provider evaluation

## 2020-12-15 NOTE — IR NOTE
Interventional Radiology Intra-procedural Nursing Note    Patient Name: Lu Smith  Medical Record Number: [id number]  Today's Date: December 15, 2020    Start Time: 0932  End of procedure time: 1012  Procedure: Bilateral pleural drain placement with sedation  Report given to: PEPE Sultana  Time pt departs:  1020    Other Notes: Pt into IR suite 1 via cart IV abx infusing. Pt awake and alert. To table in supine position prepped and drapped with 2%. VSS. Tele SR. Dr. Lindsay in room. Time out and procedure started. Pt tolerated procedure well. Debrief with Dr. Lindsay. Bilateral dressing CDI. No complications. Pt transferred back to Care Suites. Report given at bedside    Medications:    Versed 4mg  Fentanyl 200mcg  Lidocaine 1% 30ml    Tosha Hawthorne RN

## 2020-12-15 NOTE — PRE-PROCEDURE
GENERAL PRE-PROCEDURE:   Procedure:  Bilateral tunnelled pleural catheter placement with moderate sedation  Date/Time:  12/15/2020 8:16 AM    Written consent obtained?: Yes    Risks and benefits: Risks, benefits and alternatives were discussed    Consent given by:  Patient  Patient states understanding of procedure being performed: Yes    Patient's understanding of procedure matches consent: Yes    Procedure consent matches procedure scheduled: Yes    Expected level of sedation:  Moderate  Appropriately NPO:  Yes  ASA Class:  Class 2- mild systemic disease, no acute problems, no functional limitations  Mallampati  :  Grade 1- soft palate, uvula, tonsillar pillars, and posterior pharyngeal wall visible  Lungs:  Lungs clear with good breath sounds bilaterally  Heart:  Normal heart sounds and rate  History & Physical reviewed:  History and physical reviewed and no updates needed  Statement of review:  I have reviewed the lab findings, diagnostic data, medications, and the plan for sedation

## 2020-12-15 NOTE — DISCHARGE INSTRUCTIONS
Pleurex Tube Insertion Discharge Instructions     After you go home:      You may resume your normal diet    Drink plenty of fluids, especially water    Have an adult stay with you for 6 hours if you received sedation       For 24 hours - due to the sedation you received:    Relax and take it easy    Do NOT make any important or legal decisions    Do NOT drive or operate machines at home or at work    Do NOT drink alcohol    Care of Tube Site:      For the first 48 hrs, check your puncture site every couple hours while you are awake     Check the tube site twice a day for signs of infection    Keep the dressing around the tube dry    Change the dressing every 2-3 days if it is gauze/tape. If there is a Stay Fix dressing intact - this should be changed weekly.    Change the dressing if it becomes wet or dirty    You may shower but do not get the dressing wet. Place a waterproof cover over the dressing (such as plastic wrap).     No tub baths, whirlpools or swimming until the tube is removed     Activity:      You may go back to normal activity in 24 hours    Wait 48 hours before lifting, straining, exercise or other strenuous activity    Medicines:      You may resume all medications    Resume your Warfarin/Coumadin at your regular dose today. Follow up with your provider to have your INR rechecked    Resume your Platelet Inhibitors and Aspirin tomorrow at your regular dose    For minor pain, you may take Acetaminophen (Tylenol) or Ibuprofen (Advil)               Call the provider who ordered this procedure if:      The site is red, swollen, hot or tender    There is foul-smelling drainage from the tube site    You have pain that is getting worse or that does not improve with pain medication    You have chills or a fever greater than 101 F (38 C)    Fluid stops draining or is leaking around the tube onto your skin    The tube falls out     Any questions or concerns    Call  911 or go to the Emergency Room if you  have:      Severe pain or trouble breathing    Bleeding that you cannot control    Other Instructions:      If the tube falls out - cover the opening with gauze & tape    Record drainage output amounts & bring sheet to return appointments    Take your temperature daily      If you have questions call:          [facility] Radiology Dept @ [phone]      Take your temperature daily     See video at [email or web address] for how to use drainage tube    Call your provider, hospice or home health nurse for drainage questions and/or supplies    For issues with your tube, please call:         [facility] at [phone]    Mon-Fri 7:00 am - 4:30 pm    Thoracentesis Discharge Instructions     After you go home:      You may resume your normal diet.    Care of Puncture Sites:      For the first 48 hrs, check your puncture sites every couple hours while you are awake     If there is a bandaid - you may remove it tomorrow morning    You may shower tomorrow    No tub baths, whirlpools or swimming until your puncture sites have fully healed    Fluid may leak from the abdominal site. Change the bandaid as needed - keep the site dry    If the fluid leaks for more than 48 hours, call your ordering provider     Activity:      You may go back to normal activity in 24 hours     Wait 48 hours before lifting, straining, exercise or other strenuous activity    Medicines:      You may resume all your medications    For minor pain, you may take Acetaminophen (Tylenol) or Ibuprofen (Advil)            Call the provider who ordered this procedure if:      The site is red, swollen, hot or tender    Blood or fluid is draining from the site    Chills or a fever greater than 101 F (38 C)    Shortness of breath    Pain that is getting worse    Leaking from the site that does not stop    Any questions or concerns      Additional Information:      During a thoracentesis, a needle will sometimes enter the  lung. This can cause the lung to collapse - called a pneumothorax. Symptoms include severe chest pain, breathing problems or increased blood in your sputum (phlegm).     Call  911 or go to the Emergency Room if:      Severe chest pain or trouble breathing    Increased blood in your sputum (phlegm)    Bleeding that you cannot control      If you have questions call:        [facility] Radiology Dept @ [phone]

## 2020-12-15 NOTE — PROCEDURES
[facility]    Procedure: Bilateral tunneled PleurX catheters and thoracenteses    Date/Time: 12/15/2020 11:00 AM  Performed by: Riley Lindsay MD  Authorized by: Riley Lindsay MD     UNIVERSAL PROTOCOL   Site Marked: NA  Prior Images Obtained and Reviewed:  Yes  Required items: Required blood products, implants, devices and special equipment available    Patient identity confirmed:  Verbally with patient, arm band, provided demographic data and hospital-assigned identification number  Patient was reevaluated immediately before administering moderate or deep sedation or anesthesia  Confirmation Checklist:  Patient's identity using two indicators, relevant allergies, procedure was appropriate and matched the consent or emergent situation and correct equipment/implants were available  Time out: Immediately prior to the procedure a time out was called    Universal Protocol: the Joint Commission Universal Protocol was followed    Preparation: Patient was prepped and draped in usual sterile fashion    ESBL (mL):  5         ANESTHESIA    Anesthesia: Local infiltration  Local Anesthetic:  Lidocaine 1% without epinephrine      SEDATION    Patient Sedated: Yes    Vital signs: Vital signs monitored during sedation    See dictated procedure note for full details.  Findings: Bilateral thoracic tunneled pleurX catheters and drainage of pleural fluid (about 1 L each side)    Specimens: none    Complications: None    Condition: Stable    PROCEDURE   Patient Tolerance:  Patient tolerated the procedure well with no immediate complications    Length of time physician/provider present for 1:1 monitoring during sedation: 45

## 2020-12-15 NOTE — PROGRESS NOTES
Care Suites Post Procedure Note    Patient Information  Name: Lu Smith  Age: 70 year old    Post Procedure  Time patient returned to Care Suites: 1020 from IR  Pt. Given Versed 4 mg and Fentanyl 200 mcgs   Concerns/abnormal assessment:   Thoracentesis 900cc- Right  Thoracentesis 1000cc-Left   Pleurx tube on each side intact with tegaderm  If abnormal assessment, provider notified: N/A  Plan/Other: Post procedure VS and site monitoring     Care Suites Discharge Nursing Note    Discharge Education:  Discharge instructions reviewed: Yes  Additional education/resources provided: Pt. With bilat. Pleurex drains secured to chest and 2 portable machines in room. Yellow packet of info reviewed with patient Patient verbalizes understanding:   Palliative care RN coming to patients home tomorrow.   Plan is for that RN to review the Pleurex set up.   Shreya Thornton  felt that the patient had  bilat. thoracentesis today and may not need draining for a few days.   Patient discharging on new medications: no      Discharge Plans:   Discharge location: home  Discharge ride contacted: Yes  Approximate discharge time: 1230    Discharge Criteria:  Discharge criteria met and vital signs stable: Yes    Patient Belongs:  Patient belongings returned to patient: Yes    Rd Sanchez RN

## 2020-12-16 NOTE — PROGRESS NOTES
[facility] now requests orders and shares plan of care/discharge summaries for some patients through [employer].  Please REPLY TO THIS MESSAGE OR ROUTE BACK TO THE AUTHOR in order to give authorization for orders when needed.  This is considered a verbal order, you will still receive a faxed copy of orders for signature.  Thank you for your assistance in improving collaboration for our patients.    Patient elected hospice services today.     Are you willing to follow patient through her hospice episode?    Requesting orders:  OK to admit to hospice.  OK for hospice standing orders.

## 2020-12-28 NOTE — PROGRESS NOTES
This is a recent snapshot of the patient's [facility] medical record.  For current drug dose and complete information and questions, call [phone] or In Basket pool, fv home infusion ([id number])  CSN Number:  [id number]

## 2021-01-01 ENCOUNTER — HEALTH MAINTENANCE LETTER (OUTPATIENT)
Age: 71
End: 2021-01-01

## 2021-01-01 ENCOUNTER — DOCUMENTATION ONLY (OUTPATIENT)
Dept: OTHER | Facility: CLINIC | Age: 71
End: 2021-01-01

## 2021-01-04 NOTE — PROGRESS NOTES
This is a recent snapshot of the patient's [facility] medical record.  For current drug dose and complete information and questions, call [phone] or In Basket pool, fv home infusion ([id number])  CSN Number:  [id number]

## 2021-01-12 NOTE — PROGRESS NOTES
Patient present to [facility] for a walking test. Patient arrived in wheelchair and needed assistance standing and walking with walker.    O2 saturation:  Sittin% x 2  Standin%  Walkin%    PEPE Starr assisted with walking test.    -Caitlin OG CMA  
Pt was only able to walk 3-4 steps with a walker when her Oxygen saturation dropped to 79% on RA.  Pt was then positioned back into a wheelchair and was monitored.  PT was able to increase oxygen saturation to 94% when able to rest in the wheelchair.  
EOMI; PERRL; no drainage or redness

## 2021-01-17 ENCOUNTER — PATIENT OUTREACH (OUTPATIENT)
Dept: PALLIATIVE CARE | Facility: CLINIC | Age: 71
End: 2021-01-17

## 2021-01-26 ENCOUNTER — HOME INFUSION (PRE-WILLOW HOME INFUSION) (OUTPATIENT)
Dept: PHARMACY | Facility: CLINIC | Age: 71
End: 2021-01-26

## 2021-01-28 NOTE — PROGRESS NOTES
This is a recent snapshot of the patient's [facility] medical record.  For current drug dose and complete information and questions, call [phone] or In Basket pool, fv home infusion ([id number])  CSN Number:  [id number]

## 2022-03-10 NOTE — MR AVS SNAPSHOT
After Visit Summary   5/11/2018    Lu Smith    MRN: [id number]           Patient Information     Date Of Birth          1950        Visit Information        Provider Department      5/11/2018 10:00 AM  12 ATC; [facility]        Today's Diagnoses     Endometrial cancer (H)    -  1    Encounter for long-term (current) use of medications          Care Instructions    Contact Numbers    [facility] Main Line: [phone]  [facility] Triage:  [phone]    Call triage with chills and/or temperature greater than or equal to 100.5, uncontrolled nausea/vomiting, diarrhea, constipation, dizziness, shortness of breath, chest pain, bleeding, unexplained bruising, or any new/concerning symptoms, questions/concerns.     If you are having any concerning symptoms or wish to speak to a provider before your next infusion visit, please call your care coordinator or triage to notify them so we can adequately serve you.       After Hours: [phone]    If after hours, weekends, or holidays, call main hospital  and ask for Oncology doctor on call.         May 2018   Derrek Monday Tuesday Wednesday Thursday Friday Saturday             1     2     3     4     5       6     7     8     9     10     11     [facility] MASONIC LAB DRAW    8:30 AM   (15 min.)   [facility] LAB DRAW   [facility] Lab Draw     [facility] RETURN ACTIVE TREATMENT    8:45 AM   (40 min.)   Yasmine Zazueta APRN CNP   [facility] ONC INFUSION 360   10:00 AM   (360 min.)    ONCOLOGY INFUSION   [facility] 12       13     14     15     16     17     18     19       20     21     22     [facility] TCT/SIM SUITE    1:00 PM   (60 min.)   Ana Michelle MD   Radiation Oncology Clinic     CT PELVIS W    2:00 PM   (20 min.)   UUCT1   [place], [place] 23     24     25     26       27     28     29     30     31                          June 2018 Sunday Monday Tuesday  Rheumatoid arthritis with positive rheumatoid factor and elevated CCP  Since you are taking the methotrexate please go back to 6 tablets once a week with folic acid daily  Resume hydroxychloroquine 400 mg daily    Please schedule an eye exam    Come back in 1 month for repeat blood work    Pneumovax 23 pneumonia vaccine to be given today    Follow-up in the rheumatology clinic in 2 months   Wednesday Thursday Friday Saturday                            1     2       3     4     5     6     7     8     9       10     11     12     13     14     15     16       17     18     19     20     21     22     23       24     25     26     27     28     29     30                  Lab Results:  Recent Results (from the past 12 hour(s))   CBC with platelets differential    Collection Time: 05/11/18  8:44 AM   Result Value Ref Range    WBC 7.2 4.0 - 11.0 10e9/L    RBC Count 3.95 3.8 - 5.2 10e12/L    Hemoglobin 11.0 (L) 11.7 - 15.7 g/dL    Hematocrit 33.9 (L) 35.0 - 47.0 %    MCV 86 78 - 100 fl    MCH 27.8 26.5 - 33.0 pg    MCHC 32.4 31.5 - 36.5 g/dL    RDW 15.0 10.0 - 15.0 %    Platelet Count 246 150 - 450 10e9/L    Diff Method Automated Method     % Neutrophils 61.7 %    % Lymphocytes 26.4 %    % Monocytes 9.0 %    % Eosinophils 1.8 %    % Basophils 0.8 %    % Immature Granulocytes 0.3 %    Nucleated RBCs 0 0 /100    Absolute Neutrophil 4.5 1.6 - 8.3 10e9/L    Absolute Lymphocytes 1.9 0.8 - 5.3 10e9/L    Absolute Monocytes 0.7 0.0 - 1.3 10e9/L    Absolute Eosinophils 0.1 0.0 - 0.7 10e9/L    Absolute Basophils 0.1 0.0 - 0.2 10e9/L    Abs Immature Granulocytes 0.0 0 - 0.4 10e9/L    Absolute Nucleated RBC 0.0    Comprehensive metabolic panel    Collection Time: 05/11/18  8:44 AM   Result Value Ref Range    Sodium 138 133 - 144 mmol/L    Potassium 3.6 3.4 - 5.3 mmol/L    Chloride 103 94 - 109 mmol/L    Carbon Dioxide 25 20 - 32 mmol/L    Anion Gap 9 3 - 14 mmol/L    Glucose 110 (H) 70 - 99 mg/dL    Urea Nitrogen 15 7 - 30 mg/dL    Creatinine 0.66 0.52 - 1.04 mg/dL    GFR Estimate >90 >60 mL/min/1.7m2    GFR Estimate If Black >90 >60 mL/min/1.7m2    Calcium 9.3 8.5 - 10.1 mg/dL    Bilirubin Total 0.5 0.2 - 1.3 mg/dL    Albumin 3.1 (L) 3.4 - 5.0 g/dL    Protein Total 7.3 6.8 - 8.8 g/dL    Alkaline Phosphatase 63 40 - 150 U/L    ALT 30 0 - 50 U/L    AST 19 0 - 45 U/L   Magnesium    Collection Time: 05/11/18  8:44 AM    Result Value Ref Range    Magnesium 1.7 1.6 - 2.3 mg/dL               Follow-ups after your visit        Your next 10 appointments already scheduled     May 22, 2018  1:00 PM CDT   TCT/SIM Suite Visit with Ana Michelle MD   Radiation Oncology Clinic ([facility])    [facility]  1st Floor  [address] [facility] [address] [phone]            May 22, 2018  2:00 PM CDT   CT PELVIS W CONTRAST with UUCT1   [facility], [place], [place] ([facility])    [address] [address]   [phone]           Please bring any scans or X-rays taken at other hospitals, if similar tests were done. Also bring a list of your medicines, including vitamins, minerals and over-the-counter drugs. It is safest to leave personal items at home.  Be sure to tell your doctor:   If you have any allergies.   If there s any chance you are pregnant.   If you are breastfeeding.  How to prepare:   Do not eat or drink for 2 hours before your exam. If you need to take medicine, you may take it with small sips of water. (We may ask you to take liquid medicine as well.)   Please wear loose clothing, such as a sweat suit or jogging clothes. Avoid snaps, zippers and other metal. We may ask you to undress and put on a hospital gown.  Please arrive 30 minutes early for your CT. Once in the department you might be asked to drink water 15-20 minutes prior to your exam.  If indicated you may be asked to drink an oral contrast in advance of your CT.  If this is the case, the imaging team will let you know or be in contact with you prior to your appointment  Patients over 70 or patients with diabetes or kidney problems:   If you haven t had a blood test (creatinine test) within the last 30 days, the Cardiologist/Radiologist may require you to get this test prior to your exam.  If you have diabetes:   Continue to take your metformin medication on  the day of your exam  If you have any questions, please call the Imaging Department where you will have your exam.              Who to contact     If you have questions or need follow up information about today's clinic visit or your schedule please contact [facility] directly at [phone].  Normal or non-critical lab and imaging results will be communicated to you by MyChart, letter or phone within 4 business days after the clinic has received the results. If you do not hear from us within 7 days, please contact the clinic through [employer] or phone. If you have a critical or abnormal lab result, we will notify you by phone as soon as possible.  Submit refill requests through [employer] or call your pharmacy and they will forward the refill request to us. Please allow 3 business days for your refill to be completed.          Additional Information About Your Visit        MyChart Information     [employer] gives you secure access to your electronic health record. If you see a primary care provider, you can also send messages to your care team and make appointments. If you have questions, please call your primary care clinic.  If you do not have a primary care provider, please call [phone] and they will assist you.        Care EveryWhere ID     This is your Care EveryWhere ID. This could be used by other organizations to access your [place] medical records  [id number]         Blood Pressure from Last 3 Encounters:   05/11/18 152/72   04/27/18 160/77   04/20/18 149/82    Weight from Last 3 Encounters:   05/11/18 129.2 kg (284 lb 12.8 oz)   04/27/18 128.8 kg (284 lb)   04/20/18 130.6 kg (288 lb)              We Performed the Following     CBC with platelets differential     Comprehensive metabolic panel     Magnesium          Today's Medication Changes          These changes are accurate as of 5/11/18  3:06 PM.  If you have any questions, ask your nurse or doctor.               Start taking these  medicines.        Dose/Directions    fluticasone 50 MCG/ACT spray   Commonly known as:  FLONASE   Used for:  Chronic seasonal allergic rhinitis, unspecified trigger   Started by:  Yasmine Zazueta APRN CNP        Dose:  1-2 spray   Spray 1-2 sprays into both nostrils daily   Quantity:  1 Bottle   Refills:  1       loratadine 10 MG tablet   Commonly known as:  CLARITIN   Used for:  Pain in joint, multiple sites, Chronic seasonal allergic rhinitis, unspecified trigger   Started by:  Yasmine Zazueta APRN CNP        Dose:  10 mg   Take 1 tablet (10 mg) by mouth daily   Quantity:  30 tablet   Refills:  3       pyridoxine 50 MG Tabs   Commonly known as:  VITAMIN B-6   Used for:  Chemotherapy-induced peripheral neuropathy (H)   Started by:  Yasmine Zazueta APRN CNP        Dose:  50 mg   Take 1 tablet (50 mg) by mouth 2 times daily   Quantity:  60 tablet   Refills:  3            Where to get your medicines      These medications were sent to [facility] [address]  [address] [place] [address] [address] [address]    Hours:  TRANSPLANT PHONE NUMBER [phone] Phone:  [phone]     fluticasone 50 MCG/ACT spray    loratadine 10 MG tablet    pyridoxine 50 MG Tabs                Primary Care Provider Office Phone # Fax #    Levar Scott [phone]       [address] [place] [address] [place] [address]        Equal Access to Services     CED COLLINS AH: Hadii bailee forbes hadasho Soilianaali, waaxda luqadaha, qaybta kaalmada adeegyada, angela enriquez. So [place] -[phone].    ATENCIÓN: Si habla español, tiene a cortes disposición servicios gratuitos de asistencia lingüística. Llame al [phone].    We comply with applicable federal civil rights laws and [place] laws. We do not discriminate on the basis of race, color, national origin, age, disability, sex, sexual orientation, or gender identity.            Thank  "you!     Thank you for choosing [facility] CANCER CLINIC  for your care. Our goal is always to provide you with excellent care. Hearing back from our patients is one way we can continue to improve our services. Please take a few minutes to complete the written survey that you may receive in the mail after your visit with us. Thank you!             Your Updated Medication List - Protect others around you: Learn how to safely use, store and throw away your medicines at www.disposemymeds.org.          This list is accurate as of 5/11/18  3:06 PM.  Always use your most recent med list.                   Brand Name Dispense Instructions for use Diagnosis    ACETAMINOPHEN PO      Take 325 mg by mouth every 6 hours as needed for pain        aspirin 81 MG EC tablet      Take 81 mg by mouth Pt states, \"I don't take it regularly. I take it when I remember to\".        fluticasone 50 MCG/ACT spray    FLONASE    1 Bottle    Spray 1-2 sprays into both nostrils daily    Chronic seasonal allergic rhinitis, unspecified trigger       ibuprofen 600 MG tablet    ADVIL/MOTRIN    30 tablet    Take 1 tablet (600 mg) by mouth every 6 hours as needed for pain (mild)    Endometrial carcinoma (H)       loratadine 10 MG tablet    CLARITIN    30 tablet    Take 1 tablet (10 mg) by mouth daily    Pain in joint, multiple sites, Chronic seasonal allergic rhinitis, unspecified trigger       LORazepam 0.5 MG tablet    ATIVAN    30 tablet    Take 1 tablet (0.5 mg) by mouth every 6 hours as needed (nausea/vomiting, anxiety or sleep)    Endometrial cancer (H), Encounter for long-term (current) use of medications       METFORMIN HCL PO      Take 500 mg by mouth daily (with breakfast)        prochlorperazine 5 MG tablet    COMPAZINE    30 tablet    Take 1 tablet (5 mg) by mouth every 6 hours as needed (nausea/vomiting)    Endometrial cancer (H), Encounter for long-term (current) use of medications       pyridoxine 50 MG Tabs    VITAMIN B-6    60 " tablet    Take 1 tablet (50 mg) by mouth 2 times daily    Chemotherapy-induced peripheral neuropathy (H)       triamterene-hydrochlorothiazide 37.5-25 MG per capsule    DYAZIDE     Take 1 capsule by mouth every morning
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
## 2023-05-05 NOTE — TELEPHONE ENCOUNTER
[employer] form completed and fax per MD request. MD reviewed     Maria Victoria Turner RN    
no

## 2023-10-05 NOTE — PROGRESS NOTES
Addended by: JULIO LANE on: 10/5/2023 04:14 PM     Modules accepted: Orders     Infusion Nursing Note:  Lu Smith presents today for C2D1 Taxol, Carboplatin #8 & Avastin.    Patient seen by provider today: Yes: Yasmine Zazueta NP   present during visit today: Not Applicable.    Note: Pt arrives to infusion from clinic. No new complaints or concerns.    Intravenous Access:  Implanted Port.    Treatment Conditions:  Lab Results   Component Value Date    HGB 10.1 12/02/2019     Lab Results   Component Value Date    WBC 7.1 12/02/2019      Lab Results   Component Value Date    ANEU 4.4 12/02/2019     Lab Results   Component Value Date     12/02/2019                   Lab Results   Component Value Date    POTASSIUM 4.3 12/02/2019           Lab Results   Component Value Date    MAG 1.7 12/02/2019     Results reviewed, labs MET treatment parameters, ok to proceed with treatment.  Urine negative.      Post Infusion Assessment:  Patient tolerated infusion poorly due to : Hypersensitivity: Did patient have a hypersensitivity reaction? : Yes  Drug or Product name: Carboplatin  Were pre-meds administered?: Yes  If Yes, what pre-meds were administered?: Dexamethasone (decadron;Palonesetron (aloxi);Diphenhydramine (Benadryl);Famotidine (Pepcid)  First or Subsequent treatment: Subsequent infusion(#8)  Rate of infusion when patient had hypersensitivity reaction: 877  Time the hypersensitivity reaction was first recognized: 1537  Symptoms observed or reported (select all that apply): Chest tightness;Other: (Comment)(Hoarse voice)  Interventions/treatment following reaction: Infusion stopped;Hypersensitivity medications administered;Rapid Response called;EMS called;Transported to hospital  What hypersensitivity medications were administered?: Methylprednisolone;NaCl 0.9% Bolus;Epinephrine, Benadryl  Name of provider notified: Yasmine Zazueta NP  Time provider notified: 1540  Type of notification (select all that apply): Paged/Phone;Provider at bedside  Was the patient re-challenged  today?: No - no re-challenge today     Discharge Plan:   Pt transferred by stretcher with EMS to [facility] at 1600. Pt stable upon transfer. Report given to ER. Suhail accessed upon discharge from [facility].  Yasmine Zazueta NP will contact Lala Sampson RN care coordinator, to reschedule pt to come to infusion for Avastin in a few days.         Sneha Bautista RN

## 2024-01-27 NOTE — LETTER
10/28/2020         RE: Lu Smith  [address]  [place] [address]      Infusion Nursing Note:  Lu Smith presents today for C3 D29 Taxol - DEFFERED.    Patient seen by provider today: No   present during visit today: Not Applicable.    Note: Patient arrives feeling very fatigued and has had a cough for the last month, but has worsened in the last week. Notified Dr. Henderson. Pt does report improvement in RLE swelling and states redness has resolved. Denies right ankle pain. Wearing stockings and elevated legs on top of taking diuretic. PELAEZ continues but has not worsened. Denies fever, chills, or taste changes (already has lack of appetite). States she has intermittent stomachaches that sometimes keep her up at night. She takes Oxycodone PRN which helps. Pt reports mild nausea this morning. Ginger ale given which was effective.    Per TORB Dr. Henderson/Madison Antunez RN @0917 10/28/20:  - Hold Taxol  - Get patient COVID tested and have her return next week to complete treatment as long as results are negative    Pt verbalized understanding of plan. COVID patient education reviewed and given to patient. InBasket sent to scheduling/PORFIRIO Kelsey to get pt rescheduled and also schedule follow-up CT. Pt aware of plan.    Intravenous Access:  Implanted Port.    Treatment Conditions:  Lab Results   Component Value Date    HGB 8.9 10/28/2020     Lab Results   Component Value Date    WBC 3.7 10/28/2020      Lab Results   Component Value Date    ANEU 2.0 10/28/2020     Lab Results   Component Value Date     10/28/2020      Lab Results   Component Value Date     10/07/2020                   Lab Results   Component Value Date    POTASSIUM 3.2 10/28/2020           Lab Results   Component Value Date    MAG 1.6 10/28/2020            Lab Results   Component Value Date    CR 1.06 10/07/2020                   Lab Results   Component Value Date    MARYLOU 9.4 10/07/2020                 Lab Results   Component Value Date    BILITOTAL 0.5 10/07/2020           Lab Results   Component Value Date    ALBUMIN 2.7 10/07/2020                    Lab Results   Component Value Date    ALT 25 10/07/2020           Lab Results   Component Value Date    AST 17 10/07/2020       Results reviewed, labs MET treatment parameters, ok to proceed with treatment. See TORB above. Pt did NOT get treated today.  Potassium replaced orally per protocol.      Post Infusion Assessment:  Access discontinued per protocol.       Discharge Plan:   Patient declined prescription refills.  Discharge instructions reviewed with: Patient.  Patient and/or family verbalized understanding of discharge instructions and all questions answered.  AVS to patient via [employer].  Patient will return 11/4 for next appointment.   Patient discharged in stable condition accompanied by: self.  Departure Mode: Ambulatory.  Face to Face time: 5.    Madison Antunez RN                              [facility] [place]   on the discharge service for the patient. I have reviewed and made amendments to the documentation where necessary.

## (undated) DEVICE — DAVINCI S GRASPER ENDOWRIST PROGRASP 420093

## (undated) DEVICE — NDL YUEH CENTESIS 5FRX10CM G09490 DTVN-5.0-19-10.0-YUEH

## (undated) DEVICE — JELLY LUBRICATING SURGILUBE 2OZ TUBE

## (undated) DEVICE — NDL 15GA 1.5" 8881200029

## (undated) DEVICE — LINEN TOWEL PACK X6 WHITE 5487

## (undated) DEVICE — TUBING IRRIG CYSTO/BLADDER SET 81" LF 2C4040

## (undated) DEVICE — DAVINCI S MONOPOLAR SCISSORS PRECURVED HOT SHEARS 420179

## (undated) DEVICE — NDL INSUFFLATION 150MM VERRES 172016

## (undated) DEVICE — DAVINCI XI SEAL UNIVERSAL 5-8MM 470361

## (undated) DEVICE — CONNECTOR MALE TO MALE LL

## (undated) DEVICE — DAVINCI HOT SHEARS TIP COVER  400180

## (undated) DEVICE — Device

## (undated) DEVICE — DRAPE LEGGINGS CLEAR 8430

## (undated) DEVICE — KOH COLPOTOMIZER OCCLUDER  CPO-6

## (undated) DEVICE — KIT PATIENT POSITIONING PIGAZZI LATEX FREE 40580

## (undated) DEVICE — CONTAINER EVACUATOR GLASS VACUTAINER 1000ML 1A8504

## (undated) DEVICE — SYSTEM CLEARIFY VISUALIZATION 21-345

## (undated) DEVICE — PACK GOWN 3/PK DISP XL SBA32GPFCB

## (undated) DEVICE — DAVINCI XI NDL DRIVER MEGA SUTURE CUT 8MM 470309

## (undated) DEVICE — SOL WATER IRRIG 1000ML BOTTLE 2F7114

## (undated) DEVICE — LINEN TOWEL PACK X5 5464

## (undated) DEVICE — SUCTION IRR STRYKERFLOW II W/TIP 250-070-520

## (undated) DEVICE — STOPCOCK ANGIO 1-WAY MARQUIS MLL  H1RC

## (undated) DEVICE — DAVINCI XI OBTURATOR BLADELESS 8MM 470359

## (undated) DEVICE — DRAPE SHEET REV FOLD 3/4 9349

## (undated) DEVICE — GLOVE PROTEXIS POWDER FREE SMT 7.5  2D72PT75X

## (undated) DEVICE — PROTECTOR ARM ONE-STEP TRENDELENBURG 40418

## (undated) DEVICE — DAVINCI XI DRAPE COLUMN 470341

## (undated) DEVICE — DAVINCI XI FCP BIPOLAR FENESTRATED 470205

## (undated) DEVICE — TUBING CONMED AIRSEAL SMOKE EVAC INSUFFLATION ASM-EVAC

## (undated) DEVICE — SYR 01ML 27GA 0.5" NDL TBC 309623

## (undated) DEVICE — SUCTION MANIFOLD DORNOCH ULTRA CART UL-CL500

## (undated) DEVICE — ENDO TROCAR CONMED AIRSEAL BLADELESS 12X120MM IAS12-120LP

## (undated) DEVICE — SU VICRYL 0 CT-1 36" J346H

## (undated) DEVICE — ENDO RETRACTOR PADDLE 12MM  173046

## (undated) DEVICE — DAVINCI XI DRAPE ARM 470015

## (undated) DEVICE — SOL NACL 0.9% INJ 1000ML BAG 07983-09

## (undated) DEVICE — NDL SPINAL 22GA 3.5" QUINCKE 405181

## (undated) DEVICE — COVER ULTRASOUND PROBE W/GEL FLEXI-FEEL 6"X58" LF  25-FF658

## (undated) DEVICE — DRAPE MAYO STAND 23X54 8337

## (undated) DEVICE — LINEN GOWN XLG 5407

## (undated) DEVICE — LINEN TOWEL PACK X30 5481

## (undated) DEVICE — SOL NACL 0.9% IRRIG 1000ML BOTTLE 2F7124

## (undated) DEVICE — CONNECTOR STOPCOCK 3 WAY MALE LL MX4311L

## (undated) DEVICE — ESU LIGASURE LAPAROSCOPIC BLUNT TIP SEALER 5MMX37CM LF1837

## (undated) DEVICE — ENDO OBTURATOR ACCESS PORT BLADELESS 8X100MM IAS8-100LP

## (undated) DEVICE — BLADE KNIFE SURG 15 371115

## (undated) DEVICE — SPECIMEN TRAP MUCOUS 40ML LUKI C30200A

## (undated) DEVICE — SU VICRYL 0 UR-6 27" J603H

## (undated) DEVICE — PREP CHLORAPREP 26ML TINTED ORANGE  260815

## (undated) DEVICE — SU VICRYL 0 CT-2 27" J334H

## (undated) DEVICE — GOWN XLG DISP 9545

## (undated) DEVICE — DEVICE SUTURE GRASPER TROCAR CLOSURE 14GA PMITCSG

## (undated) DEVICE — TUBING MEDRAD 48" HIGH PRESSURE MX694

## (undated) DEVICE — DAVINCI XI VESSEL SEALER 480322

## (undated) DEVICE — BAG SPECIMEN MINI ENDO 3X4' SB534

## (undated) DEVICE — WIPES FOLEY CARE SURESTEP PROVON DFC100

## (undated) DEVICE — SU MONOCRYL 3-0 PS-1 27" Y936H

## (undated) DEVICE — PREP TECHNI-CARE CHLOROXYLENOL 3% 4OZ BOTTLE C222-4ZWO

## (undated) DEVICE — DRSG TEGADERM 2 3/8X2 3/4" 1624W

## (undated) DEVICE — ESU PENCIL W/COATED BLADE E2450H

## (undated) DEVICE — ESU GROUND PAD ADULT REM W/15' CORD E7507DB

## (undated) RX ORDER — HEPARIN SODIUM (PORCINE) LOCK FLUSH IV SOLN 100 UNIT/ML 100 UNIT/ML
SOLUTION INTRAVENOUS
Status: DISPENSED
Start: 2019-09-16

## (undated) RX ORDER — PHENYLEPHRINE HCL IN 0.9% NACL 1 MG/10 ML
SYRINGE (ML) INTRAVENOUS
Status: DISPENSED
Start: 2018-02-23

## (undated) RX ORDER — HEPARIN SODIUM (PORCINE) LOCK FLUSH IV SOLN 100 UNIT/ML 100 UNIT/ML
SOLUTION INTRAVENOUS
Status: DISPENSED
Start: 2019-10-24

## (undated) RX ORDER — HEPARIN SODIUM (PORCINE) LOCK FLUSH IV SOLN 100 UNIT/ML 100 UNIT/ML
SOLUTION INTRAVENOUS
Status: DISPENSED
Start: 2020-01-01

## (undated) RX ORDER — IBUPROFEN 200 MG
TABLET ORAL
Status: DISPENSED
Start: 2018-02-23

## (undated) RX ORDER — FENTANYL CITRATE 50 UG/ML
INJECTION, SOLUTION INTRAMUSCULAR; INTRAVENOUS
Status: DISPENSED
Start: 2018-02-23

## (undated) RX ORDER — HEPARIN SODIUM (PORCINE) LOCK FLUSH IV SOLN 100 UNIT/ML 100 UNIT/ML
SOLUTION INTRAVENOUS
Status: DISPENSED
Start: 2018-03-29

## (undated) RX ORDER — HEPARIN SODIUM,PORCINE 10 UNIT/ML
VIAL (ML) INTRAVENOUS
Status: DISPENSED
Start: 2020-01-01

## (undated) RX ORDER — ONDANSETRON 2 MG/ML
INJECTION INTRAMUSCULAR; INTRAVENOUS
Status: DISPENSED
Start: 2018-02-23

## (undated) RX ORDER — LIDOCAINE HYDROCHLORIDE 10 MG/ML
INJECTION, SOLUTION INFILTRATION; PERINEURAL
Status: DISPENSED
Start: 2020-01-01

## (undated) RX ORDER — LABETALOL HYDROCHLORIDE 5 MG/ML
INJECTION, SOLUTION INTRAVENOUS
Status: DISPENSED
Start: 2018-02-23

## (undated) RX ORDER — FENTANYL CITRATE 50 UG/ML
INJECTION, SOLUTION INTRAMUSCULAR; INTRAVENOUS
Status: DISPENSED
Start: 2018-03-29

## (undated) RX ORDER — CLINDAMYCIN PHOSPHATE 900 MG/50ML
INJECTION, SOLUTION INTRAVENOUS
Status: DISPENSED
Start: 2020-01-01

## (undated) RX ORDER — DEXAMETHASONE SODIUM PHOSPHATE 4 MG/ML
INJECTION, SOLUTION INTRA-ARTICULAR; INTRALESIONAL; INTRAMUSCULAR; INTRAVENOUS; SOFT TISSUE
Status: DISPENSED
Start: 2018-02-23

## (undated) RX ORDER — SODIUM CHLORIDE 9 MG/ML
INJECTION, SOLUTION INTRAVENOUS
Status: DISPENSED
Start: 2018-02-23

## (undated) RX ORDER — EPHEDRINE SULFATE 50 MG/ML
INJECTION, SOLUTION INTRAMUSCULAR; INTRAVENOUS; SUBCUTANEOUS
Status: DISPENSED
Start: 2018-02-23

## (undated) RX ORDER — LIDOCAINE HYDROCHLORIDE 10 MG/ML
INJECTION, SOLUTION EPIDURAL; INFILTRATION; INTRACAUDAL; PERINEURAL
Status: DISPENSED
Start: 2018-03-29

## (undated) RX ORDER — FENTANYL CITRATE 50 UG/ML
INJECTION, SOLUTION INTRAMUSCULAR; INTRAVENOUS
Status: DISPENSED
Start: 2020-01-01

## (undated) RX ORDER — CLINDAMYCIN PHOSPHATE 900 MG/50ML
INJECTION, SOLUTION INTRAVENOUS
Status: DISPENSED
Start: 2018-02-23

## (undated) RX ORDER — LIDOCAINE HYDROCHLORIDE 10 MG/ML
INJECTION, SOLUTION EPIDURAL; INFILTRATION; INTRACAUDAL; PERINEURAL
Status: DISPENSED
Start: 2019-10-24

## (undated) RX ORDER — HEPARIN SODIUM (PORCINE) LOCK FLUSH IV SOLN 100 UNIT/ML 100 UNIT/ML
SOLUTION INTRAVENOUS
Status: DISPENSED
Start: 2018-06-12

## (undated) RX ORDER — HEPARIN SODIUM,PORCINE 10 UNIT/ML
VIAL (ML) INTRAVENOUS
Status: DISPENSED
Start: 2018-03-29

## (undated) RX ORDER — CLINDAMYCIN PHOSPHATE 900 MG/50ML
INJECTION, SOLUTION INTRAVENOUS
Status: DISPENSED
Start: 2018-03-29

## (undated) RX ORDER — PROPOFOL 10 MG/ML
INJECTION, EMULSION INTRAVENOUS
Status: DISPENSED
Start: 2018-02-23

## (undated) RX ORDER — SODIUM CHLORIDE 9 MG/ML
INJECTION, SOLUTION INTRAVENOUS
Status: DISPENSED
Start: 2018-03-29

## (undated) RX ORDER — FENTANYL CITRATE 50 UG/ML
INJECTION, SOLUTION INTRAMUSCULAR; INTRAVENOUS
Status: DISPENSED
Start: 2019-10-24

## (undated) RX ORDER — SODIUM CHLORIDE 9 MG/ML
INJECTION, SOLUTION INTRAVENOUS
Status: DISPENSED
Start: 2019-10-24